# Patient Record
Sex: FEMALE | Race: WHITE | Employment: FULL TIME | URBAN - METROPOLITAN AREA
[De-identification: names, ages, dates, MRNs, and addresses within clinical notes are randomized per-mention and may not be internally consistent; named-entity substitution may affect disease eponyms.]

---

## 2018-07-16 ENCOUNTER — OFFICE VISIT (OUTPATIENT)
Dept: ORTHOPEDIC SURGERY | Age: 58
End: 2018-07-16

## 2018-07-16 VITALS — HEIGHT: 59 IN | WEIGHT: 105 LBS | BODY MASS INDEX: 21.17 KG/M2

## 2018-07-16 DIAGNOSIS — M75.01 ADHESIVE CAPSULITIS OF RIGHT SHOULDER: Primary | ICD-10-CM

## 2018-07-16 PROCEDURE — 99204 OFFICE O/P NEW MOD 45 MIN: CPT | Performed by: ORTHOPAEDIC SURGERY

## 2018-07-16 NOTE — PROGRESS NOTES
7/16/18  History of Present Illness:  Zonia Ponce is a 59 y.o. female here for 2nd opinion from a right shoulder problem    Location right Shoulder  Severity  Moderate  Duration more than 5 months  Associated sign/symptoms loss of motion, loss of strength, severe pain    I have reviewed and discussed the below Pain assessment findings with the patient. Medical History  Patient's medications, allergies, past medical, surgical, social and family histories were reviewed and updated as appropriate. Past Medical History:   Diagnosis Date    Cancer ([facility] [address]     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    Depression     Diabetes mellitus ([facility] [address]     Hypertension     Insulin pump in place      History reviewed. No pertinent family history. Social History     Social History    Marital status:      Spouse name: N/A    Number of children: N/A    Years of education: N/A     Social History Main Topics    Smoking status: Former Smoker     Types: Cigarettes     Quit date: 11/24/1980    Smokeless tobacco: Never Used    Alcohol use No    Drug use: No    Sexual activity: Not Asked     Other Topics Concern    None     Social History Narrative    None     Current Outpatient Prescriptions   Medication Sig Dispense Refill    losartan (COZAAR) 25 MG tablet Take 1 tablet by mouth daily. 30 tablet 3    pantoprazole (PROTONIX) 40 MG tablet Take 1 tablet by mouth 2 times daily. 60 tablet 1    Insulin Syringe-Needle U-100 ([employer] INSULIN SYRINGE) 31G X 5/16\" 0.5 ML MISC 100 each by Does not apply route. 100 each 6    trazodone (DESYREL) 50 MG tablet Take 50 mg by mouth nightly.  insulin aspart (NOVOLOG) 100 UNIT/ML injection Inject  into the skin continuous. [place] insulin pump        No current facility-administered medications for this visit.       Allergies   Allergen Reactions    Percocet [Oxycodone-Acetaminophen] Nausea And Vomiting       REVIEW OF SYSTEMS:   Pertinent items are noted in HPI  Review of systems Elbow motion finger and wrist motion is full equal bilaterally. Deep tendon reflexes of the Brachial radialis, biceps, tricepsAre all +2/4 equal bilaterally. Cervical spine range of motion is full without pain negative Spurling's test.  Load-and-shift test is negative. Crank test is negative. Apprehension and relocation is negative. Anterior and posterior glide are equal bilaterally. Negative sulcus sign. No signs of any significant multidirectional instability. There is no scapular winging. There is no muscle atrophy of the latissimus dorsi, the deltoid, the periscapular musculature,The trapezius musculature or the pectoralis musculature. Negative Neer's test, negative Russo test, no pain with crossarm elevation. Abduction --150 degrees  Flexion-- 180 degrees  Extension-- between 45-60 degrees  Latera/external  rotation --close to 90 degrees  Medial/ internal rotation -- between 70-90 degree    Cervical spine exam demonstrates no  Radiculopathy no reproduction of the symptomology. Range of motion is normal without pain or radiculopathy and does not cause shoulder pain. Cranial nerve exam:    1- smell-- patient states no Olfactory problem  2- visual acuity is intact  3- moves eyes, and pupils are reactive  4- extra-occular muscles are intact  5- facial sensation is intact no muscle atrophy  6- extra occular muscles are intact  7- mouth moist and facial expressions are intact  8- good hearing and no difficulty with recognition  9- patient has no difficulty swallowing  10- no difficulty breathing and no Gastrointestinal problems good cough   11- moves head with all motion and no swallowing problems  12- normal speech and tongue protrudes midline    Additional Examinations:  Left Upper Extremity: Examination of the left upper extremity does not show any tenderness, deformity or injury. Range of motion is unremarkable. There is no gross instability. There are no rashes, ulcerations or lesions. Strength and tone are normal.  Thoracic Spine: Examination of the thoracic spine does not show any tenderness, deformity or injury. Range of motion is unremarkable. There is no gross instability. There are no rashes, ulcerations or lesions. Strength and tone are normal.  Neck: Examination of the neck does not show any tenderness, deformity or injury. Range of motion is unremarkable. There is no gross instability. There are no rashes, ulcerations or lesions. Strength and tone are normal.        IMPRESSION:    Diagnostic testing:  MRI of the right shoulder demonstrates severe adhesive capsulitis synovitis and a full thickness rotator cuff tear    Impression no significant bony abnormality  MRI: As above  Labs: None          Past Surgical History:   Procedure Laterality Date    CHOLECYSTECTOMY      HYSTERECTOMY      SHOULDER SURGERY      UPPER GASTROINTESTINAL ENDOSCOPY  10/22/14   . Office Procedures:  No orders of the defined types were placed in this encounter. Previous Treatments:  MRI, physical therapy, X-ray, anti-inflammatories,    Differential Diagnoses: Impingement, AC joint osteoarthritis,  Rotator cuff tear, Labral tear, Instability, loose body,  Long head of bicep injury,  Glenohumeral osteoarthritis, AC joint separation, SLAP tear, Posterior labral tear, Anterior Labral tear, neck pathology, brachioplexis injury, muscle injury, neck radiculopathy, bone tumor, fracture,    Diagnosis she states her adhesive capsulitis got worse with physical therapy        Plan: (Medical Decision Making)    1. Medications - none at this time  2. PT - she is in therapy now  3. Further imaging - not necessary  4. Follow up - I spent 15+ minutes, face to face, with the patient discussing and answering questions regarding the risks, benefits, and complications of shoulder arthroscopy surgery in detail.  We talked about the arthroscopic nature of the procedure, the portals utilized and what can be done through

## 2018-07-26 ENCOUNTER — TELEPHONE (OUTPATIENT)
Dept: ORTHOPEDIC SURGERY | Age: 58
End: 2018-07-26

## 2018-08-27 DIAGNOSIS — M75.01 ADHESIVE CAPSULITIS OF RIGHT SHOULDER: Primary | ICD-10-CM

## 2018-08-27 RX ORDER — OXYCODONE HYDROCHLORIDE 10 MG/1
10 TABLET ORAL EVERY 8 HOURS PRN
Qty: 21 TABLET | Refills: 0 | Status: SHIPPED | OUTPATIENT
Start: 2018-08-31 | End: 2018-09-07

## 2018-08-27 RX ORDER — MELOXICAM 15 MG/1
15 TABLET ORAL DAILY
Qty: 30 TABLET | Refills: 3 | Status: SHIPPED | OUTPATIENT
Start: 2018-08-31

## 2018-08-31 ENCOUNTER — HOSPITAL ENCOUNTER (OUTPATIENT)
Dept: SURGERY | Age: 58
Discharge: HOME | End: 2018-08-31
Attending: ORTHOPAEDIC SURGERY | Admitting: ORTHOPAEDIC SURGERY

## 2018-08-31 VITALS
RESPIRATION RATE: 16 BRPM | OXYGEN SATURATION: 98 % | TEMPERATURE: 97.8 F | DIASTOLIC BLOOD PRESSURE: 72 MMHG | HEART RATE: 81 BPM | SYSTOLIC BLOOD PRESSURE: 129 MMHG

## 2018-08-31 LAB
GLUCOSE BLD-MCNC: 212 MG/DL (ref 70–99)
GLUCOSE BLD-MCNC: 285 MG/DL (ref 70–99)
PERFORMED ON: ABNORMAL
PERFORMED ON: ABNORMAL

## 2018-08-31 RX ORDER — DIPHENHYDRAMINE HYDROCHLORIDE 50 MG/ML
12.5 INJECTION INTRAMUSCULAR; INTRAVENOUS
Status: ACTIVE | OUTPATIENT
Start: 2018-08-31 | End: 2018-08-31

## 2018-08-31 RX ORDER — SODIUM CHLORIDE 0.9 % (FLUSH) 0.9 %
10 SYRINGE (ML) INJECTION EVERY 12 HOURS SCHEDULED
Status: DISCONTINUED | OUTPATIENT
Start: 2018-08-31 | End: 2018-09-01 | Stop reason: HOSPADM

## 2018-08-31 RX ORDER — OXYCODONE HYDROCHLORIDE 5 MG/1
5 TABLET ORAL PRN
Status: ACTIVE | OUTPATIENT
Start: 2018-08-31 | End: 2018-08-31

## 2018-08-31 RX ORDER — MEPERIDINE HYDROCHLORIDE 25 MG/ML
12.5 INJECTION INTRAMUSCULAR; INTRAVENOUS; SUBCUTANEOUS EVERY 5 MIN PRN
Status: DISCONTINUED | OUTPATIENT
Start: 2018-08-31 | End: 2018-09-01 | Stop reason: HOSPADM

## 2018-08-31 RX ORDER — SODIUM CHLORIDE 9 MG/ML
INJECTION, SOLUTION INTRAVENOUS CONTINUOUS
Status: DISCONTINUED | OUTPATIENT
Start: 2018-08-31 | End: 2018-09-01 | Stop reason: HOSPADM

## 2018-08-31 RX ORDER — OXYCODONE HYDROCHLORIDE 5 MG/1
10 TABLET ORAL PRN
Status: ACTIVE | OUTPATIENT
Start: 2018-08-31 | End: 2018-08-31

## 2018-08-31 RX ORDER — CEFAZOLIN SODIUM 2 G/100ML
2 INJECTION, SOLUTION INTRAVENOUS
Status: COMPLETED | OUTPATIENT
Start: 2018-08-31 | End: 2018-08-31

## 2018-08-31 RX ORDER — HYDROMORPHONE HCL 110MG/55ML
0.25 PATIENT CONTROLLED ANALGESIA SYRINGE INTRAVENOUS EVERY 5 MIN PRN
Status: DISCONTINUED | OUTPATIENT
Start: 2018-08-31 | End: 2018-09-01 | Stop reason: HOSPADM

## 2018-08-31 RX ORDER — SODIUM CHLORIDE 0.9 % (FLUSH) 0.9 %
10 SYRINGE (ML) INJECTION PRN
Status: DISCONTINUED | OUTPATIENT
Start: 2018-08-31 | End: 2018-09-01 | Stop reason: HOSPADM

## 2018-08-31 RX ORDER — PROMETHAZINE HYDROCHLORIDE 25 MG/ML
6.25 INJECTION, SOLUTION INTRAMUSCULAR; INTRAVENOUS PRN
Status: DISCONTINUED | OUTPATIENT
Start: 2018-08-31 | End: 2018-09-01 | Stop reason: HOSPADM

## 2018-08-31 RX ORDER — LABETALOL HYDROCHLORIDE 5 MG/ML
5 INJECTION, SOLUTION INTRAVENOUS EVERY 10 MIN PRN
Status: DISCONTINUED | OUTPATIENT
Start: 2018-08-31 | End: 2018-09-01 | Stop reason: HOSPADM

## 2018-08-31 RX ORDER — HYDROMORPHONE HCL 110MG/55ML
0.5 PATIENT CONTROLLED ANALGESIA SYRINGE INTRAVENOUS EVERY 5 MIN PRN
Status: DISCONTINUED | OUTPATIENT
Start: 2018-08-31 | End: 2018-09-01 | Stop reason: HOSPADM

## 2018-08-31 RX ORDER — FENTANYL CITRATE 50 UG/ML
50 INJECTION, SOLUTION INTRAMUSCULAR; INTRAVENOUS EVERY 5 MIN PRN
Status: DISCONTINUED | OUTPATIENT
Start: 2018-08-31 | End: 2018-09-01 | Stop reason: HOSPADM

## 2018-08-31 RX ADMIN — CEFAZOLIN SODIUM 2 G: 2 INJECTION, SOLUTION INTRAVENOUS at 07:43

## 2018-08-31 RX ADMIN — SODIUM CHLORIDE: 9 INJECTION, SOLUTION INTRAVENOUS at 07:07

## 2018-08-31 ASSESSMENT — PAIN DESCRIPTION - DESCRIPTORS: DESCRIPTORS: ACHING;SHARP;SHOOTING

## 2018-08-31 NOTE — PROGRESS NOTES
Discharge instructions reviewed with patient/responsible adult. All home medications have been reviewed, questions answered and patient and spouse verbalized understanding. Discharge instructions signed and copies given. Patient discharged  Per w/c with belongings.

## 2018-08-31 NOTE — ANESTHESIA POST-OP
[address] [facility] Anesthesiology  Post-Anesthesia Note       Name:  Jeannette Lion                                         Age:  59 y.o. MRN:  [id number]     Last Vitals & Oxygen Saturation: /72   Pulse 81   Temp 97.8 °F (36.6 °C) (Temporal)   Resp 16   LMP  (LMP Unknown) Comment: had a hysterectomy a long time ago  SpO2 98%   No data found. Level of consciousness:  Awake, alert    Respiratory: Respirations easy, no distress. Stable. Cardiovascular: Hemodynamically stable. Hydration: Adequate. PONV: Adequately managed. Post-op pain: Adequately controlled. Post-op assessment: Tolerated anesthetic well without complication. Complications:  None.     Josue Helms MD  August 31, 2018   2:46 PM

## 2018-08-31 NOTE — OP NOTE
bipolar both posterior superiorly and anteriorly. The undersurface rotator cuff fraying was removed 1st with a shaver then the edges were smoothed off with the bipolar. Any chondral surface fraying was removed either with the shaver, bipolar or probe. Following this we went ahead and did a standard anterior capsular release with a shaver and a bipolar once this was completely released I did a manipulation under anesthesia to get full range of motion full forward flexion full external rotation and full internal rotation full abduction. Once this was finished I felt very good about the range of motion and there was no labral tear or injury no rotator cuff tear. Having done the capsular release and knee manipulation under anesthesia I went ahead and did a irrigation and cauterized any leading tissue and removed all instruments from the glenohumeral joint itself. Following my standard diagnostic arthroscopy the cannula was removed from the glenohumeral joint. The blunt trocar was inserted into the cannula and through the posterior portal the cannula was entered into the subacromial space without any difficulty. Now under direct visualization we could see there was moderate bursitis, synovitis, a subacromial spur and distal clavicle spur. Under direct visualization I put a 18-gauge spinal needle laterally through the deltoid into the subacromial space, liking this location I made my portal with a sharp scalpel and a blunt trocar. At this point I entered the subacromial space with a shaver and I removed all the bursa tissue synovial tissue and tissue lining the subacromial joint space and also the bone spur and around the distal clavicle. I used the bipolar to remove all soft tissue from the undersurface bone spur and the distal clavicle spur.        I removed the bipolar tissue ablator and entered with the 5.5 mm  barrel bur and proceeded to perform a generous subacromial decompression through the lateral portal and through the posterior portal.      Next I went ahead and addressed the distal clavicle with a shaver and a bipolar through the lateral portal and then resecting 5 mm to the level of the subacromial decompression. I also used the shaver the bipolar and the 5.5 mm barrel bur to undermine the distal clavicle to make sure there was no impingement on the rotator cuff tissue. I now removed the 5.5 mm barrel bur from the lateral portal and I made an anterior portal with a blunt trocar and entered with a shaver then a bipolar and I finished off the distal clavicle to the capsule with a 5.5 mm barrel bur. The subacromial space was visualized through the anterior portal lateral portal and the posterior portal into bleeding tissue was cauterized any loose debris was removed with the shaver once all this was performed I removed all instruments from the subacromial space. At this point all instruments were removed from the shoulder each portal was closed with a simple interrupted suture. Each portal was covered with a sterile Band-Aid sterile gauze and ABD then tape. The patient was brought to recovery in stable condition. The Patient was given typewritten instructions for postoperative care. The patient was given exercises, pain medication, anti-inflammatory medication, a follow-up appointment in the office in 1 week. The patient was given instructions and a number to call if there is any concerns or problems. The patient will be discharged to home from the postoperative area when in stable condition and understands the instructions. _____________________         Radha Giraldo MS, DO         Orthopedic Surgeon          [facility] trained         Board-certified         Team Physician for [employer]

## 2018-08-31 NOTE — ANESTHESIA PRE-OP
[address] [facility] Anesthesiology  Pre-Anesthesia Evaluation/Consultation       Name:  Mariusz Acosta                                         Age:  59 y.o. MRN:    [id number]           Procedure (Scheduled): SHOULDER ARTHROSCOPY ROTATOR CUFF   Surgeon:     Dr. Mary Du DO     Allergies   Allergen Reactions    Percocet [Oxycodone-Acetaminophen] Nausea And Vomiting     Patient states she can take percocet     Patient Active Problem List   Diagnosis    DKA (diabetic ketoacidoses) ([address]    HTN (hypertension)    Depression    Hyponatremia    Nausea & vomiting    Duodenal erosion    Adhesive capsulitis of right shoulder     Past Medical History:   Diagnosis Date    Cancer ([facility] [address]     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    Depression     Diabetes mellitus ([facility] [address]     Hx of radiation therapy     melanoma right eye    Hypertension     Insulin pump in place     Prolonged emergence from general anesthesia     slow to wake up     Past Surgical History:   Procedure Laterality Date    CARPAL TUNNEL RELEASE Bilateral 12/2017    CHOLECYSTECTOMY      EYE SURGERY Right     biopsy    HYSTERECTOMY      LIPOMA RESECTION      SHOULDER SURGERY      UPPER GASTROINTESTINAL ENDOSCOPY  10/22/14     Social History   Substance Use Topics    Smoking status: Former Smoker     Packs/day: 1.00     Years: 10.00     Types: Cigarettes     Quit date: 11/24/1980    Smokeless tobacco: Never Used    Alcohol use No       Prior to Admission medications    Medication Sig Start Date End Date Taking? Authorizing Provider   meloxicam (MOBIC) 15 MG tablet Take 1 tablet by mouth daily 8/31/18   Mary Du DO   oxyCODONE HCl (OXY-IR) 10 MG immediate release tablet Take 1 tablet by mouth every 8 hours as needed for Pain for up to 7 days. Shaniqua Loera Date: 8/31/18 8/31/18 9/7/18  Mary Du DO   insulin regular (HUMULIN R) 100 UNIT/ML injection Inject into the skin See Admin Instructions Per insulin pump    Historical Provider, MD 5 mg Intravenous Q10 Min PRN Yanira Dozier MD        meperidine (DEMEROL) injection 12.5 mg  12.5 mg Intravenous Q5 Min PRN Yanira Dozier MD           Vital Signs  (Current) There were no vitals filed for this visit. (for past 48 hrs)  No Data Recorded  (last three values)   BP Readings from Last 3 Encounters:   10/24/14 138/67   11/24/12 155/80   05/29/12 150/85       CBC  Lab Results   Component Value Date    WBC 9.2 10/24/2014    RBC 4.17 10/24/2014    HGB 12.6 10/24/2014    HCT 37.0 10/24/2014    MCV 88.7 10/24/2014    RDW 12.8 10/24/2014     10/24/2014       CMP    Lab Results   Component Value Date     10/24/2014    K 3.1 10/24/2014    CL 97 10/24/2014    CO2 33 10/24/2014    BUN 5 10/24/2014    CREATININE 0.9 10/24/2014    GFRAA >60 10/24/2014    GFRAA >60 05/29/2012    AGRATIO 1.4 12/07/2011    LABGLOM >60 10/24/2014    GLUCOSE 52 10/24/2014    PROT 6.5 10/23/2014    PROT 7.8 05/25/2012    CALCIUM 9.1 10/24/2014    BILITOT 0.6 10/23/2014    ALKPHOS 105 10/23/2014    AST 15 10/23/2014    ALT 11 10/23/2014       BMP    Lab Results   Component Value Date     10/24/2014    K 3.1 10/24/2014    CL 97 10/24/2014    CO2 33 10/24/2014    BUN 5 10/24/2014    CREATININE 0.9 10/24/2014    CALCIUM 9.1 10/24/2014    GFRAA >60 10/24/2014    GFRAA >60 05/29/2012    LABGLOM >60 10/24/2014    GLUCOSE 52 10/24/2014       Coags   No results found for: PROTIME, INR, APTT    HCG (If Applicable)   Lab Results   Component Value Date    PREGTESTUR Neg 05/25/2012        ABGs  Lab Results   Component Value Date    PHART 7.291 10/19/2014    PO2ART 93.1 10/19/2014    OMC1APR 32.7 10/19/2014    FLO2TWJ 15.4 10/19/2014    BEART -10.1 10/19/2014    L2UYVPCZ 96.9 10/19/2014        Type & Screen (If Applicable)  No results found for: LABABO, LABRH      POCGlucose  No results for input(s): GLUCOSE in the last 72 hours.      NPO Status  > 8 hours                       BMI  There is no height or weight on file to calculate BMI. Estimated body mass index is 21.61 kg/m² as calculated from the following:    Height as of 8/27/18: 4' 11\" (1.499 m). Weight as of 8/27/18: 107 lb (48.5 kg). Additional Testing (Echo, Stress, ECG, PFTs, etc)        Anesthesia Evaluation    Airway: Mallampati: II  TM distance: >3 FB   Neck ROM: full  Mouth opening: > = 3 FB Dental:          Pulmonary:                              Cardiovascular:    (+) hypertension:,         Rhythm: regular  Rate: normal                    Neuro/Psych:   (+) psychiatric history:            GI/Hepatic/Renal:   (+) PUD,           Endo/Other:                     Abdominal:           Vascular:                                        Anesthesia Plan      general     ASA 2       Induction: intravenous. Anesthetic plan and risks discussed with patient. Plan discussed with CRNA. DOS STAFF ADDENDUM:    Pt seen and examined, chart reviewed (including anesthesia, drug and allergy history). No interval changes to history and physical examination. Anesthetic plan, risks, benefits, alternatives, and personnel involved discussed with patient. Patient verbalized an understanding and agrees to proceed.       Jennifer Lam MD  August 31, 2018  6:33 AM

## 2018-09-04 DIAGNOSIS — M75.01 ADHESIVE CAPSULITIS OF RIGHT SHOULDER: Primary | ICD-10-CM

## 2018-09-05 ENCOUNTER — HOSPITAL ENCOUNTER (OUTPATIENT)
Dept: PHYSICAL THERAPY | Age: 58
Discharge: HOME | End: 2018-09-06
Admitting: ORTHOPAEDIC SURGERY

## 2018-09-05 NOTE — FLOWSHEET NOTE
[address], [facility]    Physical Therapy Daily Treatment Note  Date:  2018    Patient Name:  Irina Santos    :  1960  MRN: [id number]  Restrictions/Precautions:    Medical/Treatment Diagnosis Information:  · Diagnosis: s/p R shoulder scope with capsular release  · Treatment Diagnosis: R shoulder pain  Insurance/Certification information:  PT Insurance Information: [place], deductible met, 100% coverage, $30 copay, 60 OP visits  Physician Information:  Referring Practitioner: Jen Arana DO  Plan of care signed (Y/N):     Date of Patient follow up with Physician:     G-Code (if applicable):      Date G-Code Applied:    PT G-Codes  Functional Assessment Tool Used: QuickDASH  Score: 48% disability  Functional Limitation: Carrying, moving and handling objects  Carrying, Moving and Handling Objects Current Status ():  At least 40 percent but less than 60 percent impaired, limited or restricted  Carrying, Moving and Handling Objects Goal Status (): 0 percent impaired, limited or restricted    Progress Note: [x]  Yes  []  No  Next due by: Visit #10      Latex Allergy:  [x]NO      []YES  Preferred Language for Healthcare:   [x]English       []other:    Visit # Insurance Allowable   1 60  $30 copay     Pain level:  3/10     SUBJECTIVE:  See eval    OBJECTIVE: See eval  Observation:   Test measurements:      RESTRICTIONS/PRECAUTIONS: SHANI    Exercises/Interventions:   Therapeutic Ex 10' Wt / Resistance Sets/sec Reps Notes   UBE       Cane AAROM flex  2 10 10x underhand   Wand ER  10\" 10           scap depression std  3\" 20    scap retraction  3\" 20    HBB strap stretch  10\" 10                                                                                        Manual Intervention 15'       Shld /GH Mobs  5 min     Post Cap mobs       Thoracic/Rib manipualtion       CT MT/Mobs       PROM MT  10 min                   NMR re-education       T-spine swelling/inflammation/restriction, improving soft tissue extensibility and allowing for proper ROM for normal function with self care, reaching, carrying, lifting, house/yardwork, driving/computer work    Modalities:  Cold pack with TENS x 15 min    Charges:  Timed Code Treatment Minutes: eval +35   Total Treatment Minutes: 60     [x] EVAL  [x] IC(48552) x  1   [] IONTO  [] NMR (81926) x      [] VASO  [x] Manual (72249) x  1    [] Other:  [] TA x       [] Mech Traction (99028)  [] ES(attended) (54849)      [x] ES (un) (46839):     GOALS:  Patient stated goal: less pain     Therapist goals for Patient:   Short Term Goals: To be achieved in: 2 weeks  1. Independent in HEP and progression per patient tolerance, in order to prevent re-injury. 2. Patient will have a decrease in pain to facilitate improvement in movement, function, and ADLs as indicated by Functional Deficits.     Long Term Goals: To be achieved in: 6 weeks  1. Disability index score of 10% or less for the DASH to assist with reaching prior level of function. 2. Patient will demonstrate increased AROM to [facility] to allow for proper joint functioning as indicated by patients Functional Deficits. 3. Patient will demonstrate an increase in Strength to 3+/5 to allow for proper functional mobility as indicated by patients Functional Deficits. 4. Patient will return to dressing/ADLs functional activities without increased symptoms or restriction. 5. Pt will return to reaching high level shelf at home without pain         Progression Towards Functional goals:  [] Patient is progressing as expected towards functional goals listed. [] Progression is slowed due to complexities listed. [] Progression has been slowed due to co-morbidities.   [x] Plan just implemented, too soon to assess goals progression  [] Other:     ASSESSMENT:  See eval    Treatment/Activity Tolerance:  [x] Patient tolerated treatment well [] Patient limited by santiago  [] Patient

## 2018-09-05 NOTE — PLAN OF CARE
(ROS):  [x]Performed Review of systems (Integumentary, CardioPulmonary, Neurological) by intake and observation. Intake form has been scanned into medical record. Patient has been instructed to contact their primary care physician regarding ROS issues if not already being addressed at this time. Co-morbidities/Complexities (which will affect course of rehabilitation):   []None           Arthritic conditions   []Rheumatoid arthritis (M05.9)  []Osteoarthritis (M19.91)   Cardiovascular conditions   []Hypertension (I10)  []Hyperlipidemia (E78.5)  []Angina pectoris (I20)  []Atherosclerosis (I70)   Musculoskeletal conditions   []Disc pathology   []Congenital spine pathologies   []Prior surgical intervention  []Osteoporosis (M81.8)  []Osteopenia (M85.8)   Endocrine conditions   []Hypothyroid (E03.9)  []Hyperthyroid Gastrointestinal conditions   []Constipation (F44.90)   Metabolic conditions   []Morbid obesity (E66.01)  [x]Diabetes type 1(E10.65) or 2 (E11.65)   []Neuropathy (G60.9)     Pulmonary conditions   []Asthma (J45)  []Coughing   []COPD (J44.9)   Psychological Disorders  []Anxiety (F41.9)  []Depression (F32.9)   []Other:   [x]Other:     H/o melanoma,      Barriers to/and or personal factors that will affect rehab potential:              []Age  []Sex              []Motivation/Lack of Motivation                        [x]Co-Morbidities              []Cognitive Function, education/learning barriers              []Environmental, home barriers              []profession/work barriers  []past PT/medical experience  []other:  Justification: DM     Falls Risk Assessment (30 days):   [x] Falls Risk assessed and no intervention required.   [] Falls Risk assessed and Patient requires intervention due to being higher risk   TUG score (>12s at risk):     [] Falls education provided, including       G-Codes:  PT G-Codes  Functional Assessment Tool Used: QuickDASH  Score: 48% disability  Functional Limitation: Carrying, moving and handling objects  Carrying, Moving and Handling Objects Current Status (): At least 40 percent but less than 60 percent impaired, limited or restricted  Carrying, Moving and Handling Objects Goal Status (): 0 percent impaired, limited or restricted    ASSESSMENT: s/p R shoulder SHANI and scope 8/31/18  Functional Impairments   [x]Noted spinal or UE joint hypomobility   []Noted spinal or UE joint hypermobility   [x]Decreased UE functional ROM   [x]Decreased UE functional strength   []Abnormal reflexes/sensation/myotomal/dermatomal deficits   [x]Decreased RC/scapular/core strength and neuromuscular control   []other:      Functional Activity Limitations (from functional questionnaire and intake)   []Reduced ability to tolerate prolonged functional positions   []Reduced ability or difficulty with changes of positions or transfers between positions   []Reduced ability to maintain good posture and demonstrate good body mechanics with sitting, bending, and lifting   [x] Reduced ability or tolerance with driving and/or computer work   [x]Reduced ability to sleep   [x]Reduced ability to perform lifting, reaching, carrying tasks   []Reduced ability to tolerate impact through UE   [x]Reduced ability to reach behind back   [x]Reduced ability to  or hold objects   []Reduced ability to throw or toss an object   []other:    Participation Restrictions   [x]Reduced participation in self care activities   [x]Reduced participation in home management activities   [x]Reduced participation in work activities   [x]Reduced participation in social activities. []Reduced participation in sport/recreation activities. Classification:   [x]Signs/symptoms consistent with post-surgical status including decreased ROM, strength and function.   []Signs/symptoms consistent with joint sprain/strain   []Signs/symptoms consistent with shoulder impingement   []Signs/symptoms consistent with shoulder/elbow/wrist

## 2018-09-06 ENCOUNTER — OFFICE VISIT (OUTPATIENT)
Dept: ORTHOPEDIC SURGERY | Age: 58
End: 2018-09-06

## 2018-09-06 VITALS — BODY MASS INDEX: 21.57 KG/M2 | WEIGHT: 107 LBS | HEIGHT: 59 IN

## 2018-09-06 DIAGNOSIS — M75.01 ADHESIVE CAPSULITIS OF RIGHT SHOULDER: Primary | ICD-10-CM

## 2018-09-06 PROCEDURE — 99024 POSTOP FOLLOW-UP VISIT: CPT | Performed by: ORTHOPAEDIC SURGERY

## 2018-09-06 NOTE — PROGRESS NOTES
History of Present of Illness:  S/P Shoulder Arthroscopy   The patient returns today for right shoulder evaluation 1 week after shoulder arthroscopy. Examination:  Inspection reveals warm, dry, intact skin. There is no adenopathy. The distal neurovascular exam is grossly intact. Examination of the contralateral shoulder reveals no atrophy or deformity. The skin is warm and dry. Range of motion is within normal limits. There is no focal tenderness with palpation. Provocative SLAP, biceps tension, apprehension AC joint or rotator cuff tests are negative. Strength is graded 5/5 in all muscle groups. The distal neurovascular exam is grossly intact. Cervical spine: The skin is warm and dry. There is no swelling, warmth, or erythema. Range of motion is within normal limits. There is no paraspinal or spinous process tenderness. Spurling's sign is negative and did not produce shoulder pain. The distal neurovascular exam is grossly intact. Diagnostic Test Findings:    No orders of the defined types were placed in this encounter. Treatment Plan:  She's doing therapy for her adhesive capsulitis doing very well        Disclaimer: \"This note was dictated with voice recognition software. Though review and correction are routine, we apologize for any errors. \"

## 2018-09-07 ENCOUNTER — HOSPITAL ENCOUNTER (OUTPATIENT)
Dept: PHYSICAL THERAPY | Age: 58
Discharge: HOME | End: 2018-09-08
Admitting: ORTHOPAEDIC SURGERY

## 2018-09-07 NOTE — FLOWSHEET NOTE
min    Charges:  Timed Code Treatment Minutes: 35   Total Treatment Minutes: 50     [] EVAL  [x] SC(89939) x  1   [] IONTO  [] NMR (14470) x      [] VASO  [x] Manual (50223) x  1    [] Other:  [] TA x       [] Mech Traction (60253)  [] ES(attended) (10798)      [x] ES (un) (39223):     GOALS:  Patient stated goal: less pain     Therapist goals for Patient:   Short Term Goals: To be achieved in: 2 weeks  1. Independent in HEP and progression per patient tolerance, in order to prevent re-injury. 2. Patient will have a decrease in pain to facilitate improvement in movement, function, and ADLs as indicated by Functional Deficits.     Long Term Goals: To be achieved in: 6 weeks  1. Disability index score of 10% or less for the DASH to assist with reaching prior level of function. 2. Patient will demonstrate increased AROM to [facility] to allow for proper joint functioning as indicated by patients Functional Deficits. 3. Patient will demonstrate an increase in Strength to 3+/5 to allow for proper functional mobility as indicated by patients Functional Deficits. 4. Patient will return to dressing/ADLs functional activities without increased symptoms or restriction. 5. Pt will return to reaching high level shelf at home without pain         Progression Towards Functional goals:  [] Patient is progressing as expected towards functional goals listed. [] Progression is slowed due to complexities listed. [] Progression has been slowed due to co-morbidities. [x] Plan just implemented, too soon to assess goals progression  [] Other:     ASSESSMENT:  Added exercises this date including theraband exercises and bent over rows. Continue to work on improving active strength and motion as well as passive motion toward end ranges for improved reaching, lifting, carrying, self care and household tasks with improved function and less pain.      Treatment/Activity Tolerance:  [x] Patient tolerated treatment well [] Patient limited

## 2018-09-13 ENCOUNTER — HOSPITAL ENCOUNTER (OUTPATIENT)
Dept: PHYSICAL THERAPY | Age: 58
Discharge: HOME | End: 2018-09-14
Admitting: ORTHOPAEDIC SURGERY

## 2018-09-13 NOTE — FLOWSHEET NOTE
[facility] [address], [employer]    Physical Therapy Daily Treatment Note  Date:  2018    Patient Name:  Anny Amado    :  1960  MRN: [id number]  Restrictions/Precautions:    Medical/Treatment Diagnosis Information:  · Diagnosis: s/p R shoulder scope with capsular release  · Treatment Diagnosis: R shoulder pain  Insurance/Certification information:  PT Insurance Information: [place], deductible met, 100% coverage, $30 copay, 60 OP visits  Physician Information:  Referring Practitioner: Terrell Tosacno DO  Plan of care signed (Y/N):     Date of Patient follow up with Physician:     G-Code (if applicable):      Date G-Code Applied:         Progress Note: [x]  Yes  []  No  Next due by: Visit #10      Latex Allergy:  [x]NO      []YES  Preferred Language for Healthcare:   [x]English       []other:    Visit # Insurance Allowable   4 60  $30 copay     Pain level:  3/10     SUBJECTIVE: A little sore today from cleaning house and was a little sore after last visit. No other issues with upgraded HEP last visit. Pt states feeling she has made about 80% improvement overall since starting therapy.       OBJECTIVE: See eval  Observation:   Test measurements:      RESTRICTIONS/PRECAUTIONS: SHANI    Exercises/Interventions:   Therapeutic Ex Wt / Resistance Sets/sec Reps Notes   pully  6'     Cane AAROM flex  2 10 10x underhand   Wand ER  10\" 10    Sleeper stretch  30\" 3    scap depression std  3\" 20    scap retraction  3\" 20    HBB strap stretch  20\" 5           theraband rows high/mid green 3 10    theraband extension green 3 10    Bent over rows  3 10           Prone extension  2 10    Prone HAB  2 10    Supine diagonals NPV                                  Manual Intervention       Shld /GH Mobs  5 min     Scapular mobs  5'     PROM MT  20 min            NMR re-education       Scapular isometrics  NPV     Body blade       Wall ball roll       Wall Ball bounce tasks and progress toward goals.      Treatment/Activity Tolerance:  [x] Patient tolerated treatment well [] Patient limited by fatique  [] Patient limited by pain  [] Patient limited by other medical complications  [] Other:     Prognosis: [x] Good [] Fair  [] Poor    Patient Requires Follow-up: [x] Yes  [] No    PLAN: See eval  [] Continue per plan of care [] Alter current plan (see comments)  [x] Plan of care initiated [] Hold pending MD visit [] Discharge    Electronically signed by: Karol Baldwin [id number]

## 2018-09-21 ENCOUNTER — HOSPITAL ENCOUNTER (OUTPATIENT)
Dept: PHYSICAL THERAPY | Age: 58
Discharge: HOME | End: 2018-09-22
Admitting: ORTHOPAEDIC SURGERY

## 2018-09-21 NOTE — FLOWSHEET NOTE
santiago  [] Patient limited by pain  [] Patient limited by other medical complications  [] Other:     Prognosis: [x] Good [] Fair  [] Poor    Patient Requires Follow-up: [x] Yes  [] No    PLAN: See eval  [] Continue per plan of care [] Alter current plan (see comments)  [x] Plan of care initiated [] Hold pending MD visit [] Discharge    Electronically signed by: Criselda Núñez [id number]

## 2018-09-27 ENCOUNTER — HOSPITAL ENCOUNTER (OUTPATIENT)
Dept: PHYSICAL THERAPY | Age: 58
Setting detail: THERAPIES SERIES
Discharge: HOME | End: 2018-09-27
Payer: COMMERCIAL

## 2018-09-27 NOTE — FLOWSHEET NOTE
[facility]    Physical Therapy  Cancellation/No-show Note  Patient Name:  Nancy Miller  :  1960   Date:  2018  Cancelled visits to date: 1  No-shows to date: 0    For today's appointment patient:  [x]  Cancelled  []  Rescheduled appointment  []  No-show     Reason given by patient:  []  Patient ill  []  Conflicting appointment  []  No transportation    []  Conflict with work  [x]  No reason given  []  Other:     Comments:      Electronically signed by:   Lexa Navarro PT

## 2018-10-15 ENCOUNTER — OFFICE VISIT (OUTPATIENT)
Dept: ORTHOPEDIC SURGERY | Age: 58
End: 2018-10-15

## 2018-10-15 VITALS
HEIGHT: 59 IN | SYSTOLIC BLOOD PRESSURE: 130 MMHG | BODY MASS INDEX: 21.6 KG/M2 | WEIGHT: 107.14 LBS | DIASTOLIC BLOOD PRESSURE: 77 MMHG

## 2018-10-15 DIAGNOSIS — M75.01 ADHESIVE CAPSULITIS OF RIGHT SHOULDER: Primary | ICD-10-CM

## 2018-10-15 PROCEDURE — 99024 POSTOP FOLLOW-UP VISIT: CPT | Performed by: ORTHOPAEDIC SURGERY

## 2019-09-20 ENCOUNTER — HOSPITAL ENCOUNTER (OUTPATIENT)
Dept: WOMENS IMAGING | Age: 59
Discharge: HOME | End: 2019-09-20
Payer: COMMERCIAL

## 2019-09-20 DIAGNOSIS — Z12.31 VISIT FOR SCREENING MAMMOGRAM: ICD-10-CM

## 2019-09-20 PROCEDURE — 77067 SCR MAMMO BI INCL CAD: CPT

## 2019-09-23 ENCOUNTER — HOSPITAL ENCOUNTER (OUTPATIENT)
Dept: ULTRASOUND IMAGING | Age: 59
Discharge: HOME | End: 2019-09-23
Payer: COMMERCIAL

## 2019-09-23 DIAGNOSIS — R92.8 ABNORMAL MAMMOGRAM: ICD-10-CM

## 2019-09-23 PROCEDURE — 76642 ULTRASOUND BREAST LIMITED: CPT

## 2019-09-24 ENCOUNTER — HOSPITAL ENCOUNTER (OUTPATIENT)
Dept: ULTRASOUND IMAGING | Age: 59
Discharge: HOME | End: 2019-09-24
Payer: COMMERCIAL

## 2019-09-24 ENCOUNTER — HOSPITAL ENCOUNTER (OUTPATIENT)
Dept: MAMMOGRAPHY | Age: 59
Discharge: HOME | End: 2019-09-24
Payer: COMMERCIAL

## 2019-09-24 DIAGNOSIS — N63.0 BREAST MASS: ICD-10-CM

## 2019-09-24 PROCEDURE — 88305 TISSUE EXAM BY PATHOLOGIST: CPT

## 2019-09-24 PROCEDURE — 2709999900 US BREAST BIOPSY W LOC DEVICE 1ST LESION LEFT

## 2019-09-24 PROCEDURE — 77065 DX MAMMO INCL CAD UNI: CPT

## 2019-10-21 ENCOUNTER — OFFICE VISIT (OUTPATIENT)
Dept: ORTHOPEDIC SURGERY | Age: 59
End: 2019-10-21
Payer: COMMERCIAL

## 2019-10-21 VITALS — HEIGHT: 59 IN | WEIGHT: 107.14 LBS | BODY MASS INDEX: 21.6 KG/M2

## 2019-10-21 DIAGNOSIS — M25.521 BILATERAL ELBOW JOINT PAIN: Primary | ICD-10-CM

## 2019-10-21 DIAGNOSIS — M25.522 BILATERAL ELBOW JOINT PAIN: Primary | ICD-10-CM

## 2019-10-21 PROCEDURE — MISCD86 TENNIS ELBOW STRAP-BREG: Performed by: ORTHOPAEDIC SURGERY

## 2019-10-21 PROCEDURE — 99214 OFFICE O/P EST MOD 30 MIN: CPT | Performed by: ORTHOPAEDIC SURGERY

## 2019-10-29 ENCOUNTER — HOSPITAL ENCOUNTER (OUTPATIENT)
Dept: PHYSICAL THERAPY | Age: 59
Setting detail: THERAPIES SERIES
Discharge: HOME | End: 2019-10-29
Payer: COMMERCIAL

## 2019-10-29 PROCEDURE — 97163 PT EVAL HIGH COMPLEX 45 MIN: CPT

## 2019-10-29 PROCEDURE — 97140 MANUAL THERAPY 1/> REGIONS: CPT

## 2019-10-29 PROCEDURE — 97032 APPL MODALITY 1+ESTIM EA 15: CPT

## 2019-10-29 PROCEDURE — 97110 THERAPEUTIC EXERCISES: CPT

## 2019-10-31 ENCOUNTER — HOSPITAL ENCOUNTER (OUTPATIENT)
Dept: PHYSICAL THERAPY | Age: 59
Setting detail: THERAPIES SERIES
Discharge: HOME | End: 2019-10-31
Payer: COMMERCIAL

## 2019-10-31 PROCEDURE — 97140 MANUAL THERAPY 1/> REGIONS: CPT

## 2019-10-31 PROCEDURE — 97110 THERAPEUTIC EXERCISES: CPT

## 2019-10-31 PROCEDURE — 97032 APPL MODALITY 1+ESTIM EA 15: CPT

## 2019-11-05 ENCOUNTER — HOSPITAL ENCOUNTER (OUTPATIENT)
Dept: PHYSICAL THERAPY | Age: 59
Setting detail: THERAPIES SERIES
Discharge: HOME | End: 2019-11-05
Payer: COMMERCIAL

## 2019-11-05 PROCEDURE — 97032 APPL MODALITY 1+ESTIM EA 15: CPT

## 2019-11-05 PROCEDURE — 97140 MANUAL THERAPY 1/> REGIONS: CPT

## 2019-11-05 PROCEDURE — 97110 THERAPEUTIC EXERCISES: CPT

## 2019-11-07 ENCOUNTER — HOSPITAL ENCOUNTER (OUTPATIENT)
Dept: PHYSICAL THERAPY | Age: 59
Setting detail: THERAPIES SERIES
Discharge: HOME | End: 2019-11-07
Payer: COMMERCIAL

## 2019-11-12 ENCOUNTER — HOSPITAL ENCOUNTER (OUTPATIENT)
Dept: PHYSICAL THERAPY | Age: 59
Setting detail: THERAPIES SERIES
Discharge: HOME | End: 2019-11-12
Payer: COMMERCIAL

## 2019-11-12 PROCEDURE — 97032 APPL MODALITY 1+ESTIM EA 15: CPT

## 2019-11-12 PROCEDURE — 97110 THERAPEUTIC EXERCISES: CPT

## 2019-11-12 PROCEDURE — 97140 MANUAL THERAPY 1/> REGIONS: CPT

## 2019-11-14 ENCOUNTER — APPOINTMENT (OUTPATIENT)
Dept: PHYSICAL THERAPY | Age: 59
End: 2019-11-14
Payer: COMMERCIAL

## 2019-11-19 ENCOUNTER — HOSPITAL ENCOUNTER (OUTPATIENT)
Dept: PHYSICAL THERAPY | Age: 59
Setting detail: THERAPIES SERIES
Discharge: HOME | End: 2019-11-19
Payer: COMMERCIAL

## 2019-11-21 ENCOUNTER — APPOINTMENT (OUTPATIENT)
Dept: PHYSICAL THERAPY | Age: 59
End: 2019-11-21
Payer: COMMERCIAL

## 2019-11-26 ENCOUNTER — HOSPITAL ENCOUNTER (OUTPATIENT)
Dept: PHYSICAL THERAPY | Age: 59
Setting detail: THERAPIES SERIES
Discharge: HOME | End: 2019-11-26
Payer: COMMERCIAL

## 2019-11-26 PROCEDURE — 97110 THERAPEUTIC EXERCISES: CPT

## 2019-11-26 PROCEDURE — 97140 MANUAL THERAPY 1/> REGIONS: CPT

## 2019-12-03 ENCOUNTER — HOSPITAL ENCOUNTER (OUTPATIENT)
Dept: PHYSICAL THERAPY | Age: 59
Setting detail: THERAPIES SERIES
Discharge: HOME | End: 2019-12-03
Payer: COMMERCIAL

## 2019-12-05 ENCOUNTER — HOSPITAL ENCOUNTER (OUTPATIENT)
Dept: PHYSICAL THERAPY | Age: 59
Setting detail: THERAPIES SERIES
Discharge: HOME | End: 2019-12-05
Payer: COMMERCIAL

## 2019-12-05 PROCEDURE — 97032 APPL MODALITY 1+ESTIM EA 15: CPT

## 2019-12-05 PROCEDURE — 97140 MANUAL THERAPY 1/> REGIONS: CPT

## 2019-12-05 PROCEDURE — 97110 THERAPEUTIC EXERCISES: CPT

## 2019-12-10 ENCOUNTER — HOSPITAL ENCOUNTER (OUTPATIENT)
Dept: PHYSICAL THERAPY | Age: 59
Setting detail: THERAPIES SERIES
Discharge: HOME | End: 2019-12-10
Payer: COMMERCIAL

## 2019-12-17 ENCOUNTER — HOSPITAL ENCOUNTER (OUTPATIENT)
Dept: PHYSICAL THERAPY | Age: 59
Setting detail: THERAPIES SERIES
Discharge: HOME | End: 2019-12-17
Payer: COMMERCIAL

## 2019-12-17 PROCEDURE — 97110 THERAPEUTIC EXERCISES: CPT

## 2019-12-17 PROCEDURE — 97140 MANUAL THERAPY 1/> REGIONS: CPT

## 2019-12-19 ENCOUNTER — HOSPITAL ENCOUNTER (OUTPATIENT)
Dept: PHYSICAL THERAPY | Age: 59
Setting detail: THERAPIES SERIES
Discharge: HOME | End: 2019-12-19
Payer: COMMERCIAL

## 2019-12-19 PROCEDURE — 97140 MANUAL THERAPY 1/> REGIONS: CPT

## 2019-12-19 PROCEDURE — 97110 THERAPEUTIC EXERCISES: CPT

## 2019-12-19 PROCEDURE — 97032 APPL MODALITY 1+ESTIM EA 15: CPT

## 2019-12-23 ENCOUNTER — OFFICE VISIT (OUTPATIENT)
Dept: ORTHOPEDIC SURGERY | Age: 59
End: 2019-12-23
Payer: COMMERCIAL

## 2019-12-23 VITALS — WEIGHT: 107 LBS | BODY MASS INDEX: 21.57 KG/M2 | HEIGHT: 59 IN

## 2019-12-23 DIAGNOSIS — M25.522 BILATERAL ELBOW JOINT PAIN: Primary | ICD-10-CM

## 2019-12-23 DIAGNOSIS — M25.521 BILATERAL ELBOW JOINT PAIN: Primary | ICD-10-CM

## 2019-12-23 PROCEDURE — 99214 OFFICE O/P EST MOD 30 MIN: CPT | Performed by: ORTHOPAEDIC SURGERY

## 2019-12-23 RX ORDER — PREDNISONE 10 MG/1
TABLET ORAL
Qty: 30 TABLET | Refills: 0 | Status: SHIPPED | OUTPATIENT
Start: 2019-12-23

## 2019-12-26 ENCOUNTER — HOSPITAL ENCOUNTER (OUTPATIENT)
Dept: PHYSICAL THERAPY | Age: 59
Setting detail: THERAPIES SERIES
Discharge: HOME | End: 2019-12-26
Payer: COMMERCIAL

## 2019-12-26 PROCEDURE — 97110 THERAPEUTIC EXERCISES: CPT

## 2019-12-26 PROCEDURE — 97032 APPL MODALITY 1+ESTIM EA 15: CPT

## 2019-12-26 PROCEDURE — 97140 MANUAL THERAPY 1/> REGIONS: CPT

## 2019-12-31 ENCOUNTER — HOSPITAL ENCOUNTER (OUTPATIENT)
Dept: PHYSICAL THERAPY | Age: 59
Setting detail: THERAPIES SERIES
Discharge: HOME | End: 2019-12-31
Payer: COMMERCIAL

## 2019-12-31 PROCEDURE — 97140 MANUAL THERAPY 1/> REGIONS: CPT

## 2019-12-31 PROCEDURE — 97110 THERAPEUTIC EXERCISES: CPT

## 2019-12-31 NOTE — FLOWSHEET NOTE
Home Exercise Program:    [x] (33211) Reviewed/Progressed HEP activities related to strengthening, flexibility, endurance, ROM of scapular, scapulothoracic and UE control with self care, reaching, carrying, lifting, house/yardwork, driving/computer work  [] (52067) Reviewed/Progressed HEP activities related to improving balance, coordination, kinesthetic sense, posture, motor skill, proprioception of scapular, scapulothoracic and UE control with self care, reaching, carrying, lifting, house/yardwork, driving/computer work      Manual Treatments:  PROM / STM / Oscillations-Mobs:  G-I, II, III, IV (PA's, Inf., Post.)  [x] (16115) Provided manual therapy to mobilize soft tissue/joints of cervical/CT, scapular GHJ and UE for the purpose of modulating pain, promoting relaxation,  increasing ROM, reducing/eliminating soft tissue swelling/inflammation/restriction, improving soft tissue extensibility and allowing for proper ROM for normal function with self care, reaching, carrying, lifting, house/yardwork, driving/computer work    Spoke with  regarding the use of Dry Needling       Modalities:  None. Charges:  Timed Code Treatment Minutes: 38   Total Treatment Minutes: 48       [] EVAL (LOW) 24046 (typically 20 minutes face-to-face)  [] EVAL (MOD) 07644 (typically 30 minutes face-to-face)  [] EVAL (HIGH) 21547 (typically 45 minutes face-to-face)  [] RE-EVAL     [x] GX(54132) x 1    [] IONTO (04984)  [] NMR (99731) x     [] VASO (84478)   [x] Manual (73986) x 1   [] Other:  [] TA (53627)x     [] Mech Traction (59179)  [] ES(attended) (18141)     [] ES (un) (89107): If [facility] Please Indicate Time In/Out  CPT Code Time in Time out                                     GOALS:  Patient stated goal: Get back to performing daily functions pain free  [x] Progressing: [] Met: [] Not Met: [] Adjusted     Therapist goals for Patient:   Short Term Goals: To be achieved in: 2 weeks  1.  Independent in HEP and progression Stages   []  Not Ready for Return to Sports   Comments:      Treatment/Activity Tolerance:  [x] Patient tolerated treatment well [] Patient limited by fatique  [] Patient limited by pain  [] Patient limited by other medical complications  [] Other:     Overall Progression Towards Functional goals/ Treatment Progress Update:  [x] Patient is progressing as expected towards functional goals listed. [] Progression is slowed due to complexities/Impairments listed. [] Progression has been slowed due to co-morbidities. [] Plan just implemented, too soon to assess goals progression <30days   [] Goals require adjustment due to lack of progress  [] Patient is not progressing as expected and requires additional follow up with physician  [] Other    Prognosis for POC: [x] Good [] Fair  [] Poor    Patient requires continued skilled intervention: [x] Yes  [] No      PLAN: Address wrist ROM deficits and pain with manual therapy techniques and stretching. Address underlying proximal mobility and strength deficits to address underlying cause of sxs. [x] Continue per plan of care [] Alter current plan (see comments)  [] Plan of care initiated [] Hold pending MD visit [] Discharge    Electronically signed by: Ainsley Uribe, PT     Note: If patient does not return for scheduled/recommended follow up visits, this note will serve as a discharge from care along with the most recent update on progress.

## 2020-01-03 ENCOUNTER — HOSPITAL ENCOUNTER (OUTPATIENT)
Dept: PHYSICAL THERAPY | Age: 60
Setting detail: THERAPIES SERIES
Discharge: HOME | End: 2020-01-03
Payer: COMMERCIAL

## 2020-01-03 PROCEDURE — 97140 MANUAL THERAPY 1/> REGIONS: CPT

## 2020-01-03 PROCEDURE — 97110 THERAPEUTIC EXERCISES: CPT

## 2020-01-03 NOTE — FLOWSHEET NOTE
[facility]    Physical Therapy Treatment Note/ Progress Report:     Date:  1/3/2020    Patient Name:  Bhavesh Childers    :  1960  MRN: [id number]  Restrictions/Precautions:    Medical/Treatment Diagnosis Information:  · Diagnosis: Bilateral elbow pain  · Treatment Diagnosis: Bilateral elbow pain  Insurance/Certification information:  PT Insurance Information: [place] - 60 visits  Physician Information:  Referring Practitioner: Cora Vasquez  Plan of care signed (Y/N):     Date of Patient follow up with Physician:      Progress Report: []  Yes  [x]  No     Date Range for reporting period:  Beginning:  10/29/19  Ending:  NA    Progress report due (10 Rx/or 30 days whichever is less): 47     Recertification due (POC duration/ or 90 days whichever is less): 20     Visit # Insurance Allowable Auth Needed   11 [place] - 60 visits []Yes    [x]No     Pain level:  8-9/10 on L only    SUBJECTIVE:  Reports 8-9/10 pain localized to L lateral elbow starting 2-3 hours following previous session primarily with gripping activities and activities where L elbow is fully extended. OBJECTIVE:    Observation:    Test measurements: Median Nn Tension Test (R/L):  +/+ Reproduction of patient's sxs in forearm and hand on L. Ulnar Nn Tension Test (R/L):  -/+       (1/3/20):  Cervical radiculopathy cluster:  -  1st rib mobility:  Restricted on the L. No reproduction of L UE sxs. 2nd rib mobility:  Restricted on the L. No reproduction of L UE sxs. Scalenes:  Increased tightness/tone on L. No reproduction of L UE sxs. No change in L lateral elbow sxs with passive radial glide and inferior shoulder glide. No change in sxs in L lateral elbow with passive radial glide and posterior shoulder glide. RESTRICTIONS/PRECAUTIONS: Type I diabetes - uses insulin pump to improve regulation.  Cancer/tumor - eye melanoma treated with plaque radiation in remission flexibility, endurance, ROM  for improvements in scapular, scapulothoracic and UE control with self care, reaching, carrying, lifting, house/yardwork, driving/computer work.    [] (75807) Provided verbal/tactile cueing for activities related to improving balance, coordination, kinesthetic sense, posture, motor skill, proprioception  to assist with  scapular, scapulothoracic and UE control with self care, reaching, carrying, lifting, house/yardwork, driving/computer work. Therapeutic Activities:    [] (43368 or 88162) Provided verbal/tactile cueing for activities related to improving balance, coordination, kinesthetic sense, posture, motor skill, proprioception and motor activation to allow for proper function of scapular, scapulothoracic and UE control with self care, carrying, lifting, driving/computer work.      Home Exercise Program:    [x] (45049) Reviewed/Progressed HEP activities related to strengthening, flexibility, endurance, ROM of scapular, scapulothoracic and UE control with self care, reaching, carrying, lifting, house/yardwork, driving/computer work  [] (55635) Reviewed/Progressed HEP activities related to improving balance, coordination, kinesthetic sense, posture, motor skill, proprioception of scapular, scapulothoracic and UE control with self care, reaching, carrying, lifting, house/yardwork, driving/computer work      Manual Treatments:  PROM / STM / Oscillations-Mobs:  G-I, II, III, IV (PA's, Inf., Post.)  [x] (32695) Provided manual therapy to mobilize soft tissue/joints of cervical/CT, scapular GHJ and UE for the purpose of modulating pain, promoting relaxation,  increasing ROM, reducing/eliminating soft tissue swelling/inflammation/restriction, improving soft tissue extensibility and allowing for proper ROM for normal function with self care, reaching, carrying, lifting, house/yardwork, driving/computer work    Spoke with  regarding the use of Dry Needling       Modalities: None.    Charges:  Timed Code Treatment Minutes: 40   Total Treatment Minutes: 40       [] EVAL (LOW) 80017 (typically 20 minutes face-to-face)  [] EVAL (MOD) 32390 (typically 30 minutes face-to-face)  [] EVAL (HIGH) 94890 (typically 45 minutes face-to-face)  [] RE-EVAL     [x] KA(20182) x 1    [] IONTO (03568)  [] NMR (66179) x     [] VASO (66317)   [x] Manual (39023) x 2   [] Other:  [] TA (69106)x     [] Mech Traction (42804)  [] ES(attended) (81794)     [] ES (un) (53962): If [facility] Please Indicate Time In/Out  CPT Code Time in Time out                                     GOALS:  Patient stated goal: Get back to performing daily functions pain free  [x] Progressing: [] Met: [] Not Met: [] Adjusted     Therapist goals for Patient:   Short Term Goals: To be achieved in: 2 weeks  1. Independent in HEP and progression per patient tolerance, in order to prevent re-injury. [x] Progressing: [] Met: [] Not Met: [] Adjusted  2. Patient will have a decrease in pain to facilitate improvement in movement, function, and ADLs as indicated by Functional Deficits. [x] Progressing: [] Met: [] Not Met: [] Adjusted     Long Term Goals: To be achieved in: 4 weeks  1. Disability index score of 25% or less for the DASH to assist with reaching prior level of function. [x] Progressing: [] Met: [] Not Met: [] Adjusted  2. Patient will demonstrate an increase in bilateral shoulder strength to allow for proper functional mobility as indicated by patients Functional Deficits. [x] Progressing: [] Met: [] Not Met: [] Adjusted  3. Patient will be able to perform all bathing/dressing tasks and heavy household chores including vacuuming, opening tight jars, etc. without increased symptoms or restriction. [x] Progressing: [] Met: [] Not Met: [] Adjusted  4. Patient will be able to perform all crafting/painting activities as part of second job without increased sxs or restriction.    [x] Progressing: [] Met: [] Not Met: [] Adjusted

## 2020-01-10 ENCOUNTER — HOSPITAL ENCOUNTER (OUTPATIENT)
Dept: PHYSICAL THERAPY | Age: 60
Setting detail: THERAPIES SERIES
Discharge: HOME | End: 2020-01-10
Payer: COMMERCIAL

## 2020-01-10 PROCEDURE — 97110 THERAPEUTIC EXERCISES: CPT

## 2020-01-10 PROCEDURE — 97140 MANUAL THERAPY 1/> REGIONS: CPT

## 2020-01-10 NOTE — FLOWSHEET NOTE
L&R wrist PROM/stretching np      L&R wrist joint mobs - dorsal, ventral np   To address wrist flexion/extension deficits   L elbow PROM/stretching np      IASTM/STMob to L common wrist extensor tendon, along ulnar nerve tract np   To L pronator teres with L elbow fully extended and pronated   R shoulder PROM np      Thoracic PA joint mobs/manips   5 min Grade 2-3, 5   Passive radial nerve glides   4 min    Radial head mobs w/gripping - lateral mob, distraction   4 min Increased p! To L lateral elbow and forearm   1st rib mobs - seated, supine   4 min Grade 2-3   2nd rib mobs - prone   4 min Grade 2-3   C7 PA glides - prone w/head slightly flexed   8 min Grade 2-3          NMR Re-education  Min:  0       T-spine Ext       GH depres/compress       Eliza Scap Bio       Scap/GH NMR       Body blade       Wall ball roll       Wall Ball bounce              Therapeutic Activity  Min:  0                         Therapeutic Exercise and NMR EXR  [x] (08436) Provided verbal/tactile cueing for activities related to strengthening, flexibility, endurance, ROM  for improvements in scapular, scapulothoracic and UE control with self care, reaching, carrying, lifting, house/yardwork, driving/computer work.    [] (91293) Provided verbal/tactile cueing for activities related to improving balance, coordination, kinesthetic sense, posture, motor skill, proprioception  to assist with  scapular, scapulothoracic and UE control with self care, reaching, carrying, lifting, house/yardwork, driving/computer work. Therapeutic Activities:    [] (08368 or 69133) Provided verbal/tactile cueing for activities related to improving balance, coordination, kinesthetic sense, posture, motor skill, proprioception and motor activation to allow for proper function of scapular, scapulothoracic and UE control with self care, carrying, lifting, driving/computer work.      Home Exercise Program:    [x] (52062) Reviewed/Progressed HEP activities related to strengthening, flexibility, endurance, ROM of scapular, scapulothoracic and UE control with self care, reaching, carrying, lifting, house/yardwork, driving/computer work  [] (27515) Reviewed/Progressed HEP activities related to improving balance, coordination, kinesthetic sense, posture, motor skill, proprioception of scapular, scapulothoracic and UE control with self care, reaching, carrying, lifting, house/yardwork, driving/computer work      Manual Treatments:  PROM / STM / Oscillations-Mobs:  G-I, II, III, IV (PA's, Inf., Post.)  [x] (34626) Provided manual therapy to mobilize soft tissue/joints of cervical/CT, scapular GHJ and UE for the purpose of modulating pain, promoting relaxation,  increasing ROM, reducing/eliminating soft tissue swelling/inflammation/restriction, improving soft tissue extensibility and allowing for proper ROM for normal function with self care, reaching, carrying, lifting, house/yardwork, driving/computer work          Modalities:  None. Charges:  Timed Code Treatment Minutes: 35   Total Treatment Minutes: 35       [] EVAL (LOW) 81943 (typically 20 minutes face-to-face)  [] EVAL (MOD) 43403 (typically 30 minutes face-to-face)  [] EVAL (HIGH) 63176 (typically 45 minutes face-to-face)  [] RE-EVAL     [x] JQ(33823) x 1    [] IONTO (90579)  [] NMR (97144) x     [] VASO (83954)   [x] Manual (21735) x 1   [] Other:  [] TA (53948)x     [] Mech Traction (14269)  [] ES(attended) (44623)     [] ES (un) (64463): If [facility] Please Indicate Time In/Out  CPT Code Time in Time out                                     GOALS:  Patient stated goal: Get back to performing daily functions pain free  [x] Progressing: [] Met: [] Not Met: [] Adjusted     Therapist goals for Patient:   Short Term Goals: To be achieved in: 2 weeks  1. Independent in HEP and progression per patient tolerance, in order to prevent re-injury. [x] Progressing: [] Met: [] Not Met: [] Adjusted  2.  Patient will have a decrease in

## 2020-01-14 ENCOUNTER — HOSPITAL ENCOUNTER (OUTPATIENT)
Dept: PHYSICAL THERAPY | Age: 60
Setting detail: THERAPIES SERIES
Discharge: HOME | End: 2020-01-14
Payer: COMMERCIAL

## 2020-01-14 NOTE — FLOWSHEET NOTE
[employer] [facility] [employer]    Physical Therapy Treatment Note/ Progress Report:     Date:  2020    Patient Name:  Radha Borges    :  1960  MRN: [id number]  Restrictions/Precautions:    Medical/Treatment Diagnosis Information:  · Diagnosis: Bilateral elbow pain  · Treatment Diagnosis: Bilateral elbow pain  Insurance/Certification information:  PT Insurance Information: [facility] - 60 visits  Physician Information:  Referring Practitioner: Luis Livingston  Plan of care signed (Y/N):     Date of Patient follow up with Physician:      Progress Report: []  Yes  [x]  No     Date Range for reporting period:  Beginning:  10/29/19  Ending:  NA    Progress report due (10 Rx/or 30 days whichever is less): 66     Recertification due (POC duration/ or 90 days whichever is less): 20     Visit # Insurance Allowable Auth Needed   13 [facility] - 60 visits []Yes    [x]No     Pain level:  6-7/10    SUBJECTIVE:  States that her L UE sxs have not changed much since previous session. Reports compliance with performance of chin tucks seated when she is driving and while sitting at her desk throughout the day, but patient does not feel these have changed her sxs at all. Performance of chin tucks has only made the back of her shoulders and neck sore. OBJECTIVE:    Observation:    Test measurements: Median Nn Tension Test (R/L):  +/+ Reproduction of patient's sxs in forearm and hand on L. Ulnar Nn Tension Test (R/L):  -/+       (1/3/20):  Cervical radiculopathy cluster:  -  1st rib mobility:  Restricted on the L. No reproduction of L UE sxs. 2nd rib mobility:  Restricted on the L. No reproduction of L UE sxs. Scalenes:  Increased tightness/tone on L. No reproduction of L UE sxs. No change in L lateral elbow sxs with passive radial glide and inferior shoulder glide.   No change in sxs in L lateral elbow with passive radial glide and posterior shoulder glide.    (1/10/20):  Passive L wrist ROM screen:  ROM restricted with L wrist extension. No reproduction of L UE sxs with passive movement; only reproduced with active L wrist flexion to end range. Passive L elbow ROM screen:  ROM normal. No reproduction of L UE sxs with passive movement; only reproduced with active L elbow extension, pronation/supination. Cervical ROM screen:  ROM normal. Somewhat restricted in extension, but no reproduction of sxs in L UE even with overpressure in all directions. C5-T4 PA glide restricted/stiff. C7 PA glide improved intensity of L UE sxs when rechecked in seated position when PA glide performed prone with patient's head in flexed position. RESTRICTIONS/PRECAUTIONS: Type I diabetes - uses insulin pump to improve regulation. Cancer/tumor - eye melanoma treated with plaque radiation in remission for 8 years. Bilateral carpal tunnel December 2018. L SLAP repair >5 years ago. R shoulder SHANI for adhesive capsulitis 2018. Exercises/Interventions:   Therapeutic Exercise  Min:  0 Wt / Resistance Sets/sec Reps Notes   Wrist flexion stretch  10 seconds 10 P! Seated serratus punches    x10    Wrist extension stretch  10 seconds 10 HEP   Prone Y & T isometric holds - 1# dumbbell  5 seconds 10 each position Verbal/tactile cueing to improve scapular position/mechanics     Prone rows & extensions - no resistance  np     TB pulldowns blue 5 seconds 10    TB rows blue 5 seconds 10    TB IR/ER blue  20    Median nerve tensioners   20  Bilaterally    Ball on wall OH flex/abd -  Green ball   20 Cw/ccw bilaterally   Wall clocks orange  10 Bilaterally   Radial nerve glides - standing - hand fixed, trunk moves for decreased nerve irritation   5 min    Cervical retractions seated w/self overpressure to chin   20    Patient education. 5 min HEP   Tests and measures. 10 min Assessment. See objective measures above.                  Manual Therapy  Min:  0       L&R wrist endurance, ROM of scapular, scapulothoracic and UE control with self care, reaching, carrying, lifting, house/yardwork, driving/computer work  [] (85378) Reviewed/Progressed HEP activities related to improving balance, coordination, kinesthetic sense, posture, motor skill, proprioception of scapular, scapulothoracic and UE control with self care, reaching, carrying, lifting, house/yardwork, driving/computer work      Manual Treatments:  PROM / STM / Oscillations-Mobs:  G-I, II, III, IV (PA's, Inf., Post.)  [x] (09110) Provided manual therapy to mobilize soft tissue/joints of cervical/CT, scapular GHJ and UE for the purpose of modulating pain, promoting relaxation,  increasing ROM, reducing/eliminating soft tissue swelling/inflammation/restriction, improving soft tissue extensibility and allowing for proper ROM for normal function with self care, reaching, carrying, lifting, house/yardwork, driving/computer work          Modalities:  None. Charges:  Timed Code Treatment Minutes: 0   Total Treatment Minutes: 15       [] EVAL (LOW) 67248 (typically 20 minutes face-to-face)  [] EVAL (MOD) 65215 (typically 30 minutes face-to-face)  [] EVAL (HIGH) 70608 (typically 45 minutes face-to-face)  [] RE-EVAL     [] DU(46474) x   [] IONTO (80330)  [] NMR (84168) x     [] VASO (15425)   [] Manual (03902) x     [] Other:  [] TA (62997)x     [] Mech Traction (62098)  [] ES(attended) (36655)     [] ES (un) (33757): If BW Please Indicate Time In/Out  CPT Code Time in Time out                                     GOALS:  Patient stated goal: Get back to performing daily functions pain free  [x] Progressing: [] Met: [] Not Met: [] Adjusted     Therapist goals for Patient:   Short Term Goals: To be achieved in: 2 weeks  1. Independent in HEP and progression per patient tolerance, in order to prevent re-injury. [x] Progressing: [] Met: [] Not Met: [] Adjusted  2.  Patient will have a decrease in pain to facilitate improvement in Plyometrics and Intro to Throwing   []  Stage 4: Sport specific Training/Return to Sport     []  Ready to Return to Play, [employer]   []  Not Ready for Return to Sports   Comments:      Treatment/Activity Tolerance:  [x] Patient tolerated treatment well [] Patient limited by fatique  [] Patient limited by pain  [] Patient limited by other medical complications  [] Other:     Overall Progression Towards Functional goals/ Treatment Progress Update:  [x] Patient is progressing as expected towards functional goals listed. [] Progression is slowed due to complexities/Impairments listed. [] Progression has been slowed due to co-morbidities. [] Plan just implemented, too soon to assess goals progression <30days   [] Goals require adjustment due to lack of progress  [] Patient is not progressing as expected and requires additional follow up with physician  [] Other    Prognosis for POC: [x] Good [] Fair  [] Poor    Patient requires continued skilled intervention: [x] Yes  [] No      PLAN:   [x] Continue per plan of care [] Alter current plan (see comments)  [] Plan of care initiated [] Hold pending MD visit [] Discharge    Electronically signed by: Rosa M Vail PT     Note: If patient does not return for scheduled/recommended follow up visits, this note will serve as a discharge from care along with the most recent update on progress.

## 2020-01-16 ENCOUNTER — HOSPITAL ENCOUNTER (OUTPATIENT)
Dept: PHYSICAL THERAPY | Age: 60
Setting detail: THERAPIES SERIES
Discharge: HOME | End: 2020-01-16
Payer: COMMERCIAL

## 2020-01-16 NOTE — FLOWSHEET NOTE
ROM restricted with L wrist extension. No reproduction of L UE sxs with passive movement; only reproduced with active L wrist flexion to end range. Passive L elbow ROM screen:  ROM normal. No reproduction of L UE sxs with passive movement; only reproduced with active L elbow extension, pronation/supination. Cervical ROM screen:  ROM normal. Somewhat restricted in extension, but no reproduction of sxs in L UE even with overpressure in all directions. C5-T4 PA glide restricted/stiff. C7 PA glide improved intensity of L UE sxs when rechecked in seated position when PA glide performed prone with patient's head in flexed position. RESTRICTIONS/PRECAUTIONS: Type I diabetes - uses insulin pump to improve regulation. Cancer/tumor - eye melanoma treated with plaque radiation in remission for 8 years. Bilateral carpal tunnel December 2018. L SLAP repair >5 years ago. R shoulder SHANI for adhesive capsulitis 2018. Exercises/Interventions:   Therapeutic Exercise  Min:  0 Wt / Resistance Sets/sec Reps Notes   Wrist flexion stretch  10 seconds 10 P! Seated serratus punches    x10    Wrist extension stretch  10 seconds 10 HEP   Prone Y & T isometric holds - 1# dumbbell  5 seconds 10 each position Verbal/tactile cueing to improve scapular position/mechanics     Prone rows & extensions - no resistance  np     TB pulldowns blue 5 seconds 10    TB rows blue 5 seconds 10    TB IR/ER blue  20    Median nerve tensioners   20  Bilaterally    Ball on wall OH flex/abd -  Green ball   20 Cw/ccw bilaterally   Wall clocks orange  10 Bilaterally   Radial nerve glides - standing - hand fixed, trunk moves for decreased nerve irritation   5 min    Cervical retractions seated w/self overpressure to chin   20    Patient education. 5 min HEP   Tests and measures. 10 min Assessment. See objective measures above.                  Manual Therapy  Min:  0       L&R wrist PROM/stretching np      L&R wrist joint mobs - dorsal, ventral np care, reaching, carrying, lifting, house/yardwork, driving/computer work  [] (37619) Reviewed/Progressed HEP activities related to improving balance, coordination, kinesthetic sense, posture, motor skill, proprioception of scapular, scapulothoracic and UE control with self care, reaching, carrying, lifting, house/yardwork, driving/computer work      Manual Treatments:  PROM / STM / Oscillations-Mobs:  G-I, II, III, IV (PA's, Inf., Post.)  [x] (82675) Provided manual therapy to mobilize soft tissue/joints of cervical/CT, scapular GHJ and UE for the purpose of modulating pain, promoting relaxation,  increasing ROM, reducing/eliminating soft tissue swelling/inflammation/restriction, improving soft tissue extensibility and allowing for proper ROM for normal function with self care, reaching, carrying, lifting, house/yardwork, driving/computer work          Modalities:  None. Charges:  Timed Code Treatment Minutes: 0   Total Treatment Minutes: 15       [] EVAL (LOW) 12074 (typically 20 minutes face-to-face)  [] EVAL (MOD) 45098 (typically 30 minutes face-to-face)  [] EVAL (HIGH) 29784 (typically 45 minutes face-to-face)  [] RE-EVAL     [] RI(72856) x   [] IONTO (07326)  [] NMR (51851) x     [] VASO (58905)   [] Manual (79010) x     [] Other:  [] TA (55517)x     [] Mech Traction (76638)  [] ES(attended) (83794)     [] ES (un) (65042): If [facility] Please Indicate Time In/Out  CPT Code Time in Time out                                     GOALS:  Patient stated goal: Get back to performing daily functions pain free  [x] Progressing: [] Met: [] Not Met: [] Adjusted     Therapist goals for Patient:   Short Term Goals: To be achieved in: 2 weeks  1. Independent in HEP and progression per patient tolerance, in order to prevent re-injury. [x] Progressing: [] Met: [] Not Met: [] Adjusted  2. Patient will have a decrease in pain to facilitate improvement in movement, function, and ADLs as indicated by Functional Deficits.   [x] Progressing: [] Met: [] Not Met: [] Adjusted     Long Term Goals: To be achieved in: 4 weeks  1. Disability index score of 25% or less for the DASH to assist with reaching prior level of function. [x] Progressing: [] Met: [] Not Met: [] Adjusted  2. Patient will demonstrate an increase in bilateral shoulder strength to allow for proper functional mobility as indicated by patients Functional Deficits. [x] Progressing: [] Met: [] Not Met: [] Adjusted  3. Patient will be able to perform all bathing/dressing tasks and heavy household chores including vacuuming, opening tight jars, etc. without increased symptoms or restriction. [x] Progressing: [] Met: [] Not Met: [] Adjusted  4. Patient will be able to perform all crafting/painting activities as part of second job without increased sxs or restriction. [x] Progressing: [] Met: [] Not Met: [] Adjusted             Progression Towards Functional goals:  [x] Patient is progressing as expected towards functional goals listed. [] Progression is slowed due to complexities listed. [] Progression has been slowed due to co-morbidities. [] Plan just implemented, too soon to assess goals progression  [] Other:     ASSESSMENT:   Patient notes minimal change in L UE sxs following compliance with wearing wrist splint to immobilize L UE since previous visit. Spoke to Dr. Monica Viera about patient, course of physical therapy, and response to treatment. Dr. Monica Viera had patient schedule an appointment with him. Patient will likely have an MRI to determine whether there is any other structural cause of pain, then will likely be referred to Dr. Noemi Humphrey.   Return to Play: (if applicable)   []  Stage 1: Intro to Strength   []  Stage 2: Dynamic Strength and Intro to Plyometrics   []  Stage 3: Advanced Plyometrics and Intro to Throwing   []  Stage 4: Sport specific Training/Return to Sport     []  Ready to Return to Play, [facility] [employer] [facility] St[facility]   []  Not Ready for Return to Sports   Comments:

## 2020-01-20 ENCOUNTER — OFFICE VISIT (OUTPATIENT)
Dept: ORTHOPEDIC SURGERY | Age: 60
End: 2020-01-20
Payer: COMMERCIAL

## 2020-01-20 VITALS — WEIGHT: 106.92 LBS | HEIGHT: 59 IN | BODY MASS INDEX: 21.56 KG/M2

## 2020-01-20 PROCEDURE — 99214 OFFICE O/P EST MOD 30 MIN: CPT | Performed by: ORTHOPAEDIC SURGERY

## 2020-01-20 NOTE — PROGRESS NOTES
Strength and tone are normal.  Left Lower Extremity: Examination of the left lower extremity does not show any tenderness, deformity or injury. Range of motion is unremarkable. There is no gross instability. There are no rashes, ulcerations or lesions. Strength and tone are normal.  Thoracic Spine: Examination of the thoracic spine does not show any tenderness, deformity or injury. Range of motion is unremarkable. There is no gross instability. There are no rashes, ulcerations or lesions. Strength and tone are normal.    Radiology: Lateral cervical spine demonstrates C6-7 arthritis changes, osteofyte no masses or tumors.     Assessment: Severe lateral epicondylitis not improved with conservative therapy    Impression: Same    Office Procedures:  Orders Placed This Encounter   Procedures    XR Cervical Spine 1 VW     Standing Status:   Future     Number of Occurrences:   1     Standing Expiration Date:   1/20/2021       Treatment Plan: She is failed with physical therapy, dry needling, [place], anti-inflammatories, steroids, I would like to get an MRI to evaluate the amount of damage to this lateral epicondyle and then we will see her back and discuss options         Homero Romano,

## 2020-01-23 ENCOUNTER — OFFICE VISIT (OUTPATIENT)
Dept: ORTHOPEDIC SURGERY | Age: 60
End: 2020-01-23
Payer: COMMERCIAL

## 2020-01-23 VITALS — HEIGHT: 59 IN | BODY MASS INDEX: 21.56 KG/M2 | WEIGHT: 106.92 LBS

## 2020-01-23 PROBLEM — M25.522 LEFT ELBOW PAIN: Status: ACTIVE | Noted: 2020-01-23

## 2020-01-23 PROCEDURE — 99214 OFFICE O/P EST MOD 30 MIN: CPT | Performed by: ORTHOPAEDIC SURGERY

## 2020-01-23 NOTE — PROGRESS NOTES
 Sexual activity: None   Lifestyle    Physical activity:     Days per week: None     Minutes per session: None    Stress: None   Relationships    Social connections:     Talks on phone: None     Gets together: None     Attends [employer] service: None     Active member of club or organization: None     Attends meetings of clubs or organizations: None     Relationship status: None    Intimate partner violence:     Fear of current or ex partner: None     Emotionally abused: None     Physically abused: None     Forced sexual activity: None   Other Topics Concern    None   Social History Narrative    None     Current Outpatient Medications   Medication Sig Dispense Refill    predniSONE (DELTASONE) 10 MG tablet Days1-2:50mg PO QD,Days3-4:40mg PO QD,Days5-6:30mg PO QD,Days7-8:20mg PO QD,Days 9-10:10mg PO QD 30 tablet 0    meloxicam (MOBIC) 15 MG tablet Take 1 tablet by mouth daily 30 tablet 3    insulin regular (HUMULIN R) 100 UNIT/ML injection Inject into the skin See Admin Instructions Per insulin pump      Insulin Syringe-Needle U-100 ([employer] INSULIN SYRINGE) 31G X 5/16\" 0.5 ML MISC 100 each by Does not apply route. 100 each 6    trazodone (DESYREL) 50 MG tablet Take 50 mg by mouth nightly. No current facility-administered medications for this visit. No Known Allergies    REVIEW OF SYSTEMS:   Pertinent items are noted in HPI  Review of systems reviewed from Patient History Form dated on 1/23/2020 and available in the patient's chart under the Media tab. Examination:    General Exam:    Vitals: Height 4' 11.02\" (1.499 m), weight 106 lb 14.8 oz (48.5 kg). Constitutional: Patient is adequately groomed with no evidence of malnutrition  Mental Status: The patient is oriented to time, place and person. The patient's mood and affect are appropriate.         Elbow Examination  Inspection: [place] demonstrates no obvious deformity    Palpation: Very pain to palpation over the lateral epicondyle    Rang of Motion: Range of motion is full with flexion extension supination and pronation    Strength: Excellent strength internal/external rotation and supraspinatus isolation bilaterally,Shoulder shrug is 5 over 5 , cervical spine strength is excellent, flexion extension at the elbow is 5 over 5 wrist and hand strength is equal bilaterally no winging no muscle atrophy  Severe pain with resisted extension at the wrist    Special Tests: Radial ulnar and median nerve function is intact capillary refill is brisk sensation is intact negative Tinel's negative Phalen's at the wrist negative Tinel's at the elbow severe pain to palpation over the lateral epicondyle and severe pain with resisted extension at the wrist    Gait: Normal gait  Deep tendon reflexes of the biceps, triceps, brachioradialis +2/4 equal bilaterally      Additional Comments:     Additional Examinations:  Right Lower Extremity: Examination of the right lower extremity does not show any tenderness, deformity or injury. Range of motion is unremarkable. There is no gross instability. There are no rashes, ulcerations or lesions. Strength and tone are normal.  Left Lower Extremity: Examination of the left lower extremity does not show any tenderness, deformity or injury. Range of motion is unremarkable. There is no gross instability. There are no rashes, ulcerations or lesions. Strength and tone are normal.  Neck: Examination of the neck does not show any tenderness, deformity or injury. Range of motion is unremarkable. There is no gross instability. There are no rashes, ulcerations or lesions.   Strength and tone are normal.      Diagnostic Testing:    Views MRI multiple views taken in the office today  Body Part left elbow impression moderate lateral epicondylar edema and tearing of the extensor tendon        Assessment: Severe lateral epicondylitis not relieved with conservative therapy of prednisone, physical therapy, rest, bracing, anti-inflammatories, MRI    Impression: Same    Office Procedures:  No orders of the defined types were placed in this encounter.       Treatment Plan: I would like for her to follow-up with Dr. Magali Paulson for evaluation and treatment         Brenda Maciel, DO

## 2020-01-29 ENCOUNTER — OFFICE VISIT (OUTPATIENT)
Dept: ORTHOPEDIC SURGERY | Age: 60
End: 2020-01-29
Payer: COMMERCIAL

## 2020-01-29 VITALS — BODY MASS INDEX: 21.56 KG/M2 | WEIGHT: 106.92 LBS | HEIGHT: 59 IN

## 2020-01-29 PROCEDURE — 99213 OFFICE O/P EST LOW 20 MIN: CPT | Performed by: ORTHOPAEDIC SURGERY

## 2020-01-29 NOTE — PROGRESS NOTES
temperature. Vascular: There is intact, symmetric circulation in both upper extremities. Musculoskeletal       Cervical spine, shoulders, wrist:  satisfactory baseline range of motion,                                                                           strength, and stability        Left Elbow:   Satisfactory range of motion 0 to 135 degrees of flexion, 75 degrees of pronation and supination without crepitus or mechanical symptoms   tenderness over the lateral epicondyle and common extensor origin with no tenderness medially. Minimal discomfort at the radial tunnel. There is pain and mild weakness with resisted wrist and middle finger extension testing. No evidence of skin changes including erythema or ecchymosis  No elbow effusion    No atrophy throughout left upper extremity including thenar or intrinsic muscular atrophy           Contralateral Elbow:  Satisfactory range of motion. No significant tenderness over lateral and medial epicondyle. No significant pain or weakness with resisted wrist/finger extensor, supination, flexor, and pronator strength testing. Neurological:  Sensation is subjectively normal in the forearm and hands bilaterally  Negative Tinel's left cubital tunnel and negative hyperflexion test at elbow    DIAGNOSTIC TESTING:     X-rays  reviewed in office:  2 views of left elbow reviewed from 10/21/2019 demonstrating no fracture or dislocation no evidence of loose body or degenerative changes    MRI of the left elbow joint without contrast from 1/22/2020  Lateral epicondylitis with low-grade partial-thickness interstitial tearing and mild tendinosis and peritendinitis of common extensor tendon origin  Images personally reviewed by me, please see media tab for full detailed report        IMPRESSION AND PLAN: 68-year-old female presenting with history of persistent left lateral elbow pain now for over 6 months  1.   Patient with common extensor tendinosis of the left elbow, also known as lateral epicondylitis or tennis elbow. We discussed the current concepts regarding this diagnosis and the appropriate gold standard evidence-based interventions including rest, lifting modifications, appropriate use of a tennis elbow forearm strap, and physical therapy. This frustrating entity may often require many months before expected resolution. We will provide the appropriate means for conservative care at this time. Appropriate followup plans are discussed with the patient depending on the level of progress with the conservative care. The patient has had minimal response and benefit from most conservative management  I did offer her referral to 1 of our hand therapist for education and modalities that may be beneficial to her and she is agreeable to this plan today  In addition I recommend continued activity modification and home stretching. We did discuss alternative treatment options including PRP injection and Tenex procedure with educational information provided to her today  Finally, if she does continue to have persistent symptoms of discomfort and pain then we could also consider surgical intervention with lateral epicondyle and common extensor debridement.   The patient is understanding and would like to proceed at this time with a trial of further conservative treatment with plans to see me back in 6 weeks for repeat evaluation and if not trending positively or responding then more strongly consider surgical intervention

## 2020-02-26 ENCOUNTER — HOSPITAL ENCOUNTER (OUTPATIENT)
Dept: OCCUPATIONAL THERAPY | Age: 60
Setting detail: THERAPIES SERIES
Discharge: HOME | End: 2020-02-26
Payer: COMMERCIAL

## 2020-02-26 PROCEDURE — 97110 THERAPEUTIC EXERCISES: CPT | Performed by: OCCUPATIONAL THERAPIST

## 2020-02-26 PROCEDURE — 97112 NEUROMUSCULAR REEDUCATION: CPT | Performed by: OCCUPATIONAL THERAPIST

## 2020-02-26 PROCEDURE — 97165 OT EVAL LOW COMPLEX 30 MIN: CPT | Performed by: OCCUPATIONAL THERAPIST

## 2020-02-26 PROCEDURE — 97535 SELF CARE MNGMENT TRAINING: CPT | Performed by: OCCUPATIONAL THERAPIST

## 2020-02-26 NOTE — FLOWSHEET NOTE
[facility], [facility]   [address], [address] [facility] [address]  Phone: [phone] Fax: [phone]    Occupational Therapy Treatment Note/ Progress Report:     Date:  2020    Patient Name:  Katharine Holbrook    :  1960  MRN: [id number]    Medical/Treatment Diagnosis Information:  · Diagnosis: M77.12 (ICD-10-CM) - Lateral epicondylitis of left elbow   · Treatment Diagnosis: L elbow pain - H77.002     Insurance/Certification information:  OT Insurance Information: Nikki  Physician Information:  Referring Practitioner: Pilo Escobar MD  Has the plan of care been signed (Y/N):        []  Yes  [x]  No       Visit # Insurance Allowable Auth Required   1 60 []  Yes []  No        Is this a Progress Report:     []  Yes  [x]  No      If Yes:  Date Range for reporting period:  Beginning  Ending    Progress report will be due (10 Rx or 30 days whichever is less):      Recertification will be due (POC Duration  / 90 days whichever is less): 20     Date of Injury: NA  Date of Surgery: NA     Date of Patient follow up with Physician: prn     RESTRICTIONS/PRECAUTIONS: -     Latex Allergy:  [x]? No      []? Yes                    Pacemaker:  [x]? No       []? Yes      Preferred Language for Healthcare:   [x]? English       []? other:      Functional Scale: 52% (Quick DASH)                                 Date assessed:  2020     SUBJECTIVE: Patient reported deficits/history of current problem: progressive pain in B elbows last fall, seen by PT from Oct '19- with some improvement (R more than L); L actually seemed to get worse; MRI recently which found tendon tears and pt was referred to hand surgeon, seen by Dr Diomedes Jerry on 20 and referred to therapy; second opinion on 20 and given option of surgery; reports tried elbow strap in the past but did not find any relief     Pain Scale: 210 \"aching\" in forearm at rest; -9/10 with for activities related to improving balance, coordination, kinesthetic sense, posture, motor skill, proprioception and motor activation to allow for proper function of scapular, scapulothoracic and UE control with self care, carrying, lifting, driving/computer work    Home Exercise Program:    [x] (50481) Reviewed/Progressed HEP activities related to strengthening, flexibility, endurance, ROM of scapular, scapulothoracic and UE control with self care, reaching, carrying, lifting, house/yardwork, driving/computer work  [] (56825) Reviewed/Progressed HEP activities related to improving balance, coordination, kinesthetic sense, posture, motor skill, proprioception of scapular, scapulothoracic and UE control with self care, reaching, carrying, lifting, house/yardwork, driving/computer work      Manual Treatments:  PROM / STM / Oscillations-Mobs:  G-I, II, III, IV (PA's, Inf., Post.)  [x] (42369) Provided manual therapy to mobilize soft tissue/joints of cervical/CT, scapular GHJ and UE for the purpose of modulating pain, promoting relaxation,  increasing ROM, reducing/eliminating soft tissue swelling/inflammation/restriction, improving soft tissue extensibility and allowing for proper ROM for normal function with self care, reaching, carrying, lifting, house/yardwork, driving/computer work    ADL Training:  [x] (19631) Provided self-care/home management training related to activities of daily living and compensatory training, and/or use of adaptive equipment      Charges:  Timed Code Treatment Minutes: 50   Total Treatment Minutes: 75   Worker's Comp: Time In/Time Out     [x] EVAL (LOW) 24936 (typically 20 minutes face-to-face)    [] EVAL (MOD) 66043 (typically 30 minutes face-to-face)  [] EVAL (HIGH) 21376 (typically 45 minutes face-to-face)  [] OT Re-eval (84319)       [x] [place] [phone]) x  1    [] YKWKX(40192)  [x] NMR (21005) x 1   [] Estim (attended) (40699)   [] Manual ([address] [facility]) x     [] US (52223)  [] TA () x [] Paraffin (57310)  [x] ADL  (19854) x  1   [] Splint/L code:    [] Estim (unattended) (46637)  [] Fluidotherapy (96273)  [] Other:      ASSESSMENT:  See eval    GOALS:  Patient stated goal: no pain    [] Progressing: [] Met: [] Not Met: [] Adjusted    Therapist goals for Patient:   Short Term Goals: To be achieved in: 2 weeks  1. Independent in HEP and progression per patient tolerance, in order to prevent re-injury. [] Progressing: [] Met: [] Not Met: [] Adjusted   2. Patient will have a decrease in pain to facilitate improvement in movement, function, and ADLs as indicated by Functional Deficits. [] Progressing: [] Met: [] Not Met: [] Adjusted    Long Term Goals to be achieved in 3-6 weeks (through 4/8/20), including patient directed goals to address patient identified performance deficits:  1) Pt to be independent in graded HEP progression with a good level of effort and compliance. [] Progressing: [] Met: [] Not Met: [] Adjusted   2) Pt to report a score of </= 30 % on the Quick DASH disability questionnaire for increased performance with carrying, moving, and handling objects. [] Progressing: [] Met: [] Not Met: [] Adjusted   3) Pt will verbalize understanding and demonstrate competency with diagnosis specific ADL modifications and joint protection techniques to enable independence with ADLs, household, and recreational activities. [] Progressing: [] Met: [] Not Met: [] Adjusted   4) Pt will demonstrate increased strength to L /pinch >/= 60% of R for improved independence with opening jar lids. [] Progressing: [] Met: [] Not Met: [] Adjusted   5) Pt will have a decrease in pain to 2-3/10 to facilitate improved rest/sleep and performance of recreational activities. [] Progressing: [] Met: [] Not Met: [] Adjusted        Overall Progression Towards Functional Goals/Treatment Progress Update:  [] Patient is progressing as expected towards functional goals listed.     [] Progression is slowed due to complexities/impairments listed. [] Progression has been slowed due to co-morbidities. [x] Plan just implemented, too soon to assess goals progression <30 days  [] Goals require adjustment due to lack of progress  [] Patient is not progressing as expected and requires additional follow up with physician  [] All goals are met  [] Other:     Prognosis for POC: [x] Good [] Fair  [] Poor    Patient requires continued skilled intervention: [x] Yes  [] No    Treatment/Activity Tolerance:  [x] Patient able to complete treatment  [] Patient limited by fatigue  [] Patient limited by pain    [] Patient limited by other medical complications  [] Other:                  PLAN: See eval  [] Continue per plan of care [] Alter current plan (see comments above)  [x] Plan of care initiated [] Hold pending MD visit [] Discharge      Electronically signed by: Any LORENZO/L, PT, MPT, CHT, [id number], [id number]      Note: If patient does not return for scheduled/ recommended follow up visits, this note will serve as a discharge from care along with most recent update on progress.

## 2020-02-26 NOTE — PLAN OF CARE
[facility], [place]  [address],  [address], [address] [facility]  Phone: [phone] Fax: [phone]            Occupational Donna   Dear Referring Practitioner: Diego Varela MD,     We had the pleasure of evaluating the following patient for occupational therapy services at [address] [place] [address]. A summary of our findings can be found in the initial assessment below. This includes our plan of care. If you have any questions or concerns regarding these findings, please do not hesitate to contact me at the office phone number checked above.   Thank you for the referral.     Physician Signature:_______________________________Date:__________________  By signing above (or electronic signature), therapists plan is approved by physician      Patient: Shelby Shearer   : 1960   MRN: [id number]  Referring Physician: Referring Practitioner: Diego Varela MD      Evaluation Date: 2020      Medical Diagnosis Information:  Diagnosis: M77.12 (ICD-10-CM) - Lateral epicondylitis of left elbow    Treatment Diagnosis: L elbow pain - M25.522              Insurance information: OT Insurance Information: [place]      Date of Injury: NA  Date of Surgery: NA    Date of Patient follow up with Physician: prn    RESTRICTIONS/PRECAUTIONS: -    Latex Allergy:  [x]No      []Yes  Pacemaker:  [x] No       [] Yes     Preferred Language for Healthcare:   [x]English       []other:     Functional Scale: 52% (Quick DASH)   Date assessed:  2020    SUBJECTIVE: Patient reported deficits/history of current problem: progressive pain in B elbows last fall, seen by PT from Oct '19- with some improvement (R more than L); L actually seemed to get worse; MRI recently which found tendon tears and pt was referred to hand surgeon, seen by Dr Darya Larkin on 20 and referred to therapy; second opinion on 20 and given option of surgery; reports tried elbow strap in the past but did not find any relief    Pain Scale: 2/10 \"aching\" in forearm at rest; 5-9/10 with activities  [x]Constant      []Intermittent    []other:  Pain Location:  L lateral forearm, upper arm, lateral epicondyle  Easing factors: rest  Provocative factors: reaching, picking up, donning bra, zipping pants, gripping, sleeping     [x] Patient reported history, allergies, and medications reviewed - see intake form. 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    Recreational Activities/Meaningful Interests: painting, cooking    Prior Level of Function: [x] Independent with ADLs/IADLs     [] Assistance needed (describe):    Patient-Identified Primary Performance Deficits (to be addressed in POC):   [] bathing    [x] household tasks    [] dressing    [] self feeding   [] grooming    [x] work/education   [] functional mobility   [] sleeping/rest   [] toileting/hygiene   [x] recreational activities   [] driving    [] community/social participation   [] other:     Comorbidities Affecting Functional Performance:     [x]Anxiety (F41.9)/Depression (F32.9)   [x]Diabetes Type 1(E10.65) or 2 (E11.65)   []Rheumatoid Arthritis (M05.9)  []Fibromyalgia (M79.7)  []Neuropathy(G60.9)  []Osteoarthritis(M19.91)  []None   [x]Other: B [facility] Dec 2018 (Dr Eliz West), 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; hx of B shoulder problems    Hand Dominance:    [x]  Right    [] Left      OBJECTIVE:     Involved   AROM: Right Left   Digits: tips to DPFC   0cm   0cm   Thumb tip to DPFC 0cm 0cm   Wrist Ext/Flex            RD/UD 80/84 85/88   Forearm sup/pron   WNL WNL   Elbow ext/flex   [id number] 10HE/135   increased time needed to reach end range due to pain   Shoulder Flex                   Abd                   IR/ER WNL (able to reach B arms overhead, behind head, behind back) WNL        Edema:      At elbow 21.5cm 21.0cm        Strength:      II 51 22 with pain   Lateral Pinch 13 11   3 Point Pinch 11 10 (with pain)   Tip Pinch 7 6 with mild pain   MMT: elbow ext                    Flex            Wrist ext                  Flex        Shoulder scaption     5/5  5/5  5/5  5/5  4+/5 5/5  5/5  5/5 (with mild pain)  5/5  4+/5     Observations (including splints, bandages, incisions, scars):   Positive for pain with wrist extension (with/without resistance) with elbow fully extended; rounded, forward shoulders noted, slumped posture    Sensation:  [] No reported deficits  [x] Intact to light touch    [] [place] test completed, findings as noted:  [] Other:    Palpation: tender over L lateral elbow, extending distally x ~3-4 inches over dorsal/lateral forearm    Functional Mobility/Transfers/Gait:  [x] Independent - no significant gait deviations  [] Assistance needed   [] Assistive device used: Falls Risk Assessment (30 days):   [x] Falls Risk assessed and no intervention required. [] Falls Risk assessed and Patient requires intervention due to being higher risk   TUG score (>12s at risk):     [] Falls education provided, including      Review Of Systems (ROS): [x]Performed Review of systems (Integumentary, CardioPulmonary, Neurological) by intake and observation. Intake form has been scanned into medical record. Patient has been instructed to contact their primary care physician regarding ROS issues if not already being addressed at this time. ASSESSMENT:   This patient presents with signs and symptoms consistent with the medical diagnosis provided by the referring physician.      Impairments (physical, cognitive and/or psychosocial):  [] Decreased mobility  [x] Weakness    [] Hypersensitivity   [x] Pain/tenderness   [] Edema/swelling   [] Decreased coordination (fine/gross motor)   [] Impaired body mechanics  [] Sensory loss  [] Loss of balance   [] Other:      Patient-Identified Primary Performance Deficits (to be addressed in POC):   [] bathing    [x] household tasks    [] dressing    [] self feeding   [] grooming    [x] work/education   [] functional mobility   [] sleeping/rest   [] toileting/hygiene   [x] recreational activities   [] driving    [] community/social participation   [] other:     Rehab Potential:   [] Excellent [x] Good [] Fair  [] Poor     Barriers affecting rehab potential:  [x]Age    []Lack of Motivation   []Co-Morbidities  []Cognitive Function  []Environmental/home/work barriers  []Other: Tolerance of evaluation/treatment:    [] Excellent [x] Good [] Fair  [] Poor    PLAN OF CARE:  Interventions:   [x] Therapeutic Exercise [x] Therapeutic Activity    [x] Activities of Daily Living [x] Neuromuscular Re-education      [x] Patient Education  [x] Manual Therapy      [x] Modalities as needed, and not otherwise contraindicated, including: ultrasound,paraffin,moist heat/cold pack, electrical stimulation, contrast bath, iontophoresis  [] Splinting    Frequency/Duration:  1 days per week for 3-6 weeks      GOALS:  Patient stated goal: no pain    [] Progressing: [] Met: [] Not Met: [] Adjusted    Therapist goals for Patient:   Short Term Goals: To be achieved in: 2 weeks  1. Independent in HEP and progression per patient tolerance, in order to prevent re-injury. [] Progressing: [] Met: [] Not Met: [] Adjusted   2. Patient will have a decrease in pain to facilitate improvement in movement, function, and ADLs as indicated by Functional Deficits. [] Progressing: [] Met: [] Not Met: [] Adjusted    Long Term Goals to be achieved in 3-6 weeks (through 4/8/20), including patient directed goals to address patient identified performance deficits:  1) Pt to be independent in graded HEP progression with a good level of effort and compliance.   [] Progressing: [] Met: [] Not Met: [] Adjusted   2) Pt to report a score of </= 30 % on the Quick DASH disability questionnaire for increased including analysis of occupational profile, data analysis from detailed assessment and consideration of several treatment options. Comorbidities that affect occupational performance may be present. Minimal to moderate task modifications or assistance needed to complete assessment. [] high complexity, including analysis of occupational profile, analysis of data from comprehensive assessment and consideration of multiple treatment options. Multiple comorbidities present that affect occupational performance. Significant task modifications or assistance needed to complete assessment. Evaluation Code:  [x] Low Complexity EVAL 41937 (typically 30 minutes face to face)  [] Mod Complexity EVAL 92406 (typically 45 minutes face to face)  [] High Complexity EVAL 40519 (typically 60 minutes face to face)    Electronically signed by:   Mary Newberry  OTR/L, PT, MPT, [address] [place] [address], [facility], [id number]

## 2020-03-16 ENCOUNTER — HOSPITAL ENCOUNTER (OUTPATIENT)
Dept: OCCUPATIONAL THERAPY | Age: 60
Setting detail: THERAPIES SERIES
Discharge: HOME | End: 2020-03-16
Payer: COMMERCIAL

## 2020-03-16 NOTE — FLOWSHEET NOTE
[facility] [address] [place] [address]    Occupational Therapy  Cancellation/No-show Note  Patient Name:  Radha Borges   :  1960   Date:  3/16/2020  Cancelled visits to date: 1  No-shows to date: 0    For today's appointment patient:  [x]    Cancelled  []    Rescheduled appointment  []    No-show     Reason given by patient:  [x]    Patient ill - sore throat  []    Conflicting appointment  []    No transportation    []    Conflict with work  []    No reason given  []    Other:     Comments:      Electronically signed by:   Mary Mclaughlin/L, [place], [address] [place] [place] [address], [id number], [id number]

## 2020-08-10 ENCOUNTER — OFFICE VISIT (OUTPATIENT)
Dept: PRIMARY CARE CLINIC | Age: 60
End: 2020-08-10
Payer: COMMERCIAL

## 2020-08-10 PROCEDURE — 99211 OFF/OP EST MAY X REQ PHY/QHP: CPT | Performed by: NURSE PRACTITIONER

## 2020-08-10 NOTE — PATIENT INSTRUCTIONS
You have received a viral test for COVID-19. Below is education on quarantine per the CDC guidelines. For any symptoms, seek care from your PCP, call [phone] to establish care with a doctor, or go directly to an urgent care or the emergency room. Test results will take 2-7 days and will be sent to you in your [employer] account. If you test positive, you will be contacted via phone. If you test negative, the ONLY communication will be through [address] GO TO [employer] AND SIGN UP FOR [employer]  (LOWER LEFT OF THE HOME PAGE)  No test is 100%. If you have symptoms, you should follow the guidance of quarantine as previously stated. You can still be contagious if you have symptoms. Your [facility] will reach out to you if you have a positive result. They will provide you with a return to work date and note. If you were tested for a pre-op, then you should remain in quarantine until your procedure. How do I know if I need to be in quarantine? If you live in a community where COVID-19 is or might be spreading (currently, that is virtually everywhere in the [place])  Be alert for symptoms. Watch for fever, cough, shortness of breath, or other symptoms of COVID-19.  Take your temperature if symptoms develop.  Practice social distancing. Maintain 6 feet of distance from others and stay out of crowded places.  Follow CDC guidance if symptoms develop. If you feel healthy but:   Recently had close contact with a person with COVID-19 you need to Quarantine:   Stay home until 14 days after your last exposure.  Check your temperature twice a day and watch for symptoms of COVID-19.  If possible, stay away from people who are at higher-risk for getting very sick from COVID-19.   Stay Home and Monitor Your Health if you:   Have been diagnosed with COVID-19, or   Are waiting for test results, or   Have cough, fever, or shortness of breath, or symptoms of COVID-19      When You Can

## 2020-08-10 NOTE — PROGRESS NOTES
Monserrat Spain received a viral test for COVID-19. They were educated on isolation and quarantine as appropriate. For any symptoms, they were directed to seek care from their PCP, given contact information to establish with a doctor, directed to an urgent care or the emergency room.

## 2020-08-11 LAB — SARS-COV-2, NAA: NOT DETECTED

## 2020-09-24 ENCOUNTER — HOSPITAL ENCOUNTER (OUTPATIENT)
Dept: MAMMOGRAPHY | Age: 60
Discharge: HOME | End: 2020-09-24
Payer: COMMERCIAL

## 2020-09-24 PROCEDURE — 77067 SCR MAMMO BI INCL CAD: CPT

## 2020-11-12 ENCOUNTER — OFFICE VISIT (OUTPATIENT)
Dept: ORTHOPEDIC SURGERY | Age: 60
End: 2020-11-12
Payer: COMMERCIAL

## 2020-11-12 VITALS — BODY MASS INDEX: 24.19 KG/M2 | WEIGHT: 120 LBS | HEIGHT: 59 IN

## 2020-11-12 PROCEDURE — L1902 AFO ANKLE GAUNTLET PRE OTS: HCPCS | Performed by: PODIATRIST

## 2020-11-12 PROCEDURE — 99203 OFFICE O/P NEW LOW 30 MIN: CPT | Performed by: PODIATRIST

## 2020-11-12 NOTE — PROGRESS NOTES
the Achilles  tendon. The patient is able to dorsiflex and plantarflex the foot, as well as  umer and invert independently. RADIOGRAPHS: 3 weightbearing x-ray views of the left ankle were evaluated. No acute fractures or dislocations are noted. The ankle joint is in  excellent alignment. 2 weightbearing x-ray views of the left foot were evaluated. No acute fracture or dislocations are noted. There is no diastases noted between the first and second metatarsal bases. It does not appear that she had any problem with a fractured toe. ASSESSMENT: Left Lateral Ankle and Midfoot Sprain      PLAN: The patient was educated on the pathology and its treatment options. An Aircast ankle brace was dispensed for the left ankle. The patient was instructed on how to apply it correctly and will use it full-time with a supportive shoe. She can start coming out of the boot. I gave her a prescription for physical therapy to rehab the left foot and ankle. I will see her back after therapy. Procedures    Aircast Air Sport Ankle Brace     Patient was prescribed an Aircast Air Sport Brace. The left ankle will require stabilization / immobilization from this semi-rigid / rigid orthosis to improve their function. The orthosis will assist in protecting the affected area, provide functional support and facilitate healing. Patient was instructed to progress ambulation weight bearing as tolerated in the device. The patient was educated and fit by a healthcare professional with expert knowledge and specialization in brace application while under the direct supervision of the treating physician. Verbal and written instructions for the use of and application of this item were provided. They were instructed to contact the office immediately should the brace result in increased pain, decreased sensation, increased swelling or worsening of the condition.

## 2022-04-08 LAB
CHOLESTEROL, TOTAL: 217 MG/DL (ref 0–199)
GLUCOSE BLD-MCNC: 430 MG/DL (ref 70–99)
HDLC SERPL-MCNC: 113 MG/DL (ref 40–60)
LDL CHOLESTEROL CALCULATED: 80 MG/DL
TRIGL SERPL-MCNC: 118 MG/DL (ref 0–150)

## 2022-04-11 ENCOUNTER — TELEPHONE (OUTPATIENT)
Dept: INTERNAL MEDICINE CLINIC | Age: 62
End: 2022-04-11

## 2022-04-11 NOTE — TELEPHONE ENCOUNTER
Discussed abn lab results (blood sugar) from the Be Well screening    1) she has a family doc (out of network)  2) she is currently on insulin  3) she is aware of s/s of elevated and low blood sugar  4) she will f/u with her PCP / endo re treatment changes

## 2022-06-03 ENCOUNTER — TELEMEDICINE (OUTPATIENT)
Dept: PRIMARY CARE CLINIC | Age: 62
End: 2022-06-03
Payer: COMMERCIAL

## 2022-06-03 DIAGNOSIS — H57.89 EYE DISCHARGE: ICD-10-CM

## 2022-06-03 DIAGNOSIS — J06.9 UPPER RESPIRATORY TRACT INFECTION, UNSPECIFIED TYPE: Primary | ICD-10-CM

## 2022-06-03 PROCEDURE — 99213 OFFICE O/P EST LOW 20 MIN: CPT | Performed by: NURSE PRACTITIONER

## 2022-06-03 RX ORDER — CETIRIZINE HYDROCHLORIDE 10 MG/1
10 TABLET ORAL DAILY
Qty: 30 TABLET | Refills: 0 | Status: SHIPPED | OUTPATIENT
Start: 2022-06-03 | End: 2022-07-03

## 2022-06-03 RX ORDER — ROPINIROLE 1 MG/1
1 TABLET, FILM COATED ORAL NIGHTLY
COMMUNITY
Start: 2022-02-01

## 2022-06-03 RX ORDER — OLOPATADINE HYDROCHLORIDE 1 MG/ML
1 SOLUTION/ DROPS OPHTHALMIC 2 TIMES DAILY
Qty: 5 ML | Refills: 0 | Status: SHIPPED | OUTPATIENT
Start: 2022-06-03 | End: 2022-07-03

## 2022-06-03 RX ORDER — GABAPENTIN 600 MG/1
600 TABLET ORAL EVERY EVENING
COMMUNITY

## 2022-06-03 SDOH — ECONOMIC STABILITY: FOOD INSECURITY: WITHIN THE PAST 12 MONTHS, THE FOOD YOU BOUGHT JUST DIDN'T LAST AND YOU DIDN'T HAVE MONEY TO GET MORE.: NEVER TRUE

## 2022-06-03 SDOH — ECONOMIC STABILITY: FOOD INSECURITY: WITHIN THE PAST 12 MONTHS, YOU WORRIED THAT YOUR FOOD WOULD RUN OUT BEFORE YOU GOT MONEY TO BUY MORE.: NEVER TRUE

## 2022-06-03 ASSESSMENT — PATIENT HEALTH QUESTIONNAIRE - PHQ9
10. IF YOU CHECKED OFF ANY PROBLEMS, HOW DIFFICULT HAVE THESE PROBLEMS MADE IT FOR YOU TO DO YOUR WORK, TAKE CARE OF THINGS AT HOME, OR GET ALONG WITH OTHER PEOPLE: 0
8. MOVING OR SPEAKING SO SLOWLY THAT OTHER PEOPLE COULD HAVE NOTICED. OR THE OPPOSITE, BEING SO FIGETY OR RESTLESS THAT YOU HAVE BEEN MOVING AROUND A LOT MORE THAN USUAL: 0
9. THOUGHTS THAT YOU WOULD BE BETTER OFF DEAD, OR OF HURTING YOURSELF: 0
5. POOR APPETITE OR OVEREATING: 0
SUM OF ALL RESPONSES TO PHQ QUESTIONS 1-9: 0
2. FEELING DOWN, DEPRESSED OR HOPELESS: 0
7. TROUBLE CONCENTRATING ON THINGS, SUCH AS READING THE NEWSPAPER OR WATCHING TELEVISION: 0
3. TROUBLE FALLING OR STAYING ASLEEP: 0
4. FEELING TIRED OR HAVING LITTLE ENERGY: 0
6. FEELING BAD ABOUT YOURSELF - OR THAT YOU ARE A FAILURE OR HAVE LET YOURSELF OR YOUR FAMILY DOWN: 0

## 2022-06-03 ASSESSMENT — ENCOUNTER SYMPTOMS
COUGH: 1
EYE REDNESS: 0
ABDOMINAL PAIN: 0
EYE PAIN: 0
TROUBLE SWALLOWING: 0
SINUS PAIN: 0
COLOR CHANGE: 0
PHOTOPHOBIA: 0
EYE DISCHARGE: 1
SINUS PRESSURE: 0
WHEEZING: 0
NAUSEA: 0
DIARRHEA: 0
CHEST TIGHTNESS: 0
EYE ITCHING: 0
RHINORRHEA: 1
SHORTNESS OF BREATH: 0
SORE THROAT: 1
VOMITING: 0

## 2022-06-03 ASSESSMENT — SOCIAL DETERMINANTS OF HEALTH (SDOH): HOW HARD IS IT FOR YOU TO PAY FOR THE VERY BASICS LIKE FOOD, HOUSING, MEDICAL CARE, AND HEATING?: NOT HARD AT ALL

## 2022-06-03 NOTE — PROGRESS NOTES
6/3/2022         TELEHEALTH EVALUATION -- Audio/Visual (During AJFUK-38 public health emergency)    HPI:    Barbara Nj (:  1960) has requested an audio/video evaluation for the following concern(s):    Patient seen virtually for a problem visit. Complains of eye discharge x 2 days  Also has headache for the past wk and a nonproductive cough. Has runny nose, sinus pressure and raspy throat. No earaches. No fevers. No shortness of breath or wheezing. Eyes were almost matted shut this am and goopy. No eye itching, redness or vision changes. Drainage is clear. Taking excedrin for headache   Overall feels ok, just concerned about her eyes. covid test negative x 3 home test    Review of Systems   Constitutional: Negative for activity change, appetite change, chills, fatigue and fever. HENT: Positive for congestion, rhinorrhea and sore throat. Negative for ear pain, postnasal drip, sinus pressure, sinus pain and trouble swallowing. Eyes: Positive for discharge. Negative for photophobia, pain, redness, itching and visual disturbance. Respiratory: Positive for cough. Negative for chest tightness, shortness of breath and wheezing. Cardiovascular: Negative for chest pain, palpitations and leg swelling. Gastrointestinal: Negative for abdominal pain, diarrhea, nausea and vomiting. Genitourinary: Negative for difficulty urinating. Musculoskeletal: Negative for myalgias. Skin: Negative for color change, pallor and rash. Neurological: Positive for headaches. Negative for dizziness, weakness and light-headedness. Hematological: Negative for adenopathy. Prior to Visit Medications    Medication Sig Taking? Authorizing Provider   gabapentin (NEURONTIN) 600 MG tablet Take 600 mg by mouth every evening.  Yes Historical Provider, MD   rOPINIRole (REQUIP) 1 MG tablet Take 1 mg by mouth nightly Yes Historical Provider, MD   olopatadine (PATANOL) 0.1 % ophthalmic solution Place 1 drop into both eyes 2 times daily Yes Suzi Whitney APRN - CNP   cetirizine (ZYRTEC) 10 MG tablet Take 1 tablet by mouth daily Yes CABRERA Art - CNP   sertraline (ZOLOFT) 50 MG tablet 75 mg daily  Yes Historical Provider, MD   insulin regular (HUMULIN R) 100 UNIT/ML injection Inject into the skin See Admin Instructions Per insulin pump Yes Historical Provider, MD   Insulin Syringe-Needle U-100 ([employer] INSULIN SYRINGE) 31G X 5/16\" 0.5 ML MISC 100 each by Does not apply route. Yes Jamin Vargas MD   predniSONE (DELTASONE) 10 MG tablet Days1-2:50mg PO QD,Days3-4:40mg PO QD,Days5-6:30mg PO QD,Days7-8:20mg PO QD,Days 9-10:10mg PO QD  Patient not taking: Reported on 6/3/2022  Joseluis Aguiar DO   meloxicam (MOBIC) 15 MG tablet Take 1 tablet by mouth daily  Patient not taking: Reported on 6/3/2022  Joseluis Aguiar DO   trazodone (DESYREL) 50 MG tablet Take 50 mg by mouth nightly.     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    Depression     Diabetes mellitus ([address]     Hx of radiation therapy     melanoma right eye    Hypertension     Insulin pump in place     Prolonged emergence from general anesthesia     slow to wake up   ,   Past Surgical History:   Procedure Laterality Date    CARPAL TUNNEL RELEASE Bilateral 2017    CHOLECYSTECTOMY      EYE SURGERY Right     biopsy    HYSTERECTOMY      LIPOMA RESECTION      SHOULDER ARTHROSCOPY Right 2018    RIGHT SHOULDER ARTHROSCOPE, SUBACROMIAL DECOMPRESSION, DISTALCLAVICLE EXCISION, CAPSULAR RELEASE, MANIPULATION UNDER ANESTHESIA, DEBRIDEMENT    SHOULDER SURGERY      UPPER GASTROINTESTINAL ENDOSCOPY  10/22/14       PHYSICAL EXAMINATION:  [ INSTRUCTIONS:  \"[x]\" Indicates a positive item  \"[]\" Indicates a negative item  -- DELETE ALL ITEMS NOT EXAMINED]  Vital Signs: (As obtained by patient/caregiver or practitioner observation)    Blood pressure-  Heart rate-    Respiratory rate-    Temperature-  Pulse oximetry-     Constitutional: [x] Appears well-developed and well-nourished [x] No apparent distress      [] Abnormal-   Mental status  [x] Alert and awake  [x] Oriented to person/place/time [x]Able to follow commands      Eyes:  EOM    [x]  Normal  [] Abnormal-  Sclera  [x]  Normal  [] Abnormal -         Discharge [x]  None visible  [] Abnormal -    HENT:   [x] Normocephalic, atraumatic. [] Abnormal   [x] Mouth/Throat: Mucous membranes are moist.     External Ears [x] Normal  [] Abnormal-     Neck: [x] No visualized mass     Pulmonary/Chest: [x] Respiratory effort normal.  [x] No visualized signs of difficulty breathing or respiratory distress        [] Abnormal-      Musculoskeletal:   [] Normal gait with no signs of ataxia         [x] Normal range of motion of neck        [] Abnormal-       Neurological:        [x] No Facial Asymmetry (Cranial nerve 7 motor function) (limited exam to video visit)          [x] No gaze palsy        [] Abnormal-         Skin:        [x] No significant exanthematous lesions or discoloration noted on facial skin         [] Abnormal-            Psychiatric:       [x] Normal Affect [x] No Hallucinations        [] Abnormal-     Other pertinent observable physical exam findings-     ASSESSMENT/PLAN:  1. Upper respiratory tract infection, unspecified type  Likely viral.   Discussed symptomatic treatment with otc meds  Start on antihistamine for drainage. Declines other rx. Will call if signs and symptoms worsen.    - cetirizine (ZYRTEC) 10 MG tablet; Take 1 tablet by mouth daily  Dispense: 30 tablet; Refill: 0    2. Eye discharge  Discussed differentials. Not likely infectious. Trial of pataday gtts. Warm compresses as needed. Will call if signs and symptoms worsen. - olopatadine (PATANOL) 0.1 % ophthalmic solution; Place 1 drop into both eyes 2 times daily  Dispense: 5 mL; Refill: 0  - cetirizine (ZYRTEC) 10 MG tablet; Take 1 tablet by mouth daily  Dispense: 30 tablet; Refill: 0    No follow-ups on file. Jennifer Palacios, was evaluated through a synchronous (real-time) audio-video encounter. The patient (or guardian if applicable) is aware that this is a billable service, which includes applicable co-pays. This Virtual Visit was conducted with patient's (and/or legal guardian's) consent. The visit was conducted pursuant to the emergency declaration under the [address] [place] [address] authority and the [employer] General Act. Patient identification was verified, and a caregiver was present when appropriate. The patient was located at Home: [place] [address]. Provider was located at Home ([place] [address]): [place]. Total time spent on this encounter: Not billed by time    --CABRERA Padilla CNP on 6/3/2022 at 12:03 PM    An electronic signature was used to authenticate this note.

## 2022-06-16 ENCOUNTER — TELEPHONE (OUTPATIENT)
Dept: ENDOCRINOLOGY | Age: 62
End: 2022-06-16

## 2022-06-16 NOTE — TELEPHONE ENCOUNTER
Fax from [facility] sending referral to see Dr. Saud Palafox     Dx - Type 1 diabetes mellitus with stage 3 kidney disease     Referring Graciela Mcintohs

## 2022-09-14 ENCOUNTER — OFFICE VISIT (OUTPATIENT)
Dept: PRIMARY CARE CLINIC | Age: 62
End: 2022-09-14
Payer: COMMERCIAL

## 2022-09-14 VITALS
RESPIRATION RATE: 16 BRPM | HEART RATE: 89 BPM | WEIGHT: 107.4 LBS | DIASTOLIC BLOOD PRESSURE: 76 MMHG | TEMPERATURE: 97 F | OXYGEN SATURATION: 99 % | HEIGHT: 59 IN | SYSTOLIC BLOOD PRESSURE: 128 MMHG | BODY MASS INDEX: 21.65 KG/M2

## 2022-09-14 DIAGNOSIS — E10.65 UNCONTROLLED TYPE 1 DIABETES MELLITUS WITH HYPERGLYCEMIA (HCC): ICD-10-CM

## 2022-09-14 DIAGNOSIS — R82.998 LEUKOCYTES IN URINE: ICD-10-CM

## 2022-09-14 DIAGNOSIS — R35.0 URINARY FREQUENCY: Primary | ICD-10-CM

## 2022-09-14 PROBLEM — E10.9 TYPE 1 DIABETES MELLITUS (HCC): Status: ACTIVE | Noted: 2017-01-30

## 2022-09-14 LAB
BILIRUBIN, POC: NORMAL
BLOOD URINE, POC: NORMAL
CLARITY, POC: NORMAL
COLOR, POC: YELLOW
GLUCOSE URINE, POC: >=1000
KETONES, POC: 15
LEUKOCYTE EST, POC: NORMAL
NITRITE, POC: NORMAL
PH, POC: 5.5
PROTEIN, POC: 30
SPECIFIC GRAVITY, POC: 1.02
UROBILINOGEN, POC: 0.2

## 2022-09-14 PROCEDURE — 99214 OFFICE O/P EST MOD 30 MIN: CPT | Performed by: NURSE PRACTITIONER

## 2022-09-14 PROCEDURE — 81002 URINALYSIS NONAUTO W/O SCOPE: CPT | Performed by: NURSE PRACTITIONER

## 2022-09-14 RX ORDER — FLASH GLUCOSE SENSOR
KIT MISCELLANEOUS
COMMUNITY
Start: 2022-09-04 | End: 2022-10-20 | Stop reason: ALTCHOICE

## 2022-09-14 ASSESSMENT — ENCOUNTER SYMPTOMS
VOMITING: 0
WHEEZING: 0
NAUSEA: 0
COLOR CHANGE: 0
DIARRHEA: 0
SHORTNESS OF BREATH: 0
COUGH: 0
ABDOMINAL PAIN: 0

## 2022-09-14 NOTE — PROGRESS NOTES
Team:  Joel Jones as PCP - General  CABRERA Arndt CNP as PCP - [facility] Empaneled Provider    Chief Complaint   Patient presents with    Urinary Tract Infection     Urinary freq, lower abdominal pain        HPI:   Patient seen for a problem visit. Complains of urinary symptoms x 1mo  Has increased frequency, pelvic pressure. Some urgency. Some low back pain. No odor or cloudiness to urine. No visible hematuria. No fevers. Some nausea. No vomiting. Diarrhea (normal for her)  Drinks some water. +Nephropathy. DMII:  Sees anna. Will change endo to Dr Tracy Lee. Upcoming apt  On insulin pump. Not well controlled. Admits she is noncomplaint   Has dexcom. Glu 300-400s, \"which is not unusual for her\"  Last a1c 9.9.     ROS:  Review of Systems   Constitutional:  Negative for chills, diaphoresis, fatigue and fever. Respiratory:  Negative for cough, shortness of breath and wheezing. Cardiovascular:  Negative for chest pain, palpitations and leg swelling. Gastrointestinal:  Negative for abdominal pain, diarrhea, nausea and vomiting. Endocrine: Positive for polydipsia and polyuria. Negative for polyphagia. Genitourinary:  Positive for frequency and urgency. Negative for difficulty urinating, dysuria, flank pain, hematuria, menstrual problem, pelvic pain, vaginal bleeding and vaginal discharge. Musculoskeletal:  Negative for myalgias. Skin:  Negative for color change and rash. Neurological:  Positive for dizziness. Negative for weakness, light-headedness and headaches. VITALS:  /76   Pulse 89   Temp 97 °F (36.1 °C)   Resp 16   Ht 4' 11\" (1.499 m)   Wt 107 lb 6.4 oz (48.7 kg)   LMP  (LMP Unknown) Comment: had a hysterectomy a long time ago  SpO2 99%   BMI 21.69 kg/m²      PE:  Physical Exam  Vitals and nursing note reviewed. Constitutional:       General: She is not in acute distress. Appearance: Normal appearance. She is well-developed and normal weight. She is not diaphoretic. Cardiovascular:      Rate and Rhythm: Normal rate and regular rhythm. Heart sounds: Normal heart sounds. No murmur heard. No friction rub. Pulmonary:      Effort: Pulmonary effort is normal. No respiratory distress. Breath sounds: Normal breath sounds. No wheezing, rhonchi or rales. Abdominal:      General: Abdomen is flat. Bowel sounds are normal. There is no distension. Palpations: Abdomen is soft. There is no mass. Tenderness: There is no abdominal tenderness. There is no right CVA tenderness, left CVA tenderness, guarding or rebound. Hernia: No hernia is present. Skin:     General: Skin is warm and dry. Findings: No rash. Neurological:      Mental Status: She is alert and oriented to person, place, and time. Gait: Gait normal.   Psychiatric:         Mood and Affect: Mood normal.         Behavior: Behavior normal.        Results for POC orders placed in visit on 09/14/22   POCT Urinalysis no Micro   Result Value Ref Range    Color, UA Yellow     Clarity, UA Cloudy     Glucose, UA POC >=1,000     Bilirubin, UA NEG     Ketones, UA 15     Spec Grav, UA 1.020     Blood, UA POC TRACE     pH, UA 5.5     Protein, UA POC 30     Urobilinogen, UA 0.2     Leukocytes, UA SMALL     Nitrite, UA NEG      ASSESSMENT/PLAN:  1. Urinary frequency  Discussed polyuria could be due to uncontrolled diabetes. Will send urine for culture. Proceed pending results. Increase water intake  - POCT Urinalysis no Micro    2. Leukocytes in urine  - Culture, Urine    3. Uncontrolled type 1 diabetes mellitus with hyperglycemia ([address]  Will establish with new endo soon. Patient has insulin pump and non compliant  States her glu 300-400s is not unusual for her  Discussed concern for glu and ketones in urine. Patient is not concerned. H/o DKA. Patient aware of symptoms to monitor for. No follow-ups on file.      Electronically signed by CABRERA Mayfield CNP on 9/14/2022 at 12:39 PM

## 2022-09-15 LAB — URINE CULTURE, ROUTINE: NORMAL

## 2022-09-18 PROBLEM — N18.30 STAGE 3 CHRONIC KIDNEY DISEASE (HCC): Status: ACTIVE | Noted: 2022-09-18

## 2022-09-19 ENCOUNTER — OFFICE VISIT (OUTPATIENT)
Dept: ENDOCRINOLOGY | Age: 62
End: 2022-09-19
Payer: COMMERCIAL

## 2022-09-19 VITALS
DIASTOLIC BLOOD PRESSURE: 85 MMHG | HEART RATE: 85 BPM | SYSTOLIC BLOOD PRESSURE: 178 MMHG | TEMPERATURE: 98 F | RESPIRATION RATE: 14 BRPM | BODY MASS INDEX: 21.77 KG/M2 | OXYGEN SATURATION: 98 % | HEIGHT: 59 IN | WEIGHT: 108 LBS

## 2022-09-19 DIAGNOSIS — E55.9 VITAMIN D DEFICIENCY: ICD-10-CM

## 2022-09-19 DIAGNOSIS — N18.30 STAGE 3 CHRONIC KIDNEY DISEASE, UNSPECIFIED WHETHER STAGE 3A OR 3B CKD (HCC): ICD-10-CM

## 2022-09-19 DIAGNOSIS — E78.2 MIXED HYPERLIPIDEMIA: ICD-10-CM

## 2022-09-19 DIAGNOSIS — Z83.49 FAMILY HISTORY OF THYROID DISEASE: ICD-10-CM

## 2022-09-19 DIAGNOSIS — I10 PRIMARY HYPERTENSION: ICD-10-CM

## 2022-09-19 PROBLEM — I27.0 PULMONARY HYPERTENSION, PRIMARY (HCC): Status: ACTIVE | Noted: 2022-09-19

## 2022-09-19 LAB
A/G RATIO: 1.5 (ref 1.1–2.2)
ALBUMIN SERPL-MCNC: 4.5 G/DL (ref 3.4–5)
ALP BLD-CCNC: 173 U/L (ref 40–129)
ALT SERPL-CCNC: 14 U/L (ref 10–40)
ANION GAP SERPL CALCULATED.3IONS-SCNC: 22 MMOL/L (ref 3–16)
AST SERPL-CCNC: 20 U/L (ref 15–37)
BILIRUB SERPL-MCNC: 0.3 MG/DL (ref 0–1)
BUN BLDV-MCNC: 24 MG/DL (ref 7–20)
CALCIUM SERPL-MCNC: 9.8 MG/DL (ref 8.3–10.6)
CHLORIDE BLD-SCNC: 97 MMOL/L (ref 99–110)
CHOLESTEROL, FASTING: 221 MG/DL (ref 0–199)
CO2: 17 MMOL/L (ref 21–32)
CREAT SERPL-MCNC: 1.6 MG/DL (ref 0.6–1.2)
CREATININE URINE: 135.7 MG/DL (ref 28–259)
GFR AFRICAN AMERICAN: 39
GFR NON-AFRICAN AMERICAN: 32
GLUCOSE BLD-MCNC: 309 MG/DL (ref 70–99)
HDLC SERPL-MCNC: 106 MG/DL (ref 40–60)
LDL CHOLESTEROL CALCULATED: 75 MG/DL
MICROALBUMIN UR-MCNC: 24.8 MG/DL
MICROALBUMIN/CREAT UR-RTO: 182.8 MG/G (ref 0–30)
POTASSIUM SERPL-SCNC: 4.4 MMOL/L (ref 3.5–5.1)
SODIUM BLD-SCNC: 136 MMOL/L (ref 136–145)
T3 FREE: 2.1 PG/ML (ref 2.3–4.2)
T4 FREE: 1.1 NG/DL (ref 0.9–1.8)
TOTAL PROTEIN: 7.6 G/DL (ref 6.4–8.2)
TRIGLYCERIDE, FASTING: 200 MG/DL (ref 0–150)
TSH SERPL DL<=0.05 MIU/L-ACNC: 2.11 UIU/ML (ref 0.27–4.2)
VITAMIN D 25-HYDROXY: 27 NG/ML
VLDLC SERPL CALC-MCNC: 40 MG/DL

## 2022-09-19 PROCEDURE — 99205 OFFICE O/P NEW HI 60 MIN: CPT | Performed by: INTERNAL MEDICINE

## 2022-09-19 NOTE — PROGRESS NOTES
Burke Magaña is a 59 y.o. female who is being evaluated for Type 1 diabetes mellitus. Current symptoms/problems include  hyperglycemia  and are worsening. Type 1 diabetes, uncontrolled, with neuropathy (HCC) [E10.40, E10.65]    Diagnosed with Type 1 diabetes mellitus in 1993. Comorbid conditions:  hypertension, Neuropathy, Nephropathy, Retinopathy, and Chronic Kidney Disease    Current diabetic medications include: Humalog  Marry 14 day  Medtronic 670G    Intolerance to diabetes medications: No     Weight trend: stable  Prior visit with dietician: yes  Current diet: on average, 3 meals per day  Current exercise: no     Current monitoring regimen: home blood tests - 4 times daily  Has brought blood glucose log/meter:  Yes  Home blood sugar records: fasting range: 138  and postprandial range: 300-high   Any episodes of hypoglycemia? Yes  Hypoglycemia frequency and time(s):  once in 3 months  Does patient have Glucagon emergency kit? No  Does patient have rapid acting carbohydrate? Yes  Does patient wear a medic alert bracelet or necklace? No    2. 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 Stage 3 chronic kidney disease, unspecified whether stage 3a or 3b CKD ([address]  No urination problems    4. Type 1 diabetes, uncontrolled, with retinopathy ([address]  Has more blurry vision    5. Vitamin D deficiency  Has fatigue    6. Hyperlipidemia  No muscle pain. Has leg cramps. 7.  Hypertension, primary  Has headaches    8. Family history of thyroid disease  Mother and sisters had thyroid disease.     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    Depression     Diabetes mellitus ([address]     Hx of radiation therapy     melanoma right eye    Hypertension     Insulin pump in place     Prolonged emergence from general anesthesia     slow to wake up Patient Active Problem List   Diagnosis    DKA (diabetic ketoacidoses)    Depression    Hyponatremia    Nausea & vomiting    Duodenal erosion    Adhesive capsulitis of right shoulder    Bilateral elbow joint pain    Left elbow pain    Type 1 diabetes mellitus ([address]    Uncontrolled type 1 diabetes mellitus with hyperglycemia (HCC)    Type 1 diabetes, uncontrolled, with neuropathy ([address]    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, with long-term current use of insulin ([facility])    Stage 3 chronic kidney disease (HCC)    Type 1 diabetes, uncontrolled, with retinopathy ([address]    Primary hypertension    Vitamin D deficiency    Mixed hyperlipidemia     Past Surgical History:   Procedure Laterality Date    CARPAL TUNNEL RELEASE Bilateral 2017    CHOLECYSTECTOMY      EYE SURGERY Right     biopsy    HYSTERECTOMY (CERVIX STATUS UNKNOWN)      LIPOMA RESECTION      SHOULDER ARTHROSCOPY Right 2018    RIGHT SHOULDER ARTHROSCOPE, SUBACROMIAL DECOMPRESSION, DISTALCLAVICLE EXCISION, CAPSULAR RELEASE, MANIPULATION UNDER ANESTHESIA, DEBRIDEMENT    SHOULDER SURGERY      UPPER GASTROINTESTINAL ENDOSCOPY  10/22/14     Social History     Socioeconomic History    Marital status:      Spouse name: Not on file    Number of children: Not on file    Years of education: Not on file    Highest education level: Not on file   Occupational History    Not on file   Tobacco Use    Smoking status: Former     Packs/day: 1.00     Years: 10.00     Pack years: 10.00     Types: Cigarettes     Quit date: 1980     Years since quittin.8    Smokeless tobacco: Never   Vaping Use    Vaping Use: Never used   Substance and Sexual Activity    Alcohol use: No    Drug use: No    Sexual activity: Not on file   Other Topics Concern    Not on file   Social History Narrative    Not on file     Social Determinants of Health     Financial Resource Strain: Low Risk     Difficulty of Paying Living Expenses: Not hard at all Food Insecurity: No Food Insecurity    Worried About Running Out of Food in the Last Year: Never true    Ran Out of Food in the Last Year: Never true   Transportation Needs: Not on file   Physical Activity: Not on file   Stress: Not on file   Social Connections: Not on file   Intimate Partner Violence: Not on file   Housing Stability: Not on file     Family History   Problem Relation Age of Onset    High Blood Pressure Mother     Breast Cancer Mother         breast w mets to bone    Diabetes Father     Stroke Father     Cancer Father         bladder     Current Outpatient Medications   Medication Sig Dispense Refill    insulin lispro (HUMALOG) 100 UNIT/ML injection vial Total daily dose 30 units, use in insulin pump 10 mL 3    Continuous Blood Gluc Sensor (FREESTYLE AMANDA 14 DAY SENSOR) MISC USE AS DIRECTED      gabapentin (NEURONTIN) 600 MG tablet Take 600 mg by mouth every evening. rOPINIRole (REQUIP) 1 MG tablet Take 1 mg by mouth nightly      Insulin Syringe-Needle U-100 ([employer] INSULIN SYRINGE) 31G X 5/16\" 0.5 ML MISC 100 each by Does not apply route. 100 each 6    sertraline (ZOLOFT) 50 MG tablet 75 mg daily  (Patient not taking: No sig reported)      predniSONE (DELTASONE) 10 MG tablet Days1-2:50mg PO QD,Days3-4:40mg PO QD,Days5-6:30mg PO QD,Days7-8:20mg PO QD,Days 9-10:10mg PO QD (Patient not taking: No sig reported) 30 tablet 0    meloxicam (MOBIC) 15 MG tablet Take 1 tablet by mouth daily (Patient not taking: No sig reported) 30 tablet 3    trazodone (DESYREL) 50 MG tablet Take 50 mg by mouth nightly. (Patient not taking: No sig reported)       No current facility-administered medications for this visit.      No Known Allergies  Family Status   Relation Name Status    Mother      Father         Lab Review:    Lab Results   Component Value Date/Time    WBC 9.2 10/24/2014 05:34 AM    HGB 12.6 10/24/2014 05:34 AM    HCT 37.0 10/24/2014 05:34 AM    MCV 88.7 10/24/2014 05:34 AM    PLT 261 10/24/2014 05:34 AM     Lab Results   Component Value Date/Time     09/19/2022 10:11 AM    K 4.4 09/19/2022 10:11 AM    CL 97 09/19/2022 10:11 AM    CO2 17 09/19/2022 10:11 AM    BUN 24 09/19/2022 10:11 AM    CREATININE 1.6 09/19/2022 10:11 AM    GLUCOSE 309 09/19/2022 10:11 AM    CALCIUM 9.8 09/19/2022 10:11 AM    PROT 7.6 09/19/2022 10:11 AM    PROT 7.8 05/25/2012 09:15 AM    LABALBU 4.5 09/19/2022 10:11 AM    BILITOT 0.3 09/19/2022 10:11 AM    ALKPHOS 173 09/19/2022 10:11 AM    AST 20 09/19/2022 10:11 AM    ALT 14 09/19/2022 10:11 AM    LABGLOM 32 09/19/2022 10:11 AM    GFRAA 39 09/19/2022 10:11 AM    GFRAA >60 05/29/2012 03:09 AM    AGRATIO 1.5 09/19/2022 10:11 AM     Lab Results   Component Value Date/Time    TSH 2.11 09/19/2022 10:11 AM    FT3 2.1 09/19/2022 10:11 AM     Lab Results   Component Value Date/Time    LABA1C 9.0 10/19/2014 06:06 AM     Lab Results   Component Value Date/Time    .6 10/19/2014 06:06 AM     Lab Results   Component Value Date/Time    CHOL 217 04/08/2022 06:21 AM     Lab Results   Component Value Date/Time    TRIG 118 04/08/2022 06:21 AM     Lab Results   Component Value Date/Time     09/19/2022 10:11 AM     12/07/2011 07:50 AM     Lab Results   Component Value Date/Time    LDLCALC 75 09/19/2022 10:11 AM     Lab Results   Component Value Date/Time    LABVLDL 40 09/19/2022 10:11 AM     No results found for: CHOLHDLRATIO  Lab Results   Component Value Date/Time    LABMICR 24.80 09/19/2022 10:09 AM    LABMICR Not Indicated 10/19/2014 02:50 AM     Lab Results   Component Value Date/Time    VITD25 27.0 09/19/2022 10:11 AM        Review of Systems:  Constitutional: has fatigue, no fever, no recent weight gain, no recent weight loss, no changes in appetite  Eyes: no eye pain, has change in vision, no eye redness, no eye irritation, no double vision  Ears, nose, throat: no nasal congestion, no sore throat, no earache, no decrease in hearing, no hoarseness, no dry mouth, no sinus problems, no difficulty swallowing, no neck lumps, no dental problems, no mouth sores, no ringing in ears  Pulmonary: no shortness of breath, no wheezing, no dyspnea on exertion, no cough  Cardiovascular: no chest pain, no lower extremity edema, no orthopnea, no intermittent leg claudication, no palpitations  Gastrointestinal: no abdominal pain, no nausea, no vomiting, has diarrhea, no constipation, no dysphagia, no heartburn, no bloating  Genitourinary: no dysuria, no urinary incontinence, no urinary hesitancy, no urinary frequency, no feelings of urinary urgency, no nocturia  Musculoskeletal: no joint swelling, no joint stiffness, no joint pain, has muscle cramps, no muscle pain  Integument/Breast: no hair loss, no skin rashes, no skin lesions, no itching, has dry skin  Neurological: has numbness left leg, no tingling, no weakness, no confusion, has headaches, has dizziness, no fainting, no tremors, no decrease in memory, no balance problems  Psychiatric: no anxiety, no depression, no insomnia  Hematologic/Lymphatic: no tendency for easy bleeding, no swollen lymph nodes, no tendency for easy bruising  Immunology: no seasonal allergies, no frequent infections, no frequent illnesses  Endocrine: no temperature intolerance    BP (!) 178/85   Pulse 85   Temp 98 °F (36.7 °C)   Resp 14   Ht 4' 11\" (1.499 m)   Wt 108 lb (49 kg)   LMP  (LMP Unknown) Comment: had a hysterectomy a long time ago  SpO2 98%   BMI 21.81 kg/m²    Wt Readings from Last 3 Encounters:   09/19/22 108 lb (49 kg)   09/14/22 107 lb 6.4 oz (48.7 kg)   11/12/20 120 lb (54.4 kg)     Body mass index is 21.81 kg/m².       OBJECTIVE:  Constitutional: no acute distress, well appearing and well nourished  Psychiatric: oriented to person, place and time, judgement and insight and normal, recent and remote memory and intact and mood and affect are normal  Skin: skin and subcutaneous tissue is normal without mass, normal turgor  Head and Face: examination of head and face revealed no abnormalities  Eyes: no lid or conjunctival swelling, erythema or discharge, pupils are normal, equal, round, reactive to light  Ears/Nose: external inspection of ears and nose revealed no abnormalities, hearing is grossly normal  Oropharynx/Mouth/Face: lips, tongue and gums are normal with no lesions, the voice quality was normal  Neck: neck is supple and symmetric, with midline trachea and no masses, thyroid is normal  Lymphatics: normal cervical lymph nodes, normal supraclavicular nodes  Pulmonary: no increased work of breathing or signs of respiratory distress, lungs are clear to auscultation  Cardiovascular: normal heart rate and rhythm, normal S1 and S2, no murmurs and pedal pulses and 2+ bilaterally, No edema  Abdomen: abdomen is soft, non-tender with no masses  Musculoskeletal: normal gait and station and exam of the digits and nails are normal  Neurological: normal coordination and normal general cortical function    Visual inspection:  Deformity/amputation: absent  Skin lesions/pre-ulcerative calluses: present - calluses  Edema: right- negative, left- negative    Sensory exam:  Monofilament sensation: normal  (minimum of 5 random plantar locations tested, avoiding callused areas - > 1 area with absence of sensation is + for neuropathy)    Plus at least one of the following:  Pulses: normal  Proprioception: Intact  Vibration (128 Hz): Impaired    ASSESSMENT/PLAN:  1. Type 1 diabetes, uncontrolled, with neuropathy (HCC)  Recommend pump upgrade. Patient has very fluctuating blood glucose levels, hypoglycemic episodes, severe hyperglycemia. Not always compliant with treatment and recommendations. Counseled patient on diabetes complications. Comply with diabetes regimen.   Obtain lab work, then reevaluate  CGM to monitor glycemic patterns  Counseled patient on teresita 3  Discussed Dexcom  - insulin lispro (HUMALOG) 100 UNIT/ML injection vial; Total daily dose 30 units, use in insulin pump  Dispense: 10 mL; Refill: 3  - Comprehensive Metabolic Panel; Future  - C-Peptide; Future  - Hemoglobin A1C; Future  - HM DIABETES FOOT EXAM  - Lipid, Fasting; Future  - Microalbumin / Creatinine Urine Ratio; Future  - Glutamic Acid Decarboxylase; Future  - Anti-Islet Cell Antibody; Future  - Zinc Transporter 8 AB; Future  - Insulin Antibody; Future  - T4, Free; Future  - T3, Free; Future  - TSH; Future    2. 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  Needs better diabetes control, counseled patient on complications and their management  - Hemoglobin A1C; Future    3. Stage 3 chronic kidney disease, unspecified whether stage 3a or 3b CKD ([facility]  Continue monitoring  - Comprehensive Metabolic Panel; Future    4. Type 1 diabetes, uncontrolled, with retinopathy ([facility]  Ophthalmology follow-up  - Hemoglobin A1C; Future    5. Primary hypertension  High blood pressure this appointment  Follow-up with family doctor  Low-salt diet  - Comprehensive Metabolic Panel; Future    6. Vitamin D deficiency  Obtain lab work, then reevaluate  - Comprehensive Metabolic Panel; Future  - Vitamin D 25 Hydroxy; Future    7. Mixed hyperlipidemia  Low-fat low-cholesterol diet  - T4, Free; Future  - T3, Free; Future  - TSH; Future    8.   Family history of thyroid disease  Mother and sisters have hypothyroidism  Father had diabetes  Obtain thyroid sonogram    Reviewed and/or ordered clinical lab results Yes  Reviewed and/or ordered radiology tests Yes  Reviewed and/or ordered other diagnostic tests No  Discussed test results with performing physician No  Independently reviewed image, tracing, or specimen yes, see scanned document  Made a decision to obtain old records No  Reviewed and summarized old records Yes  HbA1C 9.1  Creatinine 1.5  GFR 39  TSH 2.45  Microalbumin creatinine 403  25OHvitamin D 14.1  Obtained history from other than patient Viv Yip was counseled regarding symptoms of diabetes, hyperlipidemia, hypertension, thyroid disease diagnosis, course and complications of disease if inadequately treated, side effects of medications, diagnosis, treatment options, and prognosis, risks, benefits, complications, and alternatives of treatment, labs, imaging and other studies and treatment targets and goals. She understands instructions and counseling. These diagnosis were discussed and reviewed with the patient including the advantages of drug therapy. She was counseled at this visit on the following: diabetes complication prevention and foot care. Total time I spent for this encounter gathering history, performing physical exam, coordinating care, counseling, and documenting in the chart -60 minutes    CGMS Download Review and Recommendations  See scanned document for blood glucose tracing documentation  [employer] personal CGMS data downloaded and reviewed. Average glucose 306± 41.8 SD  Time in range: 19%  Time above 180: 80%  Time under 70: 1%   GMI 10.6%    Basal pattern review: Basal hyperglycemia  Postprandial pattern review: Postprandial hyperglycemia  Hypoglycemia review: Rare hypoglycemia at night  Activity related review: Not recorded      Based on the data, I recommend:    1. Patient needs better diabetes management. High risk of complication exacerbations. 2.  Timely boluses for meals    3. Count carbohydrates    4. Portion control    5. Compliance with recommendations      Return in about 1 month (around 10/19/2022) for diabetes.     Electronically signed by Rocky Landaverde MD on 9/20/2022 at 12:26 AM

## 2022-09-20 ENCOUNTER — TELEPHONE (OUTPATIENT)
Dept: ENDOCRINOLOGY | Age: 62
End: 2022-09-20

## 2022-09-20 PROBLEM — Z83.49 FAMILY HISTORY OF THYROID DISEASE: Status: ACTIVE | Noted: 2022-09-20

## 2022-09-20 LAB
ESTIMATED AVERAGE GLUCOSE: 248.9 MG/DL
HBA1C MFR BLD: 10.3 %

## 2022-09-20 NOTE — TELEPHONE ENCOUNTER
Pt came to staff with c/o hyperglycemic episode. [facility] was going off that pt was off charts. Staff pricked her finger at 26, and her BG was at 599. Pt has had 6 units insulin on top of her insulin pump. Made MD aware of situation, MD advised for pt to go to ER. Staff made us aware that pt declined going to ER.

## 2022-09-20 NOTE — TELEPHONE ENCOUNTER
Pt came to staff around [phone] stating running low on insulin. Gave her 1 sample vial of humalog u-100.      Lot [id number]  Exp 01/23

## 2022-09-21 LAB
C-PEPTIDE: <0.1 NG/ML (ref 1.1–4.4)
ISLET CELL ANTIBODY: NORMAL

## 2022-09-21 NOTE — TELEPHONE ENCOUNTER
I called patient twice and unable to reach her. Left a message to go to the ER. Her lab work is consistent with diabetic ketoacidosis and acute kidney injury. Must go to the ER for hydration and treatment for ketoacidosis. Please call patient and ask how she is doing and did she go to the ER. Let her know again that not treating diabetic ketoacidosis and acute kidney injury is a life-threatening emergency. I will speak with her, just let me know when you can reach her. Feli Deras

## 2022-09-21 NOTE — TELEPHONE ENCOUNTER
Patient was advised earlier today to go to the emergency room. Patient is noncompliant with medical advice. Her lab work is consistent with severe hyperglycemia, diabetic ketoacidosis and acute kidney injury. She declined to go to the emergency room. I called patient twice today. Unable to reach her. Left patient a voicemail to go to the ER based on her lab results and high glucose levels.

## 2022-09-22 NOTE — TELEPHONE ENCOUNTER
Noted.  Noncompliant response from patient. She understands the risks. Please ask patient if she has ketone strips at home to check for ketones if needed. If not, let me know I will send prescription. If blood glucose above 400 on fingerstick, she always should do manual injection and check for ketones.

## 2022-09-24 LAB
GLUTAMIC ACID DECARB AB: 203.4 IU/ML (ref 0–5)
INSULIN A: <0.4 U/ML (ref 0–0.4)

## 2022-09-27 LAB — ZINC TRANSPORTER 8 AB: 15.1 U/ML (ref 0–15)

## 2022-10-04 DIAGNOSIS — E10.40 POORLY CONTROLLED TYPE 1 DIABETES MELLITUS WITH NEUROPATHY (HCC): Primary | ICD-10-CM

## 2022-10-04 DIAGNOSIS — E10.65 POORLY CONTROLLED TYPE 1 DIABETES MELLITUS WITH NEUROPATHY (HCC): Primary | ICD-10-CM

## 2022-10-04 RX ORDER — BLOOD-GLUCOSE TRANSMITTER
EACH MISCELLANEOUS
Qty: 1 EACH | Refills: 1 | Status: SHIPPED | OUTPATIENT
Start: 2022-10-04

## 2022-10-04 RX ORDER — BLOOD-GLUCOSE SENSOR
EACH MISCELLANEOUS
Qty: 9 EACH | Refills: 1 | Status: SHIPPED | OUTPATIENT
Start: 2022-10-04

## 2022-10-20 ENCOUNTER — OFFICE VISIT (OUTPATIENT)
Dept: ENDOCRINOLOGY | Age: 62
End: 2022-10-20
Payer: COMMERCIAL

## 2022-10-20 VITALS
DIASTOLIC BLOOD PRESSURE: 94 MMHG | TEMPERATURE: 98 F | HEART RATE: 87 BPM | BODY MASS INDEX: 22.18 KG/M2 | WEIGHT: 110 LBS | OXYGEN SATURATION: 99 % | SYSTOLIC BLOOD PRESSURE: 207 MMHG | HEIGHT: 59 IN | RESPIRATION RATE: 14 BRPM

## 2022-10-20 DIAGNOSIS — E10.40 POORLY CONTROLLED TYPE 1 DIABETES MELLITUS WITH NEUROPATHY (HCC): Primary | ICD-10-CM

## 2022-10-20 DIAGNOSIS — Z83.49 FAMILY HISTORY OF THYROID DISEASE: ICD-10-CM

## 2022-10-20 DIAGNOSIS — I10 PRIMARY HYPERTENSION: ICD-10-CM

## 2022-10-20 DIAGNOSIS — E10.65 POORLY CONTROLLED TYPE 1 DIABETES MELLITUS WITH RETINOPATHY (HCC): ICD-10-CM

## 2022-10-20 DIAGNOSIS — E04.9 THYROID ENLARGEMENT: ICD-10-CM

## 2022-10-20 DIAGNOSIS — E10.65 POORLY CONTROLLED TYPE 1 DIABETES MELLITUS WITH NEUROPATHY (HCC): Primary | ICD-10-CM

## 2022-10-20 DIAGNOSIS — E55.9 VITAMIN D DEFICIENCY: ICD-10-CM

## 2022-10-20 DIAGNOSIS — E10.319 POORLY CONTROLLED TYPE 1 DIABETES MELLITUS WITH RETINOPATHY (HCC): ICD-10-CM

## 2022-10-20 DIAGNOSIS — E10.21 DIABETIC NEPHROPATHY ASSOCIATED WITH TYPE 1 DIABETES MELLITUS (HCC): ICD-10-CM

## 2022-10-20 DIAGNOSIS — E78.2 MIXED HYPERLIPIDEMIA: ICD-10-CM

## 2022-10-20 DIAGNOSIS — N18.30 STAGE 3 CHRONIC KIDNEY DISEASE, UNSPECIFIED WHETHER STAGE 3A OR 3B CKD (HCC): ICD-10-CM

## 2022-10-20 PROCEDURE — 99214 OFFICE O/P EST MOD 30 MIN: CPT | Performed by: INTERNAL MEDICINE

## 2022-10-20 PROCEDURE — 3046F HEMOGLOBIN A1C LEVEL >9.0%: CPT | Performed by: INTERNAL MEDICINE

## 2022-10-20 PROCEDURE — 95251 CONT GLUC MNTR ANALYSIS I&R: CPT | Performed by: INTERNAL MEDICINE

## 2022-10-20 RX ORDER — LISINOPRIL 5 MG/1
5 TABLET ORAL DAILY
Qty: 90 TABLET | Refills: 1 | Status: SHIPPED | OUTPATIENT
Start: 2022-10-20

## 2022-10-20 RX ORDER — INSULIN PMP CART,AUT,G6/7,CNTR
EACH SUBCUTANEOUS
Qty: 30 EACH | Refills: 1 | Status: SHIPPED | OUTPATIENT
Start: 2022-10-20

## 2022-10-20 RX ORDER — GABAPENTIN 300 MG/1
CAPSULE ORAL
COMMUNITY
Start: 2022-09-21 | End: 2022-10-20

## 2022-10-20 RX ORDER — INSULIN PMP CART,AUT,G6/7,CNTR
EACH SUBCUTANEOUS
Qty: 1 KIT | Refills: 0 | Status: SHIPPED | OUTPATIENT
Start: 2022-10-20

## 2022-10-20 NOTE — PROGRESS NOTES
Brock Miner is a 57 y.o. female who is being evaluated for Type 1 diabetes mellitus. Current symptoms/problems include  hyperglycemia  and are worsening. 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 Comorbid conditions:  hypertension, Neuropathy, Nephropathy, Retinopathy, and Chronic Kidney Disease    Current diabetic medications include: Humalog  Marry 14 day  Medtronic 670G    Intolerance to diabetes medications: No     Weight trend: stable  Prior visit with dietician: yes  Current diet: on average, 3 meals per day  Current exercise: no     Current monitoring regimen: home blood tests - 4 times daily  Has brought blood glucose log/meter:  Yes  Home blood sugar records: fasting range: 120  and postprandial range: 300-high   Any episodes of hypoglycemia? Yes  Hypoglycemia frequency and time(s):  once in 3 months  Does patient have Glucagon emergency kit? No  Does patient have rapid acting carbohydrate? Yes  Does patient wear a medic alert bracelet or necklace? No    2. Diabetic Nephropathy  Has increased urination  Lisinopril caused too low BP    3. Stage 3 chronic kidney disease, unspecified whether stage 3a or 3b CKD ([address]  No urination problems    4. Type 1 diabetes, poorly controlled, with retinopathy ([address]  Has more blurry vision    5. Vitamin D deficiency  Has fatigue    6. Hyperlipidemia  No muscle pain. Has leg cramps. 7.  Hypertension, primary  Has headaches    8. Family history of thyroid disease  Mother and sisters had thyroid disease.     9. 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    Depression     Diabetes mellitus ([address]     Hx of radiation therapy     melanoma right eye    Hypertension     Insulin pump in place     Prolonged emergence from general anesthesia slow to wake up      Patient Active Problem List   Diagnosis    DKA (diabetic ketoacidoses)    Depression    Hyponatremia    Nausea & vomiting    Duodenal erosion    Adhesive capsulitis of right shoulder    Bilateral elbow joint pain    Left elbow pain    Type 1 diabetes mellitus ([address]    Uncontrolled type 1 diabetes mellitus with hyperglycemia (HCC)    Type 1 diabetes, uncontrolled, with neuropathy    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, with long-term current use of insulin    Stage 3 chronic kidney disease (HCC)    Type 1 diabetes, uncontrolled, with retinopathy    Primary hypertension    Vitamin D deficiency    Mixed hyperlipidemia    Family history of thyroid disease    Poorly controlled type 1 diabetes mellitus with retinopathy ([address]    Diabetic nephropathy associated with type 1 diabetes mellitus ([address]    Poorly controlled type 1 diabetes mellitus with neuropathy (HCC)    Thyroid enlargement     Past Surgical History:   Procedure Laterality Date    CARPAL TUNNEL RELEASE Bilateral 2017    CHOLECYSTECTOMY      EYE SURGERY Right     biopsy    HYSTERECTOMY (CERVIX STATUS UNKNOWN)      LIPOMA RESECTION      SHOULDER ARTHROSCOPY Right 2018    RIGHT SHOULDER ARTHROSCOPE, SUBACROMIAL DECOMPRESSION, DISTALCLAVICLE EXCISION, CAPSULAR RELEASE, MANIPULATION UNDER ANESTHESIA, DEBRIDEMENT    SHOULDER SURGERY      UPPER GASTROINTESTINAL ENDOSCOPY  10/22/14     Social History     Socioeconomic History    Marital status:      Spouse name: Not on file    Number of children: Not on file    Years of education: Not on file    Highest education level: Not on file   Occupational History    Not on file   Tobacco Use    Smoking status: Former     Packs/day: 1.00     Years: 10.00     Pack years: 10.00     Types: Cigarettes     Quit date: 1980     Years since quittin.9    Smokeless tobacco: Never   Vaping Use    Vaping Use: Never used   Substance and Sexual Activity    Alcohol use: No    Drug use: No    Sexual activity: Not on file   Other Topics Concern    Not on file   Social History Narrative    Not on file     Social Determinants of Health     Financial Resource Strain: Low Risk     Difficulty of Paying Living Expenses: Not hard at all   Food Insecurity: No Food Insecurity    Worried About Running Out of Food in the Last Year: Never true    Ran Out of Food in the Last Year: Never true   Transportation Needs: Not on file   Physical Activity: Not on file   Stress: Not on file   Social Connections: Not on file   Intimate Partner Violence: Not on file   Housing Stability: Not on file     Family History   Problem Relation Age of Onset    High Blood Pressure Mother     Breast Cancer Mother         breast w mets to bone    Diabetes Father     Stroke Father     Cancer Father         bladder     Current Outpatient Medications   Medication Sig Dispense Refill    insulin lispro (HUMALOG) 100 UNIT/ML injection vial Total daily dose 30 units, use in insulin pump 40 mL 1    Insulin Disposable Pump (OMNIPOD 5 G6 INTRO, GEN 5,) KIT Use as directed by MD 1 kit 0    Insulin Disposable Pump (OMNIPOD 5 G6 POD, GEN 5,) MISC Change pods q 2-3 days 30 each 1    lisinopril (PRINIVIL;ZESTRIL) 5 MG tablet Take 1 tablet by mouth daily 90 tablet 1    Continuous Blood Gluc Sensor (DEXCOM G6 SENSOR) MISC Change sensor every 10 days 9 each 1    Continuous Blood Gluc Transmit (DEXCOM G6 TRANSMITTER) MISC Change transmitter every 3 months 1 each 1    gabapentin (NEURONTIN) 600 MG tablet Take 600 mg by mouth every evening. rOPINIRole (REQUIP) 1 MG tablet Take 1 mg by mouth nightly      Insulin Syringe-Needle U-100 ([employer] INSULIN SYRINGE) 31G X 5/16\" 0.5 ML MISC 100 each by Does not apply route.  100 each 6    sertraline (ZOLOFT) 50 MG tablet 75 mg daily  (Patient not taking: No sig reported)      predniSONE (DELTASONE) 10 MG tablet Days1-2:50mg PO QD,Days3-4:40mg PO QD,Days5-6:30mg PO QD,Days7-8:20mg PO QD,Days 9-10:10mg PO QD (Patient not taking: No sig reported) 30 tablet 0    meloxicam (MOBIC) 15 MG tablet Take 1 tablet by mouth daily (Patient not taking: No sig reported) 30 tablet 3    trazodone (DESYREL) 50 MG tablet Take 50 mg by mouth nightly. (Patient not taking: No sig reported)       No current facility-administered medications for this visit.      No Known Allergies  Family Status   Relation Name Status    Mother      Father         Lab Review:    Lab Results   Component Value Date/Time    WBC 9.2 10/24/2014 05:34 AM    HGB 12.6 10/24/2014 05:34 AM    HCT 37.0 10/24/2014 05:34 AM    MCV 88.7 10/24/2014 05:34 AM     10/24/2014 05:34 AM     Lab Results   Component Value Date/Time     2022 10:11 AM    K 4.4 2022 10:11 AM    CL 97 2022 10:11 AM    CO2 17 2022 10:11 AM    BUN 24 2022 10:11 AM    CREATININE 1.6 2022 10:11 AM    GLUCOSE 309 2022 10:11 AM    CALCIUM 9.8 2022 10:11 AM    PROT 7.6 2022 10:11 AM    PROT 7.8 2012 09:15 AM    LABALBU 4.5 2022 10:11 AM    BILITOT 0.3 2022 10:11 AM    ALKPHOS 173 2022 10:11 AM    AST 20 2022 10:11 AM    ALT 14 2022 10:11 AM    LABGLOM 32 2022 10:11 AM    GFRAA 39 2022 10:11 AM    GFRAA >60 2012 03:09 AM    AGRATIO 1.5 2022 10:11 AM     Lab Results   Component Value Date/Time    TSH 2.11 2022 10:11 AM    FT3 2.1 2022 10:11 AM     Lab Results   Component Value Date/Time    LABA1C 10.3 2022 10:11 AM     Lab Results   Component Value Date/Time    .9 2022 10:11 AM     Lab Results   Component Value Date/Time    CHOL 217 2022 06:21 AM     Lab Results   Component Value Date/Time    TRIG 118 2022 06:21 AM     Lab Results   Component Value Date/Time     2022 10:11 AM     2011 07:50 AM     Lab Results   Component Value Date/Time    LDLCALC 75 2022 10:11 AM     Lab Results Component Value Date/Time    LABVLDL 40 09/19/2022 10:11 AM     No results found for: CHOLHDLRATIO  Lab Results   Component Value Date/Time    LABMICR 24.80 09/19/2022 10:09 AM    LABMICR Not Indicated 10/19/2014 02:50 AM     Lab Results   Component Value Date/Time    VITD25 27.0 09/19/2022 10:11 AM        Review of Systems:  Constitutional: has fatigue, no fever, no recent weight gain, no recent weight loss, no changes in appetite  Eyes: no eye pain, has change in vision, no eye redness, no eye irritation, no double vision  Ears, nose, throat: no nasal congestion, no sore throat, no earache, no decrease in hearing, no hoarseness, no dry mouth, no sinus problems, no difficulty swallowing, no neck lumps, no dental problems, no mouth sores, no ringing in ears  Pulmonary: no shortness of breath, no wheezing, no dyspnea on exertion, no cough  Cardiovascular: no chest pain, no lower extremity edema, no orthopnea, no intermittent leg claudication, no palpitations  Gastrointestinal: no abdominal pain, no nausea, no vomiting, has diarrhea, no constipation, no dysphagia, no heartburn, no bloating  Genitourinary: no dysuria, no urinary incontinence, no urinary hesitancy, no urinary frequency, no feelings of urinary urgency, no nocturia  Musculoskeletal: no joint swelling, no joint stiffness, no joint pain, has muscle cramps, no muscle pain  Integument/Breast: no hair loss, no skin rashes, no skin lesions, no itching, has dry skin  Neurological: has numbness left leg, no tingling, no weakness, no confusion, has headaches, has dizziness, no fainting, no tremors, no decrease in memory, no balance problems  Psychiatric: no anxiety, no depression, no insomnia  Hematologic/Lymphatic: no tendency for easy bleeding, no swollen lymph nodes, no tendency for easy bruising  Immunology: no seasonal allergies, no frequent infections, no frequent illnesses  Endocrine: no temperature intolerance    BP (!) 207/94   Pulse 87   Temp 98 °F (36.7 °C)   Resp 14   Ht 4' 11\" (1.499 m)   Wt 110 lb (49.9 kg)   LMP  (LMP Unknown) Comment: had a hysterectomy a long time ago  SpO2 99%   BMI 22.22 kg/m²    Wt Readings from Last 3 Encounters:   10/20/22 110 lb (49.9 kg)   09/19/22 108 lb (49 kg)   09/14/22 107 lb 6.4 oz (48.7 kg)     Body mass index is 22.22 kg/m².       OBJECTIVE:  Constitutional: no acute distress, well appearing and well nourished  Psychiatric: oriented to person, place and time, judgement and insight and normal, recent and remote memory and intact and mood and affect are normal  Skin: skin and subcutaneous tissue is normal without mass, normal turgor  Head and Face: examination of head and face revealed no abnormalities  Eyes: no lid or conjunctival swelling, erythema or discharge, pupils are normal, equal, round, reactive to light  Ears/Nose: external inspection of ears and nose revealed no abnormalities, hearing is grossly normal  Oropharynx/Mouth/Face: lips, tongue and gums are normal with no lesions, the voice quality was normal  Neck: neck is supple and symmetric, with midline trachea and no masses, thyroid is normal  Lymphatics: normal cervical lymph nodes, normal supraclavicular nodes  Pulmonary: no increased work of breathing or signs of respiratory distress, lungs are clear to auscultation  Cardiovascular: normal heart rate and rhythm, normal S1 and S2, no murmurs and pedal pulses and 2+ bilaterally, No edema  Abdomen: abdomen is soft, non-tender with no masses  Musculoskeletal: normal gait and station and exam of the digits and nails are normal  Neurological: normal coordination and normal general cortical function    Visual inspection:  Deformity/amputation: absent  Skin lesions/pre-ulcerative calluses: present - calluses  Edema: right- negative, left- negative    Sensory exam:  Monofilament sensation: normal  (minimum of 5 random plantar locations tested, avoiding callused areas - > 1 area with absence of sensation is + for neuropathy)    Plus at least one of the following:  Pulses: normal  Proprioception: Intact  Vibration (128 Hz): Impaired    ASSESSMENT/PLAN:  1. Type 1 diabetes, uncontrolled, with neuropathy ([facility]  Recommend to proceed with Omnipod/Dexcom  Patient would benefit from not having tubing  Patient has very fluctuating blood glucose levels, hypoglycemic episodes, severe hyperglycemia. Not always compliant with treatment and recommendations. Counseled patient on diabetes complications. Comply with diabetes regimen. CGM to monitor glycemic patterns  - insulin lispro (HUMALOG) 100 UNIT/ML injection vial; Total daily dose 30 units, use in insulin pump  Dispense: 10 mL; Refill: 3  - Comprehensive Metabolic Panel; Future  - Hemoglobin A1C; Future  - Lipid, Fasting; Future  - Microalbumin / Creatinine Urine Ratio; Future    2. Diabetic Nephropathy  Needs better diabetes control, counseled patient on complications and their management  - Hemoglobin A1C; Future  - Microalbumin/creatinine ratio    3. Stage 3 chronic kidney disease, unspecified whether stage 3a or 3b CKD ([facility]  Start low dose Lisinopril  Could not take Lisinopril because of low BP. No Nephrologist  Creatinine 1.6  GFR 32  Continue monitoring  - Comprehensive Metabolic Panel; Future    4. Type 1 diabetes, poorly controlled, with retinopathy [facility])  Ophthalmology follow-up  - Hemoglobin A1C; Future    5. Primary hypertension  High blood pressure this appointment  Follow-up with family doctor  Low-salt diet  - Comprehensive Metabolic Panel; Future    6. Vitamin D deficiency  25OH vitamin D 27  Start vitamin D 2000 IU daily  - Comprehensive Metabolic Panel; Future  - Vitamin D 25 Hydroxy; Future    7. Mixed hyperlipidemia  LDL 80-75  -106  -200  Low-fat low-cholesterol diet  - T4, Free; Future  - T3, Free; Future  - TSH; Future    8.   Family history of thyroid disease  Mother, daughter and sisters have hypothyroidism  Father had diabetes  Obtain thyroid sonogram    9. Thyroid enlargement  TSH 2.11  No difficulty swallowing, voice change  No radiation to her neck      Reviewed and/or ordered clinical lab results Yes  Reviewed and/or ordered radiology tests Yes  Reviewed and/or ordered other diagnostic tests No  Discussed test results with performing physician No  Independently reviewed image, tracing, or specimen yes, see scanned document  Made a decision to obtain old records No  Reviewed and summarized old records Yes  HbA1C 9.1  Creatinine 1.5  GFR 39  TSH 2.45  Microalbumin creatinine 403  25OHvitamin D 14.1  Obtained history from other than patient No    Crispin Keating was counseled regarding symptoms of diabetes, hyperlipidemia, hypertension, thyroid disease diagnosis, course and complications of disease if inadequately treated, side effects of medications, diagnosis, treatment options, and prognosis, risks, benefits, complications, and alternatives of treatment, labs, imaging and other studies and treatment targets and goals. She understands instructions and counseling. These diagnosis were discussed and reviewed with the patient including the advantages of drug therapy. She was counseled at this visit on the following: diabetes complication prevention and foot care. CGMS Download Review and Recommendations  See scanned document for blood glucose tracing documentation  Dexcom personal CGMS data downloaded and reviewed. Average glucose 268± 86 SD  Time in range: 15%  Time above 180: 84%  Time under 70:<1%   GMI 9.7 %    Basal pattern review: Basal hyperglycemia  Postprandial pattern review: Postprandial hyperglycemia  Hypoglycemia review: Hypoglycemia at night and late evening  Activity related review: Not recorded      Based on the data, I recommend:    1. Patient needs better diabetes management. High risk of complication exacerbations. 2.  Timely boluses for meals    3. Count carbohydrates    4. Portion control    5.   Compliance with recommendations    6. Change basal rates as follows: Midnight-2 AM 0.225 units/h, 2 AM-5 AM 0.150 units/h, 5 AM to 9:30 AM 0.275 units/h, 9:30 AM to 2 PM 0.425 units/h, 2 PM to 5 PM 0.425 units/h, 5 PM to 6 PM 0.650 units/h, 6 PM to midnight 0.770 units/h      Return in about 3 months (around 1/20/2023) for diabetes.     Electronically signed by Latrice Cruz MD on 10/20/2022 at 11:08 PM

## 2022-11-28 ENCOUNTER — OFFICE VISIT (OUTPATIENT)
Dept: FAMILY MEDICINE CLINIC | Age: 62
End: 2022-11-28
Payer: COMMERCIAL

## 2022-11-28 VITALS
BODY MASS INDEX: 22.58 KG/M2 | DIASTOLIC BLOOD PRESSURE: 80 MMHG | WEIGHT: 112 LBS | HEIGHT: 59 IN | SYSTOLIC BLOOD PRESSURE: 172 MMHG | OXYGEN SATURATION: 99 % | HEART RATE: 86 BPM

## 2022-11-28 DIAGNOSIS — E10.40 POORLY CONTROLLED TYPE 1 DIABETES MELLITUS WITH NEUROPATHY (HCC): ICD-10-CM

## 2022-11-28 DIAGNOSIS — E10.65 POORLY CONTROLLED TYPE 1 DIABETES MELLITUS WITH RETINOPATHY (HCC): Primary | ICD-10-CM

## 2022-11-28 DIAGNOSIS — E10.65 POORLY CONTROLLED TYPE 1 DIABETES MELLITUS WITH NEUROPATHY (HCC): ICD-10-CM

## 2022-11-28 DIAGNOSIS — E10.319 POORLY CONTROLLED TYPE 1 DIABETES MELLITUS WITH RETINOPATHY (HCC): Primary | ICD-10-CM

## 2022-11-28 DIAGNOSIS — G25.81 RESTLESS LEG SYNDROME: ICD-10-CM

## 2022-11-28 DIAGNOSIS — I10 PRIMARY HYPERTENSION: ICD-10-CM

## 2022-11-28 DIAGNOSIS — F43.20 ADJUSTMENT DISORDER, UNSPECIFIED TYPE: ICD-10-CM

## 2022-11-28 DIAGNOSIS — N18.30 STAGE 3 CHRONIC KIDNEY DISEASE, UNSPECIFIED WHETHER STAGE 3A OR 3B CKD (HCC): ICD-10-CM

## 2022-11-28 PROCEDURE — 99203 OFFICE O/P NEW LOW 30 MIN: CPT | Performed by: NURSE PRACTITIONER

## 2022-11-28 PROCEDURE — 3074F SYST BP LT 130 MM HG: CPT | Performed by: NURSE PRACTITIONER

## 2022-11-28 PROCEDURE — 3078F DIAST BP <80 MM HG: CPT | Performed by: NURSE PRACTITIONER

## 2022-11-28 PROCEDURE — 3046F HEMOGLOBIN A1C LEVEL >9.0%: CPT | Performed by: NURSE PRACTITIONER

## 2022-11-28 RX ORDER — ROPINIROLE 1 MG/1
1 TABLET, FILM COATED ORAL NIGHTLY
Qty: 90 TABLET | Refills: 0 | Status: SHIPPED | OUTPATIENT
Start: 2022-11-28 | End: 2022-11-28

## 2022-11-28 RX ORDER — BLOOD-GLUCOSE SENSOR
EACH MISCELLANEOUS
Qty: 3 EACH | Refills: 2 | Status: SHIPPED | OUTPATIENT
Start: 2022-11-28

## 2022-11-28 RX ORDER — ROPINIROLE 1 MG/1
1 TABLET, FILM COATED ORAL NIGHTLY
Qty: 90 TABLET | Refills: 0 | Status: SHIPPED | OUTPATIENT
Start: 2022-11-28

## 2022-11-28 NOTE — PROGRESS NOTES
Jemima Ferguson (:  1960) is a 57 y.o. female,New patient, here for evaluation of the following chief complaint(s):  Establish Care       ASSESSMENT/PLAN:  1. Poorly controlled type 1 diabetes mellitus with retinopathy ([facility]  Stable; Not progressing;  Encouraged better DM control. Continue recommendations from endocrinology. 2. Primary hypertension  Stable; Not progressing;  BP elevated at visit. Patient reports does not take Lisinopril. Encouraged patient to monitor BP at home and call if >140/90.  3. Stage 3 chronic kidney disease, unspecified whether stage 3a or 3b CKD ([facility]  Stable; Not progressing; Last creatinine 1.6. Discussed referral to nephrology, patient declined. 4. Adjustment disorder, unspecified type  Stable; Not progressing; Patient grieving the loss of her sister. Discussed could consider restarting Zoloft vs. beginning Cymbalta. 5. Restless leg syndrome  Stable;  Patient doing well on requip. Encouraged patient to decrease caffeine intake to see if symptoms improve during the day. Continue current regimen.  -     rOPINIRole (REQUIP) 1 MG tablet; Take 1 tablet by mouth nightly, Disp-90 tablet, R-0Normal  6. Poorly controlled type 1 diabetes mellitus with neuropathy ([facility]  Stable;  Patient doing well on gabapentin. Continue current regimen. Return in about 4 weeks (around 2022).        Subjective   SUBJECTIVE/OBJECTIVE:  HPI  Type 1 DM  Dx 27 yo  Dad + type 1 DM  Highest 300, low 50- has a new pump that has been helpful; had a low last night in the 70s  Sees Dr. Yimi Song  BP elevated at visit  Stopped taking Lisinopril- used to take it and BP went too low  Has a BP cuff at home 106/80    CKD  Creatinine 1.6  Wants to wait on nephrology    Adjustment disorder  Sister passed away on Tuesday- had COPD, CHF; had been sick for a long time  Has her moments  Stopped taking Zoloft a long time ago- tried to go back on it; saw a difference at first, unsure if related to dose    Restless leg  Has been there for a while  Never used to bother her during the day, was worse at night when resting  Helps with standing and stretching  Requip helps put her at night (1mg)  ? Related to caffeine    Neuropathy  Wants to try 100 mg gabapentin during the day with 600 mg gabapentin at night- helps her sleep, no longer needs trazodone  Consider Cymbalta    Review of Systems       Objective   Physical Exam  Vitals reviewed. Constitutional:       Appearance: Normal appearance. HENT:      Head: Normocephalic. Right Ear: External ear normal.      Left Ear: External ear normal.   Cardiovascular:      Rate and Rhythm: Normal rate and regular rhythm. Pulses: Normal pulses. Heart sounds: Normal heart sounds, S1 normal and S2 normal.   Musculoskeletal:      Right lower leg: No edema. Left lower leg: No edema. Neurological:      Mental Status: She is alert. An electronic signature was used to authenticate this note.     --Sarah Oneal, CABRERA - CNP

## 2022-12-05 ENCOUNTER — OFFICE VISIT (OUTPATIENT)
Dept: PRIMARY CARE CLINIC | Age: 62
End: 2022-12-05
Payer: COMMERCIAL

## 2022-12-05 VITALS
HEART RATE: 85 BPM | TEMPERATURE: 98.2 F | BODY MASS INDEX: 22.18 KG/M2 | OXYGEN SATURATION: 98 % | SYSTOLIC BLOOD PRESSURE: 130 MMHG | WEIGHT: 110 LBS | DIASTOLIC BLOOD PRESSURE: 86 MMHG | HEIGHT: 59 IN

## 2022-12-05 DIAGNOSIS — E10.65 UNCONTROLLED TYPE 1 DIABETES MELLITUS WITH HYPERGLYCEMIA (HCC): ICD-10-CM

## 2022-12-05 DIAGNOSIS — R10.9 LEFT SIDED ABDOMINAL PAIN: ICD-10-CM

## 2022-12-05 DIAGNOSIS — R82.998 LEUKOCYTES IN URINE: ICD-10-CM

## 2022-12-05 DIAGNOSIS — R10.9 LEFT SIDED ABDOMINAL PAIN: Primary | ICD-10-CM

## 2022-12-05 DIAGNOSIS — K21.9 GASTROESOPHAGEAL REFLUX DISEASE WITHOUT ESOPHAGITIS: ICD-10-CM

## 2022-12-05 LAB
BILIRUBIN, POC: ABNORMAL
BLOOD URINE, POC: ABNORMAL
CLARITY, POC: ABNORMAL
COLOR, POC: YELLOW
GLUCOSE URINE, POC: ABNORMAL
HCT VFR BLD CALC: 26.3 % (ref 36–48)
HEMOGLOBIN: 8.3 G/DL (ref 12–16)
KETONES, POC: ABNORMAL
LEUKOCYTE EST, POC: ABNORMAL
MCH RBC QN AUTO: 24 PG (ref 26–34)
MCHC RBC AUTO-ENTMCNC: 31.4 G/DL (ref 31–36)
MCV RBC AUTO: 76.4 FL (ref 80–100)
NITRITE, POC: ABNORMAL
PDW BLD-RTO: 14.5 % (ref 12.4–15.4)
PH, POC: 6
PLATELET # BLD: 255 K/UL (ref 135–450)
PMV BLD AUTO: 8.2 FL (ref 5–10.5)
PROTEIN, POC: 300
RBC # BLD: 3.44 M/UL (ref 4–5.2)
SPECIFIC GRAVITY, POC: 1.02
UROBILINOGEN, POC: 0.2
WBC # BLD: 9.8 K/UL (ref 4–11)

## 2022-12-05 PROCEDURE — 81002 URINALYSIS NONAUTO W/O SCOPE: CPT | Performed by: NURSE PRACTITIONER

## 2022-12-05 PROCEDURE — 99214 OFFICE O/P EST MOD 30 MIN: CPT | Performed by: NURSE PRACTITIONER

## 2022-12-05 PROCEDURE — 3078F DIAST BP <80 MM HG: CPT | Performed by: NURSE PRACTITIONER

## 2022-12-05 PROCEDURE — 3074F SYST BP LT 130 MM HG: CPT | Performed by: NURSE PRACTITIONER

## 2022-12-05 PROCEDURE — 3046F HEMOGLOBIN A1C LEVEL >9.0%: CPT | Performed by: NURSE PRACTITIONER

## 2022-12-05 RX ORDER — ONDANSETRON 4 MG/1
4 TABLET, ORALLY DISINTEGRATING ORAL 3 TIMES DAILY PRN
Qty: 21 TABLET | Refills: 0 | Status: SHIPPED | OUTPATIENT
Start: 2022-12-05

## 2022-12-05 RX ORDER — OMEPRAZOLE 20 MG/1
20 CAPSULE, DELAYED RELEASE ORAL
Qty: 30 CAPSULE | Refills: 0 | Status: SHIPPED | OUTPATIENT
Start: 2022-12-05

## 2022-12-05 ASSESSMENT — ENCOUNTER SYMPTOMS
BLOOD IN STOOL: 0
VOMITING: 1
COLOR CHANGE: 0
ABDOMINAL PAIN: 1
COUGH: 0
WHEEZING: 0
ABDOMINAL DISTENTION: 0
RECTAL PAIN: 0
NAUSEA: 1
DIARRHEA: 0
SHORTNESS OF BREATH: 0
CONSTIPATION: 0

## 2022-12-05 NOTE — PROGRESS NOTES
ENCOUNTER DATE: 12/5/2022     NAME: Jaylin Bryant   AGE: 57 y.o. GENDER: female   YOB: 1960    Patient Active Problem List   Diagnosis    DKA (diabetic ketoacidoses)    Depression    Hyponatremia    Nausea & vomiting    Duodenal erosion    Adhesive capsulitis of right shoulder    Bilateral elbow joint pain    Left elbow pain    Type 1 diabetes mellitus ([facility] [address]    Uncontrolled type 1 diabetes mellitus with hyperglycemia (HCC)    Type 1 diabetes, uncontrolled, with neuropathy    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, with long-term current use of insulin    Stage 3 chronic kidney disease (HCC)    Type 1 diabetes, uncontrolled, with retinopathy    Primary hypertension    Vitamin D deficiency    Mixed hyperlipidemia    Family history of thyroid disease    Poorly controlled type 1 diabetes mellitus with retinopathy ([facility] [address]    Diabetic nephropathy associated with type 1 diabetes mellitus ([facility] [address]    Poorly controlled type 1 diabetes mellitus with neuropathy (HCC)    Thyroid enlargement    Gastroesophageal reflux disease without esophagitis      No Known Allergies  Current Outpatient Medications on File Prior to Visit   Medication Sig Dispense Refill    rOPINIRole (REQUIP) 1 MG tablet Take 1 tablet by mouth nightly 90 tablet 0    Continuous Blood Gluc Sensor (DEXCOM G6 SENSOR) MISC USE AS DIRECTED AND CHANGE EVERY 10 DAYS 3 each 2    insulin lispro (HUMALOG) 100 UNIT/ML injection vial Total daily dose 30 units, use in insulin pump 40 mL 1    Insulin Disposable Pump (OMNIPOD 5 G6 INTRO, GEN 5,) KIT Use as directed by MD 1 kit 0    Insulin Disposable Pump (OMNIPOD 5 G6 POD, GEN 5,) MISC Change pods q 2-3 days 30 each 1    Continuous Blood Gluc Transmit (DEXCOM G6 TRANSMITTER) MISC Change transmitter every 3 months 1 each 1    gabapentin (NEURONTIN) 600 MG tablet Take 600 mg by mouth every evening.       Insulin Syringe-Needle U-100 ([employer] INSULIN SYRINGE) 31G X 5/16\" 0.5 ML MISC 100 each by Does not apply route. 100 each 6    lisinopril (PRINIVIL;ZESTRIL) 5 MG tablet Take 1 tablet by mouth daily (Patient not taking: Reported on 2022) 90 tablet 1     No current facility-administered medications on file prior to visit. Social History     Tobacco Use    Smoking status: Former     Packs/day: 1.00     Years: 10.00     Pack years: 10.00     Types: Cigarettes     Quit date: 1980     Years since quittin.0    Smokeless tobacco: Never   Substance Use Topics    Alcohol use: No      CARE TEAM  Patient Care Team:  CABRERA Fernandez CNP as PCP - General (Certified Nurse Practitioner)  CABRERA Santizo CNP as PCP - [facility] Empaneled Provider    Chief Complaint   Patient presents with    Pain     Under   left  rib      Back Pain     Upper back  pain      HPI:   Patient is here for a problem visit    Complains of LUQ pain. Has been intermittent for some time, but now is more constant over the past wk. In LUQ and radiates down and around to her back. Some nausea with pain. Vomit x 1  Dull ache, constant. More she moves, the worse it gets. Worsens with movement, even walking. Can't lay on right side to sleep due to the pain on left side, but can lay on left side. Always tender in midepigastric region. Has reflux bad. No changes in urine or bowels. Stool pale at times. No h/o PUD, is under a lot of stress. Has h/o kidney stones. No recent hematuria. ENDO  Follows with Dr Rufus Portillo for type I diabetes  Not well controlled. Labs ordered. Not yet completed. ROS:  Review of Systems   Constitutional:  Negative for chills, diaphoresis, fatigue and fever. Respiratory:  Negative for cough, shortness of breath and wheezing. Cardiovascular:  Negative for chest pain, palpitations and leg swelling. Gastrointestinal:  Positive for abdominal pain, nausea and vomiting.  Negative for abdominal distention, blood in stool, constipation, diarrhea and rectal pain.        +reflux   Genitourinary:  Negative for difficulty urinating. Musculoskeletal:  Negative for myalgias. Skin:  Negative for color change, pallor and rash. Neurological:  Negative for dizziness, weakness, light-headedness and headaches. Hematological:  Negative for adenopathy. VITALS:  /86   Pulse 85   Temp 98.2 °F (36.8 °C) (Oral)   Ht 4' 11\" (1.499 m)   Wt 110 lb (49.9 kg)   LMP  (LMP Unknown) Comment: had a hysterectomy a long time ago  SpO2 98%   BMI 22.22 kg/m²      PE:  Physical Exam  Vitals and nursing note reviewed. Constitutional:       General: She is not in acute distress. Appearance: Normal appearance. She is well-developed and normal weight. She is not diaphoretic. Cardiovascular:      Rate and Rhythm: Normal rate and regular rhythm. Heart sounds: Normal heart sounds. No murmur heard. No friction rub. Pulmonary:      Effort: Pulmonary effort is normal. No respiratory distress. Breath sounds: Normal breath sounds. No wheezing, rhonchi or rales. Chest:      Chest wall: No tenderness (no left rib tenderness). Abdominal:      General: Abdomen is flat. Bowel sounds are normal. There is no distension. Palpations: Abdomen is soft. There is no mass. Tenderness: There is no abdominal tenderness (mild midepigastric). There is no right CVA tenderness, left CVA tenderness, guarding or rebound. Hernia: No hernia is present. Skin:     General: Skin is warm and dry. Coloration: Skin is not jaundiced or pale. Findings: No bruising or rash. Neurological:      Mental Status: She is alert and oriented to person, place, and time. Motor: No weakness.       Gait: Gait normal.   Psychiatric:         Mood and Affect: Mood normal.         Behavior: Behavior normal.      Results for POC orders placed in visit on 12/05/22   POCT Urinalysis no Micro   Result Value Ref Range    Color, UA yellow     Clarity, UA cloudy     Glucose, UA POC neg     Bilirubin, UA neg     Ketones, UA neg     Spec Grav, UA 1.020     Blood, UA POC small     pH, UA 6.0     Protein, UA      Urobilinogen, UA 0.2     Leukocytes, UA small     Nitrite, UA neg      ASSESSMENT/PLAN:  1. Left sided abdominal pain  Discussed differentials. Check labs today  Check US to rule out pancreatitis. Zofran prn for n/v.   - CBC; Future  - Basic Metabolic Panel; Future  - Lipase; Future  - ondansetron (ZOFRAN-ODT) 4 MG disintegrating tablet; Take 1 tablet by mouth 3 times daily as needed for Nausea or Vomiting  Dispense: 21 tablet; Refill: 0    - US ABDOMEN LIMITED; Future  - POCT Urinalysis no Micro  - Culture, Urine    2. Leukocytes in urine  Send urine for culture to rule out UTI. - Culture, Urine    3. GERD  Trial of PPI. ? PUD  Follow up with PCP in 2 weeks. If no improvement, may need EGD. - omeprazole (PRILOSEC) 20 MG delayed release capsule; Take 1 capsule by mouth every morning (before breakfast)  Dispense: 30 capsule; Refill: 0    3. Uncontrolled type 1 diabetes mellitus with hyperglycemia ([facility] [address]  Continue per endo  Encouraged to complete labs as previously ordered by endo      Return in about 2 weeks (around 12/19/2022) for abd pain/reflux.      Electronically signed by CABRERA Kc CNP on 12/5/2022 at 1:29 PM

## 2022-12-06 ENCOUNTER — HOSPITAL ENCOUNTER (OUTPATIENT)
Dept: ULTRASOUND IMAGING | Age: 62
Discharge: HOME | End: 2022-12-06
Payer: COMMERCIAL

## 2022-12-06 ENCOUNTER — TELEPHONE (OUTPATIENT)
Dept: PRIMARY CARE CLINIC | Age: 62
End: 2022-12-06

## 2022-12-06 ENCOUNTER — HOSPITAL ENCOUNTER (OUTPATIENT)
Dept: CT IMAGING | Age: 62
Discharge: HOME | End: 2022-12-06
Payer: COMMERCIAL

## 2022-12-06 DIAGNOSIS — R10.9 LEFT SIDED ABDOMINAL PAIN: ICD-10-CM

## 2022-12-06 DIAGNOSIS — Q45.3 PANCREATIC ABNORMALITY: ICD-10-CM

## 2022-12-06 DIAGNOSIS — R10.9 LEFT SIDED ABDOMINAL PAIN: Primary | ICD-10-CM

## 2022-12-06 DIAGNOSIS — K86.89 MASS OF PANCREAS: Primary | ICD-10-CM

## 2022-12-06 LAB
ANION GAP SERPL CALCULATED.3IONS-SCNC: 16 MMOL/L (ref 3–16)
BUN BLDV-MCNC: 22 MG/DL (ref 7–20)
CALCIUM SERPL-MCNC: 9.2 MG/DL (ref 8.3–10.6)
CHLORIDE BLD-SCNC: 100 MMOL/L (ref 99–110)
CO2: 21 MMOL/L (ref 21–32)
CREAT SERPL-MCNC: 1.2 MG/DL (ref 0.6–1.2)
GFR SERPL CREATININE-BSD FRML MDRD: 50 ML/MIN/{1.73_M2}
GLUCOSE BLD-MCNC: 181 MG/DL (ref 70–99)
LIPASE: 38 U/L (ref 13–60)
POTASSIUM SERPL-SCNC: 4.4 MMOL/L (ref 3.5–5.1)
SODIUM BLD-SCNC: 137 MMOL/L (ref 136–145)
URINE CULTURE, ROUTINE: NORMAL

## 2022-12-06 PROCEDURE — 76700 US EXAM ABDOM COMPLETE: CPT

## 2022-12-06 PROCEDURE — 74178 CT ABD&PLV WO CNTR FLWD CNTR: CPT

## 2022-12-06 PROCEDURE — 6360000004 HC RX CONTRAST MEDICATION: Performed by: NURSE PRACTITIONER

## 2022-12-06 RX ADMIN — IOPAMIDOL 80 ML: 755 INJECTION, SOLUTION INTRAVENOUS at 12:16

## 2022-12-06 NOTE — TELEPHONE ENCOUNTER
Dr. Rodney Avila called in regards to the ultra sound. He would like for you to call him at [phone]. Stated that there are findings that warrant a CT Scan.

## 2022-12-06 NOTE — TELEPHONE ENCOUNTER
Spoke with Dr Ondina Benjamin. US discussed. Recommend stat CT pancreatitis vs neoplasm. Will discuss with patient.

## 2022-12-08 ENCOUNTER — TELEPHONE (OUTPATIENT)
Dept: PRIMARY CARE CLINIC | Age: 62
End: 2022-12-08

## 2022-12-08 DIAGNOSIS — K86.89 MASS OF PANCREAS: Primary | ICD-10-CM

## 2022-12-08 RX ORDER — TRAMADOL HYDROCHLORIDE 50 MG/1
50 TABLET ORAL EVERY 8 HOURS PRN
Qty: 21 TABLET | Refills: 0 | Status: SHIPPED | OUTPATIENT
Start: 2022-12-08 | End: 2022-12-15

## 2022-12-08 NOTE — TELEPHONE ENCOUNTER
Spoke with patient. Having worsening abd pain at night. Requesting short term rx until she can see oncology. Has apt with oncology 12/13. I agreed to send in a one time rx for Tramadol. She will need to discuss pain meds with oncology.  Also, any future prescriptions will need to come from PCP, Noel Gandhi NP

## 2022-12-08 NOTE — TELEPHONE ENCOUNTER
Pt is in a lot of pain. Pt would like a script of pain medications to help her with the pain.  Pt saw Graham Oliva on 12/5/22  Pharmacy    [address] [place] [address], [address] [facility],First Floor Katharine Munoz [phone] [address] [facility], [address] [place] [id number]   Phone:  [phone]  Fax:  [phone]

## 2022-12-09 ENCOUNTER — TELEPHONE (OUTPATIENT)
Dept: FAMILY MEDICINE CLINIC | Age: 62
End: 2022-12-09

## 2022-12-09 RX ORDER — BUSPIRONE HYDROCHLORIDE 5 MG/1
5-10 TABLET ORAL 3 TIMES DAILY PRN
Qty: 90 TABLET | Refills: 0 | Status: SHIPPED | OUTPATIENT
Start: 2022-12-09 | End: 2023-01-08

## 2022-12-09 NOTE — TELEPHONE ENCOUNTER
Spoke with patient. Will try adding Buspar since patient is taking Tramadol. Discussed will wait to potentially add a benzodiazepine. Patient has an appointment with oncology on Tuesday.

## 2022-12-16 ENCOUNTER — HOSPITAL ENCOUNTER (OUTPATIENT)
Dept: CT IMAGING | Age: 62
Discharge: HOME | End: 2022-12-16
Payer: COMMERCIAL

## 2022-12-16 ENCOUNTER — HOSPITAL ENCOUNTER (OUTPATIENT)
Dept: ULTRASOUND IMAGING | Age: 62
Discharge: HOME | End: 2022-12-16
Payer: COMMERCIAL

## 2022-12-16 VITALS
WEIGHT: 110 LBS | HEIGHT: 59 IN | OXYGEN SATURATION: 99 % | RESPIRATION RATE: 16 BRPM | BODY MASS INDEX: 22.18 KG/M2 | DIASTOLIC BLOOD PRESSURE: 63 MMHG | SYSTOLIC BLOOD PRESSURE: 137 MMHG | HEART RATE: 76 BPM

## 2022-12-16 VITALS
HEART RATE: 79 BPM | DIASTOLIC BLOOD PRESSURE: 67 MMHG | SYSTOLIC BLOOD PRESSURE: 159 MMHG | TEMPERATURE: 98 F | OXYGEN SATURATION: 96 % | RESPIRATION RATE: 16 BRPM

## 2022-12-16 DIAGNOSIS — K86.89 MASS OF PANCREAS: ICD-10-CM

## 2022-12-16 DIAGNOSIS — R93.89 ABNORMAL FINDING ON IMAGING: ICD-10-CM

## 2022-12-16 DIAGNOSIS — C78.7 SECONDARY MALIGNANT NEOPLASM OF LIVER (HCC): ICD-10-CM

## 2022-12-16 LAB
APTT: 34.4 SEC (ref 23–34.3)
GLUCOSE BLD-MCNC: 81 MG/DL (ref 70–99)
INR BLD: 1.15 (ref 0.87–1.14)
PERFORMED ON: NORMAL
PLATELET # BLD: 272 K/UL (ref 135–450)
PROTHROMBIN TIME: 14.7 SEC (ref 11.7–14.5)

## 2022-12-16 PROCEDURE — 7100000011 HC PHASE II RECOVERY - ADDTL 15 MIN

## 2022-12-16 PROCEDURE — 2500000003 HC RX 250 WO HCPCS: Performed by: STUDENT IN AN ORGANIZED HEALTH CARE EDUCATION/TRAINING PROGRAM

## 2022-12-16 PROCEDURE — 7100000010 HC PHASE II RECOVERY - FIRST 15 MIN

## 2022-12-16 PROCEDURE — 85049 AUTOMATED PLATELET COUNT: CPT

## 2022-12-16 PROCEDURE — 85730 THROMBOPLASTIN TIME PARTIAL: CPT

## 2022-12-16 PROCEDURE — 36415 COLL VENOUS BLD VENIPUNCTURE: CPT

## 2022-12-16 PROCEDURE — 85610 PROTHROMBIN TIME: CPT

## 2022-12-16 PROCEDURE — 6360000002 HC RX W HCPCS: Performed by: STUDENT IN AN ORGANIZED HEALTH CARE EDUCATION/TRAINING PROGRAM

## 2022-12-16 PROCEDURE — 47000 NEEDLE BIOPSY OF LIVER PERQ: CPT | Performed by: INTERNAL MEDICINE

## 2022-12-16 RX ORDER — FENTANYL CITRATE 50 UG/ML
INJECTION, SOLUTION INTRAMUSCULAR; INTRAVENOUS
Status: COMPLETED | OUTPATIENT
Start: 2022-12-16 | End: 2022-12-16

## 2022-12-16 RX ORDER — MIDAZOLAM HYDROCHLORIDE 1 MG/ML
INJECTION INTRAMUSCULAR; INTRAVENOUS
Status: COMPLETED | OUTPATIENT
Start: 2022-12-16 | End: 2022-12-16

## 2022-12-16 RX ORDER — ACETAMINOPHEN 325 MG/1
650 TABLET ORAL EVERY 4 HOURS PRN
Status: DISCONTINUED | OUTPATIENT
Start: 2022-12-16 | End: 2022-12-17 | Stop reason: HOSPADM

## 2022-12-16 RX ORDER — LIDOCAINE HYDROCHLORIDE 10 MG/ML
INJECTION, SOLUTION EPIDURAL; INFILTRATION; INTRACAUDAL; PERINEURAL
Status: COMPLETED | OUTPATIENT
Start: 2022-12-16 | End: 2022-12-16

## 2022-12-16 RX ADMIN — FENTANYL CITRATE 50 MCG: 50 INJECTION, SOLUTION INTRAMUSCULAR; INTRAVENOUS at 10:00

## 2022-12-16 RX ADMIN — MIDAZOLAM HYDROCHLORIDE 1 MG: 2 INJECTION, SOLUTION INTRAMUSCULAR; INTRAVENOUS at 10:00

## 2022-12-16 RX ADMIN — LIDOCAINE HYDROCHLORIDE 10 ML: 10 INJECTION, SOLUTION EPIDURAL; INFILTRATION; INTRACAUDAL; PERINEURAL at 10:03

## 2022-12-16 ASSESSMENT — PAIN - FUNCTIONAL ASSESSMENT
PAIN_FUNCTIONAL_ASSESSMENT: NONE - DENIES PAIN
PAIN_FUNCTIONAL_ASSESSMENT: 0-10

## 2022-12-16 ASSESSMENT — PAIN SCALES - GENERAL
PAINLEVEL_OUTOF10: 0
PAINLEVEL_OUTOF10: 0

## 2022-12-16 NOTE — DISCHARGE INSTRUCTIONS
LIVER BIOPSY DISCHARGE INSTRUCTIONS    Post-procedure Care    Biopsy results typically take 2-3 business days. Results will be sent to the doctor that ordered the test.     [address]  A responsible person should be with you for the next 24 hours. Please follow the instructions checked below:    ACTIVITY INSTRUCTIONS:  [x]Rest today. Increase activity as tolerated    [x]No heavy lifting or strenuous activity x 1 week    [x]No driving today or as instructed by your doctor  []Other   WOUND/DRESSING INSTRUCTIONS:     Always clean your hands before and after caring for the wound. [x]May shower today  [x]Avoid tub baths, hot tub, swimming pool etc. (do not submerge site in water) For 1 week to prevent infection     [x]Remove dressing in 2 days, may apply band-aid                            []Other       MEDICATION INSTRUCTIONS:  [x]Resume your prescibed medications    [x]You may take a non-prescription headache remedy, preferably one that does not contain aspirin. [x]Give a list of your medications to your primary care physician on your next visit. Keep your medication list updated and carry it with in case of emergencies. IF YOU TAKE ANTICOAGULANTS:  You may typically re-start blood thinning drugs the day after your procedure UNLESS directed otherwise by the doctor who prescribes the drug for you.   Some common blood thinners are:  Anti-inflammatory drugs (motrin, aleve)     Aspirin  Anti-coagulants such as plavix (clopidogrel) or coumadin (warfarin)    After arriving home, contact your doctor if any of the following occurs:   Signs of infection, including fever and chills  Redness, swelling, increasing pain, excessive bleeding, or any discharge from the incision site  Severe abdominal pain, persistent nausea or vomiting  A faint or light-headed feeling  Severe shoulder pain (right shoulder aching is typical of liver biopsy and should resolve in a day or two) prescribes the drug for you. Some common blood thinners are:  Anti-inflammatory drugs (motrin, aleve)     Aspirin  Anti-coagulants such as plavix (clopidogrel) or coumadin (warfarin)    After arriving home, contact your doctor if any of the following occurs:   Signs of infection, including fever and chills  Redness, swelling, increasing pain, excessive bleeding, or any discharge from the incision site  Severe abdominal pain, persistent nausea or vomiting  A faint or light-headed feeling  Severe shoulder pain (right shoulder aching is typical of liver biopsy and should resolve in a day or two)   Trouble breathing or chest pain  In case of an emergency, CALL 911. FOLLOW-UP CARE:           Follow up with Dr. Nicolas Castano for results and appointment       The above instructions were reviewed with patient/significant other. The following additional patient specific information was reviewed with the patient/significant other:  ____________________________________________   (Patient/S.O. Signature)           Nurse *** ,R.N. Date/time ***           Radiology Department  [phone]       SAME DAY SERVICES:  [phone] M-F 7AM-6PM        If you smoke STOP. We care about your health!

## 2022-12-16 NOTE — PROGRESS NOTES
Pt arrived in SDS alert and stable  DRSG on right abd clean dry and intact  No pain  Hep lock dcd   Fluids offered  Verbal and written discharge instructions given to pt and   Vs q15min, stable at present   No pain  No nausea

## 2022-12-16 NOTE — H&P
Moderate Sedation Focused Evaluation:    NPO for 4 hours:  Yes    ASA 2 - Patient with mild systemic disease with no functional limitations    II (soft palate, uvula, fauces visible)    Activity:  2 - Able to move 4 extremities voluntarily on command  Respiration:  2 - Able to breathe deeply and cough freely  Circulation:  2 - BP+/- 20mmHg of normal  Consciousness:  2 - Fully awake  Oxygen Saturation (color):  2 - Able to maintain oxygen saturation >92% on room air    Sedation : Moderate sedation planned

## 2022-12-16 NOTE — PROGRESS NOTES
1025 Arrived in SDS 7 from radiology. Color pale pink. Report taken at bedside from radiology nurse. Patient denies pain  and respirations are unlabored. Soft flat abdomen with clean right sided abdominal dressing. Call for spouse to come to room. Call light in reach , warm covers on .

## 2022-12-16 NOTE — SEDATION DOCUMENTATION
IMAGING SERVICES NURSING PROGRESS NOTE    Procedure:  liver biopsy  December 16, 2022  Willie Muniz      Allergies:  No Known Allergies    Vitals:    12/16/22 1016   BP: 127/65   Pulse: 78   Resp: 15   Temp:    SpO2: 99%       Recent lab work reviewed with MD: yes   Procedure explained to patient by MD: yes   Informed consent obtained:yes  Family with patient: Yes    Mental Status:  Normal  Readiness to learn:  Yes  Barriers to learning: No    Pain Assessment Pre-Procedure:  Pain Present:  no  Pain Score:  0  Pain Quality/Description:  none    Time out Procedure Verification with:  [x] RN  [x] Physician  [x] Patient  [x] Other: CT Technologist  Procedure site marked, if applicable:  Yes    Note: Patient arrived A & O x 4, denies pain, breathing easily on room air, Spoke to Dr. Gabriel Segura prior to procedure. Procedural sedation:  Fentanyl:  50 mcg  Versed:    1 mg  Post Procedureal Note:  Patient tolerated procedure well. Breathing easily on room air. Report given to Will Montanez RN. Patient transported in stable conditon to room 11.     Pain Assessment Post-Procedure:  Pain Present:  yes  Pain Score:  0  Pain Quality/Description:  none    Plan of Care Goals:  Safety measures met:  Yes  Patient understands explanation of procedure:  Yes    Time in:  1001  Time out:  ITZ Bird.  R.N. 12/16/2022

## 2022-12-16 NOTE — DISCHARGE INSTRUCTIONS
Ambulatory Surgery/Procedure Discharge Note    [unfilled]    @8HRIOBRIEF@    Restroom use offered before discharge. {YES / EV:08366}    Pain assessment:  {Pain assessment:20435}  Pain Level: 0        Patient discharged to home/self care.  Patient discharged via wheel chair by transporter to waiting family/S.O.       12/16/2022 11:56 AM

## 2022-12-16 NOTE — PROGRESS NOTES
Ambulatory Surgery/Procedure Discharge Note\pt remains stable  Liver biopsy site clean dry and intact  Up ambulating well  Dcd per wheelchair to car with  present    Vitals:    12/16/22 1230   BP: (!) 159/67   Pulse: 79   Resp:    Temp:    SpO2: 96%       No intake/output data recorded. Restroom use offered before discharge. Yes    Pain assessment:  none  Pain Level: 0        Patient discharged to home/self care.  Patient discharged via wheel chair by transporter to waiting family/S.O.       12/16/2022 1:20 PM

## 2022-12-16 NOTE — PROGRESS NOTES
Signed                Pt arrived in SDS alert and stable  DRSG on right abd clean dry and intact  No pain  Hep lock dcd   Fluids offered  Verbal and written discharge instructions given to pt and   Vs q15min, stable at present   No pain  No nausea  1115 pt remains stable  Resting in semifowlers position

## 2022-12-16 NOTE — PROGRESS NOTES
1200 Report received from [facility] [address], cecelia mcfadden encouraged as pt did not want to eat or drink, water also provided. 1230 Abdomen site with dressing clean dry and intact, no pain or drainage.

## 2022-12-16 NOTE — PROGRESS NOTES
Pt arrived in SDS alert and stable  DRSG on right abd clean dry and intact  No pain  Hep lock dcd   Fluids offered  Verbal and written discharge instructions given to pt and   Vs q15min, stable at present   No pain  No nausea  1200  Pt remains stable   Drsg clean dry and intact  Alert and stable  Resting well   No pain   No nausea

## 2022-12-16 NOTE — H&P
Present Diagnosis and Illness: 57 y.o. female who presents with mass of liver. 1. Mass of pancreas    2. Secondary malignant neoplasm of liver (HCC)        No Known Allergies    Current Outpatient Medications   Medication Sig Dispense Refill    busPIRone (BUSPAR) 5 MG tablet Take 1-2 tablets by mouth 3 times daily as needed (anxiety) 90 tablet 0    ondansetron (ZOFRAN-ODT) 4 MG disintegrating tablet Take 1 tablet by mouth 3 times daily as needed for Nausea or Vomiting 21 tablet 0    omeprazole (PRILOSEC) 20 MG delayed release capsule Take 1 capsule by mouth every morning (before breakfast) 30 capsule 0    rOPINIRole (REQUIP) 1 MG tablet Take 1 tablet by mouth nightly 90 tablet 0    Continuous Blood Gluc Sensor (DEXCOM G6 SENSOR) MISC USE AS DIRECTED AND CHANGE EVERY 10 DAYS 3 each 2    insulin lispro (HUMALOG) 100 UNIT/ML injection vial Total daily dose 30 units, use in insulin pump 40 mL 1    Insulin Disposable Pump (OMNIPOD 5 G6 INTRO, GEN 5,) KIT Use as directed by MD 1 kit 0    Insulin Disposable Pump (OMNIPOD 5 G6 POD, GEN 5,) MISC Change pods q 2-3 days 30 each 1    lisinopril (PRINIVIL;ZESTRIL) 5 MG tablet Take 1 tablet by mouth daily (Patient not taking: Reported on 12/5/2022) 90 tablet 1    Continuous Blood Gluc Transmit (DEXCOM G6 TRANSMITTER) MISC Change transmitter every 3 months 1 each 1    gabapentin (NEURONTIN) 600 MG tablet Take 600 mg by mouth every evening. Insulin Syringe-Needle U-100 ([employer] INSULIN SYRINGE) 31G X 5/16\" 0.5 ML MISC 100 each by Does not apply route.  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 rate 15, SpO2 99 %. Head and neck: normal atraumatic, no neck masses, normal thyroid, no jvd  Chest: Normal  Heart: Regular rate and rhythm  Abdominal: soft, non-tender. Bowel sounds normal. No masses,  no organomegaly  Neurological: normal    Labs:  CBCP:  Lab Results   Component Value Date    WBC 9.8 12/05/2022    HGB 8.3 (L) 12/05/2022    HCT 26.3 (L) 12/05/2022    MCV 76.4 (L) 12/05/2022     12/16/2022      BASIC:  Lab Results   Component Value Date/Time     12/05/2022 03:08 PM    K 4.4 12/05/2022 03:08 PM     12/05/2022 03:08 PM    CO2 21 12/05/2022 03:08 PM    BUN 22 12/05/2022 03:08 PM    CREATININE 1.2 12/05/2022 03:08 PM    GLUCOSE 181 12/05/2022 03:08 PM    CALCIUM 9.2 12/05/2022 03:08 PM       COAGS:  Lab Results   Component Value Date    INR 1.15 (H) 12/16/2022    PROTIME 14.7 (H) 12/16/2022        Assessment: 57 y.o. female with mass of liver. Plan: Will plan for liver biopsy with moderate sedation.     Adriana Adan MD  12/16/22  10:18 AM

## 2022-12-16 NOTE — BRIEF OP NOTE
Interventional Radiology Post Procedure    Date: 12/16/2022    Physician: Valentin Lopez MD    Pre-op Diagnosis: liver lesions    Post-op Diagnosis: same    Variation from Planned Procedure: None       Findings: liver lesion biopsy without complication    Patient condition: Stable    Estimated Blood Loss: less than 50 ml    Specimens:  4 core specimens were obtained

## 2022-12-19 NOTE — TELEPHONE ENCOUNTER
Medication:   Requested Prescriptions     Pending Prescriptions Disp Refills    gabapentin (NEURONTIN) 300 MG capsule [Pharmacy Med Name: GABAPENTIN 300MG CAPS] 180 capsule 2     Sig: TAKE TWO CAPSULES BY MOUTH AT BEDTIME        Last Filled:  9/21/22    Patient Phone Number: [phone] (home) [phone] (work)    Last appt: 11/28/2022   Next appt: Visit date not found    Last OARRS: No flowsheet data found.

## 2022-12-23 RX ORDER — GABAPENTIN 300 MG/1
CAPSULE ORAL
Qty: 180 CAPSULE | Refills: 0 | Status: SHIPPED | OUTPATIENT
Start: 2022-12-23 | End: 2023-03-23

## 2022-12-27 ENCOUNTER — HOSPITAL ENCOUNTER (OUTPATIENT)
Dept: CT IMAGING | Age: 62
Discharge: HOME | End: 2022-12-27
Payer: COMMERCIAL

## 2022-12-27 ENCOUNTER — HOSPITAL ENCOUNTER (OUTPATIENT)
Dept: ULTRASOUND IMAGING | Age: 62
Discharge: HOME | End: 2022-12-27
Payer: COMMERCIAL

## 2022-12-27 VITALS
DIASTOLIC BLOOD PRESSURE: 58 MMHG | OXYGEN SATURATION: 99 % | HEART RATE: 71 BPM | HEIGHT: 60 IN | RESPIRATION RATE: 16 BRPM | BODY MASS INDEX: 21.6 KG/M2 | SYSTOLIC BLOOD PRESSURE: 130 MMHG | TEMPERATURE: 97.2 F | WEIGHT: 110 LBS

## 2022-12-27 DIAGNOSIS — E78.2 MIXED HYPERLIPIDEMIA: ICD-10-CM

## 2022-12-27 DIAGNOSIS — C78.7 SECONDARY MALIGNANT NEOPLASM OF LIVER (HCC): ICD-10-CM

## 2022-12-27 DIAGNOSIS — I10 PRIMARY HYPERTENSION: ICD-10-CM

## 2022-12-27 DIAGNOSIS — E55.9 VITAMIN D DEFICIENCY: ICD-10-CM

## 2022-12-27 DIAGNOSIS — E10.65 POORLY CONTROLLED TYPE 1 DIABETES MELLITUS WITH NEUROPATHY (HCC): ICD-10-CM

## 2022-12-27 DIAGNOSIS — E04.9 THYROID ENLARGEMENT: ICD-10-CM

## 2022-12-27 DIAGNOSIS — E10.40 POORLY CONTROLLED TYPE 1 DIABETES MELLITUS WITH NEUROPATHY (HCC): ICD-10-CM

## 2022-12-27 DIAGNOSIS — D37.8 NEOPLASM OF UNCERTAIN BEHAVIOR OF PANCREAS: ICD-10-CM

## 2022-12-27 DIAGNOSIS — Z83.49 FAMILY HISTORY OF THYROID DISEASE: ICD-10-CM

## 2022-12-27 DIAGNOSIS — K86.89 MASS OF PANCREAS: ICD-10-CM

## 2022-12-27 DIAGNOSIS — E10.21 DIABETIC NEPHROPATHY ASSOCIATED WITH TYPE 1 DIABETES MELLITUS (HCC): ICD-10-CM

## 2022-12-27 PROCEDURE — 88305 TISSUE EXAM BY PATHOLOGIST: CPT

## 2022-12-27 PROCEDURE — 2500000003 HC RX 250 WO HCPCS: Performed by: RADIOLOGY

## 2022-12-27 PROCEDURE — 6360000002 HC RX W HCPCS: Performed by: RADIOLOGY

## 2022-12-27 PROCEDURE — 7100000011 HC PHASE II RECOVERY - ADDTL 15 MIN

## 2022-12-27 PROCEDURE — 99152 MOD SED SAME PHYS/QHP 5/>YRS: CPT

## 2022-12-27 PROCEDURE — 7100000010 HC PHASE II RECOVERY - FIRST 15 MIN

## 2022-12-27 PROCEDURE — 88342 IMHCHEM/IMCYTCHM 1ST ANTB: CPT

## 2022-12-27 RX ORDER — ACETAMINOPHEN 325 MG/1
650 TABLET ORAL EVERY 4 HOURS PRN
Status: DISCONTINUED | OUTPATIENT
Start: 2022-12-27 | End: 2022-12-28 | Stop reason: HOSPADM

## 2022-12-27 RX ORDER — MIDAZOLAM HYDROCHLORIDE 1 MG/ML
INJECTION INTRAMUSCULAR; INTRAVENOUS
Status: COMPLETED | OUTPATIENT
Start: 2022-12-27 | End: 2022-12-27

## 2022-12-27 RX ORDER — FENTANYL CITRATE 50 UG/ML
INJECTION, SOLUTION INTRAMUSCULAR; INTRAVENOUS
Status: COMPLETED | OUTPATIENT
Start: 2022-12-27 | End: 2022-12-27

## 2022-12-27 RX ORDER — LIDOCAINE HYDROCHLORIDE 10 MG/ML
INJECTION, SOLUTION EPIDURAL; INFILTRATION; INTRACAUDAL; PERINEURAL
Status: COMPLETED | OUTPATIENT
Start: 2022-12-27 | End: 2022-12-27

## 2022-12-27 RX ADMIN — MIDAZOLAM HYDROCHLORIDE 1 MG: 2 INJECTION, SOLUTION INTRAMUSCULAR; INTRAVENOUS at 08:51

## 2022-12-27 RX ADMIN — FENTANYL CITRATE 25 MCG: 50 INJECTION, SOLUTION INTRAMUSCULAR; INTRAVENOUS at 08:53

## 2022-12-27 RX ADMIN — LIDOCAINE HYDROCHLORIDE 10 ML: 10 INJECTION, SOLUTION EPIDURAL; INFILTRATION; INTRACAUDAL; PERINEURAL at 08:52

## 2022-12-27 RX ADMIN — FENTANYL CITRATE 50 MCG: 50 INJECTION, SOLUTION INTRAMUSCULAR; INTRAVENOUS at 08:51

## 2022-12-27 RX ADMIN — MIDAZOLAM HYDROCHLORIDE 0.5 MG: 2 INJECTION, SOLUTION INTRAMUSCULAR; INTRAVENOUS at 08:53

## 2022-12-27 ASSESSMENT — PAIN - FUNCTIONAL ASSESSMENT: PAIN_FUNCTIONAL_ASSESSMENT: NONE - DENIES PAIN

## 2022-12-27 NOTE — PROGRESS NOTES
Patient arrived alert and orientated x 4, ambulatory, steady gait, breathing easily on room air, denies pain. Spoke to Dr. Gildardo Lira prior to procedure. Labs and medications reviewed. Consent obtained and verified. Tolerated procedure well, Retroperitoneal lymph node biopsy done, dsd applied, no bleeding at site done. Breathing easily on room air. Report sheet sent to [facility] and patient transported in stable condition to [facility].     Sedation: Versed:1.5 Mg Fentanyl: 75 mcg

## 2022-12-27 NOTE — PROGRESS NOTES
Ambulatory Surgery/Procedure Discharge Note    Vitals:    12/27/22 1002   BP: (!) 104/59   Pulse: 70   Resp: 15   Temp:    SpO2: 97%       No intake/output data recorded. Restroom use offered before discharge. Yes    Pain assessment:  none    Pt requesting water. Declines food at this time. Tolerating po well. Pt and S.O./family states \"ready to go home\". Pt alert and oriented x4. IV removed. Denies N/V or pain. Dressing to lower back c/d/I. Discharge instructions given to pt and  with pt permission. Pt and  verbalized understanding of all instructions. Left with all belongings and discharge instructions. Patient discharged to home/self care.  Patient discharged via wheel chair by transporter to waiting family/S.O.       12/27/2022 10:11 AM

## 2022-12-27 NOTE — PROCEDURES
IR Brief Postoperative Note    Crispin Keating  YOB: 1960  [id number]    Pre-operative Diagnosis: pancreatic mass    Post-operative Diagnosis: Same    Procedure: CT retroperitoneal node bx    Anesthesia: moderate    Surgeons/Assistants: stacey    Estimated Blood Loss: Minimal    Complications: none    Specimens: were obtained    See full procedure dictation to follow      Bart Diamond MD MD  12/27/2022

## 2022-12-27 NOTE — DISCHARGE INSTRUCTIONS
Discharge Instructions for Biopsy of  abdomen retroperitoneum  Interventional Radiologist performing procedure Dr. Pantera Horn the bandage after a day or two. May apply band-aid if needed. Diet     Resume your normal diet. Physical Activity     Rest for the first 24-48 hours. No driving for 24 hours if you have received analgesia/sedation   May shower. Do not submerge biopsy site in water (ie:bath, hot tub, swimming pool) for one week to prevent infection    Avoid strenuous activities for one week (excercise, heavy lifting etc.)       Call Your Doctor If Any of the Following Occurs   Signs of infection, including fever and chills     Redness, swelling, increasing pain, excessive bleeding, or any discharge from the incision site     Pain that you can't control with over the counter medications   Cough, shortness of breath, or chest pain     Pain when taking a deep breath     You feel your heart rate is fast   In case of an emergency, call 911 immediately. [facility]  A responsible person should be with you for the next 24 hours. MEDICATION INSTRUCTIONS:  [x]Resume your prescribed medications  [x]You may take a non-prescription headache remedy, preferably one that does not contain aspirin. [x] Keep your medication list updated and carry it with you in case of emergencies. IF YOU TAKE ANTI-COAGULANTS:  You may typically re-start your blood thinning drugs the day after your procedure UNLESS directed otherwise by the doctor who prescribes the drug for you. Some common blood thinning drugs include  Anti-inflammatory drugs (motrin, aleve)     Aspirin  Anti-coagulants such as plavix (clopidogrel) or coumadin (warfarin)      FOLLOW-UP CARE:  Follow up with your doctor for results and appointment    Physician ***        The above instructions were reviewed with patient/significant other.   The following additional patient specific information was reviewed with the patient/significant other:       I have read and understand the instructions given to me:         ____________________________________________   (Patient/S.O. Signature)           Nurse Albert Mccormick RN ,R.N. Date/time 12/27/2022 10:05 AM         Radiology Department  [phone]           SAME DAY SERVICES:  [phone] M-F 7AM-6PM     If you smoke STOP. We care about your health!

## 2022-12-27 NOTE — H&P
Patient:  Willie Muniz   :   1960      Relevant clinical history, particularly as it involves the pending procedure, was reviewed and discussed. The procedure including risks and benefits was discussed at length with the patient (or designated family member) and all questions were answered. Informed consent to proceed with the procedure was given. Vital signs were monitored and documented by the Radiology nurse. Targeted physical examination  Heart : regular rate and rhythm  Lungs : clear, breathing easily  Condition : stable    [facility] nurses notes reviewed and agreed. 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    Depression     Diabetes mellitus ([address]     Hx of radiation therapy     melanoma right eye    Hypertension     Insulin pump in place     Prolonged emergence from general anesthesia     slow to wake up       Past Surgical History:           Procedure Laterality Date    CARPAL TUNNEL RELEASE Bilateral 2017    CHOLECYSTECTOMY      CT BIOPSY ABDOMEN RETROPERITONEUM  2022    CT BIOPSY ABDOMEN RETROPERITONEUM 2022 [facility] CT SCAN    EYE SURGERY Right     biopsy    HYSTERECTOMY (CERVIX STATUS UNKNOWN)      LIPOMA RESECTION      LIVER BIOPSY Right     SHOULDER ARTHROSCOPY Right 2018    RIGHT SHOULDER ARTHROSCOPE, SUBACROMIAL DECOMPRESSION, DISTALCLAVICLE EXCISION, CAPSULAR RELEASE, MANIPULATION UNDER ANESTHESIA, DEBRIDEMENT    SHOULDER SURGERY      UPPER GASTROINTESTINAL ENDOSCOPY  10/22/2014    US GUIDED LIVER BIOPSY PERCUTANEOUS  2022    US GUIDED LIVER BIOPSY PERCUTANEOUS 2022 [facility] ULTRASOUND       Allergies:  Patient has no known allergies.     Medications:   Home Meds  Current Outpatient Medications on File Prior to Encounter   Medication Sig Dispense Refill    gabapentin (NEURONTIN) 300 MG capsule TAKE TWO CAPSULES BY MOUTH AT BEDTIME 180 capsule 0    busPIRone (BUSPAR) 5 MG tablet Take 1-2 tablets by mouth 3 times daily as needed (anxiety) 90 tablet 0    ondansetron (ZOFRAN-ODT) 4 MG disintegrating tablet Take 1 tablet by mouth 3 times daily as needed for Nausea or Vomiting 21 tablet 0    rOPINIRole (REQUIP) 1 MG tablet Take 1 tablet by mouth nightly 90 tablet 0    Continuous Blood Gluc Sensor (DEXCOM G6 SENSOR) MISC USE AS DIRECTED AND CHANGE EVERY 10 DAYS 3 each 2    insulin lispro (HUMALOG) 100 UNIT/ML injection vial Total daily dose 30 units, use in insulin pump 40 mL 1    Insulin Disposable Pump (OMNIPOD 5 G6 INTRO, GEN 5,) KIT Use as directed by MD 1 kit 0    Insulin Disposable Pump (OMNIPOD 5 G6 POD, GEN 5,) MISC Change pods q 2-3 days 30 each 1    Continuous Blood Gluc Transmit (DEXCOM G6 TRANSMITTER) MISC Change transmitter every 3 months 1 each 1    Insulin Syringe-Needle U-100 ([employer] INSULIN SYRINGE) 31G X 5/16\" 0.5 ML MISC 100 each by Does not apply route. 100 each 6     No current facility-administered medications on file prior to encounter.        Current Meds  acetaminophen (TYLENOL) tablet 650 mg, Q4H PRN          ASA 2 - Patient with mild systemic disease with no functional limitations    II (soft palate, uvula, fauces visible)    Activity:  2 - Able to move 4 extremities voluntarily on command  Respiration:  2 - Able to breathe deeply and cough freely  Circulation:  2 - BP+/- 20mmHg of normal  Consciousness:  2 - Fully awake  Oxygen Saturation (color):  2 - Able to maintain oxygen saturation >92% on room air    Sedation : Moderate sedation planned

## 2023-01-03 PROBLEM — G25.81 RESTLESS LEGS SYNDROME (RLS): Status: ACTIVE | Noted: 2020-02-06

## 2023-01-03 PROBLEM — M79.642 BILATERAL HAND PAIN: Status: ACTIVE | Noted: 2017-06-05

## 2023-01-03 PROBLEM — M79.641 BILATERAL HAND PAIN: Status: ACTIVE | Noted: 2017-06-05

## 2023-01-03 PROBLEM — E11.21 DIABETIC NEPHROPATHY (HCC): Status: ACTIVE | Noted: 2022-10-20

## 2023-01-03 PROBLEM — H26.9 CATARACT, LEFT EYE: Status: ACTIVE | Noted: 2017-07-11

## 2023-01-03 PROBLEM — F32.2 CURRENT SEVERE EPISODE OF MAJOR DEPRESSIVE DISORDER WITHOUT PSYCHOTIC FEATURES WITHOUT PRIOR EPISODE (HCC): Status: ACTIVE | Noted: 2019-11-04

## 2023-01-03 PROBLEM — R80.9 MICROALBUMINURIA: Status: ACTIVE | Noted: 2020-02-07

## 2023-01-03 PROBLEM — C69.30 MALIGNANT MELANOMA OF CHOROID (HCC): Status: ACTIVE | Noted: 2023-01-03

## 2023-01-03 PROBLEM — R05.3 CHRONIC COUGH: Status: ACTIVE | Noted: 2020-02-06

## 2023-01-03 PROBLEM — K86.89 PANCREATIC MASS: Status: ACTIVE | Noted: 2022-12-13

## 2023-01-03 PROBLEM — K91.5 POST-CHOLECYSTECTOMY SYNDROME: Status: ACTIVE | Noted: 2021-10-30

## 2023-01-03 PROBLEM — M75.81 ROTATOR CUFF TENDINITIS, RIGHT: Status: ACTIVE | Noted: 2017-11-09

## 2023-01-03 PROBLEM — E55.9 VITAMIN D DEFICIENCY: Status: ACTIVE | Noted: 2021-04-23

## 2023-01-03 PROBLEM — E11.319 DIABETIC RETINOPATHY (HCC): Status: ACTIVE | Noted: 2023-01-03

## 2023-01-03 PROBLEM — M77.11 LATERAL EPICONDYLITIS OF RIGHT ELBOW: Status: ACTIVE | Noted: 2017-11-09

## 2023-01-03 PROBLEM — Z96.41 INSULIN PUMP STATUS: Status: ACTIVE | Noted: 2018-10-01

## 2023-01-03 PROBLEM — C78.7 MALIGNANT NEOPLASM METASTATIC TO LIVER (HCC): Status: ACTIVE | Noted: 2023-01-03

## 2023-01-03 PROBLEM — M51.16 LUMBAR DISC HERNIATION WITH RADICULOPATHY: Status: ACTIVE | Noted: 2021-04-15

## 2023-01-03 RX ORDER — OMEPRAZOLE 20 MG/1
20 CAPSULE, DELAYED RELEASE ORAL
COMMUNITY
Start: 2022-12-05

## 2023-01-03 RX ORDER — GABAPENTIN 300 MG/1
CAPSULE ORAL
COMMUNITY
End: 2023-01-11

## 2023-01-03 RX ORDER — LISINOPRIL 5 MG/1
5 TABLET ORAL
COMMUNITY
Start: 2022-10-20

## 2023-01-03 RX ORDER — INSULIN LISPRO 100 [IU]/ML
INJECTION, SOLUTION INTRAVENOUS; SUBCUTANEOUS
COMMUNITY
Start: 2022-10-20 | End: 2023-01-11

## 2023-01-06 ENCOUNTER — OFFICE VISIT (OUTPATIENT)
Dept: SURGERY | Age: 63
End: 2023-01-06
Payer: COMMERCIAL

## 2023-01-06 VITALS
SYSTOLIC BLOOD PRESSURE: 201 MMHG | TEMPERATURE: 97.2 F | WEIGHT: 108 LBS | HEIGHT: 60 IN | HEART RATE: 94 BPM | DIASTOLIC BLOOD PRESSURE: 77 MMHG | BODY MASS INDEX: 21.2 KG/M2

## 2023-01-06 DIAGNOSIS — R59.0 RETROPERITONEAL LYMPHADENOPATHY: ICD-10-CM

## 2023-01-06 DIAGNOSIS — K86.89 PANCREATIC MASS: ICD-10-CM

## 2023-01-06 DIAGNOSIS — R16.0 LIVER MASS: ICD-10-CM

## 2023-01-06 DIAGNOSIS — R16.1 SPLENIC MASS: ICD-10-CM

## 2023-01-06 DIAGNOSIS — R16.0 LIVER MASS: Primary | ICD-10-CM

## 2023-01-06 PROCEDURE — 3074F SYST BP LT 130 MM HG: CPT | Performed by: SURGERY

## 2023-01-06 PROCEDURE — 3078F DIAST BP <80 MM HG: CPT | Performed by: SURGERY

## 2023-01-06 PROCEDURE — 99205 OFFICE O/P NEW HI 60 MIN: CPT | Performed by: SURGERY

## 2023-01-07 LAB
A/G RATIO: 1.1 (ref 1.1–2.2)
ALBUMIN SERPL-MCNC: 4 G/DL (ref 3.4–5)
ALP BLD-CCNC: 155 U/L (ref 40–129)
ALT SERPL-CCNC: 9 U/L (ref 10–40)
ANION GAP SERPL CALCULATED.3IONS-SCNC: 17 MMOL/L (ref 3–16)
AST SERPL-CCNC: 16 U/L (ref 15–37)
BASOPHILS ABSOLUTE: 0.1 K/UL (ref 0–0.2)
BASOPHILS RELATIVE PERCENT: 0.9 %
BILIRUB SERPL-MCNC: 0.3 MG/DL (ref 0–1)
BUN BLDV-MCNC: 20 MG/DL (ref 7–20)
CALCIUM SERPL-MCNC: 9.7 MG/DL (ref 8.3–10.6)
CEA: 4 NG/ML (ref 0–5)
CHLORIDE BLD-SCNC: 100 MMOL/L (ref 99–110)
CO2: 22 MMOL/L (ref 21–32)
CREAT SERPL-MCNC: 1.3 MG/DL (ref 0.6–1.2)
EOSINOPHILS ABSOLUTE: 0.2 K/UL (ref 0–0.6)
EOSINOPHILS RELATIVE PERCENT: 1.7 %
GFR SERPL CREATININE-BSD FRML MDRD: 46 ML/MIN/{1.73_M2}
GLUCOSE BLD-MCNC: 196 MG/DL (ref 70–99)
HCT VFR BLD CALC: 27.4 % (ref 36–48)
HEMOGLOBIN: 8.3 G/DL (ref 12–16)
LYMPHOCYTES ABSOLUTE: 1.6 K/UL (ref 1–5.1)
LYMPHOCYTES RELATIVE PERCENT: 16.8 %
MCH RBC QN AUTO: 22.5 PG (ref 26–34)
MCHC RBC AUTO-ENTMCNC: 30.4 G/DL (ref 31–36)
MCV RBC AUTO: 73.9 FL (ref 80–100)
MONOCYTES ABSOLUTE: 0.6 K/UL (ref 0–1.3)
MONOCYTES RELATIVE PERCENT: 5.8 %
NEUTROPHILS ABSOLUTE: 7.2 K/UL (ref 1.7–7.7)
NEUTROPHILS RELATIVE PERCENT: 74.8 %
PDW BLD-RTO: 15.8 % (ref 12.4–15.4)
PLATELET # BLD: 323 K/UL (ref 135–450)
PMV BLD AUTO: 9.4 FL (ref 5–10.5)
POTASSIUM SERPL-SCNC: 3.9 MMOL/L (ref 3.5–5.1)
RBC # BLD: 3.7 M/UL (ref 4–5.2)
SODIUM BLD-SCNC: 139 MMOL/L (ref 136–145)
TOTAL PROTEIN: 7.5 G/DL (ref 6.4–8.2)
WBC # BLD: 9.6 K/UL (ref 4–11)

## 2023-01-09 ENCOUNTER — TELEPHONE (OUTPATIENT)
Dept: SURGERY | Age: 63
End: 2023-01-09

## 2023-01-09 DIAGNOSIS — K86.89 PANCREATIC MASS: ICD-10-CM

## 2023-01-09 DIAGNOSIS — R16.0 LIVER MASS: Primary | ICD-10-CM

## 2023-01-09 DIAGNOSIS — R59.0 RETROPERITONEAL LYMPHADENOPATHY: ICD-10-CM

## 2023-01-09 DIAGNOSIS — R16.1 SPLENIC MASS: ICD-10-CM

## 2023-01-09 NOTE — TELEPHONE ENCOUNTER
MA called [facility] to get a prior Auth for a PET Scan and it was approved with an auth # of  [id number]     MA also called [facility] and gave them this information and told them that Dr. Lauren Matos would like this done ASAP and they said they would call the patient and get them scheduled

## 2023-01-09 NOTE — TELEPHONE ENCOUNTER
Shae Anglin called re: GI appointment and difficulty scheduling- office will only schedule her for consultation. Placed call to [facility]. Dr. Ndiaye Ahr assistant currently unavailable, but left message requesting expedited procedure. Will await callback.

## 2023-01-10 LAB
CA 19-9: 31 U/ML (ref 0–35)
CHROMOGRANIN A: 214 NG/ML (ref 0–103)

## 2023-01-11 ENCOUNTER — TELEPHONE (OUTPATIENT)
Dept: SURGERY | Age: 63
End: 2023-01-11

## 2023-01-11 NOTE — PROGRESS NOTES
Pt has insulin pump in place and anethesia is aware    C-diff Questionnaire:     * Admitted with diarrhea? [] YES    [x]  NO     *Prior history of C-Diff. In last 3 months? [] YES    [x]  NO     *Antibiotic use in the past 6-8 weeks? [x]  NO    []  YES      If yes, which: REASON_________________     *Prior hospitalization or nursing home in the last month? []  YES    [x]  NO     SAFETY FIRST. .call before you fall    [address] time___730      Surgery time____900    Do not eat or drink anything after 12:00 midnight prior to your surgery. This includes water chewing gum, mints and ice chips- the Day of Surgery. You may brush your teeth and gargle the morning of your surgery, but do not swallow the water     Please see your family doctor/pediatrician for a history and physical and/or questions concerning medications. Bring any test results/reports from your physicians office. If you are under the care of a heart doctor or specialist doctor, please be aware that you may be asked to them for clearance    You may be asked to stop blood thinners such as Coumadin, Plavix, Fragmin, Lovenox, etc., or any anti-inflammatories such as:  Aspirin, Ibuprofen, Advil, Naproxen prior to your surgery. We also ask that you stop any OTC medications such as fish oil, vitamin E, glucosamine, garlic, Multivitamins, COQ 10, etc.    We ask that you do not smoke 24 hours prior to surgery  We ask that you do not  drink any alcoholic beverages 24 hours prior to surgery     You must make arrangements for a responsible adult to take you home after your surgery. For your safety you will not be allowed to leave alone or drive yourself home. Your surgery will be cancelled if you do not have a ride home. Also for your safety, it is strongly suggested that someone stay with you the first 24 hours after your surgery.      A parent or legal guardian must accompany a child scheduled for surgery and plan to stay at the hospital until the child is discharged. Please do not bring other children with you. For your comfort, please wear simple loose fitting clothing to the hospital.  Please do not bring valuables. Do not wear any make-up or nail polish on your fingers or toes. For your safety, please do not wear any jewelry or body piercing's on the day of surgery. All jewelry must be removed. If you have dentures, they will be removed before going to operating room. For your convenience, we will provide you with a container. If you wear contact lenses or glasses, they will be removed, please bring a case for them. If you have a living will and a durable power of  for healthcare, please bring in a copy. As part of our patient safety program to minimize surgical site infections, we ask you to do the following:    Please notify your surgeon if you develop any illness between         now and the day of your surgery. This includes a cough, cold, fever, sore throat, nausea,         or vomiting, and diarrhea, etc.   Please notify your surgeon if you experience dizziness, shortness         of breath or blurred vision between now and the time of your surgery. Do not shave your operative site 96 hours prior to surgery. For face and neck surgery, men may use an electric razor 48 hours   prior to surgery. You may shower the night before surgery or the morning of   your surgery with an antibacterial soap. You will need to bring a photo ID and insurance card     If you use a C-pap or Bi-pap machine, please bring your machine with you to the hospital     Our goal is to provide you with excellent care, therefore, visitors will be limited to so that we may focus on providing this care for you. Please contact your surgeon office, if you have any further questions.                  [address] phone number:  [phone] PAT fax number:  [phone]    Please note these are generalized instructions for all surgical cases, you may be provided with more specific instructions according to your surgery.

## 2023-01-11 NOTE — TELEPHONE ENCOUNTER
Received patient advice request from Kiah Dotson stating she is vomiting almost every day and having diarrhea. Recommended changing diet to liquids only and sipping throughout the day. Encouraged her to seek high calorie, high-protein liquids. Has EUS on Friday with Dr. Yanet Santa, PET scan is tomorrow. Offered follow-up appt Tuesday 1/17 however she is working every day until 4:30 at [employer] until there is a plan in place re: surgery/treatment. Will discuss option of Doxyvisit with Dr. Solange Aguirre and follow-up with patient.

## 2023-01-12 ENCOUNTER — TELEPHONE (OUTPATIENT)
Dept: SURGERY | Age: 63
End: 2023-01-12

## 2023-01-12 ENCOUNTER — ANESTHESIA EVENT (OUTPATIENT)
Dept: ENDOSCOPY | Age: 63
End: 2023-01-12
Payer: COMMERCIAL

## 2023-01-12 NOTE — TELEPHONE ENCOUNTER
MA called patient and offered her an appointment for tomorrow and she said she is having her procedure tomorrow and feels she will still feel sleepy from anesthesia so I told her that was fine, we will offer her a doxy visit for Tuesday 1/17/23

## 2023-01-13 ENCOUNTER — ANESTHESIA (OUTPATIENT)
Dept: ENDOSCOPY | Age: 63
End: 2023-01-13
Payer: COMMERCIAL

## 2023-01-13 ENCOUNTER — HOSPITAL ENCOUNTER (OUTPATIENT)
Age: 63
Setting detail: OUTPATIENT SURGERY
Discharge: HOME | End: 2023-01-13
Attending: INTERNAL MEDICINE | Admitting: INTERNAL MEDICINE
Payer: COMMERCIAL

## 2023-01-13 VITALS
BODY MASS INDEX: 19.3 KG/M2 | HEART RATE: 79 BPM | OXYGEN SATURATION: 98 % | TEMPERATURE: 97.7 F | DIASTOLIC BLOOD PRESSURE: 71 MMHG | WEIGHT: 98.33 LBS | SYSTOLIC BLOOD PRESSURE: 144 MMHG | RESPIRATION RATE: 16 BRPM | HEIGHT: 60 IN

## 2023-01-13 DIAGNOSIS — K86.89 PANCREATIC MASS: ICD-10-CM

## 2023-01-13 LAB
GLUCOSE BLD-MCNC: 113 MG/DL (ref 70–99)
GLUCOSE BLD-MCNC: 136 MG/DL (ref 70–99)
PERFORMED ON: ABNORMAL
PERFORMED ON: ABNORMAL

## 2023-01-13 PROCEDURE — 88177 CYTP FNA EVAL EA ADDL: CPT

## 2023-01-13 PROCEDURE — 7100000010 HC PHASE II RECOVERY - FIRST 15 MIN: Performed by: INTERNAL MEDICINE

## 2023-01-13 PROCEDURE — 88172 CYTP DX EVAL FNA 1ST EA SITE: CPT

## 2023-01-13 PROCEDURE — 7100000001 HC PACU RECOVERY - ADDTL 15 MIN: Performed by: INTERNAL MEDICINE

## 2023-01-13 PROCEDURE — 2580000003 HC RX 258: Performed by: NURSE ANESTHETIST, CERTIFIED REGISTERED

## 2023-01-13 PROCEDURE — 3609020800 HC EGD W/EUS FNA: Performed by: INTERNAL MEDICINE

## 2023-01-13 PROCEDURE — 7100000011 HC PHASE II RECOVERY - ADDTL 15 MIN: Performed by: INTERNAL MEDICINE

## 2023-01-13 PROCEDURE — 2500000003 HC RX 250 WO HCPCS: Performed by: NURSE ANESTHETIST, CERTIFIED REGISTERED

## 2023-01-13 PROCEDURE — 88305 TISSUE EXAM BY PATHOLOGIST: CPT

## 2023-01-13 PROCEDURE — 3700000001 HC ADD 15 MINUTES (ANESTHESIA): Performed by: INTERNAL MEDICINE

## 2023-01-13 PROCEDURE — 3609012400 HC EGD TRANSORAL BIOPSY SINGLE/MULTIPLE: Performed by: INTERNAL MEDICINE

## 2023-01-13 PROCEDURE — 6360000002 HC RX W HCPCS: Performed by: NURSE ANESTHETIST, CERTIFIED REGISTERED

## 2023-01-13 PROCEDURE — 2709999900 HC NON-CHARGEABLE SUPPLY: Performed by: INTERNAL MEDICINE

## 2023-01-13 PROCEDURE — 2580000003 HC RX 258: Performed by: ANESTHESIOLOGY

## 2023-01-13 PROCEDURE — 2720000010 HC SURG SUPPLY STERILE: Performed by: INTERNAL MEDICINE

## 2023-01-13 PROCEDURE — 3700000000 HC ANESTHESIA ATTENDED CARE: Performed by: INTERNAL MEDICINE

## 2023-01-13 PROCEDURE — 88173 CYTOPATH EVAL FNA REPORT: CPT

## 2023-01-13 PROCEDURE — 88342 IMHCHEM/IMCYTCHM 1ST ANTB: CPT

## 2023-01-13 PROCEDURE — 88341 IMHCHEM/IMCYTCHM EA ADD ANTB: CPT

## 2023-01-13 PROCEDURE — 7100000000 HC PACU RECOVERY - FIRST 15 MIN: Performed by: INTERNAL MEDICINE

## 2023-01-13 RX ORDER — PROPOFOL 10 MG/ML
INJECTION, EMULSION INTRAVENOUS PRN
Status: DISCONTINUED | OUTPATIENT
Start: 2023-01-13 | End: 2023-01-13 | Stop reason: SDUPTHER

## 2023-01-13 RX ORDER — SODIUM CHLORIDE 0.9 % (FLUSH) 0.9 %
5-40 SYRINGE (ML) INJECTION EVERY 12 HOURS SCHEDULED
Status: DISCONTINUED | OUTPATIENT
Start: 2023-01-13 | End: 2023-01-13 | Stop reason: HOSPADM

## 2023-01-13 RX ORDER — SODIUM CHLORIDE 0.9 % (FLUSH) 0.9 %
5-40 SYRINGE (ML) INJECTION PRN
Status: DISCONTINUED | OUTPATIENT
Start: 2023-01-13 | End: 2023-01-13 | Stop reason: HOSPADM

## 2023-01-13 RX ORDER — SODIUM CHLORIDE 9 MG/ML
INJECTION, SOLUTION INTRAVENOUS CONTINUOUS PRN
Status: DISCONTINUED | OUTPATIENT
Start: 2023-01-13 | End: 2023-01-13 | Stop reason: SDUPTHER

## 2023-01-13 RX ORDER — SODIUM CHLORIDE 9 MG/ML
INJECTION, SOLUTION INTRAVENOUS PRN
Status: DISCONTINUED | OUTPATIENT
Start: 2023-01-13 | End: 2023-01-13 | Stop reason: HOSPADM

## 2023-01-13 RX ORDER — PROPOFOL 10 MG/ML
INJECTION, EMULSION INTRAVENOUS CONTINUOUS PRN
Status: DISCONTINUED | OUTPATIENT
Start: 2023-01-13 | End: 2023-01-13 | Stop reason: SDUPTHER

## 2023-01-13 RX ORDER — LIDOCAINE HYDROCHLORIDE 20 MG/ML
INJECTION, SOLUTION EPIDURAL; INFILTRATION; INTRACAUDAL; PERINEURAL PRN
Status: DISCONTINUED | OUTPATIENT
Start: 2023-01-13 | End: 2023-01-13 | Stop reason: SDUPTHER

## 2023-01-13 RX ORDER — ONDANSETRON 2 MG/ML
4 INJECTION INTRAMUSCULAR; INTRAVENOUS
Status: DISCONTINUED | OUTPATIENT
Start: 2023-01-13 | End: 2023-01-13 | Stop reason: HOSPADM

## 2023-01-13 RX ADMIN — PROPOFOL 80 MG: 10 INJECTION, EMULSION INTRAVENOUS at 09:03

## 2023-01-13 RX ADMIN — SODIUM CHLORIDE: 9 INJECTION, SOLUTION INTRAVENOUS at 08:06

## 2023-01-13 RX ADMIN — LIDOCAINE HYDROCHLORIDE 60 MG: 20 INJECTION, SOLUTION EPIDURAL; INFILTRATION; INTRACAUDAL; PERINEURAL at 09:03

## 2023-01-13 RX ADMIN — PROPOFOL 180 MCG/KG/MIN: 10 INJECTION, EMULSION INTRAVENOUS at 09:03

## 2023-01-13 RX ADMIN — SODIUM CHLORIDE: 9 INJECTION, SOLUTION INTRAVENOUS at 08:59

## 2023-01-13 ASSESSMENT — PAIN SCALES - GENERAL
PAINLEVEL_OUTOF10: 0

## 2023-01-13 ASSESSMENT — PAIN - FUNCTIONAL ASSESSMENT: PAIN_FUNCTIONAL_ASSESSMENT: 0-10

## 2023-01-13 NOTE — PROGRESS NOTES
Pt has insulin pump in right thigh and dexcom in abdomen. Dr Claribel Browne states okay for insulin pump to stay on.

## 2023-01-13 NOTE — DISCHARGE INSTRUCTIONS
Impression:  Moderate portal gastropathy and small gastric varices in the fundus. There was a 1cm pigmented nodule in the proximal body of the stomach on the greater curve biopsied  Extensive retroperitoneal lymphadenopathy adjacent to the tail of the pancreas. Biopsy performed. Splenic vein thrombosis. 1.25 x 1.18 cm hyperechoic liver mass    Recommendations:  1. Clear liquid diet advance as tolerated. 2.  Follow up on the final biopsies. Parisa Mccormick MD  [facility]      Endoscopy Discharge Instructions    Call with any questions or concerns. You may be drowsy or lightheaded after receiving sedation. DO NOT operate  a vehicle (automobile, bicycle, motorcycle, machinery, or power tools), no  alcoholic beverages, and do not make any important decisions today. Plan on bed rest or quiet relaxation today. Resume normal activities in the morning. Resume normal activity tomorrow unless otherwise advised by your physician. Eat a light first meal, avoiding spicy and fatty foods, then resume normal diet unless  you are told otherwise by your physician. If the intravenous medication site is painful, apply warm compresses on the site until the soreness is relieved and elevate the arm above the heart. Call your physician if no improvement  in 2-3 days. POSSIBLE SYMPTOMS TO WATCH:     1. fever (greater than 100) 5. increased abdominal bloating   2. severe pain   6. excessive bleeding   3. nausea and vomiting  7. chest pain   4. chills    8. shortness of breath       Notify us if these problems occur     Expected as normal and remedies:  Sore throat: use over the counter throat lozenges or gargle with warm salt water. Redness or soreness at the IV site: apply warm compress  Gaseous discomfort: belching or passing flatus (gas).

## 2023-01-13 NOTE — ANESTHESIA POSTPROCEDURE EVALUATION
[facility] Department of Anesthesiology  Post-Anesthesia Note       Name:  Rodney Sheriff                                  Age:  57 y.o. MRN:  [id number]     Last Vitals & Oxygen Saturation: BP (!) 144/71   Pulse 79   Temp 97.7 °F (36.5 °C) (Temporal)   Resp 16   Ht 5' (1.524 m)   Wt 98 lb 5.2 oz (44.6 kg)   LMP  (LMP Unknown) Comment: had a hysterectomy a long time ago  SpO2 98%   BMI 19.20 kg/m²   Patient Vitals for the past 4 hrs:   BP Temp Temp src Pulse Resp SpO2 Height Weight   01/13/23 1022 (!) 144/71 -- -- 79 16 98 % -- --   01/13/23 1000 135/65 97.7 °F (36.5 °C) Temporal 80 18 99 % -- --   01/13/23 0955 (!) 125/57 -- -- 77 17 97 % -- --   01/13/23 0950 (!) 127/55 -- -- 79 20 98 % -- --   01/13/23 0945 (!) 117/50 -- -- 76 21 98 % -- --   01/13/23 0940 (!) 83/33 -- -- 73 22 97 % -- --   01/13/23 0935 (!) 85/30 -- -- 73 21 100 % -- --   01/13/23 0933 -- -- -- 74 20 100 % -- --   01/13/23 0932 (!) 84/34 -- -- 72 14 99 % -- --   01/13/23 0931 -- -- -- -- -- 100 % -- --   01/13/23 0930 (!) 82/33 97.8 °F (36.6 °C) Temporal 74 20 -- -- --   01/13/23 0754 (!) 143/65 98.7 °F (37.1 °C) Temporal 83 17 100 % 5' (1.524 m) 98 lb 5.2 oz (44.6 kg)       Level of consciousness:  Awake, alert    Respiratory: Respirations easy, no distress. Stable. Cardiovascular: Hemodynamically stable. Hydration: Adequate. PONV: Adequately managed. Post-op pain: Adequately controlled. Post-op assessment: Tolerated anesthetic well without complication. Complications:  None.     Niesha Charlton MD  January 13, 2023   10:47 AM

## 2023-01-13 NOTE — ANESTHESIA PRE PROCEDURE
[facility] Department of Anesthesiology  Pre-Anesthesia Evaluation/Consultation       Name:  Shaw Turcios  : 1960  Age:  57 y.o.                                            MRN:  [id number]  Date: 2023           Surgeon: Surgeon(s):  Asaf Valdez MD    Procedure: Procedure(s):  ENDOSCOPIC ULTRASOUND EXAM     No Known Allergies  Patient Active Problem List   Diagnosis    DKA (diabetic ketoacidoses)    Depression    Hyponatremia    Nausea & vomiting    Duodenal erosion    Rotator cuff tendinitis, right    Bilateral hand pain    Left elbow pain    Type 1 diabetes mellitus ([address]    Uncontrolled type 1 diabetes mellitus with hyperglycemia ([address]    Type 1 diabetes, uncontrolled, with neuropathy    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, with long-term current use of insulin    Stage 3 chronic kidney disease (HCC)    Type 1 diabetes, uncontrolled, with retinopathy    Primary hypertension    Vitamin D deficiency    Mixed hyperlipidemia    Family history of thyroid disease    Poorly controlled type 1 diabetes mellitus with retinopathy ([address]    Diabetic nephropathy ([address]    Poorly controlled type 1 diabetes mellitus with neuropathy ([address]    Thyroid enlargement    Gastroesophageal reflux disease without esophagitis    Cataract, left eye    Chronic cough    Current severe episode of major depressive disorder without psychotic features without prior episode ([address]    Diabetic retinopathy ([address]    Insomnia    Insulin pump status    Lateral epicondylitis of right elbow    Lumbar disc herniation with radiculopathy    Malignant melanoma of choroid ([address]    Malignant neoplasm metastatic to liver (HCC)    Microalbuminuria    Pancreatic mass    Post-cholecystectomy syndrome    Restless legs syndrome (RLS)     Past Medical History:   Diagnosis Date    Cancer ([address]     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    Depression     Diabetes mellitus ([address]     Hx of radiation therapy     melanoma right eye    Hypertension     Insulin pump in place     Prolonged emergence from general anesthesia     slow to wake up    Restless leg syndrome      Past Surgical History:   Procedure Laterality Date    CARPAL TUNNEL RELEASE Bilateral 2017    CHOLECYSTECTOMY      CT BIOPSY ABDOMEN RETROPERITONEUM  2022    CT BIOPSY ABDOMEN RETROPERITONEUM 2022 [facility] CT SCAN    EYE SURGERY Right     biopsy    HYSTERECTOMY (CERVIX STATUS UNKNOWN)      LIPOMA RESECTION      LIVER BIOPSY Right     SHOULDER ARTHROSCOPY Right 2018    RIGHT SHOULDER ARTHROSCOPE, SUBACROMIAL DECOMPRESSION, DISTALCLAVICLE EXCISION, CAPSULAR RELEASE, MANIPULATION UNDER ANESTHESIA, DEBRIDEMENT    SHOULDER SURGERY      UPPER GASTROINTESTINAL ENDOSCOPY  10/22/2014    US GUIDED LIVER BIOPSY PERCUTANEOUS  2022    US GUIDED LIVER BIOPSY PERCUTANEOUS 2022 [address] ULTRASOUND     Social History     Tobacco Use    Smoking status: Former     Packs/day: 1.00     Years: 10.00     Pack years: 10.00     Types: Cigarettes     Quit date: 1980     Years since quittin.1    Smokeless tobacco: Never   Vaping Use    Vaping Use: Never used   Substance Use Topics    Alcohol use: No    Drug use: No     Medications  No current facility-administered medications on file prior to encounter.      Current Outpatient Medications on File Prior to Encounter   Medication Sig Dispense Refill    lisinopril (PRINIVIL;ZESTRIL) 5 MG tablet 5 mg (Patient not taking: Reported on 2023)      omeprazole (PRILOSEC) 20 MG delayed release capsule 20 mg (Patient not taking: Reported on 2023)      gabapentin (NEURONTIN) 300 MG capsule TAKE TWO CAPSULES BY MOUTH AT BEDTIME 180 capsule 0    ondansetron (ZOFRAN-ODT) 4 MG disintegrating tablet Take 1 tablet by mouth 3 times daily as needed for Nausea or Vomiting (Patient not taking: Reported on 2023) 21 tablet 0    rOPINIRole (REQUIP) 1 MG tablet Take 1 tablet by mouth nightly 90 tablet 0    insulin lispro (HUMALOG) 100 UNIT/ML injection vial Total daily dose 30 units, use in insulin pump 40 mL 1     Current Facility-Administered Medications   Medication Dose Route Frequency Provider Last Rate Last Admin    sodium chloride flush 0.9 % injection 5-40 mL  5-40 mL IntraVENous 2 times per day Hafsa Sanchez MD        sodium chloride flush 0.9 % injection 5-40 mL  5-40 mL IntraVENous ROXIE Sanchez MD        0.9 % sodium chloride infusion   IntraVENous PRN Hafsa Sanchez  mL/hr at 23 0806 New Bag at 23 08     Vital Signs (Current)   Vitals:    23   BP: (!) 143/65   Pulse: 83   Resp: 17   Temp: 98.7 °F (37.1 °C)   SpO2: 100%     Vital Signs Statistics (for past 48 hrs)     Temp  Av.7 °F (37.1 °C)  Min: 98.7 °F (37.1 °C)   Min taken time: 23  Max: 98.7 °F (37.1 °C)   Max taken time: 23  Pulse  Av  Min: 80   Min taken time: 23  Max: 80   Max taken time: 23  Resp  Av  Min: 16   Min taken time: 23  Max: 16   Max taken time: 23  BP  Min: 143/65   Min taken time: 23  Max: 143/65   Max taken time: 23  SpO2  Av %  Min: 100 %   Min taken time: 23  Max: 100 %   Max taken time: 23    BP Readings from Last 3 Encounters:   23 (!) 143/65   23 (!) 201/77   22 (!) 130/58     BMI  Body mass index is 19.2 kg/m². Estimated body mass index is 19.2 kg/m² as calculated from the following:    Height as of this encounter: 5' (1.524 m). Weight as of this encounter: 98 lb 5.2 oz (44.6 kg).     CBC   Lab Results   Component Value Date/Time    WBC 9.6 2023 04:03 PM    RBC 3.70 2023 04:03 PM    HGB 8.3 2023 04:03 PM    HCT 27.4 2023 04:03 PM    MCV 73.9 2023 04:03 PM    RDW 15.8 2023 04:03 PM     2023 04:03 PM     CMP    Lab Results   Component Value Date/Time     01/06/2023 04:03 PM    K 3.9 01/06/2023 04:03 PM     01/06/2023 04:03 PM    CO2 22 01/06/2023 04:03 PM    BUN 20 01/06/2023 04:03 PM    CREATININE 1.3 01/06/2023 04:03 PM    GFRAA 39 09/19/2022 10:11 AM    GFRAA >60 05/29/2012 03:09 AM    AGRATIO 1.1 01/06/2023 04:03 PM    LABGLOM 46 01/06/2023 04:03 PM    GLUCOSE 196 01/06/2023 04:03 PM    PROT 7.5 01/06/2023 04:03 PM    PROT 7.8 05/25/2012 09:15 AM    CALCIUM 9.7 01/06/2023 04:03 PM    BILITOT 0.3 01/06/2023 04:03 PM    ALKPHOS 155 01/06/2023 04:03 PM    AST 16 01/06/2023 04:03 PM    ALT 9 01/06/2023 04:03 PM     BMP    Lab Results   Component Value Date/Time     01/06/2023 04:03 PM    K 3.9 01/06/2023 04:03 PM     01/06/2023 04:03 PM    CO2 22 01/06/2023 04:03 PM    BUN 20 01/06/2023 04:03 PM    CREATININE 1.3 01/06/2023 04:03 PM    CALCIUM 9.7 01/06/2023 04:03 PM    GFRAA 39 09/19/2022 10:11 AM    GFRAA >60 05/29/2012 03:09 AM    LABGLOM 46 01/06/2023 04:03 PM    GLUCOSE 196 01/06/2023 04:03 PM     POCGlucose  No results for input(s): GLUCOSE in the last 72 hours.    Coags    Lab Results   Component Value Date/Time    PROTIME 14.7 12/16/2022 08:04 AM    INR 1.15 12/16/2022 08:04 AM    APTT 34.4 12/16/2022 08:04 AM     HCG (If Applicable)   Lab Results   Component Value Date    PREGTESTUR Neg 05/25/2012      ABGs   Lab Results   Component Value Date/Time    PHART 7.291 10/19/2014 12:38 AM    PO2ART 93.1 10/19/2014 12:38 AM    QEC7RQI 32.7 10/19/2014 12:38 AM    HZK8SOQ 15.4 10/19/2014 12:38 AM    BEART -10.1 10/19/2014 12:38 AM    M5TKWWEP 96.9 10/19/2014 12:38 AM      Type & Screen (If Applicable)  No results found for: LABABO, LABRH                         BMI: Wt Readings from Last 3 Encounters:       NPO Status:   Date of last liquid consumption: 01/12/23   Time of last liquid consumption: 2300   Date of last solid food consumption: 01/11/23      Time of last solid consumption: 2300       Anesthesia Evaluation  Patient summary reviewed no history of anesthetic complications:   Airway: Mallampati: III  TM distance: >3 FB   Neck ROM: full  Mouth opening: > = 3 FB   Dental: normal exam         Pulmonary:Negative Pulmonary ROS and normal exam                               Cardiovascular:  Exercise tolerance: good (>4 METS),   (+) hypertension:, hyperlipidemia      ECG reviewed  Rhythm: regular  Rate: normal                    Neuro/Psych:   (+) neuromuscular disease:, psychiatric history:depression/anxiety             GI/Hepatic/Renal:   (+) GERD: well controlled, PUD, liver disease:, renal disease: CRI,           Endo/Other:    (+) Diabetes (insulin pump)Type I DM, using insulin, .    (-) blood dyscrasia               Abdominal:             Vascular: negative vascular ROS. Other Findings:           Anesthesia Plan      MAC     ASA 3       Induction: intravenous. Anesthetic plan and risks discussed with patient and spouse. Plan discussed with CRNA. This pre-anesthesia assessment may be used as a history and physical.    DOS STAFF ADDENDUM:    Pt seen and examined, chart reviewed (including anesthesia, drug and allergy history). No interval changes to history and physical examination. Anesthetic plan, risks, benefits, alternatives, and personnel involved discussed with patient. Questions and concerns addressed. Patient(family) verbalized an understanding and agrees to proceed.       Arie Salgado MD  January 13, 2023  8:19 AM

## 2023-01-13 NOTE — OP NOTE
Endoscopy Note    Patient: Lesli Mercedes   : 1960  Acct#:     Procedure: Endoscopic ultrasound with FNA  Doppler of mesenteric vessels  EGD with biopsy    Surgeon:  Cristofer Crystal MD,     Referring Physician:  Dr. Alphonzo Collet, Dr. Hilaria Alcantara, and Colleen REES-CNP    Anesthesia:  MAC per Anesthesia. Indications: This is a 57y.o. year old female who presents today with 5  x 5.5cm mass int he pancreatic tail with extensive retroperitoneal lymphadenopathy. 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 There is metastatic disease of the lung, liver, and left adrenal gland and spleen. Had IR biopsy of liver  and retroperitoneal lymph node negative. Consent: Risks, benefits, and alternatives were explained and informed consent was obtained. Monitoring:  Patient was monitored with continuous pulse oximetry, telemetry, and intermittent blood pressures. Details of the Procedure: The patient was then taken to the endoscopy suite. A time-out was performed. The patient and staff were in agreement as to the correct patient and procedure. The above anesthesia was administered by the anesthesia department. The patient was placed in the left lateral position. The Olympus videoendoscope was placed in the patient's mouth and under direct visualization passed into the esophagus and advanced without difficulty to the 2nd portion of the duodenum. Views were good, patient toleration was good. Retroflexion was performed in the stomach. Findings:  1. The esophagus appeared normal without evidence of Chand's esophagus or reflux esophagitis. No esophageal varices. 2.  Moderate portal gastropathy. Possible small gastric varices in the fundus. There was a 1cm pigmented nodule in the proximal body of the stomach on the greater curve biopsied  3. Normal duodenum.      Next, the curvilinear array echoendoscope was advanced without difficulty to the 2nd portion of the duodenum. Endosonographic views were good, patient toleration was good. Findings: The body and tail of the pancreas appeared pretty normal.  Adjacent to the tail of the pancreas, there was extensive large adenopathy with largest 3.68 x 2.23 cm. Using doppler ultrasound to find a vessel-free path into the mass, a 22 G Acquire needle was passed under ultrasound guidance directly into the mass. 3 passes were made. Slides were made and specimen was also sent in formalin. Preliminary results were + for malignancy. There was a 1.25 x 1.18 cm hyperechoic liver lesion also concerning for tumor. 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 Doppler evaluation of the celiac artery, hepatic artery, superior mesenteric artery, superior mesenteric vein, portal vein was normal.    The duodenum and stomach were decompressed and the scope was withdrawn from the patient. The patient tolerated the procedure well and was taken to the post anesthesia care unit in good condition. Doppler Interpretation:  Doppler evaluation was performed and interpreted on the spot by the endoscopist without the presence of a radiologist.      Specimens taken: yes  Estimated blood loss: minimal      Impression:  Moderate portal gastropathy and small gastric varices in the fundus. There was a 1cm pigmented nodule in the proximal body of the stomach on the greater curve biopsied  Extensive retroperitoneal lymphadenopathy adjacent to the tail of the pancreas. Biopsy performed. Splenic vein thrombosis. 1.25 x 1.18 cm hyperechoic liver mass    Recommendations:  1. Clear liquid diet advance as tolerated. 2.  Follow up on the final biopsies. Preliminary concern is for melanoma. 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     Maria C Kramer

## 2023-01-13 NOTE — H&P
Pre-operative History and Physical    Patient: Baron Kuhn  : 1960  Acct#:     HISTORY OF PRESENT ILLNESS:    The patient is a 57 y.o. female who presents with 5  x 5.5cm mass int he pancreatic tail with extensive retroperitoneal lymphadenopathy. 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 There is metastatic disease of the lung, liver, and left adrenal gland and spleen. Had IR biopsy of liver  and retroperitoneal lymph node negative. 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    Depression     Diabetes mellitus ([address]     Hx of radiation therapy     melanoma right eye    Hypertension     Insulin pump in place     Prolonged emergence from general anesthesia     slow to wake up    Restless leg syndrome       Past Surgical History:        Procedure Laterality Date    CARPAL TUNNEL RELEASE Bilateral 2017    CHOLECYSTECTOMY      CT BIOPSY ABDOMEN RETROPERITONEUM  2022    CT BIOPSY ABDOMEN RETROPERITONEUM 2022 [facility] CT SCAN    EYE SURGERY Right     biopsy    HYSTERECTOMY (CERVIX STATUS UNKNOWN)      LIPOMA RESECTION      LIVER BIOPSY Right     SHOULDER ARTHROSCOPY Right 2018    RIGHT SHOULDER ARTHROSCOPE, SUBACROMIAL DECOMPRESSION, DISTALCLAVICLE EXCISION, CAPSULAR RELEASE, MANIPULATION UNDER ANESTHESIA, DEBRIDEMENT    SHOULDER SURGERY      UPPER GASTROINTESTINAL ENDOSCOPY  10/22/2014    US GUIDED LIVER BIOPSY PERCUTANEOUS  2022    US GUIDED LIVER BIOPSY PERCUTANEOUS 2022 [facility] ULTRASOUND      Medications Prior to Admission:   No current facility-administered medications on file prior to encounter.      Current Outpatient Medications on File Prior to Encounter   Medication Sig Dispense Refill    lisinopril (PRINIVIL;ZESTRIL) 5 MG tablet 5 mg (Patient not taking: Reported on 2023)      omeprazole (PRILOSEC) 20 MG delayed release capsule 20 mg (Patient not taking: Reported on 2023)      gabapentin (NEURONTIN) 300 MG capsule TAKE TWO CAPSULES BY MOUTH AT BEDTIME 180 capsule 0    ondansetron (ZOFRAN-ODT) 4 MG disintegrating tablet Take 1 tablet by mouth 3 times daily as needed for Nausea or Vomiting (Patient not taking: Reported on 2023) 21 tablet 0    rOPINIRole (REQUIP) 1 MG tablet Take 1 tablet by mouth nightly 90 tablet 0    insulin lispro (HUMALOG) 100 UNIT/ML injection vial Total daily dose 30 units, use in insulin pump 40 mL 1        Allergies:  Patient has no known allergies.     Social History:   Social History     Socioeconomic History    Marital status:      Spouse name: Not on file    Number of children: Not on file    Years of education: Not on file    Highest education level: Not on file   Occupational History    Not on file   Tobacco Use    Smoking status: Former     Packs/day: 1.00     Years: 10.00     Pack years: 10.00     Types: Cigarettes     Quit date: 1980     Years since quittin.1    Smokeless tobacco: Never   Vaping Use    Vaping Use: Never used   Substance and Sexual Activity    Alcohol use: No    Drug use: No    Sexual activity: Not on file   Other Topics Concern    Not on file   Social History Narrative    Not on file     Social Determinants of Health     Financial Resource Strain: Low Risk     Difficulty of Paying Living Expenses: Not hard at all   Food Insecurity: No Food Insecurity    Worried About Running Out of Food in the Last Year: Never true    Ran Out of Food in the Last Year: Never true   Transportation Needs: Not on file   Physical Activity: Not on file   Stress: Not on file   Social Connections: Not on file   Intimate Partner Violence: Not on file   Housing Stability: Not on file      Family History:       Problem Relation Age of Onset    High Blood Pressure Mother     Breast Cancer Mother         breast w mets to bone    Diabetes Father     Stroke Father     Cancer Father         bladder        PHYSICAL EXAM:      BP (!) 143/65   Pulse 83   Temp 98.7 °F (37.1 °C) (Temporal)   Resp 17   Ht 5' (1.524 m)   Wt 98 lb 5.2 oz (44.6 kg)   LMP  (LMP Unknown) Comment: had a hysterectomy a long time ago  SpO2 100%   BMI 19.20 kg/m²  I        Heart:  RRR    Lungs:  CTA b    Abdomen:  S/NT/ND/+BS      ASSESSMENT AND PLAN:  ASA: per anesthesia  Mallampati: per anesthesia  1. Patient is a 57 y.o. female here for EGD and EUS   2. Procedure options, risks and benefits reviewed with the patient. The patient expresses understanding.     Emil Cornejo

## 2023-01-13 NOTE — PROGRESS NOTES
Reviewed dc instructions with pt . Pt  verbalized understanding. PIV removed. Dressing clean dry and intact. Pt dc to private residence. Wheelchair to transport pt. To vehicle. Pt dc with personal belongings.

## 2023-01-13 NOTE — PROGRESS NOTES
Procedure Performed: ENDOSCOPIC ULTRASOUND With: Fine Needle Aspiration    Fine Needle Aspiration of:   Pancreatic Tail (X 3 Passes)          ALL FNA SPECIMENS MANAGED BY CYTOTECHNOLOGIST.         EUS scope balloon removed intact and verified by Marsha Tran and Ifrah MOBLEY      Electronically signed by Angie Montes De Oca RN on 1/13/2023 at 9:25 AM

## 2023-01-16 NOTE — PROGRESS NOTES
2023    TELEHEALTH EVALUATION -- Audio/Visual ([place] [facility] [place])    HPI:    Janelle Morales (:  1960) has requested an audio/video evaluation for the following concern(s): Pancreatic Mass. Ida Crockett is here today to discuss further management of her Pancreatic Mass and to review recent EUS findings. Patient is followed by Dr. Sandee Wray.     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    Depression     Diabetes mellitus ([address]     Hx of radiation therapy     melanoma right eye    Hypertension     Insulin pump in place     Prolonged emergence from general anesthesia     slow to wake up    Restless leg syndrome        Past Surgical History:   Procedure Laterality Date    CARPAL TUNNEL RELEASE Bilateral 2017    CHOLECYSTECTOMY      CT BIOPSY ABDOMEN RETROPERITONEUM  2022    CT BIOPSY ABDOMEN RETROPERITONEUM 2022 [facility] CT SCAN    EYE SURGERY Right     biopsy    HYSTERECTOMY (CERVIX STATUS UNKNOWN)      LIPOMA RESECTION      LIVER BIOPSY Right     SHOULDER ARTHROSCOPY Right 2018    RIGHT SHOULDER ARTHROSCOPE, SUBACROMIAL DECOMPRESSION, DISTALCLAVICLE EXCISION, CAPSULAR RELEASE, MANIPULATION UNDER ANESTHESIA, DEBRIDEMENT    SHOULDER SURGERY      UPPER GASTROINTESTINAL ENDOSCOPY  10/22/2014    UPPER GASTROINTESTINAL ENDOSCOPY N/A 2023    EGD W/EUS FNA performed by Calin Sparrow MD at [place] [address]  2023    EGD BIOPSY performed by Calin Sparrow MD at [address] [place] [address]  2022    US GUIDED LIVER BIOPSY PERCUTANEOUS 2022 [facility] ULTRASOUND       Family History   Problem Relation Age of Onset    High Blood Pressure Mother     Breast Cancer Mother         breast w mets to bone    Diabetes Father     Stroke Father     Cancer Father         bladder       Social History     Tobacco Use    Smoking status: Former     Packs/day: 1.00     Years: 10.00 Pack years: 10.00     Types: Cigarettes     Quit date: 1980     Years since quittin.1    Smokeless tobacco: Never   Vaping Use    Vaping Use: Never used   Substance Use Topics    Alcohol use: No    Drug use: No        Prior to Visit Medications    Medication Sig Taking? Authorizing Provider   lisinopril (PRINIVIL;ZESTRIL) 5 MG tablet 5 mg  Patient not taking: No sig reported  Historical Provider, MD   omeprazole (PRILOSEC) 20 MG delayed release capsule 20 mg  Patient not taking: No sig reported  Historical Provider, MD   gabapentin (NEURONTIN) 300 MG capsule TAKE TWO CAPSULES BY MOUTH AT BEDTIME  CABRERA Oro CNP   ondansetron (ZOFRAN-ODT) 4 MG disintegrating tablet Take 1 tablet by mouth 3 times daily as needed for Nausea or Vomiting  Patient not taking: No sig reported  Amber Jearl Rubinstein, APRN - CNP   rOPINIRole (REQUIP) 1 MG tablet Take 1 tablet by mouth nightly  CABRERA Donnelly CNP   insulin lispro (HUMALOG) 100 UNIT/ML injection vial Total daily dose 30 units, use in insulin pump  Ricki Lopez MD       No Known Allergies    PHYSICAL EXAMINATION:    Constitutional: Patient is oriented to person, place, and time. No distress. HENT: mucus membranes - moist. No scleral icterus. Neck: Normal range of motion. No visible lymphadenopathy present. Pulmonary/Chest: Effort normal. No respiratory distress. Bilateral symmetrical chest rise. No audible additional breath sounds. Neurological: Grossly intact motor and sensory systems on limited exam  Skin: Skin is warm and dry. No rash noted. She is not diaphoretic. Psychiatric: She has a normal mood and affect. Her behavior is normal. Judgment and thought content normal.     Other pertinent observable physical exam findings - none    PET 23 Impression:    1.  Hypermetabolic mass at the distal pancreatic tail extending to the splenic hilum and inseparable from the adjacent left adrenal gland is concerning for primary pancreatic malignancy. 2. Hypermetabolic retrocaval lymph nodes consistent with jose metastasis. There are also mildly hypermetabolic enlarged posterior mediastinal nodes and left supraclavicular nodes consistent with additional jose metastasis. 3. Scattered lung nodules with mild FDG uptake are concerning for pulmonary metastasis. Some of theses are sub centimeter nodules  and are below PET resolution due to small size. 4. No definite FDG evidence of hepatic metastasis. Wedge shaped hypo attenuation in the right hepatic lobe does not correspond with abnormal uptake. Additional smaller hypodense focus seen on prior right hepatic lobe near the dome may be below PET resolution. Abdominal MRI without and with contrast would provide better characterization. This lesion was still not seen remote prior CT from 2014. EUS 1/13/23 Impression:    Moderate portal gastropathy and small gastric varices in the fundus. There was a 1cm pigmented nodule in the proximal body of the stomach on the greater curve biopsied  Extensive retroperitoneal lymphadenopathy adjacent to the tail of the pancreas. Biopsy performed. Splenic vein thrombosis. 1.25 x 1.18 cm hyperechoic liver mass    Cytology 1/13/23: melanoma      ASSESSMENT/PLAN:     Diagnosis Orders   1. Pancreatic mass        2. Splenic mass        3. Retroperitoneal lymphadenopathy        4. Liver mass          PET scan images personally interpreted - active disease sites noted. Based on disease extent, she is not a candidate for surgery. EUS biopsy showed melanoma. Discussed about management with immunotherapy and prognosis. I had a discussion with the patient and family regarding the risks, benefits, and alternatives of the immunotherapy. Discussed need for optimization of nutrition and physical activity in preparation for immunotherapy. Recommended she increase protein intake. Discussed with Dr. Wolf Alcantara and he wanted to have port placed.   Discussed about port placement in detail. All the complications were explained. Risks, benefits and alternatives of surgery explained to the patient. She is at higher risk for anesthesia due to overall deconditioned status and diabetes. All the questions of the patient are answered to her apparent satisfaction. Patient had multiple relevant questions and answered all of them. Patient verbalized understanding of the management plan. Daya Marin is a 57 y.o. female being evaluated by a Virtual Visit (video visit) encounter to address concerns as mentioned above. A caregiver was present when appropriate. Due to this being a TeleHealth encounter (During [facility] public health emergency), evaluation of the some organ systems was limited. This Virtual Visit was conducted with patient's (and/or legal guardian's) consent. The patient (and/or legal guardian) has also been advised to contact this office for worsening conditions or problems, and seek emergency medical treatment and/or call 911 if deemed necessary. Basil Yun MD  Surgery Attending      An electronic signature was used to authenticate this note.

## 2023-01-17 ENCOUNTER — TELEMEDICINE (OUTPATIENT)
Dept: SURGERY | Age: 63
End: 2023-01-17
Payer: COMMERCIAL

## 2023-01-17 ENCOUNTER — TELEPHONE (OUTPATIENT)
Dept: SURGERY | Age: 63
End: 2023-01-17

## 2023-01-17 DIAGNOSIS — K86.89 PANCREATIC MASS: Primary | ICD-10-CM

## 2023-01-17 DIAGNOSIS — R59.0 RETROPERITONEAL LYMPHADENOPATHY: ICD-10-CM

## 2023-01-17 DIAGNOSIS — R16.0 LIVER MASS: ICD-10-CM

## 2023-01-17 DIAGNOSIS — R16.1 SPLENIC MASS: ICD-10-CM

## 2023-01-17 PROCEDURE — 99214 OFFICE O/P EST MOD 30 MIN: CPT | Performed by: SURGERY

## 2023-01-17 NOTE — TELEPHONE ENCOUNTER
Pre-op Call     Pt aware of arrival and procedure time: Yes  Arrival time: 12:15     Surgical site and laterality confirmed: Yes    Reminded to be NPO after midnight: Yes  Pre-op H&P completed with PCP: N/A  Lymphoscintigram scheduled if needed:  NA  Pre-op labs completed (if needed):   NA  Blood thinning medications held for surgery: Reviewed, will hold anticoagulants pre-operatively and Not on any anticoagulation   Bowel prep reviewed: Yes, No, and Not needed   Has ride home from hospital: Yes     Instructed to call with any questions or concerns. Verbalized understanding.

## 2023-01-17 NOTE — LETTER
[facility]) Surgical Oncology and General Surgery  [address] [phone]  F [phone]   Joslyn Anne MD    Date of Surgery Friday January 20th     Time of Surgery 2:15 pm   Arrival Time: 12:15    Lymphoscintigram time: Not needed                                                       PATIENT NAME: Sienna Andrea OF BIRTH: 1960  SEX: female   PHONE: [phone] (home) [phone] (work)  PCP: CABRERA Hanna CNP                                            Procedure Name and CPT Code(s): Port placement: 32744    Diagnosis:     ICD-10-CM    1. Pancreatic mass  K86.89       2. Splenic mass  R16.1       3. Retroperitoneal lymphadenopathy  R59.0       4.  Liver mass  R16.0           Length of Procedure: 30 minutes       Anesthesia:  MAC     Patient Status: Outpatient     Pre-Op to be done by: PCP    Pt Position:  supine    Other needs: None    Labs Needed: None: None    Other orders: None    Medications to be stopped 5 days before surgery: None    No Known Allergies     Preop Antibiotics: Ancef 2g IV on call to OR    Bowel Prep: None     Joslyn Anne MD  12:54 PM

## 2023-01-18 ENCOUNTER — HOSPITAL ENCOUNTER (OUTPATIENT)
Age: 63
Setting detail: OUTPATIENT SURGERY
Discharge: HOME | End: 2023-01-18
Attending: SURGERY | Admitting: SURGERY
Payer: COMMERCIAL

## 2023-01-18 ENCOUNTER — ANESTHESIA EVENT (OUTPATIENT)
Dept: OPERATING ROOM | Age: 63
End: 2023-01-18
Payer: COMMERCIAL

## 2023-01-18 ENCOUNTER — ANESTHESIA (OUTPATIENT)
Dept: OPERATING ROOM | Age: 63
End: 2023-01-18
Payer: COMMERCIAL

## 2023-01-18 ENCOUNTER — APPOINTMENT (OUTPATIENT)
Dept: GENERAL RADIOLOGY | Age: 63
End: 2023-01-18
Attending: SURGERY
Payer: COMMERCIAL

## 2023-01-18 VITALS
RESPIRATION RATE: 14 BRPM | HEART RATE: 82 BPM | BODY MASS INDEX: 18.31 KG/M2 | TEMPERATURE: 97.6 F | OXYGEN SATURATION: 100 % | WEIGHT: 97 LBS | HEIGHT: 61 IN | SYSTOLIC BLOOD PRESSURE: 154 MMHG | DIASTOLIC BLOOD PRESSURE: 70 MMHG

## 2023-01-18 LAB
GLUCOSE BLD-MCNC: 185 MG/DL (ref 70–99)
GLUCOSE BLD-MCNC: 93 MG/DL (ref 70–99)
PERFORMED ON: ABNORMAL
PERFORMED ON: NORMAL

## 2023-01-18 PROCEDURE — 7100000010 HC PHASE II RECOVERY - FIRST 15 MIN: Performed by: SURGERY

## 2023-01-18 PROCEDURE — 2500000003 HC RX 250 WO HCPCS: Performed by: SURGERY

## 2023-01-18 PROCEDURE — C1788 PORT, INDWELLING, IMP: HCPCS | Performed by: SURGERY

## 2023-01-18 PROCEDURE — 2500000003 HC RX 250 WO HCPCS: Performed by: NURSE ANESTHETIST, CERTIFIED REGISTERED

## 2023-01-18 PROCEDURE — 3600000012 HC SURGERY LEVEL 2 ADDTL 15MIN: Performed by: SURGERY

## 2023-01-18 PROCEDURE — 71045 X-RAY EXAM CHEST 1 VIEW: CPT

## 2023-01-18 PROCEDURE — 2580000003 HC RX 258: Performed by: SURGERY

## 2023-01-18 PROCEDURE — A4217 STERILE WATER/SALINE, 500 ML: HCPCS | Performed by: SURGERY

## 2023-01-18 PROCEDURE — 6360000002 HC RX W HCPCS: Performed by: SURGERY

## 2023-01-18 PROCEDURE — 2580000003 HC RX 258: Performed by: ANESTHESIOLOGY

## 2023-01-18 PROCEDURE — 7100000011 HC PHASE II RECOVERY - ADDTL 15 MIN: Performed by: SURGERY

## 2023-01-18 PROCEDURE — 7100000001 HC PACU RECOVERY - ADDTL 15 MIN: Performed by: SURGERY

## 2023-01-18 PROCEDURE — 77001 FLUOROGUIDE FOR VEIN DEVICE: CPT

## 2023-01-18 PROCEDURE — 3600000002 HC SURGERY LEVEL 2 BASE: Performed by: SURGERY

## 2023-01-18 PROCEDURE — 3700000000 HC ANESTHESIA ATTENDED CARE: Performed by: SURGERY

## 2023-01-18 PROCEDURE — 3700000001 HC ADD 15 MINUTES (ANESTHESIA): Performed by: SURGERY

## 2023-01-18 PROCEDURE — 6360000002 HC RX W HCPCS: Performed by: NURSE ANESTHETIST, CERTIFIED REGISTERED

## 2023-01-18 PROCEDURE — 7100000000 HC PACU RECOVERY - FIRST 15 MIN: Performed by: SURGERY

## 2023-01-18 PROCEDURE — 2709999900 HC NON-CHARGEABLE SUPPLY: Performed by: SURGERY

## 2023-01-18 DEVICE — PORT INFUS 6FR PLAS PWR INJ ATTCH POLYUR CATH SIL FILL SUT: Type: IMPLANTABLE DEVICE | Status: FUNCTIONAL

## 2023-01-18 RX ORDER — DIPHENHYDRAMINE HYDROCHLORIDE 50 MG/ML
12.5 INJECTION INTRAMUSCULAR; INTRAVENOUS
Status: CANCELLED | OUTPATIENT
Start: 2023-01-18 | End: 2023-01-19

## 2023-01-18 RX ORDER — ONDANSETRON 2 MG/ML
4 INJECTION INTRAMUSCULAR; INTRAVENOUS
Status: CANCELLED | OUTPATIENT
Start: 2023-01-18 | End: 2023-01-19

## 2023-01-18 RX ORDER — BUPIVACAINE HYDROCHLORIDE 5 MG/ML
INJECTION, SOLUTION EPIDURAL; INTRACAUDAL PRN
Status: DISCONTINUED | OUTPATIENT
Start: 2023-01-18 | End: 2023-01-18 | Stop reason: HOSPADM

## 2023-01-18 RX ORDER — SODIUM CHLORIDE, SODIUM LACTATE, POTASSIUM CHLORIDE, CALCIUM CHLORIDE 600; 310; 30; 20 MG/100ML; MG/100ML; MG/100ML; MG/100ML
INJECTION, SOLUTION INTRAVENOUS CONTINUOUS
Status: DISCONTINUED | OUTPATIENT
Start: 2023-01-18 | End: 2023-01-18 | Stop reason: HOSPADM

## 2023-01-18 RX ORDER — HEPARIN SODIUM (PORCINE) LOCK FLUSH IV SOLN 100 UNIT/ML 100 UNIT/ML
SOLUTION INTRAVENOUS PRN
Status: DISCONTINUED | OUTPATIENT
Start: 2023-01-18 | End: 2023-01-18 | Stop reason: HOSPADM

## 2023-01-18 RX ORDER — SODIUM CHLORIDE 0.9 % (FLUSH) 0.9 %
5-40 SYRINGE (ML) INJECTION PRN
Status: CANCELLED | OUTPATIENT
Start: 2023-01-18

## 2023-01-18 RX ORDER — SODIUM CHLORIDE 0.9 % (FLUSH) 0.9 %
5-40 SYRINGE (ML) INJECTION EVERY 12 HOURS SCHEDULED
Status: CANCELLED | OUTPATIENT
Start: 2023-01-18

## 2023-01-18 RX ORDER — LIDOCAINE HYDROCHLORIDE 20 MG/ML
INJECTION, SOLUTION EPIDURAL; INFILTRATION; INTRACAUDAL; PERINEURAL PRN
Status: DISCONTINUED | OUTPATIENT
Start: 2023-01-18 | End: 2023-01-18 | Stop reason: SDUPTHER

## 2023-01-18 RX ORDER — PROPOFOL 10 MG/ML
INJECTION, EMULSION INTRAVENOUS CONTINUOUS PRN
Status: DISCONTINUED | OUTPATIENT
Start: 2023-01-18 | End: 2023-01-18 | Stop reason: SDUPTHER

## 2023-01-18 RX ORDER — PROPOFOL 10 MG/ML
INJECTION, EMULSION INTRAVENOUS PRN
Status: DISCONTINUED | OUTPATIENT
Start: 2023-01-18 | End: 2023-01-18 | Stop reason: SDUPTHER

## 2023-01-18 RX ORDER — FENTANYL CITRATE 50 UG/ML
INJECTION, SOLUTION INTRAMUSCULAR; INTRAVENOUS PRN
Status: DISCONTINUED | OUTPATIENT
Start: 2023-01-18 | End: 2023-01-18 | Stop reason: SDUPTHER

## 2023-01-18 RX ORDER — LIDOCAINE HYDROCHLORIDE 10 MG/ML
1 INJECTION, SOLUTION EPIDURAL; INFILTRATION; INTRACAUDAL; PERINEURAL
Status: DISCONTINUED | OUTPATIENT
Start: 2023-01-18 | End: 2023-01-18 | Stop reason: HOSPADM

## 2023-01-18 RX ORDER — MIDAZOLAM HYDROCHLORIDE 1 MG/ML
INJECTION INTRAMUSCULAR; INTRAVENOUS PRN
Status: DISCONTINUED | OUTPATIENT
Start: 2023-01-18 | End: 2023-01-18 | Stop reason: SDUPTHER

## 2023-01-18 RX ORDER — HYDRALAZINE HYDROCHLORIDE 20 MG/ML
10 INJECTION INTRAMUSCULAR; INTRAVENOUS
Status: CANCELLED | OUTPATIENT
Start: 2023-01-18

## 2023-01-18 RX ORDER — GLYCOPYRROLATE 0.2 MG/ML
INJECTION INTRAMUSCULAR; INTRAVENOUS PRN
Status: DISCONTINUED | OUTPATIENT
Start: 2023-01-18 | End: 2023-01-18 | Stop reason: SDUPTHER

## 2023-01-18 RX ORDER — PROCHLORPERAZINE EDISYLATE 5 MG/ML
5 INJECTION INTRAMUSCULAR; INTRAVENOUS
Status: CANCELLED | OUTPATIENT
Start: 2023-01-18 | End: 2023-01-19

## 2023-01-18 RX ORDER — MAGNESIUM HYDROXIDE 1200 MG/15ML
LIQUID ORAL CONTINUOUS PRN
Status: DISCONTINUED | OUTPATIENT
Start: 2023-01-18 | End: 2023-01-18 | Stop reason: HOSPADM

## 2023-01-18 RX ORDER — SODIUM CHLORIDE 9 MG/ML
INJECTION, SOLUTION INTRAVENOUS PRN
Status: CANCELLED | OUTPATIENT
Start: 2023-01-18

## 2023-01-18 RX ADMIN — SODIUM CHLORIDE, POTASSIUM CHLORIDE, SODIUM LACTATE AND CALCIUM CHLORIDE: 600; 310; 30; 20 INJECTION, SOLUTION INTRAVENOUS at 11:36

## 2023-01-18 RX ADMIN — GLYCOPYRROLATE 0.2 MG: 0.2 INJECTION INTRAMUSCULAR; INTRAVENOUS at 12:57

## 2023-01-18 RX ADMIN — FENTANYL CITRATE 50 MCG: 50 INJECTION, SOLUTION INTRAMUSCULAR; INTRAVENOUS at 13:00

## 2023-01-18 RX ADMIN — PROPOFOL 30 MG: 10 INJECTION, EMULSION INTRAVENOUS at 12:56

## 2023-01-18 RX ADMIN — LIDOCAINE HYDROCHLORIDE 100 MG: 20 INJECTION, SOLUTION EPIDURAL; INFILTRATION; INTRACAUDAL; PERINEURAL at 12:52

## 2023-01-18 RX ADMIN — PROPOFOL 130 MCG/KG/MIN: 10 INJECTION, EMULSION INTRAVENOUS at 12:58

## 2023-01-18 RX ADMIN — PROPOFOL 30 MG: 10 INJECTION, EMULSION INTRAVENOUS at 12:52

## 2023-01-18 RX ADMIN — PROPOFOL 120 MCG/KG/MIN: 10 INJECTION, EMULSION INTRAVENOUS at 12:52

## 2023-01-18 RX ADMIN — MIDAZOLAM HYDROCHLORIDE 1 MG: 2 INJECTION, SOLUTION INTRAMUSCULAR; INTRAVENOUS at 12:45

## 2023-01-18 ASSESSMENT — PAIN SCALES - GENERAL
PAINLEVEL_OUTOF10: 0

## 2023-01-18 ASSESSMENT — PAIN - FUNCTIONAL ASSESSMENT: PAIN_FUNCTIONAL_ASSESSMENT: 0-10

## 2023-01-18 ASSESSMENT — LIFESTYLE VARIABLES: SMOKING_STATUS: 0

## 2023-01-18 NOTE — ANESTHESIA PRE PROCEDURE
Department of Anesthesiology  Preprocedure Note       Name:  Bernardo Dang   Age:  57 y.o.  :  1960                                          MRN:  [id number]         Date:  2023      Surgeon: Neel Espino):  Siobhan Zhu MD    Procedure: Procedure(s):  PORT PLACEMENT    Medications prior to admission:   Prior to Admission medications    Medication Sig Start Date End Date Taking? Authorizing Provider   lisinopril (PRINIVIL;ZESTRIL) 5 MG tablet 5 mg  Patient not taking: No sig reported 10/20/22   Historical Provider, MD   omeprazole (PRILOSEC) 20 MG delayed release capsule 20 mg  Patient not taking: No sig reported 22   Historical Provider, MD   gabapentin (NEURONTIN) 300 MG capsule TAKE TWO CAPSULES BY MOUTH AT BEDTIME 12/23/22 3/23/23  [facility] CampusCABRERA CNP   ondansetron (ZOFRAN-ODT) 4 MG disintegrating tablet Take 1 tablet by mouth 3 times daily as needed for Nausea or Vomiting  Patient not taking: No sig reported 22   CABRERA Rubalcava CNP   rOPINIRole (REQUIP) 1 MG tablet Take 1 tablet by mouth nightly 22   [facility], APRN - CNP   insulin lispro (HUMALOG) 100 UNIT/ML injection vial Total daily dose 30 units, use in insulin pump 10/20/22   Niesha Donaldson MD       Current medications:    No current facility-administered medications for this encounter. Allergies:  No Known Allergies    Problem List:    Patient Active Problem List   Diagnosis Code    DKA (diabetic ketoacidoses) E11.10    Depression F32. A    Hyponatremia E87.1    Nausea & vomiting R11.2    Duodenal erosion K26.9    Rotator cuff tendinitis, right M75.81    Bilateral hand pain M79.641, M79.642    Left elbow pain M25.522    Type 1 diabetes mellitus ([address] E10.9    Uncontrolled type 1 diabetes mellitus with hyperglycemia (HCC) E10.65    Type 1 diabetes, uncontrolled, with neuropathy [id number]    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, with long-term current use of insulin [id number]    Stage 3 chronic kidney disease (HCC) N18.30    Type 1 diabetes, uncontrolled, with retinopathy CXH8879    Primary hypertension I10    Vitamin D deficiency E55.9    Mixed hyperlipidemia E78.2    Family history of thyroid disease Z80.51    Poorly controlled type 1 diabetes mellitus with retinopathy ([address] E10.319, E10.65    Diabetic nephropathy ([address] E11.21    Poorly 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.40, E10.65    Thyroid enlargement E04.9    Gastroesophageal reflux disease without esophagitis K21.9    Cataract, left eye H26.9    Chronic cough R05.3    Current severe episode of major depressive disorder without psychotic features without prior episode ([address] F32.2    Diabetic retinopathy ([address] E11.319    Insomnia G47.00    Insulin pump status Z96.41    Lateral epicondylitis of right elbow M77.11    Lumbar disc herniation with radiculopathy M51.16    Malignant melanoma of choroid (HCC) C69.30    Malignant neoplasm metastatic to liver (HCC) C78.7    Microalbuminuria R80.9    Pancreatic mass K86.89    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    Restless legs syndrome (RLS) G25.81       Past Medical History:        Diagnosis Date    Cancer ([address]     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    Depression     Diabetes mellitus ([address]     Hx of radiation therapy     melanoma right eye    Hypertension     Insulin pump in place     Prolonged emergence from general anesthesia     slow to wake up    Restless leg syndrome        Past Surgical History:        Procedure Laterality Date    CARPAL TUNNEL RELEASE Bilateral 12/2017    CHOLECYSTECTOMY      CT BIOPSY ABDOMEN RETROPERITONEUM  12/27/2022    CT BIOPSY ABDOMEN RETROPERITONEUM 12/27/2022 [facility] CT SCAN    EYE SURGERY Right     biopsy    HYSTERECTOMY (CERVIX STATUS UNKNOWN)      LIPOMA RESECTION      LIVER BIOPSY Right     SHOULDER ARTHROSCOPY Right 08/31/2018    RIGHT SHOULDER ARTHROSCOPE, SUBACROMIAL DECOMPRESSION, DISTALCLAVICLE EXCISION, CAPSULAR RELEASE, MANIPULATION UNDER ANESTHESIA, DEBRIDEMENT    SHOULDER SURGERY      UPPER GASTROINTESTINAL ENDOSCOPY  10/22/2014    UPPER GASTROINTESTINAL ENDOSCOPY N/A 2023    [facility]GD [facility]US [facility]  [facility]    EGD BIOPSY [facility] Graf MD at [facility]  2022    US GUIDED LIVER BIOPSY PERCUTANEOUS 2022 [address] ULTRASOUND       Social History:    Social History     Tobacco Use    Smoking status: Former     Packs/day: 1.00     Years: 10.00     Pack years: 10.00     Types: Cigarettes     Quit date: 1980     Years since quittin.1    Smokeless tobacco: Never   Substance Use Topics    Alcohol use: No                                Counseling given: Not Answered      Vital Signs (Current):   Vitals:    23 1044   BP: (!) 171/79   Pulse: 86   Resp: 15   Temp: 97.3 °F (36.3 °C)   TempSrc: Temporal   SpO2: 99%   Weight: 97 lb (44 kg)   Height: 5' 1\" (1.549 m)                                              BP Readings from Last 3 Encounters:   23 (!) 171/79   23 (!) 144/71   23 (!) 201/77       NPO Status: Time of last liquid consumption: 2300                        Time of last solid consumption: 2300                                                      BMI:   Wt Readings from Last 3 Encounters:   23 97 lb (44 kg)   23 98 lb 5.2 oz (44.6 kg)   23 108 lb (49 kg)     Body mass index is 18.33 kg/m².     CBC:   Lab Results   Component Value Date/Time    WBC 9.6 2023 04:03 PM    RBC 3.70 2023 04:03 PM    HGB 8.3 2023 04:03 PM    HCT 27.4 2023 04:03 PM    MCV 73.9 2023 04:03 PM    RDW 15.8 2023 04:03 PM     2023 04:03 PM       CMP:   Lab Results   Component Value Date/Time     2023 04:03 PM    K 3.9 2023 04:03 PM     01/06/2023 04:03 PM    CO2 22 01/06/2023 04:03 PM    BUN 20 01/06/2023 04:03 PM    CREATININE 1.3 01/06/2023 04:03 PM    GFRAA 39 09/19/2022 10:11 AM    GFRAA >60 05/29/2012 03:09 AM    AGRATIO 1.1 01/06/2023 04:03 PM    LABGLOM 46 01/06/2023 04:03 PM    GLUCOSE 196 01/06/2023 04:03 PM    PROT 7.5 01/06/2023 04:03 PM    PROT 7.8 05/25/2012 09:15 AM    CALCIUM 9.7 01/06/2023 04:03 PM    BILITOT 0.3 01/06/2023 04:03 PM    ALKPHOS 155 01/06/2023 04:03 PM    AST 16 01/06/2023 04:03 PM    ALT 9 01/06/2023 04:03 PM       POC Tests: No results for input(s): POCGLU, POCNA, POCK, POCCL, POCBUN, POCHEMO, POCHCT in the last 72 hours.     Coags:   Lab Results   Component Value Date/Time    PROTIME 14.7 12/16/2022 08:04 AM    INR 1.15 12/16/2022 08:04 AM    APTT 34.4 12/16/2022 08:04 AM       HCG (If Applicable):   Lab Results   Component Value Date    PREGTESTUR Neg 05/25/2012        ABGs:   Lab Results   Component Value Date/Time    PHART 7.291 10/19/2014 12:38 AM    PO2ART 93.1 10/19/2014 12:38 AM    CJD0BUB 32.7 10/19/2014 12:38 AM    YMZ1AEM 15.4 10/19/2014 12:38 AM    BEART -10.1 10/19/2014 12:38 AM    V4MGPEFF 96.9 10/19/2014 12:38 AM        Type & Screen (If Applicable):  No results found for: LABABO, LABRH    Drug/Infectious Status (If Applicable):  No results found for: HIV, HEPCAB    COVID-19 Screening (If Applicable):   Lab Results   Component Value Date/Time    COVID19 NOT DETECTED 08/10/2020 09:29 AM           Anesthesia Evaluation  Patient summary reviewed and Nursing notes reviewed no history of anesthetic complications:   Airway: Mallampati: II  TM distance: >3 FB   Neck ROM: full  Mouth opening: > = 3 FB   Dental: normal exam         Pulmonary: breath sounds clear to auscultation      (-) not a current smoker (quit 40 yrs ago )                           Cardiovascular:  Exercise tolerance: good (>4 METS),   (+) hypertension: moderate,     (-) past MI    NYHA Classification: II    Rhythm: regular  Rate: normal           Beta Blocker:  Not on Beta Blocker         Neuro/Psych:      (-) neuromuscular disease           GI/Hepatic/Renal:   (+) GERD: well controlled,           Endo/Other:    (+) Diabetes (insulin pump )Type I DM, well controlled, using insulin, malignancy/cancer (hx melanoma right eye  20 yrs ago  had radiation patch sewn to  now with recurrance  mets to pancrease/liver  / stomach  bx last week + for melanoma  for port / chemo ).                 Abdominal:             Vascular: negative vascular ROS.         Other Findings:           Anesthesia Plan      MAC     ASA 4       Induction: intravenous.    MIPS: Prophylactic antiemetics administered.  Anesthetic plan and risks discussed with patient.      Plan discussed with CRNA.    Attending anesthesiologist reviewed and agrees with Preprocedure content                Cesar Harvey, DO   1/18/2023

## 2023-01-18 NOTE — H&P
General Surgery   Resident History and Physical       Chief Complaint: malignant melanoma    History of Present Illness:    Janelle Contreras is a 57 y.o. female with Hx of malignant melanoma is here for scheduled port placement. No complains. Past Medical History:        Diagnosis Date    Cancer ([facility] 2002    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    Depression     Diabetes mellitus ([facility]     Hx of radiation therapy     melanoma right eye    Hypertension     Insulin pump in place     Melanoma ([facility] 01/13/2023    in stomach, pancreas    Prolonged emergence from general anesthesia     slow to wake up    Restless leg syndrome        Past Surgical History:           Procedure Laterality Date    CARPAL TUNNEL RELEASE Bilateral 12/2017    CHOLECYSTECTOMY      CT BIOPSY ABDOMEN RETROPERITONEUM  12/27/2022    CT BIOPSY ABDOMEN RETROPERITONEUM 12/27/2022 [facility] CT SCAN    EYE SURGERY Right     biopsy    HYSTERECTOMY (CERVIX STATUS UNKNOWN)      LIPOMA RESECTION      LIVER BIOPSY Right     SHOULDER ARTHROSCOPY Right 08/31/2018    RIGHT SHOULDER ARTHROSCOPE, SUBACROMIAL DECOMPRESSION, DISTALCLAVICLE EXCISION, CAPSULAR RELEASE, MANIPULATION UNDER ANESTHESIA, DEBRIDEMENT    SHOULDER SURGERY      UPPER GASTROINTESTINAL ENDOSCOPY  10/22/2014    UPPER GASTROINTESTINAL ENDOSCOPY N/A 1/13/2023    EGD W/EUS FNA performed by Billy Camacho MD at [facility]  1/13/2023    EGD BIOPSY performed by Billy Camacho MD at [address] [place] [address]  12/16/2022    US GUIDED LIVER BIOPSY PERCUTANEOUS 12/16/2022 [facility] ULTRASOUND       Allergies:  Patient has no known allergies. Medications:   Home Meds  No current facility-administered medications on file prior to encounter.      Current Outpatient Medications on File Prior to Encounter   Medication Sig Dispense Refill    lisinopril (PRINIVIL;ZESTRIL) 5 MG tablet 5 mg (Patient not taking: No sig reported)      omeprazole (PRILOSEC) 20 MG delayed release capsule 20 mg (Patient not taking: No sig reported)      gabapentin (NEURONTIN) 300 MG capsule TAKE TWO CAPSULES BY MOUTH AT BEDTIME 180 capsule 0    ondansetron (ZOFRAN-ODT) 4 MG disintegrating tablet Take 1 tablet by mouth 3 times daily as needed for Nausea or Vomiting (Patient not taking: No sig reported) 21 tablet 0    rOPINIRole (REQUIP) 1 MG tablet Take 1 tablet by mouth nightly 90 tablet 0    insulin lispro (HUMALOG) 100 UNIT/ML injection vial Total daily dose 30 units, use in insulin pump 40 mL 1       Current Meds  lidocaine PF 1 % injection 1 mL, Once PRN  lactated ringers infusion, Continuous        Family History:   Family History   Problem Relation Age of Onset    High Blood Pressure Mother     Breast Cancer Mother         breast w mets to bone    Diabetes Father     Stroke Father     Cancer Father         bladder       Social History:   TOBACCO:   reports that she quit smoking about 42 years ago. Her smoking use included cigarettes. She has a 10.00 pack-year smoking history. She has never used smokeless tobacco.  ETOH:   reports no history of alcohol use. DRUGS:   reports no history of drug use. ROS: A 14 point review of systems was conducted, significant findings as noted in HPI. All other systems negative. Physical exam:    Vitals:    01/18/23 1044   BP: (!) 171/79   Pulse: 86   Resp: 15   Temp: 97.3 °F (36.3 °C)   TempSrc: Temporal   SpO2: 99%   Weight: 97 lb (44 kg)   Height: 5' 1\" (1.549 m)       General appearance: alert, no acute distress, grooming appropriate  Eyes: PERRLA, no scleral icterus  Neck: trachea midline, no JVD  Chest/Lungs: normal effort on room air  Cardiovascular: RRR  Abdomen: soft, non-tender, non-distended,no guarding/rigidity  Skin: warm and dry, no rashes  Extremities: no edema, no cyanosis  Neuro: A&Ox3, no focal deficits, sensation intact    Labs:    CBC: No results for input(s): WBC, HGB, HCT, MCV, PLT in the last 72 hours.   BMP: No results for input(s): NA, K, CL, CO2, PHOS, BUN, CREATININE, CA in the last 72 hours. PT/INR: No results for input(s): PROTIME, INR in the last 72 hours. APTT: No results for input(s): APTT in the last 72 hours. Liver Profile:  Lab Results   Component Value Date/Time    AST 16 01/06/2023 04:03 PM    ALT 9 01/06/2023 04:03 PM    BILIDIR 0.2 10/23/2014 05:50 AM    BILITOT 0.3 01/06/2023 04:03 PM    ALKPHOS 155 01/06/2023 04:03 PM     Lab Results   Component Value Date/Time    CHOL 217 04/08/2022 06:21 AM     09/19/2022 10:11 AM     12/07/2011 07:50 AM    TRIG 118 04/08/2022 06:21 AM     UA:   Lab Results   Component Value Date/Time    NITRITE neg 12/05/2022 12:50 PM    COLORU yellow 12/05/2022 12:50 PM    COLORU Yellow 10/19/2014 02:50 AM    PHUR 6.0 12/05/2022 12:50 PM    PHUR 5.5 10/19/2014 02:50 AM    WBCUA None seen 05/25/2012 09:30 AM    RBCUA Rare 05/25/2012 09:30 AM    BACTERIA Rare 05/25/2012 09:30 AM    CLARITYU cloudy 12/05/2022 12:50 PM    CLARITYU Clear 10/19/2014 02:50 AM    SPECGRAV 1.020 12/05/2022 12:50 PM    SPECGRAV 1.020 10/19/2014 02:50 AM    LEUKOCYTESUR small 12/05/2022 12:50 PM    LEUKOCYTESUR Negative 10/19/2014 02:50 AM    UROBILINOGEN 0.2 10/19/2014 02:50 AM    BILIRUBINUR neg 12/05/2022 12:50 PM    BILIRUBINUR NEGATIVE 05/25/2012 09:30 AM    BLOODU small 12/05/2022 12:50 PM    BLOODU Negative 10/19/2014 02:50 AM    GLUCOSEU neg 12/05/2022 12:50 PM    GLUCOSEU >=1000 10/19/2014 02:50 AM    GLUCOSEU >=1000 05/25/2012 09:30 AM       Imaging:   No orders to display          Assessment/Plan: This is a 57 y.o. female with Hx of malignant melanoma is here for scheduled port placement. Risks, benefits, and alternatives were discussed and pt is consented for above procedure.      Kulwinder Dugan MD  General Surgery Surgery  01/18/23  12:24 PM  [phone]

## 2023-01-18 NOTE — DISCHARGE INSTRUCTIONS
Wound Care:   Skin glue was used to cover your incisions. It will peel off on its own in approximately 10 days. If glue does not peel off in 10 days, you may use petroleum jelly to remove. You may shower after surgery, but do not soak or scrub area of incision for 7-10 days.    Pain Medication:  Most patients that have had this procedure report that their post-operative pain was relieved by their preferred of over-the-counter medication alone. Use over-the-counter medications as your primary pain reliever.    Diet:   You may resume a regular diet.    Activity:  No vigorous activity immediately after surgery. You may return to your pre-operative level activity gradually as tolerated.    Return Precautions:  It is normal for the area around your incisions to be tender and slightly red for the first 24-48 hours after surgery.   If you notice increased redness, worsening pain, drainage from wound, fevers, or have any other concerns about your incision or post-operative course, however, call or return to the Emergency Department.     Follow-up:   Follow up with Dr. Corley in 3 months to discuss your pancreatic mass care.    [facility] AMBULATORY PROCEDURE DISCHARGE INSTRUCTIONS    There are potential side effects of anesthesia or sedation you may experience for the first 24 hours.  These side effects include:    Confusion or Memory loss, Dizziness, or Delayed Reaction Times   [x]A responsible person should be with you for the next 24 hours.  Do not operate any vehicles (automobiles, bicycles, motorcycles) or power tools or machinery for 24 hours.  Do not sign any legal documents or make any legal decisions for 24 hours. Do not drink alcohol for 24 hours or while taking narcotic pain medication.      Nausea    [x]Start with light diet and progress to your normal diet as you feel like eating. However, if you experience nausea or repeated episodes of vomiting which persist beyond 12-24 hours, notify your physician.  Once nausea has passed, remember to keep drinking fluids. Difficulty Passing Urine  [x]Drink extra amounts of fluid today. Notify your physician if you have not urinated within 8 hours after your procedure or you feel uncomfortable. Irritated Throat from a Breathing Tube  [x]Drink extra amounts of fluid today. Lozenges may help. Muscle Aches  [x]You may experience some generalized body aches as your muscles recover from medications used to relax them during surgery. These will gradually subside. MEDICATION INSTRUCTIONS:  [x]Prescription(S) x  0   sent with you. Use as directed. When taking pain medications, you may experience the side effect of dizziness or drowsiness. Do not drink alcohol or drive when taking these medications. []Prescription(S) x          Called to Pharmacy Name and location:    [x]Give the list of your medications to your primary care physician on your next visit. Keep your med list updated and carry it with in case of emergencies. [x] Narcotic pain medications can cause the side effect of significant constipation. You may want to add a stool softener to your postoperative medication schedule or speak to your surgeon on how best to manage this side effect. NARCOTIC SAFETY:  Your pain medicine is only for you to take. Safely store your medicines. Store pills up high and out of reach of children and pets. Ensure safety caps are snapped tightly  Keep track of how many pills you have left    Unused medication can be disposed of by taking them to a drop-off box or take-back program that is authorized by the [facility]. Access to a site near you can be found on the [facility] [employer] website ([facility].gov). If you have a CPAP machine, it is very important that you use it daily during all periods of sleep and daytime rest during your recovery at home. Surgery and Anesthesia place a significant amount of stress on your body.   Using your CPAP will help keep you safe and lessen the negative effects of that stress. FOLLOW-UP RECOVERY CARE:  [x]Call the office at  for follow-up appointment and problems    Watch for these possible complications, symptoms, or side effects of anesthesia. Call physician if they or any other problems occur:  Signs of INFECTION   > Fever over 101°     > Redness, swelling, hardness or warmth at the operative site   >Foul smelling or cloudy drainage at the operative site   Unrelieved PAIN  Unrelieved NAUSEA  Blood soaked dressing. (Some oozing may be normal)  Inability to urinate      Numb, pale, blue, cold or tingling extremity      Physician: The above instructions were reviewed with patient/significant other. The following additional patient specific information was reviewed with the patient/significant other:  []Procedure/physician specific instructions  []Medication information sheet(S) including potential side effects  []Antoniettas egress test  []Pain Ball management  []FAQ Catheter associated blood stream infections  []FAQ Surgical Site Infections  []Other-    I have read and understand the instructions given to me: ____________________________________________   (Patient/S.O. Signature)            Date/time 1/18/2023 2:51 PM         PACU:  [phone]   M-F 700 AM - 7 PM      SAME DAY SERVICES:  [phone] M-F 7AM-6PM        If you smoke STOP. We care about your health!

## 2023-01-18 NOTE — OP NOTE
Operative Note      Patient: Christine Kaur  YOB: 1960  MRN: [id number]    Date of Procedure: 1/18/2023    Pre-Op Diagnosis: Pancreatic mass [K86.89]  Splenic mass [R16.1]  Liver mass [R16.0]  Retroperitoneal lymphadenopathy [R59.0]    Post-Op Diagnosis: Same       Procedure(s):  RIGHT PORT PLACEMENT    Surgeon(s):  Charmayne Rusk, MD    Assistant:   Resident: Dawn Santillan MD    Anesthesia: Monitor Anesthesia Care    Estimated Blood Loss (mL): Minimal    Complications: None    Specimens:   * No specimens in log *    Implants:  * No implants in log *      Drains: * No LDAs found *    Findings: R subclavian vein port    Detailed Description of Procedure: Indication for procedure: metastatic melanoma, requiring access for chemotherapy    Description of procedure: The patient was brought to the operating room and placed in a supine position. The patient was then prepped and draped in sterile fashion. The left clavicular fossa was anesthetized with 1% lidocaine. After local anesthetic and sedation, the left subclavian vein was then entered easily with good return of venous blood. Guidewire was then positioned in the superior vena cava. This was confirmed using fluoroscopy. Following this a 4 cm pocket for the port was created on the anterior chest wall 3 fingerbreadths below the guidewire by anesthetizing the skin with 1% lidocaine. Following adequate local anesthetic, incision was then made using a 15-blade scalpel. Electrocautery was then used to deepen the incision and create the pocket for the port, leaving a small fatty layer above the port. Next, a 15-blade scalpel was used to extend the guidewire exit site to a 1 cm incision. The dilator and sheath were placed over the guidewire in the superior vena cava under fluoroscopic guidance. The dilator and wire were subsequently removed. The catheter was then threaded through the sheath into the superior vena cava.  This was confirmed using fluoroscopy. The catheter was cut to appropriate length; the port/catheter locking mechanism was placed onto the catheter and the catheter was secured to the port. The port was then positioned in the pocket and tethered to the anterior chest wall using 2 interrupted 2-0 ethibond sutures. The port was then accessed. Good return of venous blood was noted and flushed easily. Wounds were inspected. Good hemostasis was noted. Following this, the wounds were then closed using interrupted 3-0 Vicryl sutures in two layers. The wounds were then cleaned, dried and dressed with DermaFlex. The patient tolerated the procedure well and was taken to the recovery room in stable condition. Dr. Neva Parker was scrubbed and present for the entire case. Electronically signed by Neelam Huizar MD on 1/18/2023 at 1:42 PM    I was present for the entire procedure. Agree with the above note and changes made accordingly.      Zeke Gomez MD  Surgery Attending

## 2023-01-18 NOTE — PROGRESS NOTES
Ambulatory Surgery/Procedure Discharge Note    Vitals:    01/18/23 1516   BP: (!) 154/70   Pulse: 82   Resp: 14   Temp: 97.6 °F (36.4 °C)   SpO2: 100%   Adrete score criteria met for BP. In: 840 [P.O.:240; I.V.:600]  Out: -     Restroom use offered before discharge. Yes    Pain assessment:  level of pain (1-10, 10 severe),   Pain Level: 0    Pt and S.O./family states \"ready to go home\". Pt alert and oriented x4. IV removed. Denies N/V or pain. Dressing C, D & I. Discharge instructions given to pt and  with pt permission. Pt and  verbalized understanding of all instructions. Left with all belongings and discharge instructions. Patient discharged to home/self care.  Patient discharged via wheel chair by transporter to waiting family/S.O.       1/18/2023 3:43 PM

## 2023-01-18 NOTE — PROGRESS NOTES
Place patient label inside box (if no patient label, complete below)  Name:  :  MR#:     Quirino Speaks / PROCEDURE  I (we)Mian (Patient Name) authorize DR. Hugo Rangel (Provider / Jennifer Vazquez) and/or such assistants as may be selected by him/her, to perform the following operation/procedure(s): PORT PLACEMENT       Note: If unable to obtain consent prior to an emergent procedure, document the emergent reason in the medical record. This procedure has been explained to my (our) satisfaction and included in the explanation was: The intended benefit, nature, and extent of the procedure to be performed; The significant risks involved and the probability of success; Alternative procedures and methods of treatment; The dangers and probable consequences of such alternatives (including no procedure or treatment); The expected consequences of the procedure on my future health; Whether other qualified individuals would be performing important surgical tasks and/or whether  would be present to advise or support the procedure. I (we) understand that there are other risks of infection and other serious complications in the pre-operative/procedural and postoperative/procedural stages of my (our) care. I (we) have asked all of the questions which I (we) thought were important in deciding whether or not to undergo treatment or diagnosis. These questions have been answered to my (our) satisfaction. I (we) understand that no assurance can be given that the procedure will be a success, and no guarantee or warranty of success has been given to me (us). It has been explained to me (us) that during the course of the operation/procedure, unforeseen conditions may be revealed that necessitate extension of the original procedure(s) or different procedure(s) than those set forth in Paragraph 1.  I (we) authorize and request that the above-named physician, his/her assistants or his/her designees, perform procedures as necessary and desirable if deemed to be in my (our) best interest.     Revised 8/2/2021                                                                          Page 1 of 2       I acknowledge that health care personnel may be observing this procedure for the purpose of medical education or other specified purposes as may be necessary as requested and/or approved by my (our) physician. I (we) consent to the disposal by the hospital Pathologist of the removed tissue, parts or organs in accordance with hospital policy. I do ____ do not ____ consent to the use of a local infiltration pain blocking agent that will be used by my provider/surgical provider to help alleviate pain during my procedure. I do ____ do not ____ consent to an emergent blood transfusion in the case of a life-threatening situation that requires blood components to be administered. This consent is valid for 24 hours from the beginning of the procedure. This patient does ____ or does not ____ currently have a DNR status/order. If DNR order is in place, obtain Addendum to the Surgical Consent for ALL Patients with a DNR Order to address jhon-operative status for limited intervention or DNR suspension.      I have read and fully understand the above Consent for Operation/Procedure and that all blanks were completed before I signed the consent.   _____________________________       _____________________      ____/____am/pm  Signature of Patient or legal representative      Printed Name / Relationship            Date / Time   ____________________________       _____________________      ____/____am/pm  Witness to Signature                                    Printed Name                    Date / Time    If patient is unable to sign or is a minor, complete the following)  Patient is a minor, ____ years of age, or unable to sign because: ______________________________________________________________________________________________    If a phone consent is obtained, consent will be documented by using two health care professionals, each affirming that the consenting party has no questions and gives consent for the procedure discussed with the physician/provider.   _____________________          ____________________       _____/_____am/pm   2nd witness to phone consent        Printed name           Date / Time    Informed Consent:  I have provided the explanation described above in section 1 to the patient and/or legal representative.  I have provided the patient and/or legal representative with an opportunity to ask any questions about the proposed operation/procedure.   ___________________________          ____________________         ____/____am/pm  Provider / Proceduralist                            Printed name            Date / Time  Revised 8/2/2021                                                                      Page 2 of 2

## 2023-01-18 NOTE — ANESTHESIA POSTPROCEDURE EVALUATION
Department of Anesthesiology  Postprocedure Note    Patient: Pippa Betts  MRN: [id number]  YOB: 1960  Date of evaluation: 1/18/2023      Procedure Summary     Date: 01/18/23 Room / Location: [address] [place] [address]    Anesthesia Start: 1247 Anesthesia Stop: 8053    Procedure: RIGHT PORT PLACEMENT (Right) Diagnosis:       Pancreatic mass      Splenic mass      Liver mass      Retroperitoneal lymphadenopathy      (Pancreatic mass [K86.89])      (Splenic mass [R16.1])      (Liver mass [R16.0])      (Retroperitoneal lymphadenopathy [R59.0])    Surgeons: Marcin Yarbrough MD Responsible Provider: Shlomo Hager DO    Anesthesia Type: MAC ASA Status: 4          Anesthesia Type: No value filed.     Rosaura Phase I: Rosaura Score: 10    Rosaura Phase II:        Anesthesia Post Evaluation    Patient location during evaluation: PACU  Patient participation: complete - patient participated  Level of consciousness: awake and alert  Pain score: 0  Airway patency: patent  Nausea & Vomiting: no nausea and no vomiting  Complications: no  Cardiovascular status: hemodynamically stable  Respiratory status: acceptable  Hydration status: stable

## 2023-01-18 NOTE — PROGRESS NOTES
PACU Transfer to [facility]    Vitals:    01/18/23 1430   BP: (!) 142/64   Pulse: 81   Resp: 19   Temp: 98.2 °F (36.8 °C)   SpO2: 99%         Intake/Output Summary (Last 24 hours) at 1/18/2023 1446  Last data filed at 1/18/2023 1334  Gross per 24 hour   Intake 600 ml   Output --   Net 600 ml       Pain assessment:  none  Pain Level: 0    Patient transferred to care of [facility] RN.    1/18/2023 2:46 PM

## 2023-02-01 ENCOUNTER — HOSPITAL ENCOUNTER (OUTPATIENT)
Dept: MRI IMAGING | Age: 63
Discharge: HOME | End: 2023-02-01
Payer: COMMERCIAL

## 2023-02-01 DIAGNOSIS — D37.8: ICD-10-CM

## 2023-02-01 DIAGNOSIS — C43.9 MALIGNANT MELANOMA, UNSPECIFIED SITE (HCC): ICD-10-CM

## 2023-02-01 DIAGNOSIS — C77.2 SECONDARY MALIGNANT NEOPLASM OF INTRA-ABDOMINAL LYMPH NODES (HCC): ICD-10-CM

## 2023-02-01 DIAGNOSIS — C78.00 MALIGNANT NEOPLASM METASTATIC TO LUNG, UNSPECIFIED LATERALITY (HCC): ICD-10-CM

## 2023-02-01 PROCEDURE — 70553 MRI BRAIN STEM W/O & W/DYE: CPT

## 2023-02-01 PROCEDURE — 6360000004 HC RX CONTRAST MEDICATION: Performed by: INTERNAL MEDICINE

## 2023-02-01 PROCEDURE — A9579 GAD-BASE MR CONTRAST NOS,1ML: HCPCS | Performed by: INTERNAL MEDICINE

## 2023-02-01 RX ADMIN — GADOTERIDOL 8 ML: 279.3 INJECTION, SOLUTION INTRAVENOUS at 13:43

## 2023-02-19 PROBLEM — E10.21 DIABETIC NEPHROPATHY ASSOCIATED WITH TYPE 1 DIABETES MELLITUS (HCC): Status: ACTIVE | Noted: 2023-02-19

## 2023-02-19 PROBLEM — C43.9: Status: ACTIVE | Noted: 2023-02-19

## 2023-02-19 PROBLEM — C79.70: Status: ACTIVE | Noted: 2023-02-19

## 2023-02-19 PROBLEM — C79.72 MALIGNANT NEOPLASM METASTATIC TO LEFT ADRENAL GLAND (HCC): Status: ACTIVE | Noted: 2023-02-19

## 2023-02-20 ENCOUNTER — OFFICE VISIT (OUTPATIENT)
Dept: ENDOCRINOLOGY | Age: 63
End: 2023-02-20
Payer: COMMERCIAL

## 2023-02-20 VITALS
SYSTOLIC BLOOD PRESSURE: 189 MMHG | TEMPERATURE: 98 F | HEIGHT: 61 IN | OXYGEN SATURATION: 100 % | RESPIRATION RATE: 14 BRPM | DIASTOLIC BLOOD PRESSURE: 90 MMHG | WEIGHT: 92.8 LBS | HEART RATE: 90 BPM | BODY MASS INDEX: 17.52 KG/M2

## 2023-02-20 DIAGNOSIS — E04.9 THYROID ENLARGEMENT: ICD-10-CM

## 2023-02-20 DIAGNOSIS — E10.65 POORLY CONTROLLED TYPE 1 DIABETES MELLITUS WITH RETINOPATHY (HCC): ICD-10-CM

## 2023-02-20 DIAGNOSIS — Z83.49 FAMILY HISTORY OF THYROID DISEASE: ICD-10-CM

## 2023-02-20 DIAGNOSIS — E10.40 POORLY CONTROLLED TYPE 1 DIABETES MELLITUS WITH NEUROPATHY (HCC): Primary | ICD-10-CM

## 2023-02-20 DIAGNOSIS — C79.70: ICD-10-CM

## 2023-02-20 DIAGNOSIS — E10.65 POORLY CONTROLLED TYPE 1 DIABETES MELLITUS WITH NEUROPATHY (HCC): ICD-10-CM

## 2023-02-20 DIAGNOSIS — N18.30 STAGE 3 CHRONIC KIDNEY DISEASE, UNSPECIFIED WHETHER STAGE 3A OR 3B CKD (HCC): ICD-10-CM

## 2023-02-20 DIAGNOSIS — E10.319 POORLY CONTROLLED TYPE 1 DIABETES MELLITUS WITH RETINOPATHY (HCC): ICD-10-CM

## 2023-02-20 DIAGNOSIS — E78.2 MIXED HYPERLIPIDEMIA: ICD-10-CM

## 2023-02-20 DIAGNOSIS — E10.65 POORLY CONTROLLED TYPE 1 DIABETES MELLITUS WITH NEUROPATHY (HCC): Primary | ICD-10-CM

## 2023-02-20 DIAGNOSIS — E10.40 POORLY CONTROLLED TYPE 1 DIABETES MELLITUS WITH NEUROPATHY (HCC): ICD-10-CM

## 2023-02-20 DIAGNOSIS — C43.9: ICD-10-CM

## 2023-02-20 DIAGNOSIS — E55.9 VITAMIN D DEFICIENCY: ICD-10-CM

## 2023-02-20 DIAGNOSIS — I10 PRIMARY HYPERTENSION: ICD-10-CM

## 2023-02-20 DIAGNOSIS — E10.21 DIABETIC NEPHROPATHY ASSOCIATED WITH TYPE 1 DIABETES MELLITUS (HCC): ICD-10-CM

## 2023-02-20 LAB
A/G RATIO: 1.1 (ref 1.1–2.2)
ALBUMIN SERPL-MCNC: 3.6 G/DL (ref 3.4–5)
ALP BLD-CCNC: 157 U/L (ref 40–129)
ALT SERPL-CCNC: 8 U/L (ref 10–40)
ANION GAP SERPL CALCULATED.3IONS-SCNC: 15 MMOL/L (ref 3–16)
AST SERPL-CCNC: 21 U/L (ref 15–37)
BILIRUB SERPL-MCNC: <0.2 MG/DL (ref 0–1)
BUN BLDV-MCNC: 11 MG/DL (ref 7–20)
CALCIUM SERPL-MCNC: 9.7 MG/DL (ref 8.3–10.6)
CHLORIDE BLD-SCNC: 96 MMOL/L (ref 99–110)
CHOLESTEROL, FASTING: 172 MG/DL (ref 0–199)
CO2: 28 MMOL/L (ref 21–32)
CORTISOL - AM: 22.1 UG/DL (ref 4.3–22.4)
CREAT SERPL-MCNC: 1.5 MG/DL (ref 0.6–1.2)
CREATININE URINE: 344.7 MG/DL (ref 28–259)
ESTIMATED AVERAGE GLUCOSE: 159.9 MG/DL
GFR SERPL CREATININE-BSD FRML MDRD: 39 ML/MIN/{1.73_M2}
GLUCOSE BLD-MCNC: 103 MG/DL (ref 70–99)
HBA1C MFR BLD: 7.2 %
HBA1C MFR BLD: 7.2 %
HDLC SERPL-MCNC: 81 MG/DL (ref 40–60)
LDL CHOLESTEROL CALCULATED: 59 MG/DL
MICROALBUMIN UR-MCNC: 173.8 MG/DL
MICROALBUMIN/CREAT UR-RTO: 504.2 MG/G (ref 0–30)
POTASSIUM SERPL-SCNC: 4 MMOL/L (ref 3.5–5.1)
SODIUM BLD-SCNC: 139 MMOL/L (ref 136–145)
T3 FREE: 2.2 PG/ML (ref 2.3–4.2)
T4 FREE: 1.6 NG/DL (ref 0.9–1.8)
TOTAL PROTEIN: 7 G/DL (ref 6.4–8.2)
TRIGLYCERIDE, FASTING: 162 MG/DL (ref 0–150)
TSH SERPL DL<=0.05 MIU/L-ACNC: 1.86 UIU/ML (ref 0.27–4.2)
VITAMIN D 25-HYDROXY: 20.8 NG/ML
VLDLC SERPL CALC-MCNC: 32 MG/DL

## 2023-02-20 PROCEDURE — 99214 OFFICE O/P EST MOD 30 MIN: CPT | Performed by: INTERNAL MEDICINE

## 2023-02-20 PROCEDURE — 95251 CONT GLUC MNTR ANALYSIS I&R: CPT | Performed by: INTERNAL MEDICINE

## 2023-02-20 PROCEDURE — 3077F SYST BP >= 140 MM HG: CPT | Performed by: INTERNAL MEDICINE

## 2023-02-20 PROCEDURE — 3080F DIAST BP >= 90 MM HG: CPT | Performed by: INTERNAL MEDICINE

## 2023-02-20 PROCEDURE — 83036 HEMOGLOBIN GLYCOSYLATED A1C: CPT | Performed by: INTERNAL MEDICINE

## 2023-02-20 RX ORDER — OXYCODONE HYDROCHLORIDE 5 MG/1
TABLET ORAL
COMMUNITY
Start: 2023-02-17

## 2023-02-20 RX ORDER — ONDANSETRON HYDROCHLORIDE 8 MG/1
TABLET, FILM COATED ORAL
COMMUNITY
Start: 2023-01-26

## 2023-02-20 NOTE — PROGRESS NOTES
Gladis Phelps is a 57 y.o. female who is being evaluated for Type 1 diabetes mellitus. Current symptoms/problems include  hyperglycemia  and are worsening. 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 Comorbid conditions:  hypertension, Neuropathy, Nephropathy, Retinopathy, and Chronic Kidney Disease    Current diabetic medications include: Humalog  Marry 14 day  Medtronic 670G    Intolerance to diabetes medications: No     Weight trend: stable  Prior visit with dietician: yes  Current diet: on average, 3 meals per day  Current exercise: no     Current monitoring regimen: home blood tests - 4 times daily  Has brought blood glucose log/meter:  Yes  Home blood sugar records: fasting range: 100-120  and postprandial range: 300s   Any episodes of hypoglycemia? Yes  Hypoglycemia frequency and time(s):  once in 3 months  Does patient have Glucagon emergency kit? No  Does patient have rapid acting carbohydrate? Yes  Does patient wear a medic alert bracelet or necklace? No    2. Diabetic Nephropathy  Has increased urination  Lisinopril caused too low BP    3. Stage 3 chronic kidney disease, unspecified whether stage 3a or 3b CKD ([address]  No urination problems    4. Type 1 diabetes, poorly controlled, with retinopathy ([address]  Has blurry vision    5. Vitamin D deficiency  Has fatigue    6. Hyperlipidemia  No muscle pain. Has leg cramps. 7.  Hypertension, primary  Has headaches    8. Family history of thyroid disease  Mother and sisters had thyroid disease. 9. Thyroid enlargement  No difficulty swallowing, voice change  No radiation to her neck    10.   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 History of chronic pain, increasing discomfort with nausea. History of melanoma of the eye       COMPARISON: Abdominal ultrasound, 12/6/2022       TECHNIQUE: Axial CT imaging obtained from lung bases through pelvis. Images obtained, arterial phase, portal venous phase and delayed imaging. Axial images and multiplanar reformatted images are provided for review. Individualized dose optimization technique was used in order to meet ALARA standards for radiation dose reduction. In addition to vendor specific dose reduction algorithms, the    dose reduction techniques vary based on the specific scanner utilized but frequently include automated exposure control, adjustment of the mA and/or kV according to patient size, and use of iterative reconstruction technique. IV Contrast: 80 mL Isovue-370   Oral Contrast: None. FINDINGS:   LUNG BASES: Multiple noncalcified pulmonary nodules are present, pulmonary metastasis. In the right lower lobe, 18 x 12 mm nodule series 302 image 25   Anteromedially along the diaphragm nodule measures 8.3 x 8 mm image 25, right middle lobe subpleural nodule measures 5 mm       Left lower lobe 9 x 8 mm nodule image 37   Adjacent pulmonary nodule measures 7 x 6 mm. Rea Torres LIVER:    Wedge-shaped hypoattenuation right lobe of liver segment 6/7 series 306 image 26 with arterial phase imaging suggesting a preceding 11 x 12 mm lesion on image 62 of series 302. Subcapsular ill-defined lesion medial aspect of the right lobe segment 6/7 measuring 7 x 8 mm on image 26   Adjacent to the hepatic vein in the medial aspect of the right lower lobe, 5 mm lesion image 29   Inferomedially in the right lobe of 4 mm lesion on image 45   Hypoattenuating lesion on image 49 in the central portion of the inferior right lobe.    Inferior subcapsular lesion in the right lobe anteriorly on image 53 measures 18 x 4 mm. [facility] GALLBLADDER AND BILIARY TREE: Cholecystectomy clips  No intra- or extrahepatic biliary dilatation. PANCREATIC TUMOR TABLE:   *  Location (Right head/neck or Left body/tail of SMV):  Large mass in the tail the pancreas measures 5 x 5.5 cm image 83. *  Size: Coronal image series 605 image 59 measuring 5.6 x 3.8 cm   *  Enhancement/Attenuation in Pancreatic parenchyma (hypo/iso/hyper): Heterogeneous enhancing lesion similar to the spleen. .   *  Confined to pancreas with clear fat plane: Loss of the fat planes of the peripancreatic fat, spleen and adrenal glands. *  Pancreatic Duct Cut off with or without dilatation:  None. *  Biliary Duct Cut off with or without dilatation:  None. *  Remaining pancreas (yes/no , state of pancreatic duct): Uncinate process, head body and neck of the pancreas are normal.   *  Adenopathy (celiac, periportal, para aortic etc):   Extensive retroperitoneal lymphadenopathy on the left with nodes measuring 19 x 14 mm image 107 of series 302 and large retroperitoneal jose complex measuring 3.6 x 20 mm to the left of the aorta on    image 122. *  Metastatic disease:  Left adrenal: Poorly defined with soft tissue infiltration measuring 27 x 24 mm. Liver and pulmonary metastasis as described. *  Splenic contour lesion with enhancing lesion in the midportion spleen, subcapsular nodule measuring 15 x 22 mm on image 79   *  Ascites/peripancreatic fluid:  None. VASCULAR INVOLVEMENT and DEGREE:  (<180 or >180)    (Abutment or encasement) (narrowing, irregularity, tethering hazy attenuation/Stranding contact)   *  Celiac Involvement:  Normal.   *  SMA Involvement:  Normal perivascular fat. *  Portal Vein Involvement:  Patent portal vein. *  Superior mesenteric vein:  Normal superior mesenteric vein.    *  Splenic vein:  There is enhancing soft tissue within the splenic vein on arterial image 77 through image 80 of series 302 with filling defect on venous phase imaging measuring 14 x 6 mm series 306 image 33, tumor thrombus . 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 *  Distance to SMV: 30 mm   *  Aberrant anatomy ( replaced or accessory):  None. *  Atherosclerosis origins of celiac axis/SMA:  None. ADRENAL GLANDS: Normal right adrenal gland. KIDNEYS AND URETERS: No hydronephrosis. No urolithiasis. URINARY BLADDER: Normal.       REPRODUCTIVE ORGANS: Uterus is been removed. No adnexal masses       BOWEL: No dilatation. Normal appendix. There is thickening and prominence of the ligament of Treitz with anterior rightward orientation, partial malrotation. No obstructive fluid levels. Edematous changes of the proximal jejunum are noted. ABDOMINAL WALL: Normal.   BONES: No destructive process. Impression   1. Mass involving the tail the pancreas and splenic hilus with retroperitoneal lymphadenopathy and splenic vein tumor thrombus, concordant with pancreatic neoplasm   2.  Metastatic disease of the lung, liver, left adrenal gland and spleen       Past Medical History:   Diagnosis Date    Cancer ([address] 2002    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    Depression     Diabetes mellitus ([address]     Hx of radiation therapy     melanoma right eye    Hypertension     Insulin pump in place     Melanoma ([address] 01/13/2023    in stomach, pancreas    Prolonged emergence from general anesthesia     slow to wake up    Restless leg syndrome       Patient Active Problem List   Diagnosis    DKA (diabetic ketoacidoses)    Depression    Hyponatremia    Nausea & vomiting    Duodenal erosion    Rotator cuff tendinitis, right    Bilateral hand pain    Left elbow pain    Type 1 diabetes mellitus ([address]    Uncontrolled type 1 diabetes mellitus with hyperglycemia (HCC)    Type 1 diabetes, uncontrolled, with neuropathy    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, with long-term current use of insulin    Stage 3 chronic kidney disease (HCC)    Type 1 diabetes, uncontrolled, with retinopathy    Primary hypertension    Vitamin D deficiency    Mixed hyperlipidemia    Family history of thyroid disease    Poorly controlled type 1 diabetes mellitus with retinopathy ([address]    Diabetic nephropathy ([address]    Poorly controlled type 1 diabetes mellitus with neuropathy (HCC)    Thyroid enlargement    Gastroesophageal reflux disease without esophagitis    Cataract, left eye    Chronic cough    Current severe episode of major depressive disorder without psychotic features without prior episode (HCC)    Diabetic retinopathy (HCC)    Insomnia    Insulin pump status    Lateral epicondylitis of right elbow    Lumbar disc herniation with radiculopathy    Malignant melanoma of choroid ([address]    Malignant neoplasm metastatic to liver (HCC)    Microalbuminuria    Pancreatic mass    Post-cholecystectomy syndrome    Restless legs syndrome (RLS)    Diabetic nephropathy associated with type 1 diabetes mellitus ([address]    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 [facility])     Past Surgical History:   Procedure Laterality Date    CARPAL TUNNEL RELEASE Bilateral 12/2017    CHOLECYSTECTOMY      CT BIOPSY ABDOMEN RETROPERITONEUM  12/27/2022    CT BIOPSY ABDOMEN RETROPERITONEUM 12/27/2022 [facility] CT SCAN    EYE SURGERY Right     biopsy    HYSTERECTOMY (CERVIX STATUS UNKNOWN)      LIPOMA RESECTION      LIVER BIOPSY Right     PORT SURGERY Right 1/18/2023    RIGHT PORT PLACEMENT performed by Manuel Corcoran MD at [facility] ARTHROSCOPY Right 08/31/2018    RIGHT SHOULDER ARTHROSCOPE, SUBACROMIAL DECOMPRESSION, DISTALCLAVICLE EXCISION, CAPSULAR RELEASE, MANIPULATION UNDER ANESTHESIA, DEBRIDEMENT    SHOULDER SURGERY      UPPER GASTROINTESTINAL ENDOSCOPY  10/22/2014    UPPER GASTROINTESTINAL ENDOSCOPY N/A 1/13/2023    EGD W/EUS FNA performed by Yuniel John MD at [facility] ENDOSCOPY    UPPER GASTROINTESTINAL ENDOSCOPY  2023    EGD BIOPSY performed by Angel Luis Arias MD at [address]  2022    US GUIDED LIVER BIOPSY PERCUTANEOUS 2022 [address] ULTRASOUND     Social History     Socioeconomic History    Marital status:      Spouse name: Not on file    Number of children: Not on file    Years of education: Not on file    Highest education level: Not on file   Occupational History    Not on file   Tobacco Use    Smoking status: Former     Packs/day: 1.00     Years: 10.00     Pack years: 10.00     Types: Cigarettes     Quit date: 1980     Years since quittin.2    Smokeless tobacco: Never   Vaping Use    Vaping Use: Never used   Substance and Sexual Activity    Alcohol use: No    Drug use: No    Sexual activity: Not on file   Other Topics Concern    Not on file   Social History Narrative    Not on file     Social Determinants of Health     Financial Resource Strain: Low Risk     Difficulty of Paying Living Expenses: Not hard at all   Food Insecurity: No Food Insecurity    Worried About Running Out of Food in the Last Year: Never true    Ran Out of Food in the Last Year: Never true   Transportation Needs: Not on file   Physical Activity: Not on file   Stress: Not on file   Social Connections: Not on file   Intimate Partner Violence: Not on file   Housing Stability: Not on file     Family History   Problem Relation Age of Onset    High Blood Pressure Mother     Breast Cancer Mother         breast w mets to bone    Diabetes Father     Stroke Father     Cancer Father         bladder     Current Outpatient Medications   Medication Sig Dispense Refill    oxyCODONE (ROXICODONE) 5 MG immediate release tablet TAKE 1 TABLET BY MOUTH EVERY 4 TO 6 HOURS AS NEEDED FOR PAIN      ondansetron (ZOFRAN) 8 MG tablet TAKE 1 TABLET BY MOUTH EVERY 8 HOURS AS NEEDED FOR NAUSEA OR VOMITING      insulin lispro (HUMALOG) 100 UNIT/ML injection vial Total daily dose 30 units, use in insulin pump 30 mL 3    gabapentin (NEURONTIN) 300 MG capsule TAKE TWO CAPSULES BY MOUTH AT BEDTIME 180 capsule 0    rOPINIRole (REQUIP) 1 MG tablet Take 1 tablet by mouth nightly 90 tablet 0    lisinopril (PRINIVIL;ZESTRIL) 5 MG tablet 5 mg (Patient not taking: No sig reported)      omeprazole (PRILOSEC) 20 MG delayed release capsule 20 mg (Patient not taking: No sig reported)      ondansetron (ZOFRAN-ODT) 4 MG disintegrating tablet Take 1 tablet by mouth 3 times daily as needed for Nausea or Vomiting (Patient not taking: No sig reported) 21 tablet 0     No current facility-administered medications for this visit.      No Known Allergies  Family Status   Relation Name Status    Mother      Father         Lab Review:    Lab Results   Component Value Date/Time    WBC 9.6 2023 04:03 PM    HGB 8.3 2023 04:03 PM    HCT 27.4 2023 04:03 PM    MCV 73.9 2023 04:03 PM     2023 04:03 PM     Lab Results   Component Value Date/Time     2023 04:03 PM    K 3.9 2023 04:03 PM     2023 04:03 PM    CO2 22 2023 04:03 PM    BUN 20 2023 04:03 PM    CREATININE 1.3 2023 04:03 PM    GLUCOSE 196 2023 04:03 PM    CALCIUM 9.7 2023 04:03 PM    PROT 7.5 2023 04:03 PM    PROT 7.8 2012 09:15 AM    LABALBU 4.0 2023 04:03 PM    BILITOT 0.3 2023 04:03 PM    ALKPHOS 155 2023 04:03 PM    AST 16 2023 04:03 PM    ALT 9 2023 04:03 PM    LABGLOM 46 2023 04:03 PM    GFRAA 39 2022 10:11 AM    GFRAA >60 2012 03:09 AM    AGRATIO 1.1 2023 04:03 PM     Lab Results   Component Value Date/Time    TSH 2.11 2022 10:11 AM    FT3 2.1 2022 10:11 AM     Lab Results   Component Value Date/Time    LABA1C 10.3 2022 10:11 AM     Lab Results   Component Value Date/Time    .9 2022 10:11 AM     Lab Results   Component Value Date/Time    CHOL 217 04/08/2022 06:21 AM     Lab Results   Component Value Date/Time    TRIG 118 04/08/2022 06:21 AM     Lab Results   Component Value Date/Time     09/19/2022 10:11 AM     12/07/2011 07:50 AM     Lab Results   Component Value Date/Time    LDLCALC 75 09/19/2022 10:11 AM     Lab Results   Component Value Date/Time    LABVLDL 40 09/19/2022 10:11 AM     No results found for: CHOLHDLRATIO  Lab Results   Component Value Date/Time    LABMICR 24.80 09/19/2022 10:09 AM    LABMICR Not Indicated 10/19/2014 02:50 AM     Lab Results   Component Value Date/Time    VITD25 27.0 09/19/2022 10:11 AM        Review of Systems:  Constitutional: has fatigue, no fever, no recent weight gain, no recent weight loss, no changes in appetite  Eyes: no eye pain, has change in vision, no eye redness, no eye irritation, no double vision  Ears, nose, throat: no nasal congestion, no sore throat, no earache, no decrease in hearing, no hoarseness, no dry mouth, no sinus problems, no difficulty swallowing, no neck lumps, no dental problems, no mouth sores, no ringing in ears  Pulmonary: no shortness of breath, no wheezing, no dyspnea on exertion, no cough  Cardiovascular: no chest pain, no lower extremity edema, no orthopnea, no intermittent leg claudication, no palpitations  Gastrointestinal: has abdominal pain, has nausea, has vomiting, has diarrhea, no constipation, no dysphagia, no heartburn, no bloating  Genitourinary: no dysuria, no urinary incontinence, no urinary hesitancy, no urinary frequency, no feelings of urinary urgency, no nocturia  Musculoskeletal: no joint swelling, no joint stiffness, no joint pain, has muscle cramps, no muscle pain  Integument/Breast: no hair loss, no skin rashes, no skin lesions, no itching, has dry skin  Neurological: has numbness left leg, no tingling, no weakness, no confusion, has headaches, has dizziness, no fainting, no tremors, no decrease in memory, no balance problems  Psychiatric: no anxiety, no depression, no insomnia  Hematologic/Lymphatic: no tendency for easy bleeding, no swollen lymph nodes, no tendency for easy bruising  Immunology: no seasonal allergies, no frequent infections, no frequent illnesses  Endocrine: no temperature intolerance    BP (!) 189/90   Pulse 90   Temp 98 °F (36.7 °C)   Resp 14   Ht 5' 1\" (1.549 m)   Wt 92 lb 12.8 oz (42.1 kg)   LMP  (LMP Unknown) Comment: had a hysterectomy a long time ago  SpO2 100%   BMI 17.53 kg/m²    Wt Readings from Last 3 Encounters:   02/20/23 92 lb 12.8 oz (42.1 kg)   01/18/23 97 lb (44 kg)   01/13/23 98 lb 5.2 oz (44.6 kg)     Body mass index is 17.53 kg/m².       OBJECTIVE:  Constitutional: no acute distress, well appearing and well nourished  Psychiatric: oriented to person, place and time, judgement and insight and normal, recent and remote memory and intact and mood and affect are normal  Skin: skin and subcutaneous tissue is normal without mass, normal turgor  Head and Face: examination of head and face revealed no abnormalities  Eyes: no lid or conjunctival swelling, erythema or discharge, pupils are normal, equal, round, reactive to light  Ears/Nose: external inspection of ears and nose revealed no abnormalities, hearing is grossly normal  Oropharynx/Mouth/Face: lips, tongue and gums are normal with no lesions, the voice quality was normal  Neck: neck is supple and symmetric, with midline trachea and no masses, thyroid is normal  Lymphatics: normal cervical lymph nodes, normal supraclavicular nodes  Pulmonary: no increased work of breathing or signs of respiratory distress, lungs are clear to auscultation  Cardiovascular: normal heart rate and rhythm, normal S1 and S2, no murmurs and pedal pulses and 2+ bilaterally, No edema  Abdomen: abdomen is soft, non-tender with no masses  Musculoskeletal: normal gait and station and exam of the digits and nails are normal  Neurological: normal coordination and normal general cortical function    Visual inspection:  Deformity/amputation: absent  Skin lesions/pre-ulcerative calluses: present - calluses  Edema: right- negative, left- negative    Sensory exam:  Monofilament sensation: normal  (minimum of 5 random plantar locations tested, avoiding callused areas - > 1 area with absence of sensation is + for neuropathy)    Plus at least one of the following:  Pulses: normal  Propriocssssssssssssssssssssssssssssssssssssssssssssssssssssssssssssssssssssssssssssssssssssssssssssssssseption: Intact  Vibration (128 Hz): Impaired    ASSESSMENT/PLAN:  1. Type 1 diabetes, poorly controlled, with neuropathy (HCC)  Continue Omnipod/Dexcom   Continue Humalog  HbA1C 7.2  Patient has very fluctuating blood glucose levels, hypoglycemic episodes, severe hyperglycemia. Counseled patient on diabetes complications. Comply with diabetes regimen. - Comprehensive Metabolic Panel; Future  - Hemoglobin A1C; Future  - Lipid, Fasting; Future  - Microalbumin / Creatinine Urine Ratio; Future    2. Diabetic Nephropathy  - Hemoglobin A1C; Future  - Microalbumin/creatinine ratio    3. Stage 3 chronic kidney disease, unspecified whether stage 3a or 3b CKD ([address]  Could not take Lisinopril because of low BP. Creatinine 1.6-1.2-1.3  GFR 32-50-46  Continue monitoring  - Comprehensive Metabolic Panel; Future    4. Type 1 diabetes, poorly controlled, with retinopathy [facility])  Ophthalmology follow-up  - Hemoglobin A1C; Future    5. Primary hypertension  High blood pressure this appointment  Follow-up with family doctor  Low-salt diet  - Comprehensive Metabolic Panel; Future    6. Vitamin D deficiency  25OH vitamin D 27  Start vitamin D 2000 IU daily  - Comprehensive Metabolic Panel; Future  - Vitamin D 25 Hydroxy; Future    7. Mixed hyperlipidemia  LDL 80-75  -106  -200  Low-fat low-cholesterol diet  - T4, Free; Future  - T3, Free; Future  - TSH; Future    8.   Family history of thyroid disease  Mother, daughter and sisters have hypothyroidism  Father had diabetes  Obtain thyroid sonogram    9. Thyroid enlargement  TSH 2.11  No difficulty swallowing, voice change  No radiation to her neck    10. 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)  Left adrenal metastasis on CT scan  Primary: Malignant melanoma        Reviewed and/or ordered clinical lab results Yes  Reviewed and/or ordered radiology tests Yes  Reviewed and/or ordered other diagnostic tests No  Discussed test results with performing physician No  Independently reviewed image, tracing, or specimen yes, see scanned document  Made a decision to obtain old records No  Reviewed and summarized old records Yes  HbA1C 9.1  Creatinine 1.5  GFR 39  TSH 2.45  Microalbumin creatinine 403  25OHvitamin D 14.1  Obtained history from other than patient No    Eduar Slaughter was counseled regarding symptoms of diabetes, hyperlipidemia, hypertension, thyroid disease diagnosis, course and complications of disease if inadequately treated, side effects of medications, diagnosis, treatment options, and prognosis, risks, benefits, complications, and alternatives of treatment, labs, imaging and other studies and treatment targets and goals. She understands instructions and counseling. These diagnosis were discussed and reviewed with the patient including the advantages of drug therapy. She was counseled at this visit on the following: diabetes complication prevention and foot care. CGMS Download Review and Recommendations  See scanned document for blood glucose tracing documentation  Dexcom personal CGMS data downloaded and reviewed.   This was  service provided -CGM interpretation    Average glucose 248± 113 SD  Time in range: 33%  Time above 180: 65%  Time under 70:21%   GMI 9.2 %    Basal pattern review: Basal hyperglycemia  Postprandial pattern review: Postprandial hyperglycemia  Hypoglycemia review: Hypoglycemia at night and late evening  Activity related review: Not recorded      Based on the data, I recommend:    1. She does not bolus for meals    2. Start timely boluses for meals    3. Count carbohydrates    4. Portion control      Return in about 3 months (around 5/20/2023) for diabetes.     Electronically signed by Aida Leger MD on 2/20/2023 at 7:46 AM

## 2023-02-23 LAB — ADRENOCORTICOTROPIC HORMONE: 25 PG/ML (ref 6–58)

## 2023-02-25 ENCOUNTER — TELEPHONE (OUTPATIENT)
Dept: ENDOCRINOLOGY | Age: 63
End: 2023-02-25

## 2023-02-26 NOTE — TELEPHONE ENCOUNTER
Please inform patient:  Protein in the urine is elevated, 504. 2. Creatinine is worse, GFR 39. Recommended nephrology referral.  Patient is already on lisinopril. Noted that patient was referred to Dr. Tammy Gleason. Cortisol in the blood is normal.  Thyroid test is good overall. Hemoglobin A1c 7.2  Bad cholesterol was good, 59. Triglycerides improved and was 162. Close to normal.  Vitamin D was still very low, 20.8. Is she taking any supplements? Reviewed pump download. Same data as on the CGM. She gives herself insulin boluses when blood glucose is already high for a while. She needs to give boluses before meals or as soon as she starts eating to avoid severe hyperglycemia related to meals.

## 2023-03-11 DIAGNOSIS — E10.40 POORLY CONTROLLED TYPE 1 DIABETES MELLITUS WITH NEUROPATHY (HCC): ICD-10-CM

## 2023-03-11 DIAGNOSIS — G25.81 RESTLESS LEG SYNDROME: ICD-10-CM

## 2023-03-11 DIAGNOSIS — E10.65 POORLY CONTROLLED TYPE 1 DIABETES MELLITUS WITH NEUROPATHY (HCC): ICD-10-CM

## 2023-03-13 RX ORDER — INSULIN PMP CART,AUT,G6/7,CNTR
EACH SUBCUTANEOUS
Qty: 30 EACH | Refills: 0 | Status: SHIPPED | OUTPATIENT
Start: 2023-03-13 | End: 2023-04-04 | Stop reason: SDUPTHER

## 2023-03-13 RX ORDER — ROPINIROLE 1 MG/1
TABLET, FILM COATED ORAL
Qty: 90 TABLET | Refills: 0 | Status: SHIPPED | OUTPATIENT
Start: 2023-03-13

## 2023-03-13 RX ORDER — INSULIN LISPRO 100 [IU]/ML
INJECTION, SOLUTION INTRAVENOUS; SUBCUTANEOUS
Qty: 40 ML | Refills: 0 | Status: SHIPPED | OUTPATIENT
Start: 2023-03-13

## 2023-03-13 RX ORDER — PROCHLORPERAZINE 25 MG/1
SUPPOSITORY RECTAL
Qty: 9 EACH | Refills: 0 | Status: SHIPPED | OUTPATIENT
Start: 2023-03-13

## 2023-03-13 NOTE — TELEPHONE ENCOUNTER
Medication:   Requested Prescriptions     Pending Prescriptions Disp Refills    rOPINIRole (REQUIP) 1 MG tablet [Pharmacy Med Name: ROPINIROLE HCL 1MG TABS] 90 tablet 0     Sig: TAKE ONE TABLET BY MOUTH NIGHTLY        Last Filled:  11/28/22    Patient Phone Number: [phone] (home)     Last appt: 11/28/2022   Next appt: Visit date not found    Last OARRS: No flowsheet data found.

## 2023-04-01 ENCOUNTER — APPOINTMENT (OUTPATIENT)
Dept: CT IMAGING | Age: 63
DRG: 683 | End: 2023-04-01
Payer: COMMERCIAL

## 2023-04-01 ENCOUNTER — HOSPITAL ENCOUNTER (INPATIENT)
Age: 63
LOS: 1 days | Discharge: HOME | DRG: 683 | End: 2023-04-02
Attending: EMERGENCY MEDICINE | Admitting: INTERNAL MEDICINE
Payer: COMMERCIAL

## 2023-04-01 DIAGNOSIS — N17.9 AKI (ACUTE KIDNEY INJURY) (HCC): ICD-10-CM

## 2023-04-01 DIAGNOSIS — I16.0 HYPERTENSIVE URGENCY: Primary | ICD-10-CM

## 2023-04-01 DIAGNOSIS — E87.6 HYPOKALEMIA: ICD-10-CM

## 2023-04-01 LAB
ANION GAP SERPL CALCULATED.3IONS-SCNC: 12 MMOL/L (ref 3–16)
ANION GAP SERPL CALCULATED.3IONS-SCNC: 22 MMOL/L (ref 3–16)
BASOPHILS # BLD: 0.1 K/UL (ref 0–0.2)
BASOPHILS # BLD: 0.1 K/UL (ref 0–0.2)
BASOPHILS NFR BLD: 1 %
BASOPHILS NFR BLD: 1.2 %
BUN SERPL-MCNC: 32 MG/DL (ref 7–20)
BUN SERPL-MCNC: 36 MG/DL (ref 7–20)
CALCIUM SERPL-MCNC: 8.3 MG/DL (ref 8.3–10.6)
CALCIUM SERPL-MCNC: 9.7 MG/DL (ref 8.3–10.6)
CHLORIDE SERPL-SCNC: 102 MMOL/L (ref 99–110)
CHLORIDE SERPL-SCNC: 93 MMOL/L (ref 99–110)
CO2 SERPL-SCNC: 15 MMOL/L (ref 21–32)
CO2 SERPL-SCNC: 17 MMOL/L (ref 21–32)
CREAT SERPL-MCNC: 1.9 MG/DL (ref 0.6–1.2)
CREAT SERPL-MCNC: 2.2 MG/DL (ref 0.6–1.2)
DEPRECATED RDW RBC AUTO: 19.8 % (ref 12.4–15.4)
DEPRECATED RDW RBC AUTO: 20.3 % (ref 12.4–15.4)
EKG ATRIAL RATE: 71 BPM
EKG DIAGNOSIS: NORMAL
EKG P AXIS: 66 DEGREES
EKG P-R INTERVAL: 118 MS
EKG Q-T INTERVAL: 420 MS
EKG QRS DURATION: 86 MS
EKG QTC CALCULATION (BAZETT): 457 MS
EKG R AXIS: 53 DEGREES
EKG T AXIS: 61 DEGREES
EKG VENTRICULAR RATE: 71 BPM
EOSINOPHIL # BLD: 0.1 K/UL (ref 0–0.6)
EOSINOPHIL # BLD: 0.3 K/UL (ref 0–0.6)
EOSINOPHIL NFR BLD: 0.9 %
EOSINOPHIL NFR BLD: 2.9 %
FLUAV RNA UPPER RESP QL NAA+PROBE: NEGATIVE
FLUBV AG NPH QL: NEGATIVE
GFR SERPLBLD CREATININE-BSD FMLA CKD-EPI: 24 ML/MIN/{1.73_M2}
GFR SERPLBLD CREATININE-BSD FMLA CKD-EPI: 29 ML/MIN/{1.73_M2}
GLUCOSE BLD-MCNC: 116 MG/DL (ref 70–99)
GLUCOSE BLD-MCNC: 123 MG/DL (ref 70–99)
GLUCOSE BLD-MCNC: 98 MG/DL (ref 70–99)
GLUCOSE SERPL-MCNC: 133 MG/DL (ref 70–99)
GLUCOSE SERPL-MCNC: 87 MG/DL (ref 70–99)
HCT VFR BLD AUTO: 28 % (ref 36–48)
HCT VFR BLD AUTO: 32.3 % (ref 36–48)
HGB BLD-MCNC: 10.5 G/DL (ref 12–16)
HGB BLD-MCNC: 9.1 G/DL (ref 12–16)
LYMPHOCYTES # BLD: 1.8 K/UL (ref 1–5.1)
LYMPHOCYTES # BLD: 3.8 K/UL (ref 1–5.1)
LYMPHOCYTES NFR BLD: 20 %
LYMPHOCYTES NFR BLD: 36.3 %
MAGNESIUM SERPL-MCNC: 2 MG/DL (ref 1.8–2.4)
MCH RBC QN AUTO: 22.9 PG (ref 26–34)
MCH RBC QN AUTO: 23.2 PG (ref 26–34)
MCHC RBC AUTO-ENTMCNC: 32.3 G/DL (ref 31–36)
MCHC RBC AUTO-ENTMCNC: 32.6 G/DL (ref 31–36)
MCV RBC AUTO: 70.4 FL (ref 80–100)
MCV RBC AUTO: 71.9 FL (ref 80–100)
MONOCYTES # BLD: 0.5 K/UL (ref 0–1.3)
MONOCYTES # BLD: 0.9 K/UL (ref 0–1.3)
MONOCYTES NFR BLD: 5.6 %
MONOCYTES NFR BLD: 8.6 %
NEUTROPHILS # BLD: 5.3 K/UL (ref 1.7–7.7)
NEUTROPHILS # BLD: 6.5 K/UL (ref 1.7–7.7)
NEUTROPHILS NFR BLD: 51 %
NEUTROPHILS NFR BLD: 72.5 %
PERFORMED ON: ABNORMAL
PERFORMED ON: ABNORMAL
PERFORMED ON: NORMAL
PHOSPHATE SERPL-MCNC: 3.1 MG/DL (ref 2.5–4.9)
PLATELET # BLD AUTO: 264 K/UL (ref 135–450)
PLATELET # BLD AUTO: 308 K/UL (ref 135–450)
PMV BLD AUTO: 7.2 FL (ref 5–10.5)
PMV BLD AUTO: 7.5 FL (ref 5–10.5)
POTASSIUM SERPL-SCNC: 2.5 MMOL/L (ref 3.5–5.1)
POTASSIUM SERPL-SCNC: 4.8 MMOL/L (ref 3.5–5.1)
POTASSIUM SERPL-SCNC: 4.9 MMOL/L (ref 3.5–5.1)
RBC # BLD AUTO: 3.9 M/UL (ref 4–5.2)
RBC # BLD AUTO: 4.59 M/UL (ref 4–5.2)
SARS-COV-2 RDRP RESP QL NAA+PROBE: NOT DETECTED
SODIUM SERPL-SCNC: 130 MMOL/L (ref 136–145)
SODIUM SERPL-SCNC: 131 MMOL/L (ref 136–145)
WBC # BLD AUTO: 10.4 K/UL (ref 4–11)
WBC # BLD AUTO: 8.9 K/UL (ref 4–11)

## 2023-04-01 PROCEDURE — 6360000002 HC RX W HCPCS: Performed by: INTERNAL MEDICINE

## 2023-04-01 PROCEDURE — 2060000000 HC ICU INTERMEDIATE R&B

## 2023-04-01 PROCEDURE — 93010 ELECTROCARDIOGRAM REPORT: CPT | Performed by: INTERNAL MEDICINE

## 2023-04-01 PROCEDURE — 6370000000 HC RX 637 (ALT 250 FOR IP): Performed by: INTERNAL MEDICINE

## 2023-04-01 PROCEDURE — 99285 EMERGENCY DEPT VISIT HI MDM: CPT

## 2023-04-01 PROCEDURE — 87804 INFLUENZA ASSAY W/OPTIC: CPT

## 2023-04-01 PROCEDURE — 87635 SARS-COV-2 COVID-19 AMP PRB: CPT

## 2023-04-01 PROCEDURE — 80048 BASIC METABOLIC PNL TOTAL CA: CPT

## 2023-04-01 PROCEDURE — 2580000003 HC RX 258: Performed by: EMERGENCY MEDICINE

## 2023-04-01 PROCEDURE — 96361 HYDRATE IV INFUSION ADD-ON: CPT

## 2023-04-01 PROCEDURE — 83036 HEMOGLOBIN GLYCOSYLATED A1C: CPT

## 2023-04-01 PROCEDURE — 96374 THER/PROPH/DIAG INJ IV PUSH: CPT

## 2023-04-01 PROCEDURE — 84100 ASSAY OF PHOSPHORUS: CPT

## 2023-04-01 PROCEDURE — 85025 COMPLETE CBC W/AUTO DIFF WBC: CPT

## 2023-04-01 PROCEDURE — 2580000003 HC RX 258: Performed by: INTERNAL MEDICINE

## 2023-04-01 PROCEDURE — 84132 ASSAY OF SERUM POTASSIUM: CPT

## 2023-04-01 PROCEDURE — 83735 ASSAY OF MAGNESIUM: CPT

## 2023-04-01 PROCEDURE — 2500000003 HC RX 250 WO HCPCS: Performed by: EMERGENCY MEDICINE

## 2023-04-01 PROCEDURE — 93005 ELECTROCARDIOGRAM TRACING: CPT | Performed by: EMERGENCY MEDICINE

## 2023-04-01 PROCEDURE — 96375 TX/PRO/DX INJ NEW DRUG ADDON: CPT

## 2023-04-01 PROCEDURE — 36415 COLL VENOUS BLD VENIPUNCTURE: CPT

## 2023-04-01 PROCEDURE — 70450 CT HEAD/BRAIN W/O DYE: CPT

## 2023-04-01 PROCEDURE — 6360000002 HC RX W HCPCS: Performed by: EMERGENCY MEDICINE

## 2023-04-01 RX ORDER — ONDANSETRON 2 MG/ML
4 INJECTION INTRAMUSCULAR; INTRAVENOUS ONCE
Status: COMPLETED | OUTPATIENT
Start: 2023-04-01 | End: 2023-04-01

## 2023-04-01 RX ORDER — 0.9 % SODIUM CHLORIDE 0.9 %
1000 INTRAVENOUS SOLUTION INTRAVENOUS ONCE
Status: COMPLETED | OUTPATIENT
Start: 2023-04-01 | End: 2023-04-01

## 2023-04-01 RX ORDER — POLYETHYLENE GLYCOL 3350 17 G/17G
17 POWDER, FOR SOLUTION ORAL DAILY PRN
Status: DISCONTINUED | OUTPATIENT
Start: 2023-04-01 | End: 2023-04-02 | Stop reason: HOSPADM

## 2023-04-01 RX ORDER — DEXTROSE MONOHYDRATE 100 MG/ML
INJECTION, SOLUTION INTRAVENOUS CONTINUOUS PRN
Status: DISCONTINUED | OUTPATIENT
Start: 2023-04-01 | End: 2023-04-02 | Stop reason: HOSPADM

## 2023-04-01 RX ORDER — AMLODIPINE BESYLATE 5 MG/1
5 TABLET ORAL DAILY
Status: DISCONTINUED | OUTPATIENT
Start: 2023-04-01 | End: 2023-04-02 | Stop reason: HOSPADM

## 2023-04-01 RX ORDER — INSULIN LISPRO 100 [IU]/ML
0-4 INJECTION, SOLUTION INTRAVENOUS; SUBCUTANEOUS NIGHTLY
Status: DISCONTINUED | OUTPATIENT
Start: 2023-04-01 | End: 2023-04-02 | Stop reason: HOSPADM

## 2023-04-01 RX ORDER — ROPINIROLE 1 MG/1
1 TABLET, FILM COATED ORAL NIGHTLY
Status: DISCONTINUED | OUTPATIENT
Start: 2023-04-01 | End: 2023-04-02 | Stop reason: HOSPADM

## 2023-04-01 RX ORDER — HEPARIN SODIUM 5000 [USP'U]/ML
5000 INJECTION, SOLUTION INTRAVENOUS; SUBCUTANEOUS 2 TIMES DAILY
Status: DISCONTINUED | OUTPATIENT
Start: 2023-04-01 | End: 2023-04-02 | Stop reason: HOSPADM

## 2023-04-01 RX ORDER — OXYCODONE HYDROCHLORIDE 5 MG/1
5 TABLET ORAL EVERY 4 HOURS PRN
Status: DISCONTINUED | OUTPATIENT
Start: 2023-04-01 | End: 2023-04-02 | Stop reason: HOSPADM

## 2023-04-01 RX ORDER — ONDANSETRON 2 MG/ML
4 INJECTION INTRAMUSCULAR; INTRAVENOUS EVERY 4 HOURS PRN
Status: DISCONTINUED | OUTPATIENT
Start: 2023-04-01 | End: 2023-04-02 | Stop reason: HOSPADM

## 2023-04-01 RX ORDER — SODIUM CHLORIDE 9 MG/ML
INJECTION, SOLUTION INTRAVENOUS PRN
Status: DISCONTINUED | OUTPATIENT
Start: 2023-04-01 | End: 2023-04-02 | Stop reason: HOSPADM

## 2023-04-01 RX ORDER — DEXTROSE MONOHYDRATE 100 MG/ML
INJECTION, SOLUTION INTRAVENOUS CONTINUOUS PRN
Status: DISCONTINUED | OUTPATIENT
Start: 2023-04-01 | End: 2023-04-01 | Stop reason: SDUPTHER

## 2023-04-01 RX ORDER — SODIUM CHLORIDE AND POTASSIUM CHLORIDE 300; 900 MG/100ML; MG/100ML
INJECTION, SOLUTION INTRAVENOUS ONCE
Status: COMPLETED | OUTPATIENT
Start: 2023-04-01 | End: 2023-04-01

## 2023-04-01 RX ORDER — SODIUM CHLORIDE 0.9 % (FLUSH) 0.9 %
10 SYRINGE (ML) INJECTION EVERY 12 HOURS SCHEDULED
Status: DISCONTINUED | OUTPATIENT
Start: 2023-04-01 | End: 2023-04-02 | Stop reason: HOSPADM

## 2023-04-01 RX ORDER — LABETALOL HYDROCHLORIDE 5 MG/ML
20 INJECTION, SOLUTION INTRAVENOUS ONCE
Status: COMPLETED | OUTPATIENT
Start: 2023-04-01 | End: 2023-04-01

## 2023-04-01 RX ORDER — SODIUM CHLORIDE 9 MG/ML
INJECTION, SOLUTION INTRAVENOUS CONTINUOUS
Status: DISCONTINUED | OUTPATIENT
Start: 2023-04-01 | End: 2023-04-02 | Stop reason: HOSPADM

## 2023-04-01 RX ORDER — HYDRALAZINE HYDROCHLORIDE 20 MG/ML
10 INJECTION INTRAMUSCULAR; INTRAVENOUS ONCE
Status: COMPLETED | OUTPATIENT
Start: 2023-04-01 | End: 2023-04-01

## 2023-04-01 RX ORDER — SODIUM CHLORIDE 0.9 % (FLUSH) 0.9 %
10 SYRINGE (ML) INJECTION PRN
Status: DISCONTINUED | OUTPATIENT
Start: 2023-04-01 | End: 2023-04-02 | Stop reason: HOSPADM

## 2023-04-01 RX ORDER — ACETAMINOPHEN 325 MG/1
650 TABLET ORAL EVERY 4 HOURS PRN
Status: DISCONTINUED | OUTPATIENT
Start: 2023-04-01 | End: 2023-04-02 | Stop reason: HOSPADM

## 2023-04-01 RX ORDER — INSULIN LISPRO 100 [IU]/ML
0-8 INJECTION, SOLUTION INTRAVENOUS; SUBCUTANEOUS
Status: DISCONTINUED | OUTPATIENT
Start: 2023-04-01 | End: 2023-04-02 | Stop reason: HOSPADM

## 2023-04-01 RX ORDER — KETOROLAC TROMETHAMINE 30 MG/ML
15 INJECTION, SOLUTION INTRAMUSCULAR; INTRAVENOUS ONCE
Status: COMPLETED | OUTPATIENT
Start: 2023-04-01 | End: 2023-04-01

## 2023-04-01 RX ORDER — ACETAMINOPHEN 650 MG/1
650 SUPPOSITORY RECTAL EVERY 4 HOURS PRN
Status: DISCONTINUED | OUTPATIENT
Start: 2023-04-01 | End: 2023-04-02 | Stop reason: HOSPADM

## 2023-04-01 RX ORDER — LORAZEPAM 2 MG/ML
1 INJECTION INTRAMUSCULAR ONCE
Status: COMPLETED | OUTPATIENT
Start: 2023-04-01 | End: 2023-04-01

## 2023-04-01 RX ADMIN — OXYCODONE 5 MG: 5 TABLET ORAL at 14:56

## 2023-04-01 RX ADMIN — LORAZEPAM 1 MG: 2 INJECTION INTRAMUSCULAR; INTRAVENOUS at 01:46

## 2023-04-01 RX ADMIN — ONDANSETRON 4 MG: 2 INJECTION INTRAMUSCULAR; INTRAVENOUS at 01:46

## 2023-04-01 RX ADMIN — AMLODIPINE BESYLATE 5 MG: 5 TABLET ORAL at 14:55

## 2023-04-01 RX ADMIN — OXYCODONE 5 MG: 5 TABLET ORAL at 20:08

## 2023-04-01 RX ADMIN — OXYCODONE 5 MG: 5 TABLET ORAL at 10:28

## 2023-04-01 RX ADMIN — POTASSIUM CHLORIDE AND SODIUM CHLORIDE: 900; 300 INJECTION, SOLUTION INTRAVENOUS at 03:01

## 2023-04-01 RX ADMIN — ROPINIROLE 1 MG: 1 TABLET, FILM COATED ORAL at 23:06

## 2023-04-01 RX ADMIN — HEPARIN SODIUM 5000 UNITS: 5000 INJECTION INTRAVENOUS; SUBCUTANEOUS at 23:06

## 2023-04-01 RX ADMIN — HEPARIN SODIUM 5000 UNITS: 5000 INJECTION INTRAVENOUS; SUBCUTANEOUS at 10:32

## 2023-04-01 RX ADMIN — ONDANSETRON 4 MG: 2 INJECTION INTRAMUSCULAR; INTRAVENOUS at 21:51

## 2023-04-01 RX ADMIN — Medication 10 ML: at 10:35

## 2023-04-01 RX ADMIN — ACETAMINOPHEN 650 MG: 325 TABLET ORAL at 05:45

## 2023-04-01 RX ADMIN — SODIUM CHLORIDE 1000 ML: 9 INJECTION, SOLUTION INTRAVENOUS at 01:21

## 2023-04-01 RX ADMIN — KETOROLAC TROMETHAMINE 15 MG: 30 INJECTION, SOLUTION INTRAMUSCULAR at 02:21

## 2023-04-01 RX ADMIN — SODIUM CHLORIDE: 9 INJECTION, SOLUTION INTRAVENOUS at 14:55

## 2023-04-01 RX ADMIN — LABETALOL HYDROCHLORIDE 20 MG: 5 INJECTION, SOLUTION INTRAVENOUS at 01:17

## 2023-04-01 RX ADMIN — POTASSIUM BICARBONATE 40 MEQ: 782 TABLET, EFFERVESCENT ORAL at 06:43

## 2023-04-01 RX ADMIN — HYDRALAZINE HYDROCHLORIDE 10 MG: 20 INJECTION INTRAMUSCULAR; INTRAVENOUS at 02:19

## 2023-04-01 ASSESSMENT — PAIN SCALES - GENERAL
PAINLEVEL_OUTOF10: 0
PAINLEVEL_OUTOF10: 4
PAINLEVEL_OUTOF10: 0
PAINLEVEL_OUTOF10: 0
PAINLEVEL_OUTOF10: 7
PAINLEVEL_OUTOF10: 7
PAINLEVEL_OUTOF10: 0
PAINLEVEL_OUTOF10: 7
PAINLEVEL_OUTOF10: 7
PAINLEVEL_OUTOF10: 2

## 2023-04-01 ASSESSMENT — PAIN DESCRIPTION - LOCATION
LOCATION: HEAD
LOCATION: HEAD;NECK
LOCATION: HEAD

## 2023-04-01 ASSESSMENT — PAIN SCALES - WONG BAKER
WONGBAKER_NUMERICALRESPONSE: 0

## 2023-04-01 ASSESSMENT — LIFESTYLE VARIABLES
HOW OFTEN DO YOU HAVE A DRINK CONTAINING ALCOHOL: NEVER
HOW OFTEN DO YOU HAVE A DRINK CONTAINING ALCOHOL: NEVER
HOW MANY STANDARD DRINKS CONTAINING ALCOHOL DO YOU HAVE ON A TYPICAL DAY: PATIENT DOES NOT DRINK
HOW MANY STANDARD DRINKS CONTAINING ALCOHOL DO YOU HAVE ON A TYPICAL DAY: PATIENT DOES NOT DRINK

## 2023-04-01 ASSESSMENT — PAIN - FUNCTIONAL ASSESSMENT
PAIN_FUNCTIONAL_ASSESSMENT: PREVENTS OR INTERFERES SOME ACTIVE ACTIVITIES AND ADLS

## 2023-04-01 ASSESSMENT — PAIN DESCRIPTION - DESCRIPTORS
DESCRIPTORS: THROBBING
DESCRIPTORS: ACHING
DESCRIPTORS: ACHING

## 2023-04-01 ASSESSMENT — PAIN DESCRIPTION - PAIN TYPE: TYPE: ACUTE PAIN

## 2023-04-01 ASSESSMENT — PAIN DESCRIPTION - ORIENTATION
ORIENTATION: ANTERIOR;POSTERIOR
ORIENTATION: ANTERIOR
ORIENTATION: ANTERIOR

## 2023-04-01 ASSESSMENT — PAIN DESCRIPTION - ONSET: ONSET: GRADUAL

## 2023-04-01 ASSESSMENT — PAIN DESCRIPTION - FREQUENCY: FREQUENCY: INTERMITTENT

## 2023-04-01 NOTE — PROGRESS NOTES
4 Eyes Skin Assessment     NAME:  Gladis Phelps  YOB: 1960  MEDICAL RECORD NUMBER:  [id number]    The patient is being assessed for  Admission    I agree that One RN has performed a thorough Head to Toe Skin Assessment on the patient. ALL assessment sites listed below have been assessed. Areas assessed by both nurses:    Head, Face, Ears, Shoulders, Back, Chest, Arms, Elbows, Hands, Sacrum. Buttock, Coccyx, Ischium, and Legs. Feet and Heels        Does the Patient have a Wound?  No noted wound(s)       Uriah Prevention initiated by RN: No   Wound Care Orders initiated by RN: No    Pressure Injury (Stage 3,4, Unstageable, DTI, NWPT, and Complex wounds) if present, place referral order by RN under : NA    New and Established Ostomies, if present place, referral order under : NA      Nurse 1 eSignature: Electronically signed by Rosita Webber RN on 4/1/23 at 7:00 PM EDT    **SHARE this note so that the co-signing nurse can place an eSignature**    Nurse 2 eSignature: Electronically signed by Radha Tony RN on 4/1/23 at 7:00 PM EDT

## 2023-04-01 NOTE — ED PROVIDER NOTES
Patient has no known allergies. FAMILY HISTORY        Family History   Problem Relation Age of Onset    High Blood Pressure Mother     Breast Cancer Mother         breast w mets to bone    Diabetes Father     Stroke Father     Cancer Father         bladder       SOCIAL HISTORY       Social History     Socioeconomic History    Marital status:    Tobacco Use    Smoking status: Former     Packs/day: 1.00     Years: 10.00     Pack years: 10.00     Types: Cigarettes     Quit date: 1980     Years since quittin.3    Smokeless tobacco: Never   Vaping Use    Vaping Use: Never used   Substance and Sexual Activity    Alcohol use: No    Drug use: No     Social Determinants of Health     Financial Resource Strain: Low Risk     Difficulty of Paying Living Expenses: Not hard at all   Food Insecurity: No Food Insecurity    Worried About ENDYMION in the Last Year: Never true    Ran Out of Food in the Last Year: Never true       PHYSICAL EXAM       ED Triage Vitals   BP Temp Temp src Heart Rate Resp SpO2 Height Weight   23 0123 -- -- 23 0123 23 0123 23 0123 23 0109 23 0109   (!) 165/107   74 19 100 % 5' 1\" (1.549 m) 94 lb 9.2 oz (42.9 kg)       Physical Exam  Vitals and nursing note reviewed. Constitutional:       General: She is not in acute distress. Appearance: She is well-developed. She is ill-appearing. She is not toxic-appearing or diaphoretic. HENT:      Head: Normocephalic and atraumatic. Right Ear: External ear normal.      Left Ear: External ear normal.   Eyes:      General:         Right eye: No discharge. Left eye: No discharge. Conjunctiva/sclera: Conjunctivae normal.      Pupils: Pupils are equal, round, and reactive to light. Cardiovascular:      Rate and Rhythm: Normal rate and regular rhythm. Heart sounds: No murmur heard. Pulmonary:      Effort: Pulmonary effort is normal. No respiratory distress.       Breath

## 2023-04-01 NOTE — ED NOTES
Pt remains alert and oriented with no acute distress noted. Pain score and pt condition reviewed with Saran Kerr. Informed her of the pt's insulin pump. All questions answered.       Gretel Youngblood RN  04/01/23 8032

## 2023-04-01 NOTE — PROGRESS NOTES
Patient is receiving amlodipine. VS are WNL. Oxycodone is being given PRN for headache. Patient is ambulating with standby assist and is tolerating well. Patient monitors glucose levels and receives insulin via glucose monitor on LUQ and left thigh. Patient will also administer bolus doses of insulin as needed with meals. Doctor was notified and has approved. Patient will document glucose levels and administered insulin on documentation sheet on bedside table. Patient is resting comfortably in bed. Call light is within reach. Bed alarm on. Care ongoing.

## 2023-04-01 NOTE — H&P
nerves: II-XII intact, grossly non-focal.  Psychiatric:  Alert and oriented, thought content appropriate, normal insight  Capillary Refill: Brisk,3 seconds, normal  Peripheral Pulses: +2 palpable, equal bilaterally       Labs:     Recent Labs     04/01/23  0113 04/01/23  0704   WBC 10.4 8.9   HGB 10.5* 9.1*   HCT 32.3* 28.0*    264     Recent Labs     04/01/23  0113 04/01/23  0704 04/01/23  0741   * 131*  --    K 2.5* 4.9 4.8   CL 93* 102  --    CO2 15* 17*  --    BUN 36* 32*  --    CREATININE 2.2* 1.9*  --    CALCIUM 9.7 8.3  --    PHOS  --   --  3.1     No results for input(s): AST, ALT, BILIDIR, BILITOT, ALKPHOS in the last 72 hours. No results for input(s): INR in the last 72 hours. No results for input(s): Heavenly Duque in the last 72 hours. Urinalysis:      Lab Results   Component Value Date/Time    NITRU Negative 10/19/2014 02:50 AM    WBCUA None seen 05/25/2012 09:30 AM    BACTERIA Rare 05/25/2012 09:30 AM    RBCUA Rare 05/25/2012 09:30 AM    BLOODU small 12/05/2022 12:50 PM    BLOODU Negative 10/19/2014 02:50 AM    SPECGRAV 1.020 12/05/2022 12:50 PM    SPECGRAV 1.020 10/19/2014 02:50 AM    GLUCOSEU neg 12/05/2022 12:50 PM    GLUCOSEU >=1000 10/19/2014 02:50 AM    GLUCOSEU >=1000 05/25/2012 09:30 AM       Radiology:         CT HEAD WO CONTRAST   Final Result   No acute intracranial abnormality. RECOMMENDATIONS:   Unavailable             Consults:    IP CONSULT TO DIABETES EDUCATOR    ASSESSMENT:    ARF on CRF  Uncontrolled HTN  Cephalgia  Met melanoma      PLAN:    Likely dehydrated  Poor po  Start ivf  Stop ace  Start norvasc    DVT Prophylaxis: lovenox  Diet: ADULT ORAL NUTRITION SUPPLEMENT; Breakfast, Lunch, Dinner; Diabetic Oral Supplement  ADULT DIET; Regular; 5 carb choices (75 gm/meal)  Code Status: Full Code    PT/OT Eval Status:     Dispo - 2        Kentrell Camacho MD    Thank you CABRERA Rosales - DRE for the opportunity to be involved in this patient's care.  If

## 2023-04-01 NOTE — ED NOTES
This RN received report from [place], [address] [place] [address]. All questions answered. Will continue to monitor pt.       Joaquin Yanez RN  04/01/23 7811

## 2023-04-01 NOTE — H&P
Refill: Brisk,3 seconds, normal  Peripheral Pulses: +2 palpable, equal bilaterally       Labs:     Recent Labs     04/01/23  0113 04/01/23  0704   WBC 10.4 8.9   HGB 10.5* 9.1*   HCT 32.3* 28.0*    264     Recent Labs     04/01/23  0113 04/01/23  0704 04/01/23  0741   * 131*  --    K 2.5* 4.9 4.8   CL 93* 102  --    CO2 15* 17*  --    BUN 36* 32*  --    CREATININE 2.2* 1.9*  --    CALCIUM 9.7 8.3  --    PHOS  --   --  3.1     No results for input(s): AST, ALT, BILIDIR, BILITOT, ALKPHOS in the last 72 hours. No results for input(s): INR in the last 72 hours. No results for input(s): Jaky Cassi in the last 72 hours. Urinalysis:      Lab Results   Component Value Date/Time    NITRU Negative 10/19/2014 02:50 AM    WBCUA None seen 05/25/2012 09:30 AM    BACTERIA Rare 05/25/2012 09:30 AM    RBCUA Rare 05/25/2012 09:30 AM    BLOODU small 12/05/2022 12:50 PM    BLOODU Negative 10/19/2014 02:50 AM    SPECGRAV 1.020 12/05/2022 12:50 PM    SPECGRAV 1.020 10/19/2014 02:50 AM    GLUCOSEU neg 12/05/2022 12:50 PM    GLUCOSEU >=1000 10/19/2014 02:50 AM    GLUCOSEU >=1000 05/25/2012 09:30 AM       Radiology:     CXR: I have reviewed the CXR with the following interpretation: ***  EKG:  I have reviewed the EKG with the following interpretation: ***    CT HEAD WO CONTRAST   Final Result   No acute intracranial abnormality. RECOMMENDATIONS:   Unavailable             Consults:    None    ASSESSMENT:    Active Hospital Problems    Diagnosis Date Noted    Hypertensive urgency [I16.0] 04/01/2023         PLAN:      DVT Prophylaxis: ***  Diet: ADULT ORAL NUTRITION SUPPLEMENT; Breakfast, Lunch, Dinner; Diabetic Oral Supplement  ADULT DIET; Regular; 5 carb choices (75 gm/meal)  Code Status: Full Code    PT/OT Eval Status: ***    Dispo - ***       Minnie Coffer, MD    Thank you CABRERA Gonzalez CNP for the opportunity to be involved in this patient's care.  If you have any questions or concerns please

## 2023-04-01 NOTE — ED NOTES
12-Lead EKG completed. EKG printer not working. Charge RN notified. See Chart Review for EKG image.       [place] Jorge  04/01/23 0136

## 2023-04-02 VITALS
DIASTOLIC BLOOD PRESSURE: 66 MMHG | WEIGHT: 96.34 LBS | BODY MASS INDEX: 18.19 KG/M2 | TEMPERATURE: 98.4 F | HEART RATE: 91 BPM | RESPIRATION RATE: 16 BRPM | OXYGEN SATURATION: 99 % | SYSTOLIC BLOOD PRESSURE: 122 MMHG | HEIGHT: 61 IN

## 2023-04-02 LAB
ALBUMIN SERPL-MCNC: 3.4 G/DL (ref 3.4–5)
ALBUMIN/GLOB SERPL: 1.4 {RATIO} (ref 1.1–2.2)
ALP SERPL-CCNC: 84 U/L (ref 40–129)
ALT SERPL-CCNC: 8 U/L (ref 10–40)
ANION GAP SERPL CALCULATED.3IONS-SCNC: 12 MMOL/L (ref 3–16)
AST SERPL-CCNC: 14 U/L (ref 15–37)
BASOPHILS # BLD: 0.1 K/UL (ref 0–0.2)
BASOPHILS NFR BLD: 1.2 %
BILIRUB SERPL-MCNC: <0.2 MG/DL (ref 0–1)
BUN SERPL-MCNC: 17 MG/DL (ref 7–20)
CALCIUM SERPL-MCNC: 8.9 MG/DL (ref 8.3–10.6)
CHLORIDE SERPL-SCNC: 106 MMOL/L (ref 99–110)
CO2 SERPL-SCNC: 19 MMOL/L (ref 21–32)
CREAT SERPL-MCNC: 1.3 MG/DL (ref 0.6–1.2)
DEPRECATED RDW RBC AUTO: 20.7 % (ref 12.4–15.4)
EOSINOPHIL # BLD: 0.2 K/UL (ref 0–0.6)
EOSINOPHIL NFR BLD: 3.5 %
EST. AVERAGE GLUCOSE BLD GHB EST-MCNC: 191.5 MG/DL
GFR SERPLBLD CREATININE-BSD FMLA CKD-EPI: 46 ML/MIN/{1.73_M2}
GLUCOSE BLD-MCNC: 144 MG/DL (ref 70–99)
GLUCOSE BLD-MCNC: 87 MG/DL (ref 70–99)
GLUCOSE SERPL-MCNC: 70 MG/DL (ref 70–99)
HBA1C MFR BLD: 8.3 %
HCT VFR BLD AUTO: 28.3 % (ref 36–48)
HGB BLD-MCNC: 9.2 G/DL (ref 12–16)
LYMPHOCYTES # BLD: 2.2 K/UL (ref 1–5.1)
LYMPHOCYTES NFR BLD: 39.5 %
MCH RBC QN AUTO: 23.7 PG (ref 26–34)
MCHC RBC AUTO-ENTMCNC: 32.5 G/DL (ref 31–36)
MCV RBC AUTO: 72.9 FL (ref 80–100)
MONOCYTES # BLD: 0.5 K/UL (ref 0–1.3)
MONOCYTES NFR BLD: 8.9 %
NEUTROPHILS # BLD: 2.7 K/UL (ref 1.7–7.7)
NEUTROPHILS NFR BLD: 46.9 %
PERFORMED ON: ABNORMAL
PERFORMED ON: NORMAL
PLATELET # BLD AUTO: 177 K/UL (ref 135–450)
PMV BLD AUTO: 7.6 FL (ref 5–10.5)
POTASSIUM SERPL-SCNC: 4.6 MMOL/L (ref 3.5–5.1)
PROT SERPL-MCNC: 5.9 G/DL (ref 6.4–8.2)
RBC # BLD AUTO: 3.89 M/UL (ref 4–5.2)
SODIUM SERPL-SCNC: 137 MMOL/L (ref 136–145)
WBC # BLD AUTO: 5.7 K/UL (ref 4–11)

## 2023-04-02 PROCEDURE — 6370000000 HC RX 637 (ALT 250 FOR IP): Performed by: INTERNAL MEDICINE

## 2023-04-02 PROCEDURE — 6360000002 HC RX W HCPCS: Performed by: INTERNAL MEDICINE

## 2023-04-02 PROCEDURE — 85025 COMPLETE CBC W/AUTO DIFF WBC: CPT

## 2023-04-02 PROCEDURE — 2580000003 HC RX 258: Performed by: INTERNAL MEDICINE

## 2023-04-02 PROCEDURE — 80053 COMPREHEN METABOLIC PANEL: CPT

## 2023-04-02 PROCEDURE — 36415 COLL VENOUS BLD VENIPUNCTURE: CPT

## 2023-04-02 RX ORDER — AMLODIPINE BESYLATE 5 MG/1
5 TABLET ORAL DAILY
Qty: 30 TABLET | Refills: 3 | Status: SHIPPED | OUTPATIENT
Start: 2023-04-03

## 2023-04-02 RX ORDER — PROCHLORPERAZINE MALEATE 10 MG
10 TABLET ORAL EVERY 6 HOURS PRN
Qty: 12 TABLET | Refills: 0 | Status: SHIPPED | OUTPATIENT
Start: 2023-04-02

## 2023-04-02 RX ADMIN — AMLODIPINE BESYLATE 5 MG: 5 TABLET ORAL at 10:22

## 2023-04-02 RX ADMIN — SODIUM CHLORIDE: 9 INJECTION, SOLUTION INTRAVENOUS at 02:02

## 2023-04-02 RX ADMIN — Medication 25 MG: at 00:20

## 2023-04-02 RX ADMIN — HEPARIN SODIUM 5000 UNITS: 5000 INJECTION INTRAVENOUS; SUBCUTANEOUS at 10:22

## 2023-04-02 RX ADMIN — OXYCODONE 5 MG: 5 TABLET ORAL at 10:30

## 2023-04-02 ASSESSMENT — PAIN - FUNCTIONAL ASSESSMENT: PAIN_FUNCTIONAL_ASSESSMENT: PREVENTS OR INTERFERES SOME ACTIVE ACTIVITIES AND ADLS

## 2023-04-02 ASSESSMENT — PAIN DESCRIPTION - ORIENTATION: ORIENTATION: ANTERIOR

## 2023-04-02 ASSESSMENT — PAIN SCALES - GENERAL: PAINLEVEL_OUTOF10: 7

## 2023-04-02 ASSESSMENT — PAIN DESCRIPTION - LOCATION: LOCATION: HEAD;NECK

## 2023-04-02 ASSESSMENT — PAIN DESCRIPTION - DESCRIPTORS: DESCRIPTORS: ACHING

## 2023-04-02 NOTE — PLAN OF CARE
Problem: Discharge Planning  Goal: Discharge to home or other facility with appropriate resources  4/2/2023 0014 by Xochitl Smith RN  Outcome: Progressing  Flowsheets (Taken 4/1/2023 1945)  Discharge to home or other facility with appropriate resources:   Identify barriers to discharge with patient and caregiver   Arrange for needed discharge resources and transportation as appropriate   Identify discharge learning needs (meds, wound care, etc)       Problem: Pain  Goal: Verbalizes/displays adequate comfort level or baseline comfort level  4/2/2023 0014 by Xochitl Smith RN  Outcome: Progressing  Flowsheets (Taken 4/1/2023 2008)  Verbalizes/displays adequate comfort level or baseline comfort level:   Encourage patient to monitor pain and request assistance   Assess pain using appropriate pain scale   Administer analgesics based on type and severity of pain and evaluate response   Implement non-pharmacological measures as appropriate and evaluate response       Problem: Safety - Adult  Goal: Free from fall injury  4/2/2023 0014 by Xochitl Smith RN  Outcome: Progressing  Flowsheets (Taken 4/2/2023 0011)       Problem: ABCDS Injury Assessment  Goal: Absence of physical injury  Outcome: Progressing     Problem: Chronic Conditions and Co-morbidities  Goal: Patient's chronic conditions and co-morbidity symptoms are monitored and maintained or improved  Outcome: Progressing     Problem: Cardiovascular - Adult  Goal: Maintains optimal cardiac output and hemodynamic stability  Outcome: Progressing     Problem: Musculoskeletal - Adult  Goal: Return mobility to safest level of function  Outcome: Progressing     Problem: Gastrointestinal - Adult  Goal: Minimal or absence of nausea and vomiting  Outcome: Progressing  Goal: Maintains or returns to baseline bowel function  Outcome: Progressing  Goal: Maintains adequate nutritional intake  Outcome: Progressing     Problem: Metabolic/Fluid and Electrolytes - Adult  Goal:
Problem: Discharge Planning  Goal: Discharge to home or other facility with appropriate resources  Outcome: Progressing  Flowsheets  Taken 4/1/2023 1012  Discharge to home or other facility with appropriate resources: Identify barriers to discharge with patient and caregiver  Taken 4/1/2023 0931  Discharge to home or other facility with appropriate resources: Identify barriers to discharge with patient and caregiver     Problem: Pain  Goal: Verbalizes/displays adequate comfort level or baseline comfort level  Outcome: Progressing     Problem: Safety - Adult  Goal: Free from fall injury  Outcome: Progressing
Completed

## 2023-04-02 NOTE — PROGRESS NOTES
Discharge orders acknowledged by RN . Discharge teaching completed with pt and family. AVS reviewed and all questions answered. Medication regimen reviewed and pt understands schedule. Follow up appointments also reviewed with pt and resources given for discharge. Pt was sent electronic to be filled and understands schedule. IV removed. Bedside monitor removed from pt. 60+ minutes of education completed. Required core measures completed. Pt vitals WDL. Pt discharged with all belongings to home with . Pt transported off of unit via wheelchair. No complications.

## 2023-04-02 NOTE — CARE COORDINATION
04/02/23 1416   Service Assessment   Patient Orientation Alert and Oriented   Cognition Alert   History Provided By Patient   Primary Caregiver Self   Accompanied By/Relationship no one   Support Systems Spouse/Significant Other   Patient's Healthcare Decision Maker is: Legal Next of Kin   PCP Verified by CM Yes   Last Visit to PCP Within last 6 months   Prior Functional Level Independent in ADLs/IADLs   Current Functional Level Independent in ADLs/IADLs   Can patient return to prior living arrangement Yes   Ability to make needs known: Good   Family able to assist with home care needs: Yes   Would you like for me to discuss the discharge plan with any other family members/significant others, and if so, who? No   Financial Resources Other (Comment)  (BCBS)   Discharge Planning   Type of Residence House   Living Arrangements Spouse/Significant Other   Current Services Prior To Admission None   Potential Assistance Needed N/A   DME Ordered?  No   Potential Assistance Purchasing Medications No   Type of Home Care Services None   Patient expects to be discharged to: [facility]   Transition of Care Consult (CM Consult) N/A   Services At/After Discharge None   Mode of Transport at Discharge Other (see comment)  (private car)
discuss the discharge plan with any other family members/significant others, and if so, who?  No  Plans to Return to Present Housing: Yes  Other Identified Issues/Barriers to RETURNING to current housing: n/a  Potential Assistance needed at discharge: N/A            Potential DME:  none needed  Patient expects to discharge to: [address] [place] [address] for transportation at discharge:  family    Financial    Payor: Ketty Garrett / Plan: [address] [address] Pr[address]ar / Product Type: *No Product type* /     Does insurance require precert for SNF: n/a    Potential assistance Purchasing Medications: No  Meds-to-Beds request:        [id number] [place], [address] [place] [address] [phone] - F [phone]  [place] [id number]  Phone: [phone] Fax: [phone]    [address] [place] [address], [address] [place] [address]  Phone: [phone] Fax: [phone]      Notes:    Factors facilitating achievement of predicted outcomes: Family support, Motivated, Cooperative, and Pleasant    Barriers to discharge: none noted    Additional Case Management Notes: pt plans to return home- no needs    The Plan for Transition of Care is related to the following treatment goals of Hypokalemia [E87.6]  FAVIAN (acute kidney injury) ([facility] [N17.9]  Hypertensive urgency [I16.0]    The Patient and/or Patient Representative Agree with the Discharge Plan?  yes    Merline Lente  Case Management Department  Ph: [phone] Fax: [phone]

## 2023-04-02 NOTE — ACP (ADVANCE CARE PLANNING)
Advance Care Planning     Advance Care Planning Activator (Inpatient)  Conversation Note      Date of ACP Conversation: 4/2/2023     Conversation Conducted with: Patient with Decision Making Capacity    ACP Activator: [address] Decision Maker:     Current Designated Health Care Decision Maker:     Primary Decision Maker: Micaela Birmingham - [phone]    Secondary Decision Maker: George Hall - Child - [phone]    Supplemental (Other) Decision Maker: Delma Hu - Child - [phone]  Click here to complete Healthcare Decision Makers including section of the Healthcare Decision Maker Relationship (ie \"Primary\")      Care Preferences    Ventilation: \"If you were in your present state of health and suddenly became very ill and were unable to breathe on your own, what would your preference be about the use of a ventilator (breathing machine) if it were available to you? \"      Would the patient desire the use of ventilator (breathing machine)?: yes    \"If your health worsens and it becomes clear that your chance of recovery is unlikely, what would your preference be about the use of a ventilator (breathing machine) if it were available to you? \"     Would the patient desire the use of ventilator (breathing machine)?: No      Resuscitation  \"CPR works best to restart the heart when there is a sudden event, like a heart attack, in someone who is otherwise healthy. Unfortunately, CPR does not typically restart the heart for people who have serious health conditions or who are very sick. \"    \"In the event your heart stopped as a result of an underlying serious health condition, would you want attempts to be made to restart your heart (answer \"yes\" for attempt to resuscitate) or would you prefer a natural death (answer \"no\" for do not attempt to resuscitate)? \" yes       [] Yes   [] No   Educated Patient / Decision Maker regarding differences between Advance Directives and

## 2023-04-02 NOTE — PROGRESS NOTES
Pt complained of a head and neck pain and nausea in the beginning of the shift. Took quite a bit of time, rest, and medication for relief. Stated later this shift that she did get some rest.  Blood pressure has been under control.

## 2023-04-03 ENCOUNTER — CARE COORDINATION (OUTPATIENT)
Dept: OTHER | Facility: CLINIC | Age: 63
End: 2023-04-03

## 2023-04-03 NOTE — CARE COORDINATION
[facility] Care Transitions Initial Follow Up Call    Call within 2 business days of discharge: Yes    Patient Current Location: [address]  Transition Nurse contacted the patient by telephone to perform post hospital discharge assessment. Verified name and  with patient as identifiers. Provided introduction to self, and explanation of the Care Transition Nurse role. Patient: Gladis Phelps Patient : 1960   MRN: [id number]  Reason for Admission: hypertensive Urgency  Discharge Date: 23 RARS: Readmission Risk Score: 14.7      Last Discharge  Street       Date Complaint Diagnosis Description Type Department Provider    23 Hypertension Hypertensive urgency . .. ED to Hosp-Admission (Discharged) (ADMITTED) Lopez Cuevas MD; Jennifer Galvan MD;... Was this an external facility discharge? No Discharge Facility: [facility]    Challenges to be reviewed by the provider   Additional needs identified to be addressed with provider: No  none               Method of communication with provider: none. Spoke briefly with patient who said she was at the pcp office now waiting to be seen. She has started the Norvasc she said, she does not routinely check her b/p at home she said. She is agreeable to follow up call in a couple of days. Care Transition Nurse reviewed discharge instructions with patient who verbalized understanding. The patient was given an opportunity to ask questions and does not have any further questions or concerns at this time. Were discharge instructions available to patient? Yes. Reviewed appropriate site of care based on symptoms and resources available to patient including: PCP./Specialist The patient agrees to contact the PCP office for questions related to their healthcare. Advance Care Planning:   Does patient have an Advance Directive: not on file.     Medication reconciliation was performed with  not reviewed at this time as patient was in the waiting area

## 2023-04-04 ENCOUNTER — TELEPHONE (OUTPATIENT)
Dept: ENDOCRINOLOGY | Age: 63
End: 2023-04-04

## 2023-04-04 DIAGNOSIS — E10.65 POORLY CONTROLLED TYPE 1 DIABETES MELLITUS WITH NEUROPATHY (HCC): ICD-10-CM

## 2023-04-04 DIAGNOSIS — E10.40 POORLY CONTROLLED TYPE 1 DIABETES MELLITUS WITH NEUROPATHY (HCC): ICD-10-CM

## 2023-04-04 LAB
EKG ATRIAL RATE: 76 BPM
EKG DIAGNOSIS: NORMAL
EKG P AXIS: 78 DEGREES
EKG P-R INTERVAL: 144 MS
EKG Q-T INTERVAL: 422 MS
EKG QRS DURATION: 72 MS
EKG QTC CALCULATION (BAZETT): 474 MS
EKG R AXIS: 51 DEGREES
EKG T AXIS: 58 DEGREES
EKG VENTRICULAR RATE: 76 BPM

## 2023-04-04 RX ORDER — INSULIN PMP CART,AUT,G6/7,CNTR
EACH SUBCUTANEOUS
Qty: 30 EACH | Refills: 0 | Status: SHIPPED | OUTPATIENT
Start: 2023-04-04

## 2023-04-04 NOTE — TELEPHONE ENCOUNTER
Call from [facility] has a quantity limit of 10 per month. 1 pod = 3 days.  They want to change rx to 1 every 3 days    CB# [id number] [facility] [phone]

## 2023-04-05 ENCOUNTER — CARE COORDINATION (OUTPATIENT)
Dept: OTHER | Facility: CLINIC | Age: 63
End: 2023-04-05

## 2023-04-05 SDOH — ECONOMIC STABILITY: TRANSPORTATION INSECURITY
IN THE PAST 12 MONTHS, HAS THE LACK OF TRANSPORTATION KEPT YOU FROM MEDICAL APPOINTMENTS OR FROM GETTING MEDICATIONS?: NO

## 2023-04-05 SDOH — ECONOMIC STABILITY: TRANSPORTATION INSECURITY
IN THE PAST 12 MONTHS, HAS LACK OF TRANSPORTATION KEPT YOU FROM MEETINGS, WORK, OR FROM GETTING THINGS NEEDED FOR DAILY LIVING?: NO

## 2023-04-05 NOTE — CARE COORDINATION
care based on symptoms and resources available to patient including: PCP  Specialist. The patient agrees to contact the PCP office for questions related to their healthcare. Advance Care Planning:   not on file. Patients top risk factors for readmission: medical condition-pt has metastatic melanoma and receiving immunotherapy. Offered patient enrollment in the Remote Patient Monitoring (RPM) program for in-home monitoring: NA.     Care Transitions Subsequent and Final Call    Subsequent and Final Calls  Do you have any ongoing symptoms?: No  Have your medications changed?: No  Do you have any questions related to your medications?: No  Do you currently have any active services?: No  Do you have any needs or concerns that I can assist you with?: No  Identified Barriers: None  Care Transitions Interventions  Other Interventions:             Care Transition Nurse provided contact information for future needs. No further follow-up call indicated based on severity of symptoms and risk factors. Pt politely declines need for future support from ACM.      Hugo Duggan RN

## 2023-04-19 ENCOUNTER — APPOINTMENT (OUTPATIENT)
Dept: CT IMAGING | Age: 63
DRG: 638 | End: 2023-04-19
Payer: COMMERCIAL

## 2023-04-19 ENCOUNTER — APPOINTMENT (OUTPATIENT)
Dept: GENERAL RADIOLOGY | Age: 63
DRG: 638 | End: 2023-04-19
Payer: COMMERCIAL

## 2023-04-19 ENCOUNTER — HOSPITAL ENCOUNTER (INPATIENT)
Age: 63
LOS: 3 days | Discharge: HOME | DRG: 638 | End: 2023-04-22
Attending: EMERGENCY MEDICINE | Admitting: STUDENT IN AN ORGANIZED HEALTH CARE EDUCATION/TRAINING PROGRAM
Payer: COMMERCIAL

## 2023-04-19 DIAGNOSIS — N17.9 AKI (ACUTE KIDNEY INJURY) (HCC): ICD-10-CM

## 2023-04-19 DIAGNOSIS — E87.6 HYPOKALEMIA: ICD-10-CM

## 2023-04-19 DIAGNOSIS — E10.10 DIABETIC KETOACIDOSIS WITHOUT COMA ASSOCIATED WITH TYPE 1 DIABETES MELLITUS (HCC): Primary | ICD-10-CM

## 2023-04-19 PROBLEM — D72.829 LEUKOCYTOSIS: Status: ACTIVE | Noted: 2023-04-19

## 2023-04-19 PROBLEM — E11.10 DKA (DIABETIC KETOACIDOSIS) (HCC): Status: ACTIVE | Noted: 2023-04-19

## 2023-04-19 LAB
ALBUMIN SERPL-MCNC: 4.4 G/DL (ref 3.4–5)
ALBUMIN/GLOB SERPL: 1.5 {RATIO} (ref 1.1–2.2)
ALP SERPL-CCNC: 111 U/L (ref 40–129)
ALT SERPL-CCNC: 17 U/L (ref 10–40)
ANION GAP SERPL CALCULATED.3IONS-SCNC: 28 MMOL/L (ref 3–16)
AST SERPL-CCNC: 23 U/L (ref 15–37)
BASE EXCESS BLDV CALC-SCNC: -9.5 MMOL/L
BASOPHILS # BLD: 0.1 K/UL (ref 0–0.2)
BASOPHILS NFR BLD: 0.4 %
BETA-HYDROXYBUTYRATE: 2.91 MMOL/L (ref 0–0.27)
BILIRUB SERPL-MCNC: 0.4 MG/DL (ref 0–1)
BUN SERPL-MCNC: 46 MG/DL (ref 7–20)
CALCIUM SERPL-MCNC: 10.3 MG/DL (ref 8.3–10.6)
CHLORIDE SERPL-SCNC: 92 MMOL/L (ref 99–110)
CO2 BLDV-SCNC: 16 MMOL/L
CO2 SERPL-SCNC: 13 MMOL/L (ref 21–32)
COHGB MFR BLDV: 1.4 %
CREAT SERPL-MCNC: 2.1 MG/DL (ref 0.6–1.2)
DEPRECATED RDW RBC AUTO: 19.1 % (ref 12.4–15.4)
EOSINOPHIL # BLD: 0 K/UL (ref 0–0.6)
EOSINOPHIL NFR BLD: 0 %
GFR SERPLBLD CREATININE-BSD FMLA CKD-EPI: 26 ML/MIN/{1.73_M2}
GLUCOSE BLD-MCNC: 308 MG/DL (ref 70–99)
GLUCOSE BLD-MCNC: 490 MG/DL (ref 70–99)
GLUCOSE SERPL-MCNC: 465 MG/DL (ref 70–99)
HCO3 BLDV-SCNC: 15 MMOL/L (ref 23–29)
HCT VFR BLD AUTO: 32.6 % (ref 36–48)
HGB BLD-MCNC: 10.6 G/DL (ref 12–16)
LIPASE SERPL-CCNC: 31 U/L (ref 13–60)
LYMPHOCYTES # BLD: 0.7 K/UL (ref 1–5.1)
LYMPHOCYTES NFR BLD: 3.9 %
MCH RBC QN AUTO: 24.6 PG (ref 26–34)
MCHC RBC AUTO-ENTMCNC: 32.5 G/DL (ref 31–36)
MCV RBC AUTO: 75.8 FL (ref 80–100)
METHGB MFR BLDV: 0.6 %
MONOCYTES # BLD: 1.4 K/UL (ref 0–1.3)
MONOCYTES NFR BLD: 7.6 %
NEUTROPHILS # BLD: 15.8 K/UL (ref 1.7–7.7)
NEUTROPHILS NFR BLD: 88.1 %
O2 THERAPY: ABNORMAL
PCO2 BLDV: 28.1 MMHG (ref 40–50)
PERFORMED ON: ABNORMAL
PERFORMED ON: ABNORMAL
PH BLDV: 7.34 [PH] (ref 7.35–7.45)
PLATELET # BLD AUTO: 304 K/UL (ref 135–450)
PMV BLD AUTO: 7.7 FL (ref 5–10.5)
PO2 BLDV: <30 MMHG
POTASSIUM SERPL-SCNC: 2.9 MMOL/L (ref 3.5–5.1)
PROT SERPL-MCNC: 7.4 G/DL (ref 6.4–8.2)
RBC # BLD AUTO: 4.3 M/UL (ref 4–5.2)
SAO2 % BLDV: 50 %
SODIUM SERPL-SCNC: 133 MMOL/L (ref 136–145)
WBC # BLD AUTO: 17.9 K/UL (ref 4–11)

## 2023-04-19 PROCEDURE — 71046 X-RAY EXAM CHEST 2 VIEWS: CPT

## 2023-04-19 PROCEDURE — 6360000002 HC RX W HCPCS: Performed by: EMERGENCY MEDICINE

## 2023-04-19 PROCEDURE — 99285 EMERGENCY DEPT VISIT HI MDM: CPT

## 2023-04-19 PROCEDURE — 85025 COMPLETE CBC W/AUTO DIFF WBC: CPT

## 2023-04-19 PROCEDURE — 80053 COMPREHEN METABOLIC PANEL: CPT

## 2023-04-19 PROCEDURE — 83690 ASSAY OF LIPASE: CPT

## 2023-04-19 PROCEDURE — 2580000003 HC RX 258: Performed by: EMERGENCY MEDICINE

## 2023-04-19 PROCEDURE — 96375 TX/PRO/DX INJ NEW DRUG ADDON: CPT

## 2023-04-19 PROCEDURE — 2000000000 HC ICU R&B

## 2023-04-19 PROCEDURE — 82010 KETONE BODYS QUAN: CPT

## 2023-04-19 PROCEDURE — 82803 BLOOD GASES ANY COMBINATION: CPT

## 2023-04-19 PROCEDURE — 96365 THER/PROPH/DIAG IV INF INIT: CPT

## 2023-04-19 PROCEDURE — 74176 CT ABD & PELVIS W/O CONTRAST: CPT

## 2023-04-19 RX ORDER — SODIUM CHLORIDE 9 MG/ML
INJECTION, SOLUTION INTRAVENOUS CONTINUOUS
Status: DISCONTINUED | OUTPATIENT
Start: 2023-04-19 | End: 2023-04-20

## 2023-04-19 RX ORDER — ONDANSETRON 2 MG/ML
4 INJECTION INTRAMUSCULAR; INTRAVENOUS EVERY 6 HOURS PRN
Status: DISCONTINUED | OUTPATIENT
Start: 2023-04-19 | End: 2023-04-22 | Stop reason: HOSPADM

## 2023-04-19 RX ORDER — POTASSIUM CHLORIDE 7.45 MG/ML
10 INJECTION INTRAVENOUS
Status: COMPLETED | OUTPATIENT
Start: 2023-04-19 | End: 2023-04-20

## 2023-04-19 RX ORDER — POTASSIUM CHLORIDE 7.45 MG/ML
10 INJECTION INTRAVENOUS PRN
Status: DISCONTINUED | OUTPATIENT
Start: 2023-04-19 | End: 2023-04-19

## 2023-04-19 RX ORDER — HEPARIN SODIUM 5000 [USP'U]/ML
5000 INJECTION, SOLUTION INTRAVENOUS; SUBCUTANEOUS 2 TIMES DAILY
Status: DISCONTINUED | OUTPATIENT
Start: 2023-04-20 | End: 2023-04-22 | Stop reason: HOSPADM

## 2023-04-19 RX ORDER — MAGNESIUM SULFATE 1 G/100ML
1000 INJECTION INTRAVENOUS PRN
Status: DISCONTINUED | OUTPATIENT
Start: 2023-04-19 | End: 2023-04-19

## 2023-04-19 RX ORDER — DEXTROSE, SODIUM CHLORIDE, AND POTASSIUM CHLORIDE 5; .45; .15 G/100ML; G/100ML; G/100ML
INJECTION INTRAVENOUS CONTINUOUS PRN
Status: DISCONTINUED | OUTPATIENT
Start: 2023-04-19 | End: 2023-04-21

## 2023-04-19 RX ORDER — ONDANSETRON 2 MG/ML
4 INJECTION INTRAMUSCULAR; INTRAVENOUS ONCE
Status: COMPLETED | OUTPATIENT
Start: 2023-04-19 | End: 2023-04-19

## 2023-04-19 RX ORDER — 0.9 % SODIUM CHLORIDE 0.9 %
1000 INTRAVENOUS SOLUTION INTRAVENOUS ONCE
Status: COMPLETED | OUTPATIENT
Start: 2023-04-19 | End: 2023-04-19

## 2023-04-19 RX ORDER — SODIUM CHLORIDE 9 MG/ML
INJECTION, SOLUTION INTRAVENOUS CONTINUOUS
Status: DISCONTINUED | OUTPATIENT
Start: 2023-04-19 | End: 2023-04-19

## 2023-04-19 RX ADMIN — Medication 10 MEQ: at 23:23

## 2023-04-19 RX ADMIN — Medication 12.5 MG: at 22:49

## 2023-04-19 RX ADMIN — ONDANSETRON 4 MG: 2 INJECTION INTRAMUSCULAR; INTRAVENOUS at 21:33

## 2023-04-19 RX ADMIN — SODIUM CHLORIDE 1000 ML: 9 INJECTION, SOLUTION INTRAVENOUS at 21:32

## 2023-04-19 RX ADMIN — SODIUM CHLORIDE 1000 ML: 9 INJECTION, SOLUTION INTRAVENOUS at 22:47

## 2023-04-19 ASSESSMENT — LIFESTYLE VARIABLES
HOW MANY STANDARD DRINKS CONTAINING ALCOHOL DO YOU HAVE ON A TYPICAL DAY: PATIENT DOES NOT DRINK
HOW OFTEN DO YOU HAVE A DRINK CONTAINING ALCOHOL: NEVER

## 2023-04-20 LAB
ANION GAP SERPL CALCULATED.3IONS-SCNC: 13 MMOL/L (ref 3–16)
ANION GAP SERPL CALCULATED.3IONS-SCNC: 15 MMOL/L (ref 3–16)
ANION GAP SERPL CALCULATED.3IONS-SCNC: 17 MMOL/L (ref 3–16)
ANION GAP SERPL CALCULATED.3IONS-SCNC: 20 MMOL/L (ref 3–16)
ANION GAP SERPL CALCULATED.3IONS-SCNC: 22 MMOL/L (ref 3–16)
BACTERIA URNS QL MICRO: NORMAL /HPF
BASE EXCESS BLDV CALC-SCNC: -10.2 MMOL/L
BASOPHILS # BLD: 0 K/UL (ref 0–0.2)
BASOPHILS NFR BLD: 0.3 %
BILIRUB UR QL STRIP.AUTO: NEGATIVE
BUN SERPL-MCNC: 21 MG/DL (ref 7–20)
BUN SERPL-MCNC: 24 MG/DL (ref 7–20)
BUN SERPL-MCNC: 26 MG/DL (ref 7–20)
BUN SERPL-MCNC: 30 MG/DL (ref 7–20)
BUN SERPL-MCNC: 31 MG/DL (ref 7–20)
CALCIUM SERPL-MCNC: 10.2 MG/DL (ref 8.3–10.6)
CALCIUM SERPL-MCNC: 8.8 MG/DL (ref 8.3–10.6)
CALCIUM SERPL-MCNC: 8.9 MG/DL (ref 8.3–10.6)
CALCIUM SERPL-MCNC: 9 MG/DL (ref 8.3–10.6)
CALCIUM SERPL-MCNC: 9.4 MG/DL (ref 8.3–10.6)
CHLORIDE SERPL-SCNC: 100 MMOL/L (ref 99–110)
CHLORIDE SERPL-SCNC: 103 MMOL/L (ref 99–110)
CHLORIDE SERPL-SCNC: 104 MMOL/L (ref 99–110)
CHLORIDE SERPL-SCNC: 97 MMOL/L (ref 99–110)
CHLORIDE SERPL-SCNC: 97 MMOL/L (ref 99–110)
CLARITY UR: CLEAR
CO2 BLDV-SCNC: 15 MMOL/L
CO2 SERPL-SCNC: 15 MMOL/L (ref 21–32)
CO2 SERPL-SCNC: 15 MMOL/L (ref 21–32)
CO2 SERPL-SCNC: 19 MMOL/L (ref 21–32)
CO2 SERPL-SCNC: 19 MMOL/L (ref 21–32)
CO2 SERPL-SCNC: 20 MMOL/L (ref 21–32)
COHGB MFR BLDV: 1.5 %
COLOR UR: YELLOW
CREAT SERPL-MCNC: 1.2 MG/DL (ref 0.6–1.2)
CREAT SERPL-MCNC: 1.3 MG/DL (ref 0.6–1.2)
CREAT SERPL-MCNC: 1.4 MG/DL (ref 0.6–1.2)
DEPRECATED RDW RBC AUTO: 19.3 % (ref 12.4–15.4)
EOSINOPHIL # BLD: 0 K/UL (ref 0–0.6)
EOSINOPHIL NFR BLD: 0 %
EPI CELLS #/AREA URNS AUTO: 1 /HPF (ref 0–5)
FLUAV RNA UPPER RESP QL NAA+PROBE: NEGATIVE
FLUBV AG NPH QL: NEGATIVE
GFR SERPLBLD CREATININE-BSD FMLA CKD-EPI: 42 ML/MIN/{1.73_M2}
GFR SERPLBLD CREATININE-BSD FMLA CKD-EPI: 46 ML/MIN/{1.73_M2}
GFR SERPLBLD CREATININE-BSD FMLA CKD-EPI: 50 ML/MIN/{1.73_M2}
GLUCOSE BLD-MCNC: 143 MG/DL (ref 70–99)
GLUCOSE BLD-MCNC: 143 MG/DL (ref 70–99)
GLUCOSE BLD-MCNC: 146 MG/DL (ref 70–99)
GLUCOSE BLD-MCNC: 158 MG/DL (ref 70–99)
GLUCOSE BLD-MCNC: 161 MG/DL (ref 70–99)
GLUCOSE BLD-MCNC: 170 MG/DL (ref 70–99)
GLUCOSE BLD-MCNC: 181 MG/DL (ref 70–99)
GLUCOSE BLD-MCNC: 191 MG/DL (ref 70–99)
GLUCOSE BLD-MCNC: 202 MG/DL (ref 70–99)
GLUCOSE BLD-MCNC: 210 MG/DL (ref 70–99)
GLUCOSE BLD-MCNC: 217 MG/DL (ref 70–99)
GLUCOSE BLD-MCNC: 227 MG/DL (ref 70–99)
GLUCOSE BLD-MCNC: 237 MG/DL (ref 70–99)
GLUCOSE BLD-MCNC: 253 MG/DL (ref 70–99)
GLUCOSE BLD-MCNC: 291 MG/DL (ref 70–99)
GLUCOSE BLD-MCNC: 312 MG/DL (ref 70–99)
GLUCOSE BLD-MCNC: 362 MG/DL (ref 70–99)
GLUCOSE BLD-MCNC: 382 MG/DL (ref 70–99)
GLUCOSE BLD-MCNC: 414 MG/DL (ref 70–99)
GLUCOSE SERPL-MCNC: 144 MG/DL (ref 70–99)
GLUCOSE SERPL-MCNC: 191 MG/DL (ref 70–99)
GLUCOSE SERPL-MCNC: 234 MG/DL (ref 70–99)
GLUCOSE SERPL-MCNC: 260 MG/DL (ref 70–99)
GLUCOSE SERPL-MCNC: 395 MG/DL (ref 70–99)
GLUCOSE UR STRIP.AUTO-MCNC: >=1000 MG/DL
HCO3 BLDV-SCNC: 15 MMOL/L (ref 23–29)
HCT VFR BLD AUTO: 27.3 % (ref 36–48)
HGB BLD-MCNC: 8.9 G/DL (ref 12–16)
HGB UR QL STRIP.AUTO: ABNORMAL
HYALINE CASTS #/AREA URNS AUTO: 1 /LPF (ref 0–8)
KETONES UR STRIP.AUTO-MCNC: 15 MG/DL
LEUKOCYTE ESTERASE UR QL STRIP.AUTO: NEGATIVE
LYMPHOCYTES # BLD: 1 K/UL (ref 1–5.1)
LYMPHOCYTES NFR BLD: 6.5 %
MAGNESIUM SERPL-MCNC: 1.4 MG/DL (ref 1.8–2.4)
MAGNESIUM SERPL-MCNC: 1.9 MG/DL (ref 1.8–2.4)
MAGNESIUM SERPL-MCNC: 2.2 MG/DL (ref 1.8–2.4)
MAGNESIUM SERPL-MCNC: 2.2 MG/DL (ref 1.8–2.4)
MAGNESIUM SERPL-MCNC: 2.3 MG/DL (ref 1.8–2.4)
MCH RBC QN AUTO: 23.9 PG (ref 26–34)
MCHC RBC AUTO-ENTMCNC: 32.8 G/DL (ref 31–36)
MCV RBC AUTO: 73.1 FL (ref 80–100)
METHGB MFR BLDV: 0.5 %
MONOCYTES # BLD: 0.5 K/UL (ref 0–1.3)
MONOCYTES NFR BLD: 3.5 %
NEUTROPHILS # BLD: 13.9 K/UL (ref 1.7–7.7)
NEUTROPHILS NFR BLD: 89.7 %
NITRITE UR QL STRIP.AUTO: NEGATIVE
O2 THERAPY: ABNORMAL
PCO2 BLDV: 27.2 MMHG (ref 40–50)
PERFORMED ON: ABNORMAL
PH BLDV: 7.33 [PH] (ref 7.35–7.45)
PH UR STRIP.AUTO: 5 [PH] (ref 5–8)
PHOSPHATE SERPL-MCNC: 1.7 MG/DL (ref 2.5–4.9)
PHOSPHATE SERPL-MCNC: 3.1 MG/DL (ref 2.5–4.9)
PHOSPHATE SERPL-MCNC: 3.2 MG/DL (ref 2.5–4.9)
PHOSPHATE SERPL-MCNC: 3.2 MG/DL (ref 2.5–4.9)
PHOSPHATE SERPL-MCNC: 4.3 MG/DL (ref 2.5–4.9)
PLATELET # BLD AUTO: 274 K/UL (ref 135–450)
PMV BLD AUTO: 7.1 FL (ref 5–10.5)
PO2 BLDV: 47 MMHG
POTASSIUM SERPL-SCNC: 3.4 MMOL/L (ref 3.5–5.1)
POTASSIUM SERPL-SCNC: 3.4 MMOL/L (ref 3.5–5.1)
POTASSIUM SERPL-SCNC: 3.6 MMOL/L (ref 3.5–5.1)
POTASSIUM SERPL-SCNC: 4 MMOL/L (ref 3.5–5.1)
POTASSIUM SERPL-SCNC: 4.4 MMOL/L (ref 3.5–5.1)
PROCALCITONIN SERPL IA-MCNC: 3.13 NG/ML (ref 0–0.15)
PROT UR STRIP.AUTO-MCNC: 100 MG/DL
RBC # BLD AUTO: 3.74 M/UL (ref 4–5.2)
RBC CLUMPS #/AREA URNS AUTO: 0 /HPF (ref 0–4)
SAO2 % BLDV: 82 %
SARS-COV-2 RDRP RESP QL NAA+PROBE: NOT DETECTED
SODIUM SERPL-SCNC: 129 MMOL/L (ref 136–145)
SODIUM SERPL-SCNC: 134 MMOL/L (ref 136–145)
SODIUM SERPL-SCNC: 135 MMOL/L (ref 136–145)
SODIUM SERPL-SCNC: 138 MMOL/L (ref 136–145)
SODIUM SERPL-SCNC: 140 MMOL/L (ref 136–145)
SP GR UR STRIP.AUTO: 1.01 (ref 1–1.03)
UA COMPLETE W REFLEX CULTURE PNL UR: ABNORMAL
UA DIPSTICK W REFLEX MICRO PNL UR: YES
URN SPEC COLLECT METH UR: ABNORMAL
UROBILINOGEN UR STRIP-ACNC: 0.2 E.U./DL
WBC # BLD AUTO: 15.5 K/UL (ref 4–11)
WBC #/AREA URNS AUTO: 1 /HPF (ref 0–5)

## 2023-04-20 PROCEDURE — 82803 BLOOD GASES ANY COMBINATION: CPT

## 2023-04-20 PROCEDURE — 2580000003 HC RX 258: Performed by: STUDENT IN AN ORGANIZED HEALTH CARE EDUCATION/TRAINING PROGRAM

## 2023-04-20 PROCEDURE — 87635 SARS-COV-2 COVID-19 AMP PRB: CPT

## 2023-04-20 PROCEDURE — 2580000003 HC RX 258: Performed by: HOSPITALIST

## 2023-04-20 PROCEDURE — 81001 URINALYSIS AUTO W/SCOPE: CPT

## 2023-04-20 PROCEDURE — 6360000002 HC RX W HCPCS: Performed by: STUDENT IN AN ORGANIZED HEALTH CARE EDUCATION/TRAINING PROGRAM

## 2023-04-20 PROCEDURE — 84145 PROCALCITONIN (PCT): CPT

## 2023-04-20 PROCEDURE — 84100 ASSAY OF PHOSPHORUS: CPT

## 2023-04-20 PROCEDURE — 2000000000 HC ICU R&B

## 2023-04-20 PROCEDURE — 87804 INFLUENZA ASSAY W/OPTIC: CPT

## 2023-04-20 PROCEDURE — 6360000002 HC RX W HCPCS: Performed by: EMERGENCY MEDICINE

## 2023-04-20 PROCEDURE — 6370000000 HC RX 637 (ALT 250 FOR IP): Performed by: HOSPITALIST

## 2023-04-20 PROCEDURE — 6360000002 HC RX W HCPCS: Performed by: HOSPITALIST

## 2023-04-20 PROCEDURE — 94760 N-INVAS EAR/PLS OXIMETRY 1: CPT

## 2023-04-20 PROCEDURE — 85025 COMPLETE CBC W/AUTO DIFF WBC: CPT

## 2023-04-20 PROCEDURE — 36415 COLL VENOUS BLD VENIPUNCTURE: CPT

## 2023-04-20 PROCEDURE — 83735 ASSAY OF MAGNESIUM: CPT

## 2023-04-20 PROCEDURE — 2500000003 HC RX 250 WO HCPCS: Performed by: STUDENT IN AN ORGANIZED HEALTH CARE EDUCATION/TRAINING PROGRAM

## 2023-04-20 PROCEDURE — 83036 HEMOGLOBIN GLYCOSYLATED A1C: CPT

## 2023-04-20 PROCEDURE — 36591 DRAW BLOOD OFF VENOUS DEVICE: CPT

## 2023-04-20 PROCEDURE — 80048 BASIC METABOLIC PNL TOTAL CA: CPT

## 2023-04-20 PROCEDURE — 6370000000 HC RX 637 (ALT 250 FOR IP): Performed by: STUDENT IN AN ORGANIZED HEALTH CARE EDUCATION/TRAINING PROGRAM

## 2023-04-20 RX ORDER — METOCLOPRAMIDE HYDROCHLORIDE 5 MG/ML
10 INJECTION INTRAMUSCULAR; INTRAVENOUS ONCE
Status: COMPLETED | OUTPATIENT
Start: 2023-04-20 | End: 2023-04-20

## 2023-04-20 RX ORDER — DEXTROSE, SODIUM CHLORIDE, AND POTASSIUM CHLORIDE 5; .45; .15 G/100ML; G/100ML; G/100ML
INJECTION INTRAVENOUS CONTINUOUS PRN
Status: DISCONTINUED | OUTPATIENT
Start: 2023-04-20 | End: 2023-04-20

## 2023-04-20 RX ORDER — POTASSIUM CHLORIDE 7.45 MG/ML
10 INJECTION INTRAVENOUS PRN
Status: DISCONTINUED | OUTPATIENT
Start: 2023-04-20 | End: 2023-04-21

## 2023-04-20 RX ORDER — METOCLOPRAMIDE HYDROCHLORIDE 5 MG/ML
5 INJECTION INTRAMUSCULAR; INTRAVENOUS EVERY 6 HOURS PRN
Status: DISCONTINUED | OUTPATIENT
Start: 2023-04-20 | End: 2023-04-20

## 2023-04-20 RX ORDER — SODIUM CHLORIDE 450 MG/100ML
INJECTION, SOLUTION INTRAVENOUS CONTINUOUS
Status: DISCONTINUED | OUTPATIENT
Start: 2023-04-20 | End: 2023-04-21

## 2023-04-20 RX ORDER — MAGNESIUM SULFATE IN WATER 40 MG/ML
4000 INJECTION, SOLUTION INTRAVENOUS ONCE
Status: COMPLETED | OUTPATIENT
Start: 2023-04-20 | End: 2023-04-20

## 2023-04-20 RX ORDER — OXYCODONE HYDROCHLORIDE 5 MG/1
5 TABLET ORAL EVERY 4 HOURS PRN
Status: DISCONTINUED | OUTPATIENT
Start: 2023-04-20 | End: 2023-04-22 | Stop reason: HOSPADM

## 2023-04-20 RX ORDER — POTASSIUM CHLORIDE 29.8 MG/ML
20 INJECTION INTRAVENOUS ONCE
Status: COMPLETED | OUTPATIENT
Start: 2023-04-20 | End: 2023-04-20

## 2023-04-20 RX ORDER — HYDRALAZINE HYDROCHLORIDE 20 MG/ML
5 INJECTION INTRAMUSCULAR; INTRAVENOUS ONCE
Status: COMPLETED | OUTPATIENT
Start: 2023-04-21 | End: 2023-04-21

## 2023-04-20 RX ORDER — MAGNESIUM SULFATE IN WATER 40 MG/ML
2000 INJECTION, SOLUTION INTRAVENOUS PRN
Status: DISCONTINUED | OUTPATIENT
Start: 2023-04-20 | End: 2023-04-21

## 2023-04-20 RX ORDER — ROPINIROLE 1 MG/1
1 TABLET, FILM COATED ORAL NIGHTLY
Status: DISCONTINUED | OUTPATIENT
Start: 2023-04-20 | End: 2023-04-21

## 2023-04-20 RX ORDER — METOCLOPRAMIDE HYDROCHLORIDE 5 MG/ML
5 INJECTION INTRAMUSCULAR; INTRAVENOUS ONCE
Status: COMPLETED | OUTPATIENT
Start: 2023-04-20 | End: 2023-04-20

## 2023-04-20 RX ADMIN — POTASSIUM CHLORIDE, DEXTROSE MONOHYDRATE AND SODIUM CHLORIDE: 150; 5; 450 INJECTION, SOLUTION INTRAVENOUS at 14:18

## 2023-04-20 RX ADMIN — Medication 10 MEQ: at 00:58

## 2023-04-20 RX ADMIN — POTASSIUM CHLORIDE 10 MEQ: 7.46 INJECTION, SOLUTION INTRAVENOUS at 20:31

## 2023-04-20 RX ADMIN — Medication 12.5 MG: at 20:40

## 2023-04-20 RX ADMIN — HEPARIN SODIUM 5000 UNITS: 5000 INJECTION INTRAVENOUS; SUBCUTANEOUS at 09:54

## 2023-04-20 RX ADMIN — POTASSIUM CHLORIDE 10 MEQ: 7.46 INJECTION, SOLUTION INTRAVENOUS at 21:57

## 2023-04-20 RX ADMIN — SODIUM CHLORIDE: 9 INJECTION, SOLUTION INTRAVENOUS at 00:58

## 2023-04-20 RX ADMIN — METOCLOPRAMIDE HYDROCHLORIDE 5 MG: 5 INJECTION INTRAMUSCULAR; INTRAVENOUS at 11:09

## 2023-04-20 RX ADMIN — POTASSIUM CHLORIDE, DEXTROSE MONOHYDRATE AND SODIUM CHLORIDE: 150; 5; 450 INJECTION, SOLUTION INTRAVENOUS at 23:34

## 2023-04-20 RX ADMIN — Medication 12.5 MG: at 08:13

## 2023-04-20 RX ADMIN — Medication 10 MEQ: at 02:10

## 2023-04-20 RX ADMIN — POTASSIUM PHOSPHATE, MONOBASIC AND POTASSIUM PHOSPHATE, DIBASIC 20 MMOL: 224; 236 INJECTION, SOLUTION, CONCENTRATE INTRAVENOUS at 06:53

## 2023-04-20 RX ADMIN — HEPARIN SODIUM 5000 UNITS: 5000 INJECTION INTRAVENOUS; SUBCUTANEOUS at 20:38

## 2023-04-20 RX ADMIN — METOCLOPRAMIDE 5 MG: 5 INJECTION, SOLUTION INTRAMUSCULAR; INTRAVENOUS at 05:28

## 2023-04-20 RX ADMIN — MAGNESIUM SULFATE HEPTAHYDRATE 4000 MG: 40 INJECTION, SOLUTION INTRAVENOUS at 06:40

## 2023-04-20 RX ADMIN — POTASSIUM CHLORIDE 10 MEQ: 7.46 INJECTION, SOLUTION INTRAVENOUS at 19:18

## 2023-04-20 RX ADMIN — POTASSIUM CHLORIDE 10 MEQ: 7.46 INJECTION, SOLUTION INTRAVENOUS at 17:10

## 2023-04-20 RX ADMIN — SODIUM CHLORIDE: 4.5 INJECTION, SOLUTION INTRAVENOUS at 11:12

## 2023-04-20 RX ADMIN — METOCLOPRAMIDE HYDROCHLORIDE 5 MG: 5 INJECTION INTRAMUSCULAR; INTRAVENOUS at 17:17

## 2023-04-20 RX ADMIN — POTASSIUM CHLORIDE 10 MEQ: 7.46 INJECTION, SOLUTION INTRAVENOUS at 14:32

## 2023-04-20 RX ADMIN — Medication 10 MEQ: at 03:13

## 2023-04-20 RX ADMIN — ROPINIROLE HYDROCHLORIDE 1 MG: 1 TABLET, FILM COATED ORAL at 02:14

## 2023-04-20 RX ADMIN — POTASSIUM CHLORIDE 10 MEQ: 7.46 INJECTION, SOLUTION INTRAVENOUS at 16:03

## 2023-04-20 RX ADMIN — ONDANSETRON 4 MG: 2 INJECTION INTRAMUSCULAR; INTRAVENOUS at 03:52

## 2023-04-20 RX ADMIN — POTASSIUM CHLORIDE 10 MEQ: 7.46 INJECTION, SOLUTION INTRAVENOUS at 23:01

## 2023-04-20 RX ADMIN — OXYCODONE 5 MG: 5 TABLET ORAL at 15:13

## 2023-04-20 RX ADMIN — OXYCODONE 5 MG: 5 TABLET ORAL at 20:38

## 2023-04-20 RX ADMIN — INSULIN HUMAN 0.1 UNITS/KG/HR: 1 INJECTION, SOLUTION INTRAVENOUS at 11:20

## 2023-04-20 RX ADMIN — METOCLOPRAMIDE 10 MG: 5 INJECTION, SOLUTION INTRAMUSCULAR; INTRAVENOUS at 02:09

## 2023-04-20 RX ADMIN — POTASSIUM CHLORIDE 20 MEQ: 29.8 INJECTION, SOLUTION INTRAVENOUS at 06:39

## 2023-04-20 RX ADMIN — ROPINIROLE HYDROCHLORIDE 1 MG: 1 TABLET, FILM COATED ORAL at 21:33

## 2023-04-20 RX ADMIN — ONDANSETRON 4 MG: 2 INJECTION INTRAMUSCULAR; INTRAVENOUS at 09:54

## 2023-04-20 ASSESSMENT — PAIN DESCRIPTION - ORIENTATION
ORIENTATION: MID
ORIENTATION: RIGHT;LEFT
ORIENTATION: RIGHT;LEFT

## 2023-04-20 ASSESSMENT — PAIN - FUNCTIONAL ASSESSMENT
PAIN_FUNCTIONAL_ASSESSMENT: ACTIVITIES ARE NOT PREVENTED
PAIN_FUNCTIONAL_ASSESSMENT: ACTIVITIES ARE NOT PREVENTED

## 2023-04-20 ASSESSMENT — PAIN DESCRIPTION - ONSET
ONSET: ON-GOING
ONSET: ON-GOING

## 2023-04-20 ASSESSMENT — PAIN DESCRIPTION - LOCATION
LOCATION: HEAD

## 2023-04-20 ASSESSMENT — PAIN DESCRIPTION - PAIN TYPE
TYPE: CHRONIC PAIN
TYPE: CHRONIC PAIN

## 2023-04-20 ASSESSMENT — PAIN DESCRIPTION - DESCRIPTORS
DESCRIPTORS: ACHING
DESCRIPTORS: ACHING

## 2023-04-20 ASSESSMENT — PAIN SCALES - GENERAL
PAINLEVEL_OUTOF10: 0
PAINLEVEL_OUTOF10: 1
PAINLEVEL_OUTOF10: 6
PAINLEVEL_OUTOF10: 7

## 2023-04-20 ASSESSMENT — PAIN DESCRIPTION - FREQUENCY
FREQUENCY: CONTINUOUS
FREQUENCY: CONTINUOUS

## 2023-04-20 NOTE — CONSULTS
[facility]    Consult Note      Requesting Physician:  rickey    CHIEF COMPLAINT:  nv       HISTORY OF PRESENT ILLNESS:    Ms. Myrtle Merritt  is a 57 y.o. female we are seeing in consultation for known metastatic melanoma   THe pt sees my partner dr clark and is on active treatment with ipilumumab and nivolumab  She has had about 3-4 rounds-and last tx last week  Seh has not noted harder to control sugars on the regimen and so far has not had obvious autoimmune issues  She thinks she went into dka due to her pump malfunctioning  SHe denies diarrhea cough sob   NO rashes  NO neuro changes    ICD-10-CM    1. Diabetic ketoacidosis without coma associated with type 1 diabetes mellitus ([address]  E10.10       2. FAVIAN (acute kidney injury) ([address]  N17.9       3.  Hypokalemia  E87.6        Stage iv melanoma       Past Medical History:  Past Medical History:   Diagnosis Date    Cancer ([address] 2002    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    Depression     Diabetes mellitus ([address]     Hx of radiation therapy     melanoma right eye    Hypertension     Insulin pump in place     Melanoma ([address] 01/13/2023    in stomach, pancreas    Prolonged emergence from general anesthesia     slow to wake up    Restless leg syndrome        Past Surgical History:  Past Surgical History:   Procedure Laterality Date    CARPAL TUNNEL RELEASE Bilateral 12/2017    CHOLECYSTECTOMY      CT BIOPSY ABDOMEN RETROPERITONEUM  12/27/2022    CT BIOPSY ABDOMEN RETROPERITONEUM 12/27/2022 [facility] CT SCAN    EYE SURGERY Right     biopsy    HYSTERECTOMY (CERVIX STATUS UNKNOWN)      LIPOMA RESECTION      LIVER BIOPSY Right     PORT SURGERY Right 1/18/2023    RIGHT PORT PLACEMENT performed by Caitie Atkins MD at [facility] ARTHROSCOPY Right 08/31/2018    RIGHT SHOULDER ARTHROSCOPE, SUBACROMIAL DECOMPRESSION, DISTALCLAVICLE EXCISION, CAPSULAR RELEASE,

## 2023-04-20 NOTE — ACP (ADVANCE CARE PLANNING)
Advance Care Planning     Advance Care Planning Activator (Inpatient)  Conversation Note      Date of ACP Conversation: 4/20/2023     Conversation Conducted with: Patient with Decision Making Capacity    ACP Activator: Russ Camacho, [address] [address] Maker:     Current Designated Health Care Decision Maker:     Primary Decision Maker: Dellar Osler - [phone]    Secondary Decision Maker: Traci Dobbs - Child - [phone]    Supplemental (Other) Decision Maker: Seferino Mendoza Child - [phone]    Care Preferences    Ventilation: \"If you were in your present state of health and suddenly became very ill and were unable to breathe on your own, what would your preference be about the use of a ventilator (breathing machine) if it were available to you? \"      Would the patient desire the use of ventilator (breathing machine)?: yes    \"If your health worsens and it becomes clear that your chance of recovery is unlikely, what would your preference be about the use of a ventilator (breathing machine) if it were available to you? \"     Would the patient desire the use of ventilator (breathing machine)?: No      Resuscitation  \"CPR works best to restart the heart when there is a sudden event, like a heart attack, in someone who is otherwise healthy. Unfortunately, CPR does not typically restart the heart for people who have serious health conditions or who are very sick. \"    \"In the event your heart stopped as a result of an underlying serious health condition, would you want attempts to be made to restart your heart (answer \"yes\" for attempt to resuscitate) or would you prefer a natural death (answer \"no\" for do not attempt to resuscitate)? \" yes       [] Yes   [x] No   Educated Patient / Samia Dozier regarding differences between Advance Directives and portable DNR orders.     Length of ACP Conversation in minutes:  5    Conversation Outcomes:  ACP discussion completed    Follow-up plan:

## 2023-04-20 NOTE — ED TRIAGE NOTES
Per patient she has been vomiting all day, history of DM1. Per patient her meter has been saying \"high\" since this AM.  at this time. Patient A/O x4 at this time. Per patient she just took 3.5 units right before she came in. Denies any other pain at this time.

## 2023-04-20 NOTE — ED PROVIDER NOTES
AS DIRECTED CHANGE EVERY 10 DAYS    CONTINUOUS BLOOD GLUC TRANSMIT (DEXCOM G6 TRANSMITTER) MISC    USE AS DIRECTED AND CHANGE EVERY 90 DAYS    ESCITALOPRAM (LEXAPRO) 10 MG TABLET    Take 0.5 tablet daily x 1 week then increase to 1 tablet daily    GABAPENTIN (NEURONTIN) 300 MG CAPSULE    TAKE TWO CAPSULES BY MOUTH AT BEDTIME    INSULIN DISPOSABLE PUMP (OMNIPOD 5 G6 POD, GEN 5,) MISC    USE AS DIRECTED AND CHANGE POD EVERY 3 DAYS    INSULIN LISPRO (HUMALOG) 100 UNIT/ML INJECTION VIAL    Total daily dose 30 units, use in insulin pump    INSULIN LISPRO (HUMALOG) 100 UNIT/ML SOLN INJECTION VIAL    USE IN INSULIN PUMP; TOTAL DAILY DOSE IS 30 UNITS. VIAL EXPIRES 28 DAYS AFTER OPENING. ONDANSETRON (ZOFRAN) 8 MG TABLET    TAKE 1 TABLET BY MOUTH EVERY 8 HOURS AS NEEDED FOR NAUSEA OR VOMITING    ONDANSETRON (ZOFRAN-ODT) 4 MG DISINTEGRATING TABLET    Take 1 tablet by mouth 3 times daily as needed for Nausea or Vomiting    OXYCODONE (ROXICODONE) 5 MG IMMEDIATE RELEASE TABLET    TAKE 1 TABLET BY MOUTH EVERY 4 TO 6 HOURS AS NEEDED FOR PAIN    PROCHLORPERAZINE (COMPAZINE) 10 MG TABLET    Take 1 tablet by mouth every 6 hours as needed (headache or nausea)    ROPINIROLE (REQUIP) 1 MG TABLET    TAKE ONE TABLET BY MOUTH NIGHTLY       ALLERGIES     Patient has no known allergies.     FAMILY HISTORY       Family History   Problem Relation Age of Onset    High Blood Pressure Mother     Breast Cancer Mother         breast w mets to bone    Diabetes Father     Stroke Father     Cancer Father         bladder          SOCIAL HISTORY       Social History     Socioeconomic History    Marital status:      Spouse name: None    Number of children: None    Years of education: None    Highest education level: None   Tobacco Use    Smoking status: Former     Packs/day: 1.00     Years: 10.00     Pack years: 10.00     Types: Cigarettes     Quit date: 1980     Years since quittin.4    Smokeless tobacco: Never   Vaping Use    Vaping

## 2023-04-20 NOTE — H&P
round, and reactive to light. Extra ocular muscles intact. Dry mucous membranes, anicteric sclera, no nystagmus  Neck: Supple, no JVD  Lungs: Clear breath sounds bilaterally  Heart: Tachycardic but regular rhythm, no murmurs detected  Abdomen: Somewhat firm abdomen, hyperactive bowel sounds, generalized tenderness to palpation,  Extremities: No edema  Skin: No rashes visualized  Neurologic: Difficult to fully evaluate but seems grossly intact neurologically  Mental status: Alert, oriented, thought content appropriate. Capillary Refill: Acceptable  < 3 seconds  Peripheral Pulses: +3 Easily felt, not easily obliterated with pressure    CT abdomen pelvis noncontrast:  Again noted is the large mass at the pancreatic tail which appears to be   invading the posterior wall the stomach, posteromedial aspect of spleen and   left adrenal gland with metastatic retroperitoneal lymph nodes. The known   hepatic metastases are not well appreciated on the current exam due to   extensive artifact as noted above. Old healed fractures noted anterolaterally at the lower ribs on the right. CBC   Recent Labs     04/19/23 2114 04/20/23  0517   WBC 17.9* 15.5*   HGB 10.6* 8.9*   HCT 32.6* 27.3*    274      RENAL  Recent Labs     04/19/23 2114 04/20/23  0517   * 140   K 2.9* 3.4*   CL 92* 104   CO2 13* 19*   PHOS  --  1.7*   BUN 46* 31*   CREATININE 2.1* 1.3*     LFT'S  Recent Labs     04/19/23 2114   AST 23   ALT 17   BILITOT 0.4   ALKPHOS 111     COAG  No results for input(s): INR in the last 72 hours. CARDIAC ENZYMES  No results for input(s): CKTOTAL, CKMB, CKMBINDEX, TROPONINI in the last 72 hours.     U/A:    Lab Results   Component Value Date/Time    NITRITE neg 12/05/2022 12:50 PM    COLORU Yellow 04/20/2023 12:22 AM    WBCUA 1 04/20/2023 12:22 AM    RBCUA 0 04/20/2023 12:22 AM    BACTERIA None Seen 04/20/2023 12:22 AM    CLARITYU Clear 04/20/2023 12:22 AM    SPECGRAV 1.015 04/20/2023 12:22 AM

## 2023-04-21 LAB
ANION GAP SERPL CALCULATED.3IONS-SCNC: 12 MMOL/L (ref 3–16)
ANION GAP SERPL CALCULATED.3IONS-SCNC: 13 MMOL/L (ref 3–16)
ANION GAP SERPL CALCULATED.3IONS-SCNC: 15 MMOL/L (ref 3–16)
BASOPHILS # BLD: 0 K/UL (ref 0–0.2)
BASOPHILS NFR BLD: 0.2 %
BUN SERPL-MCNC: 13 MG/DL (ref 7–20)
BUN SERPL-MCNC: 14 MG/DL (ref 7–20)
BUN SERPL-MCNC: 18 MG/DL (ref 7–20)
CALCIUM SERPL-MCNC: 8.5 MG/DL (ref 8.3–10.6)
CALCIUM SERPL-MCNC: 8.9 MG/DL (ref 8.3–10.6)
CALCIUM SERPL-MCNC: 9 MG/DL (ref 8.3–10.6)
CHLORIDE SERPL-SCNC: 100 MMOL/L (ref 99–110)
CHLORIDE SERPL-SCNC: 103 MMOL/L (ref 99–110)
CHLORIDE SERPL-SCNC: 98 MMOL/L (ref 99–110)
CO2 SERPL-SCNC: 19 MMOL/L (ref 21–32)
CO2 SERPL-SCNC: 20 MMOL/L (ref 21–32)
CO2 SERPL-SCNC: 20 MMOL/L (ref 21–32)
CREAT SERPL-MCNC: 1 MG/DL (ref 0.6–1.2)
CREAT SERPL-MCNC: 1 MG/DL (ref 0.6–1.2)
CREAT SERPL-MCNC: 1.1 MG/DL (ref 0.6–1.2)
DEPRECATED RDW RBC AUTO: 19.6 % (ref 12.4–15.4)
EOSINOPHIL # BLD: 0 K/UL (ref 0–0.6)
EOSINOPHIL NFR BLD: 0.1 %
EST. AVERAGE GLUCOSE BLD GHB EST-MCNC: 185.8 MG/DL
GFR SERPLBLD CREATININE-BSD FMLA CKD-EPI: 56 ML/MIN/{1.73_M2}
GFR SERPLBLD CREATININE-BSD FMLA CKD-EPI: >60 ML/MIN/{1.73_M2}
GFR SERPLBLD CREATININE-BSD FMLA CKD-EPI: >60 ML/MIN/{1.73_M2}
GLUCOSE BLD-MCNC: 130 MG/DL (ref 70–99)
GLUCOSE BLD-MCNC: 132 MG/DL (ref 70–99)
GLUCOSE BLD-MCNC: 138 MG/DL (ref 70–99)
GLUCOSE BLD-MCNC: 144 MG/DL (ref 70–99)
GLUCOSE BLD-MCNC: 144 MG/DL (ref 70–99)
GLUCOSE BLD-MCNC: 157 MG/DL (ref 70–99)
GLUCOSE BLD-MCNC: 157 MG/DL (ref 70–99)
GLUCOSE BLD-MCNC: 169 MG/DL (ref 70–99)
GLUCOSE BLD-MCNC: 176 MG/DL (ref 70–99)
GLUCOSE BLD-MCNC: 184 MG/DL (ref 70–99)
GLUCOSE BLD-MCNC: 185 MG/DL (ref 70–99)
GLUCOSE BLD-MCNC: 195 MG/DL (ref 70–99)
GLUCOSE BLD-MCNC: 210 MG/DL (ref 70–99)
GLUCOSE BLD-MCNC: 242 MG/DL (ref 70–99)
GLUCOSE SERPL-MCNC: 151 MG/DL (ref 70–99)
GLUCOSE SERPL-MCNC: 164 MG/DL (ref 70–99)
GLUCOSE SERPL-MCNC: 232 MG/DL (ref 70–99)
HBA1C MFR BLD: 8.1 %
HCT VFR BLD AUTO: 27.8 % (ref 36–48)
HGB BLD-MCNC: 9.1 G/DL (ref 12–16)
LYMPHOCYTES # BLD: 1.3 K/UL (ref 1–5.1)
LYMPHOCYTES NFR BLD: 6.6 %
MAGNESIUM SERPL-MCNC: 1.4 MG/DL (ref 1.8–2.4)
MAGNESIUM SERPL-MCNC: 1.6 MG/DL (ref 1.8–2.4)
MAGNESIUM SERPL-MCNC: 1.7 MG/DL (ref 1.8–2.4)
MCH RBC QN AUTO: 24.1 PG (ref 26–34)
MCHC RBC AUTO-ENTMCNC: 32.7 G/DL (ref 31–36)
MCV RBC AUTO: 73.5 FL (ref 80–100)
MONOCYTES # BLD: 1.3 K/UL (ref 0–1.3)
MONOCYTES NFR BLD: 6.5 %
NEUTROPHILS # BLD: 17.1 K/UL (ref 1.7–7.7)
NEUTROPHILS NFR BLD: 86.6 %
PERFORMED ON: ABNORMAL
PHOSPHATE SERPL-MCNC: 2.1 MG/DL (ref 2.5–4.9)
PHOSPHATE SERPL-MCNC: 2.9 MG/DL (ref 2.5–4.9)
PHOSPHATE SERPL-MCNC: 3.1 MG/DL (ref 2.5–4.9)
PLATELET # BLD AUTO: 259 K/UL (ref 135–450)
PMV BLD AUTO: 7.1 FL (ref 5–10.5)
POTASSIUM SERPL-SCNC: 4.3 MMOL/L (ref 3.5–5.1)
POTASSIUM SERPL-SCNC: 4.5 MMOL/L (ref 3.5–5.1)
POTASSIUM SERPL-SCNC: 4.6 MMOL/L (ref 3.5–5.1)
PROCALCITONIN SERPL IA-MCNC: 2.29 NG/ML (ref 0–0.15)
RBC # BLD AUTO: 3.78 M/UL (ref 4–5.2)
SODIUM SERPL-SCNC: 132 MMOL/L (ref 136–145)
SODIUM SERPL-SCNC: 133 MMOL/L (ref 136–145)
SODIUM SERPL-SCNC: 135 MMOL/L (ref 136–145)
WBC # BLD AUTO: 19.7 K/UL (ref 4–11)

## 2023-04-21 PROCEDURE — 36591 DRAW BLOOD OFF VENOUS DEVICE: CPT

## 2023-04-21 PROCEDURE — 80048 BASIC METABOLIC PNL TOTAL CA: CPT

## 2023-04-21 PROCEDURE — 2500000003 HC RX 250 WO HCPCS: Performed by: STUDENT IN AN ORGANIZED HEALTH CARE EDUCATION/TRAINING PROGRAM

## 2023-04-21 PROCEDURE — 83735 ASSAY OF MAGNESIUM: CPT

## 2023-04-21 PROCEDURE — 84100 ASSAY OF PHOSPHORUS: CPT

## 2023-04-21 PROCEDURE — 6360000002 HC RX W HCPCS: Performed by: HOSPITALIST

## 2023-04-21 PROCEDURE — 6370000000 HC RX 637 (ALT 250 FOR IP): Performed by: STUDENT IN AN ORGANIZED HEALTH CARE EDUCATION/TRAINING PROGRAM

## 2023-04-21 PROCEDURE — 2580000003 HC RX 258: Performed by: HOSPITALIST

## 2023-04-21 PROCEDURE — C9113 INJ PANTOPRAZOLE SODIUM, VIA: HCPCS | Performed by: STUDENT IN AN ORGANIZED HEALTH CARE EDUCATION/TRAINING PROGRAM

## 2023-04-21 PROCEDURE — 6370000000 HC RX 637 (ALT 250 FOR IP): Performed by: HOSPITALIST

## 2023-04-21 PROCEDURE — 2580000003 HC RX 258: Performed by: STUDENT IN AN ORGANIZED HEALTH CARE EDUCATION/TRAINING PROGRAM

## 2023-04-21 PROCEDURE — 2500000003 HC RX 250 WO HCPCS: Performed by: HOSPITALIST

## 2023-04-21 PROCEDURE — 1200000000 HC SEMI PRIVATE

## 2023-04-21 PROCEDURE — 6360000002 HC RX W HCPCS: Performed by: STUDENT IN AN ORGANIZED HEALTH CARE EDUCATION/TRAINING PROGRAM

## 2023-04-21 PROCEDURE — 85025 COMPLETE CBC W/AUTO DIFF WBC: CPT

## 2023-04-21 PROCEDURE — 94760 N-INVAS EAR/PLS OXIMETRY 1: CPT

## 2023-04-21 PROCEDURE — 84145 PROCALCITONIN (PCT): CPT

## 2023-04-21 PROCEDURE — 36415 COLL VENOUS BLD VENIPUNCTURE: CPT

## 2023-04-21 RX ORDER — LABETALOL HYDROCHLORIDE 5 MG/ML
5 INJECTION, SOLUTION INTRAVENOUS EVERY 4 HOURS PRN
Status: DISCONTINUED | OUTPATIENT
Start: 2023-04-21 | End: 2023-04-22 | Stop reason: HOSPADM

## 2023-04-21 RX ORDER — ESCITALOPRAM OXALATE 10 MG/1
10 TABLET ORAL DAILY
Status: DISCONTINUED | OUTPATIENT
Start: 2023-04-21 | End: 2023-04-22 | Stop reason: HOSPADM

## 2023-04-21 RX ORDER — AMLODIPINE BESYLATE 5 MG/1
5 TABLET ORAL DAILY
Status: DISCONTINUED | OUTPATIENT
Start: 2023-04-21 | End: 2023-04-22 | Stop reason: HOSPADM

## 2023-04-21 RX ORDER — OXYCODONE HYDROCHLORIDE 5 MG/1
5 TABLET ORAL EVERY 4 HOURS PRN
Status: DISCONTINUED | OUTPATIENT
Start: 2023-04-21 | End: 2023-04-21 | Stop reason: SDUPTHER

## 2023-04-21 RX ORDER — INSULIN GLARGINE 100 [IU]/ML
10 INJECTION, SOLUTION SUBCUTANEOUS EVERY MORNING
Status: DISCONTINUED | OUTPATIENT
Start: 2023-04-21 | End: 2023-04-22 | Stop reason: HOSPADM

## 2023-04-21 RX ORDER — INSULIN LISPRO 100 [IU]/ML
0-4 INJECTION, SOLUTION INTRAVENOUS; SUBCUTANEOUS
Status: DISCONTINUED | OUTPATIENT
Start: 2023-04-21 | End: 2023-04-22 | Stop reason: HOSPADM

## 2023-04-21 RX ORDER — INSULIN LISPRO 100 [IU]/ML
0-4 INJECTION, SOLUTION INTRAVENOUS; SUBCUTANEOUS NIGHTLY
Status: DISCONTINUED | OUTPATIENT
Start: 2023-04-21 | End: 2023-04-22 | Stop reason: HOSPADM

## 2023-04-21 RX ORDER — DEXTROSE MONOHYDRATE 100 MG/ML
INJECTION, SOLUTION INTRAVENOUS CONTINUOUS PRN
Status: DISCONTINUED | OUTPATIENT
Start: 2023-04-21 | End: 2023-04-22 | Stop reason: HOSPADM

## 2023-04-21 RX ORDER — ROPINIROLE 1 MG/1
1 TABLET, FILM COATED ORAL NIGHTLY
Status: DISCONTINUED | OUTPATIENT
Start: 2023-04-21 | End: 2023-04-22 | Stop reason: HOSPADM

## 2023-04-21 RX ADMIN — POTASSIUM CHLORIDE 10 MEQ: 7.46 INJECTION, SOLUTION INTRAVENOUS at 00:05

## 2023-04-21 RX ADMIN — AMLODIPINE BESYLATE 5 MG: 5 TABLET ORAL at 18:06

## 2023-04-21 RX ADMIN — POTASSIUM CHLORIDE 10 MEQ: 7.46 INJECTION, SOLUTION INTRAVENOUS at 02:34

## 2023-04-21 RX ADMIN — LABETALOL HYDROCHLORIDE 5 MG: 5 INJECTION INTRAVENOUS at 09:11

## 2023-04-21 RX ADMIN — POTASSIUM CHLORIDE 10 MEQ: 7.46 INJECTION, SOLUTION INTRAVENOUS at 10:04

## 2023-04-21 RX ADMIN — HEPARIN SODIUM 5000 UNITS: 5000 INJECTION INTRAVENOUS; SUBCUTANEOUS at 21:20

## 2023-04-21 RX ADMIN — POTASSIUM CHLORIDE, DEXTROSE MONOHYDRATE AND SODIUM CHLORIDE: 150; 5; 450 INJECTION, SOLUTION INTRAVENOUS at 10:03

## 2023-04-21 RX ADMIN — SODIUM PHOSPHATE, MONOBASIC, MONOHYDRATE AND SODIUM PHOSPHATE, DIBASIC, ANHYDROUS 15 MMOL: 142; 276 INJECTION, SOLUTION INTRAVENOUS at 03:49

## 2023-04-21 RX ADMIN — OXYCODONE 5 MG: 5 TABLET ORAL at 19:36

## 2023-04-21 RX ADMIN — ROPINIROLE HYDROCHLORIDE 1 MG: 1 TABLET, FILM COATED ORAL at 21:20

## 2023-04-21 RX ADMIN — ALUMINUM HYDROXIDE, MAGNESIUM HYDROXIDE, AND SIMETHICONE: 200; 200; 20 SUSPENSION ORAL at 04:34

## 2023-04-21 RX ADMIN — OXYCODONE 5 MG: 5 TABLET ORAL at 03:35

## 2023-04-21 RX ADMIN — MAGNESIUM SULFATE HEPTAHYDRATE 2000 MG: 40 INJECTION, SOLUTION INTRAVENOUS at 11:36

## 2023-04-21 RX ADMIN — INSULIN GLARGINE 10 UNITS: 100 INJECTION, SOLUTION SUBCUTANEOUS at 11:40

## 2023-04-21 RX ADMIN — POTASSIUM CHLORIDE 10 MEQ: 7.46 INJECTION, SOLUTION INTRAVENOUS at 03:36

## 2023-04-21 RX ADMIN — POTASSIUM CHLORIDE 10 MEQ: 7.46 INJECTION, SOLUTION INTRAVENOUS at 08:17

## 2023-04-21 RX ADMIN — HEPARIN SODIUM 5000 UNITS: 5000 INJECTION INTRAVENOUS; SUBCUTANEOUS at 08:58

## 2023-04-21 RX ADMIN — POTASSIUM CHLORIDE 10 MEQ: 7.46 INJECTION, SOLUTION INTRAVENOUS at 06:42

## 2023-04-21 RX ADMIN — HYDRALAZINE HYDROCHLORIDE 5 MG: 20 INJECTION INTRAMUSCULAR; INTRAVENOUS at 00:07

## 2023-04-21 RX ADMIN — Medication 12.5 MG: at 03:13

## 2023-04-21 RX ADMIN — Medication 40 MG: at 04:33

## 2023-04-21 RX ADMIN — OXYCODONE 5 MG: 5 TABLET ORAL at 09:10

## 2023-04-21 RX ADMIN — MAGNESIUM SULFATE HEPTAHYDRATE 2000 MG: 40 INJECTION, SOLUTION INTRAVENOUS at 08:20

## 2023-04-21 RX ADMIN — OXYCODONE 5 MG: 5 TABLET ORAL at 15:52

## 2023-04-21 RX ADMIN — ESCITALOPRAM OXALATE 10 MG: 10 TABLET ORAL at 18:06

## 2023-04-21 RX ADMIN — LABETALOL HYDROCHLORIDE 5 MG: 5 INJECTION INTRAVENOUS at 15:52

## 2023-04-21 RX ADMIN — LABETALOL HYDROCHLORIDE 5 MG: 5 INJECTION INTRAVENOUS at 04:34

## 2023-04-21 ASSESSMENT — PAIN DESCRIPTION - LOCATION
LOCATION: HEAD

## 2023-04-21 ASSESSMENT — PAIN DESCRIPTION - ORIENTATION
ORIENTATION: MID;LEFT;RIGHT
ORIENTATION: ANTERIOR
ORIENTATION: RIGHT;LEFT
ORIENTATION: ANTERIOR
ORIENTATION: RIGHT;LEFT

## 2023-04-21 ASSESSMENT — PAIN - FUNCTIONAL ASSESSMENT
PAIN_FUNCTIONAL_ASSESSMENT: ACTIVITIES ARE NOT PREVENTED
PAIN_FUNCTIONAL_ASSESSMENT: ACTIVITIES ARE NOT PREVENTED
PAIN_FUNCTIONAL_ASSESSMENT: PREVENTS OR INTERFERES SOME ACTIVE ACTIVITIES AND ADLS
PAIN_FUNCTIONAL_ASSESSMENT: PREVENTS OR INTERFERES SOME ACTIVE ACTIVITIES AND ADLS

## 2023-04-21 ASSESSMENT — PAIN DESCRIPTION - DESCRIPTORS
DESCRIPTORS: ACHING
DESCRIPTORS: THROBBING
DESCRIPTORS: ACHING
DESCRIPTORS: THROBBING
DESCRIPTORS: DISCOMFORT;THROBBING

## 2023-04-21 ASSESSMENT — PAIN SCALES - GENERAL
PAINLEVEL_OUTOF10: 7
PAINLEVEL_OUTOF10: 4
PAINLEVEL_OUTOF10: 5
PAINLEVEL_OUTOF10: 7
PAINLEVEL_OUTOF10: 6
PAINLEVEL_OUTOF10: 7
PAINLEVEL_OUTOF10: 2
PAINLEVEL_OUTOF10: 3
PAINLEVEL_OUTOF10: 7

## 2023-04-21 ASSESSMENT — PAIN DESCRIPTION - PAIN TYPE
TYPE: CHRONIC PAIN
TYPE: CHRONIC PAIN

## 2023-04-21 ASSESSMENT — PAIN DESCRIPTION - FREQUENCY
FREQUENCY: CONTINUOUS
FREQUENCY: CONTINUOUS

## 2023-04-21 ASSESSMENT — PAIN DESCRIPTION - ONSET
ONSET: ON-GOING
ONSET: ON-GOING

## 2023-04-21 NOTE — PLAN OF CARE
Problem: Discharge Planning  Goal: Discharge to home or other facility with appropriate resources  Outcome: Progressing  Flowsheets (Taken 4/21/2023 0800)  Discharge to home or other facility with appropriate resources:   Identify barriers to discharge with patient and caregiver   Arrange for needed discharge resources and transportation as appropriate   Identify discharge learning needs (meds, wound care, etc)     Problem: Pain  Goal: Verbalizes/displays adequate comfort level or baseline comfort level  Outcome: Progressing     Problem: Skin/Tissue Integrity  Goal: Absence of new skin breakdown  Description: 1. Monitor for areas of redness and/or skin breakdown  2. Assess vascular access sites hourly  3. Every 4-6 hours minimum:  Change oxygen saturation probe site  4. Every 4-6 hours:  If on nasal continuous positive airway pressure, respiratory therapy assess nares and determine need for appliance change or resting period.   Outcome: Progressing     Problem: Safety - Adult  Goal: Free from fall injury  Outcome: Progressing  Flowsheets (Taken 4/21/2023 1705)  Free From Fall Injury:   Instruct family/caregiver on patient safety   Based on caregiver fall risk screen, instruct family/caregiver to ask for assistance with transferring infant if caregiver noted to have fall risk factors     Problem: Nutrition Deficit:  Goal: Optimize nutritional status  Outcome: Progressing     Problem: ABCDS Injury Assessment  Goal: Absence of physical injury  Outcome: Progressing  Flowsheets (Taken 4/21/2023 1705)  Absence of Physical Injury: Implement safety measures based on patient assessment

## 2023-04-22 VITALS
BODY MASS INDEX: 19.2 KG/M2 | OXYGEN SATURATION: 100 % | TEMPERATURE: 98.1 F | SYSTOLIC BLOOD PRESSURE: 166 MMHG | HEIGHT: 59 IN | HEART RATE: 81 BPM | WEIGHT: 95.24 LBS | DIASTOLIC BLOOD PRESSURE: 80 MMHG | RESPIRATION RATE: 16 BRPM

## 2023-04-22 LAB
ANION GAP SERPL CALCULATED.3IONS-SCNC: 11 MMOL/L (ref 3–16)
BASOPHILS # BLD: 0.1 K/UL (ref 0–0.2)
BASOPHILS NFR BLD: 0.5 %
BUN SERPL-MCNC: 10 MG/DL (ref 7–20)
CALCIUM SERPL-MCNC: 8.7 MG/DL (ref 8.3–10.6)
CHLORIDE SERPL-SCNC: 91 MMOL/L (ref 99–110)
CO2 SERPL-SCNC: 24 MMOL/L (ref 21–32)
CREAT SERPL-MCNC: 0.8 MG/DL (ref 0.6–1.2)
DEPRECATED RDW RBC AUTO: 19.6 % (ref 12.4–15.4)
EOSINOPHIL # BLD: 0 K/UL (ref 0–0.6)
EOSINOPHIL NFR BLD: 0.2 %
GFR SERPLBLD CREATININE-BSD FMLA CKD-EPI: >60 ML/MIN/{1.73_M2}
GLUCOSE BLD-MCNC: 200 MG/DL (ref 70–99)
GLUCOSE BLD-MCNC: 249 MG/DL (ref 70–99)
GLUCOSE BLD-MCNC: 347 MG/DL (ref 70–99)
GLUCOSE SERPL-MCNC: 230 MG/DL (ref 70–99)
HCT VFR BLD AUTO: 29.4 % (ref 36–48)
HGB BLD-MCNC: 10 G/DL (ref 12–16)
LYMPHOCYTES # BLD: 1.3 K/UL (ref 1–5.1)
LYMPHOCYTES NFR BLD: 10.8 %
MAGNESIUM SERPL-MCNC: 2.1 MG/DL (ref 1.8–2.4)
MCH RBC QN AUTO: 25 PG (ref 26–34)
MCHC RBC AUTO-ENTMCNC: 34 G/DL (ref 31–36)
MCV RBC AUTO: 73.5 FL (ref 80–100)
MONOCYTES # BLD: 0.6 K/UL (ref 0–1.3)
MONOCYTES NFR BLD: 4.9 %
NEUTROPHILS # BLD: 10 K/UL (ref 1.7–7.7)
NEUTROPHILS NFR BLD: 83.6 %
PERFORMED ON: ABNORMAL
PHOSPHATE SERPL-MCNC: 2.6 MG/DL (ref 2.5–4.9)
PLATELET # BLD AUTO: 220 K/UL (ref 135–450)
PMV BLD AUTO: 7.1 FL (ref 5–10.5)
POTASSIUM SERPL-SCNC: 4.4 MMOL/L (ref 3.5–5.1)
RBC # BLD AUTO: 3.99 M/UL (ref 4–5.2)
SODIUM SERPL-SCNC: 126 MMOL/L (ref 136–145)
WBC # BLD AUTO: 12 K/UL (ref 4–11)

## 2023-04-22 PROCEDURE — 6370000000 HC RX 637 (ALT 250 FOR IP): Performed by: STUDENT IN AN ORGANIZED HEALTH CARE EDUCATION/TRAINING PROGRAM

## 2023-04-22 PROCEDURE — C9113 INJ PANTOPRAZOLE SODIUM, VIA: HCPCS | Performed by: STUDENT IN AN ORGANIZED HEALTH CARE EDUCATION/TRAINING PROGRAM

## 2023-04-22 PROCEDURE — 6370000000 HC RX 637 (ALT 250 FOR IP): Performed by: HOSPITALIST

## 2023-04-22 PROCEDURE — 2580000003 HC RX 258: Performed by: STUDENT IN AN ORGANIZED HEALTH CARE EDUCATION/TRAINING PROGRAM

## 2023-04-22 PROCEDURE — 6360000002 HC RX W HCPCS: Performed by: STUDENT IN AN ORGANIZED HEALTH CARE EDUCATION/TRAINING PROGRAM

## 2023-04-22 PROCEDURE — 84100 ASSAY OF PHOSPHORUS: CPT

## 2023-04-22 PROCEDURE — 94760 N-INVAS EAR/PLS OXIMETRY 1: CPT

## 2023-04-22 PROCEDURE — 83735 ASSAY OF MAGNESIUM: CPT

## 2023-04-22 PROCEDURE — 85025 COMPLETE CBC W/AUTO DIFF WBC: CPT

## 2023-04-22 PROCEDURE — 80048 BASIC METABOLIC PNL TOTAL CA: CPT

## 2023-04-22 PROCEDURE — 2500000003 HC RX 250 WO HCPCS: Performed by: STUDENT IN AN ORGANIZED HEALTH CARE EDUCATION/TRAINING PROGRAM

## 2023-04-22 RX ORDER — SUCRALFATE 1 G/1
1 TABLET ORAL EVERY 6 HOURS SCHEDULED
Status: DISCONTINUED | OUTPATIENT
Start: 2023-04-22 | End: 2023-04-22 | Stop reason: HOSPADM

## 2023-04-22 RX ORDER — SUCRALFATE 1 G/1
1 TABLET ORAL 4 TIMES DAILY
Qty: 120 TABLET | Refills: 0 | Status: SHIPPED | OUTPATIENT
Start: 2023-04-22

## 2023-04-22 RX ORDER — CALCIUM CARBONATE 200(500)MG
500 TABLET,CHEWABLE ORAL 3 TIMES DAILY PRN
Status: DISCONTINUED | OUTPATIENT
Start: 2023-04-22 | End: 2023-04-22 | Stop reason: HOSPADM

## 2023-04-22 RX ORDER — FAMOTIDINE 20 MG/1
20 TABLET, FILM COATED ORAL DAILY
Status: DISCONTINUED | OUTPATIENT
Start: 2023-04-22 | End: 2023-04-22 | Stop reason: HOSPADM

## 2023-04-22 RX ADMIN — ANTACID TABLETS 500 MG: 500 TABLET, CHEWABLE ORAL at 01:54

## 2023-04-22 RX ADMIN — FAMOTIDINE 20 MG: 20 TABLET, FILM COATED ORAL at 13:56

## 2023-04-22 RX ADMIN — INSULIN LISPRO 1 UNITS: 100 INJECTION, SOLUTION INTRAVENOUS; SUBCUTANEOUS at 13:56

## 2023-04-22 RX ADMIN — HEPARIN SODIUM 5000 UNITS: 5000 INJECTION INTRAVENOUS; SUBCUTANEOUS at 08:05

## 2023-04-22 RX ADMIN — AMLODIPINE BESYLATE 5 MG: 5 TABLET ORAL at 08:05

## 2023-04-22 RX ADMIN — Medication 40 MG: at 08:05

## 2023-04-22 RX ADMIN — OXYCODONE 5 MG: 5 TABLET ORAL at 05:02

## 2023-04-22 RX ADMIN — OXYCODONE 5 MG: 5 TABLET ORAL at 14:01

## 2023-04-22 RX ADMIN — ANTACID TABLETS 500 MG: 500 TABLET, CHEWABLE ORAL at 13:55

## 2023-04-22 RX ADMIN — SUCRALFATE 1 G: 1 TABLET ORAL at 13:56

## 2023-04-22 RX ADMIN — INSULIN GLARGINE 10 UNITS: 100 INJECTION, SOLUTION SUBCUTANEOUS at 08:05

## 2023-04-22 RX ADMIN — INSULIN LISPRO 1 UNITS: 100 INJECTION, SOLUTION INTRAVENOUS; SUBCUTANEOUS at 17:14

## 2023-04-22 RX ADMIN — INSULIN LISPRO 3 UNITS: 100 INJECTION, SOLUTION INTRAVENOUS; SUBCUTANEOUS at 08:04

## 2023-04-22 RX ADMIN — ESCITALOPRAM OXALATE 10 MG: 10 TABLET ORAL at 08:05

## 2023-04-22 RX ADMIN — ONDANSETRON 4 MG: 2 INJECTION INTRAMUSCULAR; INTRAVENOUS at 08:16

## 2023-04-22 RX ADMIN — LABETALOL HYDROCHLORIDE 5 MG: 5 INJECTION INTRAVENOUS at 09:05

## 2023-04-22 ASSESSMENT — PAIN DESCRIPTION - LOCATION
LOCATION: GENERALIZED
LOCATION: ABDOMEN

## 2023-04-22 ASSESSMENT — PAIN SCALES - GENERAL
PAINLEVEL_OUTOF10: 3
PAINLEVEL_OUTOF10: 0
PAINLEVEL_OUTOF10: 6
PAINLEVEL_OUTOF10: 0
PAINLEVEL_OUTOF10: 7

## 2023-04-22 ASSESSMENT — PAIN DESCRIPTION - DESCRIPTORS
DESCRIPTORS: DISCOMFORT
DESCRIPTORS: BURNING

## 2023-04-22 ASSESSMENT — PAIN DESCRIPTION - ORIENTATION: ORIENTATION: MID

## 2023-04-22 NOTE — PLAN OF CARE
Problem: Discharge Planning  Goal: Discharge to home or other facility with appropriate resources  4/22/2023 1921 by Singh Alfred RN  Outcome: Completed  4/22/2023 1602 by Singh Alfred RN  Outcome: Progressing     Problem: Pain  Goal: Verbalizes/displays adequate comfort level or baseline comfort level  4/22/2023 1921 by Singh Alfred RN  Outcome: Completed  4/22/2023 1602 by Singh Alfred RN  Outcome: Progressing     Problem: Skin/Tissue Integrity  Goal: Absence of new skin breakdown  Description: 1. Monitor for areas of redness and/or skin breakdown  2. Assess vascular access sites hourly  3. Every 4-6 hours minimum:  Change oxygen saturation probe site  4. Every 4-6 hours:  If on nasal continuous positive airway pressure, respiratory therapy assess nares and determine need for appliance change or resting period.   4/22/2023 1921 by Singh Alfred RN  Outcome: Completed  4/22/2023 1602 by Singh Alfred RN  Outcome: Progressing     Problem: Safety - Adult  Goal: Free from fall injury  4/22/2023 1921 by Singh Alfred RN  Outcome: Completed  4/22/2023 1602 by Singh Alfred RN  Outcome: Progressing     Problem: Nutrition Deficit:  Goal: Optimize nutritional status  4/22/2023 1921 by Singh Alfred RN  Outcome: Completed  4/22/2023 1602 by Singh Alfred RN  Outcome: Progressing     Problem: ABCDS Injury Assessment  Goal: Absence of physical injury  4/22/2023 1921 by Singh Alfred RN  Outcome: Completed  4/22/2023 1602 by Singh Alfred RN  Outcome: Progressing

## 2023-04-22 NOTE — DISCHARGE INSTR - DIET

## 2023-04-22 NOTE — PROGRESS NOTES
4 Eyes Skin Assessment     NAME:  Andres Abdi  YOB: 1960  MEDICAL RECORD NUMBER:  [id number]    The patient is being assessed for  Shift Handoff    I agree that at lease one RN has performed a thorough Head to Toe Skin Assessment on the patient. ALL assessment sites listed below have been assessed. Areas assessed by both nurses:    Head, Face, Ears, Shoulders, Back, Chest, Arms, Elbows, Hands, Sacrum. Buttock, Coccyx, Ischium, Legs. Feet and Heels, and Under Medical Devices         Does the Patient have a Wound?  No noted wound(s)       Uriah Prevention initiated by RN: No  Wound Care Orders initiated by RN: No    Pressure Injury (Stage 3,4, Unstageable, DTI, NWPT, and Complex wounds) if present, place referral order by RN under : No    New Ostomies, if present place, referral order under : No     Nurse 1 eSignature: Electronically signed by Sarah Boyer RN on 4/21/23 at 6:22 PM EDT    **SHARE this note so that the co-signing nurse can place an eSignature**    Nurse 2 eSignature: {Esignature:[id number]}
4 Eyes Skin Assessment     NAME:  Mariela Funk  YOB: 1960  MEDICAL RECORD NUMBER:  [id number]    The patient is being assessed for  Transfer to New Unit    I agree that at [employer] one RN has performed a thorough Head to Toe Skin Assessment on the patient. ALL assessment sites listed below have been assessed. Areas assessed by both nurses:    Head, Face, Ears, Shoulders, Back, Chest, Arms, Elbows, Hands, Sacrum. Buttock, Coccyx, Ischium, Legs. Feet and Heels, and Under Medical Devices         Does the Patient have a Wound?  No noted wound(s)       Uriah Prevention initiated by RN: No  Wound Care Orders initiated by RN: No    Pressure Injury (Stage 3,4, Unstageable, DTI, NWPT, and Complex wounds) if present, place referral order by RN under : No    New Ostomies, if present place, referral order under : No     Nurse 1 eSignature: Electronically signed by Saurabh Montenegro RN on 4/22/23 at 5:50 PM EDT    **SHARE this note so that the co-signing nurse can place an eSignature**    Nurse 2 eSignature: Electronically signed by Jolene Sims RN on 4/22/23 at 5:52 PM EDT
Hospitalist Progress Note      PCP: Johnny Lozoya, APRN - CNP    Date of Admission: 4/19/2023    Chief Complaint: n/v, elevated blood glucose    Hospital Course: 31F with DM I ,R eye melenoma, HTN, presents with elevated blood glucose. Patient woke up at 9am with n/v, with biliuos emesis, no diarrhea or abdominal pain. Patient believes her insulin was not inserted correctly    Subjective: afebrile, complains of heartburn / epigastric discomfort, no further n/v      Medications:  Reviewed    Infusion Medications    dextrose       Scheduled Medications    pantoprazole (PROTONIX) 40 mg injection  40 mg IntraVENous Daily    insulin glargine  10 Units SubCUTAneous QAM    insulin lispro  0-4 Units SubCUTAneous TID WC    insulin lispro  0-4 Units SubCUTAneous Nightly    rOPINIRole  1 mg Oral Nightly    heparin (porcine)  5,000 Units SubCUTAneous BID     PRN Meds: labetalol, glucose, dextrose bolus **OR** dextrose bolus, glucagon (rDNA), dextrose, oxyCODONE, promethazine, ondansetron      Intake/Output Summary (Last 24 hours) at 4/21/2023 1716  Last data filed at 4/21/2023 1626  Gross per 24 hour   Intake 6258.82 ml   Output 3900 ml   Net 2358.82 ml         Physical Exam Performed:    BP (!) 188/87   Pulse 79   Temp 98.1 °F (36.7 °C)   Resp 18   Ht 4' 11\" (1.499 m)   Wt 98 lb 1.7 oz (44.5 kg)   LMP  (LMP Unknown) Comment: had a hysterectomy a long time ago  SpO2 100%   BMI 19.81 kg/m²     General appearance: No apparent distress, appears stated age and cooperative. HEENT: Pupils equal, round,  Conjunctivae/corneas clear. Neck: Supple, with full range of motion. No jugular venous distention. Trachea midline. Respiratory:  Normal respiratory effort. Cardiovascular: Regular rate and rhythm    Abdomen: Soft, non-tender, non-distended    Musculoskeletal: No clubbing, cyanosis or edema bilaterally. Skin: Skin color, texture, turgor normal.  No rashes or lesions.   Neurologic:  grossly non-focal.
Informed pt that the doctor had ordered an insulin drip for her. She then informed me that she had already started her own insulin pump from home. Blood sugar was rechecked, BG remains elevated at 414. I asked her if she would deactivate her pump for now and she was agreeable. Insulin drip to start shortly.
Medication Reconciliation    List of medications patient is currently taking is complete. Source of information: 1.  RN Interview                                      2. EPIC records
Messaged Dr Ant Weir regarding patient's BP of 188/89 and admin of Labetalol IV. Patient is tolerating food and fluids by mouth and asked if po medications can be ordered. Home meds were ordered.
ONCOLOGY HEMATOLOGY CARE PROGRESS NOTE      SUBJECTIVE: Feeling better today. Mild burning in throat from vomiting. No abdominal pain. No fevers. ROS:     Constitutional:  No weight loss, No fever, No chills, No night sweats. Energy level good.   Eyes:  No impairment or change in vision  ENT / Mouth:  No pain, abnormal ulceration, bleeding, nasal drip or change in voice or hearing  Cardiovascular:  No chest pain, palpitations, new edema, or calf discomfort  Respiratory:  No pain, hemoptysis, change to breathing  Breast:  No pain, discharge, change in appearance or texture  Gastrointestinal:  No pain, cramping, jaundice, change to eating and bowel habits  Urinary:  No pain, bleeding or change in continence  Genitalia: No pain, bleeding or discharge  Musculoskeletal:  No redness, pain, edema or weakness  Skin:  No pruritus, rash, change to nodules or lesions  Neurologic:  No discomfort, change in mental status, speech, sensory or motor activity  Psychiatric:  No change in concentration or change to affect or mood  Endocrine:  No hot flashes, increased thirst, or change to urine production  Hematologic: No petechiae, ecchymosis or bleeding  Lymphatic:  No lymphadenopathy or lymphedema  Allergy / Immunologic:  No eczema, hives, frequent or recurrent infections    OBJECTIVE        Physical    VITALS:  Patient Vitals for the past 24 hrs:   BP Temp Temp src Pulse Resp SpO2 Weight   04/21/23 1700 -- -- -- 79 18 -- --   04/21/23 1622 -- -- -- -- 15 -- --   04/21/23 1600 -- -- -- 86 19 100 % --   04/21/23 1552 (!) 188/87 98.1 °F (36.7 °C) -- 86 -- -- --   04/21/23 1500 -- -- -- 85 17 100 % --   04/21/23 1400 (!) 177/80 -- -- 80 13 100 % --   04/21/23 1300 -- -- -- 91 22 100 % --   04/21/23 1200 (!) 177/84 -- -- 83 19 100 % --   04/21/23 1100 137/69 -- -- 79 21 98 % --   04/21/23 1000 (!) 149/79 -- -- 77 21 99 % --   04/21/23 0940 -- -- -- -- 18 -- --   04/21/23 0900 (!) 186/90 --
Patient complaining of non-stop nausea and intermittent vomiting. Phenergan, Zofran and Reglan all ineffective.     Electronically signed by Linda Brantley RN on 4/20/2023 at 6:47 AM
Patient transitioned off of insulin gtt at 1249 after given 10U lantus. RN spoke with Dr. Mahesh Simpson regarding patient's home insulin pump. Patient's  brought in her insulin pump but Dr. Mahesh Simpson said to continue with sliding scale insulin and she can resume her home insulin pump tomorrow at discharge since she was already given the lantus. 1552 - Patient's . Patient takes 5 mg amlodipine at home. Perfect Serve message sent to Dr. Bo Aaron at to see if he wants to resume home med. Awaiting response.
Pt AOX4 - Pt denied n/v, diarrhea SOB & pain - pt has no further needs at this time - bed in lowest and locked position with bedside table and call light w/in reach non skid socks on
04/19/23 2114 04/20/23 0517   WBC 17.9* 15.5*   HGB 10.6* 8.9*   HCT 32.6* 27.3*    274     Recent Labs     04/19/23 2114 04/20/23 0517 04/20/23  0924   * 140 134*   K 2.9* 3.4* 4.4   CL 92* 104 97*   CO2 13* 19* 15*   BUN 46* 31* 30*   CREATININE 2.1* 1.3* 1.3*   CALCIUM 10.3 9.4 8.9   PHOS  --  1.7* 4.3     Recent Labs     04/19/23 2114   AST 23   ALT 17   BILITOT 0.4   ALKPHOS 111     No results for input(s): INR in the last 72 hours. No results for input(s): Arnold Genera in the last 72 hours. Urinalysis:      Lab Results   Component Value Date/Time    NITRU Negative 04/20/2023 12:22 AM    WBCUA 1 04/20/2023 12:22 AM    BACTERIA None Seen 04/20/2023 12:22 AM    RBCUA 0 04/20/2023 12:22 AM    BLOODU TRACE 04/20/2023 12:22 AM    SPECGRAV 1.015 04/20/2023 12:22 AM    GLUCOSEU >=1000 04/20/2023 12:22 AM    GLUCOSEU >=1000 05/25/2012 09:30 AM       Radiology:  XR CHEST (2 VW)   Final Result   Normal examination with right Port-A-Cath in place. CT ABDOMEN PELVIS WO CONTRAST Additional Contrast? None   Final Result   Again noted is the large mass at the pancreatic tail which appears to be   invading the posterior wall the stomach, posteromedial aspect of spleen and   left adrenal gland with metastatic retroperitoneal lymph nodes. The known   hepatic metastases are not well appreciated on the current exam due to   extensive artifact as noted above. Old healed fractures noted anterolaterally at the lower ribs on the right.                  Assessment/Plan:    Active Hospital Problems    Diagnosis Date Noted    DKA (diabetic ketoacidosis) ([facility] [E11.10] 04/19/2023    Leukocytosis [D72.829] 04/19/2023    FAVIAN (acute kidney injury) ([facility] [N17.9] 04/19/2023    DKA, type 1, not at goal [facility]) [E10.10] 04/19/2023     DKA  - insulin per dka protocol    Hypokalemia  - corrected, continue to monitor    Leukocytosis  - pct elev, likely reactive     Diet: Diet NPO  Code Status: Full Code
liquid status due to medical condition    Nutrition Interventions:   Food and/or Nutrient Delivery:  (Monitor for diet advancements)  Nutrition Education/Counseling: Education declined  Coordination of Nutrition Care: Continue to monitor while inpatient       Goals:     Goals: other (specify)  Specify Other Goals: Initiate most appropriate form of nutrition    Nutrition Monitoring and Evaluation:   Behavioral-Environmental Outcomes: None Identified  Food/Nutrient Intake Outcomes: Diet Advancement/Tolerance  Physical Signs/Symptoms Outcomes: Biochemical Data, Nutrition Focused Physical Findings, Skin, Weight, Nausea or Vomiting    Discharge Planning:     Too soon to determine     Marcello More, [address] [place] [address], LD  Contact: [phone]
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    Malignant neoplasm metastatic to liver (HCC) C78.7    Microalbuminuria R80.9    Pancreatic mass K86.89    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    Restless legs syndrome (RLS) G25.81    Diabetic nephropathy associated with type 1 diabetes mellitus (HCC) E10.21    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, 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    Hypertensive urgency I16.0    DKA (diabetic ketoacidosis) (HCC) E11.10    Leukocytosis D72.829    FAVIAN (acute kidney injury) ([facility] N17.9    DKA, type 1, not at goal ([facility] E10.10        BP (!) 182/76   Pulse (!) 107   Temp 97.8 °F (36.6 °C) (Oral)   Resp 18   Ht 4' 11\" (1.499 m)   Wt 97 lb 14.2 oz (44.4 kg)   LMP  (LMP Unknown) Comment: had a hysterectomy a long time ago  SpO2 100%   BMI 19.77 kg/m²     HgBA1c:    Lab Results   Component Value Date/Time    LABA1C 8.3 04/01/2023 07:04 AM       Recent Labs     04/20/23  0312 04/20/23  0427 04/20/23  0815 04/20/23  0923   POCGLU 158* 181* 312* 362*       BUN/Creatinine:    Lab Results   Component Value Date/Time    BUN 31 04/20/2023 05:17 AM    CREATININE 1.3 04/20/2023 05:17 AM       Assessment        Diabetes Management and Education    Does the patient have a Primary Care Physician? Yes, Ioana Chang APRN - CNP  Endocrinology support with Dr. Maricarmen Fleming. Does the patient require new medication instruction? TBD - anticipate resumption of insulin pump when DKA resolved.

## 2023-04-22 NOTE — CARE COORDINATION
04/20/23 1708   Readmission Assessment   Number of Days since last admission? 8-30 days   Who is being Interviewed Patient   What was the patient's/caregiver's perception as to why they think they needed to return back to the hospital? Other (Comment)  (N/V, elevated blood glucose levels)   Did you visit your Primary Care Physician after you left the hospital, before you returned this time? Yes   Did you see a specialist, such as Cardiac, Pulmonary, Orthopedic Physician, etc. after you left the hospital? No   Who advised the patient to return to the hospital? Self-referral   Does the patient report anything that got in the way of taking their medications? No   In our efforts to provide the best possible care to you and others like you, can you think of anything that we could have done to help you after you left the hospital the first time, so that you might not have needed to return so soon?  Other (Comment)  (Nothing)     William SIERRA RN  Case Management  [phone]    Electronically signed by William Mejia RN on 4/20/2023 at 5:10 PM
Per chart review, on RA, glucose level this morning <200. Discharge plan is to return to home with spouse. No needs identified. Family will provide transportation.      Chacorta CELISN RN  Case Management  [phone]    Electronically signed by Chacorta Pacheco RN on 4/21/2023 at 3:51 PM
/24  OT AM-PAC:   /24    Family can provide assistance at DC: Yes  Would you like Case Management to discuss the discharge plan with any other family members/significant others, and if so, who? No  Plans to Return to Present Housing: Yes  Other Identified Issues/Barriers to RETURNING to current housing: None  Potential Assistance needed at discharge: N/A            Potential DME: N/A   Patient expects to discharge to: [address] [place] [address] for transportation at discharge: Family     Financial    Payor: Alma Moran / Plan: Berny Calvillo [id number] / Product Type: *No Product type* /     Does insurance require precert for SNF: Yes    Potential assistance Purchasing Medications: No  Meds-to-Beds request: Yes      [place] [address] [address] [facility] [phone] - F [phone]  [facility] [address] [address]  Phone: [phone] Fax: [phone]    [address] [place] [address]  Phone: [phone] Fax: [phone]    [facility] [address], [address] [facility] [phone] Lenetta Shallow [phone]  [address] [facility] [place] [address]  Phone: [phone] Fax: [phone]      Notes:    Factors facilitating achievement of predicted outcomes: Family support    Barriers to discharge: Medication managment    Additional Case Management Notes: Discharge plan is to return to home with spouse. No needs identified at this time. Family will provide transportation. The Plan for Transition of Care is related to the following treatment goals of Hypokalemia [E87.6]  FAVIAN (acute kidney injury) ([facility] [address] [address] [N17.9]  DKA, type 1, not at goal [facility]) [E10.10]  Diabetic ketoacidosis without coma associated with type 1 diabetes mellitus ([facility] [E10.10]    The Patient and/or Patient Representative Agree with the Discharge Plan?
ketoacidosis without coma associated with type 1 diabetes mellitus [facility] [E10.10]    Kelly Pavon RN  [address] [address]   Case Management Department  Ph: [phone]    Electronically signed by Kelly Pavon RN on 4/22/2023 at 4:03 PM

## 2023-04-22 NOTE — DISCHARGE SUMMARY
>60 >60    Calcium 8.5 8.3 - 10.6 mg/dL   Magnesium   Result Value Ref Range    Magnesium 1.40 (L) 1.80 - 2.40 mg/dL   Phosphorus   Result Value Ref Range    Phosphorus 3.1 2.5 - 4.9 mg/dL   Procalcitonin   Result Value Ref Range    Procalcitonin 2.29 (H) 0.00 - 0.15 ng/mL   CBC with Auto Differential   Result Value Ref Range    WBC 12.0 (H) 4.0 - 11.0 K/uL    RBC 3.99 (L) 4.00 - 5.20 M/uL    Hemoglobin 10.0 (L) 12.0 - 16.0 g/dL    Hematocrit 29.4 (L) 36.0 - 48.0 %    MCV 73.5 (L) 80.0 - 100.0 fL    MCH 25.0 (L) 26.0 - 34.0 pg    MCHC 34.0 31.0 - 36.0 g/dL    RDW 19.6 (H) 12.4 - 15.4 %    Platelets 057 361 - 620 K/uL    MPV 7.1 5.0 - 10.5 fL    Neutrophils % 83.6 %    Lymphocytes % 10.8 %    Monocytes % 4.9 %    Eosinophils % 0.2 %    Basophils % 0.5 %    Neutrophils Absolute 10.0 (H) 1.7 - 7.7 K/uL    Lymphocytes Absolute 1.3 1.0 - 5.1 K/uL    Monocytes Absolute 0.6 0.0 - 1.3 K/uL    Eosinophils Absolute 0.0 0.0 - 0.6 K/uL    Basophils Absolute 0.1 0.0 - 0.2 K/uL   Basic Metabolic Panel   Result Value Ref Range    Sodium 126 (L) 136 - 145 mmol/L    Potassium 4.4 3.5 - 5.1 mmol/L    Chloride 91 (L) 99 - 110 mmol/L    CO2 24 21 - 32 mmol/L    Anion Gap 11 3 - 16    Glucose 230 (H) 70 - 99 mg/dL    BUN 10 7 - 20 mg/dL    Creatinine 0.8 0.6 - 1.2 mg/dL    Est, Glom Filt Rate >60 >60    Calcium 8.7 8.3 - 10.6 mg/dL   Magnesium   Result Value Ref Range    Magnesium 2.10 1.80 - 2.40 mg/dL   Phosphorus   Result Value Ref Range    Phosphorus 2.6 2.5 - 4.9 mg/dL   POCT Glucose   Result Value Ref Range    POC Glucose 490 (H) 70 - 99 mg/dl    Performed on ACCU-CHEK    POCT Glucose   Result Value Ref Range    POC Glucose 308 (H) 70 - 99 mg/dl    Performed on ACCU-CHEK    POCT Glucose   Result Value Ref Range    POC Glucose 227 (H) 70 - 99 mg/dl    Performed on ACCU-CHEK    POCT Glucose   Result Value Ref Range    POC Glucose 191 (H) 70 - 99 mg/dl    Performed on ACCU-CHEK    POCT Glucose   Result Value Ref Range    POC Glucose

## 2023-04-27 ENCOUNTER — HOSPITAL ENCOUNTER (OUTPATIENT)
Dept: CT IMAGING | Age: 63
Discharge: HOME | End: 2023-04-27
Payer: COMMERCIAL

## 2023-04-27 DIAGNOSIS — C43.9 MALIGNANT MELANOMA, UNSPECIFIED SITE (HCC): ICD-10-CM

## 2023-04-27 DIAGNOSIS — C77.2 SECONDARY AND UNSPECIFIED MALIGNANT NEOPLASM OF INTRA-ABDOMINAL LYMPH NODES (HCC): ICD-10-CM

## 2023-04-27 DIAGNOSIS — C78.00 MALIGNANT NEOPLASM METASTATIC TO LUNG, UNSPECIFIED LATERALITY (HCC): ICD-10-CM

## 2023-04-27 DIAGNOSIS — C69.31 CHOROID MELANOMA OF RIGHT EYE (HCC): ICD-10-CM

## 2023-04-27 DIAGNOSIS — D37.8 NEOPLASM OF UNCERTAIN BEHAVIOR OF HEAD OF PANCREAS: ICD-10-CM

## 2023-04-27 PROCEDURE — 6360000004 HC RX CONTRAST MEDICATION: Performed by: INTERNAL MEDICINE

## 2023-04-27 PROCEDURE — 71260 CT THORAX DX C+: CPT

## 2023-04-27 RX ADMIN — IOPAMIDOL 75 ML: 755 INJECTION, SOLUTION INTRAVENOUS at 10:10

## 2023-04-27 RX ADMIN — IOPAMIDOL 50 ML: 612 INJECTION, SOLUTION INTRAVENOUS at 10:10

## 2023-05-08 DIAGNOSIS — G25.81 RESTLESS LEG SYNDROME: ICD-10-CM

## 2023-05-08 DIAGNOSIS — E10.65 POORLY CONTROLLED TYPE 1 DIABETES MELLITUS WITH NEUROPATHY (HCC): ICD-10-CM

## 2023-05-08 DIAGNOSIS — E10.40 POORLY CONTROLLED TYPE 1 DIABETES MELLITUS WITH NEUROPATHY (HCC): ICD-10-CM

## 2023-05-08 RX ORDER — ROPINIROLE 1 MG/1
TABLET, FILM COATED ORAL
Qty: 90 TABLET | Refills: 0 | Status: SHIPPED | OUTPATIENT
Start: 2023-05-08

## 2023-05-08 RX ORDER — INSULIN PMP CART,AUT,G6/7,CNTR
EACH SUBCUTANEOUS
Qty: 30 EACH | Refills: 0 | Status: SHIPPED | OUTPATIENT
Start: 2023-05-08

## 2023-05-08 NOTE — TELEPHONE ENCOUNTER
Medication:   Requested Prescriptions     Pending Prescriptions Disp Refills    rOPINIRole (REQUIP) 1 MG tablet [Pharmacy Med Name: ROPINIROLE HCL 1MG TABS] 90 tablet 0     Sig: TAKE ONE TABLET BY MOUTH EVERY NIGHT        Last Filled: 3/13/2023   Last appt: 4/3/2023   Next appt: NONE

## 2023-05-18 DIAGNOSIS — F32.2 CURRENT SEVERE EPISODE OF MAJOR DEPRESSIVE DISORDER WITHOUT PSYCHOTIC FEATURES WITHOUT PRIOR EPISODE (HCC): ICD-10-CM

## 2023-05-18 RX ORDER — ESCITALOPRAM OXALATE 10 MG/1
TABLET ORAL
Qty: 30 TABLET | Refills: 1 | Status: SHIPPED | OUTPATIENT
Start: 2023-05-18

## 2023-05-18 RX ORDER — ESCITALOPRAM OXALATE 10 MG/1
TABLET ORAL
Qty: 6 TABLET | Refills: 1 | Status: SHIPPED | OUTPATIENT
Start: 2023-05-18 | End: 2023-05-18 | Stop reason: SDUPTHER

## 2023-05-18 NOTE — TELEPHONE ENCOUNTER
Medication:   Requested Prescriptions     Pending Prescriptions Disp Refills    escitalopram (LEXAPRO) 10 MG tablet [Pharmacy Med Name: ESCITALOPRAM OXALATE 10MG TABS] 6 tablet 1     Sig: TAKE ONE TABLET BY MOUTH ONE TIME A DAY        Last Filled:  4/3/23    Patient Phone Number: [phone] (home)     Last appt: 4/3/2023   Next appt: Visit date not found    Last OARRS: No flowsheet data found.

## 2023-05-18 NOTE — TELEPHONE ENCOUNTER
Quantity clarification needed for Escitalopram Rx that was sent to [facility]. Quantity is only 6 with a refill. Should quantity be 90?

## 2023-06-07 ENCOUNTER — HOSPITAL ENCOUNTER (INPATIENT)
Age: 63
LOS: 2 days | Discharge: HOME | DRG: 682 | End: 2023-06-09
Attending: EMERGENCY MEDICINE | Admitting: HOSPITALIST
Payer: COMMERCIAL

## 2023-06-07 ENCOUNTER — APPOINTMENT (OUTPATIENT)
Dept: CT IMAGING | Age: 63
DRG: 682 | End: 2023-06-07
Payer: COMMERCIAL

## 2023-06-07 DIAGNOSIS — N17.9 AKI (ACUTE KIDNEY INJURY) (HCC): Primary | ICD-10-CM

## 2023-06-07 DIAGNOSIS — M54.2 NECK PAIN: ICD-10-CM

## 2023-06-07 DIAGNOSIS — E16.2 HYPOGLYCEMIA: ICD-10-CM

## 2023-06-07 DIAGNOSIS — R41.82 ALTERED MENTAL STATUS, UNSPECIFIED ALTERED MENTAL STATUS TYPE: ICD-10-CM

## 2023-06-07 DIAGNOSIS — Z85.820 HISTORY OF MELANOMA: ICD-10-CM

## 2023-06-07 DIAGNOSIS — Z71.89 GOALS OF CARE, COUNSELING/DISCUSSION: ICD-10-CM

## 2023-06-07 LAB
ALBUMIN SERPL-MCNC: 3.9 G/DL (ref 3.4–5)
ALBUMIN/GLOB SERPL: 1.3 {RATIO} (ref 1.1–2.2)
ALP SERPL-CCNC: 124 U/L (ref 40–129)
ALT SERPL-CCNC: 8 U/L (ref 10–40)
AMPHETAMINES UR QL SCN>1000 NG/ML: ABNORMAL
ANION GAP SERPL CALCULATED.3IONS-SCNC: 12 MMOL/L (ref 3–16)
AST SERPL-CCNC: 25 U/L (ref 15–37)
BARBITURATES UR QL SCN>200 NG/ML: ABNORMAL
BENZODIAZ UR QL SCN>200 NG/ML: ABNORMAL
BILIRUB SERPL-MCNC: 0.3 MG/DL (ref 0–1)
BUN SERPL-MCNC: 20 MG/DL (ref 7–20)
CALCIUM SERPL-MCNC: 9.2 MG/DL (ref 8.3–10.6)
CANNABINOIDS UR QL SCN>50 NG/ML: ABNORMAL
CHLORIDE SERPL-SCNC: 106 MMOL/L (ref 99–110)
CHP ED QC CHECK: YES
CO2 SERPL-SCNC: 22 MMOL/L (ref 21–32)
COCAINE UR QL SCN: ABNORMAL
CREAT SERPL-MCNC: 1.9 MG/DL (ref 0.6–1.2)
CRP SERPL-MCNC: 155.6 MG/L (ref 0–5.1)
DEPRECATED RDW RBC AUTO: 15.1 % (ref 12.4–15.4)
DRUG SCREEN COMMENT UR-IMP: ABNORMAL
ERYTHROCYTE [SEDIMENTATION RATE] IN BLOOD BY WESTERGREN METHOD: 49 MM/HR (ref 0–30)
ETHANOLAMINE SERPL-MCNC: NORMAL MG/DL (ref 0–0.08)
FENTANYL SCREEN, URINE: ABNORMAL
GFR SERPLBLD CREATININE-BSD FMLA CKD-EPI: 29 ML/MIN/{1.73_M2}
GLUCOSE BLD-MCNC: 129 MG/DL (ref 70–99)
GLUCOSE BLD-MCNC: 162 MG/DL (ref 70–99)
GLUCOSE BLD-MCNC: 36 MG/DL (ref 70–99)
GLUCOSE BLD-MCNC: 36 MG/DL (ref 70–99)
GLUCOSE BLD-MCNC: 91 MG/DL
GLUCOSE BLD-MCNC: 91 MG/DL (ref 70–99)
GLUCOSE SERPL-MCNC: 86 MG/DL (ref 70–99)
HCT VFR BLD AUTO: 27.3 % (ref 36–48)
HGB BLD-MCNC: 9 G/DL (ref 12–16)
INR PPP: 1.2 (ref 0.84–1.16)
MCH RBC QN AUTO: 25.5 PG (ref 26–34)
MCHC RBC AUTO-ENTMCNC: 33 G/DL (ref 31–36)
MCV RBC AUTO: 77.2 FL (ref 80–100)
METHADONE UR QL SCN>300 NG/ML: ABNORMAL
OPIATES UR QL SCN>300 NG/ML: ABNORMAL
OXYCODONE UR QL SCN: POSITIVE
PCP UR QL SCN>25 NG/ML: ABNORMAL
PERFORMED ON: ABNORMAL
PERFORMED ON: NORMAL
PH UR STRIP: 6 [PH]
PLATELET # BLD AUTO: 239 K/UL (ref 135–450)
PMV BLD AUTO: 8.3 FL (ref 5–10.5)
POTASSIUM SERPL-SCNC: 3.7 MMOL/L (ref 3.5–5.1)
PROCALCITONIN SERPL IA-MCNC: 0.21 NG/ML (ref 0–0.15)
PROT SERPL-MCNC: 6.8 G/DL (ref 6.4–8.2)
PROTHROMBIN TIME: 15.2 SEC (ref 11.5–14.8)
RBC # BLD AUTO: 3.54 M/UL (ref 4–5.2)
SODIUM SERPL-SCNC: 140 MMOL/L (ref 136–145)
WBC # BLD AUTO: 7.1 K/UL (ref 4–11)

## 2023-06-07 PROCEDURE — 87040 BLOOD CULTURE FOR BACTERIA: CPT

## 2023-06-07 PROCEDURE — 87483 CNS DNA AMP PROBE TYPE 12-25: CPT

## 2023-06-07 PROCEDURE — 2060000000 HC ICU INTERMEDIATE R&B

## 2023-06-07 PROCEDURE — 6370000000 HC RX 637 (ALT 250 FOR IP): Performed by: NURSE PRACTITIONER

## 2023-06-07 PROCEDURE — 6360000002 HC RX W HCPCS: Performed by: NURSE PRACTITIONER

## 2023-06-07 PROCEDURE — 80053 COMPREHEN METABOLIC PANEL: CPT

## 2023-06-07 PROCEDURE — 36415 COLL VENOUS BLD VENIPUNCTURE: CPT

## 2023-06-07 PROCEDURE — 84157 ASSAY OF PROTEIN OTHER: CPT

## 2023-06-07 PROCEDURE — 82077 ASSAY SPEC XCP UR&BREATH IA: CPT

## 2023-06-07 PROCEDURE — 70450 CT HEAD/BRAIN W/O DYE: CPT

## 2023-06-07 PROCEDURE — 2580000003 HC RX 258: Performed by: EMERGENCY MEDICINE

## 2023-06-07 PROCEDURE — 80307 DRUG TEST PRSMV CHEM ANLYZR: CPT

## 2023-06-07 PROCEDURE — 84145 PROCALCITONIN (PCT): CPT

## 2023-06-07 PROCEDURE — 85610 PROTHROMBIN TIME: CPT

## 2023-06-07 PROCEDURE — 85027 COMPLETE CBC AUTOMATED: CPT

## 2023-06-07 PROCEDURE — 96365 THER/PROPH/DIAG IV INF INIT: CPT

## 2023-06-07 PROCEDURE — 99285 EMERGENCY DEPT VISIT HI MDM: CPT

## 2023-06-07 PROCEDURE — 93005 ELECTROCARDIOGRAM TRACING: CPT | Performed by: NURSE PRACTITIONER

## 2023-06-07 PROCEDURE — 85652 RBC SED RATE AUTOMATED: CPT

## 2023-06-07 PROCEDURE — 6360000002 HC RX W HCPCS: Performed by: EMERGENCY MEDICINE

## 2023-06-07 PROCEDURE — 2580000003 HC RX 258: Performed by: NURSE PRACTITIONER

## 2023-06-07 PROCEDURE — 89050 BODY FLUID CELL COUNT: CPT

## 2023-06-07 PROCEDURE — 96361 HYDRATE IV INFUSION ADD-ON: CPT

## 2023-06-07 PROCEDURE — 86140 C-REACTIVE PROTEIN: CPT

## 2023-06-07 PROCEDURE — 82945 GLUCOSE OTHER FLUID: CPT

## 2023-06-07 PROCEDURE — 2500000003 HC RX 250 WO HCPCS: Performed by: EMERGENCY MEDICINE

## 2023-06-07 PROCEDURE — 96367 TX/PROPH/DG ADDL SEQ IV INF: CPT

## 2023-06-07 PROCEDURE — 1200000000 HC SEMI PRIVATE

## 2023-06-07 PROCEDURE — 96375 TX/PRO/DX INJ NEW DRUG ADDON: CPT

## 2023-06-07 PROCEDURE — 2500000003 HC RX 250 WO HCPCS: Performed by: NURSE PRACTITIONER

## 2023-06-07 RX ORDER — LIDOCAINE HYDROCHLORIDE 20 MG/ML
5 INJECTION, SOLUTION INFILTRATION; PERINEURAL ONCE
Status: COMPLETED | OUTPATIENT
Start: 2023-06-07 | End: 2023-06-07

## 2023-06-07 RX ORDER — ACETAMINOPHEN 500 MG
1000 TABLET ORAL ONCE
Status: COMPLETED | OUTPATIENT
Start: 2023-06-07 | End: 2023-06-07

## 2023-06-07 RX ORDER — DEXTROSE MONOHYDRATE 25 G/50ML
25 INJECTION, SOLUTION INTRAVENOUS ONCE
Status: COMPLETED | OUTPATIENT
Start: 2023-06-07 | End: 2023-06-07

## 2023-06-07 RX ORDER — MIDAZOLAM HYDROCHLORIDE 1 MG/ML
2 INJECTION INTRAMUSCULAR; INTRAVENOUS ONCE
Status: COMPLETED | OUTPATIENT
Start: 2023-06-07 | End: 2023-06-07

## 2023-06-07 RX ORDER — SODIUM CHLORIDE 9 MG/ML
INJECTION, SOLUTION INTRAVENOUS CONTINUOUS
Status: DISCONTINUED | OUTPATIENT
Start: 2023-06-07 | End: 2023-06-09 | Stop reason: HOSPADM

## 2023-06-07 RX ORDER — 0.9 % SODIUM CHLORIDE 0.9 %
1000 INTRAVENOUS SOLUTION INTRAVENOUS ONCE
Status: COMPLETED | OUTPATIENT
Start: 2023-06-07 | End: 2023-06-07

## 2023-06-07 RX ORDER — INSULIN LISPRO 100 [IU]/ML
0-8 INJECTION, SOLUTION INTRAVENOUS; SUBCUTANEOUS
Status: DISCONTINUED | OUTPATIENT
Start: 2023-06-08 | End: 2023-06-08 | Stop reason: DRUGHIGH

## 2023-06-07 RX ORDER — KETOROLAC TROMETHAMINE 30 MG/ML
15 INJECTION, SOLUTION INTRAMUSCULAR; INTRAVENOUS ONCE
Status: COMPLETED | OUTPATIENT
Start: 2023-06-07 | End: 2023-06-07

## 2023-06-07 RX ORDER — 0.9 % SODIUM CHLORIDE 0.9 %
500 INTRAVENOUS SOLUTION INTRAVENOUS ONCE
Status: COMPLETED | OUTPATIENT
Start: 2023-06-07 | End: 2023-06-07

## 2023-06-07 RX ORDER — CIPROFLOXACIN 500 MG/1
500 TABLET, FILM COATED ORAL 2 TIMES DAILY
Status: ON HOLD | COMMUNITY
Start: 2023-06-05 | End: 2023-06-09 | Stop reason: HOSPADM

## 2023-06-07 RX ORDER — DEXTROSE MONOHYDRATE 100 MG/ML
INJECTION, SOLUTION INTRAVENOUS CONTINUOUS PRN
Status: DISCONTINUED | OUTPATIENT
Start: 2023-06-07 | End: 2023-06-09 | Stop reason: HOSPADM

## 2023-06-07 RX ORDER — INSULIN LISPRO 100 [IU]/ML
0-4 INJECTION, SOLUTION INTRAVENOUS; SUBCUTANEOUS NIGHTLY
Status: DISCONTINUED | OUTPATIENT
Start: 2023-06-07 | End: 2023-06-08 | Stop reason: DRUGHIGH

## 2023-06-07 RX ORDER — MORPHINE SULFATE 4 MG/ML
4 INJECTION, SOLUTION INTRAMUSCULAR; INTRAVENOUS ONCE
Status: COMPLETED | OUTPATIENT
Start: 2023-06-07 | End: 2023-06-07

## 2023-06-07 RX ADMIN — MIDAZOLAM HYDROCHLORIDE 2 MG: 1 INJECTION, SOLUTION INTRAMUSCULAR; INTRAVENOUS at 22:36

## 2023-06-07 RX ADMIN — DEXTROSE MONOHYDRATE 25 G: 25 INJECTION, SOLUTION INTRAVENOUS at 19:38

## 2023-06-07 RX ADMIN — AMPICILLIN SODIUM 2000 MG: 2 INJECTION, POWDER, FOR SOLUTION INTRAMUSCULAR; INTRAVENOUS at 21:14

## 2023-06-07 RX ADMIN — VANCOMYCIN HYDROCHLORIDE 1000 MG: 1 INJECTION, POWDER, LYOPHILIZED, FOR SOLUTION INTRAVENOUS at 18:46

## 2023-06-07 RX ADMIN — CEFTRIAXONE 1000 MG: 1 INJECTION, POWDER, FOR SOLUTION INTRAMUSCULAR; INTRAVENOUS at 18:21

## 2023-06-07 RX ADMIN — MORPHINE SULFATE 4 MG: 4 INJECTION, SOLUTION INTRAMUSCULAR; INTRAVENOUS at 18:37

## 2023-06-07 RX ADMIN — ACETAMINOPHEN 1000 MG: 500 TABLET ORAL at 17:02

## 2023-06-07 RX ADMIN — SODIUM CHLORIDE 500 ML: 9 INJECTION, SOLUTION INTRAVENOUS at 18:54

## 2023-06-07 RX ADMIN — LIDOCAINE HYDROCHLORIDE 5 ML: 20 INJECTION, SOLUTION INFILTRATION; PERINEURAL at 22:40

## 2023-06-07 RX ADMIN — KETOROLAC TROMETHAMINE 15 MG: 30 INJECTION, SOLUTION INTRAMUSCULAR at 18:18

## 2023-06-07 RX ADMIN — SODIUM CHLORIDE 1000 ML: 9 INJECTION, SOLUTION INTRAVENOUS at 17:01

## 2023-06-07 ASSESSMENT — ENCOUNTER SYMPTOMS
ABDOMINAL PAIN: 0
DIARRHEA: 0
NAUSEA: 1
ANAL BLEEDING: 0
VOMITING: 1
EYE PAIN: 0
SHORTNESS OF BREATH: 0
SORE THROAT: 0
BACK PAIN: 0
COUGH: 0

## 2023-06-07 ASSESSMENT — PAIN SCALES - GENERAL
PAINLEVEL_OUTOF10: 10
PAINLEVEL_OUTOF10: 10
PAINLEVEL_OUTOF10: 8
PAINLEVEL_OUTOF10: 2

## 2023-06-07 ASSESSMENT — PAIN DESCRIPTION - LOCATION
LOCATION: LEG
LOCATION: HEAD;NECK

## 2023-06-07 ASSESSMENT — PAIN - FUNCTIONAL ASSESSMENT: PAIN_FUNCTIONAL_ASSESSMENT: 0-10

## 2023-06-07 NOTE — ED TRIAGE NOTES
[facility] called to report pt has stage IV Melanoma and has had confusion x4 days. Pt also c/o head and neck pain. Pt was seen at [facility] and received fluids. Pt recently treated for a UTI.

## 2023-06-07 NOTE — ED NOTES

## 2023-06-08 ENCOUNTER — APPOINTMENT (OUTPATIENT)
Dept: MRI IMAGING | Age: 63
DRG: 682 | End: 2023-06-08
Payer: COMMERCIAL

## 2023-06-08 LAB
ALBUMIN SERPL-MCNC: 2.8 G/DL (ref 3.4–5)
ALBUMIN/GLOB SERPL: 1.1 {RATIO} (ref 1.1–2.2)
ALP SERPL-CCNC: 88 U/L (ref 40–129)
ALT SERPL-CCNC: 8 U/L (ref 10–40)
ANION GAP SERPL CALCULATED.3IONS-SCNC: 10 MMOL/L (ref 3–16)
APPEARANCE CSF: CLEAR
AST SERPL-CCNC: 15 U/L (ref 15–37)
BACTERIA URNS QL MICRO: ABNORMAL /HPF
BASOPHILS # BLD: 0.1 K/UL (ref 0–0.2)
BASOPHILS NFR BLD: 1.2 %
BILIRUB SERPL-MCNC: <0.2 MG/DL (ref 0–1)
BILIRUB UR QL STRIP.AUTO: NEGATIVE
BUN SERPL-MCNC: 17 MG/DL (ref 7–20)
CALCIUM SERPL-MCNC: 8.3 MG/DL (ref 8.3–10.6)
CHLORIDE SERPL-SCNC: 107 MMOL/L (ref 99–110)
CLARITY UR: CLEAR
CLOT EVALUATION CSF: ABNORMAL
CO2 SERPL-SCNC: 20 MMOL/L (ref 21–32)
COLOR CSF: COLORLESS
COLOR UR: YELLOW
CREAT SERPL-MCNC: 1.9 MG/DL (ref 0.6–1.2)
DEPRECATED RDW RBC AUTO: 15 % (ref 12.4–15.4)
EKG ATRIAL RATE: 90 BPM
EKG DIAGNOSIS: NORMAL
EKG P AXIS: 80 DEGREES
EKG P-R INTERVAL: 132 MS
EKG Q-T INTERVAL: 386 MS
EKG QRS DURATION: 72 MS
EKG QTC CALCULATION (BAZETT): 472 MS
EKG R AXIS: 34 DEGREES
EKG T AXIS: 42 DEGREES
EKG VENTRICULAR RATE: 90 BPM
EOSINOPHIL # BLD: 0.9 K/UL (ref 0–0.6)
EOSINOPHIL NFR BLD: 14.8 %
EPI CELLS #/AREA URNS AUTO: 1 /HPF (ref 0–5)
GFR SERPLBLD CREATININE-BSD FMLA CKD-EPI: 29 ML/MIN/{1.73_M2}
GLUCOSE BLD-MCNC: 102 MG/DL (ref 70–99)
GLUCOSE BLD-MCNC: 136 MG/DL (ref 70–99)
GLUCOSE BLD-MCNC: 152 MG/DL (ref 70–99)
GLUCOSE BLD-MCNC: 193 MG/DL (ref 70–99)
GLUCOSE BLD-MCNC: 239 MG/DL (ref 70–99)
GLUCOSE BLD-MCNC: 52 MG/DL (ref 70–99)
GLUCOSE BLD-MCNC: 56 MG/DL (ref 70–99)
GLUCOSE BLD-MCNC: 71 MG/DL (ref 70–99)
GLUCOSE BLD-MCNC: 79 MG/DL (ref 70–99)
GLUCOSE BLD-MCNC: 85 MG/DL (ref 70–99)
GLUCOSE CSF-MCNC: 72 MG/DL (ref 40–80)
GLUCOSE SERPL-MCNC: 150 MG/DL (ref 70–99)
GLUCOSE UR STRIP.AUTO-MCNC: NEGATIVE MG/DL
HCT VFR BLD AUTO: 24.5 % (ref 36–48)
HGB BLD-MCNC: 8 G/DL (ref 12–16)
HGB UR QL STRIP.AUTO: NEGATIVE
HYALINE CASTS #/AREA URNS AUTO: 0 /LPF (ref 0–8)
KETONES UR STRIP.AUTO-MCNC: ABNORMAL MG/DL
LEUKOCYTE ESTERASE UR QL STRIP.AUTO: NEGATIVE
LYMPHOCYTES # BLD: 1.3 K/UL (ref 1–5.1)
LYMPHOCYTES NFR BLD: 22.5 %
MANUAL DIF COMMENT CSF-IMP: ABNORMAL
MCH RBC QN AUTO: 25.2 PG (ref 26–34)
MCHC RBC AUTO-ENTMCNC: 32.5 G/DL (ref 31–36)
MCV RBC AUTO: 77.6 FL (ref 80–100)
MENING+ENC PNL CSF NAA+NON-PROBE: NORMAL
MONOCYTES # BLD: 0.5 K/UL (ref 0–1.3)
MONOCYTES NFR BLD: 8.8 %
MRSA DNA SPEC QL NAA+PROBE: NORMAL
NEUTROPHILS # BLD: 3.1 K/UL (ref 1.7–7.7)
NEUTROPHILS NFR BLD: 52.7 %
NITRITE UR QL STRIP.AUTO: NEGATIVE
NUC CELL # FLD MANUAL: 7 /CUMM (ref 0–5)
PERFORMED ON: ABNORMAL
PERFORMED ON: NORMAL
PH UR STRIP.AUTO: 5.5 [PH] (ref 5–8)
PHOSPHATE SERPL-MCNC: 3.3 MG/DL (ref 2.5–4.9)
PLATELET # BLD AUTO: 208 K/UL (ref 135–450)
PMV BLD AUTO: 8.5 FL (ref 5–10.5)
POTASSIUM SERPL-SCNC: 3.7 MMOL/L (ref 3.5–5.1)
PROT CSF-MCNC: 51 MG/DL (ref 15–45)
PROT SERPL-MCNC: 5.3 G/DL (ref 6.4–8.2)
PROT UR STRIP.AUTO-MCNC: 30 MG/DL
RBC # BLD AUTO: 3.16 M/UL (ref 4–5.2)
RBC # FLD MANUAL: 17 /CUMM
RBC CLUMPS #/AREA URNS AUTO: 0 /HPF (ref 0–4)
REPORT: NORMAL
SODIUM SERPL-SCNC: 137 MMOL/L (ref 136–145)
SP GR UR STRIP.AUTO: 1.01 (ref 1–1.03)
SPECIMEN VOL CSF: 3.5 ML
TUBE # CSF: ABNORMAL
UA DIPSTICK W REFLEX MICRO PNL UR: YES
URN SPEC COLLECT METH UR: ABNORMAL
UROBILINOGEN UR STRIP-ACNC: 0.2 E.U./DL
VANCOMYCIN SERPL-MCNC: 13 UG/ML
WBC # BLD AUTO: 5.9 K/UL (ref 4–11)
WBC #/AREA URNS AUTO: 3 /HPF (ref 0–5)

## 2023-06-08 PROCEDURE — 72156 MRI NECK SPINE W/O & W/DYE: CPT

## 2023-06-08 PROCEDURE — 93010 ELECTROCARDIOGRAM REPORT: CPT | Performed by: INTERNAL MEDICINE

## 2023-06-08 PROCEDURE — 70544 MR ANGIOGRAPHY HEAD W/O DYE: CPT

## 2023-06-08 PROCEDURE — 6360000002 HC RX W HCPCS: Performed by: EMERGENCY MEDICINE

## 2023-06-08 PROCEDURE — 2580000003 HC RX 258: Performed by: NURSE PRACTITIONER

## 2023-06-08 PROCEDURE — 6370000000 HC RX 637 (ALT 250 FOR IP): Performed by: NURSE PRACTITIONER

## 2023-06-08 PROCEDURE — 6360000002 HC RX W HCPCS: Performed by: HOSPITALIST

## 2023-06-08 PROCEDURE — 81001 URINALYSIS AUTO W/SCOPE: CPT

## 2023-06-08 PROCEDURE — 009U3ZZ DRAINAGE OF SPINAL CANAL, PERCUTANEOUS APPROACH: ICD-10-PCS | Performed by: EMERGENCY MEDICINE

## 2023-06-08 PROCEDURE — 88112 CYTOPATH CELL ENHANCE TECH: CPT

## 2023-06-08 PROCEDURE — 6370000000 HC RX 637 (ALT 250 FOR IP): Performed by: HOSPITALIST

## 2023-06-08 PROCEDURE — A9577 INJ MULTIHANCE: HCPCS | Performed by: NURSE PRACTITIONER

## 2023-06-08 PROCEDURE — 70553 MRI BRAIN STEM W/O & W/DYE: CPT

## 2023-06-08 PROCEDURE — 36415 COLL VENOUS BLD VENIPUNCTURE: CPT

## 2023-06-08 PROCEDURE — 87641 MR-STAPH DNA AMP PROBE: CPT

## 2023-06-08 PROCEDURE — 97530 THERAPEUTIC ACTIVITIES: CPT

## 2023-06-08 PROCEDURE — 2580000003 HC RX 258: Performed by: HOSPITALIST

## 2023-06-08 PROCEDURE — 80053 COMPREHEN METABOLIC PANEL: CPT

## 2023-06-08 PROCEDURE — 70549 MR ANGIOGRAPH NECK W/O&W/DYE: CPT

## 2023-06-08 PROCEDURE — 85025 COMPLETE CBC W/AUTO DIFF WBC: CPT

## 2023-06-08 PROCEDURE — 84100 ASSAY OF PHOSPHORUS: CPT

## 2023-06-08 PROCEDURE — 2060000000 HC ICU INTERMEDIATE R&B

## 2023-06-08 PROCEDURE — 2580000003 HC RX 258: Performed by: EMERGENCY MEDICINE

## 2023-06-08 PROCEDURE — 6360000004 HC RX CONTRAST MEDICATION: Performed by: NURSE PRACTITIONER

## 2023-06-08 PROCEDURE — 87040 BLOOD CULTURE FOR BACTERIA: CPT

## 2023-06-08 PROCEDURE — 99222 1ST HOSP IP/OBS MODERATE 55: CPT | Performed by: INTERNAL MEDICINE

## 2023-06-08 PROCEDURE — 80202 ASSAY OF VANCOMYCIN: CPT

## 2023-06-08 PROCEDURE — 97165 OT EVAL LOW COMPLEX 30 MIN: CPT

## 2023-06-08 RX ORDER — INSULIN LISPRO 100 [IU]/ML
0-4 INJECTION, SOLUTION INTRAVENOUS; SUBCUTANEOUS NIGHTLY
Status: DISCONTINUED | OUTPATIENT
Start: 2023-06-08 | End: 2023-06-09 | Stop reason: HOSPADM

## 2023-06-08 RX ORDER — SODIUM CHLORIDE 9 MG/ML
INJECTION, SOLUTION INTRAVENOUS PRN
Status: DISCONTINUED | OUTPATIENT
Start: 2023-06-08 | End: 2023-06-09 | Stop reason: HOSPADM

## 2023-06-08 RX ORDER — ACETAMINOPHEN 650 MG/1
650 SUPPOSITORY RECTAL EVERY 6 HOURS PRN
Status: DISCONTINUED | OUTPATIENT
Start: 2023-06-08 | End: 2023-06-09 | Stop reason: HOSPADM

## 2023-06-08 RX ORDER — INSULIN GLARGINE 100 [IU]/ML
7 INJECTION, SOLUTION SUBCUTANEOUS DAILY
Status: DISCONTINUED | OUTPATIENT
Start: 2023-06-08 | End: 2023-06-09 | Stop reason: HOSPADM

## 2023-06-08 RX ORDER — MAGNESIUM SULFATE IN WATER 40 MG/ML
2000 INJECTION, SOLUTION INTRAVENOUS PRN
Status: DISCONTINUED | OUTPATIENT
Start: 2023-06-08 | End: 2023-06-08

## 2023-06-08 RX ORDER — SODIUM CHLORIDE 0.9 % (FLUSH) 0.9 %
5-40 SYRINGE (ML) INJECTION PRN
Status: DISCONTINUED | OUTPATIENT
Start: 2023-06-08 | End: 2023-06-09 | Stop reason: HOSPADM

## 2023-06-08 RX ORDER — INSULIN LISPRO 100 [IU]/ML
0-4 INJECTION, SOLUTION INTRAVENOUS; SUBCUTANEOUS
Status: DISCONTINUED | OUTPATIENT
Start: 2023-06-08 | End: 2023-06-09 | Stop reason: HOSPADM

## 2023-06-08 RX ORDER — SODIUM CHLORIDE 0.9 % (FLUSH) 0.9 %
5-40 SYRINGE (ML) INJECTION EVERY 12 HOURS SCHEDULED
Status: DISCONTINUED | OUTPATIENT
Start: 2023-06-08 | End: 2023-06-09 | Stop reason: HOSPADM

## 2023-06-08 RX ORDER — POLYETHYLENE GLYCOL 3350 17 G/17G
17 POWDER, FOR SOLUTION ORAL DAILY PRN
Status: DISCONTINUED | OUTPATIENT
Start: 2023-06-08 | End: 2023-06-09 | Stop reason: HOSPADM

## 2023-06-08 RX ORDER — FAMOTIDINE 20 MG/1
20 TABLET, FILM COATED ORAL DAILY
Status: DISCONTINUED | OUTPATIENT
Start: 2023-06-08 | End: 2023-06-09 | Stop reason: HOSPADM

## 2023-06-08 RX ORDER — ACETAMINOPHEN 325 MG/1
650 TABLET ORAL EVERY 6 HOURS PRN
Status: DISCONTINUED | OUTPATIENT
Start: 2023-06-08 | End: 2023-06-09 | Stop reason: HOSPADM

## 2023-06-08 RX ORDER — ONDANSETRON 2 MG/ML
4 INJECTION INTRAMUSCULAR; INTRAVENOUS EVERY 6 HOURS PRN
Status: DISCONTINUED | OUTPATIENT
Start: 2023-06-08 | End: 2023-06-09 | Stop reason: HOSPADM

## 2023-06-08 RX ORDER — ROPINIROLE 1 MG/1
1 TABLET, FILM COATED ORAL NIGHTLY
Status: DISCONTINUED | OUTPATIENT
Start: 2023-06-08 | End: 2023-06-09 | Stop reason: HOSPADM

## 2023-06-08 RX ORDER — POTASSIUM CHLORIDE 20 MEQ/1
40 TABLET, EXTENDED RELEASE ORAL PRN
Status: DISCONTINUED | OUTPATIENT
Start: 2023-06-08 | End: 2023-06-08

## 2023-06-08 RX ORDER — ONDANSETRON 4 MG/1
4 TABLET, ORALLY DISINTEGRATING ORAL EVERY 8 HOURS PRN
Status: DISCONTINUED | OUTPATIENT
Start: 2023-06-08 | End: 2023-06-09 | Stop reason: HOSPADM

## 2023-06-08 RX ORDER — POTASSIUM CHLORIDE 7.45 MG/ML
10 INJECTION INTRAVENOUS PRN
Status: DISCONTINUED | OUTPATIENT
Start: 2023-06-08 | End: 2023-06-08

## 2023-06-08 RX ORDER — INSULIN LISPRO 100 [IU]/ML
2 INJECTION, SOLUTION INTRAVENOUS; SUBCUTANEOUS
Status: DISCONTINUED | OUTPATIENT
Start: 2023-06-08 | End: 2023-06-09 | Stop reason: HOSPADM

## 2023-06-08 RX ORDER — DEXTROSE MONOHYDRATE 50 MG/ML
INJECTION, SOLUTION INTRAVENOUS CONTINUOUS
Status: DISCONTINUED | OUTPATIENT
Start: 2023-06-08 | End: 2023-06-09 | Stop reason: HOSPADM

## 2023-06-08 RX ORDER — DEXTROSE AND SODIUM CHLORIDE 5; .45 G/100ML; G/100ML
INJECTION, SOLUTION INTRAVENOUS CONTINUOUS
Status: DISCONTINUED | OUTPATIENT
Start: 2023-06-08 | End: 2023-06-08

## 2023-06-08 RX ADMIN — CEFTRIAXONE SODIUM 2000 MG: 2 INJECTION, POWDER, FOR SOLUTION INTRAMUSCULAR; INTRAVENOUS at 01:35

## 2023-06-08 RX ADMIN — ROPINIROLE HYDROCHLORIDE 1 MG: 1 TABLET, FILM COATED ORAL at 01:16

## 2023-06-08 RX ADMIN — GADOBENATE DIMEGLUMINE 9 ML: 529 INJECTION, SOLUTION INTRAVENOUS at 15:24

## 2023-06-08 RX ADMIN — VANCOMYCIN HYDROCHLORIDE 750 MG: 750 INJECTION, POWDER, LYOPHILIZED, FOR SOLUTION INTRAVENOUS at 08:39

## 2023-06-08 RX ADMIN — ONDANSETRON 4 MG: 4 TABLET, ORALLY DISINTEGRATING ORAL at 10:21

## 2023-06-08 RX ADMIN — INSULIN LISPRO 2 UNITS: 100 INJECTION, SOLUTION INTRAVENOUS; SUBCUTANEOUS at 12:57

## 2023-06-08 RX ADMIN — ONDANSETRON 4 MG: 4 TABLET, ORALLY DISINTEGRATING ORAL at 22:37

## 2023-06-08 RX ADMIN — Medication 16 G: at 20:31

## 2023-06-08 RX ADMIN — INSULIN LISPRO 2 UNITS: 100 INJECTION, SOLUTION INTRAVENOUS; SUBCUTANEOUS at 18:05

## 2023-06-08 RX ADMIN — CEFTRIAXONE SODIUM 2000 MG: 2 INJECTION, POWDER, FOR SOLUTION INTRAMUSCULAR; INTRAVENOUS at 15:57

## 2023-06-08 RX ADMIN — ROPINIROLE HYDROCHLORIDE 1 MG: 1 TABLET, FILM COATED ORAL at 20:33

## 2023-06-08 RX ADMIN — SODIUM CHLORIDE: 9 INJECTION, SOLUTION INTRAVENOUS at 01:10

## 2023-06-08 RX ADMIN — DEXTROSE AND SODIUM CHLORIDE: 5; 450 INJECTION, SOLUTION INTRAVENOUS at 01:33

## 2023-06-08 RX ADMIN — INSULIN GLARGINE 7 UNITS: 100 INJECTION, SOLUTION SUBCUTANEOUS at 12:56

## 2023-06-08 RX ADMIN — SODIUM CHLORIDE, PRESERVATIVE FREE 10 ML: 5 INJECTION INTRAVENOUS at 20:33

## 2023-06-08 RX ADMIN — DEXTROSE MONOHYDRATE: 50 INJECTION, SOLUTION INTRAVENOUS at 21:33

## 2023-06-08 RX ADMIN — FAMOTIDINE 20 MG: 20 TABLET ORAL at 08:33

## 2023-06-08 RX ADMIN — INSULIN LISPRO 1 UNITS: 100 INJECTION, SOLUTION INTRAVENOUS; SUBCUTANEOUS at 12:57

## 2023-06-08 RX ADMIN — ACYCLOVIR SODIUM 450 MG: 50 INJECTION, SOLUTION INTRAVENOUS at 02:17

## 2023-06-08 ASSESSMENT — PAIN SCALES - GENERAL
PAINLEVEL_OUTOF10: 0
PAINLEVEL_OUTOF10: 0

## 2023-06-08 NOTE — ED PROVIDER NOTES
EMERGENCY DEPARTMENT SUPERVISING PHYSICIAN NOTE    I have seen this patient & have reviewed history and findings with the PA, NP, or resident physician  and provided direct personal supervision as needed. I was present for key portions of any procedures performed. I concur with their assessment and plans except for any discrepancies noted below. I've participated in medical management, monitoring, and treatment of this patient with the provider. I have reviewed documentation, test results, and laboratory results in the interim. Care plan has been developed collaboratively. Swapna Nicolas is a 57y.o. year old female presenting to the emergency department for Altered Mental Status ([facility] called to report pt has stage IV Melanoma and has had confusion x4 days. Pt also c/o head and neck pain. Pt was seen at [facility] and received fluids. Pt recently treated for a UTI. )  .     Brief HPI:  54yrs F currently on treatment for melanoma  Reports intermittent headaches and confusion as of late but not having a headache currently  Currently reports neck pain and stiffness    Pertinent Exam Findings:  Awake, alert, well-oriented, chronically but not acutely ill-appearing, non-distressed    Plan:  Began empiric meningitis / encephalitis treatment  Discussed the R/B/A of proceeding with LP in the ED and CSF analysis and she wished to proceed with this intervention  Performed LP at bedside successfully with no immediate complications    Lumbar Puncture    Date/Time: 6/7/2023 11:30 PM  Performed by: Thalia Sarmiento MD  Authorized by: Thalia Sarmiento MD     Consent:     Consent obtained:  Verbal and written    Consent given by:  Patient    Risks, benefits, and alternatives were discussed: yes      Risks discussed:  Bleeding, infection, pain, repeat procedure, nerve damage and headache    Alternatives discussed:  Delayed treatment, alternative treatment and observation  Universal protocol:     Patient identity confirmed:
with it. Psychiatric:         Mood and Affect: Mood normal.         Behavior: Behavior normal.       Negative test of skew  No truncal instability  No difficulty performing finger-to-nose      DIAGNOSTIC RESULTS   LABS:    I have reviewed and interpreted all of the currently available lab results from this visit:  Results for orders placed or performed during the hospital encounter of 06/07/23   Herpes simplex virus PCR    Specimen: CSF   Result Value Ref Range    Herpes Simplex Virus Subtype Source csf    Meningitis Encephalitis Panel CSF, Molecular    Specimen: CSF   Result Value Ref Range    Meningitis Encephalitis Panel       Meningitis Encephalitis Panel PCR Result: Not Detected  Patients with a suspicion of cryptococcal meningitis should be tested  for cryptococcal antigen. \"  See additional report for complete Meningitis Encephalitis Panel.      Meningitis Encephalitis Panel Report   Result Value Ref Range    Report SEE IMAGE    CBC   Result Value Ref Range    WBC 7.1 4.0 - 11.0 K/uL    RBC 3.54 (L) 4.00 - 5.20 M/uL    Hemoglobin 9.0 (L) 12.0 - 16.0 g/dL    Hematocrit 27.3 (L) 36.0 - 48.0 %    MCV 77.2 (L) 80.0 - 100.0 fL    MCH 25.5 (L) 26.0 - 34.0 pg    MCHC 33.0 31.0 - 36.0 g/dL    RDW 15.1 12.4 - 15.4 %    Platelets 264 808 - 957 K/uL    MPV 8.3 5.0 - 10.5 fL   CMP w/ Reflex to MG   Result Value Ref Range    Sodium 140 136 - 145 mmol/L    Potassium reflex Magnesium 3.7 3.5 - 5.1 mmol/L    Chloride 106 99 - 110 mmol/L    CO2 22 21 - 32 mmol/L    Anion Gap 12 3 - 16    Glucose 86 70 - 99 mg/dL    BUN 20 7 - 20 mg/dL    Creatinine 1.9 (H) 0.6 - 1.2 mg/dL    Est, Glom Filt Rate 29 (A) >60    Calcium 9.2 8.3 - 10.6 mg/dL    Total Protein 6.8 6.4 - 8.2 g/dL    Albumin 3.9 3.4 - 5.0 g/dL    Albumin/Globulin Ratio 1.3 1.1 - 2.2    Total Bilirubin 0.3 0.0 - 1.0 mg/dL    Alkaline Phosphatase 124 40 - 129 U/L    ALT 8 (L) 10 - 40 U/L    AST 25 15 - 37 U/L   Ethanol   Result Value Ref Range    Ethanol Lvl None

## 2023-06-08 NOTE — H&P
afebrile Pt   had  an episode of hypoglycemia 36 in ed . She has an LP in ed and  was started empirically on  meningtis rx . CT head  no acute pathology. Cr  1.9 previously was normal. HG 9 which is  around her baseline      CT head  Mild atrophy and mild chronic microischemic changes scattered in the deep  white matter which is unchanged with no acute abnormality seen. Chronic sinusitis. We were asked to admit for work up and evaluation of the above problems.      Past Medical History:   Diagnosis Date    Cancer ([address] 2002    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    Depression     Diabetes mellitus ([address]     Hx of radiation therapy     melanoma right eye    Hypertension     Insulin pump in place     Melanoma ([address] 01/13/2023    in stomach, pancreas    Prolonged emergence from general anesthesia     slow to wake up    Restless leg syndrome         Past Surgical History:   Procedure Laterality Date    CARPAL TUNNEL RELEASE Bilateral 12/2017    CHOLECYSTECTOMY      CT BIOPSY ABDOMEN RETROPERITONEUM  12/27/2022    CT BIOPSY ABDOMEN RETROPERITONEUM 12/27/2022 [facility] CT SCAN    EYE SURGERY Right     biopsy    HYSTERECTOMY (CERVIX STATUS UNKNOWN)      LIPOMA RESECTION      LIVER BIOPSY Right     PORT SURGERY Right 1/18/2023    RIGHT PORT PLACEMENT performed by Karena Qureshi MD at [facility] ARTHROSCOPY Right 08/31/2018    RIGHT SHOULDER ARTHROSCOPE, SUBACROMIAL DECOMPRESSION, DISTALCLAVICLE EXCISION, CAPSULAR RELEASE, MANIPULATION UNDER ANESTHESIA, DEBRIDEMENT    SHOULDER SURGERY      UPPER GASTROINTESTINAL ENDOSCOPY  10/22/2014    UPPER GASTROINTESTINAL ENDOSCOPY N/A 1/13/2023    EGD W/EUS FNA performed by Abiodun Peguero MD at [address] [place] [address]  1/13/2023    EGD BIOPSY performed by Abiodun Peguero MD at [facility]  12/16/2022    [facility] 12/16/2022 [address] ULTRASOUND       Social History     Tobacco Use    Smoking

## 2023-06-08 NOTE — CONSULTS
Clinical Pharmacy Note  Vancomycin Consult    Eduar Slaughter is a 57 y.o. female ordered Vancomycin for empiric coverage for CNS infection; consult received from Dr. Jaqueline Allen to manage therapy. Also receiving ceftriaxone, ampicillin, acyclovir. Allergies:  Patient has no known allergies. Temp max:  Temp (24hrs), Av.4 °F (36.9 °C), Min:97.5 °F (36.4 °C), Max:99.6 °F (37.6 °C)      Recent Labs     23  1650 23  0558   WBC 7.1 5.9         Recent Labs     23  1650 23  0558   BUN 20 17   CREATININE 1.9* 1.9*           Intake/Output Summary (Last 24 hours) at 2023 8720  Last data filed at 2023 0327  Gross per 24 hour   Intake 120 ml   Output --   Net 120 ml       Culture Results:  pending    Ht Readings from Last 1 Encounters:   23 4' 11\" (1.499 m)          Wt Readings from Last 1 Encounters:   23 101 lb 3.1 oz (45.9 kg)         Estimated Creatinine Clearance: 22 mL/min (A) (based on SCr of 1.9 mg/dL (H)). Assessment/Plan:  Day # 2 of vancomycin. Vancomycin random level 13mg/L 11 hours post 1000 mg dose given   Goal -600  Will dose initially based on levels due to FAVIAN  Vancomycin 750mg x 1 dose this AM  AM SrCr unchanged  Random Vancomycin level tomorrow AM  Thank you for the consult.    MARKUS Dooley [facility], 2023 7:29 AM
Clinical Pharmacy Note  Vancomycin Consult    Milena Carrasquillo is a 57 y.o. female ordered Vancomycin for empiric coverage for CNS infection; consult received from Dr. Louie Galvez to manage therapy. Also receiving ceftriaxone, ampicillin, acyclovir. Allergies:  Patient has no known allergies. Temp max:  Temp (24hrs), Av.6 °F (37 °C), Min:97.5 °F (36.4 °C), Max:99.6 °F (37.6 °C)      Recent Labs     23  1650   WBC 7.1       Recent Labs     23  1650   BUN 20   CREATININE 1.9*       No intake or output data in the 24 hours ending 23 0241    Culture Results:  pending    Ht Readings from Last 1 Encounters:   23 4' 11\" (1.499 m)        Wt Readings from Last 1 Encounters:   23 101 lb 3.1 oz (45.9 kg)         Estimated Creatinine Clearance: 22 mL/min (A) (based on SCr of 1.9 mg/dL (H)). Assessment/Plan:  Day # 1 of vancomycin. Vancomycin 1000 mg IV x 1 dose given at 1846  Goal -600  Will dose initially based on levels due to FAVIAN  Level ordered for 0400 (with repeat Scr) to see if renal function improved/another dose of vancomycin needed    Thank you for the consult.    Eli Walden, PharmD  2023 2:43 AM
Clinical Pharmacy Note  Vancomycin Consult    Pharmacy consult received for one-time dose of vancomycin in the Emergency Department per Chelsea Barragan CNP. Ht Readings from Last 1 Encounters:   04/20/23 4' 11\" (1.499 m)        Wt Readings from Last 1 Encounters:   06/07/23 101 lb (45.8 kg)         Assessment/Plan:  Vancomycin 1000 mg x 1 in ED. If Vancomycin is to continue on admission and pharmacy is to manage dosing, please re-consult with admission orders.       MARKUS Guzman [facility]  6/7/2023 5:41 PM
Neurology Consult Note  Reason for Consult: AMS, headache, neck pain    Chief complaint: multiple complaints    Dr Nya Arnold MD asked me to see Leesa Espinal in consultation for evaluation of AMS, headache, neck pain    History of Present Illness:  Leesa Espinal is a 57 y.o. female who presents with multiple complaints. I obtained my information via interview w/ the patient and her  at the bedside, supplemented by chart review. About 3-4 weeks ago the patient had a fall and landed on her chest over the bathtub. She has been somewhat unsteady w/ her gait recently. Over the past 7 days or so she had developed multiple different symptoms. She has been feeling overall sick. She has been complaining of neck pain and stiffness. She has been dizzy and lightheaded. She has had nausea and did vomit once. There has been some confusion. Her  gives the example of her having a difficult time figuring out how to use the phone and seemed confused when she was talking on it. Just yesterday she started having pain below the knees bilaterally which is difficult for her to describe. She feels like she has to tighten her legs to give her some relief and has been hitting them to see if that will help. Today she is feeling better overall w/ improvement in all her symptoms though not quite back to normal.      She denies any fevers at home. No bowel or bladder issues. She does have a hx of metastatic cancer, on Nivolumab/Ipilimumab.       Medical History:  Past Medical History:   Diagnosis Date    Cancer ([facility] [address] 2002    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    Depression     Diabetes mellitus ([facility] [address]     Hx of radiation therapy     melanoma right eye    Hypertension     Insulin pump in place     Melanoma ([facility] [address] 01/13/2023    in stomach, pancreas    Prolonged emergence from general anesthesia     slow to wake up    Restless leg syndrome      Past Surgical History:   Procedure Laterality Date
2000, 10/20/2014    TDaP, ADACEL (age 10y-63y), BOOSTRIX (age 10y+), IM, 0.5mL 2014         Social History:     Social History     Tobacco Use    Smoking status: Former     Packs/day: 1.00     Years: 10.00     Pack years: 10.00     Types: Cigarettes     Quit date: 1980     Years since quittin.5    Smokeless tobacco: Never   Vaping Use    Vaping Use: Never used   Substance Use Topics    Alcohol use: No    Drug use: No     Social History     Tobacco Use   Smoking Status Former    Packs/day: 1.00    Years: 10.00    Pack years: 10.00    Types: Cigarettes    Quit date: 1980    Years since quittin.5   Smokeless Tobacco Never      Family History   Problem Relation Age of Onset    High Blood Pressure Mother     Breast Cancer Mother         breast w mets to bone    Diabetes Father     Stroke Father     Cancer Father         bladder          REVIEW OF SYSTEMS:      Constitutional:  negative for fevers, chills, night sweats  Eyes:  negative for blurred vision, eye discharge, visual disturbance   HEENT:  negative for hearing loss, ear drainage,nasal congestion  Respiratory:  negative for cough, shortness of breath or hemoptysis   Cardiovascular:  negative for chest pain, palpitations, syncope  Gastrointestinal:  negative for nausea, vomiting, diarrhea, constipation, abdominal pain  Genitourinary:  negative for frequency, dysuria, urinary incontinence, hematuria  Hematologic/Lymphatic:  negative for easy bruising, bleeding and lymphadenopathy  Allergic/Immunologic:  negative for recurrent infections, angioedema, anaphylaxis   Endocrine:  negative for weight changes, polyuria, polydipsia and polyphagia  Musculoskeletal:  negative for joint  pain, swelling, decreased range of motion  Integumentary: No rashes, skin lesions  Neurological:  negative for headaches, slurred speech, unilateral weakness  Psychiatric: negative for hallucinations,confusion,agitation.      PHYSICAL EXAM:      Vitals:    BP

## 2023-06-08 NOTE — ED NOTES
Introduce myself to the patient, identification band inplace, stretcher in the lowest position for safety, and the call light is in reach. Updated patient on Emergency Department plan of care, no additional needs at this time, will continue to monitor patient.           Laureen Guevara RN  06/08/23 1785

## 2023-06-08 NOTE — PLAN OF CARE
Problem: Discharge Planning  Goal: Discharge to home or other facility with appropriate resources  Outcome: Progressing  Flowsheets (Taken 6/8/2023 1840)  Discharge to home or other facility with appropriate resources:   Identify barriers to discharge with patient and caregiver   Arrange for needed discharge resources and transportation as appropriate     Problem: Pain  Goal: Verbalizes/displays adequate comfort level or baseline comfort level  Outcome: Progressing  Flowsheets (Taken 6/8/2023 1840)  Verbalizes/displays adequate comfort level or baseline comfort level:   Encourage patient to monitor pain and request assistance   Assess pain using appropriate pain scale     Problem: Safety - Adult  Goal: Free from fall injury  Outcome: Progressing  Flowsheets (Taken 6/8/2023 1840)  Free From Fall Injury: Instruct family/caregiver on patient safety     Problem: ABCDS Injury Assessment  Goal: Absence of physical injury  Outcome: Progressing  Flowsheets (Taken 6/8/2023 1840)  Absence of Physical Injury: Implement safety measures based on patient assessment   Sandy Rizzo RN  6/8/2023

## 2023-06-08 NOTE — ED NOTES
Safe shift handoff given to ArvVidal. Justyn Hernandez No question at this time. VSS. Versed 2Mg given to Adore for administration during upcoming Lumbar puncture.       Jonathan Batres RN  06/07/23 1840

## 2023-06-08 NOTE — ED NOTES
Handoff report given to [facility], RN to assume care. NO further questions at this time. ED tech is transporting the pt to room 5266. If any questions arise, please don't hesitate to call .      Cleo Schafer RN  06/08/23 0625

## 2023-06-08 NOTE — PLAN OF CARE
Problem: Discharge Planning  Goal: Discharge to home or other facility with appropriate resources  Outcome: Progressing     Problem: Pain  Goal: Verbalizes/displays adequate comfort level or baseline comfort level  Outcome: Progressing     Problem: Safety - Adult  Goal: Free from fall injury  Outcome: Progressing     Problem: ABCDS Injury Assessment  Goal: Absence of physical injury  Outcome: Progressing     Problem: Respiratory - Adult  Goal: Achieves optimal ventilation and oxygenation  Outcome: Progressing     Problem: Cardiovascular - Adult  Goal: Maintains optimal cardiac output and hemodynamic stability  Outcome: Progressing     Problem: Cardiovascular - Adult  Goal: Absence of cardiac dysrhythmias or at baseline  Outcome: Progressing     Problem: Skin/Tissue Integrity - Adult  Goal: Skin integrity remains intact  Outcome: Progressing     Problem: Musculoskeletal - Adult  Goal: Return mobility to safest level of function  Outcome: Progressing     Problem: Gastrointestinal - Adult  Goal: Minimal or absence of nausea and vomiting  Outcome: Progressing     Problem: Infection - Adult  Goal: Absence of infection at discharge  Outcome: Progressing     Problem: Hematologic - Adult  Goal: Maintains hematologic stability  Outcome: Progressing

## 2023-06-09 VITALS
WEIGHT: 97 LBS | HEIGHT: 59 IN | HEART RATE: 94 BPM | SYSTOLIC BLOOD PRESSURE: 152 MMHG | BODY MASS INDEX: 19.56 KG/M2 | DIASTOLIC BLOOD PRESSURE: 91 MMHG | TEMPERATURE: 98.1 F | OXYGEN SATURATION: 99 % | RESPIRATION RATE: 16 BRPM

## 2023-06-09 PROBLEM — Z85.820 HISTORY OF MELANOMA: Status: ACTIVE | Noted: 2023-06-09

## 2023-06-09 PROBLEM — C78.89: Status: ACTIVE | Noted: 2023-06-09

## 2023-06-09 PROBLEM — M54.2 NECK PAIN: Status: ACTIVE | Noted: 2023-06-09

## 2023-06-09 PROBLEM — E11.65 POORLY CONTROLLED DIABETES MELLITUS (HCC): Status: ACTIVE | Noted: 2023-06-09

## 2023-06-09 PROBLEM — E16.2 HYPOGLYCEMIA: Status: ACTIVE | Noted: 2023-06-09

## 2023-06-09 LAB
ANION GAP SERPL CALCULATED.3IONS-SCNC: 15 MMOL/L (ref 3–16)
BACTERIA BLD CULT ORG #2: NORMAL
BASOPHILS # BLD: 0.1 K/UL (ref 0–0.2)
BASOPHILS NFR BLD: 1.2 %
BUN SERPL-MCNC: 11 MG/DL (ref 7–20)
CALCIUM SERPL-MCNC: 8.8 MG/DL (ref 8.3–10.6)
CHLORIDE SERPL-SCNC: 100 MMOL/L (ref 99–110)
CO2 SERPL-SCNC: 21 MMOL/L (ref 21–32)
CREAT SERPL-MCNC: 1.4 MG/DL (ref 0.6–1.2)
DEPRECATED RDW RBC AUTO: 14.6 % (ref 12.4–15.4)
EOSINOPHIL # BLD: 1.1 K/UL (ref 0–0.6)
EOSINOPHIL NFR BLD: 14 %
GFR SERPLBLD CREATININE-BSD FMLA CKD-EPI: 42 ML/MIN/{1.73_M2}
GLUCOSE BLD-MCNC: 142 MG/DL (ref 70–99)
GLUCOSE BLD-MCNC: 265 MG/DL (ref 70–99)
GLUCOSE BLD-MCNC: 299 MG/DL (ref 70–99)
GLUCOSE SERPL-MCNC: 227 MG/DL (ref 70–99)
HCT VFR BLD AUTO: 28.7 % (ref 36–48)
HGB BLD-MCNC: 9.5 G/DL (ref 12–16)
HSV1 DNA CSF QL NAA+PROBE: NOT DETECTED
HSV2 DNA CSF QL NAA+PROBE: NOT DETECTED
LYMPHOCYTES # BLD: 1.7 K/UL (ref 1–5.1)
LYMPHOCYTES NFR BLD: 21.3 %
MCH RBC QN AUTO: 25 PG (ref 26–34)
MCHC RBC AUTO-ENTMCNC: 33.2 G/DL (ref 31–36)
MCV RBC AUTO: 75.3 FL (ref 80–100)
MONOCYTES # BLD: 0.6 K/UL (ref 0–1.3)
MONOCYTES NFR BLD: 8 %
NEUTROPHILS # BLD: 4.4 K/UL (ref 1.7–7.7)
NEUTROPHILS NFR BLD: 55.5 %
PERFORMED ON: ABNORMAL
PLATELET # BLD AUTO: 253 K/UL (ref 135–450)
PMV BLD AUTO: 8.5 FL (ref 5–10.5)
POTASSIUM SERPL-SCNC: 3.1 MMOL/L (ref 3.5–5.1)
RBC # BLD AUTO: 3.8 M/UL (ref 4–5.2)
SODIUM SERPL-SCNC: 136 MMOL/L (ref 136–145)
SPECIMEN SOURCE: NORMAL
WBC # BLD AUTO: 8 K/UL (ref 4–11)

## 2023-06-09 PROCEDURE — 97161 PT EVAL LOW COMPLEX 20 MIN: CPT

## 2023-06-09 PROCEDURE — 97530 THERAPEUTIC ACTIVITIES: CPT

## 2023-06-09 PROCEDURE — 6360000002 HC RX W HCPCS: Performed by: HOSPITALIST

## 2023-06-09 PROCEDURE — 80048 BASIC METABOLIC PNL TOTAL CA: CPT

## 2023-06-09 PROCEDURE — 6370000000 HC RX 637 (ALT 250 FOR IP): Performed by: HOSPITALIST

## 2023-06-09 PROCEDURE — 85025 COMPLETE CBC W/AUTO DIFF WBC: CPT

## 2023-06-09 RX ORDER — POTASSIUM CHLORIDE 20 MEQ/1
40 TABLET, EXTENDED RELEASE ORAL ONCE
Status: COMPLETED | OUTPATIENT
Start: 2023-06-09 | End: 2023-06-09

## 2023-06-09 RX ORDER — PROMETHAZINE HYDROCHLORIDE 25 MG/1
12.5 TABLET ORAL EVERY 6 HOURS PRN
Status: DISCONTINUED | OUTPATIENT
Start: 2023-06-09 | End: 2023-06-09 | Stop reason: HOSPADM

## 2023-06-09 RX ADMIN — PROMETHAZINE HYDROCHLORIDE 12.5 MG: 25 TABLET ORAL at 12:01

## 2023-06-09 RX ADMIN — ACETAMINOPHEN 650 MG: 325 TABLET ORAL at 09:37

## 2023-06-09 RX ADMIN — INSULIN LISPRO 2 UNITS: 100 INJECTION, SOLUTION INTRAVENOUS; SUBCUTANEOUS at 10:02

## 2023-06-09 RX ADMIN — POTASSIUM CHLORIDE 40 MEQ: 1500 TABLET, EXTENDED RELEASE ORAL at 12:02

## 2023-06-09 RX ADMIN — INSULIN LISPRO 2 UNITS: 100 INJECTION, SOLUTION INTRAVENOUS; SUBCUTANEOUS at 13:32

## 2023-06-09 RX ADMIN — FAMOTIDINE 20 MG: 20 TABLET ORAL at 09:38

## 2023-06-09 RX ADMIN — ONDANSETRON 4 MG: 2 INJECTION INTRAMUSCULAR; INTRAVENOUS at 09:38

## 2023-06-09 ASSESSMENT — PAIN SCALES - GENERAL: PAINLEVEL_OUTOF10: 1

## 2023-06-09 ASSESSMENT — PAIN DESCRIPTION - LOCATION: LOCATION: ABDOMEN

## 2023-06-09 ASSESSMENT — PAIN DESCRIPTION - DESCRIPTORS: DESCRIPTORS: CRAMPING

## 2023-06-09 ASSESSMENT — PAIN DESCRIPTION - ORIENTATION: ORIENTATION: RIGHT

## 2023-06-09 NOTE — DISCHARGE SUMMARY
BILIRUBINUR NEGATIVE 05/25/2012 09:30 AM    BLOODU Negative 06/08/2023 04:15 PM    GLUCOSEU Negative 06/08/2023 04:15 PM    GLUCOSEU >=1000 05/25/2012 09:30 AM    KETUA TRACE 06/08/2023 04:15 PM     Urine Cultures:   Lab Results   Component Value Date/Time    LABURIN No growth at 18 to 36 hours 12/05/2022 12:54 PM     Blood Cultures:   Lab Results   Component Value Date/Time    [facility]  06/07/2023 06:09 PM     No Growth to date. Any change in status will be called. Lab Results   Component Value Date/Time    BLOODCULT2  06/08/2023 01:07 AM     No Growth to date. Any change in status will be called.      Organism: No results found for: ORG    Time Spent Discharging patient 45 minutes    Electronically signed by Dewayne Cunningham MD on 6/9/2023 at 11:26 AM

## 2023-06-11 LAB — BACTERIA BLD CULT: NORMAL

## 2023-06-12 LAB — BACTERIA BLD CULT ORG #2: NORMAL

## 2023-06-14 ENCOUNTER — APPOINTMENT (OUTPATIENT)
Dept: GENERAL RADIOLOGY | Age: 63
DRG: 682 | End: 2023-06-14
Payer: COMMERCIAL

## 2023-06-14 ENCOUNTER — HOSPITAL ENCOUNTER (INPATIENT)
Age: 63
LOS: 5 days | Discharge: HOME | DRG: 682 | End: 2023-06-19
Attending: EMERGENCY MEDICINE | Admitting: INTERNAL MEDICINE
Payer: COMMERCIAL

## 2023-06-14 DIAGNOSIS — R77.8 ELEVATED TROPONIN: ICD-10-CM

## 2023-06-14 DIAGNOSIS — E87.6 HYPOKALEMIA: ICD-10-CM

## 2023-06-14 DIAGNOSIS — E87.1 HYPONATREMIA: Primary | ICD-10-CM

## 2023-06-14 DIAGNOSIS — N17.9 AKI (ACUTE KIDNEY INJURY) (HCC): ICD-10-CM

## 2023-06-14 DIAGNOSIS — R62.7 FAILURE TO THRIVE IN ADULT: ICD-10-CM

## 2023-06-14 DIAGNOSIS — R53.1 GENERALIZED WEAKNESS: ICD-10-CM

## 2023-06-14 LAB
ALBUMIN SERPL-MCNC: 4 G/DL (ref 3.4–5)
ALBUMIN/GLOB SERPL: 1.2 {RATIO} (ref 1.1–2.2)
ALP SERPL-CCNC: 134 U/L (ref 40–129)
ALT SERPL-CCNC: 9 U/L (ref 10–40)
ANION GAP SERPL CALCULATED.3IONS-SCNC: 17 MMOL/L (ref 3–16)
AST SERPL-CCNC: 28 U/L (ref 15–37)
BACTERIA URNS QL MICRO: NORMAL /HPF
BASE EXCESS BLDV CALC-SCNC: -1.1 MMOL/L
BASOPHILS # BLD: 0.1 K/UL (ref 0–0.2)
BASOPHILS NFR BLD: 0.9 %
BETA-HYDROXYBUTYRATE: 2.05 MMOL/L (ref 0–0.27)
BILIRUB SERPL-MCNC: 0.3 MG/DL (ref 0–1)
BILIRUB UR QL STRIP.AUTO: NEGATIVE
BUN SERPL-MCNC: 26 MG/DL (ref 7–20)
CALCIUM SERPL-MCNC: 9.9 MG/DL (ref 8.3–10.6)
CHLORIDE SERPL-SCNC: 80 MMOL/L (ref 99–110)
CLARITY UR: CLEAR
CO2 BLDV-SCNC: 28 MMOL/L
CO2 SERPL-SCNC: 23 MMOL/L (ref 21–32)
COHGB MFR BLDV: 1.1 %
COLOR UR: YELLOW
CREAT SERPL-MCNC: 3.3 MG/DL (ref 0.6–1.2)
CREAT UR-MCNC: 53.2 MG/DL (ref 28–259)
DEPRECATED RDW RBC AUTO: 14.5 % (ref 12.4–15.4)
EOSINOPHIL # BLD: 1.6 K/UL (ref 0–0.6)
EOSINOPHIL NFR BLD: 20 %
EPI CELLS #/AREA URNS AUTO: 1 /HPF (ref 0–5)
GFR SERPLBLD CREATININE-BSD FMLA CKD-EPI: 15 ML/MIN/{1.73_M2}
GLUCOSE SERPL-MCNC: 124 MG/DL (ref 70–99)
GLUCOSE UR STRIP.AUTO-MCNC: NEGATIVE MG/DL
HCO3 BLDV-SCNC: 26 MMOL/L (ref 23–29)
HCT VFR BLD AUTO: 30.3 % (ref 36–48)
HGB BLD-MCNC: 10.1 G/DL (ref 12–16)
HGB UR QL STRIP.AUTO: NEGATIVE
HYALINE CASTS #/AREA URNS AUTO: 0 /LPF (ref 0–8)
KETONES UR STRIP.AUTO-MCNC: NEGATIVE MG/DL
LACTATE BLDV-SCNC: 1 MMOL/L (ref 0.4–1.9)
LACTATE BLDV-SCNC: 1.2 MMOL/L (ref 0.4–1.9)
LEUKOCYTE ESTERASE UR QL STRIP.AUTO: NEGATIVE
LIPASE SERPL-CCNC: 15 U/L (ref 13–60)
LYMPHOCYTES # BLD: 1.8 K/UL (ref 1–5.1)
LYMPHOCYTES NFR BLD: 21.7 %
MAGNESIUM SERPL-MCNC: 1.4 MG/DL (ref 1.8–2.4)
MCH RBC QN AUTO: 24.2 PG (ref 26–34)
MCHC RBC AUTO-ENTMCNC: 33.3 G/DL (ref 31–36)
MCV RBC AUTO: 72.6 FL (ref 80–100)
METHGB MFR BLDV: 0.6 %
MONOCYTES # BLD: 0.8 K/UL (ref 0–1.3)
MONOCYTES NFR BLD: 9.3 %
NEUTROPHILS # BLD: 3.9 K/UL (ref 1.7–7.7)
NEUTROPHILS NFR BLD: 48.1 %
NITRITE UR QL STRIP.AUTO: NEGATIVE
NT-PROBNP SERPL-MCNC: 969 PG/ML (ref 0–124)
O2 THERAPY: ABNORMAL
OSMOLALITY SERPL: 270 MOSM/KG (ref 280–301)
OSMOLALITY UR: 144 MOSM/KG (ref 390–1070)
PCO2 BLDV: 54 MMHG (ref 40–50)
PH BLDV: 7.29 [PH] (ref 7.35–7.45)
PH UR STRIP.AUTO: 6 [PH] (ref 5–8)
PLATELET # BLD AUTO: 359 K/UL (ref 135–450)
PMV BLD AUTO: 8.5 FL (ref 5–10.5)
PO2 BLDV: <30 MMHG
POTASSIUM SERPL-SCNC: 2.9 MMOL/L (ref 3.5–5.1)
PROT SERPL-MCNC: 7.3 G/DL (ref 6.4–8.2)
PROT UR STRIP.AUTO-MCNC: 30 MG/DL
RBC # BLD AUTO: 4.18 M/UL (ref 4–5.2)
RBC CLUMPS #/AREA URNS AUTO: 0 /HPF (ref 0–4)
SAO2 % BLDV: 26 %
SODIUM SERPL-SCNC: 120 MMOL/L (ref 136–145)
SODIUM UR-SCNC: <20 MMOL/L
SP GR UR STRIP.AUTO: 1 (ref 1–1.03)
TROPONIN, HIGH SENSITIVITY: 28 NG/L (ref 0–14)
TROPONIN, HIGH SENSITIVITY: 33 NG/L (ref 0–14)
TSH SERPL DL<=0.005 MIU/L-ACNC: 3 UIU/ML (ref 0.27–4.2)
UA DIPSTICK W REFLEX MICRO PNL UR: YES
URN SPEC COLLECT METH UR: ABNORMAL
UROBILINOGEN UR STRIP-ACNC: 0.2 E.U./DL
UUN UR-MCNC: 123.3 MG/DL (ref 800–1666)
WBC # BLD AUTO: 8.2 K/UL (ref 4–11)
WBC #/AREA URNS AUTO: 1 /HPF (ref 0–5)

## 2023-06-14 PROCEDURE — 87086 URINE CULTURE/COLONY COUNT: CPT

## 2023-06-14 PROCEDURE — 6360000002 HC RX W HCPCS: Performed by: INTERNAL MEDICINE

## 2023-06-14 PROCEDURE — 1200000000 HC SEMI PRIVATE

## 2023-06-14 PROCEDURE — 83735 ASSAY OF MAGNESIUM: CPT

## 2023-06-14 PROCEDURE — 96365 THER/PROPH/DIAG IV INF INIT: CPT

## 2023-06-14 PROCEDURE — 83690 ASSAY OF LIPASE: CPT

## 2023-06-14 PROCEDURE — 84540 ASSAY OF URINE/UREA-N: CPT

## 2023-06-14 PROCEDURE — 93005 ELECTROCARDIOGRAM TRACING: CPT | Performed by: EMERGENCY MEDICINE

## 2023-06-14 PROCEDURE — 2580000003 HC RX 258: Performed by: EMERGENCY MEDICINE

## 2023-06-14 PROCEDURE — 6360000002 HC RX W HCPCS: Performed by: EMERGENCY MEDICINE

## 2023-06-14 PROCEDURE — 84443 ASSAY THYROID STIM HORMONE: CPT

## 2023-06-14 PROCEDURE — 85025 COMPLETE CBC W/AUTO DIFF WBC: CPT

## 2023-06-14 PROCEDURE — 82010 KETONE BODYS QUAN: CPT

## 2023-06-14 PROCEDURE — 82570 ASSAY OF URINE CREATININE: CPT

## 2023-06-14 PROCEDURE — 83880 ASSAY OF NATRIURETIC PEPTIDE: CPT

## 2023-06-14 PROCEDURE — 83935 ASSAY OF URINE OSMOLALITY: CPT

## 2023-06-14 PROCEDURE — 6370000000 HC RX 637 (ALT 250 FOR IP): Performed by: INTERNAL MEDICINE

## 2023-06-14 PROCEDURE — 83605 ASSAY OF LACTIC ACID: CPT

## 2023-06-14 PROCEDURE — 80053 COMPREHEN METABOLIC PANEL: CPT

## 2023-06-14 PROCEDURE — 36415 COLL VENOUS BLD VENIPUNCTURE: CPT

## 2023-06-14 PROCEDURE — 96367 TX/PROPH/DG ADDL SEQ IV INF: CPT

## 2023-06-14 PROCEDURE — 87040 BLOOD CULTURE FOR BACTERIA: CPT

## 2023-06-14 PROCEDURE — 82803 BLOOD GASES ANY COMBINATION: CPT

## 2023-06-14 PROCEDURE — 99285 EMERGENCY DEPT VISIT HI MDM: CPT

## 2023-06-14 PROCEDURE — 84484 ASSAY OF TROPONIN QUANT: CPT

## 2023-06-14 PROCEDURE — 81001 URINALYSIS AUTO W/SCOPE: CPT

## 2023-06-14 PROCEDURE — 84300 ASSAY OF URINE SODIUM: CPT

## 2023-06-14 PROCEDURE — 2580000003 HC RX 258: Performed by: INTERNAL MEDICINE

## 2023-06-14 PROCEDURE — 83930 ASSAY OF BLOOD OSMOLALITY: CPT

## 2023-06-14 PROCEDURE — 71045 X-RAY EXAM CHEST 1 VIEW: CPT

## 2023-06-14 RX ORDER — SODIUM CHLORIDE 0.9 % (FLUSH) 0.9 %
5-40 SYRINGE (ML) INJECTION EVERY 12 HOURS SCHEDULED
Status: DISCONTINUED | OUTPATIENT
Start: 2023-06-14 | End: 2023-06-19 | Stop reason: HOSPADM

## 2023-06-14 RX ORDER — POTASSIUM CHLORIDE 7.45 MG/ML
10 INJECTION INTRAVENOUS
Status: DISPENSED | OUTPATIENT
Start: 2023-06-14 | End: 2023-06-14

## 2023-06-14 RX ORDER — SODIUM CHLORIDE 9 MG/ML
INJECTION, SOLUTION INTRAVENOUS CONTINUOUS
Status: DISCONTINUED | OUTPATIENT
Start: 2023-06-14 | End: 2023-06-16

## 2023-06-14 RX ORDER — ONDANSETRON 4 MG/1
4 TABLET, ORALLY DISINTEGRATING ORAL EVERY 8 HOURS PRN
Status: DISCONTINUED | OUTPATIENT
Start: 2023-06-14 | End: 2023-06-19 | Stop reason: HOSPADM

## 2023-06-14 RX ORDER — SODIUM CHLORIDE, SODIUM LACTATE, POTASSIUM CHLORIDE, AND CALCIUM CHLORIDE .6; .31; .03; .02 G/100ML; G/100ML; G/100ML; G/100ML
30 INJECTION, SOLUTION INTRAVENOUS ONCE
Status: COMPLETED | OUTPATIENT
Start: 2023-06-14 | End: 2023-06-14

## 2023-06-14 RX ORDER — HEPARIN SODIUM 5000 [USP'U]/ML
5000 INJECTION, SOLUTION INTRAVENOUS; SUBCUTANEOUS 2 TIMES DAILY
Status: DISCONTINUED | OUTPATIENT
Start: 2023-06-15 | End: 2023-06-19 | Stop reason: HOSPADM

## 2023-06-14 RX ORDER — ACETAMINOPHEN 325 MG/1
650 TABLET ORAL EVERY 6 HOURS PRN
Status: DISCONTINUED | OUTPATIENT
Start: 2023-06-14 | End: 2023-06-19 | Stop reason: HOSPADM

## 2023-06-14 RX ORDER — INSULIN LISPRO 100 [IU]/ML
0-4 INJECTION, SOLUTION INTRAVENOUS; SUBCUTANEOUS
Status: DISCONTINUED | OUTPATIENT
Start: 2023-06-15 | End: 2023-06-15

## 2023-06-14 RX ORDER — ESCITALOPRAM OXALATE 10 MG/1
10 TABLET ORAL DAILY
Status: DISCONTINUED | OUTPATIENT
Start: 2023-06-15 | End: 2023-06-19 | Stop reason: HOSPADM

## 2023-06-14 RX ORDER — INSULIN LISPRO 100 [IU]/ML
0-4 INJECTION, SOLUTION INTRAVENOUS; SUBCUTANEOUS NIGHTLY
Status: DISCONTINUED | OUTPATIENT
Start: 2023-06-14 | End: 2023-06-15

## 2023-06-14 RX ORDER — SODIUM CHLORIDE 0.9 % (FLUSH) 0.9 %
5-40 SYRINGE (ML) INJECTION PRN
Status: DISCONTINUED | OUTPATIENT
Start: 2023-06-14 | End: 2023-06-19 | Stop reason: HOSPADM

## 2023-06-14 RX ORDER — SODIUM CHLORIDE 9 MG/ML
INJECTION, SOLUTION INTRAVENOUS CONTINUOUS
Status: DISCONTINUED | OUTPATIENT
Start: 2023-06-14 | End: 2023-06-14 | Stop reason: SDUPTHER

## 2023-06-14 RX ORDER — POLYETHYLENE GLYCOL 3350 17 G/17G
17 POWDER, FOR SOLUTION ORAL DAILY PRN
Status: DISCONTINUED | OUTPATIENT
Start: 2023-06-14 | End: 2023-06-19 | Stop reason: HOSPADM

## 2023-06-14 RX ORDER — DEXTROSE MONOHYDRATE 100 MG/ML
INJECTION, SOLUTION INTRAVENOUS CONTINUOUS PRN
Status: DISCONTINUED | OUTPATIENT
Start: 2023-06-14 | End: 2023-06-17 | Stop reason: SDUPTHER

## 2023-06-14 RX ORDER — SODIUM CHLORIDE 9 MG/ML
INJECTION, SOLUTION INTRAVENOUS PRN
Status: DISCONTINUED | OUTPATIENT
Start: 2023-06-14 | End: 2023-06-19 | Stop reason: HOSPADM

## 2023-06-14 RX ORDER — MAGNESIUM SULFATE IN WATER 40 MG/ML
2000 INJECTION, SOLUTION INTRAVENOUS ONCE
Status: COMPLETED | OUTPATIENT
Start: 2023-06-14 | End: 2023-06-14

## 2023-06-14 RX ORDER — AMLODIPINE BESYLATE 5 MG/1
5 TABLET ORAL DAILY
Status: CANCELLED | OUTPATIENT
Start: 2023-06-15

## 2023-06-14 RX ORDER — ACETAMINOPHEN 650 MG/1
650 SUPPOSITORY RECTAL EVERY 6 HOURS PRN
Status: DISCONTINUED | OUTPATIENT
Start: 2023-06-14 | End: 2023-06-19 | Stop reason: HOSPADM

## 2023-06-14 RX ORDER — ONDANSETRON 2 MG/ML
4 INJECTION INTRAMUSCULAR; INTRAVENOUS EVERY 6 HOURS PRN
Status: DISCONTINUED | OUTPATIENT
Start: 2023-06-14 | End: 2023-06-19 | Stop reason: HOSPADM

## 2023-06-14 RX ORDER — ROPINIROLE 1 MG/1
1 TABLET, FILM COATED ORAL NIGHTLY
Status: DISCONTINUED | OUTPATIENT
Start: 2023-06-14 | End: 2023-06-19 | Stop reason: HOSPADM

## 2023-06-14 RX ORDER — POTASSIUM CHLORIDE 7.45 MG/ML
10 INJECTION INTRAVENOUS
Status: COMPLETED | OUTPATIENT
Start: 2023-06-15 | End: 2023-06-14

## 2023-06-14 RX ORDER — OXYCODONE HYDROCHLORIDE 5 MG/1
5 TABLET ORAL EVERY 4 HOURS PRN
Status: DISCONTINUED | OUTPATIENT
Start: 2023-06-14 | End: 2023-06-19 | Stop reason: HOSPADM

## 2023-06-14 RX ADMIN — POTASSIUM CHLORIDE 10 MEQ: 7.46 INJECTION, SOLUTION INTRAVENOUS at 20:21

## 2023-06-14 RX ADMIN — CEFEPIME 2000 MG: 2 INJECTION, POWDER, FOR SOLUTION INTRAVENOUS at 17:23

## 2023-06-14 RX ADMIN — SODIUM CHLORIDE, POTASSIUM CHLORIDE, SODIUM LACTATE AND CALCIUM CHLORIDE 1215 ML: 600; 310; 30; 20 INJECTION, SOLUTION INTRAVENOUS at 17:23

## 2023-06-14 RX ADMIN — MAGNESIUM SULFATE HEPTAHYDRATE 2000 MG: 40 INJECTION, SOLUTION INTRAVENOUS at 20:16

## 2023-06-14 RX ADMIN — SODIUM CHLORIDE: 9 INJECTION, SOLUTION INTRAVENOUS at 20:14

## 2023-06-14 RX ADMIN — POTASSIUM CHLORIDE 10 MEQ: 7.46 INJECTION, SOLUTION INTRAVENOUS at 23:26

## 2023-06-14 RX ADMIN — ROPINIROLE HYDROCHLORIDE 1 MG: 1 TABLET, FILM COATED ORAL at 23:15

## 2023-06-14 RX ADMIN — VANCOMYCIN HYDROCHLORIDE 750 MG: 750 INJECTION, POWDER, LYOPHILIZED, FOR SOLUTION INTRAVENOUS at 18:52

## 2023-06-14 ASSESSMENT — PAIN - FUNCTIONAL ASSESSMENT: PAIN_FUNCTIONAL_ASSESSMENT: NONE - DENIES PAIN

## 2023-06-15 LAB
ALBUMIN SERPL-MCNC: 3.1 G/DL (ref 3.4–5)
ANION GAP SERPL CALCULATED.3IONS-SCNC: 10 MMOL/L (ref 3–16)
ANION GAP SERPL CALCULATED.3IONS-SCNC: 10 MMOL/L (ref 3–16)
ANION GAP SERPL CALCULATED.3IONS-SCNC: 14 MMOL/L (ref 3–16)
BASOPHILS # BLD: 0.1 K/UL (ref 0–0.2)
BASOPHILS NFR BLD: 1.1 %
BUN SERPL-MCNC: 18 MG/DL (ref 7–20)
BUN SERPL-MCNC: 19 MG/DL (ref 7–20)
BUN SERPL-MCNC: 22 MG/DL (ref 7–20)
CALCIUM SERPL-MCNC: 8.6 MG/DL (ref 8.3–10.6)
CALCIUM SERPL-MCNC: 8.8 MG/DL (ref 8.3–10.6)
CALCIUM SERPL-MCNC: 8.8 MG/DL (ref 8.3–10.6)
CHLORIDE SERPL-SCNC: 85 MMOL/L (ref 99–110)
CHLORIDE SERPL-SCNC: 91 MMOL/L (ref 99–110)
CHLORIDE SERPL-SCNC: 91 MMOL/L (ref 99–110)
CO2 SERPL-SCNC: 21 MMOL/L (ref 21–32)
CO2 SERPL-SCNC: 22 MMOL/L (ref 21–32)
CO2 SERPL-SCNC: 22 MMOL/L (ref 21–32)
CORTIS 1H P 250 UG ACTH RIV SERPL-MCNC: <0.8 UG/DL
CORTIS 30M P 250 UG ACTH RIV SERPL-MCNC: <0.8 UG/DL
CORTIS SERPL-MCNC: <0.8 UG/DL
CORTIS SERPL-MCNC: <0.8 UG/DL
CREAT SERPL-MCNC: 2 MG/DL (ref 0.6–1.2)
CREAT SERPL-MCNC: 2.1 MG/DL (ref 0.6–1.2)
CREAT SERPL-MCNC: 2.6 MG/DL (ref 0.6–1.2)
DEPRECATED RDW RBC AUTO: 14.4 % (ref 12.4–15.4)
EKG ATRIAL RATE: 73 BPM
EKG DIAGNOSIS: NORMAL
EKG P AXIS: 60 DEGREES
EKG P-R INTERVAL: 146 MS
EKG Q-T INTERVAL: 430 MS
EKG QRS DURATION: 82 MS
EKG QTC CALCULATION (BAZETT): 473 MS
EKG R AXIS: 24 DEGREES
EKG T AXIS: 63 DEGREES
EKG VENTRICULAR RATE: 73 BPM
EOSINOPHIL # BLD: 1 K/UL (ref 0–0.6)
EOSINOPHIL NFR BLD: 18.7 %
GFR SERPLBLD CREATININE-BSD FMLA CKD-EPI: 20 ML/MIN/{1.73_M2}
GFR SERPLBLD CREATININE-BSD FMLA CKD-EPI: 26 ML/MIN/{1.73_M2}
GFR SERPLBLD CREATININE-BSD FMLA CKD-EPI: 27 ML/MIN/{1.73_M2}
GLUCOSE BLD-MCNC: 100 MG/DL (ref 70–99)
GLUCOSE BLD-MCNC: 112 MG/DL (ref 70–99)
GLUCOSE BLD-MCNC: 92 MG/DL (ref 70–99)
GLUCOSE BLD-MCNC: 99 MG/DL (ref 70–99)
GLUCOSE SERPL-MCNC: 168 MG/DL (ref 70–99)
GLUCOSE SERPL-MCNC: 225 MG/DL (ref 70–99)
GLUCOSE SERPL-MCNC: 92 MG/DL (ref 70–99)
HCT VFR BLD AUTO: 23.7 % (ref 36–48)
HGB BLD-MCNC: 8.1 G/DL (ref 12–16)
LYMPHOCYTES # BLD: 1.2 K/UL (ref 1–5.1)
LYMPHOCYTES NFR BLD: 23.9 %
MAGNESIUM SERPL-MCNC: 1.8 MG/DL (ref 1.8–2.4)
MCH RBC QN AUTO: 24.9 PG (ref 26–34)
MCHC RBC AUTO-ENTMCNC: 34.2 G/DL (ref 31–36)
MCV RBC AUTO: 72.7 FL (ref 80–100)
MONOCYTES # BLD: 0.8 K/UL (ref 0–1.3)
MONOCYTES NFR BLD: 15.3 %
NEUTROPHILS # BLD: 2.1 K/UL (ref 1.7–7.7)
NEUTROPHILS NFR BLD: 41 %
PERFORMED ON: ABNORMAL
PERFORMED ON: ABNORMAL
PERFORMED ON: NORMAL
PERFORMED ON: NORMAL
PHOSPHATE SERPL-MCNC: 3.2 MG/DL (ref 2.5–4.9)
PHOSPHATE SERPL-MCNC: 3.5 MG/DL (ref 2.5–4.9)
PLATELET # BLD AUTO: 235 K/UL (ref 135–450)
PMV BLD AUTO: 8.4 FL (ref 5–10.5)
POTASSIUM SERPL-SCNC: 3.4 MMOL/L (ref 3.5–5.1)
POTASSIUM SERPL-SCNC: 3.5 MMOL/L (ref 3.5–5.1)
POTASSIUM SERPL-SCNC: 3.7 MMOL/L (ref 3.5–5.1)
RBC # BLD AUTO: 3.27 M/UL (ref 4–5.2)
SODIUM SERPL-SCNC: 120 MMOL/L (ref 136–145)
SODIUM SERPL-SCNC: 123 MMOL/L (ref 136–145)
SODIUM SERPL-SCNC: 123 MMOL/L (ref 136–145)
WBC # BLD AUTO: 5.1 K/UL (ref 4–11)

## 2023-06-15 PROCEDURE — 2580000003 HC RX 258: Performed by: INTERNAL MEDICINE

## 2023-06-15 PROCEDURE — 94760 N-INVAS EAR/PLS OXIMETRY 1: CPT

## 2023-06-15 PROCEDURE — 97530 THERAPEUTIC ACTIVITIES: CPT

## 2023-06-15 PROCEDURE — 80069 RENAL FUNCTION PANEL: CPT

## 2023-06-15 PROCEDURE — 83735 ASSAY OF MAGNESIUM: CPT

## 2023-06-15 PROCEDURE — 6370000000 HC RX 637 (ALT 250 FOR IP): Performed by: INTERNAL MEDICINE

## 2023-06-15 PROCEDURE — 97535 SELF CARE MNGMENT TRAINING: CPT

## 2023-06-15 PROCEDURE — 84100 ASSAY OF PHOSPHORUS: CPT

## 2023-06-15 PROCEDURE — 80400 ACTH STIMULATION PANEL: CPT

## 2023-06-15 PROCEDURE — 6360000002 HC RX W HCPCS: Performed by: INTERNAL MEDICINE

## 2023-06-15 PROCEDURE — 80048 BASIC METABOLIC PNL TOTAL CA: CPT

## 2023-06-15 PROCEDURE — 85025 COMPLETE CBC W/AUTO DIFF WBC: CPT

## 2023-06-15 PROCEDURE — 1200000000 HC SEMI PRIVATE

## 2023-06-15 PROCEDURE — 97165 OT EVAL LOW COMPLEX 30 MIN: CPT

## 2023-06-15 PROCEDURE — 97162 PT EVAL MOD COMPLEX 30 MIN: CPT

## 2023-06-15 PROCEDURE — 97116 GAIT TRAINING THERAPY: CPT

## 2023-06-15 PROCEDURE — 2580000003 HC RX 258: Performed by: HOSPITALIST

## 2023-06-15 PROCEDURE — 93010 ELECTROCARDIOGRAM REPORT: CPT | Performed by: INTERNAL MEDICINE

## 2023-06-15 PROCEDURE — 36415 COLL VENOUS BLD VENIPUNCTURE: CPT

## 2023-06-15 PROCEDURE — 82533 TOTAL CORTISOL: CPT

## 2023-06-15 RX ORDER — DEXTROSE MONOHYDRATE 50 MG/ML
INJECTION, SOLUTION INTRAVENOUS CONTINUOUS
Status: DISCONTINUED | OUTPATIENT
Start: 2023-06-15 | End: 2023-06-15

## 2023-06-15 RX ORDER — DEXTROSE MONOHYDRATE 100 MG/ML
INJECTION, SOLUTION INTRAVENOUS CONTINUOUS PRN
Status: CANCELLED | OUTPATIENT
Start: 2023-06-15

## 2023-06-15 RX ADMIN — HEPARIN SODIUM 5000 UNITS: 5000 INJECTION INTRAVENOUS; SUBCUTANEOUS at 08:11

## 2023-06-15 RX ADMIN — DEXTROSE MONOHYDRATE: 50 INJECTION, SOLUTION INTRAVENOUS at 12:46

## 2023-06-15 RX ADMIN — ESCITALOPRAM OXALATE 10 MG: 10 TABLET ORAL at 08:11

## 2023-06-15 RX ADMIN — OXYCODONE 5 MG: 5 TABLET ORAL at 12:49

## 2023-06-15 RX ADMIN — ROPINIROLE HYDROCHLORIDE 1 MG: 1 TABLET, FILM COATED ORAL at 20:33

## 2023-06-15 RX ADMIN — SODIUM CHLORIDE: 9 INJECTION, SOLUTION INTRAVENOUS at 20:36

## 2023-06-15 RX ADMIN — SODIUM CHLORIDE: 9 INJECTION, SOLUTION INTRAVENOUS at 04:11

## 2023-06-15 RX ADMIN — Medication 10 ML: at 20:37

## 2023-06-15 RX ADMIN — OXYCODONE 5 MG: 5 TABLET ORAL at 08:15

## 2023-06-15 RX ADMIN — HEPARIN SODIUM 5000 UNITS: 5000 INJECTION INTRAVENOUS; SUBCUTANEOUS at 20:33

## 2023-06-15 RX ADMIN — OXYCODONE 5 MG: 5 TABLET ORAL at 18:36

## 2023-06-15 ASSESSMENT — PAIN DESCRIPTION - LOCATION
LOCATION: NECK
LOCATION: HEAD
LOCATION: NECK

## 2023-06-15 ASSESSMENT — PAIN DESCRIPTION - ORIENTATION
ORIENTATION: MID
ORIENTATION: MID;LOWER
ORIENTATION: RIGHT;LEFT;MID

## 2023-06-15 ASSESSMENT — PAIN DESCRIPTION - DESCRIPTORS
DESCRIPTORS: ACHING

## 2023-06-15 ASSESSMENT — PAIN SCALES - GENERAL
PAINLEVEL_OUTOF10: 5
PAINLEVEL_OUTOF10: 4
PAINLEVEL_OUTOF10: 6

## 2023-06-16 LAB
ANION GAP SERPL CALCULATED.3IONS-SCNC: 12 MMOL/L (ref 3–16)
ANION GAP SERPL CALCULATED.3IONS-SCNC: 13 MMOL/L (ref 3–16)
BACTERIA UR CULT: NORMAL
BUN SERPL-MCNC: 14 MG/DL (ref 7–20)
BUN SERPL-MCNC: 16 MG/DL (ref 7–20)
CALCIUM SERPL-MCNC: 8 MG/DL (ref 8.3–10.6)
CALCIUM SERPL-MCNC: 8.2 MG/DL (ref 8.3–10.6)
CHLORIDE SERPL-SCNC: 93 MMOL/L (ref 99–110)
CHLORIDE SERPL-SCNC: 97 MMOL/L (ref 99–110)
CO2 SERPL-SCNC: 18 MMOL/L (ref 21–32)
CO2 SERPL-SCNC: 20 MMOL/L (ref 21–32)
CREAT SERPL-MCNC: 1.7 MG/DL (ref 0.6–1.2)
CREAT SERPL-MCNC: 1.8 MG/DL (ref 0.6–1.2)
GFR SERPLBLD CREATININE-BSD FMLA CKD-EPI: 31 ML/MIN/{1.73_M2}
GFR SERPLBLD CREATININE-BSD FMLA CKD-EPI: 33 ML/MIN/{1.73_M2}
GLUCOSE BLD-MCNC: 121 MG/DL (ref 70–99)
GLUCOSE BLD-MCNC: 129 MG/DL (ref 70–99)
GLUCOSE BLD-MCNC: 148 MG/DL (ref 70–99)
GLUCOSE BLD-MCNC: 173 MG/DL (ref 70–99)
GLUCOSE BLD-MCNC: 202 MG/DL (ref 70–99)
GLUCOSE BLD-MCNC: 275 MG/DL (ref 70–99)
GLUCOSE SERPL-MCNC: 162 MG/DL (ref 70–99)
GLUCOSE SERPL-MCNC: 391 MG/DL (ref 70–99)
PERFORMED ON: ABNORMAL
POTASSIUM SERPL-SCNC: 3.4 MMOL/L (ref 3.5–5.1)
POTASSIUM SERPL-SCNC: 3.7 MMOL/L (ref 3.5–5.1)
PROCALCITONIN SERPL IA-MCNC: 0.15 NG/ML (ref 0–0.15)
SODIUM SERPL-SCNC: 124 MMOL/L (ref 136–145)
SODIUM SERPL-SCNC: 129 MMOL/L (ref 136–145)

## 2023-06-16 PROCEDURE — 2580000003 HC RX 258: Performed by: INTERNAL MEDICINE

## 2023-06-16 PROCEDURE — 6370000000 HC RX 637 (ALT 250 FOR IP): Performed by: HOSPITALIST

## 2023-06-16 PROCEDURE — 82024 ASSAY OF ACTH: CPT

## 2023-06-16 PROCEDURE — 36415 COLL VENOUS BLD VENIPUNCTURE: CPT

## 2023-06-16 PROCEDURE — 97116 GAIT TRAINING THERAPY: CPT

## 2023-06-16 PROCEDURE — 6360000002 HC RX W HCPCS: Performed by: INTERNAL MEDICINE

## 2023-06-16 PROCEDURE — 1200000000 HC SEMI PRIVATE

## 2023-06-16 PROCEDURE — 6370000000 HC RX 637 (ALT 250 FOR IP): Performed by: INTERNAL MEDICINE

## 2023-06-16 PROCEDURE — 84145 PROCALCITONIN (PCT): CPT

## 2023-06-16 PROCEDURE — 80048 BASIC METABOLIC PNL TOTAL CA: CPT

## 2023-06-16 PROCEDURE — 94760 N-INVAS EAR/PLS OXIMETRY 1: CPT

## 2023-06-16 RX ORDER — INSULIN LISPRO 100 [IU]/ML
0-4 INJECTION, SOLUTION INTRAVENOUS; SUBCUTANEOUS NIGHTLY
Status: DISCONTINUED | OUTPATIENT
Start: 2023-06-16 | End: 2023-06-17

## 2023-06-16 RX ORDER — INSULIN GLARGINE 100 [IU]/ML
10 INJECTION, SOLUTION SUBCUTANEOUS EVERY 24 HOURS
Status: DISCONTINUED | OUTPATIENT
Start: 2023-06-16 | End: 2023-06-17

## 2023-06-16 RX ORDER — SODIUM CHLORIDE AND POTASSIUM CHLORIDE 150; 900 MG/100ML; MG/100ML
INJECTION, SOLUTION INTRAVENOUS CONTINUOUS
Status: DISCONTINUED | OUTPATIENT
Start: 2023-06-16 | End: 2023-06-18

## 2023-06-16 RX ORDER — INSULIN LISPRO 100 [IU]/ML
0-4 INJECTION, SOLUTION INTRAVENOUS; SUBCUTANEOUS
Status: DISCONTINUED | OUTPATIENT
Start: 2023-06-16 | End: 2023-06-17

## 2023-06-16 RX ORDER — INSULIN LISPRO 100 [IU]/ML
2 INJECTION, SOLUTION INTRAVENOUS; SUBCUTANEOUS
Status: DISCONTINUED | OUTPATIENT
Start: 2023-06-16 | End: 2023-06-17

## 2023-06-16 RX ORDER — INSULIN GLARGINE 100 [IU]/ML
7 INJECTION, SOLUTION SUBCUTANEOUS EVERY 24 HOURS
Status: DISCONTINUED | OUTPATIENT
Start: 2023-06-16 | End: 2023-06-16

## 2023-06-16 RX ADMIN — OXYCODONE 5 MG: 5 TABLET ORAL at 21:10

## 2023-06-16 RX ADMIN — SODIUM CHLORIDE 1000 ML: 9 INJECTION, SOLUTION INTRAVENOUS at 04:23

## 2023-06-16 RX ADMIN — POTASSIUM CHLORIDE AND SODIUM CHLORIDE: 900; 150 INJECTION, SOLUTION INTRAVENOUS at 15:01

## 2023-06-16 RX ADMIN — INSULIN LISPRO 2 UNITS: 100 INJECTION, SOLUTION INTRAVENOUS; SUBCUTANEOUS at 17:49

## 2023-06-16 RX ADMIN — INSULIN GLARGINE 7 UNITS: 100 INJECTION, SOLUTION SUBCUTANEOUS at 15:02

## 2023-06-16 RX ADMIN — HEPARIN SODIUM 5000 UNITS: 5000 INJECTION INTRAVENOUS; SUBCUTANEOUS at 08:43

## 2023-06-16 RX ADMIN — ESCITALOPRAM OXALATE 10 MG: 10 TABLET ORAL at 08:43

## 2023-06-16 RX ADMIN — INSULIN GLARGINE 10 UNITS: 100 INJECTION, SOLUTION SUBCUTANEOUS at 18:26

## 2023-06-16 RX ADMIN — HEPARIN SODIUM 5000 UNITS: 5000 INJECTION INTRAVENOUS; SUBCUTANEOUS at 21:10

## 2023-06-16 RX ADMIN — INSULIN LISPRO 4 UNITS: 100 INJECTION, SOLUTION INTRAVENOUS; SUBCUTANEOUS at 17:50

## 2023-06-16 RX ADMIN — OXYCODONE 5 MG: 5 TABLET ORAL at 08:48

## 2023-06-16 RX ADMIN — ROPINIROLE HYDROCHLORIDE 1 MG: 1 TABLET, FILM COATED ORAL at 21:10

## 2023-06-16 RX ADMIN — OXYCODONE 5 MG: 5 TABLET ORAL at 14:45

## 2023-06-16 ASSESSMENT — PAIN DESCRIPTION - LOCATION
LOCATION: NECK
LOCATION: HEAD;NECK

## 2023-06-16 ASSESSMENT — PAIN DESCRIPTION - DESCRIPTORS
DESCRIPTORS: ACHING
DESCRIPTORS: ACHING

## 2023-06-16 ASSESSMENT — PAIN SCALES - GENERAL
PAINLEVEL_OUTOF10: 5
PAINLEVEL_OUTOF10: 3

## 2023-06-16 NOTE — PROGRESS NOTES
4 Eyes Skin Assessment     NAME:  Chanda Guardado  YOB: 1960  MEDICAL RECORD NUMBER:  [id number]    The patient is being assessed for  Admission    I agree that at least one RN has performed a thorough Head to Toe Skin Assessment on the patient. ALL assessment sites listed below have been assessed. Areas assessed by both nurses:    Head, Face, Ears, Shoulders, Back, Chest, Arms, Elbows, Hands, Sacrum. Buttock, Coccyx, Ischium, Legs. Feet and Heels, and Under Medical Devices         Does the Patient have a Wound?  No noted wound(s)       Uriah Prevention initiated by RN: No  Wound Care Orders initiated by RN: No    Pressure Injury (Stage 3,4, Unstageable, DTI, NWPT, and Complex wounds) if present, place Wound referral order by RN under : No    New Ostomies, if present place, Ostomy referral order under : No     Nurse 1 eSignature: Electronically signed by Ronnie Brian RN on 6/8/23 at 5:41 AM EDT    **SHARE this note so that the co-signing nurse can place an eSignature**    Nurse 2 eSignature: Electronically signed by Ely Zaragoza RN on 6/8/23 at 5:41 AM EDT
Arrived to pcu from the ED. Alert and oriented with some forgetfulness. Oriented to room and call light. Denies pain or SOB. Safety precautions in place.
Clinical Pharmacy Note  Renal Dose Adjustment    Jemima Ferguson is receiving the following renally eliminated medications: ampicillin, acyclovir. Based on the patient's estimated creatinine clearance of Estimated Creatinine Clearance: 22 mL/min (A) (based on SCr of 1.9 mg/dL (H)). and indication of CNS infection, the doses have been adjusted to the following:  -acyclovir 450mg (10 mg/kg) IV Q24H  -ampicillin 2g IV Q12H    Pharmacy will continue to monitor and adjust dose as needed for changes in renal function. Will re-evaluate doses with 0400 labs.      MARKUS Tinoco [facility] [place] 6/8/2023 1:16 AM
[facility]  Diabetes Education   Progress Note       NAME:  Judeth Denver  MEDICAL RECORD NUMBER:  [id number]  AGE: 57 y.o. GENDER: female  : 1960  TODAY'S DATE:  2023    Subjective   Reason for Diabetic Education Evaluation and Assessment: hypoglycemia     Clearer today. Alert and oriented. Planning discharge today. Insulin pump supplies available. Last Lantus dose  1256.       Visit Type: follow-up      Judeth Denver is a 57 y.o. female referred by:     [x] Physician  [] Nursing  [] Chart Review   [] Other:     PAST MEDICAL HISTORY        Diagnosis Date    Cancer ([facility]     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    Depression     Diabetes mellitus ([facility]     Hx of radiation therapy     melanoma right eye    Hypertension     Insulin pump in place     Melanoma ([facility] 2023    in stomach, pancreas    Prolonged emergence from general anesthesia     slow to wake up    Restless leg syndrome        PAST SURGICAL HISTORY    Past Surgical History:   Procedure Laterality Date    CARPAL TUNNEL RELEASE Bilateral 2017    CHOLECYSTECTOMY      CT BIOPSY ABDOMEN RETROPERITONEUM  2022    CT BIOPSY ABDOMEN RETROPERITONEUM 2022 [facility] CT SCAN    EYE SURGERY Right     biopsy    HYSTERECTOMY (CERVIX STATUS UNKNOWN)      LIPOMA RESECTION      LIVER BIOPSY Right     PORT SURGERY Right 2023    RIGHT PORT PLACEMENT performed by Breanne Rosales MD at [facility] ARTHROSCOPY Right 2018    RIGHT SHOULDER ARTHROSCOPE, SUBACROMIAL DECOMPRESSION, DISTALCLAVICLE EXCISION, CAPSULAR RELEASE, MANIPULATION UNDER ANESTHESIA, DEBRIDEMENT    SHOULDER SURGERY      UPPER GASTROINTESTINAL ENDOSCOPY  10/22/2014    UPPER GASTROINTESTINAL ENDOSCOPY N/A 2023    EGD W/EUS FNA performed by Guera Pace MD at [address] [place] [address]  2023    EGD BIOPSY performed by Guera Pace MD at [address] [place] [address]
Occupational Therapy  Facility/Department: [address] PROGRESSIVE CARE  Occupational Therapy Initial Assessment and Tentative D/C      Name: Jemima Ferguson  : 1960  MRN: [id number]  Date of Service: 2023    Discharge Recommendations: Jemima Ferguson scored a 19/24 on the AM-PAC ADL Inpatient form. Current research shows that an AM-PAC score of 18 or greater is typically associated with a discharge to the patient's home setting. If patient discharges prior to next session this note will serve as a discharge summary. Please see below for the latest assessment towards goals. Continue to assess pending progress, Home with assist PRN  OT Equipment Recommendations  Other: possible shower chair pending improved balance       Patient Diagnosis(es): The primary encounter diagnosis was FAVIAN (acute kidney injury) ([address] Diagnoses of Altered mental status, unspecified altered mental status type, Goals of care, counseling/discussion, Hypoglycemia, Neck pain, and History of melanoma were also pertinent to this visit. Past Medical History:  has a past medical history of Cancer ([address] Depression, Diabetes mellitus ([address] Hx of radiation therapy, Hypertension, Insulin pump in place, Melanoma ([address] Prolonged emergence from general anesthesia, and Restless leg syndrome. Past Surgical History:  has a past surgical history that includes Hysterectomy; Cholecystectomy; shoulder surgery; Upper gastrointestinal endoscopy (10/22/2014); Carpal tunnel release (Bilateral, 2017); lipoma resection; Eye surgery (Right); Shoulder arthroscopy (Right, 2018); liver biopsy (Right); US BIOPSY LIVER PERCUTANEOUS (2022); CT BIOPSY ABDOMEN RETROPERITONEUM (2022); Upper gastrointestinal endoscopy (N/A, 2023); Upper gastrointestinal endoscopy (2023); and Port Surgery (Right, 2023). Assessment   Performance deficits / Impairments: Decreased functional mobility ; Decreased
Patient discharged. All discharge education and information reviewed with patient and her , no further questions or concerns at this time. Spoke with Madeleine Yancey NP, he stated Neurology does not need to see patient before she leaves and patient does not need to follow up outpt with Neurology. Tele removed, CMU notified of discharge. Port deaccessed without complication. All belongings packed up. Transport to assist patient to front entrance.
Pharmacy Medication Reconciliation Note     List of medications Jordan Leiva is currently taking is complete. Source of information:   1. Conversation with patient at bedside  2. EMR    Notes regarding home medications:   1. Patient reports last dose of home medications was 6/5/2023  2.  Patient prescribed Cipro 500 mg BID x 7 days on 6/5/2023-- has not started taking    Patient denies taking any OTC or herbal medications other than those listed    Efe Boo, Pharmacy Intern  6/7/2023  6:40 PM
Physician Progress Note      PATIENT:               María Wray  CSN #:                  [id number]  :                       1960  ADMIT DATE:       2023 3:57 PM  [address] [place] DATE:        2023 2:03 PM  RESPONDING  PROVIDER #:        Felicita Bruno MD          QUERY TEXT:    Pt admitted with metabolic encephalopathy. Pt noted to have hypoglycemia and   FAVIAN. If possible, please document in progress notes and discharge summary the   relationship, if any, between metabolic encephalopathy and FAVIAN/hypoglycemia. The medical record reflects the following:  Risk Factors: FAVIAN and hypoglycemia  Clinical Indicators: on admission, 4-day history of confusion, nausea,   vomiting, lightheadedness, dizziness, and neck pain - Cr- 1.9   Glucose- 36  Treatment: IVF, monitor labs, hold insulin, monitor glucose, CT of head  Options provided:  -- metabolic encephalopathy due to FAVIAN  -- metabolic encephalopathy due to hypoglycemia  -- metabolic encephalopathy due to both FAVIAN and hypoglycemia  -- metabolic encephalopathy due to other, please specify. -- Other - I will add my own diagnosis  -- Disagree - Not applicable / Not valid  -- Disagree - Clinically unable to determine / Unknown  -- Refer to Clinical Documentation Reviewer    PROVIDER RESPONSE TEXT:    metabolic encephalopathy due to both FAVIAN and hypoglycemia. Query created by: Chantel Carrizales on 2023 11:41 AM      Electronically signed by:   Felicita Bruno MD 2023 7:19 PM
Placed on tele and verified with cmu.
Pt HS blood sugar 56, pt given juice and rechecked 15 min after and it was 52. Pt given more juice and able to tolerate only 2 of the glucose tablets, recheck was 71. Secure message sent to [facility]PORTIA ROBERT, fluids switched to d5. Pt encouraged to eat/drink, pt expresses little to no interest in food, and states she doesn't have an appetite. Snacks left at pt's bedside. Pt BS rechecked and it was 79. Call light within reach.     Electronically signed by Abelardo Dugan RN on 6/8/2023 at 10:24 PM
Independent  Ambulation  Surface: Level tile  Device: No Device  Distance: Pt amb ~ 150' without assist device Supervision. No LE buckling/giving way; abit guarded although improving as distance progressed. AM-PAC Score  AM-PAC Inpatient Mobility Raw Score : 22 (06/09/23 1226)  AM-PAC Inpatient T-Scale Score : 53.28 (06/09/23 1226)  Mobility Inpatient CMS 0-100% Score: 20.91 (06/09/23 1226)  Mobility Inpatient CMS G-Code Modifier : CJ (06/09/23 1226)            Goals  Short Term Goals  Time Frame for Short Term Goals: No acute care PT goals identified. Patient Goals   Patient Goals : Return home with family. Education  Patient Education  Education Given To: Patient; Family  Education Provided Comments: Role of PT, POC, Need to call for assist.  Education Method: Verbal  Barriers to Learning: None  Education Outcome: Verbalized understanding      Therapy Time   Individual Concurrent Group Co-treatment   Time In 1030         Time Out 1100         Minutes [address], PT
vancomycin (VANCOCIN) intermittent dosing (placeholder)   Other RX Placeholder    vancomycin  750 mg IntraVENous Once    insulin lispro  0-8 Units SubCUTAneous TID WC    insulin lispro  0-4 Units SubCUTAneous Nightly      Infusions:    sodium chloride      dextrose      sodium chloride 100 mL/hr at 06/08/23 0110     PRN Meds: sodium chloride flush, 5-40 mL, PRN  sodium chloride, , PRN  ondansetron, 4 mg, Q8H PRN   Or  ondansetron, 4 mg, Q6H PRN  polyethylene glycol, 17 g, Daily PRN  acetaminophen, 650 mg, Q6H PRN   Or  acetaminophen, 650 mg, Q6H PRN  glucose, 4 tablet, PRN  dextrose bolus, 125 mL, PRN   Or  dextrose bolus, 250 mL, PRN  glucagon (rDNA), 1 mg, PRN  dextrose, , Continuous PRN        Labs and Imaging   CT HEAD WO CONTRAST    Result Date: 6/7/2023  EXAMINATION: CT OF THE HEAD WITHOUT CONTRAST  6/7/2023 5:34 pm TECHNIQUE: CT of the head was performed without the administration of intravenous contrast. Automated exposure control, iterative reconstruction, and/or weight based adjustment of the mA/kV was utilized to reduce the radiation dose to as low as reasonably achievable. COMPARISON: None. HISTORY: ORDERING SYSTEM PROVIDED HISTORY: confusion TECHNOLOGIST PROVIDED HISTORY: If patient is on cardiac monitor and/or pulse ox, they may be taken off cardiac monitor and pulse ox, left on O2 if currently on. All monitors reattached when patient returns to room. Has a \"code stroke\" or \"stroke alert\" been called? ->No Reason for exam:->confusion Decision Support Exception - unselect if not a suspected or confirmed emergency medical condition->Emergency Medical Condition (MA) Reason for Exam: ams, dizziness FINDINGS: BRAIN/VENTRICLES: The ventricles are borderline enlarged and there is mild prominence of the cortical sulci which is unchanged. There is mild periventricular low density bilaterally which is unchanged. No intracranial hemorrhage or edema is seen. There is no extra-axial fluid collection or mass.
06/08/23  0406 06/08/23  0628 06/08/23  0812   POCGLU 85 102* 152* 193*       BUN/Creatinine:    Lab Results   Component Value Date/Time    BUN 17 06/08/2023 05:58 AM    CREATININE 1.9 06/08/2023 05:58 AM       Assessment        Diabetes Management and Education    Does the patient have a Primary Care Physician? Yes, Dasia Ackerman, APRN - CNP  Endocrinology with Dr. Carlos Mazariegos      Does the patient require new medication instruction? TBD - pump use at home.  can bring pump supplies to the hospital when ready to use. Person responsible for administration of Insulin/Medication:        [x] Self     [] Caregiver       [] Spouse       [] Other Family Member   []  Other      Does the patient/caregiver monitor Blood Glucoses? Yes CGM     Does the patient/caregiver follow a Meal Plan? No: Limited intake.  no intake the last 4 days. Does the patient/caregiver understand S/S of Hypoglycemia? No:   Reviewed symptoms, prevention and treatment. Level of patient/caregiver understanding able to:       [] Verbalized Understanding   [] Demonstrated Understanding       [] Teach Back       [x] Needs Reinforcement     []  Other:                    Does the patient/caregiver understand S/S of Hyperglycemia? No:     Reviewed symptoms, prevention and treatment. Level of patient/caregiver understanding able to:        [] Verbalized Understanding   [] Demonstrated Understanding       [] Teach Back       [x] Needs Reinforcement     []  Other:           Plan        Ongoing diabetes education and blood glucose monitoring. Recommend starting with Lantus 6 - 7 units, low correction and Humalog 2 - 3 units with meals if not ready to resume pump self management.   Discussed with Yara Soriano MD         Teaching Time Diabetes Education:  30 minutes     Electronically signed by Mich Kennedy on 6/8/2023 at 9:55 AM

## 2023-06-17 LAB
ANION GAP SERPL CALCULATED.3IONS-SCNC: 10 MMOL/L (ref 3–16)
ANION GAP SERPL CALCULATED.3IONS-SCNC: 4 MMOL/L (ref 3–16)
ANION GAP SERPL CALCULATED.3IONS-SCNC: 9 MMOL/L (ref 3–16)
BUN SERPL-MCNC: 12 MG/DL (ref 7–20)
BUN SERPL-MCNC: 12 MG/DL (ref 7–20)
BUN SERPL-MCNC: 13 MG/DL (ref 7–20)
CALCIUM SERPL-MCNC: 8.2 MG/DL (ref 8.3–10.6)
CALCIUM SERPL-MCNC: 8.2 MG/DL (ref 8.3–10.6)
CALCIUM SERPL-MCNC: 8.3 MG/DL (ref 8.3–10.6)
CHLORIDE SERPL-SCNC: 100 MMOL/L (ref 99–110)
CHLORIDE SERPL-SCNC: 102 MMOL/L (ref 99–110)
CHLORIDE SERPL-SCNC: 97 MMOL/L (ref 99–110)
CO2 SERPL-SCNC: 20 MMOL/L (ref 21–32)
CO2 SERPL-SCNC: 21 MMOL/L (ref 21–32)
CO2 SERPL-SCNC: 22 MMOL/L (ref 21–32)
CREAT SERPL-MCNC: 1.4 MG/DL (ref 0.6–1.2)
CREAT SERPL-MCNC: 1.5 MG/DL (ref 0.6–1.2)
CREAT SERPL-MCNC: 1.6 MG/DL (ref 0.6–1.2)
GFR SERPLBLD CREATININE-BSD FMLA CKD-EPI: 36 ML/MIN/{1.73_M2}
GFR SERPLBLD CREATININE-BSD FMLA CKD-EPI: 39 ML/MIN/{1.73_M2}
GFR SERPLBLD CREATININE-BSD FMLA CKD-EPI: 42 ML/MIN/{1.73_M2}
GLUCOSE BLD-MCNC: 175 MG/DL (ref 70–99)
GLUCOSE BLD-MCNC: 255 MG/DL (ref 70–99)
GLUCOSE BLD-MCNC: 53 MG/DL (ref 70–99)
GLUCOSE BLD-MCNC: 93 MG/DL (ref 70–99)
GLUCOSE BLD-MCNC: 96 MG/DL (ref 70–99)
GLUCOSE SERPL-MCNC: 127 MG/DL (ref 70–99)
GLUCOSE SERPL-MCNC: 178 MG/DL (ref 70–99)
GLUCOSE SERPL-MCNC: 221 MG/DL (ref 70–99)
PERFORMED ON: ABNORMAL
PERFORMED ON: NORMAL
PERFORMED ON: NORMAL
POTASSIUM SERPL-SCNC: 3.7 MMOL/L (ref 3.5–5.1)
POTASSIUM SERPL-SCNC: 3.9 MMOL/L (ref 3.5–5.1)
POTASSIUM SERPL-SCNC: 4 MMOL/L (ref 3.5–5.1)
SODIUM SERPL-SCNC: 128 MMOL/L (ref 136–145)
SODIUM SERPL-SCNC: 128 MMOL/L (ref 136–145)
SODIUM SERPL-SCNC: 129 MMOL/L (ref 136–145)

## 2023-06-17 PROCEDURE — 6360000002 HC RX W HCPCS: Performed by: INTERNAL MEDICINE

## 2023-06-17 PROCEDURE — 6370000000 HC RX 637 (ALT 250 FOR IP): Performed by: HOSPITALIST

## 2023-06-17 PROCEDURE — 80048 BASIC METABOLIC PNL TOTAL CA: CPT

## 2023-06-17 PROCEDURE — 6370000000 HC RX 637 (ALT 250 FOR IP): Performed by: INTERNAL MEDICINE

## 2023-06-17 PROCEDURE — 1200000000 HC SEMI PRIVATE

## 2023-06-17 PROCEDURE — 36415 COLL VENOUS BLD VENIPUNCTURE: CPT

## 2023-06-17 RX ORDER — DEXTROSE MONOHYDRATE 100 MG/ML
INJECTION, SOLUTION INTRAVENOUS CONTINUOUS PRN
Status: DISCONTINUED | OUTPATIENT
Start: 2023-06-17 | End: 2023-06-19 | Stop reason: HOSPADM

## 2023-06-17 RX ORDER — HYDROCORTISONE 5 MG/1
5 TABLET ORAL NIGHTLY
Status: DISCONTINUED | OUTPATIENT
Start: 2023-06-17 | End: 2023-06-19 | Stop reason: HOSPADM

## 2023-06-17 RX ORDER — HYDROCORTISONE 10 MG/1
10 TABLET ORAL DAILY
Status: DISCONTINUED | OUTPATIENT
Start: 2023-06-18 | End: 2023-06-19 | Stop reason: HOSPADM

## 2023-06-17 RX ADMIN — ESCITALOPRAM OXALATE 10 MG: 10 TABLET ORAL at 08:51

## 2023-06-17 RX ADMIN — Medication 16 G: at 08:29

## 2023-06-17 RX ADMIN — POTASSIUM CHLORIDE AND SODIUM CHLORIDE: 900; 150 INJECTION, SOLUTION INTRAVENOUS at 15:50

## 2023-06-17 RX ADMIN — POTASSIUM CHLORIDE AND SODIUM CHLORIDE 1000 ML: 900; 150 INJECTION, SOLUTION INTRAVENOUS at 03:11

## 2023-06-17 RX ADMIN — OXYCODONE 5 MG: 5 TABLET ORAL at 10:55

## 2023-06-17 RX ADMIN — ROPINIROLE HYDROCHLORIDE 1 MG: 1 TABLET, FILM COATED ORAL at 20:31

## 2023-06-17 RX ADMIN — HEPARIN SODIUM 5000 UNITS: 5000 INJECTION INTRAVENOUS; SUBCUTANEOUS at 20:31

## 2023-06-17 RX ADMIN — OXYCODONE 5 MG: 5 TABLET ORAL at 20:31

## 2023-06-17 RX ADMIN — HYDROCORTISONE 5 MG: 5 TABLET ORAL at 20:31

## 2023-06-17 RX ADMIN — HEPARIN SODIUM 5000 UNITS: 5000 INJECTION INTRAVENOUS; SUBCUTANEOUS at 08:51

## 2023-06-17 RX ADMIN — INSULIN LISPRO 2 UNITS: 100 INJECTION, SOLUTION INTRAVENOUS; SUBCUTANEOUS at 12:14

## 2023-06-17 RX ADMIN — OXYCODONE 5 MG: 5 TABLET ORAL at 15:47

## 2023-06-17 ASSESSMENT — PAIN DESCRIPTION - LOCATION
LOCATION: LEG

## 2023-06-17 ASSESSMENT — PAIN DESCRIPTION - ORIENTATION
ORIENTATION: RIGHT;LEFT

## 2023-06-17 ASSESSMENT — PAIN SCALES - GENERAL
PAINLEVEL_OUTOF10: 7
PAINLEVEL_OUTOF10: 0

## 2023-06-17 ASSESSMENT — PAIN DESCRIPTION - DESCRIPTORS
DESCRIPTORS: ACHING
DESCRIPTORS: ACHING
DESCRIPTORS: ACHING;DISCOMFORT

## 2023-06-17 ASSESSMENT — PAIN DESCRIPTION - FREQUENCY: FREQUENCY: INTERMITTENT

## 2023-06-17 ASSESSMENT — PAIN DESCRIPTION - PAIN TYPE: TYPE: ACUTE PAIN

## 2023-06-17 ASSESSMENT — PAIN DESCRIPTION - ONSET: ONSET: ON-GOING

## 2023-06-18 LAB
ANION GAP SERPL CALCULATED.3IONS-SCNC: 11 MMOL/L (ref 3–16)
ANION GAP SERPL CALCULATED.3IONS-SCNC: 7 MMOL/L (ref 3–16)
BACTERIA BLD CULT ORG #2: NORMAL
BACTERIA BLD CULT: NORMAL
BASOPHILS # BLD: 0.1 K/UL (ref 0–0.2)
BASOPHILS NFR BLD: 1.9 %
BUN SERPL-MCNC: 10 MG/DL (ref 7–20)
BUN SERPL-MCNC: 8 MG/DL (ref 7–20)
CA-I BLD-SCNC: 1.06 MMOL/L (ref 1.12–1.32)
CALCIUM SERPL-MCNC: 6.8 MG/DL (ref 8.3–10.6)
CALCIUM SERPL-MCNC: 8 MG/DL (ref 8.3–10.6)
CHLORIDE SERPL-SCNC: 103 MMOL/L (ref 99–110)
CHLORIDE SERPL-SCNC: 112 MMOL/L (ref 99–110)
CO2 SERPL-SCNC: 16 MMOL/L (ref 21–32)
CO2 SERPL-SCNC: 21 MMOL/L (ref 21–32)
CREAT SERPL-MCNC: 1 MG/DL (ref 0.6–1.2)
CREAT SERPL-MCNC: 1.3 MG/DL (ref 0.6–1.2)
DEPRECATED RDW RBC AUTO: 14.8 % (ref 12.4–15.4)
EOSINOPHIL # BLD: 0.6 K/UL (ref 0–0.6)
EOSINOPHIL NFR BLD: 15.9 %
GFR SERPLBLD CREATININE-BSD FMLA CKD-EPI: 46 ML/MIN/{1.73_M2}
GFR SERPLBLD CREATININE-BSD FMLA CKD-EPI: >60 ML/MIN/{1.73_M2}
GLUCOSE BLD-MCNC: 112 MG/DL (ref 70–99)
GLUCOSE BLD-MCNC: 257 MG/DL (ref 70–99)
GLUCOSE BLD-MCNC: 263 MG/DL (ref 70–99)
GLUCOSE BLD-MCNC: 83 MG/DL (ref 70–99)
GLUCOSE SERPL-MCNC: 73 MG/DL (ref 70–99)
GLUCOSE SERPL-MCNC: 90 MG/DL (ref 70–99)
HCT VFR BLD AUTO: 22.5 % (ref 36–48)
HGB BLD-MCNC: 7.4 G/DL (ref 12–16)
LYMPHOCYTES # BLD: 0.8 K/UL (ref 1–5.1)
LYMPHOCYTES NFR BLD: 22.1 %
MCH RBC QN AUTO: 25 PG (ref 26–34)
MCHC RBC AUTO-ENTMCNC: 32.7 G/DL (ref 31–36)
MCV RBC AUTO: 76.4 FL (ref 80–100)
MONOCYTES # BLD: 0.2 K/UL (ref 0–1.3)
MONOCYTES NFR BLD: 6.1 %
NEUTROPHILS # BLD: 1.9 K/UL (ref 1.7–7.7)
NEUTROPHILS NFR BLD: 54 %
PERFORMED ON: ABNORMAL
PERFORMED ON: NORMAL
PH BLDV: 7.27 [PH] (ref 7.35–7.45)
PLATELET # BLD AUTO: 213 K/UL (ref 135–450)
PMV BLD AUTO: 8.2 FL (ref 5–10.5)
POTASSIUM SERPL-SCNC: 3.9 MMOL/L (ref 3.5–5.1)
POTASSIUM SERPL-SCNC: 4.3 MMOL/L (ref 3.5–5.1)
RBC # BLD AUTO: 2.94 M/UL (ref 4–5.2)
SODIUM SERPL-SCNC: 131 MMOL/L (ref 136–145)
SODIUM SERPL-SCNC: 139 MMOL/L (ref 136–145)
WBC # BLD AUTO: 3.6 K/UL (ref 4–11)

## 2023-06-18 PROCEDURE — 94760 N-INVAS EAR/PLS OXIMETRY 1: CPT

## 2023-06-18 PROCEDURE — 6360000002 HC RX W HCPCS: Performed by: INTERNAL MEDICINE

## 2023-06-18 PROCEDURE — 2500000003 HC RX 250 WO HCPCS: Performed by: STUDENT IN AN ORGANIZED HEALTH CARE EDUCATION/TRAINING PROGRAM

## 2023-06-18 PROCEDURE — 6370000000 HC RX 637 (ALT 250 FOR IP): Performed by: INTERNAL MEDICINE

## 2023-06-18 PROCEDURE — 6370000000 HC RX 637 (ALT 250 FOR IP): Performed by: HOSPITALIST

## 2023-06-18 PROCEDURE — 80048 BASIC METABOLIC PNL TOTAL CA: CPT

## 2023-06-18 PROCEDURE — 85025 COMPLETE CBC W/AUTO DIFF WBC: CPT

## 2023-06-18 PROCEDURE — 1200000000 HC SEMI PRIVATE

## 2023-06-18 PROCEDURE — 82330 ASSAY OF CALCIUM: CPT

## 2023-06-18 RX ORDER — CALCIUM GLUCONATE 20 MG/ML
1000 INJECTION, SOLUTION INTRAVENOUS ONCE
Status: COMPLETED | OUTPATIENT
Start: 2023-06-18 | End: 2023-06-18

## 2023-06-18 RX ORDER — ERGOCALCIFEROL 1.25 MG/1
50000 CAPSULE ORAL WEEKLY
Status: DISCONTINUED | OUTPATIENT
Start: 2023-06-18 | End: 2023-06-19 | Stop reason: HOSPADM

## 2023-06-18 RX ADMIN — ERGOCALCIFEROL 50000 UNITS: 1.25 CAPSULE ORAL at 18:10

## 2023-06-18 RX ADMIN — OXYCODONE 5 MG: 5 TABLET ORAL at 23:06

## 2023-06-18 RX ADMIN — HYDROCORTISONE 10 MG: 10 TABLET ORAL at 08:20

## 2023-06-18 RX ADMIN — OXYCODONE 5 MG: 5 TABLET ORAL at 08:20

## 2023-06-18 RX ADMIN — ROPINIROLE HYDROCHLORIDE 1 MG: 1 TABLET, FILM COATED ORAL at 20:10

## 2023-06-18 RX ADMIN — OXYCODONE 5 MG: 5 TABLET ORAL at 12:44

## 2023-06-18 RX ADMIN — HEPARIN SODIUM 5000 UNITS: 5000 INJECTION INTRAVENOUS; SUBCUTANEOUS at 08:20

## 2023-06-18 RX ADMIN — POTASSIUM CHLORIDE AND SODIUM CHLORIDE: 900; 150 INJECTION, SOLUTION INTRAVENOUS at 04:59

## 2023-06-18 RX ADMIN — OXYCODONE 5 MG: 5 TABLET ORAL at 17:12

## 2023-06-18 RX ADMIN — CALCIUM GLUCONATE 1000 MG: 20 INJECTION, SOLUTION INTRAVENOUS at 17:16

## 2023-06-18 RX ADMIN — HEPARIN SODIUM 5000 UNITS: 5000 INJECTION INTRAVENOUS; SUBCUTANEOUS at 20:10

## 2023-06-18 RX ADMIN — HYDROCORTISONE 5 MG: 5 TABLET ORAL at 20:11

## 2023-06-18 RX ADMIN — ESCITALOPRAM OXALATE 10 MG: 10 TABLET ORAL at 08:20

## 2023-06-18 ASSESSMENT — PAIN - FUNCTIONAL ASSESSMENT
PAIN_FUNCTIONAL_ASSESSMENT: PREVENTS OR INTERFERES SOME ACTIVE ACTIVITIES AND ADLS
PAIN_FUNCTIONAL_ASSESSMENT: ACTIVITIES ARE NOT PREVENTED

## 2023-06-18 ASSESSMENT — PAIN SCALES - GENERAL
PAINLEVEL_OUTOF10: 2
PAINLEVEL_OUTOF10: 8
PAINLEVEL_OUTOF10: 7

## 2023-06-18 ASSESSMENT — PAIN DESCRIPTION - DESCRIPTORS
DESCRIPTORS: STABBING
DESCRIPTORS: ACHING;THROBBING
DESCRIPTORS: ACHING;THROBBING
DESCRIPTORS: ACHING;STABBING;SHOOTING

## 2023-06-18 ASSESSMENT — PAIN DESCRIPTION - ORIENTATION
ORIENTATION: RIGHT;LEFT

## 2023-06-18 ASSESSMENT — PAIN DESCRIPTION - ONSET: ONSET: ON-GOING

## 2023-06-18 ASSESSMENT — PAIN DESCRIPTION - LOCATION
LOCATION: LEG

## 2023-06-18 ASSESSMENT — PAIN DESCRIPTION - FREQUENCY: FREQUENCY: CONTINUOUS

## 2023-06-18 ASSESSMENT — PAIN DESCRIPTION - PAIN TYPE: TYPE: CHRONIC PAIN

## 2023-06-18 ASSESSMENT — PAIN SCALES - WONG BAKER: WONGBAKER_NUMERICALRESPONSE: 0

## 2023-06-19 VITALS
HEIGHT: 59 IN | OXYGEN SATURATION: 98 % | SYSTOLIC BLOOD PRESSURE: 158 MMHG | WEIGHT: 103.17 LBS | BODY MASS INDEX: 20.8 KG/M2 | TEMPERATURE: 98.2 F | DIASTOLIC BLOOD PRESSURE: 76 MMHG | HEART RATE: 73 BPM | RESPIRATION RATE: 16 BRPM

## 2023-06-19 LAB
ANION GAP SERPL CALCULATED.3IONS-SCNC: 12 MMOL/L (ref 3–16)
BASOPHILS # BLD: 0.1 K/UL (ref 0–0.2)
BASOPHILS NFR BLD: 1.1 %
BUN SERPL-MCNC: 8 MG/DL (ref 7–20)
CALCIUM SERPL-MCNC: 8.6 MG/DL (ref 8.3–10.6)
CHLORIDE SERPL-SCNC: 100 MMOL/L (ref 99–110)
CO2 SERPL-SCNC: 21 MMOL/L (ref 21–32)
CREAT SERPL-MCNC: 1.2 MG/DL (ref 0.6–1.2)
DEPRECATED RDW RBC AUTO: 15 % (ref 12.4–15.4)
EOSINOPHIL # BLD: 0.3 K/UL (ref 0–0.6)
EOSINOPHIL NFR BLD: 6.4 %
EST. AVERAGE GLUCOSE BLD GHB EST-MCNC: 159.9 MG/DL
FERRITIN SERPL IA-MCNC: 140 NG/ML (ref 15–150)
FOLATE SERPL-MCNC: 8.06 NG/ML (ref 4.78–24.2)
GFR SERPLBLD CREATININE-BSD FMLA CKD-EPI: 50 ML/MIN/{1.73_M2}
GLUCOSE BLD-MCNC: 154 MG/DL (ref 70–99)
GLUCOSE BLD-MCNC: 181 MG/DL (ref 70–99)
GLUCOSE BLD-MCNC: 244 MG/DL (ref 70–99)
GLUCOSE SERPL-MCNC: 188 MG/DL (ref 70–99)
HBA1C MFR BLD: 7.2 %
HCT VFR BLD AUTO: 23.4 % (ref 36–48)
HGB BLD-MCNC: 8 G/DL (ref 12–16)
IRON SATN MFR SERPL: 11 % (ref 15–50)
IRON SERPL-MCNC: 24 UG/DL (ref 37–145)
LYMPHOCYTES # BLD: 1.2 K/UL (ref 1–5.1)
LYMPHOCYTES NFR BLD: 21.4 %
MCH RBC QN AUTO: 25.2 PG (ref 26–34)
MCHC RBC AUTO-ENTMCNC: 34 G/DL (ref 31–36)
MCV RBC AUTO: 74 FL (ref 80–100)
MONOCYTES # BLD: 0.4 K/UL (ref 0–1.3)
MONOCYTES NFR BLD: 6.7 %
NEUTROPHILS # BLD: 3.5 K/UL (ref 1.7–7.7)
NEUTROPHILS NFR BLD: 64.4 %
PERFORMED ON: ABNORMAL
PLATELET # BLD AUTO: 280 K/UL (ref 135–450)
PMV BLD AUTO: 7.7 FL (ref 5–10.5)
POTASSIUM SERPL-SCNC: 4.1 MMOL/L (ref 3.5–5.1)
RBC # BLD AUTO: 3.16 M/UL (ref 4–5.2)
SODIUM SERPL-SCNC: 133 MMOL/L (ref 136–145)
TIBC SERPL-MCNC: 218 UG/DL (ref 260–445)
VIT B12 SERPL-MCNC: 954 PG/ML (ref 211–911)
WBC # BLD AUTO: 5.4 K/UL (ref 4–11)

## 2023-06-19 PROCEDURE — 6360000002 HC RX W HCPCS: Performed by: PHYSICIAN ASSISTANT

## 2023-06-19 PROCEDURE — 83550 IRON BINDING TEST: CPT

## 2023-06-19 PROCEDURE — 85025 COMPLETE CBC W/AUTO DIFF WBC: CPT

## 2023-06-19 PROCEDURE — 80048 BASIC METABOLIC PNL TOTAL CA: CPT

## 2023-06-19 PROCEDURE — 82607 VITAMIN B-12: CPT

## 2023-06-19 PROCEDURE — 82728 ASSAY OF FERRITIN: CPT

## 2023-06-19 PROCEDURE — 97116 GAIT TRAINING THERAPY: CPT

## 2023-06-19 PROCEDURE — 94760 N-INVAS EAR/PLS OXIMETRY 1: CPT

## 2023-06-19 PROCEDURE — 82746 ASSAY OF FOLIC ACID SERUM: CPT

## 2023-06-19 PROCEDURE — 2580000003 HC RX 258: Performed by: INTERNAL MEDICINE

## 2023-06-19 PROCEDURE — 6370000000 HC RX 637 (ALT 250 FOR IP): Performed by: HOSPITALIST

## 2023-06-19 PROCEDURE — 83540 ASSAY OF IRON: CPT

## 2023-06-19 PROCEDURE — 6370000000 HC RX 637 (ALT 250 FOR IP): Performed by: INTERNAL MEDICINE

## 2023-06-19 RX ORDER — HYDROCORTISONE 10 MG/1
10 TABLET ORAL DAILY
Qty: 30 TABLET | Refills: 1 | Status: SHIPPED | OUTPATIENT
Start: 2023-06-20 | End: 2023-08-19

## 2023-06-19 RX ORDER — ERGOCALCIFEROL 1.25 MG/1
50000 CAPSULE ORAL WEEKLY
Qty: 7 CAPSULE | Refills: 0 | Status: SHIPPED | OUTPATIENT
Start: 2023-06-25 | End: 2023-08-07

## 2023-06-19 RX ORDER — METOCLOPRAMIDE HYDROCHLORIDE 5 MG/ML
5 INJECTION INTRAMUSCULAR; INTRAVENOUS 3 TIMES DAILY
Status: DISCONTINUED | OUTPATIENT
Start: 2023-06-19 | End: 2023-06-19 | Stop reason: HOSPADM

## 2023-06-19 RX ORDER — HYDROCORTISONE 5 MG/1
5 TABLET ORAL NIGHTLY
Qty: 30 TABLET | Refills: 1 | Status: SHIPPED | OUTPATIENT
Start: 2023-06-19 | End: 2023-08-18

## 2023-06-19 RX ADMIN — Medication 10 ML: at 09:19

## 2023-06-19 RX ADMIN — OXYCODONE 5 MG: 5 TABLET ORAL at 05:57

## 2023-06-19 RX ADMIN — METOCLOPRAMIDE 5 MG: 5 INJECTION, SOLUTION INTRAMUSCULAR; INTRAVENOUS at 13:58

## 2023-06-19 RX ADMIN — OXYCODONE 5 MG: 5 TABLET ORAL at 13:58

## 2023-06-19 RX ADMIN — IRON SUCROSE 200 MG: 20 INJECTION, SOLUTION INTRAVENOUS at 09:16

## 2023-06-19 RX ADMIN — METOCLOPRAMIDE 5 MG: 5 INJECTION, SOLUTION INTRAMUSCULAR; INTRAVENOUS at 09:16

## 2023-06-19 RX ADMIN — HYDROCORTISONE 10 MG: 10 TABLET ORAL at 09:17

## 2023-06-19 ASSESSMENT — PAIN DESCRIPTION - LOCATION
LOCATION: LEG
LOCATION: LEG

## 2023-06-19 ASSESSMENT — PAIN DESCRIPTION - DESCRIPTORS
DESCRIPTORS: STABBING
DESCRIPTORS: ACHING;DISCOMFORT

## 2023-06-19 ASSESSMENT — PAIN SCALES - GENERAL
PAINLEVEL_OUTOF10: 8
PAINLEVEL_OUTOF10: 8

## 2023-06-19 ASSESSMENT — PAIN - FUNCTIONAL ASSESSMENT: PAIN_FUNCTIONAL_ASSESSMENT: ACTIVITIES ARE NOT PREVENTED

## 2023-06-19 ASSESSMENT — PAIN DESCRIPTION - ORIENTATION
ORIENTATION: RIGHT;LEFT
ORIENTATION: RIGHT;LEFT

## 2023-06-19 NOTE — PLAN OF CARE
Problem: Discharge Planning  Goal: Discharge to home or other facility with appropriate resources  Outcome: Adequate for Discharge     Problem: Safety - Adult  Goal: Free from fall injury  Outcome: Adequate for Discharge     Problem: Chronic Conditions and Co-morbidities  Goal: Patient's chronic conditions and co-morbidity symptoms are monitored and maintained or improved  Outcome: Adequate for Discharge     Problem: Nutrition Deficit:  Goal: Optimize nutritional status  Outcome: Adequate for Discharge  Flowsheets (Taken 6/19/2023 1107 by Brandy Brennan RD)  Nutrient intake appropriate for improving, restoring, or maintaining nutritional needs:   Assess nutritional status and recommend course of action   Recommend appropriate diets, oral nutritional supplements, and vitamin/mineral supplements   Monitor oral intake, labs, and treatment plans   Provide specific nutrition education to patient or family as appropriate     Problem: Pain  Goal: Verbalizes/displays adequate comfort level or baseline comfort level  Outcome: Adequate for Discharge

## 2023-06-19 NOTE — DISCHARGE SUMMARY
BILIRUBINUR Negative 06/14/2023 09:15 PM    BILIRUBINUR neg 12/05/2022 12:50 PM    BILIRUBINUR NEGATIVE 05/25/2012 09:30 AM    BLOODU Negative 06/14/2023 09:15 PM    GLUCOSEU Negative 06/14/2023 09:15 PM    GLUCOSEU >=1000 05/25/2012 09:30 AM    KETUA Negative 06/14/2023 09:15 PM     Urine Cultures:   Lab Results   Component Value Date/Time    LABURIN No growth at 18 to 36 hours 06/14/2023 09:15 PM     Blood Cultures:   Lab Results   Component Value Date/Time    BC No Growth after 4 days of incubation. 06/14/2023 05:09 PM     Lab Results   Component Value Date/Time    BLOODCULT2 No Growth after 4 days of incubation.  06/14/2023 05:09 PM     Organism: No results found for: ORG    Time Spent Discharging patient 35 minutes    Electronically signed by Brooke Quiroz MD on 6/19/2023 at 5:22 PM

## 2023-06-19 NOTE — CONSULTS
GASTROENTEROLOGY INPATIENT CONSULTATION        IDENTIFYING DATA/REASON FOR CONSULTATION   PATIENT:  Jayla Moe  MRN:  [id number]  ADMIT DATE: 6/14/2023  TIME OF EVALUATION: 6/19/2023 7:24 AM  HOSPITAL STAY:   LOS: 5 days     REASON FOR CONSULTATION:  PEG tube placement    HISTORY OF PRESENT ILLNESS   Jayla Moe is a 57 y.o. female with a PMH of metastatic melanoma with intraabdominal metastatic disease on immunotherapy follows with Dr. Marlin Pollard, DM on insulin, HTN, cholecystectomy who presented on 6/14/2023 with FAVIAN due to dehydration and poor oral intake worsened over the last two weeks. We have been consulted to evaluate patient for possible PEG tube placement. She reports having no appetite. She took nothing by mouth for 11 days prior to presenting to hospital. She has no abdominal pain or nausea. She reports she is getting started on steroids with hopes it will boost her appetite. Lab work-up:  Iron studies with iron 24, TIBC 218, iron sat 11%, ferritin pending  WBC 3.6, hgb 7.4  Na improving to 133 (was 120), Cr is 1.2 (was 3.3), K is 4.1 (was 2.9)    CT Chest, Abd, Pelvis 4/27/23:  1. Mild interval chemotherapeutic response in the chest.  2. Stable hepatic metastatic disease. 3. Stable pancreatic tail soft tissue mass now with evidence of splenic vein  invasion. Prior Endoscopic Evaluations:  EGD/EUS 1/2023 with Dr. Bella Nair: Moderate portal gastropathy and small gastric varices in the fundus. There was a 1cm pigmented nodule in the proximal body of the stomach on the greater curve biopsied  Extensive retroperitoneal lymphadenopathy adjacent to the tail of the pancreas. Biopsy performed. Splenic vein thrombosis. 1.25 x 1.18 cm hyperechoic liver mass    EGD 10/2014 with Dr. Fariha Coleman:   1. Duodenal bulb erosion. 2.  Gastritis. 3.  Esophageal ulceration likely from vomiting from her diabetic ketoacidosis  episode.     PAST MEDICAL, SURGICAL, FAMILY, and SOCIAL HISTORY     Past Medical

## 2023-06-19 NOTE — CARE COORDINATION
SW noting that patient doesn't need therapies post discharge but they do recommend a tim walker. Orders are not in as of yet. Bedside nurse advises that she may discharge tonight, however, the orders are not in. Respectfully submitted,    Merlyn THURSTON, [facility]   [phone]    Electronically signed by BERTRAND Kim, LSW on 6/19/2023 at 5:00 PM

## 2023-06-19 NOTE — PLAN OF CARE
Problem: Discharge Planning  Goal: Discharge to home or other facility with appropriate resources  6/18/2023 2016 by Paddy Mckeon RN  Outcome: Progressing  6/18/2023 1105 by Dee Dee Larson RN  Outcome: Progressing  Flowsheets (Taken 6/18/2023 1105)  Discharge to home or other facility with appropriate resources:   Refer to discharge planning if patient needs post-hospital services based on physician order or complex needs related to functional status, cognitive ability or social support system   Identify barriers to discharge with patient and caregiver   Identify discharge learning needs (meds, wound care, etc)     Problem: Safety - Adult  Goal: Free from fall injury  6/18/2023 2016 by Paddy Mckeon RN  Outcome: Progressing  6/18/2023 1105 by Dee Dee Larson RN  Outcome: Progressing  Flowsheets (Taken 6/18/2023 1105)  Free From Fall Injury:   Instruct family/caregiver on patient safety   Based on caregiver fall risk screen, instruct family/caregiver to ask for assistance with transferring infant if caregiver noted to have fall risk factors     Problem: Chronic Conditions and Co-morbidities  Goal: Patient's chronic conditions and co-morbidity symptoms are monitored and maintained or improved  Outcome: Progressing     Problem: Nutrition Deficit:  Goal: Optimize nutritional status  Outcome: Progressing     Problem: Pain  Goal: Verbalizes/displays adequate comfort level or baseline comfort level  6/18/2023 2016 by Paddy Mckeon RN  Outcome: Progressing  6/18/2023 1105 by Dee Dee Larson RN  Outcome: Progressing  Flowsheets (Taken 6/18/2023 1105)  Verbalizes/displays adequate comfort level or baseline comfort level:   Encourage patient to monitor pain and request assistance   Assess pain using appropriate pain scale   Administer analgesics based on type and severity of pain and evaluate response   Implement non-pharmacological measures as appropriate and evaluate response

## 2023-06-19 NOTE — PROGRESS NOTES
Comprehensive Nutrition Assessment    Type and Reason for Visit:  Reassess    Nutrition Recommendations/Plan:   Continue current diet. Do not go longer than 4-5 waking hours without a meal.   Honor food/fluid preferences as possible. Malnutrition Assessment:  Malnutrition Status:  Severe malnutrition (06/15/23 1006)    Context:  Chronic Illness     Findings of the 6 clinical characteristics of malnutrition:  Energy Intake:  75% or less estimated energy requirements for 1 month or longer  Weight Loss:  Greater than 10% over 6 months     Body Fat Loss:  Severe body fat loss Orbital   Muscle Mass Loss:  Severe muscle mass loss Thigh (quadraceps), Calf (gastrocnemius)  Fluid Accumulation:  No significant fluid accumulation     Strength:  Not Performed    Nutrition Assessment:    FU- pt. admitted with acute kidney injury. PMH includes T1DM, CKD stage 3, HTN, Hx. of DKA. FAVIAN may have been caused by dehydration and poor meal intakes prior to admission. GI consulted, possible PEG placement. PEG may be contraindicated d/t intraabdominal (including gastric) mets. If enteral nutrition is determined best course of action, TF recs are as follows; Recommend Nepro w/ carbsteady @50mL/hr cont. Flush 60mL q4hrs. This would provide 1770kcal (37kcal/kg), 81g protein (1.7g/kg), and 1087mL (23mL/kg) per day. Nutrition Related Findings:    Labs reviewed Na+ 133, GFR 50. Meds reviewed. Skin intact. Last BM 6/17. Wound Type: None       Current Nutrition Intake & Therapies:    Average Meal Intake: Unable to assess (NPO until 11:30 today; Hx. of very poor intakes.)  Average Supplements Intake: None Ordered  ADULT DIET; Regular    Anthropometric Measures:  Height: 4' 11\" (149.9 cm)  Ideal Body Weight (IBW): 95 lbs (43 kg)    Admission Body Weight: 89 lb (40.4 kg)  Current Body Weight: 103 lb 2.8 oz (46.8 kg), 108.6 % IBW.  Weight Source: Standing Scale  Current BMI (kg/m2): 20.8        Weight Adjustment For: No Adjustment
[facility]  Diabetes Education   Progress Note       NAME:  Glenda Cartwright  MEDICAL RECORD NUMBER:  [id number]  AGE: 57 y.o. GENDER: female  : 1960  TODAY'S DATE:  2023    Subjective   Reason for Diabetic Education Evaluation and Assessment: insulin pump    Kyle Baca is hoping for discharge today. Omni pod basal rate at present is 0.45 units/hr. CGM info Average , at BG targets 70 - 180 66%, above 180 34% and < 70 0%.       Visit Type: follow-up      Glenda Cartwright is a 57 y.o. female referred by:     [x] Physician  [] Nursing  [] Chart Review   [] Other:     PAST MEDICAL HISTORY        Diagnosis Date    Cancer ([facility]     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    Depression     Diabetes mellitus ([facility]     Hx of radiation therapy     melanoma right eye    Hypertension     Insulin pump in place     Melanoma ([facility] 2023    in stomach, pancreas    Prolonged emergence from general anesthesia     slow to wake up    Restless leg syndrome        PAST SURGICAL HISTORY    Past Surgical History:   Procedure Laterality Date    CARPAL TUNNEL RELEASE Bilateral 2017    CHOLECYSTECTOMY      CT BIOPSY ABDOMEN RETROPERITONEUM  2022    CT BIOPSY ABDOMEN RETROPERITONEUM 2022 [facility] CT SCAN    EYE SURGERY Right     biopsy    HYSTERECTOMY (CERVIX STATUS UNKNOWN)      LIPOMA RESECTION      LIVER BIOPSY Right     PORT SURGERY Right 2023    RIGHT PORT PLACEMENT performed by Austin Jeff MD at [facility] ARTHROSCOPY Right 2018    RIGHT SHOULDER ARTHROSCOPE, SUBACROMIAL DECOMPRESSION, DISTALCLAVICLE EXCISION, CAPSULAR RELEASE, MANIPULATION UNDER ANESTHESIA, DEBRIDEMENT    SHOULDER SURGERY      UPPER GASTROINTESTINAL ENDOSCOPY  10/22/2014    UPPER GASTROINTESTINAL ENDOSCOPY N/A 2023    EGD W/EUS FNA performed by Kelsey Lyon MD at [address] [place] [address]  2023    EGD BIOPSY performed by Kelsey Lyon MD at [facility]
Nephrology Progress Note   [email or web address]      Chief Complaint: Nausea, vomiting and poor intake    History of Present Illness: Ms Yuly Neumann is a 57 y.o. female with past medical history of essential HTN,insulin requiring diabetes, melanoma on immunotherapy with Dr. Barry Favorite, presented with 1 week h/o poor PO intake, nausea, vomiting, lightheadedness ongoing for a week. She states she was not eating much but was drinking plenty of water, at least 6-7 16 ounce bottles/d   at bedside reports no acute mental status changes. Labs reveal a cr of 3.3 up from 1.4 a week ago, sodium of 120 with a K of 2.9  She was admitted to the hospital recently for acute metabolic encephalopathy, has FAVIAN which improved with IV hydration    Subjective:    Sitting on side of the bed; eager to go home    Scheduled Meds:   metoclopramide  5 mg IntraVENous TID    iron sucrose  200 mg IntraVENous Daily    vitamin D  50,000 Units Oral Weekly    Insulin Pump - Bolus Dose   SubCUTAneous 4x Daily AC & HS    Insulin Pump - Basal Dose   SubCUTAneous Daily    hydrocortisone  10 mg Oral Daily    hydrocortisone  5 mg Oral Nightly    escitalopram  10 mg Oral Daily    rOPINIRole  1 mg Oral Nightly    sodium chloride flush  5-40 mL IntraVENous 2 times per day    [Held by provider] heparin (porcine)  5,000 Units SubCUTAneous BID        dextrose      sodium chloride         PRN Meds:.glucose, dextrose bolus **OR** dextrose bolus, glucagon (rDNA), dextrose, sodium chloride flush, sodium chloride, ondansetron **OR** ondansetron, polyethylene glycol, acetaminophen **OR** acetaminophen, oxyCODONE, dextrose bolus **OR** [DISCONTINUED] dextrose bolus    Physical Exam:    TEMPERATURE:  Current - Temp: 98.2 °F (36.8 °C);  Max - Temp  Av.4 °F (36.9 °C)  Min: 98.2 °F (36.8 °C)  Max: 98.5 °F (36.9 °C)  RESPIRATIONS RANGE: Resp  Av.1  Min: 14  Max: 18  PULSE RANGE: Pulse  Av.5  Min: 73  Max: 76  BLOOD PRESSURE RANGE:  Systolic (74ZEP), RQX:978 ,
RN received discharge order. Patient going home with . Patient ready to leave. RN de accessed patient port before leaving. Patient sent home with rolling walker per MD order. RN wheeled patient out in wheelchair.
ascending  Assistance: Supervision  Comment: ascended the steps step over step and with one hand support on the rail; descended the steps one at a time and with 2 hands on the one rail; no LOB but reported coming down the steps harder than going up       Balance  Sitting - Static: Good  Sitting - Dynamic: Good  Standing - Static: Good  Standing - Dynamic: Good;-  Comments: no LOB when using the walker             AM-PAC Score  AM-PAC Inpatient Mobility Raw Score : 23 (06/19/23 1122)  AM-PAC Inpatient T-Scale Score : 56.93 (06/19/23 1122)  Mobility Inpatient CMS 0-100% Score: 11.2 (06/19/23 1122)  Mobility Inpatient CMS G-Code Modifier : CI (06/19/23 1122)          Goals  Short Term Goals  Time Frame for Short Term Goals: upon d/c  Short Term Goal 1: bed mobility indep (met)  Short Term Goal 2: transfers mod I (met)  Short Term Goal 3: ambulate 150' mod I with LRAD (met)  Short Term Goal 4: stair assessment (met)  Patient Goals   Patient Goals : \"to go home\"       Education  Patient Education  Education Given To: Patient; Family  Education Provided: Role of Therapy;Plan of Care;Equipment; Fall Prevention Strategies;Transfer Training;Energy Conservation  Education Provided Comments: recommendation of RW for home use  Education Method: Verbal;Demonstration  Barriers to Learning: None  Education Outcome: Verbalized understanding;Demonstrated understanding      Therapy Time   Individual Concurrent Group Co-treatment   Time In 1103         Time Out 1132         Minutes 29                 Electronically signed by Jessica Jackson, PT [id number] on 6/19/2023 at 1:07 PM

## 2023-06-19 NOTE — CARE COORDINATION
A quick chart review reveals that this patient is not ready for discharge today. She is still waiting on on GI clearance, may need peg tube placement. If so she will also need to be set up with tube feeds. We will continue to monitor. Respectfully submitted,    Merlyn THURSTON, [facility]   [phone]    Electronically signed by BERTRAND Mendieta LSW on 6/19/2023 at 8:52 AM

## 2023-06-20 ENCOUNTER — CARE COORDINATION (OUTPATIENT)
Dept: OTHER | Facility: CLINIC | Age: 63
End: 2023-06-20

## 2023-06-20 LAB — ACTH PLAS-MCNC: <5 PG/ML (ref 6–58)

## 2023-06-20 NOTE — CARE COORDINATION
Care Transitions Outreach Attempt    Call within 2 business days of discharge: Yes   Attempted to reach patient for transitions of care follow up. Unable to reach patient. Patient: Alexy Canada Patient : 1960 MRN: [id number]    Last Discharge  Street       Date Complaint Diagnosis Description Type Department Provider    23 Emesis Hyponatremia . .. ED to Hosp-Admission (Discharged) (ADMITTED) Chago Winkler MD; Carito Wooten Son. .. Was this an external facility discharge? No Discharge Facility: [address] [place] [address]    Noted following upcoming appointments from discharge chart review:   [facility] follow up appointment(s):   Future Appointments   Date Time Provider Aixa Yuan   2023  7:10 AM MD Alyse Rincon [facility]   2023 11:40 AM CABRERA [place] - DRE Cullen  [place] - DYD     Non-[facility] follow up appointment(s): Will assess with successful outreach    ACM attempted to reach patient and spouse for Care Transitions call. HIPAA compliant message left requesting a return phone call at patient and spouse convenience. Will attempt to outreach again. Maryjo Mason, NICOLETTE RN  Associate Care Manager  Phone: [phone]  Email: [email or web address]

## 2023-06-21 ENCOUNTER — CARE COORDINATION (OUTPATIENT)
Dept: OTHER | Facility: CLINIC | Age: 63
End: 2023-06-21

## 2023-06-21 NOTE — CARE COORDINATION
can have each day. You need some protein to stay healthy. Include all sources of protein in your daily protein count. Besides meat, poultry, and fish, protein is found in milk and milk products, beans, peas, lentils, nuts, seeds, tofu, and eggs. Check for protein on the Nutrition Facts label found on packages of food such as bread and cereal.  To limit salt  Do not add salt to your food. And look for \"low sodium\" on labels. Do not use a salt substitute or lite salt unless your doctor says it is okay. (These products are high in potassium.)  Avoid or use very small amounts of condiments and marinades. These include soy sauce, fish sauce, and barbecue sauce. They are high in sodium. Avoid salted pretzels, chips, and other salted snacks. Check food labels to become more aware of the sodium content of foods. Foods that are high in sodium include soups; many canned foods; cured, smoked, or dried meats; and many packaged foods. To control carbohydrate  Ask your dietitian how much carbohydrate you can have. Carbohydrate foods include:  Whole-grain and refined breads and cereals, and some vegetables such as peas and beans. Fruits, milk, and milk products (except cheese). Candy, table sugar, and regular carbonated drinks. To limit fluids  Know what your fluid allowance is. Fill a pitcher with that amount of water every day. If you drink another fluid (such as coffee) that day, pour an equal amount out of the pitcher. Foods that are liquid at room temperature count as fluids. These include ice, gelatin, ice pops, and ice cream.  To limit potassium  Limit foods that are high in potassium. Potassium is in many foods, including vegetables, fruits, and milk products. Some high-potassium foods are bananas, broccoli, cantaloupe, milk, oranges, potatoes, spinach, and tomatoes. Choose fruits and vegetables that don't have as much potassium.  These include applesauce, blueberries, cucumbers, grapes, green beans, lettuce,

## 2023-06-22 ENCOUNTER — OFFICE VISIT (OUTPATIENT)
Dept: ENDOCRINOLOGY | Age: 63
End: 2023-06-22

## 2023-06-22 VITALS
OXYGEN SATURATION: 99 % | DIASTOLIC BLOOD PRESSURE: 74 MMHG | WEIGHT: 90.6 LBS | HEIGHT: 59 IN | SYSTOLIC BLOOD PRESSURE: 120 MMHG | HEART RATE: 74 BPM | BODY MASS INDEX: 18.27 KG/M2

## 2023-06-22 DIAGNOSIS — I10 PRIMARY HYPERTENSION: ICD-10-CM

## 2023-06-22 DIAGNOSIS — C43.9: ICD-10-CM

## 2023-06-22 DIAGNOSIS — E10.40 POORLY CONTROLLED TYPE 1 DIABETES MELLITUS WITH NEUROPATHY (HCC): Primary | ICD-10-CM

## 2023-06-22 DIAGNOSIS — E04.9 THYROID ENLARGEMENT: ICD-10-CM

## 2023-06-22 DIAGNOSIS — E78.2 MIXED HYPERLIPIDEMIA: ICD-10-CM

## 2023-06-22 DIAGNOSIS — E10.65 POORLY CONTROLLED TYPE 1 DIABETES MELLITUS WITH RETINOPATHY (HCC): ICD-10-CM

## 2023-06-22 DIAGNOSIS — E10.21 DIABETIC NEPHROPATHY ASSOCIATED WITH TYPE 1 DIABETES MELLITUS (HCC): ICD-10-CM

## 2023-06-22 DIAGNOSIS — E55.9 VITAMIN D DEFICIENCY: ICD-10-CM

## 2023-06-22 DIAGNOSIS — Z83.49 FAMILY HISTORY OF THYROID DISEASE: ICD-10-CM

## 2023-06-22 DIAGNOSIS — E10.65 POORLY CONTROLLED TYPE 1 DIABETES MELLITUS WITH NEUROPATHY (HCC): Primary | ICD-10-CM

## 2023-06-22 DIAGNOSIS — E10.319 POORLY CONTROLLED TYPE 1 DIABETES MELLITUS WITH RETINOPATHY (HCC): ICD-10-CM

## 2023-06-22 DIAGNOSIS — C79.70: ICD-10-CM

## 2023-06-22 DIAGNOSIS — N18.30 STAGE 3 CHRONIC KIDNEY DISEASE, UNSPECIFIED WHETHER STAGE 3A OR 3B CKD (HCC): ICD-10-CM

## 2023-06-22 RX ORDER — INSULIN PMP CART,AUT,G6/7,CNTR
EACH SUBCUTANEOUS
Qty: 30 EACH | Refills: 1 | Status: SHIPPED | OUTPATIENT
Start: 2023-06-22

## 2023-06-22 RX ORDER — PROCHLORPERAZINE 25 MG/1
SUPPOSITORY RECTAL
COMMUNITY
Start: 2023-06-08

## 2023-06-22 RX ORDER — INSULIN LISPRO 100 [IU]/ML
INJECTION, SOLUTION INTRAVENOUS; SUBCUTANEOUS
Qty: 40 ML | Refills: 1 | Status: SHIPPED | OUTPATIENT
Start: 2023-06-22

## 2023-06-22 NOTE — PROGRESS NOTES
mellitus with diabetic nephropathy, with long-term current use of insulin    Stage 3 chronic kidney disease (HCC)    Type 1 diabetes, uncontrolled, with retinopathy    Primary hypertension    Vitamin D deficiency    Mixed hyperlipidemia    Family history of thyroid disease    Poorly controlled type 1 diabetes mellitus with retinopathy ([address]    Diabetic nephropathy ([address]    Poorly controlled type 1 diabetes mellitus with neuropathy (HCC)    Thyroid enlargement    Gastroesophageal reflux disease without esophagitis    Cataract, left eye    Chronic cough    Current severe episode of major depressive disorder without psychotic features without prior episode (HCC)    Diabetic retinopathy (HCC)    Insomnia    Insulin pump status    Lateral epicondylitis of right elbow    Lumbar disc herniation with radiculopathy    Malignant melanoma of choroid (HCC)    Malignant neoplasm metastatic to liver (HCC)    Microalbuminuria    Pancreatic mass    Post-cholecystectomy syndrome    Restless legs syndrome (RLS)    Diabetic nephropathy associated with type 1 diabetes mellitus (HCC)    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)    Hypertensive urgency    DKA (diabetic ketoacidosis) (HCC)    Leukocytosis    FAVIAN (acute kidney injury) ([address]    DKA, type 1, not at goal [facility])    AMS (altered mental status)    History of melanoma    Neck pain    Hypoglycemia    Metastatic melanoma to pancreas ([address]    Poorly controlled diabetes mellitus ([address]     Past Surgical History:   Procedure Laterality Date    CARPAL TUNNEL RELEASE Bilateral 12/2017    CHOLECYSTECTOMY      CT BIOPSY ABDOMEN RETROPERITONEUM  12/27/2022    CT BIOPSY ABDOMEN RETROPERITONEUM 12/27/2022 [facility] CT SCAN    EYE SURGERY Right     biopsy    HYSTERECTOMY (CERVIX STATUS UNKNOWN)      LIPOMA RESECTION      LIVER BIOPSY Right     PORT SURGERY Right 1/18/2023    RIGHT PORT PLACEMENT performed by Kassy Andre MD at [facility] ARTHROSCOPY Right 08/31/2018    RIGHT

## 2023-06-26 ENCOUNTER — OFFICE VISIT (OUTPATIENT)
Dept: FAMILY MEDICINE CLINIC | Age: 63
End: 2023-06-26
Payer: COMMERCIAL

## 2023-06-26 VITALS
HEART RATE: 67 BPM | WEIGHT: 92.2 LBS | HEIGHT: 59 IN | SYSTOLIC BLOOD PRESSURE: 116 MMHG | OXYGEN SATURATION: 98 % | DIASTOLIC BLOOD PRESSURE: 60 MMHG | BODY MASS INDEX: 18.59 KG/M2

## 2023-06-26 DIAGNOSIS — C43.9: Primary | ICD-10-CM

## 2023-06-26 DIAGNOSIS — F32.2 CURRENT SEVERE EPISODE OF MAJOR DEPRESSIVE DISORDER WITHOUT PSYCHOTIC FEATURES WITHOUT PRIOR EPISODE (HCC): ICD-10-CM

## 2023-06-26 DIAGNOSIS — E27.40 ADRENAL INSUFFICIENCY (HCC): ICD-10-CM

## 2023-06-26 DIAGNOSIS — G25.81 RESTLESS LEG SYNDROME: ICD-10-CM

## 2023-06-26 DIAGNOSIS — C79.70: Primary | ICD-10-CM

## 2023-06-26 DIAGNOSIS — E10.65 UNCONTROLLED TYPE 1 DIABETES MELLITUS WITH HYPERGLYCEMIA (HCC): ICD-10-CM

## 2023-06-26 DIAGNOSIS — Z87.440 HISTORY OF UTI: ICD-10-CM

## 2023-06-26 LAB
BILIRUBIN, POC: NEGATIVE
BLOOD URINE, POC: NEGATIVE
CLARITY, POC: NORMAL
COLOR, POC: NORMAL
GLUCOSE URINE, POC: NEGATIVE
KETONES, POC: NEGATIVE
LEUKOCYTE EST, POC: NORMAL
NITRITE, POC: NEGATIVE
PH, POC: 5.5
PROTEIN, POC: NORMAL
SPECIFIC GRAVITY, POC: >=1.03
UROBILINOGEN, POC: NORMAL

## 2023-06-26 PROCEDURE — 81002 URINALYSIS NONAUTO W/O SCOPE: CPT | Performed by: NURSE PRACTITIONER

## 2023-06-26 PROCEDURE — 3074F SYST BP LT 130 MM HG: CPT | Performed by: NURSE PRACTITIONER

## 2023-06-26 PROCEDURE — 99214 OFFICE O/P EST MOD 30 MIN: CPT | Performed by: NURSE PRACTITIONER

## 2023-06-26 PROCEDURE — 3078F DIAST BP <80 MM HG: CPT | Performed by: NURSE PRACTITIONER

## 2023-06-26 PROCEDURE — 3051F HG A1C>EQUAL 7.0%<8.0%: CPT | Performed by: NURSE PRACTITIONER

## 2023-06-27 ENCOUNTER — TELEPHONE (OUTPATIENT)
Dept: ENDOCRINOLOGY | Age: 63
End: 2023-06-27

## 2023-06-27 DIAGNOSIS — E10.40 POORLY CONTROLLED TYPE 1 DIABETES MELLITUS WITH NEUROPATHY (HCC): ICD-10-CM

## 2023-06-27 DIAGNOSIS — E10.65 POORLY CONTROLLED TYPE 1 DIABETES MELLITUS WITH NEUROPATHY (HCC): ICD-10-CM

## 2023-06-27 LAB — BACTERIA UR CULT: NORMAL

## 2023-06-27 RX ORDER — INSULIN PMP CART,AUT,G6/7,CNTR
EACH SUBCUTANEOUS
Qty: 30 EACH | Refills: 1 | Status: SHIPPED | OUTPATIENT
Start: 2023-06-27

## 2023-06-28 ENCOUNTER — CARE COORDINATION (OUTPATIENT)
Dept: OTHER | Facility: CLINIC | Age: 63
End: 2023-06-28

## 2023-07-09 DIAGNOSIS — F32.2 CURRENT SEVERE EPISODE OF MAJOR DEPRESSIVE DISORDER WITHOUT PSYCHOTIC FEATURES WITHOUT PRIOR EPISODE (HCC): ICD-10-CM

## 2023-07-10 RX ORDER — ESCITALOPRAM OXALATE 10 MG/1
TABLET ORAL
Qty: 30 TABLET | Refills: 1 | Status: SHIPPED | OUTPATIENT
Start: 2023-07-10

## 2023-07-10 NOTE — TELEPHONE ENCOUNTER
Medication:   Requested Prescriptions     Pending Prescriptions Disp Refills    escitalopram (LEXAPRO) 10 MG tablet [Pharmacy Med Name: ESCITALOPRAM OXALATE 10MG TABS] 30 tablet 1     Sig: TAKE ONE TABLET BY MOUTH ONCE A DAY        Last Filled: 5/18/2023   Last appt: 6/26/2023   Next appt: none

## 2023-07-23 ENCOUNTER — HOSPITAL ENCOUNTER (INPATIENT)
Age: 63
LOS: 3 days | Discharge: HOME | DRG: 638 | End: 2023-07-26
Attending: EMERGENCY MEDICINE | Admitting: STUDENT IN AN ORGANIZED HEALTH CARE EDUCATION/TRAINING PROGRAM
Payer: COMMERCIAL

## 2023-07-23 ENCOUNTER — APPOINTMENT (OUTPATIENT)
Dept: GENERAL RADIOLOGY | Age: 63
DRG: 638 | End: 2023-07-23
Payer: COMMERCIAL

## 2023-07-23 DIAGNOSIS — T38.3X1A INSULIN OVERDOSE, ACCIDENTAL OR UNINTENTIONAL, INITIAL ENCOUNTER: ICD-10-CM

## 2023-07-23 DIAGNOSIS — E87.6 HYPOKALEMIA: ICD-10-CM

## 2023-07-23 DIAGNOSIS — E87.20 LACTIC ACID ACIDOSIS: ICD-10-CM

## 2023-07-23 DIAGNOSIS — N17.9 AKI (ACUTE KIDNEY INJURY) (HCC): Primary | ICD-10-CM

## 2023-07-23 PROBLEM — E78.2 MIXED HYPERLIPIDEMIA: Status: RESOLVED | Noted: 2022-09-19 | Resolved: 2023-07-23

## 2023-07-23 PROBLEM — R80.9 MICROALBUMINURIA: Status: RESOLVED | Noted: 2020-02-07 | Resolved: 2023-07-23

## 2023-07-23 PROBLEM — M54.2 NECK PAIN: Status: RESOLVED | Noted: 2023-06-09 | Resolved: 2023-07-23

## 2023-07-23 PROBLEM — M75.81 ROTATOR CUFF TENDINITIS, RIGHT: Status: RESOLVED | Noted: 2017-11-09 | Resolved: 2023-07-23

## 2023-07-23 PROBLEM — E78.00 HYPERCHOLESTEROLEMIA: Status: ACTIVE | Noted: 2023-07-23

## 2023-07-23 PROBLEM — R41.82 AMS (ALTERED MENTAL STATUS): Status: RESOLVED | Noted: 2023-06-07 | Resolved: 2023-07-23

## 2023-07-23 PROBLEM — E10.65 UNCONTROLLED TYPE 1 DIABETES MELLITUS WITH HYPERGLYCEMIA (HCC): Status: RESOLVED | Noted: 2022-09-14 | Resolved: 2023-07-23

## 2023-07-23 PROBLEM — I10 PRIMARY HYPERTENSION: Status: RESOLVED | Noted: 2022-09-19 | Resolved: 2023-07-23

## 2023-07-23 PROBLEM — E11.10 DKA (DIABETIC KETOACIDOSIS) (HCC): Status: RESOLVED | Noted: 2023-04-19 | Resolved: 2023-07-23

## 2023-07-23 PROBLEM — E10.21 DIABETIC NEPHROPATHY ASSOCIATED WITH TYPE 1 DIABETES MELLITUS (HCC): Status: RESOLVED | Noted: 2023-02-19 | Resolved: 2023-07-23

## 2023-07-23 PROBLEM — E11.319 DIABETIC RETINOPATHY (HCC): Status: RESOLVED | Noted: 2023-01-03 | Resolved: 2023-07-23

## 2023-07-23 PROBLEM — E11.65 POORLY CONTROLLED DIABETES MELLITUS (HCC): Status: RESOLVED | Noted: 2023-06-09 | Resolved: 2023-07-23

## 2023-07-23 PROBLEM — N18.30 STAGE 3 CHRONIC KIDNEY DISEASE (HCC): Status: RESOLVED | Noted: 2022-09-18 | Resolved: 2023-07-23

## 2023-07-23 PROBLEM — E10.40 POORLY CONTROLLED TYPE 1 DIABETES MELLITUS WITH NEUROPATHY (HCC): Status: RESOLVED | Noted: 2022-10-20 | Resolved: 2023-07-23

## 2023-07-23 PROBLEM — R71.8 MICROCYTOSIS: Status: ACTIVE | Noted: 2023-07-23

## 2023-07-23 PROBLEM — F32.2 CURRENT SEVERE EPISODE OF MAJOR DEPRESSIVE DISORDER WITHOUT PSYCHOTIC FEATURES WITHOUT PRIOR EPISODE (HCC): Status: RESOLVED | Noted: 2019-11-04 | Resolved: 2023-07-23

## 2023-07-23 PROBLEM — E10.319 POORLY CONTROLLED TYPE 1 DIABETES MELLITUS WITH RETINOPATHY (HCC): Status: RESOLVED | Noted: 2022-10-20 | Resolved: 2023-07-23

## 2023-07-23 PROBLEM — R79.89 ELEVATED BRAIN NATRIURETIC PEPTIDE (BNP) LEVEL: Status: ACTIVE | Noted: 2023-07-23

## 2023-07-23 PROBLEM — M79.642 BILATERAL HAND PAIN: Status: RESOLVED | Noted: 2017-06-05 | Resolved: 2023-07-23

## 2023-07-23 PROBLEM — D72.829 LEUKOCYTOSIS: Status: RESOLVED | Noted: 2023-04-19 | Resolved: 2023-07-23

## 2023-07-23 PROBLEM — E10.65 POORLY CONTROLLED TYPE 1 DIABETES MELLITUS WITH NEUROPATHY (HCC): Status: RESOLVED | Noted: 2022-10-20 | Resolved: 2023-07-23

## 2023-07-23 PROBLEM — E10.65 POORLY CONTROLLED TYPE 1 DIABETES MELLITUS WITH RETINOPATHY (HCC): Status: RESOLVED | Noted: 2022-10-20 | Resolved: 2023-07-23

## 2023-07-23 PROBLEM — E27.40 ADRENAL INSUFFICIENCY (HCC): Status: ACTIVE | Noted: 2023-07-23

## 2023-07-23 PROBLEM — I16.0 HYPERTENSIVE URGENCY: Status: RESOLVED | Noted: 2023-04-01 | Resolved: 2023-07-23

## 2023-07-23 PROBLEM — R05.3 CHRONIC COUGH: Status: RESOLVED | Noted: 2020-02-06 | Resolved: 2023-07-23

## 2023-07-23 PROBLEM — M79.641 BILATERAL HAND PAIN: Status: RESOLVED | Noted: 2017-06-05 | Resolved: 2023-07-23

## 2023-07-23 PROBLEM — R77.8 ELEVATED TROPONIN: Status: ACTIVE | Noted: 2023-07-23

## 2023-07-23 PROBLEM — R79.89 ELEVATED TROPONIN: Status: ACTIVE | Noted: 2023-07-23

## 2023-07-23 PROBLEM — E10.10 DKA, TYPE 1, NOT AT GOAL (HCC): Status: RESOLVED | Noted: 2023-04-19 | Resolved: 2023-07-23

## 2023-07-23 LAB
ALBUMIN SERPL-MCNC: 4.8 G/DL (ref 3.4–5)
ALBUMIN/GLOB SERPL: 1.3 {RATIO} (ref 1.1–2.2)
ALP SERPL-CCNC: 155 U/L (ref 40–129)
ALT SERPL-CCNC: 29 U/L (ref 10–40)
ANION GAP SERPL CALCULATED.3IONS-SCNC: 25 MMOL/L (ref 3–16)
AST SERPL-CCNC: 26 U/L (ref 15–37)
BACTERIA URNS QL MICRO: NORMAL /HPF
BASE EXCESS BLDV CALC-SCNC: 0.1 MMOL/L
BASOPHILS # BLD: 0.2 K/UL (ref 0–0.2)
BASOPHILS NFR BLD: 0.9 %
BETA-HYDROXYBUTYRATE: 0.13 MMOL/L (ref 0–0.27)
BILIRUB SERPL-MCNC: 0.7 MG/DL (ref 0–1)
BILIRUB UR QL STRIP.AUTO: NEGATIVE
BUN SERPL-MCNC: 32 MG/DL (ref 7–20)
CALCIUM SERPL-MCNC: 11.3 MG/DL (ref 8.3–10.6)
CHLORIDE SERPL-SCNC: 95 MMOL/L (ref 99–110)
CLARITY UR: ABNORMAL
CO2 BLDV-SCNC: 28 MMOL/L
CO2 SERPL-SCNC: 22 MMOL/L (ref 21–32)
COHGB MFR BLDV: 1.4 %
COLOR UR: YELLOW
CREAT SERPL-MCNC: 2 MG/DL (ref 0.6–1.2)
DEPRECATED RDW RBC AUTO: 15.8 % (ref 12.4–15.4)
EOSINOPHIL # BLD: 0.5 K/UL (ref 0–0.6)
EOSINOPHIL NFR BLD: 2.9 %
EPI CELLS #/AREA URNS AUTO: 1 /HPF (ref 0–5)
GFR SERPLBLD CREATININE-BSD FMLA CKD-EPI: 27 ML/MIN/{1.73_M2}
GLUCOSE BLD-MCNC: 111 MG/DL (ref 70–99)
GLUCOSE BLD-MCNC: 156 MG/DL (ref 70–99)
GLUCOSE BLD-MCNC: 310 MG/DL (ref 70–99)
GLUCOSE BLD-MCNC: 74 MG/DL (ref 70–99)
GLUCOSE BLD-MCNC: 84 MG/DL (ref 70–99)
GLUCOSE BLD-MCNC: 89 MG/DL (ref 70–99)
GLUCOSE BLD-MCNC: 97 MG/DL (ref 70–99)
GLUCOSE SERPL-MCNC: 84 MG/DL (ref 70–99)
GLUCOSE UR STRIP.AUTO-MCNC: >=1000 MG/DL
HCO3 BLDV-SCNC: 26 MMOL/L (ref 23–29)
HCT VFR BLD AUTO: 38 % (ref 36–48)
HGB BLD-MCNC: 12.6 G/DL (ref 12–16)
HGB UR QL STRIP.AUTO: ABNORMAL
HYALINE CASTS #/AREA URNS AUTO: 0 /LPF (ref 0–8)
KETONES UR STRIP.AUTO-MCNC: ABNORMAL MG/DL
LACTATE BLDV-SCNC: 2.2 MMOL/L (ref 0.4–1.9)
LACTATE BLDV-SCNC: 6.6 MMOL/L (ref 0.4–1.9)
LEUKOCYTE ESTERASE UR QL STRIP.AUTO: NEGATIVE
LYMPHOCYTES # BLD: 4.5 K/UL (ref 1–5.1)
LYMPHOCYTES NFR BLD: 25.3 %
MCH RBC QN AUTO: 25.3 PG (ref 26–34)
MCHC RBC AUTO-ENTMCNC: 33.3 G/DL (ref 31–36)
MCV RBC AUTO: 76 FL (ref 80–100)
METHGB MFR BLDV: 0.4 %
MONOCYTES # BLD: 1.5 K/UL (ref 0–1.3)
MONOCYTES NFR BLD: 8.2 %
NEUTROPHILS # BLD: 11 K/UL (ref 1.7–7.7)
NEUTROPHILS NFR BLD: 62.7 %
NITRITE UR QL STRIP.AUTO: NEGATIVE
NT-PROBNP SERPL-MCNC: 8900 PG/ML (ref 0–124)
O2 THERAPY: ABNORMAL
PCO2 BLDV: 48.1 MMHG (ref 40–50)
PERFORMED ON: ABNORMAL
PERFORMED ON: NORMAL
PH BLDV: 7.35 [PH] (ref 7.35–7.45)
PH UR STRIP.AUTO: 7 [PH] (ref 5–8)
PLATELET # BLD AUTO: 363 K/UL (ref 135–450)
PMV BLD AUTO: 8.2 FL (ref 5–10.5)
PO2 BLDV: <30 MMHG
POTASSIUM SERPL-SCNC: 2.2 MMOL/L (ref 3.5–5.1)
PROT SERPL-MCNC: 8.5 G/DL (ref 6.4–8.2)
PROT UR STRIP.AUTO-MCNC: 100 MG/DL
RBC # BLD AUTO: 5 M/UL (ref 4–5.2)
RBC CLUMPS #/AREA URNS AUTO: 1 /HPF (ref 0–4)
SAO2 % BLDV: 37 %
SODIUM SERPL-SCNC: 142 MMOL/L (ref 136–145)
SP GR UR STRIP.AUTO: 1.02 (ref 1–1.03)
TROPONIN, HIGH SENSITIVITY: 87 NG/L (ref 0–14)
UA COMPLETE W REFLEX CULTURE PNL UR: ABNORMAL
UA DIPSTICK W REFLEX MICRO PNL UR: YES
URN SPEC COLLECT METH UR: ABNORMAL
UROBILINOGEN UR STRIP-ACNC: 0.2 E.U./DL
WBC # BLD AUTO: 17.6 K/UL (ref 4–11)
WBC #/AREA URNS AUTO: 3 /HPF (ref 0–5)

## 2023-07-23 PROCEDURE — 96376 TX/PRO/DX INJ SAME DRUG ADON: CPT

## 2023-07-23 PROCEDURE — 96361 HYDRATE IV INFUSION ADD-ON: CPT

## 2023-07-23 PROCEDURE — 99285 EMERGENCY DEPT VISIT HI MDM: CPT

## 2023-07-23 PROCEDURE — 84484 ASSAY OF TROPONIN QUANT: CPT

## 2023-07-23 PROCEDURE — 93005 ELECTROCARDIOGRAM TRACING: CPT | Performed by: EMERGENCY MEDICINE

## 2023-07-23 PROCEDURE — 82010 KETONE BODYS QUAN: CPT

## 2023-07-23 PROCEDURE — 71045 X-RAY EXAM CHEST 1 VIEW: CPT

## 2023-07-23 PROCEDURE — 80053 COMPREHEN METABOLIC PANEL: CPT

## 2023-07-23 PROCEDURE — 2100000000 HC CCU R&B

## 2023-07-23 PROCEDURE — 6370000000 HC RX 637 (ALT 250 FOR IP): Performed by: EMERGENCY MEDICINE

## 2023-07-23 PROCEDURE — 2500000003 HC RX 250 WO HCPCS: Performed by: EMERGENCY MEDICINE

## 2023-07-23 PROCEDURE — 6360000002 HC RX W HCPCS: Performed by: EMERGENCY MEDICINE

## 2023-07-23 PROCEDURE — 83880 ASSAY OF NATRIURETIC PEPTIDE: CPT

## 2023-07-23 PROCEDURE — 81001 URINALYSIS AUTO W/SCOPE: CPT

## 2023-07-23 PROCEDURE — 85025 COMPLETE CBC W/AUTO DIFF WBC: CPT

## 2023-07-23 PROCEDURE — 87040 BLOOD CULTURE FOR BACTERIA: CPT

## 2023-07-23 PROCEDURE — 82803 BLOOD GASES ANY COMBINATION: CPT

## 2023-07-23 PROCEDURE — 2580000003 HC RX 258: Performed by: EMERGENCY MEDICINE

## 2023-07-23 PROCEDURE — 83605 ASSAY OF LACTIC ACID: CPT

## 2023-07-23 PROCEDURE — P9612 CATHETERIZE FOR URINE SPEC: HCPCS

## 2023-07-23 PROCEDURE — 96365 THER/PROPH/DIAG IV INF INIT: CPT

## 2023-07-23 RX ORDER — 0.9 % SODIUM CHLORIDE 0.9 %
1000 INTRAVENOUS SOLUTION INTRAVENOUS ONCE
Status: COMPLETED | OUTPATIENT
Start: 2023-07-23 | End: 2023-07-23

## 2023-07-23 RX ORDER — OXYCODONE HYDROCHLORIDE 5 MG/1
5 TABLET ORAL EVERY 6 HOURS PRN
Status: DISCONTINUED | OUTPATIENT
Start: 2023-07-23 | End: 2023-07-24

## 2023-07-23 RX ORDER — DEXTROSE MONOHYDRATE 25 G/50ML
25 INJECTION, SOLUTION INTRAVENOUS ONCE
Status: COMPLETED | OUTPATIENT
Start: 2023-07-23 | End: 2023-07-23

## 2023-07-23 RX ORDER — HEPARIN SODIUM 5000 [USP'U]/ML
5000 INJECTION, SOLUTION INTRAVENOUS; SUBCUTANEOUS 2 TIMES DAILY
Status: DISCONTINUED | OUTPATIENT
Start: 2023-07-24 | End: 2023-07-26 | Stop reason: HOSPADM

## 2023-07-23 RX ORDER — SODIUM CHLORIDE 0.9 % (FLUSH) 0.9 %
5-40 SYRINGE (ML) INJECTION PRN
Status: DISCONTINUED | OUTPATIENT
Start: 2023-07-23 | End: 2023-07-26 | Stop reason: HOSPADM

## 2023-07-23 RX ORDER — SODIUM CHLORIDE 0.9 % (FLUSH) 0.9 %
5-40 SYRINGE (ML) INJECTION EVERY 12 HOURS SCHEDULED
Status: DISCONTINUED | OUTPATIENT
Start: 2023-07-24 | End: 2023-07-26 | Stop reason: HOSPADM

## 2023-07-23 RX ORDER — REGADENOSON 0.08 MG/ML
0.4 INJECTION, SOLUTION INTRAVENOUS
Status: DISCONTINUED | OUTPATIENT
Start: 2023-07-23 | End: 2023-07-26 | Stop reason: HOSPADM

## 2023-07-23 RX ORDER — ACETAMINOPHEN 650 MG/1
650 SUPPOSITORY RECTAL EVERY 6 HOURS PRN
Status: DISCONTINUED | OUTPATIENT
Start: 2023-07-23 | End: 2023-07-26 | Stop reason: HOSPADM

## 2023-07-23 RX ORDER — SODIUM CHLORIDE 9 MG/ML
INJECTION, SOLUTION INTRAVENOUS PRN
Status: DISCONTINUED | OUTPATIENT
Start: 2023-07-23 | End: 2023-07-26 | Stop reason: HOSPADM

## 2023-07-23 RX ORDER — 0.9 % SODIUM CHLORIDE 0.9 %
500 INTRAVENOUS SOLUTION INTRAVENOUS ONCE
Status: COMPLETED | OUTPATIENT
Start: 2023-07-23 | End: 2023-07-23

## 2023-07-23 RX ORDER — DEXTROSE MONOHYDRATE 100 MG/ML
INJECTION, SOLUTION INTRAVENOUS CONTINUOUS PRN
Status: DISCONTINUED | OUTPATIENT
Start: 2023-07-23 | End: 2023-07-24

## 2023-07-23 RX ORDER — ESCITALOPRAM OXALATE 10 MG/1
10 TABLET ORAL DAILY
Status: DISCONTINUED | OUTPATIENT
Start: 2023-07-24 | End: 2023-07-26 | Stop reason: HOSPADM

## 2023-07-23 RX ORDER — DEXTROSE MONOHYDRATE 100 MG/ML
INJECTION, SOLUTION INTRAVENOUS CONTINUOUS
Status: DISCONTINUED | OUTPATIENT
Start: 2023-07-23 | End: 2023-07-24

## 2023-07-23 RX ORDER — ONDANSETRON 2 MG/ML
4 INJECTION INTRAMUSCULAR; INTRAVENOUS EVERY 6 HOURS PRN
Status: DISCONTINUED | OUTPATIENT
Start: 2023-07-23 | End: 2023-07-24

## 2023-07-23 RX ORDER — POTASSIUM CHLORIDE 7.45 MG/ML
10 INJECTION INTRAVENOUS
Status: DISPENSED | OUTPATIENT
Start: 2023-07-23 | End: 2023-07-24

## 2023-07-23 RX ORDER — HYDROCORTISONE 5 MG/1
5 TABLET ORAL NIGHTLY
Status: DISCONTINUED | OUTPATIENT
Start: 2023-07-24 | End: 2023-07-26 | Stop reason: HOSPADM

## 2023-07-23 RX ORDER — ONDANSETRON 2 MG/ML
4 INJECTION INTRAMUSCULAR; INTRAVENOUS ONCE
Status: COMPLETED | OUTPATIENT
Start: 2023-07-23 | End: 2023-07-23

## 2023-07-23 RX ORDER — POLYETHYLENE GLYCOL 3350 17 G/17G
17 POWDER, FOR SOLUTION ORAL DAILY PRN
Status: DISCONTINUED | OUTPATIENT
Start: 2023-07-23 | End: 2023-07-26 | Stop reason: HOSPADM

## 2023-07-23 RX ORDER — ACETAMINOPHEN 325 MG/1
650 TABLET ORAL EVERY 6 HOURS PRN
Status: DISCONTINUED | OUTPATIENT
Start: 2023-07-23 | End: 2023-07-26 | Stop reason: HOSPADM

## 2023-07-23 RX ORDER — ROPINIROLE 1 MG/1
1 TABLET, FILM COATED ORAL NIGHTLY
Status: DISCONTINUED | OUTPATIENT
Start: 2023-07-24 | End: 2023-07-26 | Stop reason: HOSPADM

## 2023-07-23 RX ORDER — ONDANSETRON 4 MG/1
4 TABLET, ORALLY DISINTEGRATING ORAL EVERY 8 HOURS PRN
Status: DISCONTINUED | OUTPATIENT
Start: 2023-07-23 | End: 2023-07-24

## 2023-07-23 RX ORDER — HYDROCORTISONE 10 MG/1
10 TABLET ORAL DAILY
Status: DISCONTINUED | OUTPATIENT
Start: 2023-07-24 | End: 2023-07-26 | Stop reason: HOSPADM

## 2023-07-23 RX ADMIN — CEFEPIME 2000 MG: 2 INJECTION, POWDER, FOR SOLUTION INTRAVENOUS at 21:59

## 2023-07-23 RX ADMIN — POTASSIUM CHLORIDE 10 MEQ: 7.46 INJECTION, SOLUTION INTRAVENOUS at 20:53

## 2023-07-23 RX ADMIN — DEXTROSE MONOHYDRATE: 100 INJECTION, SOLUTION INTRAVENOUS at 19:33

## 2023-07-23 RX ADMIN — POTASSIUM BICARBONATE 40 MEQ: 782 TABLET, EFFERVESCENT ORAL at 21:17

## 2023-07-23 RX ADMIN — DEXTROSE MONOHYDRATE 25 G: 25 INJECTION, SOLUTION INTRAVENOUS at 19:45

## 2023-07-23 RX ADMIN — VANCOMYCIN HYDROCHLORIDE 750 MG: 750 INJECTION, POWDER, LYOPHILIZED, FOR SOLUTION INTRAVENOUS at 23:26

## 2023-07-23 RX ADMIN — POTASSIUM CHLORIDE 10 MEQ: 7.46 INJECTION, SOLUTION INTRAVENOUS at 21:57

## 2023-07-23 RX ADMIN — SODIUM CHLORIDE 1000 ML: 9 INJECTION, SOLUTION INTRAVENOUS at 19:52

## 2023-07-23 RX ADMIN — SODIUM CHLORIDE 500 ML: 9 INJECTION, SOLUTION INTRAVENOUS at 21:06

## 2023-07-23 RX ADMIN — ONDANSETRON 4 MG: 2 INJECTION INTRAMUSCULAR; INTRAVENOUS at 20:17

## 2023-07-23 RX ADMIN — POTASSIUM CHLORIDE 10 MEQ: 7.46 INJECTION, SOLUTION INTRAVENOUS at 23:32

## 2023-07-23 ASSESSMENT — ENCOUNTER SYMPTOMS
GASTROINTESTINAL NEGATIVE: 1
EYES NEGATIVE: 1
ALLERGIC/IMMUNOLOGIC NEGATIVE: 1
RESPIRATORY NEGATIVE: 1

## 2023-07-23 NOTE — ED TRIAGE NOTES
Patient to the ER via EMS for blood sugar problem. Per EMS patient's home blood sugar meter was reading high so she took 30 units of subcut regular insulin. Upon their arrival her BS was 160. Patient is alert but drowsy and says \"I feel terrible\". When asked to elaborate on symptoms, patient says \"I don't know\". On arrival, FSBS on our glucometer reads 111.

## 2023-07-24 LAB
ANION GAP SERPL CALCULATED.3IONS-SCNC: 10 MMOL/L (ref 3–16)
BUN SERPL-MCNC: 30 MG/DL (ref 7–20)
CALCIUM SERPL-MCNC: 9.3 MG/DL (ref 8.3–10.6)
CHLORIDE SERPL-SCNC: 94 MMOL/L (ref 99–110)
CO2 SERPL-SCNC: 26 MMOL/L (ref 21–32)
CREAT SERPL-MCNC: 2 MG/DL (ref 0.6–1.2)
DEPRECATED RDW RBC AUTO: 15.9 % (ref 12.4–15.4)
EKG ATRIAL RATE: 95 BPM
EKG DIAGNOSIS: NORMAL
EKG P AXIS: 77 DEGREES
EKG P-R INTERVAL: 116 MS
EKG Q-T INTERVAL: 390 MS
EKG QRS DURATION: 70 MS
EKG QTC CALCULATION (BAZETT): 490 MS
EKG R AXIS: 58 DEGREES
EKG T AXIS: 66 DEGREES
EKG VENTRICULAR RATE: 95 BPM
EST. AVERAGE GLUCOSE BLD GHB EST-MCNC: 174.3 MG/DL
GFR SERPLBLD CREATININE-BSD FMLA CKD-EPI: 27 ML/MIN/{1.73_M2}
GLUCOSE BLD-MCNC: 102 MG/DL (ref 70–99)
GLUCOSE BLD-MCNC: 110 MG/DL (ref 70–99)
GLUCOSE BLD-MCNC: 114 MG/DL (ref 70–99)
GLUCOSE BLD-MCNC: 156 MG/DL (ref 70–99)
GLUCOSE BLD-MCNC: 229 MG/DL (ref 70–99)
GLUCOSE BLD-MCNC: 247 MG/DL (ref 70–99)
GLUCOSE BLD-MCNC: 313 MG/DL (ref 70–99)
GLUCOSE BLD-MCNC: 362 MG/DL (ref 70–99)
GLUCOSE BLD-MCNC: 406 MG/DL (ref 70–99)
GLUCOSE BLD-MCNC: 58 MG/DL (ref 70–99)
GLUCOSE BLD-MCNC: 65 MG/DL (ref 70–99)
GLUCOSE BLD-MCNC: 68 MG/DL (ref 70–99)
GLUCOSE BLD-MCNC: 73 MG/DL (ref 70–99)
GLUCOSE BLD-MCNC: 79 MG/DL (ref 70–99)
GLUCOSE BLD-MCNC: 85 MG/DL (ref 70–99)
GLUCOSE SERPL-MCNC: 245 MG/DL (ref 70–99)
HBA1C MFR BLD: 7.7 %
HCT VFR BLD AUTO: 31.8 % (ref 36–48)
HGB BLD-MCNC: 10.6 G/DL (ref 12–16)
IRON SATN MFR SERPL: 10 % (ref 15–50)
IRON SERPL-MCNC: 28 UG/DL (ref 37–145)
LACTATE BLDV-SCNC: 1 MMOL/L (ref 0.4–2)
LV EF: 63 %
LVEF MODALITY: NORMAL
MCH RBC QN AUTO: 25.2 PG (ref 26–34)
MCHC RBC AUTO-ENTMCNC: 33.3 G/DL (ref 31–36)
MCV RBC AUTO: 75.7 FL (ref 80–100)
PERFORMED ON: ABNORMAL
PERFORMED ON: NORMAL
PLATELET # BLD AUTO: 242 K/UL (ref 135–450)
PMV BLD AUTO: 8.1 FL (ref 5–10.5)
POTASSIUM SERPL-SCNC: 4.4 MMOL/L (ref 3.5–5.1)
RBC # BLD AUTO: 4.2 M/UL (ref 4–5.2)
SODIUM SERPL-SCNC: 130 MMOL/L (ref 136–145)
TIBC SERPL-MCNC: 278 UG/DL (ref 260–445)
TROPONIN, HIGH SENSITIVITY: 91 NG/L (ref 0–14)
WBC # BLD AUTO: 15.2 K/UL (ref 4–11)

## 2023-07-24 PROCEDURE — 80048 BASIC METABOLIC PNL TOTAL CA: CPT

## 2023-07-24 PROCEDURE — 6370000000 HC RX 637 (ALT 250 FOR IP): Performed by: INTERNAL MEDICINE

## 2023-07-24 PROCEDURE — 93010 ELECTROCARDIOGRAM REPORT: CPT | Performed by: INTERNAL MEDICINE

## 2023-07-24 PROCEDURE — 83540 ASSAY OF IRON: CPT

## 2023-07-24 PROCEDURE — 2580000003 HC RX 258: Performed by: NURSE PRACTITIONER

## 2023-07-24 PROCEDURE — 93306 TTE W/DOPPLER COMPLETE: CPT

## 2023-07-24 PROCEDURE — 6360000002 HC RX W HCPCS: Performed by: STUDENT IN AN ORGANIZED HEALTH CARE EDUCATION/TRAINING PROGRAM

## 2023-07-24 PROCEDURE — 83550 IRON BINDING TEST: CPT

## 2023-07-24 PROCEDURE — 83036 HEMOGLOBIN GLYCOSYLATED A1C: CPT

## 2023-07-24 PROCEDURE — 6370000000 HC RX 637 (ALT 250 FOR IP): Performed by: STUDENT IN AN ORGANIZED HEALTH CARE EDUCATION/TRAINING PROGRAM

## 2023-07-24 PROCEDURE — 2580000003 HC RX 258: Performed by: STUDENT IN AN ORGANIZED HEALTH CARE EDUCATION/TRAINING PROGRAM

## 2023-07-24 PROCEDURE — 6360000002 HC RX W HCPCS: Performed by: INTERNAL MEDICINE

## 2023-07-24 PROCEDURE — 99223 1ST HOSP IP/OBS HIGH 75: CPT | Performed by: INTERNAL MEDICINE

## 2023-07-24 PROCEDURE — 36591 DRAW BLOOD OFF VENOUS DEVICE: CPT

## 2023-07-24 PROCEDURE — 2500000003 HC RX 250 WO HCPCS: Performed by: INTERNAL MEDICINE

## 2023-07-24 PROCEDURE — 2100000000 HC CCU R&B

## 2023-07-24 PROCEDURE — 2580000003 HC RX 258: Performed by: INTERNAL MEDICINE

## 2023-07-24 PROCEDURE — 84484 ASSAY OF TROPONIN QUANT: CPT

## 2023-07-24 PROCEDURE — 85027 COMPLETE CBC AUTOMATED: CPT

## 2023-07-24 PROCEDURE — 83605 ASSAY OF LACTIC ACID: CPT

## 2023-07-24 RX ORDER — INSULIN GLARGINE 100 [IU]/ML
15 INJECTION, SOLUTION SUBCUTANEOUS DAILY
Status: DISCONTINUED | OUTPATIENT
Start: 2023-07-24 | End: 2023-07-25

## 2023-07-24 RX ORDER — PROMETHAZINE HYDROCHLORIDE 25 MG/ML
6.25 INJECTION, SOLUTION INTRAMUSCULAR; INTRAVENOUS EVERY 6 HOURS PRN
Status: DISCONTINUED | OUTPATIENT
Start: 2023-07-24 | End: 2023-07-26 | Stop reason: HOSPADM

## 2023-07-24 RX ORDER — INSULIN LISPRO 100 [IU]/ML
0-4 INJECTION, SOLUTION INTRAVENOUS; SUBCUTANEOUS NIGHTLY
Status: DISCONTINUED | OUTPATIENT
Start: 2023-07-24 | End: 2023-07-24

## 2023-07-24 RX ORDER — SCOLOPAMINE TRANSDERMAL SYSTEM 1 MG/1
1 PATCH, EXTENDED RELEASE TRANSDERMAL
Status: DISCONTINUED | OUTPATIENT
Start: 2023-07-24 | End: 2023-07-26 | Stop reason: HOSPADM

## 2023-07-24 RX ORDER — INSULIN LISPRO 100 [IU]/ML
0-4 INJECTION, SOLUTION INTRAVENOUS; SUBCUTANEOUS NIGHTLY
Status: DISCONTINUED | OUTPATIENT
Start: 2023-07-24 | End: 2023-07-26 | Stop reason: ALTCHOICE

## 2023-07-24 RX ORDER — DEXTROSE MONOHYDRATE 100 MG/ML
INJECTION, SOLUTION INTRAVENOUS CONTINUOUS
Status: DISCONTINUED | OUTPATIENT
Start: 2023-07-24 | End: 2023-07-24

## 2023-07-24 RX ORDER — INSULIN LISPRO 100 [IU]/ML
0-8 INJECTION, SOLUTION INTRAVENOUS; SUBCUTANEOUS
Status: DISCONTINUED | OUTPATIENT
Start: 2023-07-24 | End: 2023-07-24

## 2023-07-24 RX ORDER — INSULIN LISPRO 100 [IU]/ML
0-4 INJECTION, SOLUTION INTRAVENOUS; SUBCUTANEOUS
Status: DISCONTINUED | OUTPATIENT
Start: 2023-07-24 | End: 2023-07-26 | Stop reason: ALTCHOICE

## 2023-07-24 RX ORDER — INSULIN LISPRO 100 [IU]/ML
0-4 INJECTION, SOLUTION INTRAVENOUS; SUBCUTANEOUS
Status: DISCONTINUED | OUTPATIENT
Start: 2023-07-24 | End: 2023-07-24

## 2023-07-24 RX ORDER — POTASSIUM CHLORIDE 7.45 MG/ML
10 INJECTION INTRAVENOUS ONCE
Status: COMPLETED | OUTPATIENT
Start: 2023-07-24 | End: 2023-07-24

## 2023-07-24 RX ORDER — PROCHLORPERAZINE EDISYLATE 5 MG/ML
10 INJECTION INTRAMUSCULAR; INTRAVENOUS EVERY 6 HOURS PRN
Status: DISCONTINUED | OUTPATIENT
Start: 2023-07-24 | End: 2023-07-26 | Stop reason: HOSPADM

## 2023-07-24 RX ORDER — HYDRALAZINE HYDROCHLORIDE 25 MG/1
25 TABLET, FILM COATED ORAL EVERY 8 HOURS PRN
Status: DISCONTINUED | OUTPATIENT
Start: 2023-07-24 | End: 2023-07-24

## 2023-07-24 RX ORDER — DEXTROSE MONOHYDRATE 100 MG/ML
INJECTION, SOLUTION INTRAVENOUS CONTINUOUS PRN
Status: DISCONTINUED | OUTPATIENT
Start: 2023-07-24 | End: 2023-07-26 | Stop reason: HOSPADM

## 2023-07-24 RX ORDER — OXYCODONE HYDROCHLORIDE 5 MG/1
5 TABLET ORAL EVERY 4 HOURS PRN
Status: DISCONTINUED | OUTPATIENT
Start: 2023-07-24 | End: 2023-07-26 | Stop reason: HOSPADM

## 2023-07-24 RX ORDER — DEXTROSE, SODIUM CHLORIDE, SODIUM LACTATE, POTASSIUM CHLORIDE, AND CALCIUM CHLORIDE 5; .6; .31; .03; .02 G/100ML; G/100ML; G/100ML; G/100ML; G/100ML
INJECTION, SOLUTION INTRAVENOUS CONTINUOUS
Status: DISCONTINUED | OUTPATIENT
Start: 2023-07-24 | End: 2023-07-25

## 2023-07-24 RX ORDER — HYDRALAZINE HYDROCHLORIDE 20 MG/ML
5 INJECTION INTRAMUSCULAR; INTRAVENOUS EVERY 4 HOURS PRN
Status: DISCONTINUED | OUTPATIENT
Start: 2023-07-24 | End: 2023-07-24

## 2023-07-24 RX ORDER — MORPHINE SULFATE 2 MG/ML
2 INJECTION, SOLUTION INTRAMUSCULAR; INTRAVENOUS EVERY 4 HOURS PRN
Status: DISCONTINUED | OUTPATIENT
Start: 2023-07-24 | End: 2023-07-26 | Stop reason: HOSPADM

## 2023-07-24 RX ORDER — SODIUM CHLORIDE, SODIUM LACTATE, POTASSIUM CHLORIDE, CALCIUM CHLORIDE 600; 310; 30; 20 MG/100ML; MG/100ML; MG/100ML; MG/100ML
INJECTION, SOLUTION INTRAVENOUS CONTINUOUS
Status: ACTIVE | OUTPATIENT
Start: 2023-07-24 | End: 2023-07-25

## 2023-07-24 RX ORDER — AMLODIPINE BESYLATE 5 MG/1
5 TABLET ORAL DAILY
Status: DISCONTINUED | OUTPATIENT
Start: 2023-07-24 | End: 2023-07-26 | Stop reason: HOSPADM

## 2023-07-24 RX ORDER — PROMETHAZINE HYDROCHLORIDE 25 MG/ML
6.25 INJECTION, SOLUTION INTRAMUSCULAR; INTRAVENOUS EVERY 6 HOURS PRN
Status: DISCONTINUED | OUTPATIENT
Start: 2023-07-24 | End: 2023-07-24

## 2023-07-24 RX ADMIN — SODIUM CHLORIDE: 9 INJECTION, SOLUTION INTRAVENOUS at 01:06

## 2023-07-24 RX ADMIN — Medication 10 ML: at 20:39

## 2023-07-24 RX ADMIN — OXYCODONE 5 MG: 5 TABLET ORAL at 16:49

## 2023-07-24 RX ADMIN — HYDROCORTISONE 10 MG: 10 TABLET ORAL at 08:49

## 2023-07-24 RX ADMIN — Medication 10 ML: at 03:36

## 2023-07-24 RX ADMIN — OXYCODONE 5 MG: 5 TABLET ORAL at 20:39

## 2023-07-24 RX ADMIN — PROMETHAZINE HYDROCHLORIDE 6.25 MG: 25 INJECTION INTRAMUSCULAR; INTRAVENOUS at 04:24

## 2023-07-24 RX ADMIN — ONDANSETRON 4 MG: 2 INJECTION INTRAMUSCULAR; INTRAVENOUS at 09:37

## 2023-07-24 RX ADMIN — PROCHLORPERAZINE EDISYLATE 10 MG: 5 INJECTION INTRAMUSCULAR; INTRAVENOUS at 21:04

## 2023-07-24 RX ADMIN — HYDROCORTISONE 5 MG: 5 TABLET ORAL at 00:33

## 2023-07-24 RX ADMIN — OXYCODONE HYDROCHLORIDE 5 MG: 5 TABLET ORAL at 00:33

## 2023-07-24 RX ADMIN — HEPARIN SODIUM 5000 UNITS: 5000 INJECTION INTRAVENOUS; SUBCUTANEOUS at 20:39

## 2023-07-24 RX ADMIN — SODIUM CHLORIDE, SODIUM LACTATE, POTASSIUM CHLORIDE, CALCIUM CHLORIDE AND DEXTROSE MONOHYDRATE: 5; 600; 310; 30; 20 INJECTION, SOLUTION INTRAVENOUS at 22:42

## 2023-07-24 RX ADMIN — ROPINIROLE HYDROCHLORIDE 1 MG: 1 TABLET, FILM COATED ORAL at 20:39

## 2023-07-24 RX ADMIN — SODIUM CHLORIDE, POTASSIUM CHLORIDE, SODIUM LACTATE AND CALCIUM CHLORIDE: 600; 310; 30; 20 INJECTION, SOLUTION INTRAVENOUS at 21:41

## 2023-07-24 RX ADMIN — MORPHINE SULFATE 2 MG: 2 INJECTION, SOLUTION INTRAMUSCULAR; INTRAVENOUS at 10:47

## 2023-07-24 RX ADMIN — ROPINIROLE HYDROCHLORIDE 1 MG: 1 TABLET, FILM COATED ORAL at 00:33

## 2023-07-24 RX ADMIN — OXYCODONE HYDROCHLORIDE 5 MG: 5 TABLET ORAL at 12:43

## 2023-07-24 RX ADMIN — DEXTROSE MONOHYDRATE: 100 INJECTION, SOLUTION INTRAVENOUS at 22:20

## 2023-07-24 RX ADMIN — HEPARIN SODIUM 5000 UNITS: 5000 INJECTION INTRAVENOUS; SUBCUTANEOUS at 08:46

## 2023-07-24 RX ADMIN — POTASSIUM CHLORIDE 10 MEQ: 7.46 INJECTION, SOLUTION INTRAVENOUS at 01:11

## 2023-07-24 RX ADMIN — ESCITALOPRAM OXALATE 10 MG: 10 TABLET ORAL at 08:49

## 2023-07-24 RX ADMIN — INSULIN LISPRO 8 UNITS: 100 INJECTION, SOLUTION INTRAVENOUS; SUBCUTANEOUS at 11:49

## 2023-07-24 RX ADMIN — Medication 10 ML: at 08:51

## 2023-07-24 RX ADMIN — INSULIN GLARGINE 15 UNITS: 100 INJECTION, SOLUTION SUBCUTANEOUS at 10:37

## 2023-07-24 RX ADMIN — SODIUM CHLORIDE, POTASSIUM CHLORIDE, SODIUM LACTATE AND CALCIUM CHLORIDE: 600; 310; 30; 20 INJECTION, SOLUTION INTRAVENOUS at 11:52

## 2023-07-24 RX ADMIN — SODIUM CHLORIDE 5 MG/HR: 9 INJECTION, SOLUTION INTRAVENOUS at 10:44

## 2023-07-24 RX ADMIN — ACETAMINOPHEN 650 MG: 325 TABLET ORAL at 05:03

## 2023-07-24 RX ADMIN — PROMETHAZINE HYDROCHLORIDE 6.25 MG: 25 INJECTION INTRAMUSCULAR; INTRAVENOUS at 13:19

## 2023-07-24 RX ADMIN — AMLODIPINE BESYLATE 5 MG: 5 TABLET ORAL at 09:43

## 2023-07-24 RX ADMIN — HYDRALAZINE HYDROCHLORIDE 25 MG: 25 TABLET, FILM COATED ORAL at 05:03

## 2023-07-24 RX ADMIN — HYDRALAZINE HYDROCHLORIDE 5 MG: 20 INJECTION INTRAMUSCULAR; INTRAVENOUS at 06:40

## 2023-07-24 RX ADMIN — Medication 16 G: at 20:48

## 2023-07-24 RX ADMIN — ONDANSETRON 4 MG: 2 INJECTION INTRAMUSCULAR; INTRAVENOUS at 03:36

## 2023-07-24 RX ADMIN — HYDROCORTISONE 5 MG: 5 TABLET ORAL at 20:39

## 2023-07-24 RX ADMIN — Medication 10 ML: at 00:47

## 2023-07-24 RX ADMIN — PROCHLORPERAZINE EDISYLATE 10 MG: 5 INJECTION INTRAMUSCULAR; INTRAVENOUS at 10:39

## 2023-07-24 RX ADMIN — Medication 10 ML: at 06:40

## 2023-07-24 RX ADMIN — INSULIN LISPRO 4 UNITS: 100 INJECTION, SOLUTION INTRAVENOUS; SUBCUTANEOUS at 08:45

## 2023-07-24 RX ADMIN — Medication 10 ML: at 21:04

## 2023-07-24 ASSESSMENT — PAIN DESCRIPTION - ORIENTATION
ORIENTATION: ANTERIOR;POSTERIOR
ORIENTATION: RIGHT;LEFT
ORIENTATION: ANTERIOR;POSTERIOR
ORIENTATION: RIGHT;LEFT
ORIENTATION: ANTERIOR;POSTERIOR

## 2023-07-24 ASSESSMENT — PAIN DESCRIPTION - DESCRIPTORS
DESCRIPTORS: ACHING;DISCOMFORT
DESCRIPTORS: THROBBING
DESCRIPTORS: ACHING;DISCOMFORT
DESCRIPTORS: THROBBING

## 2023-07-24 ASSESSMENT — PAIN DESCRIPTION - LOCATION
LOCATION: LEG
LOCATION: HEAD
LOCATION: HEAD
LOCATION: LEG
LOCATION: HEAD

## 2023-07-24 ASSESSMENT — PAIN SCALES - GENERAL
PAINLEVEL_OUTOF10: 6
PAINLEVEL_OUTOF10: 8
PAINLEVEL_OUTOF10: 5
PAINLEVEL_OUTOF10: 0
PAINLEVEL_OUTOF10: 0
PAINLEVEL_OUTOF10: 3
PAINLEVEL_OUTOF10: 8
PAINLEVEL_OUTOF10: 6

## 2023-07-24 ASSESSMENT — PAIN - FUNCTIONAL ASSESSMENT
PAIN_FUNCTIONAL_ASSESSMENT: ACTIVITIES ARE NOT PREVENTED

## 2023-07-24 ASSESSMENT — PAIN DESCRIPTION - PAIN TYPE
TYPE: ACUTE PAIN

## 2023-07-24 NOTE — PROGRESS NOTES
RN chart review for CVU admission.     Electronically signed by Magdalene Doyle RN on 7/23/2023 at 11:15 PM

## 2023-07-24 NOTE — PROGRESS NOTES
Physician Progress Note      Kal Drake  CSN #:                  [id number]  :                       1960  ADMIT DATE:       2023 7:15 PM  [address] [facility] DATE:  RESPONDING  PROVIDER #:        Joi Vickers MD          QUERY TEXT:    Dear Dr. Lieutenant Flores,  Patient admitted with hypoglycemia , noted to have taken 30 units of Regular   Insulin for a High reading after she made a mistake and put Vet Insulin in   place of R-humulin in Insulin pump. If possible, please document in progress   notes and discharge summary if you are evaluating and/or treating any of the   following: The medical record reflects the following:  Risk Factors: Hx Adrenal Insufficiency with recently started hydrocortef  ,   Depression, Melanoma, HTN, DM type 1 with Insulin pump and per ED \"informed ED   there was a mistake and placed vet insulin in her pump in place of R-humulin,   which is longer acting\"  Clinical Indicators:  ED for blood sugar problem, \"Reportedly had a blood   glucose level that read high at home. Reportedly gave herself 30 units of   regular insulin at some point in time. \" pt unsure of time she took,  the   Kiki. She was given a 200 cc bolus of D10. Blood glucose=160, then 111   and given D-50 1 amp for downtrend and ED notes \"Insulin overdose, accidental   or unintentional, initial encounter \". H and P admits pt for \"Hyperglycemia in   DM1 with pump, arrives hypoglycemic\"  Treatment: D-10 infusion, monitor labs and blood sugars, pump removed, Sliding   scale and prandial Insulin and Lantus, hypoglycemic protocol  Thank you,  Doroteo Cedeno RN, [email or web address]  Options provided:  -- Accidental overdose of Regular Insulin along with longer acting Vet Insulin  -- Intentional overdose of Regular Insulin along with longer acting Vet   Insulin  -- Adverse effect of Regular Insulin , properly administered  -- Other - I will add my own diagnosis  -- Disagree - Not

## 2023-07-24 NOTE — PROGRESS NOTES
@ 0400 - This RN [employer] messaged DRE Kelley regarding patient's hypertension, vomiting, and improvement in blood sugar. The message was, \"Pt is starting to be hypertensive. BP last hour was 170/80, then went up to 193/89, & now down to 168/76. Also, she just vomited again. She vomited 200 ml (brown) when she was admitted to CVU about 4 hours ago, then this time, 200 ml (greenish). I just gave her PRN Zofran IV. Her blood sugar is starting to improve. Last hour was 156 and now, 229 mg/dL. Do you want to give her any medicine for BP? She's on D10 @ 100 ml/hr, do you want to adjust this? She just urinated 500 ml of mikayla-pinkish urine. Can you also add some more PRN antiemetic, just in case she'll nee one before the next Zofran is due? \". This RN will wait for DRE Kelley's response. @ 0408 - Dr. Waylon Westbrook ordered PRN Hydralazine & Phenergan and decreased the D10 infusion to 50 ml/hr.     Electronically signed by Marques Rodriguez RN on 7/24/2023 at 4:13 AM

## 2023-07-24 NOTE — PROGRESS NOTES
equal bilaterally      24HR INTAKE/OUTPUT:    Intake/Output Summary (Last 24 hours) at 7/24/2023 0956  Last data filed at 7/24/2023 0940  Gross per 24 hour   Intake 1575.64 ml   Output 1500 ml   Net 75.64 ml     I/O last 3 completed shifts:   In: 1575.6 [P.O.:60; I.V.:1095.2; IV Piggyback:420.4]  Out: 900 [Urine:500; Emesis/NG output:400]  I/O this shift:  In: -   Out: 600 [Urine:600]  Meds:    amLODIPine  5 mg Oral Daily    insulin glargine  15 Units SubCUTAneous Daily    insulin lispro  0-8 Units SubCUTAneous TID WC    insulin lispro  0-4 Units SubCUTAneous Nightly    scopolamine  1 patch TransDERmal Q72H    escitalopram  10 mg Oral Daily    hydrocortisone  10 mg Oral Daily    hydrocortisone  5 mg Oral Nightly    rOPINIRole  1 mg Oral Nightly    sodium chloride flush  5-40 mL IntraVENous 2 times per day    heparin (porcine)  5,000 Units SubCUTAneous BID     Infusions:    dextrose      niCARdipine      dextrose Stopped (07/24/23 0559)    sodium chloride 10 mL/hr at 07/24/23 0609     PRN Meds: glucose, dextrose bolus **OR** dextrose bolus, glucagon (rDNA), dextrose, morphine, prochlorperazine, promethazine, oxyCODONE, sodium chloride flush, sodium chloride, polyethylene glycol, acetaminophen **OR** acetaminophen, perflutren lipid microspheres, regadenoson    Labs/imaging:  CBC:   Recent Labs     07/23/23 1930 07/24/23  0335   WBC 17.6* 15.2*   HGB 12.6 10.6*    242     BMP:    Recent Labs     07/23/23 1930 07/24/23  0335    130*   K 2.2* 4.4   CL 95* 94*   CO2 22 26   BUN 32* 30*   CREATININE 2.0* 2.0*   GLUCOSE 84 245*     Hepatic:   Recent Labs     07/23/23 1930   AST 26   ALT 29   BILITOT 0.7   ALKPHOS 155*       Valentín Medina MD  -------------------------------  Rounding hospitalist

## 2023-07-24 NOTE — FLOWSHEET NOTE
Late entry due to patient care. 07/24/23 0500   Vitals   BP (!) 188/77   MAP (Calculated) 114   MAP (mmHg) 110       This RN gave PRN Hydralazine 25 mg PO (see MAR) for the above-written BP.     Electronically signed by Susannah Rodriguez RN on 7/24/2023 at 6:37 AM

## 2023-07-24 NOTE — PROGRESS NOTES
4 Eyes Skin Assessment     NAME:  Divina Hale  YOB: 1960  MEDICAL RECORD NUMBER:  [id number]    The patient is being assessed for  Admission    I agree that at least one RN has performed a thorough Head to Toe Skin Assessment on the patient. ALL assessment sites listed below have been assessed. Areas assessed by both nurses:    Head, Face, Ears, Shoulders, Back, Chest, Arms, Elbows, Hands, Sacrum. Buttock, Coccyx, Ischium, Legs. Feet and Heels, and Under Medical Devices         Does the Patient have a Wound?  No noted wound(s)       Uriah Prevention initiated by RN: Yes  Wound Care Orders initiated by RN: NA    Pressure Injury (Stage 3,4, Unstageable, DTI, NWPT, and Complex wounds) if present, place Wound referral order by RN under : NA    New Ostomies, if present place, Ostomy referral order under : NA     Nurse 1 eSignature: Electronically signed by Harshad Dorman RN on 7/24/23 at 3:20 AM EDT    **SHARE this note so that the co-signing nurse can place an eSignature**    Nurse 2 eSignature: Electronically signed by Jerry Herrera RN on 7/24/23 at 6:37 AM EDT

## 2023-07-24 NOTE — CONSULTS
[address] [facility]  1960    July 24, 2023    Reason for Consult: Concern of Ischemic Disease    CC: N/V    HPI:  The patient is 57 y.o. female with a past medical history significant for Diabetes Mellitus Type I, Metastic Melanoma (Previously Stage IV, ongoing further staging), adrenal insufficiency, restless leg syndrome, Major Depressive Disorder. Patient's  called EMS for nausea, emesis, lethargy and the patient was found to be hypoglycemia secondary to insulin overdose and with FAVIAN. Patient high-sensitivities troponin elevated 87 > 91. On 6/14/2023, they were 28 > 33. Consult is for consideration of ischemic workup with stress test. Pro-BNP is now elevated at 8,900. ECHO is processing. Patient has never seen a cardiologist before according to the . Patient does have smoking history of about 10 years, but she quit 30 years ago. Does not use alcohol. Trial of medical marijuana was used earlier this year for melanoma symptoms, but patient did not like it and does not use it. Review of Systems:  Constitutional: No fatigue, weakness, night sweats or fever. HEENT: No new vision difficulties or ringing in the ears. Respiratory: No new SOB, PND, orthopnea or cough. Cardiovascular: See HPI   GI: No n/v, diarrhea, constipation, abdominal pain or changes in bowel habits. No melena, no hematochezia  : No urinary frequency, urgency, incontinence, hematuria or dysuria. Skin: No cyanosis or skin lesions. Musculoskeletal: No new muscle or joint pain. Neurological: No syncope or TIA-like symptoms.   Psychiatric: No anxiety, insomnia or depression     Past Medical History:   Diagnosis Date    Adrenal insufficiency ([facility])     Cancer ([facility]) 2002    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    Depression     Diabetes mellitus ([facility])     Type I    Hx of radiation therapy     melanoma right eye    Hypertension     Pt. denies having history of Hypertension    Hyponatremia

## 2023-07-24 NOTE — PROGRESS NOTES
Late entry due to patient care. Latest Reference Range & Units 07/24/23 00:45   Lactic Acid 0.4 - 2.0 mmol/L 1.0   Troponin, High Sensitivity 0 - 14 ng/L 91 (H)   (H): Data is abnormally high    Dr. Zeyad Hoffman in CVU. This RN informed MD of the above lab results. MD acknowledged; no new orders were made.

## 2023-07-24 NOTE — PROGRESS NOTES
4 Eyes Skin Assessment     NAME:  Sanjiv Salmon  YOB: 1960  MEDICAL RECORD NUMBER:  [id number]    The patient is being assessed for  Shift Handoff    I agree that at least one RN has performed a thorough Head to Toe Skin Assessment on the patient. ALL assessment sites listed below have been assessed. Areas assessed by both nurses:    Head, Face, Ears, Shoulders, Back, Chest, Arms, Elbows, Hands, Sacrum. Buttock, Coccyx, Ischium, Legs. Feet and Heels, and Under Medical Devices         Does the Patient have a Wound?  No noted wound(s)       Uriah Prevention initiated by RN: Yes  Wound Care Orders initiated by RN: NA    Pressure Injury (Stage 3,4, Unstageable, DTI, NWPT, and Complex wounds) if present, place Wound referral order by RN under : NA    New Ostomies, if present place, Ostomy referral order under : No     Nurse 1 eSignature: Electronically signed by Freddy Roth RN on 7/24/23 at 7:53 AM EDT    **SHARE this note so that the co-signing nurse can place an eSignature**    Nurse 2 eSignature: Electronically signed by Francisca Sow RN on 7/24/23 at 8:02 AM EDT

## 2023-07-24 NOTE — ACP (ADVANCE CARE PLANNING)
Advance Care Planning     Advance Care Planning Activator (Inpatient)  Conversation Note      Date of ACP Conversation: 7/24/2023     Conversation Conducted with: Patient with Decision Making Capacity    ACP Activator: Danny Ferreira Decision Maker:     Current [facility] Decision Maker:     Primary Decision Maker: Jose C Christensen - [phone]    Secondary Decision Maker: Fariha Mcwilliams - Child - [phone]    Supplemental (Other) Decision Maker: Rock Cruz - Child - [phone]  Click here to complete Healthcare Decision Makers including section of the Healthcare Decision Maker Relationship (ie \"Primary\")      Care Preferences    Ventilation: \"If you were in your present state of health and suddenly became very ill and were unable to breathe on your own, what would your preference be about the use of a ventilator (breathing machine) if it were available to you? \"      Would the patient desire the use of ventilator (breathing machine)?: yes    \"If your health worsens and it becomes clear that your chance of recovery is unlikely, what would your preference be about the use of a ventilator (breathing machine) if it were available to you? \"     Would the patient desire the use of ventilator (breathing machine)?: No      Resuscitation  \"CPR works best to restart the heart when there is a sudden event, like a heart attack, in someone who is otherwise healthy. Unfortunately, CPR does not typically restart the heart for people who have serious health conditions or who are very sick. \"    \"In the event your heart stopped as a result of an underlying serious health condition, would you want attempts to be made to restart your heart (answer \"yes\" for attempt to resuscitate) or would you prefer a natural death (answer \"no\" for do not attempt to resuscitate)? \" yes       [] Yes   [] No   Educated Patient / Decision Maker regarding differences between Advance Directives

## 2023-07-24 NOTE — H&P
Hospitalist History and Physical      PCP: Tamiko Torres, APRN - CNP    Date of Admission: 7/23/2023     Anticipated Discharge Day/Date -     Anticipated Discharge Location:  [x]Home  MEDICAL CENTER [facility]  []SNF  []Acute Rehab  []Hospice    Assessment/Plan:      Lactic acidosis    Hyperglycemia in DM1 with pump, arrives hypoglycemic. She is not sure the amount of insulin she gets by pump daily but informed ED there was a mistake and placed vet insulin in her pump in place of R-humulin, which is longer acting. She took 10+10+10 units of Regular insulin because her glucose read was too high. Dealing with diabetes some 30 years, she does not tell me if she has managed similarly in the past, didn't want to go into DKA. Insulin pump is removed in the ED and due to frequent checks and dextrose gtt is admitted to ICU. Lactic acidosis 6.6-2.2, leukocytosis without bands. No infection chest imaging, UA with glucosuria but no bacteria. Follow blood cultures, aggressive IVF. BNP 8900. Elev trop 87. Obtain echo, trend trop. May need ischemic workup with stress test.   Microcytosis without anemia. Check for iron deficiency  Adrenal insufficiency? Takes hydrocortef in am and evening, she is not sure why. Not certain of adrenal insufficiency. Says chronic weakness and started 2 weeks ago. Not aware of need to increase dose with acute illness, not acutely ill at this time. Other chronic medical conditions reviewed and managed. DVT Prophylaxis:  [x]PPx LMWH  []SQ Heparin  []IPC/SCDs  []Eliquis  []Xarelto  []Coumadin  []Other -        Diet: Diet NPO  Code Status: Full Code      75 Minutes were spent solely for medical decision making for this patient including documentation, ordering and interpreting labs imaging and studies, independently reviewing the chart as well as discussing plan of care with the family patient ancillary staff and coordinating their care.     Chief Complaint, History of Present Illness, Review of PM

## 2023-07-24 NOTE — CARE COORDINATION
Chart Reviewed. Met with patient to introduce  role and to initiate discussion regarding DC planning. Her goal is to be discharged for a concert she has tickets to tomorrow evening. Her goal is to return home. Case Management Assessment  Initial Evaluation    Date/Time of Evaluation: 7/24/2023 1:01 PM  Assessment Completed by: JEOVANNY Chatman    If patient is discharged prior to next notation, then this note serves as note for discharge by case management. Patient Name: Jose Foss                   YOB: 1960  Diagnosis: Hypokalemia [E87.6]  Lactic acidosis [E87.20]  Lactic acid acidosis [E87.20]  FAVIAN (acute kidney injury) ([address]) [N17.9]  Insulin overdose, accidental or unintentional, initial encounter Flor Em                   Date / Time: 7/23/2023  7:15 PM    Patient Admission Status: Inpatient   Readmission Risk (Low < 19, Mod (19-27), High > 27): Readmission Risk Score: 25    Current PCP: Chancey Sandifer, APRN - CNP  PCP verified by CM? Chart Reviewed: Yes      History Provided by:    Patient Orientation:      Patient Cognition:      Hospitalization in the last 30 days (Readmission):  No    If yes, Readmission Assessment in CM Navigator will be completed.     Advance Directives:      Code Status: Full Code   Patient's Primary Decision Maker is:      Primary Decision Maker: Shant Arguelles - Spouse - [phone]    Secondary Decision Maker: Janelle Augustine - Child - [phone]    Supplemental (Other) Decision Maker: Viridiana Ventura - Child - [phone]    Discharge Planning:    Patient lives with: Spouse/Significant Other Type of Home: House  Primary Care Giver:    Patient Support Systems include: Spouse/Significant Other   Current Financial resources:    Current community resources:    Current services prior to admission: None            Current DME:              Type of Home Care services:  None    ADLS  Prior functional level:

## 2023-07-24 NOTE — FLOWSHEET NOTE
07/24/23 0900 07/24/23 0940   Vitals   BP (!) 199/85 (!) 180/90   MAP (Calculated) 123 120   MAP (mmHg) 119  --    BP Location Right upper arm Right upper arm   BP Method Automatic Manual     Per Dr. Cross Don communication, take manual if SBP is greater than 160 or DBP greater than 100.  Scheduled norvasc given at this time

## 2023-07-24 NOTE — PROGRESS NOTES
Late entry due to patient care. She verbalized that her nausea is better after this RN gave her PRN Promethazine IM (see MAR) earlier.     Electronically signed by Mai Sims RN on 7/24/2023 at 8:05 AM

## 2023-07-24 NOTE — PROGRESS NOTES
@ 7663 - This RN informed Dr. Haseeb Hudson regarding the patient's increasing blood sugar. The message was, \"Her blood sugar now is 313 mg/dL, previously 247 and 229. Dr. Sandrita Sommers decreased the D10 infusion from 100 to 50 ml/hr around 4 AM. Does the pt. still need to be on the D10 infusion? Her Serum Sodium this AM was 130, Potassium 4.4, BUN 30, Creatinine 2. \" Dr. Monster Coreas ordered to discontinue the D10 infusion. @ 9758 - This RN also informed Dr. Monster Coreas of the patient's hypertension. This RN told MD that at 5 AM, PRN Hydralazine 25 mg PO was given for her BP of 188/77 and now, BP is 183/78. This RN will wait for Dr. Monster Coreas' response.     Electronically signed by Clau Soni RN on 7/24/2023 at 6:09 AM

## 2023-07-24 NOTE — PROGRESS NOTES
Late entry due to patient care. Admitted to room 1304, via stretcher, from ED. She's sleepy but oriented (x4), holds a conversation, ST on the monitor, on O2 @ 2 L/min per nasal cannula, not in any distress, denies any pain except for a headache, w/ an accessed R chest Port-A-Cath and 2 peripheral IVs on the L arm, & on D10 IV infusion @ 100 ml/hr (See MAR). She vomited 200 ml of brownish vomitus. She denied nausea after she vomited. Plan of care for this shift, including being NPO after midnight for stress test tomorrow, were explained to her and she verbalized understanding. Call light and bedside table are within her reach. Bed alarm is activated. She only has her cellphone & eyeglasses with her upon admission to CVU.     Electronically signed by Adan Park RN on 7/24/2023 at 4:06 AM

## 2023-07-24 NOTE — FLOWSHEET NOTE
07/24/23 0633   Vitals   BP (!) 193/76   MAP (Calculated) 115   MAP (mmHg) 110     This RN gave PRN Hydralazine 5 mg IV (see MAR) for the above-written BP.     Electronically signed by Noemi Mckoy RN on 7/24/2023 at 6:51 AM

## 2023-07-24 NOTE — PROGRESS NOTES
4 Eyes Skin Assessment     NAME:  Kenyetta Tony  YOB: 1960  MEDICAL RECORD NUMBER:  [id number]    The patient is being assessed for  Shift Handoff    I agree that at least one RN has performed a thorough Head to Toe Skin Assessment on the patient. ALL assessment sites listed below have been assessed. Areas assessed by both nurses:    Head, Face, Ears, Shoulders, Back, Chest, Arms, Elbows, Hands, Sacrum. Buttock, Coccyx, Ischium, Legs. Feet and Heels, and Under Medical Devices         Does the Patient have a Wound?  No noted wound(s)       Ruiah Prevention initiated by RN: Yes  Wound Care Orders initiated by RN: NA    Pressure Injury (Stage 3,4, Unstageable, DTI, NWPT, and Complex wounds) if present, place Wound referral order by RN under : NA    New Ostomies, if present place, Ostomy referral order under : NA     Nurse 1 eSignature: Electronically signed by Jamil Sarkar RN on 7/24/23 at 6:49 PM EDT    **SHARE this note so that the co-signing nurse can place an eSignature**    Nurse 2 eSignature: Electronically signed by Miguel Delgadillo RN on 7/25/2023 at 12:13 AM

## 2023-07-24 NOTE — CONSULTS
Clinical Pharmacy Note  Vancomycin Consult    Pharmacy consult received for one-time dose of vancomycin in the Emergency Department per Dr. Alvarez Gallego. Ht Readings from Last 1 Encounters:   07/23/23 4' 11\" (1.499 m)        Wt Readings from Last 1 Encounters:   07/23/23 89 lb 4.6 oz (40.5 kg)         Assessment/Plan:  Vancomycin 750 mg x 1 in ED. If Vancomycin is to continue on admission and pharmacy is to manage dosing, please re-consult with admission orders.       Ephraim May, SarbjitD, BCPS  7/23/2023 9:15 PM

## 2023-07-24 NOTE — FLOWSHEET NOTE
07/24/23 1100   Vitals   BP (!) 93/51   MAP (Calculated) 65   MAP (mmHg) (!) 64     Cardizem gtt stopped @ this time, cardizem started at 1044, Dr. Luis Angel Gutiérrez notified of rapid change of BP

## 2023-07-24 NOTE — PROGRESS NOTES
Bedside shift hand-off done w/ oncoming ITZ Laura., who will assume pt. care. This RN handed-off the pt. in a stable condition.     Electronically signed by Stevenson Finley RN on 7/24/2023 at 7:54 AM

## 2023-07-25 LAB
ANION GAP SERPL CALCULATED.3IONS-SCNC: 11 MMOL/L (ref 3–16)
BUN SERPL-MCNC: 26 MG/DL (ref 7–20)
CALCIUM SERPL-MCNC: 8.8 MG/DL (ref 8.3–10.6)
CHLORIDE SERPL-SCNC: 103 MMOL/L (ref 99–110)
CO2 SERPL-SCNC: 25 MMOL/L (ref 21–32)
CREAT SERPL-MCNC: 2.5 MG/DL (ref 0.6–1.2)
DEPRECATED RDW RBC AUTO: 15.9 % (ref 12.4–15.4)
GFR SERPLBLD CREATININE-BSD FMLA CKD-EPI: 21 ML/MIN/{1.73_M2}
GLUCOSE BLD-MCNC: 112 MG/DL (ref 70–99)
GLUCOSE BLD-MCNC: 115 MG/DL (ref 70–99)
GLUCOSE BLD-MCNC: 117 MG/DL (ref 70–99)
GLUCOSE BLD-MCNC: 121 MG/DL (ref 70–99)
GLUCOSE BLD-MCNC: 137 MG/DL (ref 70–99)
GLUCOSE BLD-MCNC: 143 MG/DL (ref 70–99)
GLUCOSE BLD-MCNC: 194 MG/DL (ref 70–99)
GLUCOSE BLD-MCNC: 227 MG/DL (ref 70–99)
GLUCOSE SERPL-MCNC: 107 MG/DL (ref 70–99)
HCT VFR BLD AUTO: 25.7 % (ref 36–48)
HGB BLD-MCNC: 8.6 G/DL (ref 12–16)
LACTATE BLD-SCNC: 0.72 MMOL/L (ref 0.4–2)
MAGNESIUM SERPL-MCNC: 1.6 MG/DL (ref 1.8–2.4)
MCH RBC QN AUTO: 25.6 PG (ref 26–34)
MCHC RBC AUTO-ENTMCNC: 33.5 G/DL (ref 31–36)
MCV RBC AUTO: 76.4 FL (ref 80–100)
PERFORMED ON: ABNORMAL
PERFORMED ON: NORMAL
PLATELET # BLD AUTO: 174 K/UL (ref 135–450)
PMV BLD AUTO: 8.3 FL (ref 5–10.5)
POC SAMPLE TYPE: NORMAL
POTASSIUM SERPL-SCNC: 3.5 MMOL/L (ref 3.5–5.1)
RBC # BLD AUTO: 3.36 M/UL (ref 4–5.2)
SODIUM SERPL-SCNC: 139 MMOL/L (ref 136–145)
WBC # BLD AUTO: 9.2 K/UL (ref 4–11)

## 2023-07-25 PROCEDURE — 6360000002 HC RX W HCPCS: Performed by: STUDENT IN AN ORGANIZED HEALTH CARE EDUCATION/TRAINING PROGRAM

## 2023-07-25 PROCEDURE — 2580000003 HC RX 258: Performed by: STUDENT IN AN ORGANIZED HEALTH CARE EDUCATION/TRAINING PROGRAM

## 2023-07-25 PROCEDURE — 6370000000 HC RX 637 (ALT 250 FOR IP): Performed by: INTERNAL MEDICINE

## 2023-07-25 PROCEDURE — 83605 ASSAY OF LACTIC ACID: CPT

## 2023-07-25 PROCEDURE — 2100000000 HC CCU R&B

## 2023-07-25 PROCEDURE — 94760 N-INVAS EAR/PLS OXIMETRY 1: CPT

## 2023-07-25 PROCEDURE — 85027 COMPLETE CBC AUTOMATED: CPT

## 2023-07-25 PROCEDURE — 36591 DRAW BLOOD OFF VENOUS DEVICE: CPT

## 2023-07-25 PROCEDURE — 80048 BASIC METABOLIC PNL TOTAL CA: CPT

## 2023-07-25 PROCEDURE — 6370000000 HC RX 637 (ALT 250 FOR IP): Performed by: STUDENT IN AN ORGANIZED HEALTH CARE EDUCATION/TRAINING PROGRAM

## 2023-07-25 PROCEDURE — 83735 ASSAY OF MAGNESIUM: CPT

## 2023-07-25 PROCEDURE — C9113 INJ PANTOPRAZOLE SODIUM, VIA: HCPCS | Performed by: STUDENT IN AN ORGANIZED HEALTH CARE EDUCATION/TRAINING PROGRAM

## 2023-07-25 PROCEDURE — 6360000002 HC RX W HCPCS: Performed by: INTERNAL MEDICINE

## 2023-07-25 RX ORDER — CALCIUM CARBONATE 500 MG/1
500 TABLET, CHEWABLE ORAL 3 TIMES DAILY PRN
Status: DISCONTINUED | OUTPATIENT
Start: 2023-07-25 | End: 2023-07-26 | Stop reason: HOSPADM

## 2023-07-25 RX ORDER — SODIUM CHLORIDE, SODIUM LACTATE, POTASSIUM CHLORIDE, CALCIUM CHLORIDE 600; 310; 30; 20 MG/100ML; MG/100ML; MG/100ML; MG/100ML
INJECTION, SOLUTION INTRAVENOUS CONTINUOUS
Status: ACTIVE | OUTPATIENT
Start: 2023-07-25 | End: 2023-07-26

## 2023-07-25 RX ORDER — POTASSIUM CHLORIDE 29.8 MG/ML
20 INJECTION INTRAVENOUS ONCE
Status: COMPLETED | OUTPATIENT
Start: 2023-07-25 | End: 2023-07-25

## 2023-07-25 RX ORDER — INSULIN GLARGINE 100 [IU]/ML
6 INJECTION, SOLUTION SUBCUTANEOUS DAILY
Status: DISCONTINUED | OUTPATIENT
Start: 2023-07-25 | End: 2023-07-26 | Stop reason: HOSPADM

## 2023-07-25 RX ORDER — MAGNESIUM SULFATE IN WATER 40 MG/ML
2000 INJECTION, SOLUTION INTRAVENOUS ONCE
Status: COMPLETED | OUTPATIENT
Start: 2023-07-25 | End: 2023-07-25

## 2023-07-25 RX ADMIN — Medication 40 MG: at 12:27

## 2023-07-25 RX ADMIN — PROMETHAZINE HYDROCHLORIDE 6.25 MG: 25 INJECTION INTRAMUSCULAR; INTRAVENOUS at 15:27

## 2023-07-25 RX ADMIN — ROPINIROLE HYDROCHLORIDE 1 MG: 1 TABLET, FILM COATED ORAL at 22:17

## 2023-07-25 RX ADMIN — Medication 10 ML: at 05:12

## 2023-07-25 RX ADMIN — MAGNESIUM SULFATE HEPTAHYDRATE 2000 MG: 40 INJECTION, SOLUTION INTRAVENOUS at 08:40

## 2023-07-25 RX ADMIN — Medication 10 ML: at 17:22

## 2023-07-25 RX ADMIN — ANTACID TABLETS 500 MG: 500 TABLET, CHEWABLE ORAL at 12:27

## 2023-07-25 RX ADMIN — Medication 10 ML: at 08:47

## 2023-07-25 RX ADMIN — HYDROCORTISONE 10 MG: 10 TABLET ORAL at 08:29

## 2023-07-25 RX ADMIN — OXYCODONE 5 MG: 5 TABLET ORAL at 15:02

## 2023-07-25 RX ADMIN — Medication 10 ML: at 04:54

## 2023-07-25 RX ADMIN — HYDROCORTISONE 5 MG: 5 TABLET ORAL at 22:17

## 2023-07-25 RX ADMIN — SODIUM CHLORIDE, POTASSIUM CHLORIDE, SODIUM LACTATE AND CALCIUM CHLORIDE: 600; 310; 30; 20 INJECTION, SOLUTION INTRAVENOUS at 12:42

## 2023-07-25 RX ADMIN — HEPARIN SODIUM 5000 UNITS: 5000 INJECTION INTRAVENOUS; SUBCUTANEOUS at 08:46

## 2023-07-25 RX ADMIN — PROCHLORPERAZINE EDISYLATE 10 MG: 5 INJECTION INTRAMUSCULAR; INTRAVENOUS at 05:12

## 2023-07-25 RX ADMIN — MORPHINE SULFATE 2 MG: 2 INJECTION, SOLUTION INTRAMUSCULAR; INTRAVENOUS at 12:27

## 2023-07-25 RX ADMIN — PROMETHAZINE HYDROCHLORIDE 6.25 MG: 25 INJECTION INTRAMUSCULAR; INTRAVENOUS at 09:28

## 2023-07-25 RX ADMIN — OXYCODONE 5 MG: 5 TABLET ORAL at 05:00

## 2023-07-25 RX ADMIN — OXYCODONE 5 MG: 5 TABLET ORAL at 22:16

## 2023-07-25 RX ADMIN — INSULIN LISPRO 1 UNITS: 100 INJECTION, SOLUTION INTRAVENOUS; SUBCUTANEOUS at 12:37

## 2023-07-25 RX ADMIN — HEPARIN SODIUM 5000 UNITS: 5000 INJECTION INTRAVENOUS; SUBCUTANEOUS at 22:17

## 2023-07-25 RX ADMIN — PROCHLORPERAZINE EDISYLATE 10 MG: 5 INJECTION INTRAMUSCULAR; INTRAVENOUS at 17:22

## 2023-07-25 RX ADMIN — OXYCODONE 5 MG: 5 TABLET ORAL at 00:44

## 2023-07-25 RX ADMIN — INSULIN GLARGINE 6 UNITS: 100 INJECTION, SOLUTION SUBCUTANEOUS at 09:25

## 2023-07-25 RX ADMIN — OXYCODONE 5 MG: 5 TABLET ORAL at 09:28

## 2023-07-25 RX ADMIN — PROCHLORPERAZINE EDISYLATE 10 MG: 5 INJECTION INTRAMUSCULAR; INTRAVENOUS at 11:25

## 2023-07-25 RX ADMIN — ANTACID TABLETS 500 MG: 500 TABLET, CHEWABLE ORAL at 17:20

## 2023-07-25 RX ADMIN — ESCITALOPRAM OXALATE 10 MG: 10 TABLET ORAL at 08:29

## 2023-07-25 RX ADMIN — POTASSIUM CHLORIDE 20 MEQ: 29.8 INJECTION, SOLUTION INTRAVENOUS at 08:38

## 2023-07-25 ASSESSMENT — PAIN SCALES - GENERAL
PAINLEVEL_OUTOF10: 5
PAINLEVEL_OUTOF10: 6
PAINLEVEL_OUTOF10: 0
PAINLEVEL_OUTOF10: 6
PAINLEVEL_OUTOF10: 5
PAINLEVEL_OUTOF10: 0

## 2023-07-25 ASSESSMENT — PAIN DESCRIPTION - ORIENTATION
ORIENTATION: RIGHT;LEFT
ORIENTATION: RIGHT;LEFT;ANTERIOR;POSTERIOR
ORIENTATION: RIGHT;LEFT
ORIENTATION: RIGHT;LEFT

## 2023-07-25 ASSESSMENT — PAIN DESCRIPTION - PAIN TYPE
TYPE: ACUTE PAIN

## 2023-07-25 ASSESSMENT — PAIN - FUNCTIONAL ASSESSMENT
PAIN_FUNCTIONAL_ASSESSMENT: PREVENTS OR INTERFERES SOME ACTIVE ACTIVITIES AND ADLS
PAIN_FUNCTIONAL_ASSESSMENT: ACTIVITIES ARE NOT PREVENTED
PAIN_FUNCTIONAL_ASSESSMENT: ACTIVITIES ARE NOT PREVENTED

## 2023-07-25 ASSESSMENT — PAIN DESCRIPTION - DESCRIPTORS
DESCRIPTORS: THROBBING
DESCRIPTORS: THROBBING
DESCRIPTORS: ACHING;DISCOMFORT
DESCRIPTORS: PRESSURE

## 2023-07-25 ASSESSMENT — PAIN DESCRIPTION - LOCATION
LOCATION: LEG
LOCATION: HEAD
LOCATION: LEG
LOCATION: HEAD

## 2023-07-25 NOTE — FLOWSHEET NOTE
07/25/23 1549   Mobility   Level of Assistance Modified independent, requires aide device or extra time   Assistive Device None   Distance Ambulated (ft) 50 ft   Ambulation Response Tolerated well     Pt c/o restless legs. Assisted to ambulate in hallway. Pt states it helped some. Pt medicated for c/o's headache with PO Oxycodone @ 1502, see eMAR. Pt medicated for c/o's nausea with IM Phenergan @ 1527, see eMAR.

## 2023-07-25 NOTE — PROGRESS NOTES
Latest Reference Range & Units 07/24/23 22:07   POC Glucose 70 - 99 mg/dl 85     @ 2211 - This RN informed DRE Rossiilia Brandynsierra of pt.'s latest blood sugar of 85 mg/dL. This RN will wait for DRE Bentley's response. @ 2217 - DRE Nichelle Bentley responded and ordered to start D10 IV drip @ 50 ml/hr and re-check blood sugar in 30 minutes. This RN asked DRE Bentley if current order of LR @ 100 ml/hr will be kept running in addition to the D10 infusion; waiting for CNP's response. @ [address] [address] Vern changed her IV fluids to D5 LR @ 100 ml/hr (see MAR).     Electronically signed by Yesica Bull RN on 7/24/2023 at 10:34 PM

## 2023-07-25 NOTE — PROGRESS NOTES
Daughter at bedside, trying to encourage PO intake, brought outside food to patient.   Pt with c/o's heartburn and nausea, medicated with PRN Tums and Compazine, see eMAR

## 2023-07-25 NOTE — PROGRESS NOTES
DRE Bentley ordered to check patient's blood sugar again at 2200 to assess if pt. will need a Dextrose-containing IVF.     Electronically signed by Susannah Rodriguez RN on 7/24/2023 at 9:56 PM

## 2023-07-25 NOTE — PROGRESS NOTES
Late entry due to patient care. Latest Reference Range & Units 07/24/23 20:43 07/24/23 21:08   POC Glucose 70 - 99 mg/dl 58 (L) 65 (L)   (L): Data is abnormally low    @ 2119 - Bedtime blood sugar was 58 mg/dL. She was instructed to take the PRN 4 glucose tablets (16 g) for this. After the 2nd glucose tablet, she said she couldn't take anymore since she feels like she's going to throw-up. Repeat blood sugar was 65 mg/dL. This RN [employer] messaged DRE Rossiilia Mc to ask if PRN IV can just be given since she's not tolerating anymore PO intake. The message was, \"Reason for this message is that patient's bedtime blood sugar was 58 mg/dL. Since she was awake, this RN tried to give her the ordered PRN protocol 16 g of chewable glucose tabs. She only was able to take 8 g since she feels she's going to throw-up after. Repeat blood sugar was 65 mg/dL. She's drowsy but easily arousable w/ minimal verbal stimuli and oriented (x4). Can I just give the ordered PRN D10 IV bolus since she can't tolerate PO? \" This RN will wait for DRE Bentley's response. Latest Reference Range & Units 07/24/23 21:31   POC Glucose 70 - 99 mg/dl 110 (H)   (H): Data is abnormally high    @ 2131 - No response yet from CNP Dapilah but blood sugar is now 110 mg/dL. Will monitor blood sugar closely.   Electronically signed by Olga Singh RN on 7/25/2023 at 1:54 AM

## 2023-07-25 NOTE — PROGRESS NOTES
Latest Reference Range & Units 07/24/23 22:54   POC Glucose 70 - 99 mg/dl 102 (H)   (H): Data is abnormally high    This RN [employer] messaged DRE Bentley of pt.'s latest blood sugar. The message was, \"Blood sugar re-check: 102 mg/dL. Any new orders or just keep on checking her blood sugar every 2 hours like what you ordered? \" This RN will wait for DRE Bentley's response.     Electronically signed by Adan Park RN on 7/24/2023 at 11:00 PM

## 2023-07-25 NOTE — FLOWSHEET NOTE
07/25/23 0611   Vitals   BP (!) 81/43   MAP (Calculated) 56   MAP (mmHg) (!) 55     @ 0620 - This RN Bala Esteban regarding pt.'s hypotension. The message was, Zuly Macias for this message is pt.'s hypotension of 81/43 (MAP 55). She was given PRN Compazine IV for nausea about an hour ago. Last night, after Compazine, her SBP dropped to around 95/51. I noticed that every after Compazine administration, she sleeps deep but can be aroused w/ verbal and light tactile stimulation. Do you want to order anything for the hypotension or just continue to monitor her BP? She's currently on D5LR @ 100 ml/hr due to hypoglycemia last night, SR on the monitor, and O2 sat 98% on room air. \" This RN will wait for Dr. Sheffield Gosselin response. @ 1650 - Dr. Mikey Esteban responded and asked if the pt. had consistent BP of 80s/40s. This RN informed Dr. Dani Vallejoer that at 5 AM, when the pt. was awake, BP was 177/78 and the rest of the night, SBP was from 90s and above. This RN also told Dr. Dani Vallejoer that BP now is 82/42. Dr. Dani Trivedi ordered to check lactic acid through the [facility].     Electronically signed by Chacha Hyatt RN on 7/25/2023 at 6:37 AM

## 2023-07-25 NOTE — PROGRESS NOTES
4 Eyes Skin Assessment     NAME:  Braden De La Torre  YOB: 1960  MEDICAL RECORD NUMBER:  [id number]    The patient is being assessed for  Shift Handoff    I agree that at least one RN has performed a thorough Head to Toe Skin Assessment on the patient. ALL assessment sites listed below have been assessed. Areas assessed by both nurses:    Head, Face, Ears, Shoulders, Back, Chest, Arms, Elbows, Hands, Sacrum. Buttock, Coccyx, Ischium, Legs. Feet and Heels, and Under Medical Devices         Does the Patient have a Wound?  No noted wound(s)       Uriah Prevention initiated by RN: Yes  Wound Care Orders initiated by RN: NA    Pressure Injury (Stage 3,4, Unstageable, DTI, NWPT, and Complex wounds) if present, place Wound referral order by RN under : NA    New Ostomies, if present place, Ostomy referral order under : NA     Nurse 1 eSignature: Electronically signed by Anurag Zelaya RN on 7/25/23 at 7:22 AM EDT    **SHARE this note so that the co-signing nurse can place an eSignature**    Nurse 2 eSignature: Electronically signed by Marie Brar RN on 7/25/23 at 7:55 AM EDT

## 2023-07-25 NOTE — PROGRESS NOTES
[facility]  Hypoglycemia Event and Prevention Plan      NAME: Kenyetta Tony  MEDICAL RECORD NUMBER:  [id number]  AGE: 57 y.o. GENDER: female  : 1960  EPISODE DATE:  2023     Data     Recent Labs     23  2207 23  2254 23  0040 23  0228 23  0456 23  0605   POCGLU 85 102* 121* 115* 112* 117*       Medications  Scheduled Medications:   magnesium sulfate  2,000 mg IntraVENous Once    potassium chloride  20 mEq IntraVENous Once    amLODIPine  5 mg Oral Daily    insulin glargine  15 Units SubCUTAneous Daily    scopolamine  1 patch TransDERmal Q72H    insulin lispro  0-4 Units SubCUTAneous TID WC    insulin lispro  0-4 Units SubCUTAneous Nightly    escitalopram  10 mg Oral Daily    hydrocortisone  10 mg Oral Daily    hydrocortisone  5 mg Oral Nightly    rOPINIRole  1 mg Oral Nightly    sodium chloride flush  5-40 mL IntraVENous 2 times per day    heparin (porcine)  5,000 Units SubCUTAneous BID       Diet  Current diet/supplement order: ADULT DIET; Regular; 4 carb choices (60 gm/meal)     Recorded PO:   last meal in flowsheets      Action      Recommend Lantus 6 - 8 units plus Humalog 2 units when eating.       Physician Notified of event: Yes   Ivis Meyer MD    Responce     Achieved POCT Blood Glucose greater than 70 mg/dl: Yes      Medication plan updated: Yes      Electronically signed by Ila Sever, RN on 2023 at 8:22 AM

## 2023-07-25 NOTE — PROGRESS NOTES
Late entry due to patient care. She slept most of the night. As of this writing, her systolic BP ranged from 81 to 177 mmHg. Her blood sugar remained stable after she was started on a Dextrose-containing IVF (see MAR). After the PRN Compazine IV dose around 9 PM last night, she denied any more nausea, until when she got out of bed this AM to walk to the bathroom to urinate. She was given another PRN dose of Compazine IV (see MAR). Pt. also required PRN Oxycodone (see MAR) for a constant headache, rated as 4-5/10, across her head.     Electronically signed by Miguel Delgadillo RN on 7/25/2023 at 7:48 AM

## 2023-07-25 NOTE — PROGRESS NOTES
Latest Reference Range & Units 07/25/23 06:39   Lactate, ser/plas 0.40 - 2.00 mmol/L 0.72      Latest Reference Range & Units 07/25/23 04:53   Sodium 136 - 145 mmol/L 139   Potassium 3.5 - 5.1 mmol/L 3.5   Chloride 99 - 110 mmol/L 103   CO2 21 - 32 mmol/L 25   BUN,BUNPL 7 - 20 mg/dL 26 (H)   Creatinine 0.6 - 1.2 mg/dL 2.5 (H)   Anion Gap 3 - 16  11   Est, Glom Filt Rate >60  21 ! Magnesium 1.80 - 2.40 mg/dL 1.60 (L)   (H): Data is abnormally high  !: Data is abnormal  (L): Data is abnormally low    @ 0645 - This RN updated Dr. Nica Jules through [facility]. The message was, \"EPO Lactate = 0.72 mmol/L. Latest BP = 87/51 (MAP 61). Also, her serum Magnesium this AM was 1.6, Potassium 3.5, BUN 26, Creatinine 2.5. Do you want to order any electrolytes? She has a port-a-cath. Do you want to order anything for the BP? It seemed like BP is starting to trend-up a little. \"    @ 6417 - Dr. Nica Jules responded that the Lactate result was reassuring that the pt. is perfusing and MD agreed that the low BP probably is from sleeping and the Compazine as well. Regarding this AM's electrolytes, MgSO4 and KCl were ordered (see MAR).     Electronically signed by Paige Mensah RN on 7/25/2023 at 6:51 AM

## 2023-07-26 VITALS
OXYGEN SATURATION: 100 % | TEMPERATURE: 98.4 F | WEIGHT: 94.8 LBS | HEIGHT: 59 IN | SYSTOLIC BLOOD PRESSURE: 159 MMHG | DIASTOLIC BLOOD PRESSURE: 71 MMHG | RESPIRATION RATE: 18 BRPM | HEART RATE: 82 BPM | BODY MASS INDEX: 19.11 KG/M2

## 2023-07-26 LAB
ANION GAP SERPL CALCULATED.3IONS-SCNC: 13 MMOL/L (ref 3–16)
BUN SERPL-MCNC: 19 MG/DL (ref 7–20)
CALCIUM SERPL-MCNC: 9.2 MG/DL (ref 8.3–10.6)
CHLORIDE SERPL-SCNC: 96 MMOL/L (ref 99–110)
CO2 SERPL-SCNC: 24 MMOL/L (ref 21–32)
CREAT SERPL-MCNC: 1.9 MG/DL (ref 0.6–1.2)
DEPRECATED RDW RBC AUTO: 15.7 % (ref 12.4–15.4)
GFR SERPLBLD CREATININE-BSD FMLA CKD-EPI: 29 ML/MIN/{1.73_M2}
GLUCOSE BLD-MCNC: 102 MG/DL (ref 70–99)
GLUCOSE BLD-MCNC: 170 MG/DL (ref 70–99)
GLUCOSE BLD-MCNC: 266 MG/DL (ref 70–99)
GLUCOSE BLD-MCNC: 343 MG/DL (ref 70–99)
GLUCOSE SERPL-MCNC: 317 MG/DL (ref 70–99)
HCT VFR BLD AUTO: 27.3 % (ref 36–48)
HGB BLD-MCNC: 9.3 G/DL (ref 12–16)
MCH RBC QN AUTO: 26.1 PG (ref 26–34)
MCHC RBC AUTO-ENTMCNC: 34.1 G/DL (ref 31–36)
MCV RBC AUTO: 76.6 FL (ref 80–100)
PERFORMED ON: ABNORMAL
PLATELET # BLD AUTO: 145 K/UL (ref 135–450)
PMV BLD AUTO: 8.2 FL (ref 5–10.5)
POTASSIUM SERPL-SCNC: 3.7 MMOL/L (ref 3.5–5.1)
RBC # BLD AUTO: 3.56 M/UL (ref 4–5.2)
SODIUM SERPL-SCNC: 133 MMOL/L (ref 136–145)
WBC # BLD AUTO: 5.3 K/UL (ref 4–11)

## 2023-07-26 PROCEDURE — 85027 COMPLETE CBC AUTOMATED: CPT

## 2023-07-26 PROCEDURE — C9113 INJ PANTOPRAZOLE SODIUM, VIA: HCPCS | Performed by: STUDENT IN AN ORGANIZED HEALTH CARE EDUCATION/TRAINING PROGRAM

## 2023-07-26 PROCEDURE — 6360000002 HC RX W HCPCS: Performed by: STUDENT IN AN ORGANIZED HEALTH CARE EDUCATION/TRAINING PROGRAM

## 2023-07-26 PROCEDURE — 6370000000 HC RX 637 (ALT 250 FOR IP): Performed by: STUDENT IN AN ORGANIZED HEALTH CARE EDUCATION/TRAINING PROGRAM

## 2023-07-26 PROCEDURE — 80048 BASIC METABOLIC PNL TOTAL CA: CPT

## 2023-07-26 PROCEDURE — 6370000000 HC RX 637 (ALT 250 FOR IP): Performed by: INTERNAL MEDICINE

## 2023-07-26 PROCEDURE — 2580000003 HC RX 258: Performed by: STUDENT IN AN ORGANIZED HEALTH CARE EDUCATION/TRAINING PROGRAM

## 2023-07-26 PROCEDURE — 6360000002 HC RX W HCPCS: Performed by: INTERNAL MEDICINE

## 2023-07-26 PROCEDURE — 94760 N-INVAS EAR/PLS OXIMETRY 1: CPT

## 2023-07-26 PROCEDURE — 36591 DRAW BLOOD OFF VENOUS DEVICE: CPT

## 2023-07-26 RX ORDER — AMLODIPINE BESYLATE 5 MG/1
5 TABLET ORAL DAILY
Qty: 30 TABLET | Refills: 3 | Status: SHIPPED | OUTPATIENT
Start: 2023-07-27

## 2023-07-26 RX ORDER — INSULIN LISPRO 100 [IU]/ML
5 INJECTION, SOLUTION INTRAVENOUS; SUBCUTANEOUS
Status: DISCONTINUED | OUTPATIENT
Start: 2023-07-26 | End: 2023-07-26 | Stop reason: ALTCHOICE

## 2023-07-26 RX ADMIN — OXYCODONE 5 MG: 5 TABLET ORAL at 10:50

## 2023-07-26 RX ADMIN — SODIUM CHLORIDE, POTASSIUM CHLORIDE, SODIUM LACTATE AND CALCIUM CHLORIDE: 600; 310; 30; 20 INJECTION, SOLUTION INTRAVENOUS at 05:11

## 2023-07-26 RX ADMIN — Medication 40 MG: at 08:48

## 2023-07-26 RX ADMIN — Medication 10 ML: at 08:59

## 2023-07-26 RX ADMIN — AMLODIPINE BESYLATE 5 MG: 5 TABLET ORAL at 08:47

## 2023-07-26 RX ADMIN — PROCHLORPERAZINE EDISYLATE 10 MG: 5 INJECTION INTRAMUSCULAR; INTRAVENOUS at 05:11

## 2023-07-26 RX ADMIN — OXYCODONE 5 MG: 5 TABLET ORAL at 05:11

## 2023-07-26 RX ADMIN — INSULIN GLARGINE 6 UNITS: 100 INJECTION, SOLUTION SUBCUTANEOUS at 08:47

## 2023-07-26 RX ADMIN — HEPARIN SODIUM 5000 UNITS: 5000 INJECTION INTRAVENOUS; SUBCUTANEOUS at 08:58

## 2023-07-26 RX ADMIN — ESCITALOPRAM OXALATE 10 MG: 10 TABLET ORAL at 08:47

## 2023-07-26 RX ADMIN — HYDROCORTISONE 10 MG: 10 TABLET ORAL at 09:47

## 2023-07-26 RX ADMIN — PROMETHAZINE HYDROCHLORIDE 6.25 MG: 25 INJECTION INTRAMUSCULAR; INTRAVENOUS at 06:27

## 2023-07-26 RX ADMIN — INSULIN LISPRO 2 UNITS: 100 INJECTION, SOLUTION INTRAVENOUS; SUBCUTANEOUS at 08:48

## 2023-07-26 ASSESSMENT — PAIN DESCRIPTION - ORIENTATION
ORIENTATION: RIGHT;LEFT
ORIENTATION: RIGHT;LEFT

## 2023-07-26 ASSESSMENT — PAIN SCALES - GENERAL
PAINLEVEL_OUTOF10: 8
PAINLEVEL_OUTOF10: 8
PAINLEVEL_OUTOF10: 6
PAINLEVEL_OUTOF10: 8

## 2023-07-26 ASSESSMENT — PAIN DESCRIPTION - LOCATION
LOCATION: HEAD
LOCATION: LEG
LOCATION: LEG

## 2023-07-26 ASSESSMENT — PAIN DESCRIPTION - DESCRIPTORS
DESCRIPTORS: ACHING;THROBBING
DESCRIPTORS: ACHING;THROBBING

## 2023-07-26 ASSESSMENT — PAIN DESCRIPTION - PAIN TYPE: TYPE: ACUTE PAIN

## 2023-07-26 NOTE — PROGRESS NOTES
CLINICAL PHARMACY NOTE: MEDS TO BEDS    Total # of Prescriptions Filled: 1   The following medications were delivered to the patient:  Current Discharge Medication List        START taking these medications    Details   amLODIPine (NORVASC) 5 MG tablet Take 1 tablet by mouth daily  Qty: 30 tablet, Refills: 3               Additional Documentation:  [facility]

## 2023-07-26 NOTE — DISCHARGE INSTR - DIET
Good nutrition is important when healing from an illness, injury, or surgery. Follow any nutrition recommendations given to you during your hospital stay. If you were given an oral nutrition supplement while in the hospital, continue to take this supplement at home. You can take it with meals, in-between meals, and/or before bedtime. These supplements can be purchased at most local grocery stores, pharmacies, and chain [facility]-stores. If you have any questions about your diet or nutrition, call the hospital and ask for the dietitian.   DM diet

## 2023-07-26 NOTE — CARE COORDINATION
Chart Reviewed. Goal is home   No needs.   Noemy Cazares [place]     Case Management   [phone]    7/26/2023  10:15 AM

## 2023-07-26 NOTE — DISCHARGE INSTRUCTIONS
-Be sure to stay well hydrated. -Watch for signs of dehydration and electrolyte imbalance. Handouts provided at discharge for reference. - Monitor blood pressure daily. You have been started on a blood pressure medication : amlodipine  - I printed you off the [employer] [employer] information for monitoring you glucose to fingerstick. You might need to change your monitoring system tonight. - Someone needs to monitor your blood sugar in the middle of the night tonight. - Keep your insulin pump off until 8590-8280. Restart it at that time because you received Long acting insulin this morning.

## 2023-07-26 NOTE — DISCHARGE SUMMARY
Nursing Discharge Note    Discharge instructions reviewed with patient/ family  After visit Jean Marie provided with the following Educational handouts given via [address]. New Medications:  Amlodipine     Related to Dx:  Hyperglycemia: General Info  Blood Pressure Test: Home  Dehydration  Electrolyte Imbalance  Dexacom G6 Information  Patient specific discharge instructions as follows:   -Be sure to stay well hydrated. -Watch for signs of dehydration and electrolyte imbalance. Handouts provided at discharge for reference. - Monitor blood pressure daily. You have been started on a blood pressure medication : amlodipine  - I printed you off the [employer] information for monitoring you glucose to fingerstick. You might need to change your monitoring system tonight. - Someone needs to monitor your blood sugar in the middle of the night tonight. - Keep your insulin pump off until 0611-5777. Restart it at that time because you received Long acting insulin this morning. LDA at dischage:  - IV/tele discontinued. Pt discharged to home with spouse. Taken from room via [phone] by wheelchair, personal belongings taken with. Instructed Patient/ Family to make the following follow up appointment:  PCP in 1 weeks. Endocrinologist in 1-2 weeks. New medications supplied through outpatient pharmacy. Patient/ or responsible party confirms all questions regarding discharge have been answered and AVS has been provided. Signed copy of this can be found in the chart.       Patient left facility at 15:20 PM

## 2023-07-27 ENCOUNTER — CARE COORDINATION (OUTPATIENT)
Dept: OTHER | Facility: CLINIC | Age: 63
End: 2023-07-27

## 2023-07-27 LAB
BACTERIA BLD CULT ORG #2: NORMAL
BACTERIA BLD CULT: NORMAL

## 2023-07-27 NOTE — CARE COORDINATION
Care Transitions Outreach Attempt    Call within 2 business days of discharge: Yes   Attempted to reach patient for transitions of care follow up. Unable to reach patient. Patient: Braden De La Torre Patient : 1960 MRN: [id number]    Last Discharge [address]       Date Complaint Diagnosis Description Type Department Provider    23 Blood Sugar Problem FAVIAN (acute kidney injury) ([address] [facility]) . .. ED to Hosp-Admission (Discharged) (ADMIT) Netta Gallegos MD; Speedy Lopez ... Was this an external facility discharge? No Discharge Facility: [facility]    Noted following upcoming appointments from discharge chart review:   [facility] follow up appointment(s):   Future Appointments   Date Time Provider [address]  [facility]   2023  9:00 AM [facility]    2023  1:50 PM Con Curling, MD [facility]   2024 11:30 AM Con Curling, MD [facility]   2024 11:30 AM Con Curling, MD Deer[facility] Endo [facility]     Non-[facility] follow up appointment(s): Will assess with successful outreach    ACM attempted to reach patient for Care Transitions call. HIPAA compliant message left requesting a return phone call at patient convenience. Will attempt to outreach again in 1-2 days. NICOLETTE Locke RN  Associate Care Manager  Phone: [phone]  Email: [email or web address]

## 2023-07-28 ENCOUNTER — CARE COORDINATION (OUTPATIENT)
Dept: OTHER | Facility: CLINIC | Age: 63
End: 2023-07-28

## 2023-07-28 NOTE — CARE COORDINATION
Care Transitions Outreach Attempt    Call within 2 business days of discharge: Yes   Attempted to reach patient for transitions of care follow up. Unable to reach patient. Patient: Gentry Ferguson Patient : 1960 MRN: [id number]    Last Discharge [address]       Date Complaint Diagnosis Description Type Department Provider    23 Blood Sugar Problem FAVIAN (acute kidney injury) ([address]) . .. ED to Hosp-Admission (Discharged) (ADMIT) Michelle Almanza MD; Thanai Villa ... Was this an external facility discharge? No Discharge Facility: [facility]    Noted following upcoming appointments from discharge chart review:   [facility][1] follow up appointment(s):   Future Appointments   Date Time Provider [address]   2023  9:00 AM [facility]    2023  1:50 PM MD Alyse [facility]   2024 11:30 AM MD Alyse [facility]   2024 11:30 AM MD Alyse Davidson     Non-[facility] follow up appointment(s): Will assess with successful outreach      ACM attempted 2nd outreach to patient for introduction to Associate Care Management. HIPAA compliant message left requesting a return phone call at patient convenience. ACM also attempted to reach patient's , Neri Parisi. Unable to reach and no voicemail option. Unable to Reach Letter sent to patient via [employer] and mailed. Will continue to outreach.       Future Appointments   Date Time Provider [address]   2023  9:00 AM [facility] Robyn MALLOY   2023  1:50 PM MD Alyse Davidson [facility]   2024 11:30 AM MD Alyse Davidson [facility]   2024 11:30 AM MD Alyse [facility]      ACM mailed the following handouts with this letter: Electrolyte imbalance care note, Diabetic renal diet care note, BWWD program and enrollment info, [facility], [address] [place], Breanna jacobs, Dial a Dietician info,  Nurse Access

## 2023-08-01 ENCOUNTER — HOSPITAL ENCOUNTER (OUTPATIENT)
Dept: CT IMAGING | Age: 63
Discharge: HOME | End: 2023-08-01
Attending: INTERNAL MEDICINE
Payer: COMMERCIAL

## 2023-08-01 DIAGNOSIS — C78.7 METASTASIS TO LIVER (HCC): ICD-10-CM

## 2023-08-01 DIAGNOSIS — C78.00 MALIGNANT NEOPLASM METASTATIC TO LUNG, UNSPECIFIED LATERALITY (HCC): ICD-10-CM

## 2023-08-01 DIAGNOSIS — C43.9 MALIGNANT MELANOMA, UNSPECIFIED SITE (HCC): ICD-10-CM

## 2023-08-01 LAB
PERFORMED ON: ABNORMAL
POC CREATININE: 1.5 MG/DL (ref 0.6–1.2)
POC SAMPLE TYPE: ABNORMAL

## 2023-08-01 PROCEDURE — 82565 ASSAY OF CREATININE: CPT

## 2023-08-01 PROCEDURE — 6360000004 HC RX CONTRAST MEDICATION: Performed by: INTERNAL MEDICINE

## 2023-08-01 PROCEDURE — 71260 CT THORAX DX C+: CPT

## 2023-08-01 RX ADMIN — IOPAMIDOL 75 ML: 755 INJECTION, SOLUTION INTRAVENOUS at 08:48

## 2023-08-01 RX ADMIN — IOPAMIDOL 50 ML: 612 INJECTION, SOLUTION INTRAVENOUS at 08:48

## 2023-08-04 ENCOUNTER — CARE COORDINATION (OUTPATIENT)
Dept: OTHER | Facility: CLINIC | Age: 63
End: 2023-08-04

## 2023-08-04 NOTE — CARE COORDINATION
Final Transition of Care Outreach Attempt     ACM attempted to reach patient for final Care Transitions call. HIPAA compliant message left requesting a return phone call at patient convenience. Final Unable to Reach Letter sent via [employer] and mail. No further outreach scheduled with this ACM, patient has been provided with this ACM's contact information. ACM will sign off care team at this time. Episode of care resolved. Future Appointments   Date Time Provider [address]   11/28/2023  1:50 PM Inga Goodpasture, MD [facility]   2/29/2024 11:30 AM Inga Goodpasture, MD [facility]   5/28/2024 11:30 AM Inga Goodpasture, MD [facility]      AC mailed the following handouts with this letter:  Cornelio Jackson and Right [facility] Right Place Right Time. NICOLETTE Cortez RN  Associate Care Manager  Phone: [phone]  Email: [email or web address]

## 2023-08-07 DIAGNOSIS — G25.81 RESTLESS LEG SYNDROME: ICD-10-CM

## 2023-08-07 RX ORDER — PROCHLORPERAZINE 25 MG/1
SUPPOSITORY RECTAL
Qty: 9 EACH | Refills: 0 | Status: SHIPPED | OUTPATIENT
Start: 2023-08-07 | End: 2023-08-09

## 2023-08-07 RX ORDER — PROCHLORPERAZINE 25 MG/1
SUPPOSITORY RECTAL
Qty: 1 EACH | Refills: 0 | Status: SHIPPED | OUTPATIENT
Start: 2023-08-07 | End: 2023-08-09

## 2023-08-09 ENCOUNTER — APPOINTMENT (OUTPATIENT)
Dept: CT IMAGING | Age: 63
DRG: 637 | End: 2023-08-09
Payer: COMMERCIAL

## 2023-08-09 ENCOUNTER — APPOINTMENT (OUTPATIENT)
Dept: GENERAL RADIOLOGY | Age: 63
DRG: 637 | End: 2023-08-09
Payer: COMMERCIAL

## 2023-08-09 ENCOUNTER — HOSPITAL ENCOUNTER (INPATIENT)
Age: 63
LOS: 5 days | Discharge: HOME | DRG: 637 | End: 2023-08-15
Attending: EMERGENCY MEDICINE | Admitting: STUDENT IN AN ORGANIZED HEALTH CARE EDUCATION/TRAINING PROGRAM
Payer: COMMERCIAL

## 2023-08-09 DIAGNOSIS — E87.5 HYPERKALEMIA: ICD-10-CM

## 2023-08-09 DIAGNOSIS — N17.9 AKI (ACUTE KIDNEY INJURY) (HCC): ICD-10-CM

## 2023-08-09 DIAGNOSIS — E10.10 DIABETIC KETOACIDOSIS WITHOUT COMA ASSOCIATED WITH TYPE 1 DIABETES MELLITUS (HCC): Primary | ICD-10-CM

## 2023-08-09 LAB
ALBUMIN SERPL-MCNC: 3.4 G/DL (ref 3.4–5)
ALBUMIN/GLOB SERPL: 1.4 {RATIO} (ref 1.1–2.2)
ALP SERPL-CCNC: 114 U/L (ref 40–129)
ALT SERPL-CCNC: 15 U/L (ref 10–40)
ANION GAP SERPL CALCULATED.3IONS-SCNC: 31 MMOL/L (ref 3–16)
AST SERPL-CCNC: 26 U/L (ref 15–37)
BASE EXCESS BLDV CALC-SCNC: -24.4 MMOL/L
BASE EXCESS BLDV CALC-SCNC: -24.8 MMOL/L
BASOPHILS # BLD: 0 K/UL (ref 0–0.2)
BASOPHILS NFR BLD: 0 %
BILIRUB SERPL-MCNC: <0.2 MG/DL (ref 0–1)
BUN SERPL-MCNC: 65 MG/DL (ref 7–20)
CALCIUM SERPL-MCNC: 8.4 MG/DL (ref 8.3–10.6)
CHLORIDE SERPL-SCNC: 80 MMOL/L (ref 99–110)
CO2 BLDV-SCNC: 6 MMOL/L
CO2 BLDV-SCNC: 7 MMOL/L
CO2 SERPL-SCNC: 5 MMOL/L (ref 21–32)
COHGB MFR BLDV: 1.5 %
COHGB MFR BLDV: 1.7 %
CREAT SERPL-MCNC: 3.3 MG/DL (ref 0.6–1.2)
DEPRECATED RDW RBC AUTO: 16.9 % (ref 12.4–15.4)
EOSINOPHIL # BLD: 0 K/UL (ref 0–0.6)
EOSINOPHIL NFR BLD: 0 %
GFR SERPLBLD CREATININE-BSD FMLA CKD-EPI: 15 ML/MIN/{1.73_M2}
GLUCOSE BLD-MCNC: >600 MG/DL (ref 70–99)
GLUCOSE SERPL-MCNC: 1645 MG/DL (ref 70–99)
HCO3 BLDV-SCNC: 6 MMOL/L (ref 23–29)
HCO3 BLDV-SCNC: 6 MMOL/L (ref 23–29)
HCT VFR BLD AUTO: 33.3 % (ref 36–48)
HGB BLD-MCNC: 8.4 G/DL (ref 12–16)
LIPASE SERPL-CCNC: 19 U/L (ref 13–60)
LYMPHOCYTES # BLD: 2.3 K/UL (ref 1–5.1)
LYMPHOCYTES NFR BLD: 12 %
MAGNESIUM SERPL-MCNC: 2.2 MG/DL (ref 1.8–2.4)
MCH RBC QN AUTO: 26 PG (ref 26–34)
MCHC RBC AUTO-ENTMCNC: 25.3 G/DL (ref 31–36)
MCV RBC AUTO: 102.7 FL (ref 80–100)
METHGB MFR BLDV: 0.8 %
METHGB MFR BLDV: 0.8 %
MONOCYTES # BLD: 1.1 K/UL (ref 0–1.3)
MONOCYTES NFR BLD: 6 %
NEUTROPHILS # BLD: 15.4 K/UL (ref 1.7–7.7)
NEUTROPHILS NFR BLD: 82 %
O2 THERAPY: ABNORMAL
O2 THERAPY: ABNORMAL
PCO2 BLDV: 24.7 MMHG (ref 40–50)
PCO2 BLDV: 28.6 MMHG (ref 40–50)
PERFORMED ON: ABNORMAL
PH BLDV: 6.93 [PH] (ref 7.35–7.45)
PH BLDV: 6.97 [PH] (ref 7.35–7.45)
PHOSPHATE SERPL-MCNC: 6.6 MG/DL (ref 2.5–4.9)
PLATELET # BLD AUTO: 334 K/UL (ref 135–450)
PMV BLD AUTO: 8.9 FL (ref 5–10.5)
PO2 BLDV: 48 MMHG
PO2 BLDV: 89 MMHG
POTASSIUM SERPL-SCNC: 6.6 MMOL/L (ref 3.5–5.1)
PROCALCITONIN SERPL IA-MCNC: 5.54 NG/ML (ref 0–0.15)
PROT SERPL-MCNC: 5.9 G/DL (ref 6.4–8.2)
RBC # BLD AUTO: 3.24 M/UL (ref 4–5.2)
REASON FOR REJECTION: NORMAL
REJECTED TEST: NORMAL
SAO2 % BLDV: 67 %
SAO2 % BLDV: 94 %
SODIUM SERPL-SCNC: 116 MMOL/L (ref 136–145)
WBC # BLD AUTO: 18.8 K/UL (ref 4–11)

## 2023-08-09 PROCEDURE — 81001 URINALYSIS AUTO W/SCOPE: CPT

## 2023-08-09 PROCEDURE — 36556 INSERT NON-TUNNEL CV CATH: CPT

## 2023-08-09 PROCEDURE — 84100 ASSAY OF PHOSPHORUS: CPT

## 2023-08-09 PROCEDURE — 85025 COMPLETE CBC W/AUTO DIFF WBC: CPT

## 2023-08-09 PROCEDURE — 71045 X-RAY EXAM CHEST 1 VIEW: CPT

## 2023-08-09 PROCEDURE — 83735 ASSAY OF MAGNESIUM: CPT

## 2023-08-09 PROCEDURE — 96367 TX/PROPH/DG ADDL SEQ IV INF: CPT

## 2023-08-09 PROCEDURE — 87040 BLOOD CULTURE FOR BACTERIA: CPT

## 2023-08-09 PROCEDURE — 2500000003 HC RX 250 WO HCPCS: Performed by: EMERGENCY MEDICINE

## 2023-08-09 PROCEDURE — 6360000002 HC RX W HCPCS: Performed by: EMERGENCY MEDICINE

## 2023-08-09 PROCEDURE — 70450 CT HEAD/BRAIN W/O DYE: CPT

## 2023-08-09 PROCEDURE — 96375 TX/PRO/DX INJ NEW DRUG ADDON: CPT

## 2023-08-09 PROCEDURE — 96368 THER/DIAG CONCURRENT INF: CPT

## 2023-08-09 PROCEDURE — 6370000000 HC RX 637 (ALT 250 FOR IP): Performed by: EMERGENCY MEDICINE

## 2023-08-09 PROCEDURE — 84145 PROCALCITONIN (PCT): CPT

## 2023-08-09 PROCEDURE — 96365 THER/PROPH/DIAG IV INF INIT: CPT

## 2023-08-09 PROCEDURE — 2580000003 HC RX 258: Performed by: EMERGENCY MEDICINE

## 2023-08-09 PROCEDURE — 93005 ELECTROCARDIOGRAM TRACING: CPT | Performed by: EMERGENCY MEDICINE

## 2023-08-09 PROCEDURE — 87086 URINE CULTURE/COLONY COUNT: CPT

## 2023-08-09 PROCEDURE — 82803 BLOOD GASES ANY COMBINATION: CPT

## 2023-08-09 PROCEDURE — 96366 THER/PROPH/DIAG IV INF ADDON: CPT

## 2023-08-09 PROCEDURE — 71250 CT THORAX DX C-: CPT

## 2023-08-09 PROCEDURE — 80053 COMPREHEN METABOLIC PANEL: CPT

## 2023-08-09 PROCEDURE — 99285 EMERGENCY DEPT VISIT HI MDM: CPT

## 2023-08-09 PROCEDURE — 2580000003 HC RX 258: Performed by: PHYSICIAN ASSISTANT

## 2023-08-09 PROCEDURE — 96361 HYDRATE IV INFUSION ADD-ON: CPT

## 2023-08-09 PROCEDURE — 36415 COLL VENOUS BLD VENIPUNCTURE: CPT

## 2023-08-09 PROCEDURE — 51702 INSERT TEMP BLADDER CATH: CPT

## 2023-08-09 PROCEDURE — 83690 ASSAY OF LIPASE: CPT

## 2023-08-09 RX ORDER — DEXTROSE MONOHYDRATE 100 MG/ML
INJECTION, SOLUTION INTRAVENOUS CONTINUOUS PRN
Status: DISCONTINUED | OUTPATIENT
Start: 2023-08-09 | End: 2023-08-12

## 2023-08-09 RX ORDER — SODIUM CHLORIDE, SODIUM LACTATE, POTASSIUM CHLORIDE, AND CALCIUM CHLORIDE .6; .31; .03; .02 G/100ML; G/100ML; G/100ML; G/100ML
1000 INJECTION, SOLUTION INTRAVENOUS ONCE
Status: COMPLETED | OUTPATIENT
Start: 2023-08-09 | End: 2023-08-09

## 2023-08-09 RX ORDER — PANTOPRAZOLE SODIUM 40 MG/10ML
40 INJECTION, POWDER, LYOPHILIZED, FOR SOLUTION INTRAVENOUS ONCE
Status: COMPLETED | OUTPATIENT
Start: 2023-08-10 | End: 2023-08-10

## 2023-08-09 RX ORDER — CALCIUM GLUCONATE 20 MG/ML
2000 INJECTION, SOLUTION INTRAVENOUS ONCE
Status: DISCONTINUED | OUTPATIENT
Start: 2023-08-09 | End: 2023-08-09

## 2023-08-09 RX ORDER — 0.9 % SODIUM CHLORIDE 0.9 %
1000 INTRAVENOUS SOLUTION INTRAVENOUS ONCE
Status: COMPLETED | OUTPATIENT
Start: 2023-08-09 | End: 2023-08-09

## 2023-08-09 RX ORDER — LINEZOLID 2 MG/ML
600 INJECTION, SOLUTION INTRAVENOUS ONCE
Status: COMPLETED | OUTPATIENT
Start: 2023-08-09 | End: 2023-08-10

## 2023-08-09 RX ORDER — NOREPINEPHRINE BITARTRATE 0.06 MG/ML
1-100 INJECTION, SOLUTION INTRAVENOUS CONTINUOUS
Status: DISCONTINUED | OUTPATIENT
Start: 2023-08-09 | End: 2023-08-11

## 2023-08-09 RX ORDER — CALCIUM GLUCONATE 20 MG/ML
2000 INJECTION, SOLUTION INTRAVENOUS ONCE
Status: COMPLETED | OUTPATIENT
Start: 2023-08-09 | End: 2023-08-09

## 2023-08-09 RX ADMIN — SODIUM BICARBONATE 50 MEQ: 84 INJECTION INTRAVENOUS at 22:46

## 2023-08-09 RX ADMIN — LINEZOLID 600 MG: 600 INJECTION, SOLUTION INTRAVENOUS at 23:46

## 2023-08-09 RX ADMIN — SODIUM CHLORIDE, POTASSIUM CHLORIDE, SODIUM LACTATE AND CALCIUM CHLORIDE 1000 ML: 600; 310; 30; 20 INJECTION, SOLUTION INTRAVENOUS at 19:01

## 2023-08-09 RX ADMIN — PIPERACILLIN AND TAZOBACTAM 3375 MG: 3; .375 INJECTION, POWDER, LYOPHILIZED, FOR SOLUTION INTRAVENOUS at 22:51

## 2023-08-09 RX ADMIN — SODIUM CHLORIDE 1000 ML: 9 INJECTION, SOLUTION INTRAVENOUS at 19:13

## 2023-08-09 RX ADMIN — Medication 5 MCG/MIN: at 20:48

## 2023-08-09 RX ADMIN — SODIUM BICARBONATE: 84 INJECTION, SOLUTION INTRAVENOUS at 22:11

## 2023-08-09 RX ADMIN — INSULIN HUMAN 0.1 UNITS/KG/HR: 1 INJECTION, SOLUTION INTRAVENOUS at 23:25

## 2023-08-09 RX ADMIN — CALCIUM GLUCONATE 2000 MG: 20 INJECTION, SOLUTION INTRAVENOUS at 23:08

## 2023-08-09 RX ADMIN — SODIUM CHLORIDE 1000 ML: 9 INJECTION, SOLUTION INTRAVENOUS at 19:01

## 2023-08-09 NOTE — ED PROVIDER NOTES
[address] [facility] [address] [address]        Pt Name: Braden De La Torre  MRN: [id number]  [address] [facility] 1960  Date of evaluation: 8/9/2023  Provider: MADAN Santana  PCP: Mundo Pool, APRN - CNP  Note Started: 6:45 PM EDT 8/9/23       I have seen and evaluated this patient with my supervising physician Lucas Moseley MD.      [address]       Chief Complaint   Patient presents with    Hyperglycemia     Bs reading high at home. Feeling sick, lethargic        HISTORY OF PRESENT ILLNESS: 1 or more Elements     History From: patient    Braden De La Torre is a 57 y.o. female who presents for hyperglycemia with nausea and vomiting. She reports nausea vomiting started yesterday. Vomited many times. she is unable to tell me how many. Denies hematemesis or coffee ground emesis. Denies diarrhea. Denies abdominal pain. She is Type 1 DM on insulin pump. Denies fever, cough, congestion. Denies chest pain shortness of breath. She is poor historian. Nursing Notes were reviewed and agreed with or any disagreements were addressed in the HPI. REVIEW OF SYSTEMS :      Review of Systems    Positives and Pertinent negatives as per HPI.      SURGICAL HISTORY     Past Surgical History:   Procedure Laterality Date    CARPAL TUNNEL RELEASE Bilateral 12/2017    CHOLECYSTECTOMY      CT BIOPSY ABDOMEN RETROPERITONEUM  12/27/2022    CT BIOPSY ABDOMEN RETROPERITONEUM 12/27/2022 [facility] CT SCAN    EYE SURGERY Right     biopsy    HYSTERECTOMY (CERVIX STATUS UNKNOWN)      LIPOMA RESECTION      LIVER BIOPSY Right     PORT SURGERY Right 01/18/2023    RIGHT POWER PORT PLACEMENT performed by Modesta Jenkins MD at [address] [place] [address] ARTHROSCOPY Right 08/31/2018    RIGHT SHOULDER ARTHROSCOPE, SUBACROMIAL DECOMPRESSION, DISTALCLAVICLE EXCISION, CAPSULAR RELEASE, MANIPULATION UNDER ANESTHESIA, DEBRIDEMENT    SHOULDER SURGERY      UPPER GASTROINTESTINAL [id number],  Chemistry results called to and read back by Enid Ny ED RN, 08/09/2023  18:41, by TARIQ BLOOMT GLUCOSE - Abnormal; Notable for the following components:    POC Glucose >600 (*)     All other components within normal limits   SPECIMEN REJECTION   SPECIMEN REJECTION    Narrative:     ORDER WAS CANCELLED 08/09/2023 19:51, Rejected: Possibly contaminated 19:55  08/09/2023. SPECIMEN REJECTION    Narrative:     ORDER WAS CANCELLED 08/09/2023 19:57, Rejected: Possibly contaminated  08/09/2023  19:58. URINALYSIS WITH REFLEX TO CULTURE   BLOOD GAS, VENOUS   CBC WITH AUTO DIFFERENTIAL   COMPREHENSIVE METABOLIC PANEL   LIPASE   MAGNESIUM   PHOSPHORUS   PROCALCITONIN       When ordered only abnormal lab results are displayed. All other labs were within normal range or not returned as of this dictation. EKG: When ordered, EKG's are interpreted by the Emergency Department Physician in the absence of a cardiologist.  Please see their note for interpretation of EKG. RADIOLOGY:   Non-plain film images such as CT, Ultrasound and MRI are read by the radiologist. Plain radiographic images are visualized and preliminarily interpreted by the ED Provider with the below findings:    Interpretation per the Radiologist below, if available at the time of this note:    CT HEAD WO CONTRAST   Final Result   1. No acute intracranial findings. 2. Mild mucosal thickening in the sphenoid sinus. XR CHEST PORTABLE    (Results Pending)     No results found. PAST MEDICAL HISTORY      has a past medical history of Adrenal insufficiency ([address]), Cancer ([address]) (2002), Depression, Diabetes mellitus ([address]), radiation therapy, Hypertension, Hyponatremia, Insulin pump in place, Melanoma ([address]) (01/13/2023), Prolonged emergence from general anesthesia, and Restless leg syndrome.      EMERGENCY DEPARTMENT COURSE and DIFFERENTIAL DIAGNOSIS/MDM:   Vitals:    Vitals:    08/09/23 1849 08/09/23 1900 08/09/23 1915 08/09/23 1930

## 2023-08-09 NOTE — ED NOTES
Report given to [facility] OF MITZY MOORE RN at this time. All questions answered.      [facility] [address] [place] [address]  08/09/23 1920

## 2023-08-09 NOTE — ED NOTES
Pt's dexacom and insulin pump were removed from pt's abdomen at this time.       Nusrat Castillo  08/09/23 1235 Pediatric

## 2023-08-10 ENCOUNTER — TELEPHONE (OUTPATIENT)
Dept: ENDOCRINOLOGY | Age: 63
End: 2023-08-10

## 2023-08-10 PROBLEM — E10.10 TYPE 1 DIABETES MELLITUS WITH KETOACIDOSIS WITHOUT COMA (HCC): Status: ACTIVE | Noted: 2023-08-10

## 2023-08-10 PROBLEM — C43.9 METASTATIC MELANOMA (HCC): Status: ACTIVE | Noted: 2023-08-10

## 2023-08-10 PROBLEM — E11.10 DKA, TYPE 2, NOT AT GOAL (HCC): Status: ACTIVE | Noted: 2023-08-10

## 2023-08-10 PROBLEM — D72.828 NEUTROPHILIA: Status: ACTIVE | Noted: 2023-08-10

## 2023-08-10 PROBLEM — A41.9 SEPTIC SHOCK (HCC): Status: ACTIVE | Noted: 2023-08-10

## 2023-08-10 PROBLEM — N17.9 AKI (ACUTE KIDNEY INJURY) (HCC): Status: ACTIVE | Noted: 2023-08-10

## 2023-08-10 PROBLEM — E10.10 DIABETIC KETOACIDOSIS WITHOUT COMA ASSOCIATED WITH TYPE 1 DIABETES MELLITUS (HCC): Status: ACTIVE | Noted: 2023-08-10

## 2023-08-10 PROBLEM — R79.89 ELEVATED PROCALCITONIN: Status: ACTIVE | Noted: 2023-08-10

## 2023-08-10 PROBLEM — E87.20 LACTIC ACID ACIDOSIS: Status: ACTIVE | Noted: 2023-08-10

## 2023-08-10 PROBLEM — R65.21 SEPTIC SHOCK (HCC): Status: ACTIVE | Noted: 2023-08-10

## 2023-08-10 PROBLEM — G93.41 METABOLIC ENCEPHALOPATHY: Status: ACTIVE | Noted: 2023-08-10

## 2023-08-10 PROBLEM — D72.9 NEUTROPHILIA: Status: ACTIVE | Noted: 2023-08-10

## 2023-08-10 PROBLEM — E87.5 HYPERKALEMIA: Status: ACTIVE | Noted: 2023-08-10

## 2023-08-10 LAB
ALBUMIN SERPL-MCNC: 3 G/DL (ref 3.4–5)
ALBUMIN SERPL-MCNC: 3.1 G/DL (ref 3.4–5)
ALBUMIN SERPL-MCNC: 3.2 G/DL (ref 3.4–5)
ALBUMIN/GLOB SERPL: 1.2 {RATIO} (ref 1.1–2.2)
ALBUMIN/GLOB SERPL: 1.3 {RATIO} (ref 1.1–2.2)
ALBUMIN/GLOB SERPL: 1.3 {RATIO} (ref 1.1–2.2)
ALP SERPL-CCNC: 104 U/L (ref 40–129)
ALP SERPL-CCNC: 107 U/L (ref 40–129)
ALP SERPL-CCNC: 109 U/L (ref 40–129)
ALT SERPL-CCNC: 11 U/L (ref 10–40)
ALT SERPL-CCNC: 12 U/L (ref 10–40)
ALT SERPL-CCNC: 14 U/L (ref 10–40)
ANION GAP SERPL CALCULATED.3IONS-SCNC: 14 MMOL/L (ref 3–16)
ANION GAP SERPL CALCULATED.3IONS-SCNC: 15 MMOL/L (ref 3–16)
ANION GAP SERPL CALCULATED.3IONS-SCNC: 17 MMOL/L (ref 3–16)
ANION GAP SERPL CALCULATED.3IONS-SCNC: 19 MMOL/L (ref 3–16)
ANION GAP SERPL CALCULATED.3IONS-SCNC: 20 MMOL/L (ref 3–16)
ANION GAP SERPL CALCULATED.3IONS-SCNC: 24 MMOL/L (ref 3–16)
ANION GAP SERPL CALCULATED.3IONS-SCNC: 28 MMOL/L (ref 3–16)
ANION GAP SERPL CALCULATED.3IONS-SCNC: 30 MMOL/L (ref 3–16)
ANION GAP SERPL CALCULATED.3IONS-SCNC: 31 MMOL/L (ref 3–16)
AST SERPL-CCNC: 25 U/L (ref 15–37)
AST SERPL-CCNC: 25 U/L (ref 15–37)
AST SERPL-CCNC: 28 U/L (ref 15–37)
BACTERIA UR CULT: NORMAL
BACTERIA URNS QL MICRO: NORMAL /HPF
BASE EXCESS BLDV CALC-SCNC: -7.7 MMOL/L
BILIRUB SERPL-MCNC: <0.2 MG/DL (ref 0–1)
BILIRUB UR QL STRIP.AUTO: NEGATIVE
BODY TEMPERATURE: 98.6
BODY TEMPERATURE: 98.6
BODY TEMPERATURE: 98.7
BODY TEMPERATURE: 98.7
BUN SERPL-MCNC: 57 MG/DL (ref 7–20)
BUN SERPL-MCNC: 58 MG/DL (ref 7–20)
BUN SERPL-MCNC: 61 MG/DL (ref 7–20)
BUN SERPL-MCNC: 64 MG/DL (ref 7–20)
BUN SERPL-MCNC: 66 MG/DL (ref 7–20)
BUN SERPL-MCNC: 67 MG/DL (ref 7–20)
BUN SERPL-MCNC: 68 MG/DL (ref 7–20)
BUN SERPL-MCNC: 70 MG/DL (ref 7–20)
BUN SERPL-MCNC: 71 MG/DL (ref 7–20)
CALCIUM SERPL-MCNC: 7.7 MG/DL (ref 8.3–10.6)
CALCIUM SERPL-MCNC: 7.9 MG/DL (ref 8.3–10.6)
CALCIUM SERPL-MCNC: 8 MG/DL (ref 8.3–10.6)
CALCIUM SERPL-MCNC: 8.1 MG/DL (ref 8.3–10.6)
CALCIUM SERPL-MCNC: 8.2 MG/DL (ref 8.3–10.6)
CALCIUM SERPL-MCNC: 8.3 MG/DL (ref 8.3–10.6)
CALCIUM SERPL-MCNC: 8.3 MG/DL (ref 8.3–10.6)
CALCIUM SERPL-MCNC: 8.4 MG/DL (ref 8.3–10.6)
CALCIUM SERPL-MCNC: 8.6 MG/DL (ref 8.3–10.6)
CHLORIDE SERPL-SCNC: 81 MMOL/L (ref 99–110)
CHLORIDE SERPL-SCNC: 83 MMOL/L (ref 99–110)
CHLORIDE SERPL-SCNC: 83 MMOL/L (ref 99–110)
CHLORIDE SERPL-SCNC: 84 MMOL/L (ref 99–110)
CHLORIDE SERPL-SCNC: 85 MMOL/L (ref 99–110)
CHLORIDE SERPL-SCNC: 87 MMOL/L (ref 99–110)
CHLORIDE SERPL-SCNC: 88 MMOL/L (ref 99–110)
CHLORIDE SERPL-SCNC: 89 MMOL/L (ref 99–110)
CHLORIDE SERPL-SCNC: 89 MMOL/L (ref 99–110)
CLARITY UR: CLEAR
CO2 BLDV-SCNC: 20 MMOL/L
CO2 SERPL-SCNC: 12 MMOL/L (ref 21–32)
CO2 SERPL-SCNC: 13 MMOL/L (ref 21–32)
CO2 SERPL-SCNC: 15 MMOL/L (ref 21–32)
CO2 SERPL-SCNC: 17 MMOL/L (ref 21–32)
CO2 SERPL-SCNC: 7 MMOL/L (ref 21–32)
CO2 SERPL-SCNC: 7 MMOL/L (ref 21–32)
CO2 SERPL-SCNC: 9 MMOL/L (ref 21–32)
COHGB MFR BLDV: 1.5 %
COLOR UR: YELLOW
CREAT SERPL-MCNC: 3.2 MG/DL (ref 0.6–1.2)
CREAT SERPL-MCNC: 3.3 MG/DL (ref 0.6–1.2)
CREAT SERPL-MCNC: 3.3 MG/DL (ref 0.6–1.2)
CREAT SERPL-MCNC: 3.5 MG/DL (ref 0.6–1.2)
CREAT SERPL-MCNC: 3.6 MG/DL (ref 0.6–1.2)
CREAT SERPL-MCNC: 3.6 MG/DL (ref 0.6–1.2)
CREAT SERPL-MCNC: 3.8 MG/DL (ref 0.6–1.2)
EKG ATRIAL RATE: 64 BPM
EKG DIAGNOSIS: NORMAL
EKG P AXIS: 88 DEGREES
EKG P-R INTERVAL: 158 MS
EKG Q-T INTERVAL: 434 MS
EKG QRS DURATION: 84 MS
EKG QTC CALCULATION (BAZETT): 447 MS
EKG R AXIS: 2 DEGREES
EKG T AXIS: 44 DEGREES
EKG VENTRICULAR RATE: 64 BPM
EPI CELLS #/AREA URNS AUTO: 1 /HPF (ref 0–5)
GFR SERPLBLD CREATININE-BSD FMLA CKD-EPI: 13 ML/MIN/{1.73_M2}
GFR SERPLBLD CREATININE-BSD FMLA CKD-EPI: 14 ML/MIN/{1.73_M2}
GFR SERPLBLD CREATININE-BSD FMLA CKD-EPI: 15 ML/MIN/{1.73_M2}
GLUCOSE BLD-MCNC: 509 MG/DL (ref 70–99)
GLUCOSE BLD-MCNC: 528 MG/DL (ref 70–99)
GLUCOSE BLD-MCNC: 535 MG/DL (ref 70–99)
GLUCOSE BLD-MCNC: 610 MG/DL (ref 70–99)
GLUCOSE BLD-MCNC: 615 MG/DL (ref 70–99)
GLUCOSE BLD-MCNC: 623 MG/DL (ref 70–99)
GLUCOSE BLD-MCNC: 641 MG/DL (ref 70–99)
GLUCOSE BLD-MCNC: >600 MG/DL (ref 70–99)
GLUCOSE BLD-MCNC: >600 MG/DL (ref 70–99)
GLUCOSE BLD-MCNC: >700 MG/DL (ref 70–99)
GLUCOSE SERPL-MCNC: 1044 MG/DL (ref 70–99)
GLUCOSE SERPL-MCNC: 1241 MG/DL (ref 70–99)
GLUCOSE SERPL-MCNC: 1409 MG/DL (ref 70–99)
GLUCOSE SERPL-MCNC: 1580 MG/DL (ref 70–99)
GLUCOSE SERPL-MCNC: 1645 MG/DL (ref 70–99)
GLUCOSE SERPL-MCNC: 560 MG/DL (ref 70–99)
GLUCOSE SERPL-MCNC: 634 MG/DL (ref 70–99)
GLUCOSE SERPL-MCNC: 648 MG/DL (ref 70–99)
GLUCOSE SERPL-MCNC: 649 MG/DL (ref 70–99)
GLUCOSE SERPL-MCNC: 664 MG/DL (ref 70–99)
GLUCOSE SERPL-MCNC: 743 MG/DL (ref 70–99)
GLUCOSE SERPL-MCNC: 865 MG/DL (ref 70–99)
GLUCOSE SERPL-MCNC: 920 MG/DL (ref 70–99)
GLUCOSE SERPL-MCNC: 965 MG/DL (ref 70–99)
GLUCOSE UR STRIP.AUTO-MCNC: >=1000 MG/DL
HCO3 BLDV-SCNC: 19 MMOL/L (ref 23–29)
HGB UR QL STRIP.AUTO: NEGATIVE
HYALINE CASTS #/AREA URNS AUTO: 2 /LPF (ref 0–8)
KETONES UR STRIP.AUTO-MCNC: ABNORMAL MG/DL
LACTATE BLDV-SCNC: 1.4 MMOL/L (ref 0.4–2)
LACTATE BLDV-SCNC: 3.9 MMOL/L (ref 0.4–2)
LACTATE BLDV-SCNC: 4.1 MMOL/L (ref 0.4–2)
LEUKOCYTE ESTERASE UR QL STRIP.AUTO: NEGATIVE
MAGNESIUM SERPL-MCNC: 1.5 MG/DL (ref 1.8–2.4)
MAGNESIUM SERPL-MCNC: 1.7 MG/DL (ref 1.8–2.4)
MAGNESIUM SERPL-MCNC: 1.7 MG/DL (ref 1.8–2.4)
MAGNESIUM SERPL-MCNC: 1.8 MG/DL (ref 1.8–2.4)
MAGNESIUM SERPL-MCNC: 1.9 MG/DL (ref 1.8–2.4)
MAGNESIUM SERPL-MCNC: 2 MG/DL (ref 1.8–2.4)
METHGB MFR BLDV: 1.1 %
NITRITE UR QL STRIP.AUTO: NEGATIVE
O2 THERAPY: ABNORMAL
PCO2 BLDV: 41.5 MMHG (ref 40–50)
PERFORMED ON: ABNORMAL
PH BLDV: 7.26 [PH] (ref 7.35–7.45)
PH UR STRIP.AUTO: 5.5 [PH] (ref 5–8)
PHOSPHATE SERPL-MCNC: 4.3 MG/DL (ref 2.5–4.9)
PHOSPHATE SERPL-MCNC: 4.6 MG/DL (ref 2.5–4.9)
PHOSPHATE SERPL-MCNC: 4.6 MG/DL (ref 2.5–4.9)
PHOSPHATE SERPL-MCNC: 4.9 MG/DL (ref 2.5–4.9)
PHOSPHATE SERPL-MCNC: 4.9 MG/DL (ref 2.5–4.9)
PHOSPHATE SERPL-MCNC: 5.1 MG/DL (ref 2.5–4.9)
PO2 BLDV: 33 MMHG
POC SAMPLE TYPE: ABNORMAL
POTASSIUM SERPL-SCNC: 4.6 MMOL/L (ref 3.5–5.1)
POTASSIUM SERPL-SCNC: 4.7 MMOL/L (ref 3.5–5.1)
POTASSIUM SERPL-SCNC: 4.8 MMOL/L (ref 3.5–5.1)
POTASSIUM SERPL-SCNC: 5 MMOL/L (ref 3.5–5.1)
POTASSIUM SERPL-SCNC: 5.7 MMOL/L (ref 3.5–5.1)
POTASSIUM SERPL-SCNC: 6 MMOL/L (ref 3.5–5.1)
POTASSIUM SERPL-SCNC: 6.3 MMOL/L (ref 3.5–5.1)
PROCALCITONIN SERPL IA-MCNC: 7.8 NG/ML (ref 0–0.15)
PROT SERPL-MCNC: 5.4 G/DL (ref 6.4–8.2)
PROT SERPL-MCNC: 5.5 G/DL (ref 6.4–8.2)
PROT SERPL-MCNC: 5.6 G/DL (ref 6.4–8.2)
PROT UR STRIP.AUTO-MCNC: 100 MG/DL
RBC CLUMPS #/AREA URNS AUTO: 0 /HPF (ref 0–4)
SAO2 % BLDV: 60 %
SODIUM SERPL-SCNC: 117 MMOL/L (ref 136–145)
SODIUM SERPL-SCNC: 118 MMOL/L (ref 136–145)
SODIUM SERPL-SCNC: 119 MMOL/L (ref 136–145)
SODIUM SERPL-SCNC: 120 MMOL/L (ref 136–145)
SODIUM SERPL-SCNC: 121 MMOL/L (ref 136–145)
SP GR UR STRIP.AUTO: 1.01 (ref 1–1.03)
UA COMPLETE W REFLEX CULTURE PNL UR: ABNORMAL
UA DIPSTICK W REFLEX MICRO PNL UR: YES
URN SPEC COLLECT METH UR: ABNORMAL
UROBILINOGEN UR STRIP-ACNC: 0.2 E.U./DL
WBC #/AREA URNS AUTO: 2 /HPF (ref 0–5)

## 2023-08-10 PROCEDURE — 83036 HEMOGLOBIN GLYCOSYLATED A1C: CPT

## 2023-08-10 PROCEDURE — 6360000002 HC RX W HCPCS: Performed by: STUDENT IN AN ORGANIZED HEALTH CARE EDUCATION/TRAINING PROGRAM

## 2023-08-10 PROCEDURE — 2000000000 HC ICU R&B

## 2023-08-10 PROCEDURE — 6360000002 HC RX W HCPCS: Performed by: NURSE PRACTITIONER

## 2023-08-10 PROCEDURE — 80053 COMPREHEN METABOLIC PANEL: CPT

## 2023-08-10 PROCEDURE — 6360000002 HC RX W HCPCS: Performed by: INTERNAL MEDICINE

## 2023-08-10 PROCEDURE — 99291 CRITICAL CARE FIRST HOUR: CPT | Performed by: INTERNAL MEDICINE

## 2023-08-10 PROCEDURE — 2580000003 HC RX 258: Performed by: INTERNAL MEDICINE

## 2023-08-10 PROCEDURE — 2500000003 HC RX 250 WO HCPCS: Performed by: STUDENT IN AN ORGANIZED HEALTH CARE EDUCATION/TRAINING PROGRAM

## 2023-08-10 PROCEDURE — 96366 THER/PROPH/DIAG IV INF ADDON: CPT

## 2023-08-10 PROCEDURE — 36415 COLL VENOUS BLD VENIPUNCTURE: CPT

## 2023-08-10 PROCEDURE — 87040 BLOOD CULTURE FOR BACTERIA: CPT

## 2023-08-10 PROCEDURE — 96375 TX/PRO/DX INJ NEW DRUG ADDON: CPT

## 2023-08-10 PROCEDURE — 36592 COLLECT BLOOD FROM PICC: CPT

## 2023-08-10 PROCEDURE — 82947 ASSAY GLUCOSE BLOOD QUANT: CPT

## 2023-08-10 PROCEDURE — 84100 ASSAY OF PHOSPHORUS: CPT

## 2023-08-10 PROCEDURE — 82803 BLOOD GASES ANY COMBINATION: CPT

## 2023-08-10 PROCEDURE — 2580000003 HC RX 258: Performed by: STUDENT IN AN ORGANIZED HEALTH CARE EDUCATION/TRAINING PROGRAM

## 2023-08-10 PROCEDURE — 83735 ASSAY OF MAGNESIUM: CPT

## 2023-08-10 PROCEDURE — 6360000002 HC RX W HCPCS: Performed by: EMERGENCY MEDICINE

## 2023-08-10 PROCEDURE — C9113 INJ PANTOPRAZOLE SODIUM, VIA: HCPCS | Performed by: EMERGENCY MEDICINE

## 2023-08-10 PROCEDURE — 84145 PROCALCITONIN (PCT): CPT

## 2023-08-10 PROCEDURE — 83605 ASSAY OF LACTIC ACID: CPT

## 2023-08-10 RX ORDER — ESCITALOPRAM OXALATE 10 MG/1
10 TABLET ORAL DAILY
Status: DISCONTINUED | OUTPATIENT
Start: 2023-08-10 | End: 2023-08-15 | Stop reason: HOSPADM

## 2023-08-10 RX ORDER — SODIUM CHLORIDE 9 MG/ML
INJECTION, SOLUTION INTRAVENOUS PRN
Status: ACTIVE | OUTPATIENT
Start: 2023-08-10 | End: 2023-08-11

## 2023-08-10 RX ORDER — 0.9 % SODIUM CHLORIDE 0.9 %
1000 INTRAVENOUS SOLUTION INTRAVENOUS ONCE
Status: COMPLETED | OUTPATIENT
Start: 2023-08-10 | End: 2023-08-10

## 2023-08-10 RX ORDER — HYDROCORTISONE 5 MG/1
5 TABLET ORAL NIGHTLY
Status: DISCONTINUED | OUTPATIENT
Start: 2023-08-10 | End: 2023-08-11

## 2023-08-10 RX ORDER — DEXTROSE AND SODIUM CHLORIDE 5; .45 G/100ML; G/100ML
INJECTION, SOLUTION INTRAVENOUS CONTINUOUS PRN
Status: DISCONTINUED | OUTPATIENT
Start: 2023-08-10 | End: 2023-08-11

## 2023-08-10 RX ORDER — POLYETHYLENE GLYCOL 3350 17 G/17G
17 POWDER, FOR SOLUTION ORAL DAILY PRN
Status: DISCONTINUED | OUTPATIENT
Start: 2023-08-10 | End: 2023-08-15 | Stop reason: HOSPADM

## 2023-08-10 RX ORDER — HYDROCORTISONE 10 MG/1
10 TABLET ORAL DAILY
Status: DISCONTINUED | OUTPATIENT
Start: 2023-08-10 | End: 2023-08-11

## 2023-08-10 RX ORDER — POTASSIUM CHLORIDE 7.45 MG/ML
10 INJECTION INTRAVENOUS PRN
Status: DISCONTINUED | OUTPATIENT
Start: 2023-08-10 | End: 2023-08-12

## 2023-08-10 RX ORDER — SODIUM CHLORIDE 450 MG/100ML
INJECTION, SOLUTION INTRAVENOUS CONTINUOUS
Status: DISCONTINUED | OUTPATIENT
Start: 2023-08-10 | End: 2023-08-11

## 2023-08-10 RX ORDER — HEPARIN SODIUM 5000 [USP'U]/ML
5000 INJECTION, SOLUTION INTRAVENOUS; SUBCUTANEOUS 2 TIMES DAILY
Status: DISCONTINUED | OUTPATIENT
Start: 2023-08-10 | End: 2023-08-15 | Stop reason: HOSPADM

## 2023-08-10 RX ORDER — ONDANSETRON 2 MG/ML
4 INJECTION INTRAMUSCULAR; INTRAVENOUS EVERY 6 HOURS PRN
Status: DISCONTINUED | OUTPATIENT
Start: 2023-08-10 | End: 2023-08-15 | Stop reason: HOSPADM

## 2023-08-10 RX ORDER — MAGNESIUM SULFATE IN WATER 40 MG/ML
2000 INJECTION, SOLUTION INTRAVENOUS PRN
Status: DISCONTINUED | OUTPATIENT
Start: 2023-08-10 | End: 2023-08-15 | Stop reason: HOSPADM

## 2023-08-10 RX ORDER — ROPINIROLE 1 MG/1
TABLET, FILM COATED ORAL
Qty: 90 TABLET | Refills: 0 | Status: SHIPPED | OUTPATIENT
Start: 2023-08-10

## 2023-08-10 RX ADMIN — SODIUM CHLORIDE: 4.5 INJECTION, SOLUTION INTRAVENOUS at 18:52

## 2023-08-10 RX ADMIN — HEPARIN SODIUM 5000 UNITS: 5000 INJECTION INTRAVENOUS; SUBCUTANEOUS at 20:25

## 2023-08-10 RX ADMIN — POTASSIUM CHLORIDE 10 MEQ: 7.46 INJECTION, SOLUTION INTRAVENOUS at 12:03

## 2023-08-10 RX ADMIN — SODIUM BICARBONATE: 84 INJECTION, SOLUTION INTRAVENOUS at 21:11

## 2023-08-10 RX ADMIN — POTASSIUM CHLORIDE 10 MEQ: 7.46 INJECTION, SOLUTION INTRAVENOUS at 22:29

## 2023-08-10 RX ADMIN — PIPERACILLIN AND TAZOBACTAM 3375 MG: 3; .375 INJECTION, POWDER, LYOPHILIZED, FOR SOLUTION INTRAVENOUS at 22:24

## 2023-08-10 RX ADMIN — HYDROCORTISONE SODIUM SUCCINATE 100 MG: 100 INJECTION, POWDER, FOR SOLUTION INTRAMUSCULAR; INTRAVENOUS at 20:25

## 2023-08-10 RX ADMIN — POTASSIUM CHLORIDE 10 MEQ: 7.46 INJECTION, SOLUTION INTRAVENOUS at 03:20

## 2023-08-10 RX ADMIN — SODIUM CHLORIDE: 9 INJECTION, SOLUTION INTRAVENOUS at 13:15

## 2023-08-10 RX ADMIN — MAGNESIUM SULFATE HEPTAHYDRATE 2000 MG: 40 INJECTION, SOLUTION INTRAVENOUS at 16:38

## 2023-08-10 RX ADMIN — SODIUM CHLORIDE: 4.5 INJECTION, SOLUTION INTRAVENOUS at 22:53

## 2023-08-10 RX ADMIN — POTASSIUM CHLORIDE 10 MEQ: 7.46 INJECTION, SOLUTION INTRAVENOUS at 14:24

## 2023-08-10 RX ADMIN — HYDROCORTISONE SODIUM SUCCINATE 50 MG: 100 INJECTION, POWDER, FOR SOLUTION INTRAMUSCULAR; INTRAVENOUS at 10:58

## 2023-08-10 RX ADMIN — HEPARIN SODIUM 5000 UNITS: 5000 INJECTION INTRAVENOUS; SUBCUTANEOUS at 08:00

## 2023-08-10 RX ADMIN — SODIUM CHLORIDE: 4.5 INJECTION, SOLUTION INTRAVENOUS at 06:50

## 2023-08-10 RX ADMIN — SODIUM CHLORIDE: 4.5 INJECTION, SOLUTION INTRAVENOUS at 02:57

## 2023-08-10 RX ADMIN — SODIUM CHLORIDE: 4.5 INJECTION, SOLUTION INTRAVENOUS at 10:49

## 2023-08-10 RX ADMIN — ONDANSETRON 4 MG: 2 INJECTION INTRAMUSCULAR; INTRAVENOUS at 07:59

## 2023-08-10 RX ADMIN — POTASSIUM CHLORIDE 10 MEQ: 7.46 INJECTION, SOLUTION INTRAVENOUS at 10:51

## 2023-08-10 RX ADMIN — SODIUM CHLORIDE 1000 ML: 9 INJECTION, SOLUTION INTRAVENOUS at 09:31

## 2023-08-10 RX ADMIN — Medication 2 MCG/MIN: at 11:57

## 2023-08-10 RX ADMIN — PIPERACILLIN AND TAZOBACTAM 3375 MG: 3; .375 INJECTION, POWDER, LYOPHILIZED, FOR SOLUTION INTRAVENOUS at 10:59

## 2023-08-10 RX ADMIN — POTASSIUM CHLORIDE 10 MEQ: 7.46 INJECTION, SOLUTION INTRAVENOUS at 23:32

## 2023-08-10 RX ADMIN — SODIUM CHLORIDE: 4.5 INJECTION, SOLUTION INTRAVENOUS at 14:50

## 2023-08-10 RX ADMIN — PANTOPRAZOLE SODIUM 40 MG: 40 INJECTION, POWDER, FOR SOLUTION INTRAVENOUS at 00:17

## 2023-08-10 RX ADMIN — POTASSIUM CHLORIDE 10 MEQ: 7.46 INJECTION, SOLUTION INTRAVENOUS at 04:32

## 2023-08-10 RX ADMIN — HYDROCORTISONE SODIUM SUCCINATE 100 MG: 100 INJECTION, POWDER, FOR SOLUTION INTRAMUSCULAR; INTRAVENOUS at 14:25

## 2023-08-10 RX ADMIN — POTASSIUM CHLORIDE 10 MEQ: 7.46 INJECTION, SOLUTION INTRAVENOUS at 16:34

## 2023-08-10 ASSESSMENT — PAIN SCALES - WONG BAKER
WONGBAKER_NUMERICALRESPONSE: 2
WONGBAKER_NUMERICALRESPONSE: 0
WONGBAKER_NUMERICALRESPONSE: 0

## 2023-08-10 ASSESSMENT — PAIN SCALES - GENERAL
PAINLEVEL_OUTOF10: 0
PAINLEVEL_OUTOF10: 0
PAINLEVEL_OUTOF10: 2

## 2023-08-10 NOTE — ED PROVIDER NOTES
EMERGENCY DEPARTMENT SUPERVISING PHYSICIAN NOTE    I have seen this patient & have reviewed history and findings with the PA, NP, or resident physician and provided direct personal supervision as needed. I was present for key portions of any procedures performed. I concur with their assessment and plans except for any discrepancies noted below. I've participated in medical management, monitoring, and treatment of this patient with the provider. I have reviewed documentation, test results, and laboratory results in the interim. Care plan has been developed collaboratively. Castillo Ray is a 57y.o. year old female presenting to the emergency department for Hyperglycemia (Bs reading high at home. Feeling sick, lethargic )  . Brief HPI:  54yrs F has reportedly been vomiting repeatedly over the last 36 hours  Has appeared increasingly ill and somnolent during this same time  Family checked her blood glucose earlier and found it to be markedly elevated so she was brought here to be evaluated  On my evaluation she is excessively somnolent and unable to provide additional H&P details.     Pertinent Exam Findings:  Somnolent, very ill-appearing  Moving all extremities well  Answers yes / no questions appropriately  Follows brief, simple commands    Results:    Recent Results (from the past 12 hour(s))   POCT Glucose    Collection Time: 08/09/23  5:51 PM   Result Value Ref Range    POC Glucose >600 (AA) 70 - 99 mg/dl    Performed on ACCU-CHEK    Blood Gas, Venous    Collection Time: 08/09/23  6:29 PM   Result Value Ref Range    pH, Sammy 6.968 (LL) 7.350 - 7.450    pCO2, Sammy 24.7 (L) 40.0 - 50.0 mmHg    pO2, Sammy 89 Not Established mmHg    HCO3, Venous 6 (L) 23 - 29 mmol/L    Base Excess, Sammy -24.4 Not Established mmol/L    O2 Sat, Sammy 94 Not Established %    Carboxyhemoglobin 1.5 %    MetHgb, Sammy 0.8 <1.5 %    TC02 (Calc), Sammy 6 Not Established mmol/L    O2 Therapy Unknown    SPECIMEN REJECTION    Collection Time: pulse oximetry, ordering and review of radiographic studies, ordering and review of laboratory studies and ordering and performing treatments and interventions    I assumed direction of critical care for this patient from another provider in my specialty: no      Midline Venous Catheter Procedure Note    Indication:   Critical / life-threatening illness with failed prior PIV attempts / difficulty maintaining multiple IV access sites    Equipment:  12cm, 20G, single lumen catheter    Consent:  Benefits, risks (including bleeding, infection, injury, etc.), and alternatives explained to the patient/family. Questions were sought and answered. Family verbalized understanding and gave verbal consent agreeing to proceed with the procedure. Time-Out:  A \"time-out\" was completed verifying the correct patient name Castillo Ray) and other identification ([id number], 1960), procedure, site, positioning, and implant(s) or special equipment if applicable. Procedure:  Using strict sterile technique the area was prepped, sterilely draped, and anesthetized using 2cc of 1% lidocaine. Ultrasound guidance was then used to localize a vein within the medial left upper arm. A needle was advanced into the vein. A wire was then advanced through the needle. The needle was then removed. A catheter was inserted over the wire and the wire was removed. There was good blood return, a saline lock was placed on the catheter lumen, and the catheter flushed easily. The line was secured and a sterile dressing applied. Patient tolerated procedure well. EBL: < 2 cc    Total Attempts: 1    Complications: none immediately    Plan:  Critically ill with very severe DKA, severe acidosis.   Also has significantly elevated procalcitonin level   No overt source of infection readily identifiable on exam or initial testing  She has been aneuric even after receiving 3,000cc IVF boluses and continuous IVF infusion  Placing catheter now to

## 2023-08-10 NOTE — PROGRESS NOTES
Pt arrived to unit in stable condition on mobile monitor. VSS on Levophed. Insulin and bicarb gtts infusing. Insulin titrated per MAR. Skin assessed.

## 2023-08-10 NOTE — PROGRESS NOTES
Rounds with Dr. Ketan Weber at bedside. Verbal orders for one time dose of 50 mg Solu-Cortef and to place a consult for Critical Care. Orders placed. Discussed patient's hyponatremia and hyperkalemia. Verbal orders to just trend labs at this time.     Electronically signed by Melody Nagel RN on 8/10/2023 at 7:33 PM

## 2023-08-10 NOTE — TELEPHONE ENCOUNTER
Spoke w/ Annette Read. Emailing [facility] to contact pt next week as she is in ICU. Per nicole young, contact pt's  next week.  Email sent to [facility]

## 2023-08-10 NOTE — H&P
History and Physical      Name:  Eliseo Adan /Age/Sex: 1960  (57 y.o. female)   MRN & CSN:  [id number] & [id number] Encounter Date/Time: 8/10/2023 1:26 AM EDT   Location:  [address] [place] [address] PCP: CABRERA Carlson CNP         Date of Exam: 8/10/2023       Chief Complaint:     Chief Complaint   Patient presents with    Hyperglycemia     Bs reading high at home. Feeling sick, lethargic          Assessment/Plan:     Sepsis of unknown source, with hypotension: Blood pressure of 96/72 mmHg, white cell count of 18.8, procalcitonin was 5.54. Blood cultures were taken. A UA was not suggestive of UTI. CXR unremarkable for infection. CT of the chest and abdomen with esophageal thickening, trace pleural effusions as well as ascites. Patient was started on Levophed drip, Zyvox + Zosyn with some interval improvement in hemodynamic status in the ED. Continue intravenous fluid. Follow culture results and CBC. Type I diabetic ketoacidosis with mental status changes: Possibly due to acute infection. Patient has been started on DKA protocol, including intravenous fluid, intravenous insulin, serial chemistry studies, with plan to replace electrolytes as needed. Pseudohyponatremia - corrected Na is 159    Acute metabolic encephalopathy, characterized by lethargy/grunting. This could be due to DKA vs sepsis. Treat underlying etiologies. Monitor mental status closely in ICU. CT-head was nonacute    FAVIAN on CKD with hyperkalemia: SCr was 3.3, potassium was 6.6. Continue IV fluids. Expect blood pressure to be better with insulin GTT. Follow CMP.   Avoid nephrotoxins, renally dose medications, monitor I's and O's    Adrenal insufficiency: Continue home dose steroids    Macrocytic anemia: Check B12 and folate    Other problems include T1DM with insulin pump, hypertension, RLS and depression      Disposition:  Patient was admitted to the ICU    Unable to discuss with patient on account of altered mental

## 2023-08-10 NOTE — PROGRESS NOTES
4 Eyes Skin Assessment     NAME:  Jesse Santana  YOB: 1960  MEDICAL RECORD NUMBER:  [id number]    The patient is being assessed for  Admission    I agree that at least one RN has performed a thorough Head to Toe Skin Assessment on the patient. ALL assessment sites listed below have been assessed. Areas assessed by both nurses:    Head, Face, Ears, Shoulders, Back, Chest, Arms, Elbows, Hands, Sacrum. Buttock, Coccyx, Ischium, Legs. Feet and Heels, and Under Medical Devices         Does the Patient have a Wound?  No noted wound(s)       Uriah Prevention initiated by RN: Yes  Wound Care Orders initiated by RN: No    Pressure Injury (Stage 3,4, Unstageable, DTI, NWPT, and Complex wounds) if present, place Wound referral order by RN under : No    New Ostomies, if present place, Ostomy referral order under : No     Nurse 1 eSignature: Electronically signed by Garrett Shah RN on 8/10/23 at 7:14 AM EDT    **SHARE this note so that the co-signing nurse can place an eSignature**    Nurse 2 eSignature: {Esignature:[id number]}

## 2023-08-10 NOTE — TELEPHONE ENCOUNTER
Call from [facility] clinical educator @ [facility] asking how to go about getting more pump training (Omnipod) for patient.  She has been admitted for DKA     CB# [facility] [phone]

## 2023-08-10 NOTE — PROGRESS NOTES
Focus:  Diabetes Education     Telma Cardona opens eyes to name but does not engage in conversation. Insulin drip and DKA protocol active. Follow up with endocrinology currently scheduled:    Shubham Rodriguez Follow Up Appointment 1:50 PM   Dr. Volodymyr Aggarwal MD  [address]   [address] [facility]   [phone]     Office contacted for process for additional pump training. Message left with Jhoana Anglin for support service options with Associate Care Management. Electronically signed by Vanda Villarreal RN on 8/10/2023 at 1:25 PM      Dr. Maico Dubon office will place a request  or the [address] [place] [address]   to contact Telma Cardona after discharge.        Electronically signed by Vanda Villarreal RN on 8/10/2023 at 1:54 PM

## 2023-08-10 NOTE — PROGRESS NOTES
4 Eyes Skin Assessment     NAME:  Benita Rose  YOB: 1960  MEDICAL RECORD NUMBER:  [id number]    The patient is being assessed for  Shift Handoff    I agree that at least one RN has performed a thorough Head to Toe Skin Assessment on the patient. ALL assessment sites listed below have been assessed. Areas assessed by both nurses:    Head, Face, Ears, Shoulders, Back, Chest, Arms, Elbows, Hands, Sacrum. Buttock, Coccyx, Ischium, Legs. Feet and Heels, and Under Medical Devices         Does the Patient have a Wound?  No noted wound(s)       Uriah Prevention initiated by RN: Yes  Wound Care Orders initiated by RN: No    Pressure Injury (Stage 3,4, Unstageable, DTI, NWPT, and Complex wounds) if present, place Wound referral order by RN under : No    New Ostomies, if present place, Ostomy referral order under : No     Nurse 1 eSignature: Electronically signed by Artem Stewart RN on 8/10/23 at 6:39 PM EDT    **SHARE this note so that the co-signing nurse can place an eSignature**    Nurse 2 eSignature: Electronically signed by Edward Martinez RN on 8/10/23 at 7:30 PM EDT

## 2023-08-10 NOTE — ACP (ADVANCE CARE PLANNING)
Advance Care Planning     Advance Care Planning Activator (Inpatient)  Conversation Note      Date of ACP Conversation: 8/10/2023     Conversation Conducted with: Patient with Decision Making Capacity and spouse present in room    ACP Activator: Gabi Mendoza, [address] [facility]:     Current Designated Health Care Decision Maker:     Primary Decision Maker: Costaair Rizo - [phone]    Secondary Decision Maker: Antelmo Rendon - Child - [phone]    Supplemental (Other) Decision Maker: Michael Garcia - Child - [phone]  Click here to complete [place] Preferences    Ventilation: \"If you were in your present state of health and suddenly became very ill and were unable to breathe on your own, what would your preference be about the use of a ventilator (breathing machine) if it were available to you? \"      Would the patient desire the use of ventilator (breathing machine)?: yes    \"If your health worsens and it becomes clear that your chance of recovery is unlikely, what would your preference be about the use of a ventilator (breathing machine) if it were available to you? \"     Would the patient desire the use of ventilator (breathing machine)?: No      Resuscitation  \"CPR works best to restart the heart when there is a sudden event, like a heart attack, in someone who is otherwise healthy. Unfortunately, CPR does not typically restart the heart for people who have serious health conditions or who are very sick. \"    \"In the event your heart stopped as a result of an underlying serious health condition, would you want attempts to be made to restart your heart (answer \"yes\" for attempt to resuscitate) or would you prefer a natural death (answer \"no\" for do not attempt to resuscitate)? \" yes       [] Yes   [] No   Educated Patient / Magali Carito regarding differences between Advance Directives and portable DNR orders.     Length of ACP Conversation in minutes: Conversation Outcomes:  ACP discussion completed    Follow-up plan:    [] Schedule follow-up conversation to continue planning  [] Referred individual to Provider for additional questions/concerns   [] Advised patient/agent/surrogate to review completed ACP document and update if needed with changes in condition, patient preferences or care setting    [x] This note routed to one or more involved healthcare providers    Electronically signed by BERTRAND Amanda, PREMA, Case Management on 8/10/2023 at 11:41 AM

## 2023-08-10 NOTE — PROGRESS NOTES
Patient's blood sugar and insulin gtt titrations completed by another RN at 1725. Rate was changed in the [facility], but rate of 0.15 units/kg/hr was not programmed on the pump. Pump was still running at previous rate of 0.0095 units/kg/hr from 1536. Rate dose change for 1823 done using 1536 rate that was running on the pump.      Electronically signed by Griselda Dunn RN on 8/10/2023 at 6:29 PM

## 2023-08-10 NOTE — PLAN OF CARE
Problem: Discharge Planning  Goal: Discharge to home or other facility with appropriate resources  Outcome: Progressing  Flowsheets (Taken 8/10/2023 0755)  Discharge to home or other facility with appropriate resources:   Identify barriers to discharge with patient and caregiver   Arrange for needed discharge resources and transportation as appropriate     Problem: Safety - Adult  Goal: Free from fall injury  Outcome: Progressing     Problem: Confusion  Goal: Confusion, delirium, dementia, or psychosis is improved or at baseline  Description: INTERVENTIONS:  1. Assess for possible contributors to thought disturbance, including medications, impaired vision or hearing, underlying metabolic abnormalities, dehydration, psychiatric diagnoses, and notify attending LIP  2. [place] high risk fall precautions, as indicated  3. Provide frequent short contacts to provide reality reorientation, refocusing and direction  4. Decrease environmental stimuli, including noise as appropriate  5. Monitor and intervene to maintain adequate nutrition, hydration, elimination, sleep and activity  6. If unable to ensure safety without constant attention obtain sitter and review sitter guidelines with assigned personnel  7. Initiate Psychosocial CNS and Spiritual Care consult, as indicated  Outcome: Progressing  Flowsheets (Taken 8/10/2023 0755)  Effect of thought disturbance (confusion, delirium, dementia, or psychosis) are managed with adequate functional status:   Assess for contributors to thought disturbance, including medications, impaired vision or hearing, underlying metabolic abnormalities, dehydration, psychiatric diagnoses, notify [address] high risk fall precautions, as indicated   Provide frequent short contacts to provide reality reorientation, refocusing and direction     Problem: Skin/Tissue Integrity  Goal: Absence of new skin breakdown  Description: 1. Monitor for areas of redness and/or skin breakdown  2.

## 2023-08-10 NOTE — CARE COORDINATION
Case Management Assessment  Initial Evaluation    Date/Time of Evaluation: 8/10/2023 11:33 AM  Assessment Completed by: JEOVANNY Corbin    If patient is discharged prior to next notation, then this note serves as note for discharge by case management. Patient Name: Abdifatah Verde                   YOB: 1960  Diagnosis: Hyperkalemia [E87.5]  FAVIAN (acute kidney injury) ([address]) [N17.9]  DKA, type 2, not at goal [facility]) [E11.10]  Diabetic ketoacidosis without coma associated with type 1 diabetes mellitus ([address]) [E10.10]                   Date / Time: 8/9/2023  5:44 PM    Patient Admission Status: Inpatient   Readmission Risk (Low < 19, Mod (19-27), High > 27): Readmission Risk Score: 28.4    Current PCP: CABRERA Chandler CNP  PCP verified by CM? Yes    Chart Reviewed: Yes      History Provided by: Patient, Spouse, Medical Record  Patient Orientation: Alert and Oriented    Patient Cognition: Alert    Hospitalization in the last 30 days (Readmission):  Yes    If yes, Readmission Assessment in CM Navigator will be completed. Advance Directives:      Code Status: Full Code   Patient's Primary Decision Maker is: Legal Next of Kin    Primary Decision MakerMarilee Render Spouse - [phone]    Secondary Decision Maker: Jhony Lowe  Child - [phone]    Supplemental (Other) Decision Maker: [facility] Child - [phone]    Discharge Planning:    Patient lives with: Spouse/Significant Other Type of Home: House  Primary Care Giver: Self  Patient Support Systems include: Spouse/Significant Other, Children   Current Financial resources: Medicare  Current community resources: None  Current services prior to admission: Other (Comment) (insulin pump)            Current DME:              Type of Home Care services:  None    ADLS  Prior functional level:  Independent in ADLs/IADLs  Current functional level: Assistance with the following:, Cooking, Housework, Mobility    PT AM-PAC:   /24  OT AM-PAC:   /24    Family can provide assistance at DC: Yes  Would you like Case Management to discuss the discharge plan with any other family members/significant others, and if so, who? Yes  Plans to Return to Present Housing: Yes  Other Identified Issues/Barriers to RETURNING to current housing: none  Potential Assistance needed at discharge: N/A            Potential DME:  none  Patient expects to discharge to: [address] [place] [address] for transportation at discharge: Family    Financial    Payor: Yolanda Calderon / [facility] / Product Type: *No Product type* /     Does insurance require precert for SNF: Yes    Potential assistance Purchasing Medications: No  Meds-to-Beds request: Yes      Roseline Phillips [address] [phone] - F [address] [place] [id number]  Phone: [phone] Fax: [phone]    [address] [place] [address], [address] [place] [address], [address] [place] [address]  Phone: [phone] Fax: [phone]    [address], [address] [phone] Austin Lino [phone]  [address] [place] [address]  Phone: [phone] Fax: [phone]      Notes:    Factors facilitating achievement of predicted outcomes: Family support, Motivated, and Cooperative    Barriers to discharge: none    Additional Case Management Notes: Patient plans to return home with support of spouse. Patient confirmed this plan. Spouse reported that patient has an insulin pump that she manages. He believes that the pump is causing her issues and needs to be looked at.      The Plan for Transition of Care is related to the following treatment goals of Hyperkalemia [E87.5]  FAVIAN (acute kidney injury) ([address] ) [N17.9]  DKA, type 2, not at goal [facility]) [E11.10]  Diabetic ketoacidosis without coma associated

## 2023-08-10 NOTE — CONSULTS
REASON FOR CONSULTATION/CC: DKA      Consult at request of William Moreno MD for     PCP: CABRERA Swanson - CNP  Established Pulmonologist:  None    HISTORY OF PRESENT ILLNESS: Carroll Hernandes is a 57y.o. year old female with a history of  who presents with       Currently having some decreased mental status. All history per the chart and . Having chills. She has been feeling poorly for 24 hours or so. No preceding illness.  is very concerned about insulin pump. She has had recent issues and pump has given her warnings to change site. He is not aware of who her endocrinologist is. This note may have been  transcribed using [facility]. Please disregard any translational errors. Assessment:       History of CKD  Microcytic anemia    Plan:      Hospital Day 0     DKA  Current on DKA protocol. Started glucose at 1600. Started on insulin drip. Now down to 900. Progressing well. Severe pseudohyponatremia. Trending. Etiology of DKA unclear. Treating empirically for infection.  is very concerned that insulin pump may be not functioning correctly. Unfortunately, he did not know any of her settings or adjustments. She has completely managed this without his knowledge. Adrenal insufficiency  Discussed with hospitalist.  Will keep on stress dose steroids for 24 hours. She is on an insulin drip therefore hyperglycemia not of concern. Blood pressure improving significantly with fluids and stress dose steroids. Acute renal failure with metabolic acidosis  Received 4 L bolus upfront. Currently on bicarb drip and maintenance IV fluids. Ph7.26. Hyperkalemia improving      ID  Empiric antibiotics. Infectious disease consulted. Follow-up chest x-ray tomorrow. Track procalcitonin. I spent 40 minutes of critical care time with this patient excluding any procedures. This note was transcribed using [facility]. Lymph: No cervical LAD. No supraclavicular LAD. M/S: No cyanosis. No clubbing. No joint deformity. Neuro: Moves all four extremities. Psych: Wakes briefly but is not speaking. Data Reviewed:   LABS:  CBC:   Recent Labs     08/09/23 2055   WBC 18.8*   HGB 8.4*   HCT 33.3*   .7*        BMP:   Recent Labs     08/10/23  0410 08/10/23  0614 08/10/23  0900   * 118* 121*   K 4.6 6.3* 4.8   CL 84* 85* 87*   CO2 12* 13* 15*   PHOS 4.9 4.9 5.1*   BUN 68* 67* 64*   CREATININE 3.8* 3.5* 3.5*     LIVER PROFILE:   Recent Labs     08/09/23 2055 08/09/23  2315 08/10/23  0042 08/10/23  0158   AST 26 25 28 25   ALT 15 14 11 12   LIPASE 19.0  --   --   --    BILITOT <0.2 <0.2 <0.2 <0.2   ALKPHOS 114 109 107 104     PT/INR: No results for input(s): PROTIME, INR in the last 72 hours. APTT: No results for input(s): APTT in the last 72 hours. UA:  Recent Labs     08/09/23  2328   COLORU Yellow   PHUR 5.5   WBCUA 2   RBCUA 0   BACTERIA None Seen   CLARITYU Clear   SPECGRAV 1.010   LEUKOCYTESUR Negative   UROBILINOGEN 0.2   BILIRUBINUR Negative   BLOODU Negative   GLUCOSEU >=1000*     No results for input(s): PHART, WEA7BLW, PO2ART in the last 72 hours. Radiology Review:  Pertinent images / reports were reviewed as a part of this visit. CT Chest w/ contrast: No results found for this or any previous visit. CT Chest w/o contrast: No results found for this or any previous visit. CTPA: No results found for this or any previous visit.       CXR PA/LAT: Results for orders placed during the hospital encounter of 04/19/23    XR CHEST (2 [facility])    Narrative  EXAMINATION:  TWO XRAY VIEWS OF THE CHEST    4/19/2023 11:13 pm    COMPARISON:  01/18/2023    HISTORY:  ORDERING SYSTEM PROVIDED HISTORY: nausea, weakness  TECHNOLOGIST PROVIDED HISTORY:  Reason for exam:->nausea, weakness  Reason for Exam: nausea, weakness    FINDINGS:  Heart size and pulmonary vasculature are normal.  The lungs are clear

## 2023-08-10 NOTE — ED NOTES
Attempted blood cultures at this time    Unsuccessful in those attempts    Lab called to obtain blood cultures     [facility] Ricky  08/09/23 5192

## 2023-08-10 NOTE — CARE COORDINATION
08/10/23 1134   Readmission Assessment   Number of Days since last admission? 8-30 days   Previous Disposition Home with Family   Who is being Interviewed Patient;Caregiver  (spouse present in room)   What was the patient's/caregiver's perception as to why they think they needed to return back to the hospital? Other (Comment)  (Spouse reported that patient has an insulin pump that she manages. He believes that the pump is causing her issues and needs to be looked at.)   Did you visit your Primary Care Physician after you left the hospital, before you returned this time? No   Why weren't you able to visit your PCP? Did not have an appointment   Did you see a specialist, such as Cardiac, Pulmonary, Orthopedic Physician, etc. after you left the hospital? No   Who advised the patient to return to the hospital? Self-referral   Does the patient report anything that got in the way of taking their medications? No   In our efforts to provide the best possible care to you and others like you, can you think of anything that we could have done to help you after you left the hospital the first time, so that you might not have needed to return so soon?  Other (Comment)  (nothing)

## 2023-08-10 NOTE — PROGRESS NOTES
[place]    [facility] [place]      Name:  Aylin Ortiz /Age/Sex: 1960  (57 y.o. female)   MRN & CSN:  [id number] & [id number] Encounter Date/Time: 8/10/2023 9:04 AM EDT   Location:  [id number] PCP: CABRERA Singh - CNP     Attending:Nikolas Keys MD       Hospital Day: 2    Assessment and Recommendations   Aylin Ortiz is a 57 y.o. female who presents with Type 1 diabetes mellitus with ketoacidosis without coma ([address])      Plan:   Sepsis no obvious source with elevated procalcitonin leukocytosis patient on admission started on Levophed Zyvox and Zosyn. DKA, insulin drip. Acute metabolic encephalopathy, improving  FAVIAN on top of CKD improved  Adrenal insufficiency on steroids currently hypotensive, encephalopathic with hyperkalemia I will add 50 mg of hydrocortisone IV as patient likely having relative adrenal crisis will follow on further recommendations from critical care team  Anemia microcytic anemia B12 and folate checked  Essential hypertension hold p.o. medication at this time      Diet Diet NPO   DVT Prophylaxis [] Lovenox, []  Heparin, [] SCDs, [] Ambulation,  [] Eliquis, [] Xarelto  [] Coumadin   Code Status Full Code             Personally reviewed Lab Studies and Imaging       Medical Decision Making: The following items were considered in medical decision making:  Discussion of patient care with other providers  Reviewed clinical lab tests  Reviewed radiology tests  Reviewed other diagnostic tests/interventions  Independent review of radiologic images  Microbiology cultures and other micro tests reviewed      Subjective:     Chief Complaint: Lethargy    Aylin Ortiz is a 57 y.o. female who presents with lethargy in ICU hypotensive on Levophed      Review of Systems:      Pertinent positives and negatives discussed in HPI    Objective:      Intake/Output Summary (Last 24 hours) at 8/10/2023 0904  Last data filed at 8/10/2023 0600  Gross per 24 hour Negative 08/09/2023 11:28 PM    UROBILINOGEN 0.2 08/09/2023 11:28 PM    BILIRUBINUR Negative 08/09/2023 11:28 PM    BILIRUBINUR Negative 06/26/2023 12:25 PM    BILIRUBINUR NEGATIVE 05/25/2012 09:30 AM    BLOODU Negative 08/09/2023 11:28 PM    GLUCOSEU >=1000 08/09/2023 11:28 PM    GLUCOSEU >=1000 05/25/2012 09:30 AM    KETUA TRACE 08/09/2023 11:28 PM     Urine Cultures:   Lab Results   Component Value Date/Time    LABURIN No growth at 18 to 36 hours 06/26/2023 12:38 PM     Blood Cultures:   Lab Results   Component Value Date/Time    BC No Growth after 4 days of incubation. 07/23/2023 09:14 PM     Lab Results   Component Value Date/Time    BLOODCULT2 No Growth after 4 days of incubation. 07/23/2023 09:58 PM     Organism: No results found for: [facility]      Electronically signed by Mario Dominique MD on 8/10/2023 at 9:04 AM  Comment: Please note this report has been produced using speech recognition software and may contain errors related to that system including errors in grammar, punctuation, and spelling, as well as words and phrases that may be inappropriate. If there are any questions or concerns, please feel free to contact the dictating provider for clarification.

## 2023-08-11 LAB
ANION GAP SERPL CALCULATED.3IONS-SCNC: 13 MMOL/L (ref 3–16)
ANION GAP SERPL CALCULATED.3IONS-SCNC: 14 MMOL/L (ref 3–16)
ANION GAP SERPL CALCULATED.3IONS-SCNC: 17 MMOL/L (ref 3–16)
ANION GAP SERPL CALCULATED.3IONS-SCNC: 19 MMOL/L (ref 3–16)
BASOPHILS # BLD: 0 K/UL (ref 0–0.2)
BASOPHILS NFR BLD: 0.1 %
BETA-HYDROXYBUTYRATE: 0.09 MMOL/L (ref 0–0.27)
BUN SERPL-MCNC: 41 MG/DL (ref 7–20)
BUN SERPL-MCNC: 44 MG/DL (ref 7–20)
BUN SERPL-MCNC: 47 MG/DL (ref 7–20)
BUN SERPL-MCNC: 48 MG/DL (ref 7–20)
BUN SERPL-MCNC: 48 MG/DL (ref 7–20)
BUN SERPL-MCNC: 54 MG/DL (ref 7–20)
CALCIUM SERPL-MCNC: 8.1 MG/DL (ref 8.3–10.6)
CALCIUM SERPL-MCNC: 8.2 MG/DL (ref 8.3–10.6)
CALCIUM SERPL-MCNC: 8.3 MG/DL (ref 8.3–10.6)
CALCIUM SERPL-MCNC: 8.4 MG/DL (ref 8.3–10.6)
CALCIUM SERPL-MCNC: 8.7 MG/DL (ref 8.3–10.6)
CALCIUM SERPL-MCNC: 8.9 MG/DL (ref 8.3–10.6)
CHLORIDE SERPL-SCNC: 91 MMOL/L (ref 99–110)
CHLORIDE SERPL-SCNC: 92 MMOL/L (ref 99–110)
CHLORIDE SERPL-SCNC: 93 MMOL/L (ref 99–110)
CHLORIDE SERPL-SCNC: 94 MMOL/L (ref 99–110)
CHLORIDE SERPL-SCNC: 95 MMOL/L (ref 99–110)
CHLORIDE SERPL-SCNC: 98 MMOL/L (ref 99–110)
CO2 SERPL-SCNC: 15 MMOL/L (ref 21–32)
CO2 SERPL-SCNC: 17 MMOL/L (ref 21–32)
CO2 SERPL-SCNC: 17 MMOL/L (ref 21–32)
CO2 SERPL-SCNC: 21 MMOL/L (ref 21–32)
CO2 SERPL-SCNC: 23 MMOL/L (ref 21–32)
CO2 SERPL-SCNC: 24 MMOL/L (ref 21–32)
CORTIS AM PEAK SERPL-MCNC: 34.8 UG/DL (ref 4.3–22.4)
CREAT SERPL-MCNC: 2.5 MG/DL (ref 0.6–1.2)
CREAT SERPL-MCNC: 2.6 MG/DL (ref 0.6–1.2)
CREAT SERPL-MCNC: 2.7 MG/DL (ref 0.6–1.2)
CREAT SERPL-MCNC: 2.8 MG/DL (ref 0.6–1.2)
CREAT SERPL-MCNC: 2.9 MG/DL (ref 0.6–1.2)
CREAT SERPL-MCNC: 2.9 MG/DL (ref 0.6–1.2)
DEPRECATED RDW RBC AUTO: 17.2 % (ref 12.4–15.4)
EOSINOPHIL # BLD: 0 K/UL (ref 0–0.6)
EOSINOPHIL NFR BLD: 0 %
GFR SERPLBLD CREATININE-BSD FMLA CKD-EPI: 17 ML/MIN/{1.73_M2}
GFR SERPLBLD CREATININE-BSD FMLA CKD-EPI: 17 ML/MIN/{1.73_M2}
GFR SERPLBLD CREATININE-BSD FMLA CKD-EPI: 18 ML/MIN/{1.73_M2}
GFR SERPLBLD CREATININE-BSD FMLA CKD-EPI: 19 ML/MIN/{1.73_M2}
GFR SERPLBLD CREATININE-BSD FMLA CKD-EPI: 20 ML/MIN/{1.73_M2}
GFR SERPLBLD CREATININE-BSD FMLA CKD-EPI: 21 ML/MIN/{1.73_M2}
GLUCOSE BLD-MCNC: 103 MG/DL (ref 70–99)
GLUCOSE BLD-MCNC: 106 MG/DL (ref 70–99)
GLUCOSE BLD-MCNC: 110 MG/DL (ref 70–99)
GLUCOSE BLD-MCNC: 115 MG/DL (ref 70–99)
GLUCOSE BLD-MCNC: 126 MG/DL (ref 70–99)
GLUCOSE BLD-MCNC: 135 MG/DL (ref 70–99)
GLUCOSE BLD-MCNC: 157 MG/DL (ref 70–99)
GLUCOSE BLD-MCNC: 167 MG/DL (ref 70–99)
GLUCOSE BLD-MCNC: 175 MG/DL (ref 70–99)
GLUCOSE BLD-MCNC: 212 MG/DL (ref 70–99)
GLUCOSE BLD-MCNC: 213 MG/DL (ref 70–99)
GLUCOSE BLD-MCNC: 221 MG/DL (ref 70–99)
GLUCOSE BLD-MCNC: 231 MG/DL (ref 70–99)
GLUCOSE BLD-MCNC: 235 MG/DL (ref 70–99)
GLUCOSE BLD-MCNC: 246 MG/DL (ref 70–99)
GLUCOSE BLD-MCNC: 307 MG/DL (ref 70–99)
GLUCOSE BLD-MCNC: 309 MG/DL (ref 70–99)
GLUCOSE BLD-MCNC: 322 MG/DL (ref 70–99)
GLUCOSE BLD-MCNC: 354 MG/DL (ref 70–99)
GLUCOSE BLD-MCNC: 355 MG/DL (ref 70–99)
GLUCOSE BLD-MCNC: 364 MG/DL (ref 70–99)
GLUCOSE BLD-MCNC: 370 MG/DL (ref 70–99)
GLUCOSE BLD-MCNC: 413 MG/DL (ref 70–99)
GLUCOSE BLD-MCNC: 416 MG/DL (ref 70–99)
GLUCOSE BLD-MCNC: 498 MG/DL (ref 70–99)
GLUCOSE BLD-MCNC: 85 MG/DL (ref 70–99)
GLUCOSE SERPL-MCNC: 117 MG/DL (ref 70–99)
GLUCOSE SERPL-MCNC: 122 MG/DL (ref 70–99)
GLUCOSE SERPL-MCNC: 197 MG/DL (ref 70–99)
GLUCOSE SERPL-MCNC: 222 MG/DL (ref 70–99)
GLUCOSE SERPL-MCNC: 302 MG/DL (ref 70–99)
GLUCOSE SERPL-MCNC: 425 MG/DL (ref 70–99)
HCT VFR BLD AUTO: 22.6 % (ref 36–48)
HGB BLD-MCNC: 7.8 G/DL (ref 12–16)
LACTATE BLDV-SCNC: 1.8 MMOL/L (ref 0.4–2)
LACTATE BLDV-SCNC: 3.5 MMOL/L (ref 0.4–2)
LACTATE BLDV-SCNC: 3.5 MMOL/L (ref 0.4–2)
LYMPHOCYTES # BLD: 0.9 K/UL (ref 1–5.1)
LYMPHOCYTES NFR BLD: 9.8 %
MAGNESIUM SERPL-MCNC: 1.7 MG/DL (ref 1.8–2.4)
MAGNESIUM SERPL-MCNC: 1.8 MG/DL (ref 1.8–2.4)
MAGNESIUM SERPL-MCNC: 1.9 MG/DL (ref 1.8–2.4)
MAGNESIUM SERPL-MCNC: 2.1 MG/DL (ref 1.8–2.4)
MCH RBC QN AUTO: 26.4 PG (ref 26–34)
MCHC RBC AUTO-ENTMCNC: 34.6 G/DL (ref 31–36)
MCV RBC AUTO: 76.3 FL (ref 80–100)
MONOCYTES # BLD: 0.5 K/UL (ref 0–1.3)
MONOCYTES NFR BLD: 5.1 %
NEUTROPHILS # BLD: 7.4 K/UL (ref 1.7–7.7)
NEUTROPHILS NFR BLD: 85 %
OSMOLALITY SERPL: 293 MOSM/KG (ref 280–301)
OSMOLALITY UR: 225 MOSM/KG (ref 390–1070)
PERFORMED ON: ABNORMAL
PERFORMED ON: NORMAL
PHOSPHATE SERPL-MCNC: 2.9 MG/DL (ref 2.5–4.9)
PHOSPHATE SERPL-MCNC: 3.4 MG/DL (ref 2.5–4.9)
PHOSPHATE SERPL-MCNC: 3.5 MG/DL (ref 2.5–4.9)
PHOSPHATE SERPL-MCNC: 3.6 MG/DL (ref 2.5–4.9)
PHOSPHATE SERPL-MCNC: 3.7 MG/DL (ref 2.5–4.9)
PHOSPHATE SERPL-MCNC: 4.2 MG/DL (ref 2.5–4.9)
PLATELET # BLD AUTO: 148 K/UL (ref 135–450)
PMV BLD AUTO: 7.6 FL (ref 5–10.5)
POTASSIUM SERPL-SCNC: 3.1 MMOL/L (ref 3.5–5.1)
POTASSIUM SERPL-SCNC: 3.5 MMOL/L (ref 3.5–5.1)
POTASSIUM SERPL-SCNC: 3.6 MMOL/L (ref 3.5–5.1)
POTASSIUM SERPL-SCNC: 3.8 MMOL/L (ref 3.5–5.1)
POTASSIUM SERPL-SCNC: 3.9 MMOL/L (ref 3.5–5.1)
POTASSIUM SERPL-SCNC: 4.5 MMOL/L (ref 3.5–5.1)
PROCALCITONIN SERPL IA-MCNC: 6.18 NG/ML (ref 0–0.15)
RBC # BLD AUTO: 2.96 M/UL (ref 4–5.2)
SODIUM SERPL-SCNC: 125 MMOL/L (ref 136–145)
SODIUM SERPL-SCNC: 126 MMOL/L (ref 136–145)
SODIUM SERPL-SCNC: 127 MMOL/L (ref 136–145)
SODIUM SERPL-SCNC: 130 MMOL/L (ref 136–145)
SODIUM SERPL-SCNC: 130 MMOL/L (ref 136–145)
SODIUM SERPL-SCNC: 139 MMOL/L (ref 136–145)
SODIUM UR-SCNC: 80 MMOL/L
WBC # BLD AUTO: 8.8 K/UL (ref 4–11)

## 2023-08-11 PROCEDURE — 80048 BASIC METABOLIC PNL TOTAL CA: CPT

## 2023-08-11 PROCEDURE — 99291 CRITICAL CARE FIRST HOUR: CPT | Performed by: INTERNAL MEDICINE

## 2023-08-11 PROCEDURE — 85025 COMPLETE CBC W/AUTO DIFF WBC: CPT

## 2023-08-11 PROCEDURE — 36415 COLL VENOUS BLD VENIPUNCTURE: CPT

## 2023-08-11 PROCEDURE — 6360000002 HC RX W HCPCS: Performed by: NURSE PRACTITIONER

## 2023-08-11 PROCEDURE — 6360000002 HC RX W HCPCS: Performed by: STUDENT IN AN ORGANIZED HEALTH CARE EDUCATION/TRAINING PROGRAM

## 2023-08-11 PROCEDURE — 6370000000 HC RX 637 (ALT 250 FOR IP): Performed by: STUDENT IN AN ORGANIZED HEALTH CARE EDUCATION/TRAINING PROGRAM

## 2023-08-11 PROCEDURE — 83735 ASSAY OF MAGNESIUM: CPT

## 2023-08-11 PROCEDURE — 2580000003 HC RX 258: Performed by: STUDENT IN AN ORGANIZED HEALTH CARE EDUCATION/TRAINING PROGRAM

## 2023-08-11 PROCEDURE — 84145 PROCALCITONIN (PCT): CPT

## 2023-08-11 PROCEDURE — 2000000000 HC ICU R&B

## 2023-08-11 PROCEDURE — 94760 N-INVAS EAR/PLS OXIMETRY 1: CPT

## 2023-08-11 PROCEDURE — 6360000002 HC RX W HCPCS: Performed by: INTERNAL MEDICINE

## 2023-08-11 PROCEDURE — 84100 ASSAY OF PHOSPHORUS: CPT

## 2023-08-11 PROCEDURE — 82533 TOTAL CORTISOL: CPT

## 2023-08-11 PROCEDURE — 83605 ASSAY OF LACTIC ACID: CPT

## 2023-08-11 PROCEDURE — 2580000003 HC RX 258: Performed by: INTERNAL MEDICINE

## 2023-08-11 PROCEDURE — 84300 ASSAY OF URINE SODIUM: CPT

## 2023-08-11 PROCEDURE — 83935 ASSAY OF URINE OSMOLALITY: CPT

## 2023-08-11 PROCEDURE — 82010 KETONE BODYS QUAN: CPT

## 2023-08-11 PROCEDURE — 83930 ASSAY OF BLOOD OSMOLALITY: CPT

## 2023-08-11 RX ORDER — 0.9 % SODIUM CHLORIDE 0.9 %
1000 INTRAVENOUS SOLUTION INTRAVENOUS ONCE
Status: DISCONTINUED | OUTPATIENT
Start: 2023-08-11 | End: 2023-08-15 | Stop reason: HOSPADM

## 2023-08-11 RX ORDER — AMLODIPINE BESYLATE 5 MG/1
5 TABLET ORAL DAILY
Status: DISCONTINUED | OUTPATIENT
Start: 2023-08-11 | End: 2023-08-15 | Stop reason: HOSPADM

## 2023-08-11 RX ORDER — MORPHINE SULFATE 2 MG/ML
0.5 INJECTION, SOLUTION INTRAMUSCULAR; INTRAVENOUS EVERY 6 HOURS PRN
Status: DISCONTINUED | OUTPATIENT
Start: 2023-08-11 | End: 2023-08-12

## 2023-08-11 RX ORDER — OXYCODONE HYDROCHLORIDE 5 MG/1
5 TABLET ORAL 3 TIMES DAILY
Status: DISCONTINUED | OUTPATIENT
Start: 2023-08-11 | End: 2023-08-13

## 2023-08-11 RX ORDER — LABETALOL HYDROCHLORIDE 5 MG/ML
10 INJECTION, SOLUTION INTRAVENOUS EVERY 4 HOURS PRN
Status: DISCONTINUED | OUTPATIENT
Start: 2023-08-11 | End: 2023-08-15 | Stop reason: HOSPADM

## 2023-08-11 RX ORDER — DEXTROSE AND SODIUM CHLORIDE 5; .9 G/100ML; G/100ML
INJECTION, SOLUTION INTRAVENOUS CONTINUOUS
Status: DISCONTINUED | OUTPATIENT
Start: 2023-08-11 | End: 2023-08-12

## 2023-08-11 RX ADMIN — POTASSIUM CHLORIDE 10 MEQ: 7.46 INJECTION, SOLUTION INTRAVENOUS at 05:00

## 2023-08-11 RX ADMIN — DEXTROSE AND SODIUM CHLORIDE: 5; 450 INJECTION, SOLUTION INTRAVENOUS at 12:33

## 2023-08-11 RX ADMIN — POTASSIUM CHLORIDE 10 MEQ: 7.46 INJECTION, SOLUTION INTRAVENOUS at 06:09

## 2023-08-11 RX ADMIN — POTASSIUM CHLORIDE 10 MEQ: 7.46 INJECTION, SOLUTION INTRAVENOUS at 11:42

## 2023-08-11 RX ADMIN — POTASSIUM CHLORIDE 10 MEQ: 7.46 INJECTION, SOLUTION INTRAVENOUS at 10:31

## 2023-08-11 RX ADMIN — DEXTROSE AND SODIUM CHLORIDE: 5; 450 INJECTION, SOLUTION INTRAVENOUS at 06:01

## 2023-08-11 RX ADMIN — ONDANSETRON 4 MG: 2 INJECTION INTRAMUSCULAR; INTRAVENOUS at 13:14

## 2023-08-11 RX ADMIN — HYDROCORTISONE SODIUM SUCCINATE 50 MG: 100 INJECTION, POWDER, FOR SOLUTION INTRAMUSCULAR; INTRAVENOUS at 17:28

## 2023-08-11 RX ADMIN — DEXTROSE AND SODIUM CHLORIDE: 5; 900 INJECTION, SOLUTION INTRAVENOUS at 17:25

## 2023-08-11 RX ADMIN — PIPERACILLIN AND TAZOBACTAM 3375 MG: 3; .375 INJECTION, POWDER, LYOPHILIZED, FOR SOLUTION INTRAVENOUS at 23:12

## 2023-08-11 RX ADMIN — MORPHINE SULFATE 0.5 MG: 2 INJECTION, SOLUTION INTRAMUSCULAR; INTRAVENOUS at 22:59

## 2023-08-11 RX ADMIN — HEPARIN SODIUM 5000 UNITS: 5000 INJECTION INTRAVENOUS; SUBCUTANEOUS at 21:03

## 2023-08-11 RX ADMIN — HYDROCORTISONE SODIUM SUCCINATE 100 MG: 100 INJECTION, POWDER, FOR SOLUTION INTRAMUSCULAR; INTRAVENOUS at 05:01

## 2023-08-11 RX ADMIN — SODIUM CHLORIDE: 4.5 INJECTION, SOLUTION INTRAVENOUS at 02:54

## 2023-08-11 RX ADMIN — POTASSIUM CHLORIDE 10 MEQ: 7.46 INJECTION, SOLUTION INTRAVENOUS at 23:29

## 2023-08-11 RX ADMIN — HEPARIN SODIUM 5000 UNITS: 5000 INJECTION INTRAVENOUS; SUBCUTANEOUS at 10:29

## 2023-08-11 RX ADMIN — PIPERACILLIN AND TAZOBACTAM 3375 MG: 3; .375 INJECTION, POWDER, LYOPHILIZED, FOR SOLUTION INTRAVENOUS at 11:38

## 2023-08-11 RX ADMIN — POTASSIUM CHLORIDE 10 MEQ: 7.46 INJECTION, SOLUTION INTRAVENOUS at 13:30

## 2023-08-11 RX ADMIN — INSULIN HUMAN 0.03 UNITS/KG/HR: 1 INJECTION, SOLUTION INTRAVENOUS at 12:12

## 2023-08-11 RX ADMIN — MORPHINE SULFATE 0.5 MG: 2 INJECTION, SOLUTION INTRAMUSCULAR; INTRAVENOUS at 13:14

## 2023-08-11 ASSESSMENT — PAIN SCALES - GENERAL
PAINLEVEL_OUTOF10: 0
PAINLEVEL_OUTOF10: 4
PAINLEVEL_OUTOF10: 7

## 2023-08-11 ASSESSMENT — PAIN DESCRIPTION - LOCATION: LOCATION: GENERALIZED

## 2023-08-11 ASSESSMENT — PAIN DESCRIPTION - DESCRIPTORS: DESCRIPTORS: ACHING

## 2023-08-11 ASSESSMENT — PAIN DESCRIPTION - ORIENTATION: ORIENTATION: OTHER (COMMENT)

## 2023-08-11 NOTE — PROGRESS NOTES
Chief complaint:   Chief Complaint   Patient presents with   â¢ Cough   â¢ Shortness of Breath       Vitals:  Visit Vitals  BP 99/54   Pulse 94   Temp 98.4 Â°F (36.9 Â°C)   Resp 18   Wt (!) 33.6 kg   SpO2 100%       HISTORY OF PRESENT ILLNESS     Patient with a history of seasonal allergies and asthma. Per mom she noted a flare of allergies over this past weekend. Today at school patient has a cough and then shortness of breath and difficulty breathing. School nurse said pulse ox as upper 80's/low 90's. Mom states she ordered an inhaler for school and it hasn't come in yet. Cough   This is a new problem. The current episode started today. The problem has been waxing and waning. Associated symptoms include coughing. Pertinent negatives include no abdominal pain, change in bowel habit, chest pain, fever, nausea, sore throat or vomiting. Treatments tried: zyrtec. The treatment provided no relief.        Other significant problems:  Patient Active Problem List    Diagnosis Date Noted   â¢ Nonconvulsive generalized seizure disorder (CMS/[facility]) 09/10/2015     Priority: Low   â¢ Migraine 2015     Priority: Low   â¢ Head trauma in child 2014     Priority: Low   â¢ Headache(784.0) 2014     Priority: Low   â¢ Reading disability, developmental 2013     Priority: Low   â¢ White matter abnormality on MRI of brain 2013     Priority: Low   â¢ Attention deficit disorder with hyperactivity(314.01) 2013     Priority: Low   â¢ Acquired ADHD (attention deficit hyperactivity disorder), inattentive type 2013     Priority: Low   â¢ Expressive language disorder 2013     Priority: Low   â¢ Behavioral disorder 2011     Priority: Low   â¢ 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 Hector Avitia notified of GAP being closed and CO2 WNL. MD doesn't want to transition off insulin gtt due to pt being too lethargic to eat or drink. (weight)(765.10) 10/17/2010     Priority: Low   â¢ Unspecified adverse effect of other drug, medicinal and biological substance(995.29) 10/17/2010     Priority: Low       PAST MEDICAL, FAMILY AND SOCIAL HISTORY     Medications:  Current Outpatient Medications   Medication   â¢ EPINEPHrine (EPIPEN 2-BRAYAN) 0.3 MG/0.3ML auto-injector   â¢ polyethylene glycol (MIRALAX) powder   â¢ lamoTRIgine (LAMICTAL) 25 MG tablet   â¢ cetirizine (ZYRTEC CHILDRENS ALLERGY) 5 MG chewable tablet   â¢ Spacer/Aero-Holding Chambers (AEROCHAMBER)   â¢ DOCUSATE CALCIUM PO   â¢ diazepam (DIASTAT ACUDIAL) 10 MG GEL   â¢ albuterol (PROAIR HFA) 108 (90 Base) MCG/ACT inhaler   â¢ Probiotic Product ([id number]) Cap     Current Facility-Administered Medications   Medication   â¢ albuterol-ipratropium 2.5 mg/0.5 mg (DUONEB) nebulizer solution 3 mL       Allergies:  ALLERGIES:   Allergen Reactions   â¢ Barley HIVES   â¢ Dairy Aid [employer]   (Food Or Med)] GI UPSET     Severe stomach cramping   â¢ Jean Claude HIVES and GI UPSET   â¢ Peanut - Dietary Use Only SWELLING   â¢ Phenobarbital GI UPSET   â¢ Pineapple SWELLING   â¢ [place] Other (See Comments)     Pt gets reaction of wheezing   â¢ Tree Nuts    (Food) HIVES   â¢ Versed GI UPSET       Past Medical  History/Surgeries:  Past Medical History:   Diagnosis Date   â¢ Allergy    â¢ Gastroesophageal reflux disease    â¢ Heart murmur    â¢ Otitis media    â¢ Seizure (CMS/[facility]) 2009       Past Surgical History:   Procedure Laterality Date   â¢ ------------other-------------      right and left pe tube placement   â¢ ------------other-------------  2008    dental work at [facility]   â¢ Tympanostomy tube placement         Family History:  History reviewed. No pertinent family history.     Social History:  Social History     Tobacco Use   â¢ Smoking status: Never Smoker   â¢ Smokeless tobacco: Never Used   â¢ Tobacco comment: mom smoker   Substance Use Topics   â¢ Alcohol use: No       REVIEW OF SYSTEMS     Review of Systems Constitutional: Negative for fever. HENT: Positive for rhinorrhea. Negative for ear discharge, ear pain, sinus pressure and sore throat. Eyes: Negative for pain, discharge, redness and visual disturbance. Respiratory: Positive for cough, chest tightness and shortness of breath. Cardiovascular: Negative for chest pain. Gastrointestinal: Negative for abdominal pain, change in bowel habit, nausea and vomiting. PHYSICAL EXAM     Physical Exam   Constitutional: She is oriented to person, place, and time. She appears well-developed and well-nourished. No distress. HENT:   Head: Normocephalic and atraumatic. Right Ear: Tympanic membrane, external ear and ear canal normal. No drainage, swelling or tenderness. Tympanic membrane is not perforated, not erythematous, not retracted and not bulging. Left Ear: Tympanic membrane, external ear and ear canal normal. No drainage, swelling or tenderness. Tympanic membrane is not perforated, not erythematous, not retracted and not bulging. Nose: Mucosal edema and rhinorrhea present. Right sinus exhibits no maxillary sinus tenderness and no frontal sinus tenderness. Left sinus exhibits no maxillary sinus tenderness and no frontal sinus tenderness. Mouth/Throat: Uvula is midline and mucous membranes are normal. No oropharyngeal exudate, posterior oropharyngeal edema, posterior oropharyngeal erythema or tonsillar abscesses. Eyes: Pupils are equal, round, and reactive to light. Conjunctivae and EOM are normal. Right eye exhibits no discharge. Left eye exhibits no discharge. Right conjunctiva is not injected. Left conjunctiva is not injected. Right eye exhibits normal extraocular motion. Left eye exhibits normal extraocular motion. Right pupil is round and reactive. Left pupil is round and reactive. Pupils are equal.   Neck: Normal range of motion. Neck supple. Cardiovascular: Normal rate and regular rhythm.    Pulmonary/Chest: Effort normal and breath sounds normal. No tachypnea and no bradypnea. No respiratory distress. She has no decreased breath sounds. She has no wheezes. She has no rhonchi. She has no rales. After nebulizer patient states chest tightness and shortness of breath has resolved. She is feeling much better. Lymphadenopathy:     She has no cervical adenopathy. Neurological: She is alert and oriented to person, place, and time. Skin: Skin is warm and dry. No rash noted. She is not diaphoretic. Psychiatric: She has a normal mood and affect. Her behavior is normal. Judgment and thought content normal.   Nursing note and vitals reviewed. ASSESSMENT/PLAN     Jose was seen today for cough and shortness of breath. Diagnoses and all orders for this visit:    Asthma exacerbation, mild  -     albuterol-ipratropium 2.5 mg/0.5 mg (DUONEB) nebulizer solution 3 mL    Other orders  -     albuterol (PROAIR HFA) 108 (90 Base) MCG/ACT inhaler; Inhale 2 puffs into the lungs every 4 hours as needed for Shortness of Breath or Wheezing. Every 4-6 hours prn    Script for albuterol sent to [facility]. How to use inhaler and asthma and allergy management reviewed with mom. Comfort care measures, over the counter treatments, when to follow up and seek emergency care; all reviewed with mom. After visit summary reviewed with mom.   Judd Brito NP

## 2023-08-11 NOTE — PROGRESS NOTES
Pt has had an increase in urine output from yesterday. Pt had out 750mL from 1100 to 1200. MD notified. Awaiting response.

## 2023-08-11 NOTE — PLAN OF CARE
Problem: Discharge Planning  Goal: Discharge to home or other facility with appropriate resources  8/11/2023 1754 by Lei Betts RN  Outcome: Progressing  Flowsheets (Taken 8/10/2023 2000 by Violeta Francis RN)  Discharge to home or other facility with appropriate resources: Identify barriers to discharge with patient and caregiver  8/11/2023 1754 by Lei Betts RN  Outcome: Progressing     Problem: Safety - Adult  Goal: Free from fall injury  8/11/2023 1754 by Lei Betts RN  Outcome: Progressing  Flowsheets (Taken 8/11/2023 1754)  Free From Fall Injury: Instruct family/caregiver on patient safety  8/11/2023 1754 by Lei Betts RN  Outcome: Progressing  Flowsheets (Taken 8/11/2023 1754)  Free From Fall Injury: Instruct family/caregiver on patient safety     Problem: Confusion  Goal: Confusion, delirium, dementia, or psychosis is improved or at baseline  Description: INTERVENTIONS:  1. Assess for possible contributors to thought disturbance, including medications, impaired vision or hearing, underlying metabolic abnormalities, dehydration, psychiatric diagnoses, and notify attending LIP  2. [place] high risk fall precautions, as indicated  3. Provide frequent short contacts to provide reality reorientation, refocusing and direction  4. Decrease environmental stimuli, including noise as appropriate  5. Monitor and intervene to maintain adequate nutrition, hydration, elimination, sleep and activity  6. If unable to ensure safety without constant attention obtain sitter and review sitter guidelines with assigned personnel  7.  Initiate Psychosocial CNS and Spiritual Care consult, as indicated  8/11/2023 1754 by Lei Betts RN  Outcome: Progressing  Flowsheets (Taken 8/10/2023 0755 by Camille Segura RN)  Effect of thought disturbance (confusion, delirium, dementia, or psychosis) are managed with adequate functional status:   Assess for contributors to thought disturbance, including medications, impaired vision or hearing, underlying metabolic abnormalities, dehydration, psychiatric diagnoses, notify [address] high risk fall precautions, as indicated   Provide frequent short contacts to provide reality reorientation, refocusing and direction  8/11/2023 1754 by Wendy Leo RN  Outcome: Progressing     Problem: Skin/Tissue Integrity  Goal: Absence of new skin breakdown  Description: 1. Monitor for areas of redness and/or skin breakdown  2. Assess vascular access sites hourly  3. Every 4-6 hours minimum:  Change oxygen saturation probe site  4. Every 4-6 hours:  If on nasal continuous positive airway pressure, respiratory therapy assess nares and determine need for appliance change or resting period.   8/11/2023 1754 by Wendy Leo RN  Outcome: Progressing  8/11/2023 1754 by Wendy Leo RN  Outcome: Progressing     Problem: Pain  Goal: Verbalizes/displays adequate comfort level or baseline comfort level  8/11/2023 1754 by Wendy Leo RN  Outcome: Progressing  Flowsheets (Taken 8/10/2023 2000 by Chino Durant RN)  Verbalizes/displays adequate comfort level or baseline comfort level:   Encourage patient to monitor pain and request assistance   Assess pain using appropriate pain scale   Implement non-pharmacological measures as appropriate and evaluate response  8/11/2023 1754 by Wendy Leo RN  Outcome: Progressing

## 2023-08-11 NOTE — PROGRESS NOTES
Dr Coleen Hanna at bedside, he talked to Dr. Hector Avitia regarding IV steroids, order placed. IV fluids changed per orders. Bicarb gtt off.

## 2023-08-11 NOTE — PROGRESS NOTES
4 Eyes Skin Assessment     NAME:  Divina Hale  YOB: 1960  MEDICAL RECORD NUMBER:  [id number]    The patient is being assessed for  Shift Handoff    I agree that at least one RN has performed a thorough Head to Toe Skin Assessment on the patient. ALL assessment sites listed below have been assessed. Areas assessed by both nurses:    Head, Face, Ears, Shoulders, Back, Chest, Arms, Elbows, Hands, Sacrum. Buttock, Coccyx, Ischium, and Legs. Feet and Heels        Does the Patient have a Wound?  No noted wound(s)       Uriah Prevention initiated by RN: Yes  Wound Care Orders initiated by RN: No    Pressure Injury (Stage 3,4, Unstageable, DTI, NWPT, and Complex wounds) if present, place Wound referral order by RN under : No    New Ostomies, if present place, Ostomy referral order under : No     Nurse 1 eSignature: Electronically signed by Gail Kapadia RN on 8/11/23 at 6:40 AM EDT    **SHARE this note so that the co-signing nurse can place an eSignature**    Nurse 2 eSignature: Electronically signed by Sabiha Nolan RN on 8/11/23 at 7:14 AM EDT

## 2023-08-11 NOTE — PROGRESS NOTES
[address]  Hyperglycemia Event      NAME: Brian Chang  MEDICAL RECORD NUMBER:  [id number]  AGE: 57 y.o. GENDER: female  : 1960  EPISODE DATE:  2023     Data     Recent Labs     23  8680 23  0550 23  5684 23  1910 23  0744 23  0905   POCGLU 322* 235* 246* 231* 213* 175*       Medications  Scheduled Medications:   amLODIPine  5 mg Oral Daily    oxyCODONE  5 mg Oral TID    escitalopram  10 mg Oral Daily    hydrocortisone  10 mg Oral Daily    hydrocortisone  5 mg Oral Nightly    heparin (porcine)  5,000 Units SubCUTAneous BID    piperacillin-tazobactam  3,375 mg IntraVENous Q12H       Diet  Current diet/supplement order: Diet NPO     Recorded PO:   last meal in flowsheets      Action     DKA protocol active. Moaning and reaching with left arm. Not ready for diabetes self management. Notified Mr. Ananya Myers to expect contact from [facility] Training contact  from [address]. He confirms struggles with self care with insulin pump.        Electronically signed by Randall Elias RN on 2023 at 9:58 AM

## 2023-08-11 NOTE — PROGRESS NOTES
4 Eyes Skin Assessment     NAME:  Rafael Stanley  YOB: 1960  MEDICAL RECORD NUMBER:  [id number]    The patient is being assessed for  Shift Handoff    I agree that at least one RN has performed a thorough Head to Toe Skin Assessment on the patient. ALL assessment sites listed below have been assessed. Areas assessed by both nurses:    Head, Face, Ears, Shoulders, Back, Chest, Arms, Elbows, Hands, Sacrum. Buttock, Coccyx, Ischium, Legs. Feet and Heels, and Under Medical Devices         Does the Patient have a Wound?  No noted wound(s)       Uriah Prevention initiated by RN: No  Wound Care Orders initiated by RN: No    Pressure Injury (Stage 3,4, Unstageable, DTI, NWPT, and Complex wounds) if present, place Wound referral order by RN under : No    New Ostomies, if present place, Ostomy referral order under : No     Nurse 1 eSignature: Electronically signed by Wenceslao Lloyd RN on 8/11/23 at 4:49 PM EDT    **SHARE this note so that the co-signing nurse can place an eSignature**    Nurse 2 eSignature: Electronically signed by Ginny Davis RN on 8/11/23 at 8:39 PM EDT

## 2023-08-11 NOTE — PROGRESS NOTES
Infectious Diseases Inpatient Progress Note        CHIEF COMPLAINT:     DKA  Lactic acidosis  Septic shock  WBC elevation  Procal elevation    Metastatic Melanoma         HISTORY OF PRESENT ILLNESS:  57 y.o. woman with a significant history for melanoma of the right eye and h/o DM on insulin, immune therapy, diabetes type 1, hypertension, insulin pump in place, adrenal insufficiency on steroid replacement admitted hospital secondary change mental status and blood glucose reading very high on the meter. Per family patient is currently on therapy for metastatic myeloma. Labs indicate sodium 116, potassium 6.6 creatinine 3.3 blood glucose 1645, procalcitonin elevated at 5.54 LFT normal hemoglobin 8.4 WBC 18.8, lactic acid 3.9. Patient is very encephalopathic unable to provide any history she is currently in ICU secondary to septic shock hypotension and DKA. CT chest Abdo pelvis indicate circumferential wall thickening of the distal esophagus concerning for esophagitis trace bilateral pleural effusion. Chest x-ray negative chest chest port in place.   CT head negative    Interval History : Remains in ICU slow to respond more awake but not following command confused and BP is better BG slowly improving off Pressor therapy  , Procal remains elevated ,     Past Medical History:    Past Medical History:   Diagnosis Date    Adrenal insufficiency ([address])     Cancer ([address]) 2002    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    Depression     Diabetes mellitus ([address])     Type I    Hx of radiation therapy     melanoma right eye    Hypertension     Pt. denies having history of Hypertension    Hyponatremia     Insulin pump in place     Melanoma ([address]) 01/13/2023    in stomach, pancreas    Prolonged emergence from general anesthesia     slow to wake up    Restless leg syndrome        Past Surgical History:    Past Surgical History:   Procedure Laterality Date    CARPAL TUNNEL RELEASE Bilateral 12/2017    CHOLECYSTECTOMY      CT BIOPSY ABDOMEN RETROPERITONEUM  12/27/2022    CT BIOPSY ABDOMEN RETROPERITONEUM 12/27/2022 [facility] CT SCAN    EYE SURGERY Right     biopsy    HYSTERECTOMY (CERVIX STATUS UNKNOWN)      LIPOMA RESECTION      LIVER BIOPSY Right     PORT SURGERY Right 01/18/2023    RIGHT POWER PORT PLACEMENT performed by Radha Laguna MD at [facility] ARTHROSCOPY Right 08/31/2018    RIGHT SHOULDER ARTHROSCOPE, SUBACROMIAL DECOMPRESSION, DISTALCLAVICLE EXCISION, CAPSULAR RELEASE, MANIPULATION UNDER ANESTHESIA, DEBRIDEMENT    SHOULDER SURGERY      UPPER GASTROINTESTINAL ENDOSCOPY  10/22/2014    UPPER GASTROINTESTINAL ENDOSCOPY N/A 01/13/2023    EGD W/EUS FNA performed by Elisa Hernandez MD at [facility]  01/13/2023    EGD BIOPSY performed by Elisa Hernandez MD at [facility]  12/16/2022    US GUIDED LIVER BIOPSY PERCUTANEOUS 12/16/2022 [facility] ULTRASOUND       Current Medications:    No outpatient medications have been marked as taking for the 8/9/23 encounter [facility] Encounter). Allergies:  Patient has no active allergies.     Immunizations :   Immunization History   Administered Date(s) Administered    COVID-19, MODERNA BLUE border, Primary or Immunocompromised, (age 12y+), IM, 100 mcg/0.5mL 02/10/2021, 03/10/2021    Hep B, ENGERIX-B, (age 20y+), IM, 1mL 01/14/2022    Influenza Virus Vaccine 10/18/2013, 11/01/2013, 10/16/2014, 10/01/2018, 10/15/2019, 10/07/2020    Influenza Whole 11/11/2005, 10/01/2009, 10/01/2010, 10/16/2014, 10/30/2017    Influenza, FLUARIX, FLULAVAL, FLUZONE (age 10 mo+) AND AFLURIA, (age 1 y+), PF, 0.5mL 10/15/2019, 10/07/2020    Influenza, Triv, 3 Years and older, IM (Afluria (5 yrs and older) 10/26/2021    Pneumococcal, PPSV23, PNEUMOVAX 21, (age 2y+), SC/IM, 0.5mL 01/01/2000, 10/20/2014    TDaP, ADACEL (age 6y-58y), BOOSTRIX (age 10y+), IM, 0.5mL 02/24/2014         Social History:     Social History     Tobacco Use    Smoking

## 2023-08-11 NOTE — CONSULTS
[facility]  Phone: [phone]  Fax: [phone]  [email or web address]         Reason for Consult:  Hyponatremia FAVIAN   Requesting Physician:  Dr. Tuyet Cobb    History Obtained From:  staff, electronic medical record    History of Present Ilness:  57 y.o. woman with history of metastatic melanoma of the right eye on immune therapy ,Type 1  DM insulin pump in place, adrenal insufficiency on steroid replacement admitted hospital secondary change mental status and blood glucose reading very high on the meter.   She has h/o Hyponatremia , seen in 6/23 suspected to be due to hypovolemia, poor solute intake and SSRI   Subsequently readmitted in 7/23 with FAVIAN Uncontrolled HTN and elevated troponin   Cr was 1.9 mg on discharge on 7/26/23  Now admitted with AMS and not eating or drinking   Noted to have FAVIAN, DKA LActic Acidosis and BS 1645 Na+ 116 meq ( corrected Na+ 147 meq) Cr was 3.3 mg   She was started on IV ABX and treated with IVF's and Insulin gtt per DKA protocol   Na+ corrected  down to 131 meq at 2 AM on 8/11  Has been getting D51/2 NS as well as isotonic    Nahco3 gtt   Noted to have excessive UOP over the last few hours ~ 5 L so far Renal consult obtained for electrolyte issues and polyuria   Pt remains lethargic and unable to provide information or follow commands  She was switched from stress dose steroids to oral steroid replacement but has been unable to take PO         Past Medical History:        Diagnosis Date    Adrenal insufficiency ([address])     Cancer ([address]) 2002    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    Depression     Diabetes mellitus ([address])     Type I    Hx of radiation therapy     melanoma right eye    Hypertension     Pt. denies having history of Hypertension    Hyponatremia     Insulin pump in place     Melanoma ([address]) 01/13/2023    in stomach, pancreas    Prolonged emergence from general anesthesia     slow to wake up    Restless leg syndrome        Past Surgical History:        Procedure bedtime 30 tablet 1    Ergocalciferol (VITAMIN D) 45265 units CAPS Take 50,000 Units by mouth once a week for 7 doses 7 capsule 0    oxyCODONE (ROXICODONE) 5 MG immediate release tablet TAKE 1 TABLET BY MOUTH EVERY 4 TO 6 HOURS AS NEEDED FOR PAIN      ondansetron (ZOFRAN) 8 MG tablet TAKE 1 TABLET BY MOUTH EVERY 8 HOURS AS NEEDED FOR NAUSEA OR VOMITING         Allergies:  Patient has no active allergies. Social History:    Social History     Socioeconomic History    Marital status:      Spouse name: Not on file    Number of children: Not on file    Years of education: Not on file    Highest education level: Not on file   Occupational History    Not on file   Tobacco Use    Smoking status: Former     Packs/day: 1.00     Years: 10.00     Pack years: 10.00     Types: Cigarettes     Quit date: 1980     Years since quittin.7    Smokeless tobacco: Never   Vaping Use    Vaping Use: Never used   Substance and Sexual Activity    Alcohol use: No    Drug use: No    Sexual activity: Not on file   Other Topics Concern    Not on file   Social History Narrative    Not on file     Social Determinants of Health     Financial Resource Strain: Low Risk     Difficulty of Paying Living Expenses: Not hard at all   Food Insecurity: No Food Insecurity    Worried About [place] in the Last Year: Never true    [address] in the Last Year: Never true   Transportation Needs: No Transportation Needs    Lack of Transportation (Medical): No    Lack of Transportation (Non-Medical):  No   Physical Activity: Not on file   Stress: Not on file   Social Connections: Not on file   Intimate Partner Violence: Not on file   Housing Stability: Unknown    Unable to Pay for Housing in the Last Year: Not on file    Number of Places Lived in the Last Year: Not on file    Unstable Housing in the Last Year: No       Family History:   Family History   Problem Relation Age of Onset    High Blood Pressure Mother     Breast Cancer

## 2023-08-11 NOTE — PROGRESS NOTES
Sent MD a message concerning pts urine output for the second time. Urine osmolality and sodium sent. Nephro consult placed.

## 2023-08-11 NOTE — PROGRESS NOTES
Dr. Marlin Pimentel notified of GAP being closed and CO2 WNL. MD doesn't want to transition off insulin gtt at this time.

## 2023-08-11 NOTE — PROGRESS NOTES
Soft Tissues/Bones:  No acute abnormality of the visualized osseous structures. Subacute nondisplaced fracture in the mid sternum. Abdomen/Pelvis: Organs: Liver steatosis. The unenhanced liver, spleen, pancreas, adrenal glands and kidneys demonstrate no acute abnormality. Status post cholecystectomy. GI/Bowel: No acutely dilated or inflamed loops of bowel. Pelvis: No pelvic mass, adenopathy, or fluid collection. Peritoneum/Retroperitoneum: Small amount of ascites in the right upper quadrant has developed. No adenopathy. No abdominal aortic aneurysm. Bones/Soft Tissues:  No acute abnormality of the visualized osseous structures. 1. Circumferential wall thickening of the mid to distal esophagus has developed concerning for esophagitis. 2. Trace pleural effusions. 3. Small amount of ascites in the right upper quadrant. Otherwise, no acute abnormality in the abdomen or pelvis on the noncontrast CT.       CBC:   Recent Labs     08/09/23 2055   WBC 18.8*   HGB 8.4*        BMP:    Recent Labs     08/10/23  2156 08/11/23  0200 08/11/23  0613   * 125* 127*   K 4.7 4.5 3.8   CL 89* 91* 93*   CO2 17* 17* 17*   BUN 58* 54* 48*   CREATININE 3.2* 2.9* 2.9*   GLUCOSE 560* 425* 222*     Hepatic:   Recent Labs     08/09/23  2315 08/10/23  0042 08/10/23  0158   AST 25 28 25   ALT 14 11 12   BILITOT <0.2 <0.2 <0.2   ALKPHOS 109 107 104     Lipids:   Lab Results   Component Value Date/Time    CHOL 217 04/08/2022 06:21 AM    HDL 81 02/20/2023 08:31 AM     12/07/2011 07:50 AM    TRIG 118 04/08/2022 06:21 AM     Hemoglobin A1C:   Lab Results   Component Value Date/Time    LABA1C 7.7 07/24/2023 12:45 AM     TSH:   Lab Results   Component Value Date/Time    TSH 3.00 06/14/2023 09:46 PM     Troponin: No results found for: TROPONINT  Lactic Acid:   Recent Labs     08/10/23  1051 08/10/23  1415 08/10/23  1960   LACTA 4.1* 3.9* 1.4     BNP: No results for input(s): PROBNP in the last 72 hours.   UA:  Lab Results Component Value Date/Time    NITRU Negative 08/09/2023 11:28 PM    COLORU Yellow 08/09/2023 11:28 PM    PHUR 5.5 08/09/2023 11:28 PM    WBCUA 2 08/09/2023 11:28 PM    RBCUA 0 08/09/2023 11:28 PM    BACTERIA None Seen 08/09/2023 11:28 PM    CLARITYU Clear 08/09/2023 11:28 PM    SPECGRAV 1.010 08/09/2023 11:28 PM    LEUKOCYTESUR Negative 08/09/2023 11:28 PM    UROBILINOGEN 0.2 08/09/2023 11:28 PM    BILIRUBINUR Negative 08/09/2023 11:28 PM    BILIRUBINUR Negative 06/26/2023 12:25 PM    BILIRUBINUR NEGATIVE 05/25/2012 09:30 AM    BLOODU Negative 08/09/2023 11:28 PM    GLUCOSEU >=1000 08/09/2023 11:28 PM    GLUCOSEU >=1000 05/25/2012 09:30 AM    KETUA TRACE 08/09/2023 11:28 PM     Urine Cultures:   Lab Results   Component Value Date/Time    LABURIN No growth at 18 to 36 hours 08/09/2023 11:28 PM     Blood Cultures:   Lab Results   Component Value Date/Time    [facility]  08/09/2023 10:39 PM     Gram stain Aerobic bottle:  Gram positive rods  Information to follow       Lab Results   Component Value Date/Time    BLOODCULT2  08/10/2023 03:12 AM     No Growth to date. Any change in status will be called. Organism: No results found for: [facility]      Electronically signed by Paulette Pinedo MD on 8/11/2023 at 8:27 AM  Comment: Please note this report has been produced using speech recognition software and may contain errors related to that system including errors in grammar, punctuation, and spelling, as well as words and phrases that may be inappropriate. If there are any questions or concerns, please feel free to contact the dictating provider for clarification.

## 2023-08-12 LAB
ANION GAP SERPL CALCULATED.3IONS-SCNC: 12 MMOL/L (ref 3–16)
ANION GAP SERPL CALCULATED.3IONS-SCNC: 14 MMOL/L (ref 3–16)
ANION GAP SERPL CALCULATED.3IONS-SCNC: 18 MMOL/L (ref 3–16)
BUN SERPL-MCNC: 33 MG/DL (ref 7–20)
BUN SERPL-MCNC: 38 MG/DL (ref 7–20)
BUN SERPL-MCNC: 41 MG/DL (ref 7–20)
CALCIUM SERPL-MCNC: 8.5 MG/DL (ref 8.3–10.6)
CALCIUM SERPL-MCNC: 8.5 MG/DL (ref 8.3–10.6)
CALCIUM SERPL-MCNC: 8.6 MG/DL (ref 8.3–10.6)
CHLORIDE SERPL-SCNC: 100 MMOL/L (ref 99–110)
CHLORIDE SERPL-SCNC: 97 MMOL/L (ref 99–110)
CHLORIDE SERPL-SCNC: 99 MMOL/L (ref 99–110)
CO2 SERPL-SCNC: 22 MMOL/L (ref 21–32)
CO2 SERPL-SCNC: 24 MMOL/L (ref 21–32)
CO2 SERPL-SCNC: 24 MMOL/L (ref 21–32)
CREAT SERPL-MCNC: 2.1 MG/DL (ref 0.6–1.2)
CREAT SERPL-MCNC: 2.2 MG/DL (ref 0.6–1.2)
CREAT SERPL-MCNC: 2.3 MG/DL (ref 0.6–1.2)
EST. AVERAGE GLUCOSE BLD GHB EST-MCNC: 197.3 MG/DL
GFR SERPLBLD CREATININE-BSD FMLA CKD-EPI: 23 ML/MIN/{1.73_M2}
GFR SERPLBLD CREATININE-BSD FMLA CKD-EPI: 24 ML/MIN/{1.73_M2}
GFR SERPLBLD CREATININE-BSD FMLA CKD-EPI: 26 ML/MIN/{1.73_M2}
GLUCOSE BLD-MCNC: 125 MG/DL (ref 70–99)
GLUCOSE BLD-MCNC: 135 MG/DL (ref 70–99)
GLUCOSE BLD-MCNC: 136 MG/DL (ref 70–99)
GLUCOSE BLD-MCNC: 154 MG/DL (ref 70–99)
GLUCOSE BLD-MCNC: 169 MG/DL (ref 70–99)
GLUCOSE BLD-MCNC: 170 MG/DL (ref 70–99)
GLUCOSE BLD-MCNC: 182 MG/DL (ref 70–99)
GLUCOSE BLD-MCNC: 200 MG/DL (ref 70–99)
GLUCOSE BLD-MCNC: 228 MG/DL (ref 70–99)
GLUCOSE BLD-MCNC: 242 MG/DL (ref 70–99)
GLUCOSE BLD-MCNC: 72 MG/DL (ref 70–99)
GLUCOSE BLD-MCNC: 98 MG/DL (ref 70–99)
GLUCOSE SERPL-MCNC: 130 MG/DL (ref 70–99)
GLUCOSE SERPL-MCNC: 143 MG/DL (ref 70–99)
GLUCOSE SERPL-MCNC: 241 MG/DL (ref 70–99)
HBA1C MFR BLD: 8.5 %
LACTATE BLDV-SCNC: 1.2 MMOL/L (ref 0.4–2)
LACTATE BLDV-SCNC: 1.9 MMOL/L (ref 0.4–2)
LACTATE BLDV-SCNC: 2.4 MMOL/L (ref 0.4–2)
MAGNESIUM SERPL-MCNC: 1.6 MG/DL (ref 1.8–2.4)
MAGNESIUM SERPL-MCNC: 2 MG/DL (ref 1.8–2.4)
MAGNESIUM SERPL-MCNC: 2.3 MG/DL (ref 1.8–2.4)
OSMOLALITY UR: 322 MOSM/KG (ref 390–1070)
PERFORMED ON: ABNORMAL
PERFORMED ON: NORMAL
PERFORMED ON: NORMAL
PHOSPHATE SERPL-MCNC: 2.5 MG/DL (ref 2.5–4.9)
PHOSPHATE SERPL-MCNC: 2.8 MG/DL (ref 2.5–4.9)
PHOSPHATE SERPL-MCNC: 2.9 MG/DL (ref 2.5–4.9)
PHOSPHATE SERPL-MCNC: 4.1 MG/DL (ref 2.5–4.9)
POTASSIUM SERPL-SCNC: 3 MMOL/L (ref 3.5–5.1)
POTASSIUM SERPL-SCNC: 3.1 MMOL/L (ref 3.5–5.1)
SODIUM SERPL-SCNC: 135 MMOL/L (ref 136–145)
SODIUM SERPL-SCNC: 137 MMOL/L (ref 136–145)
SODIUM SERPL-SCNC: 138 MMOL/L (ref 136–145)
SODIUM UR-SCNC: 106 MMOL/L
UUN UR-MCNC: 197.1 MG/DL (ref 800–1666)

## 2023-08-12 PROCEDURE — 80048 BASIC METABOLIC PNL TOTAL CA: CPT

## 2023-08-12 PROCEDURE — 2580000003 HC RX 258: Performed by: INTERNAL MEDICINE

## 2023-08-12 PROCEDURE — 83036 HEMOGLOBIN GLYCOSYLATED A1C: CPT

## 2023-08-12 PROCEDURE — 84132 ASSAY OF SERUM POTASSIUM: CPT

## 2023-08-12 PROCEDURE — 83935 ASSAY OF URINE OSMOLALITY: CPT

## 2023-08-12 PROCEDURE — 2580000003 HC RX 258: Performed by: STUDENT IN AN ORGANIZED HEALTH CARE EDUCATION/TRAINING PROGRAM

## 2023-08-12 PROCEDURE — 6360000002 HC RX W HCPCS: Performed by: INTERNAL MEDICINE

## 2023-08-12 PROCEDURE — 6370000000 HC RX 637 (ALT 250 FOR IP): Performed by: STUDENT IN AN ORGANIZED HEALTH CARE EDUCATION/TRAINING PROGRAM

## 2023-08-12 PROCEDURE — 2500000003 HC RX 250 WO HCPCS: Performed by: STUDENT IN AN ORGANIZED HEALTH CARE EDUCATION/TRAINING PROGRAM

## 2023-08-12 PROCEDURE — 84540 ASSAY OF URINE/UREA-N: CPT

## 2023-08-12 PROCEDURE — 36415 COLL VENOUS BLD VENIPUNCTURE: CPT

## 2023-08-12 PROCEDURE — 83735 ASSAY OF MAGNESIUM: CPT

## 2023-08-12 PROCEDURE — 6370000000 HC RX 637 (ALT 250 FOR IP): Performed by: INTERNAL MEDICINE

## 2023-08-12 PROCEDURE — 84300 ASSAY OF URINE SODIUM: CPT

## 2023-08-12 PROCEDURE — 84100 ASSAY OF PHOSPHORUS: CPT

## 2023-08-12 PROCEDURE — 6360000002 HC RX W HCPCS: Performed by: STUDENT IN AN ORGANIZED HEALTH CARE EDUCATION/TRAINING PROGRAM

## 2023-08-12 PROCEDURE — 83605 ASSAY OF LACTIC ACID: CPT

## 2023-08-12 PROCEDURE — 99233 SBSQ HOSP IP/OBS HIGH 50: CPT | Performed by: INTERNAL MEDICINE

## 2023-08-12 PROCEDURE — 2000000000 HC ICU R&B

## 2023-08-12 RX ORDER — INSULIN GLARGINE 100 [IU]/ML
8 INJECTION, SOLUTION SUBCUTANEOUS DAILY
Status: DISCONTINUED | OUTPATIENT
Start: 2023-08-12 | End: 2023-08-15

## 2023-08-12 RX ORDER — INSULIN LISPRO 100 [IU]/ML
0-4 INJECTION, SOLUTION INTRAVENOUS; SUBCUTANEOUS NIGHTLY
Status: DISCONTINUED | OUTPATIENT
Start: 2023-08-12 | End: 2023-08-15

## 2023-08-12 RX ORDER — ROPINIROLE 1 MG/1
1 TABLET, FILM COATED ORAL NIGHTLY
Status: DISCONTINUED | OUTPATIENT
Start: 2023-08-13 | End: 2023-08-12 | Stop reason: SDUPTHER

## 2023-08-12 RX ORDER — HYDROCORTISONE 10 MG/1
10 TABLET ORAL DAILY
Status: DISCONTINUED | OUTPATIENT
Start: 2023-08-13 | End: 2023-08-15 | Stop reason: HOSPADM

## 2023-08-12 RX ORDER — INSULIN LISPRO 100 [IU]/ML
0-4 INJECTION, SOLUTION INTRAVENOUS; SUBCUTANEOUS
Status: DISCONTINUED | OUTPATIENT
Start: 2023-08-12 | End: 2023-08-15

## 2023-08-12 RX ORDER — ROPINIROLE 0.5 MG/1
0.5 TABLET, FILM COATED ORAL ONCE
Status: COMPLETED | OUTPATIENT
Start: 2023-08-12 | End: 2023-08-12

## 2023-08-12 RX ORDER — ROPINIROLE 1 MG/1
1 TABLET, FILM COATED ORAL NIGHTLY
Status: DISCONTINUED | OUTPATIENT
Start: 2023-08-13 | End: 2023-08-15 | Stop reason: HOSPADM

## 2023-08-12 RX ORDER — HYDROCORTISONE 10 MG/1
10 TABLET ORAL DAILY
Status: DISCONTINUED | OUTPATIENT
Start: 2023-08-12 | End: 2023-08-12

## 2023-08-12 RX ORDER — ROPINIROLE 1 MG/1
1 TABLET, FILM COATED ORAL NIGHTLY
Status: DISCONTINUED | OUTPATIENT
Start: 2023-08-12 | End: 2023-08-12 | Stop reason: SDUPTHER

## 2023-08-12 RX ORDER — DEXTROSE MONOHYDRATE 100 MG/ML
INJECTION, SOLUTION INTRAVENOUS CONTINUOUS PRN
Status: DISCONTINUED | OUTPATIENT
Start: 2023-08-12 | End: 2023-08-15 | Stop reason: HOSPADM

## 2023-08-12 RX ORDER — HYDROCORTISONE 5 MG/1
5 TABLET ORAL NIGHTLY
Status: DISCONTINUED | OUTPATIENT
Start: 2023-08-12 | End: 2023-08-12

## 2023-08-12 RX ORDER — POTASSIUM CHLORIDE 20 MEQ/1
40 TABLET, EXTENDED RELEASE ORAL ONCE
Status: COMPLETED | OUTPATIENT
Start: 2023-08-12 | End: 2023-08-12

## 2023-08-12 RX ORDER — HYDROCORTISONE 5 MG/1
5 TABLET ORAL NIGHTLY
Status: DISCONTINUED | OUTPATIENT
Start: 2023-08-13 | End: 2023-08-15 | Stop reason: HOSPADM

## 2023-08-12 RX ADMIN — POTASSIUM CHLORIDE 10 MEQ: 7.46 INJECTION, SOLUTION INTRAVENOUS at 05:28

## 2023-08-12 RX ADMIN — INSULIN GLARGINE 8 UNITS: 100 INJECTION, SOLUTION SUBCUTANEOUS at 07:36

## 2023-08-12 RX ADMIN — POTASSIUM CHLORIDE 40 MEQ: 1500 TABLET, EXTENDED RELEASE ORAL at 18:16

## 2023-08-12 RX ADMIN — POTASSIUM CHLORIDE 10 MEQ: 7.46 INJECTION, SOLUTION INTRAVENOUS at 00:46

## 2023-08-12 RX ADMIN — PIPERACILLIN AND TAZOBACTAM 3375 MG: 3; .375 INJECTION, POWDER, LYOPHILIZED, FOR SOLUTION INTRAVENOUS at 23:40

## 2023-08-12 RX ADMIN — POTASSIUM CHLORIDE 10 MEQ: 7.46 INJECTION, SOLUTION INTRAVENOUS at 12:47

## 2023-08-12 RX ADMIN — DEXTROSE AND SODIUM CHLORIDE: 5; 900 INJECTION, SOLUTION INTRAVENOUS at 02:04

## 2023-08-12 RX ADMIN — OXYCODONE HYDROCHLORIDE 5 MG: 5 TABLET ORAL at 07:32

## 2023-08-12 RX ADMIN — ONDANSETRON 4 MG: 2 INJECTION INTRAMUSCULAR; INTRAVENOUS at 09:45

## 2023-08-12 RX ADMIN — OXYCODONE HYDROCHLORIDE 5 MG: 5 TABLET ORAL at 21:32

## 2023-08-12 RX ADMIN — PIPERACILLIN AND TAZOBACTAM 3375 MG: 3; .375 INJECTION, POWDER, LYOPHILIZED, FOR SOLUTION INTRAVENOUS at 11:29

## 2023-08-12 RX ADMIN — ROPINIROLE HYDROCHLORIDE 0.5 MG: 0.5 TABLET, FILM COATED ORAL at 21:31

## 2023-08-12 RX ADMIN — MAGNESIUM SULFATE HEPTAHYDRATE 2000 MG: 40 INJECTION, SOLUTION INTRAVENOUS at 03:17

## 2023-08-12 RX ADMIN — POTASSIUM CHLORIDE 10 MEQ: 7.46 INJECTION, SOLUTION INTRAVENOUS at 06:31

## 2023-08-12 RX ADMIN — POTASSIUM CHLORIDE 10 MEQ: 7.46 INJECTION, SOLUTION INTRAVENOUS at 09:48

## 2023-08-12 RX ADMIN — POTASSIUM CHLORIDE 10 MEQ: 7.46 INJECTION, SOLUTION INTRAVENOUS at 02:03

## 2023-08-12 RX ADMIN — POTASSIUM CHLORIDE 10 MEQ: 7.46 INJECTION, SOLUTION INTRAVENOUS at 11:20

## 2023-08-12 RX ADMIN — HYDROCORTISONE SODIUM SUCCINATE 50 MG: 100 INJECTION, POWDER, FOR SOLUTION INTRAMUSCULAR; INTRAVENOUS at 07:36

## 2023-08-12 RX ADMIN — HEPARIN SODIUM 5000 UNITS: 5000 INJECTION INTRAVENOUS; SUBCUTANEOUS at 21:32

## 2023-08-12 RX ADMIN — ROPINIROLE HYDROCHLORIDE 0.5 MG: 0.5 TABLET, FILM COATED ORAL at 16:37

## 2023-08-12 RX ADMIN — POTASSIUM CHLORIDE 10 MEQ: 7.46 INJECTION, SOLUTION INTRAVENOUS at 04:22

## 2023-08-12 RX ADMIN — ESCITALOPRAM OXALATE 10 MG: 10 TABLET ORAL at 07:34

## 2023-08-12 RX ADMIN — HEPARIN SODIUM 5000 UNITS: 5000 INJECTION INTRAVENOUS; SUBCUTANEOUS at 08:19

## 2023-08-12 RX ADMIN — LABETALOL HYDROCHLORIDE 10 MG: 5 INJECTION, SOLUTION INTRAVENOUS at 03:48

## 2023-08-12 RX ADMIN — POTASSIUM CHLORIDE 10 MEQ: 7.46 INJECTION, SOLUTION INTRAVENOUS at 03:15

## 2023-08-12 RX ADMIN — AMLODIPINE BESYLATE 5 MG: 5 TABLET ORAL at 07:32

## 2023-08-12 RX ADMIN — SODIUM PHOSPHATE, MONOBASIC, MONOHYDRATE AND SODIUM PHOSPHATE, DIBASIC, ANHYDROUS 10 MMOL: 142; 276 INJECTION, SOLUTION INTRAVENOUS at 09:31

## 2023-08-12 RX ADMIN — OXYCODONE HYDROCHLORIDE 5 MG: 5 TABLET ORAL at 14:48

## 2023-08-12 RX ADMIN — POTASSIUM CHLORIDE 10 MEQ: 7.46 INJECTION, SOLUTION INTRAVENOUS at 08:29

## 2023-08-12 ASSESSMENT — PAIN DESCRIPTION - ONSET
ONSET: ON-GOING
ONSET: ON-GOING

## 2023-08-12 ASSESSMENT — PAIN SCALES - GENERAL
PAINLEVEL_OUTOF10: 8
PAINLEVEL_OUTOF10: 5
PAINLEVEL_OUTOF10: 8
PAINLEVEL_OUTOF10: 4

## 2023-08-12 ASSESSMENT — PAIN DESCRIPTION - PAIN TYPE
TYPE: CHRONIC PAIN
TYPE: CHRONIC PAIN

## 2023-08-12 ASSESSMENT — PAIN DESCRIPTION - LOCATION
LOCATION: ABDOMEN
LOCATION: ABDOMEN

## 2023-08-12 ASSESSMENT — PAIN DESCRIPTION - FREQUENCY
FREQUENCY: INTERMITTENT
FREQUENCY: INTERMITTENT

## 2023-08-12 ASSESSMENT — PAIN DESCRIPTION - DESCRIPTORS
DESCRIPTORS: DISCOMFORT
DESCRIPTORS: ACHING;DISCOMFORT

## 2023-08-12 ASSESSMENT — PAIN DESCRIPTION - ORIENTATION
ORIENTATION: OTHER (COMMENT)
ORIENTATION: OTHER (COMMENT)

## 2023-08-12 NOTE — PROGRESS NOTES
Pt during handoff from [facility] was alert and oriented and able to appropriately answer all orientation questions and hold a conversation. Reviewed fall precautions with pt who verbalized understanding. Bed alarm activated. [place] removed at this time.

## 2023-08-12 NOTE — PROGRESS NOTES
Patient's blood glucose was 85. Darlyn Herrera NP notified. Orders received to stop insulin gtt at this time until blood glucose is above 150. See MAR.

## 2023-08-12 NOTE — PROGRESS NOTES
Pulmonary Progress Note    Date of Admission: 8/9/2023   LOS: 2 days       CC:  Chief Complaint   Patient presents with    Hyperglycemia     Bs reading high at home. Feeling sick, lethargic         Subjective:  No complaints of pain. Not hungry. Thirsty. Review of systems  No chest pain no diarrhea      Assessment:          Plan: This note may have been transcribed using [employer]. Please disregard any translational errors. Hospital Day: 2     ID  Wound culture positive for gram-positive rods. Continue to follow. Infectious disease following. Currently on Zosyn. DKA  Hyperglycemia mostly resolved. Continues on insulin drip. Anion gap briefly closed. Continues to have mild anion gap. This may be secondary to renal failure. Beta hydroxybutyrate was low. Therefore, we will discontinue insulin drip today while monitoring electrolytes closely. Stop D5. Start diabetic diet. Patient currently at approximately 24 units/day. Start with Lantus 8 units with low-dose sliding scale. Acute kidney injury with metabolic acidosis  Creatinine slowly improving. Bicarb drip stopped yesterday. Volume status controlled. Adrenal insufficiency  Stress dose steroids off. Home medications. Mental status  Mental status completely resolved. Alert and oriented. Awake. Depression  Lexapro. Chronic pain  Using oxycodone at home. Patient fills 180 oxycodone at 5 mg routinely every 30 days. This would translate to 10 mg 3 times daily. Patient is awake, she states she takes 4 tablets a day. Continue 5 mg 3 times daily. Patient may request additional pain medications if needed      Hypertension    home amlodipine at 5 mg daily. Nutrition  - ADULT DIET;  Regular; 4 carb choices (60 gm/meal)  -   Intake/Output Summary (Last 24 hours) at 8/12/2023 0645  Last data filed at 8/12/2023 4280  Gross per 24 hour   Intake 4494.54 ml   Output 87493 ml   Net -6045.46 ml effusion or pneumothorax. Heart is  normal in size. Pulmonary vascular markings are normal.  Bones are  unremarkable. Port catheter tip terminates in the SVC RA junction. Impression  Normal chest radiograph. This note was transcribed using [employer]. Please disregard any translational errors.       [facility]

## 2023-08-12 NOTE — PLAN OF CARE
Problem: Discharge Planning  Goal: Discharge to home or other facility with appropriate resources  8/12/2023 0423 by Saul Alberto RN  Outcome: Progressing  Flowsheets (Taken 8/11/2023 1949)  Discharge to home or other facility with appropriate resources:   Identify barriers to discharge with patient and caregiver   Arrange for needed discharge resources and transportation as appropriate   Identify discharge learning needs (meds, wound care, etc)     Problem: Safety - Adult  Goal: Free from fall injury  8/12/2023 0423 by Saul Alberto RN  Outcome: Progressing  Flowsheets (Taken 8/11/2023 1754 by Char Garcia RN)  Free From Fall Injury: Instruct family/caregiver on patient safety     Problem: Confusion  Goal: Confusion, delirium, dementia, or psychosis is improved or at baseline  Description: INTERVENTIONS:  1. Assess for possible contributors to thought disturbance, including medications, impaired vision or hearing, underlying metabolic abnormalities, dehydration, psychiatric diagnoses, and notify attending LIP  2. [place] high risk fall precautions, as indicated  3. Provide frequent short contacts to provide reality reorientation, refocusing and direction  4. Decrease environmental stimuli, including noise as appropriate  5. Monitor and intervene to maintain adequate nutrition, hydration, elimination, sleep and activity  6. If unable to ensure safety without constant attention obtain sitter and review sitter guidelines with assigned personnel  7.  Initiate Psychosocial CNS and Spiritual Care consult, as indicated  8/12/2023 0423 by Saul Alberto RN  Outcome: Progressing  Flowsheets (Taken 8/11/2023 1949)  Effect of thought disturbance (confusion, delirium, dementia, or psychosis) are managed with adequate functional status:   Assess for contributors to thought disturbance, including medications, impaired vision or hearing, underlying metabolic abnormalities, dehydration, psychiatric diagnoses, notify LIP device):   Determine that other, less restrictive measures have been tried or would not be effective before applying the restraint   Evaluate the patient's condition at the time of restraint application   Inform patient/family regarding the reason for restraint   Every 2 hours: Monitor safety, psychosocial status, comfort, nutrition and hydration

## 2023-08-12 NOTE — PROGRESS NOTES
Secure message sent to MD [facility] in regards to QT/QTc.   QT 0.44-0.54  QTc 0.49-0.57    MD is aware. No new orders received.

## 2023-08-12 NOTE — PROGRESS NOTES
4 Eyes Skin Assessment     NAME:  Castillo Coffman  YOB: 1960  MEDICAL RECORD NUMBER:  [id number]    The patient is being assessed for  Shift Handoff    I agree that at least one RN has performed a thorough Head to Toe Skin Assessment on the patient. ALL assessment sites listed below have been assessed. Areas assessed by both nurses:    Head, Face, Ears, Shoulders, Back, Chest, Arms, Elbows, Hands, Sacrum. Buttock, Coccyx, Ischium, Legs. Feet and Heels, and Under Medical Devices         Does the Patient have a Wound?  No noted wound(s)       Uriah Prevention initiated by RN: Yes  Wound Care Orders initiated by RN: No    Pressure Injury (Stage 3,4, Unstageable, DTI, NWPT, and Complex wounds) if present, place Wound referral order by RN under : No    New Ostomies, if present place, Ostomy referral order under : No     Nurse 1 eSignature: Electronically signed by Sven Salas RN on 8/12/23 at 6:16 AM EDT    **SHARE this note so that the co-signing nurse can place an eSignature**    Nurse 2 eSignature: {Esignature:[id number]}

## 2023-08-13 ENCOUNTER — PATIENT MESSAGE (OUTPATIENT)
Dept: ENDOCRINOLOGY | Age: 63
End: 2023-08-13

## 2023-08-13 DIAGNOSIS — E10.40 POORLY CONTROLLED TYPE 1 DIABETES MELLITUS WITH NEUROPATHY (HCC): Primary | ICD-10-CM

## 2023-08-13 DIAGNOSIS — E10.65 POORLY CONTROLLED TYPE 1 DIABETES MELLITUS WITH NEUROPATHY (HCC): Primary | ICD-10-CM

## 2023-08-13 LAB
ANION GAP SERPL CALCULATED.3IONS-SCNC: 11 MMOL/L (ref 3–16)
BACTERIA BLD CULT: ABNORMAL
BUN SERPL-MCNC: 26 MG/DL (ref 7–20)
CALCIUM SERPL-MCNC: 8 MG/DL (ref 8.3–10.6)
CHLORIDE SERPL-SCNC: 100 MMOL/L (ref 99–110)
CO2 SERPL-SCNC: 29 MMOL/L (ref 21–32)
CREAT SERPL-MCNC: 1.8 MG/DL (ref 0.6–1.2)
GFR SERPLBLD CREATININE-BSD FMLA CKD-EPI: 31 ML/MIN/{1.73_M2}
GLUCOSE BLD-MCNC: 116 MG/DL (ref 70–99)
GLUCOSE BLD-MCNC: 117 MG/DL (ref 70–99)
GLUCOSE BLD-MCNC: 140 MG/DL (ref 70–99)
GLUCOSE BLD-MCNC: 162 MG/DL (ref 70–99)
GLUCOSE BLD-MCNC: 174 MG/DL (ref 70–99)
GLUCOSE BLD-MCNC: 74 MG/DL (ref 70–99)
GLUCOSE SERPL-MCNC: 165 MG/DL (ref 70–99)
MAGNESIUM SERPL-MCNC: 1.6 MG/DL (ref 1.8–2.4)
MISCELLANEOUS LAB TEST ORDER: ABNORMAL
ORGANISM: ABNORMAL
PERFORMED ON: ABNORMAL
PERFORMED ON: NORMAL
PHOSPHATE SERPL-MCNC: 3.2 MG/DL (ref 2.5–4.9)
POTASSIUM SERPL-SCNC: 3 MMOL/L (ref 3.5–5.1)
POTASSIUM SERPL-SCNC: 3.1 MMOL/L (ref 3.5–5.1)
POTASSIUM SERPL-SCNC: 3.9 MMOL/L (ref 3.5–5.1)
PROCALCITONIN SERPL IA-MCNC: 1.35 NG/ML (ref 0–0.15)
SODIUM SERPL-SCNC: 140 MMOL/L (ref 136–145)

## 2023-08-13 PROCEDURE — 84132 ASSAY OF SERUM POTASSIUM: CPT

## 2023-08-13 PROCEDURE — 97161 PT EVAL LOW COMPLEX 20 MIN: CPT

## 2023-08-13 PROCEDURE — 6370000000 HC RX 637 (ALT 250 FOR IP): Performed by: INTERNAL MEDICINE

## 2023-08-13 PROCEDURE — 2580000003 HC RX 258: Performed by: INTERNAL MEDICINE

## 2023-08-13 PROCEDURE — 6360000002 HC RX W HCPCS: Performed by: INTERNAL MEDICINE

## 2023-08-13 PROCEDURE — 84100 ASSAY OF PHOSPHORUS: CPT

## 2023-08-13 PROCEDURE — 84145 PROCALCITONIN (PCT): CPT

## 2023-08-13 PROCEDURE — 99233 SBSQ HOSP IP/OBS HIGH 50: CPT | Performed by: INTERNAL MEDICINE

## 2023-08-13 PROCEDURE — 6360000002 HC RX W HCPCS: Performed by: STUDENT IN AN ORGANIZED HEALTH CARE EDUCATION/TRAINING PROGRAM

## 2023-08-13 PROCEDURE — 94760 N-INVAS EAR/PLS OXIMETRY 1: CPT

## 2023-08-13 PROCEDURE — 80048 BASIC METABOLIC PNL TOTAL CA: CPT

## 2023-08-13 PROCEDURE — 83735 ASSAY OF MAGNESIUM: CPT

## 2023-08-13 PROCEDURE — 97530 THERAPEUTIC ACTIVITIES: CPT

## 2023-08-13 PROCEDURE — 36415 COLL VENOUS BLD VENIPUNCTURE: CPT

## 2023-08-13 PROCEDURE — 6370000000 HC RX 637 (ALT 250 FOR IP): Performed by: STUDENT IN AN ORGANIZED HEALTH CARE EDUCATION/TRAINING PROGRAM

## 2023-08-13 PROCEDURE — 1200000000 HC SEMI PRIVATE

## 2023-08-13 RX ORDER — ACETAMINOPHEN 325 MG/1
650 TABLET ORAL EVERY 4 HOURS PRN
Status: DISCONTINUED | OUTPATIENT
Start: 2023-08-13 | End: 2023-08-15 | Stop reason: HOSPADM

## 2023-08-13 RX ORDER — POTASSIUM CHLORIDE 20 MEQ/1
40 TABLET, EXTENDED RELEASE ORAL ONCE
Status: DISCONTINUED | OUTPATIENT
Start: 2023-08-13 | End: 2023-08-13

## 2023-08-13 RX ORDER — POTASSIUM CHLORIDE 7.45 MG/ML
10 INJECTION INTRAVENOUS PRN
Status: DISCONTINUED | OUTPATIENT
Start: 2023-08-13 | End: 2023-08-13

## 2023-08-13 RX ORDER — PROPOFOL 10 MG/ML
INJECTION, EMULSION INTRAVENOUS
Status: DISPENSED
Start: 2023-08-13 | End: 2023-08-14

## 2023-08-13 RX ORDER — MAGNESIUM SULFATE IN WATER 40 MG/ML
2000 INJECTION, SOLUTION INTRAVENOUS ONCE
Status: COMPLETED | OUTPATIENT
Start: 2023-08-13 | End: 2023-08-13

## 2023-08-13 RX ORDER — POTASSIUM CHLORIDE 20 MEQ/1
40 TABLET, EXTENDED RELEASE ORAL PRN
Status: DISCONTINUED | OUTPATIENT
Start: 2023-08-13 | End: 2023-08-13

## 2023-08-13 RX ORDER — POTASSIUM CHLORIDE 29.8 MG/ML
20 INJECTION INTRAVENOUS PRN
Status: DISCONTINUED | OUTPATIENT
Start: 2023-08-13 | End: 2023-08-15 | Stop reason: HOSPADM

## 2023-08-13 RX ORDER — OXYCODONE HYDROCHLORIDE 5 MG/1
5 TABLET ORAL EVERY 4 HOURS PRN
Status: DISCONTINUED | OUTPATIENT
Start: 2023-08-13 | End: 2023-08-15 | Stop reason: HOSPADM

## 2023-08-13 RX ADMIN — ESCITALOPRAM OXALATE 10 MG: 10 TABLET ORAL at 08:31

## 2023-08-13 RX ADMIN — OXYCODONE HYDROCHLORIDE 5 MG: 5 TABLET ORAL at 13:12

## 2023-08-13 RX ADMIN — PIPERACILLIN AND TAZOBACTAM 3375 MG: 3; .375 INJECTION, POWDER, LYOPHILIZED, FOR SOLUTION INTRAVENOUS at 23:29

## 2023-08-13 RX ADMIN — MAGNESIUM SULFATE HEPTAHYDRATE 2000 MG: 40 INJECTION, SOLUTION INTRAVENOUS at 08:29

## 2023-08-13 RX ADMIN — OXYCODONE HYDROCHLORIDE 5 MG: 5 TABLET ORAL at 23:20

## 2023-08-13 RX ADMIN — INSULIN GLARGINE 8 UNITS: 100 INJECTION, SOLUTION SUBCUTANEOUS at 08:31

## 2023-08-13 RX ADMIN — ONDANSETRON 4 MG: 2 INJECTION INTRAMUSCULAR; INTRAVENOUS at 18:55

## 2023-08-13 RX ADMIN — POTASSIUM CHLORIDE 20 MEQ: 29.8 INJECTION, SOLUTION INTRAVENOUS at 16:26

## 2023-08-13 RX ADMIN — HEPARIN SODIUM 5000 UNITS: 5000 INJECTION INTRAVENOUS; SUBCUTANEOUS at 20:52

## 2023-08-13 RX ADMIN — HYDROCORTISONE 10 MG: 10 TABLET ORAL at 08:31

## 2023-08-13 RX ADMIN — POTASSIUM CHLORIDE 20 MEQ: 29.8 INJECTION, SOLUTION INTRAVENOUS at 11:25

## 2023-08-13 RX ADMIN — OXYCODONE HYDROCHLORIDE 5 MG: 5 TABLET ORAL at 17:28

## 2023-08-13 RX ADMIN — POTASSIUM CHLORIDE 20 MEQ: 29.8 INJECTION, SOLUTION INTRAVENOUS at 17:30

## 2023-08-13 RX ADMIN — ACETAMINOPHEN 650 MG: 325 TABLET ORAL at 16:24

## 2023-08-13 RX ADMIN — POTASSIUM CHLORIDE 20 MEQ: 29.8 INJECTION, SOLUTION INTRAVENOUS at 08:23

## 2023-08-13 RX ADMIN — HYDROCORTISONE 5 MG: 5 TABLET ORAL at 23:33

## 2023-08-13 RX ADMIN — POTASSIUM CHLORIDE 20 MEQ: 29.8 INJECTION, SOLUTION INTRAVENOUS at 13:13

## 2023-08-13 RX ADMIN — OXYCODONE HYDROCHLORIDE 5 MG: 5 TABLET ORAL at 08:31

## 2023-08-13 RX ADMIN — PIPERACILLIN AND TAZOBACTAM 3375 MG: 3; .375 INJECTION, POWDER, LYOPHILIZED, FOR SOLUTION INTRAVENOUS at 11:27

## 2023-08-13 RX ADMIN — ACETAMINOPHEN 650 MG: 325 TABLET ORAL at 02:36

## 2023-08-13 RX ADMIN — HEPARIN SODIUM 5000 UNITS: 5000 INJECTION INTRAVENOUS; SUBCUTANEOUS at 08:31

## 2023-08-13 RX ADMIN — AMLODIPINE BESYLATE 5 MG: 5 TABLET ORAL at 08:31

## 2023-08-13 RX ADMIN — ROPINIROLE HYDROCHLORIDE 1 MG: 1 TABLET, FILM COATED ORAL at 23:20

## 2023-08-13 ASSESSMENT — PAIN SCALES - GENERAL
PAINLEVEL_OUTOF10: 6
PAINLEVEL_OUTOF10: 7
PAINLEVEL_OUTOF10: 8
PAINLEVEL_OUTOF10: 3
PAINLEVEL_OUTOF10: 4
PAINLEVEL_OUTOF10: 0
PAINLEVEL_OUTOF10: 6
PAINLEVEL_OUTOF10: 5
PAINLEVEL_OUTOF10: 3
PAINLEVEL_OUTOF10: 4
PAINLEVEL_OUTOF10: 0
PAINLEVEL_OUTOF10: 8

## 2023-08-13 ASSESSMENT — PAIN DESCRIPTION - FREQUENCY
FREQUENCY: INTERMITTENT

## 2023-08-13 ASSESSMENT — PAIN SCALES - WONG BAKER
WONGBAKER_NUMERICALRESPONSE: 0

## 2023-08-13 ASSESSMENT — PAIN DESCRIPTION - DESCRIPTORS
DESCRIPTORS: ACHING;DISCOMFORT
DESCRIPTORS: ACHING
DESCRIPTORS: ACHING;DISCOMFORT
DESCRIPTORS: ACHING
DESCRIPTORS: ACHING;DISCOMFORT

## 2023-08-13 ASSESSMENT — PAIN DESCRIPTION - ORIENTATION
ORIENTATION: RIGHT;LEFT
ORIENTATION: RIGHT;LEFT;LOWER;MID;UPPER
ORIENTATION: RIGHT;LEFT;MID;LOWER;UPPER
ORIENTATION: MID

## 2023-08-13 ASSESSMENT — PAIN - FUNCTIONAL ASSESSMENT
PAIN_FUNCTIONAL_ASSESSMENT: ACTIVITIES ARE NOT PREVENTED

## 2023-08-13 ASSESSMENT — PAIN DESCRIPTION - LOCATION
LOCATION: ABDOMEN
LOCATION: HEAD
LOCATION: ABDOMEN

## 2023-08-13 ASSESSMENT — PAIN DESCRIPTION - ONSET
ONSET: ON-GOING

## 2023-08-13 ASSESSMENT — PAIN DESCRIPTION - PAIN TYPE
TYPE: CHRONIC PAIN

## 2023-08-13 NOTE — PROGRESS NOTES
Pt transported to room 4120 via wheelchair wit all belongings including glasses and cell phone.    ITZ Fregoso transported patient

## 2023-08-13 NOTE — PROGRESS NOTES
1845 Arrived to room 4120 via wheelchair from icu. Patient complains of nausea, thinks its from motion from wheelchair transfer. Zofran iv given. A/Ox4. Denies pain at this time. Call light in reach, instructed to call for assistance.

## 2023-08-13 NOTE — PROGRESS NOTES
Physical Therapy  Facility/Department: [address] ICU  Physical Therapy Initial Assessment    Name: Diana Contreras  : 1960  MRN: [id number]  Date of Service: 2023    Discharge Recommendations:  Continue to assess pending progress, Home with assist PRN   PT Equipment Recommendations  Equipment Needed: No      Diana Contreras scored a 21/24 on the AM-PAC short mobility form. At this time, no further PT is recommended upon discharge. Assessment   Body Structures, Functions, Activity Limitations Requiring Skilled Therapeutic Intervention: Decreased functional mobility ; Decreased endurance;Decreased balance  Assessment: 58 y/o female admit 2023 with DKA, FAVIAN, Sepsis, Anemia. CT Head : negative. PMH as noted including Pancreatic Mass, Malignant Melanoma of Choroid, Liver Mets, Adrenal Insufficiency, DM, Retinopathy. PTA pt living with  in home with several steps to enter and 1st floor bed/bath; independent daily care and functional mobility; independent daily care and functional mobility. Currently, pt arnulfo oob, short distance at bedside. Anticipate adequate progress and assist/support for d/c home. Do not anticipate need cont PT upon d/c. Will monitor pt's progress. Therapy Prognosis: Good  Decision Making: Low Complexity  History: 58 y/o female admit 2023 with DKA, FAVIAN, Sepsis, Anemia. CT Head : negative. PMH as noted including Pancreatic Mass, Malignant Melanoma of Choroid, Liver Mets, Adrenal Insufficiency, DM, Retinopathy. Exam: See above. Clinical Presentation: See above. Barriers to Learning: None.   Requires PT Follow-Up: Yes  Activity Tolerance  Activity Tolerance: Patient tolerated treatment well     Plan   Physcial Therapy Plan  General Plan: 3-5 times per week  Current Treatment Recommendations: Therapeutic activities, Balance training, Functional mobility training, Transfer training, Gait training, Stair training, Safety education & training, Patient/Caregiver [facility] NEPHROLOGY  Jarrell Payne M.D.  Terry Collins D.O.  Kenia Blackburn M.D.  Ayah Longoria, MSN, ANP-C    Telephone: [phone]  Facsimile: [phone]    [address] [place] [address]

## 2023-08-13 NOTE — PLAN OF CARE
Problem: Discharge Planning  Goal: Discharge to home or other facility with appropriate resources  8/13/2023 0950 by Mirna Pennington RN  Outcome: Progressing  8/13/2023 0249 by Sukhjinder Landa RN  Outcome: Progressing     Problem: Safety - Adult  Goal: Free from fall injury  8/13/2023 0950 by Mirna Pennington RN  Outcome: Progressing  8/13/2023 0249 by Sukhjinder Landa RN  Outcome: Progressing     Problem: Confusion  Goal: Confusion, delirium, dementia, or psychosis is improved or at baseline  Description: INTERVENTIONS:  1. Assess for possible contributors to thought disturbance, including medications, impaired vision or hearing, underlying metabolic abnormalities, dehydration, psychiatric diagnoses, and notify attending LIP  2. [place] high risk fall precautions, as indicated  3. Provide frequent short contacts to provide reality reorientation, refocusing and direction  4. Decrease environmental stimuli, including noise as appropriate  5. Monitor and intervene to maintain adequate nutrition, hydration, elimination, sleep and activity  6. If unable to ensure safety without constant attention obtain sitter and review sitter guidelines with assigned personnel  7. Initiate Psychosocial CNS and Spiritual Care consult, as indicated  8/13/2023 0950 by Mirna Pennington RN  Outcome: Completed  8/13/2023 0249 by Sukhjinder Landa RN  Outcome: Progressing     Problem: Skin/Tissue Integrity  Goal: Absence of new skin breakdown  Description: 1. Monitor for areas of redness and/or skin breakdown  2. Assess vascular access sites hourly  3. Every 4-6 hours minimum:  Change oxygen saturation probe site  4. Every 4-6 hours:  If on nasal continuous positive airway pressure, respiratory therapy assess nares and determine need for appliance change or resting period.   8/13/2023 0950 by Mirna Pennington RN  Outcome: Progressing  8/13/2023 0249 by Sukhjinder Landa RN  Outcome: Progressing     Problem: Pain  Goal: Verbalizes/displays adequate comfort level or baseline comfort level  8/13/2023 0950 by Henrene Jeans, RN  Outcome: Progressing  8/13/2023 0249 by Andreia Madden RN  Outcome: Progressing     Problem: Safety - Medical Restraint  Goal: Remains free of injury from restraints (Restraint for Interference with Medical Device)  Description: INTERVENTIONS:  1. Determine that other, less restrictive measures have been tried or would not be effective before applying the restraint  2. Evaluate the patient's condition at the time of restraint application  3. Inform patient/family regarding the reason for restraint  4.  Q2H: Monitor safety, psychosocial status, comfort, nutrition and hydration  Outcome: Completed

## 2023-08-13 NOTE — PROGRESS NOTES
at bedside. Per pt she will need a new transmitter and sensor for home insulin pump.  Luis Enrique Niece states he has all other supplies at home and will bring first thing in the morning. I did call Evie Garcia from pharmacy to update. Retail pharmacy is closed today. Pt gets meds filled at [facility].

## 2023-08-13 NOTE — PLAN OF CARE
Problem: Discharge Planning  Goal: Discharge to home or other facility with appropriate resources  Outcome: Progressing     Problem: Safety - Adult  Goal: Free from fall injury  Outcome: Progressing     Problem: Confusion  Goal: Confusion, delirium, dementia, or psychosis is improved or at baseline  Description: INTERVENTIONS:  1. Assess for possible contributors to thought disturbance, including medications, impaired vision or hearing, underlying metabolic abnormalities, dehydration, psychiatric diagnoses, and notify attending LIP  2. [place] high risk fall precautions, as indicated  3. Provide frequent short contacts to provide reality reorientation, refocusing and direction  4. Decrease environmental stimuli, including noise as appropriate  5. Monitor and intervene to maintain adequate nutrition, hydration, elimination, sleep and activity  6. If unable to ensure safety without constant attention obtain sitter and review sitter guidelines with assigned personnel  7. Initiate Psychosocial CNS and Spiritual Care consult, as indicated  Outcome: Progressing     Problem: Skin/Tissue Integrity  Goal: Absence of new skin breakdown  Description: 1. Monitor for areas of redness and/or skin breakdown  2. Assess vascular access sites hourly  3. Every 4-6 hours minimum:  Change oxygen saturation probe site  4. Every 4-6 hours:  If on nasal continuous positive airway pressure, respiratory therapy assess nares and determine need for appliance change or resting period.   Outcome: Progressing     Problem: Pain  Goal: Verbalizes/displays adequate comfort level or baseline comfort level  Outcome: Progressing

## 2023-08-13 NOTE — PROGRESS NOTES
are provided for review. Automated exposure control, iterative reconstruction, and/or weight based adjustment of the mA/kV was utilized to reduce the radiation dose to as low as reasonably achievable. COMPARISON: 08/01/2023, 04/27/2023 HISTORY: ORDERING SYSTEM PROVIDED HISTORY: Sepsis / shock of unclear origin TECHNOLOGIST PROVIDED HISTORY: Reason for exam:->Sepsis / shock of unclear origin Additional Contrast?->None Decision Support Exception - unselect if not a suspected or confirmed emergency medical condition->Emergency Medical Condition (MA) Reason for Exam: Sepsis / shock of unclear origin FINDINGS: Chest: Mediastinum: Right chest port with catheter tip at the cavoatrial junction. No thoracic adenopathy. Heart size is normal.  No pericardial effusion. Thoracic aorta is normal caliber. Circumferential wall thickening of the mid to distal esophagus has developed concerning for esophagitis. Lungs/pleura: Trace pleural effusions. Apical left upper lobe 5 mm nodule is stable. No acute lung consolidation. No pneumothorax. No central endobronchial lesion. Soft Tissues/Bones:  No acute abnormality of the visualized osseous structures. Subacute nondisplaced fracture in the mid sternum. Abdomen/Pelvis: Organs: Liver steatosis. The unenhanced liver, spleen, pancreas, adrenal glands and kidneys demonstrate no acute abnormality. Status post cholecystectomy. GI/Bowel: No acutely dilated or inflamed loops of bowel. Pelvis: No pelvic mass, adenopathy, or fluid collection. Peritoneum/Retroperitoneum: Small amount of ascites in the right upper quadrant has developed. No adenopathy. No abdominal aortic aneurysm. Bones/Soft Tissues:  No acute abnormality of the visualized osseous structures. 1. Circumferential wall thickening of the mid to distal esophagus has developed concerning for esophagitis. 2. Trace pleural effusions. 3. Small amount of ascites in the right upper quadrant.   Otherwise, no acute abnormality in

## 2023-08-14 LAB
ANION GAP SERPL CALCULATED.3IONS-SCNC: 9 MMOL/L (ref 3–16)
BACTERIA BLD CULT ORG #2: NORMAL
BUN SERPL-MCNC: 16 MG/DL (ref 7–20)
CALCIUM SERPL-MCNC: 8.3 MG/DL (ref 8.3–10.6)
CHLORIDE SERPL-SCNC: 97 MMOL/L (ref 99–110)
CO2 SERPL-SCNC: 31 MMOL/L (ref 21–32)
CREAT SERPL-MCNC: 1.6 MG/DL (ref 0.6–1.2)
GFR SERPLBLD CREATININE-BSD FMLA CKD-EPI: 36 ML/MIN/{1.73_M2}
GLUCOSE BLD-MCNC: 112 MG/DL (ref 70–99)
GLUCOSE BLD-MCNC: 179 MG/DL (ref 70–99)
GLUCOSE BLD-MCNC: 59 MG/DL (ref 70–99)
GLUCOSE BLD-MCNC: 72 MG/DL (ref 70–99)
GLUCOSE BLD-MCNC: 98 MG/DL (ref 70–99)
GLUCOSE SERPL-MCNC: 106 MG/DL (ref 70–99)
MAGNESIUM SERPL-MCNC: 1.9 MG/DL (ref 1.8–2.4)
PERFORMED ON: ABNORMAL
PERFORMED ON: NORMAL
PERFORMED ON: NORMAL
PHOSPHATE SERPL-MCNC: 3 MG/DL (ref 2.5–4.9)
POTASSIUM SERPL-SCNC: 3.7 MMOL/L (ref 3.5–5.1)
SODIUM SERPL-SCNC: 137 MMOL/L (ref 136–145)

## 2023-08-14 PROCEDURE — 6370000000 HC RX 637 (ALT 250 FOR IP): Performed by: INTERNAL MEDICINE

## 2023-08-14 PROCEDURE — 1200000000 HC SEMI PRIVATE

## 2023-08-14 PROCEDURE — 80048 BASIC METABOLIC PNL TOTAL CA: CPT

## 2023-08-14 PROCEDURE — 6360000002 HC RX W HCPCS: Performed by: STUDENT IN AN ORGANIZED HEALTH CARE EDUCATION/TRAINING PROGRAM

## 2023-08-14 PROCEDURE — 83735 ASSAY OF MAGNESIUM: CPT

## 2023-08-14 PROCEDURE — 6360000002 HC RX W HCPCS: Performed by: INTERNAL MEDICINE

## 2023-08-14 PROCEDURE — 84100 ASSAY OF PHOSPHORUS: CPT

## 2023-08-14 PROCEDURE — 9990000010 HC NO CHARGE VISIT

## 2023-08-14 PROCEDURE — 36415 COLL VENOUS BLD VENIPUNCTURE: CPT

## 2023-08-14 PROCEDURE — 2580000003 HC RX 258: Performed by: INTERNAL MEDICINE

## 2023-08-14 PROCEDURE — 6370000000 HC RX 637 (ALT 250 FOR IP): Performed by: STUDENT IN AN ORGANIZED HEALTH CARE EDUCATION/TRAINING PROGRAM

## 2023-08-14 PROCEDURE — 94760 N-INVAS EAR/PLS OXIMETRY 1: CPT

## 2023-08-14 RX ADMIN — HYDROCORTISONE 5 MG: 5 TABLET ORAL at 20:06

## 2023-08-14 RX ADMIN — HEPARIN SODIUM 5000 UNITS: 5000 INJECTION INTRAVENOUS; SUBCUTANEOUS at 08:38

## 2023-08-14 RX ADMIN — HEPARIN SODIUM 5000 UNITS: 5000 INJECTION INTRAVENOUS; SUBCUTANEOUS at 20:07

## 2023-08-14 RX ADMIN — OXYCODONE HYDROCHLORIDE 5 MG: 5 TABLET ORAL at 20:06

## 2023-08-14 RX ADMIN — ESCITALOPRAM OXALATE 10 MG: 10 TABLET ORAL at 08:38

## 2023-08-14 RX ADMIN — ACETAMINOPHEN 650 MG: 325 TABLET ORAL at 12:39

## 2023-08-14 RX ADMIN — HYDROCORTISONE 10 MG: 10 TABLET ORAL at 08:37

## 2023-08-14 RX ADMIN — AMLODIPINE BESYLATE 5 MG: 5 TABLET ORAL at 08:38

## 2023-08-14 RX ADMIN — PIPERACILLIN AND TAZOBACTAM 3375 MG: 3; .375 INJECTION, POWDER, LYOPHILIZED, FOR SOLUTION INTRAVENOUS at 11:56

## 2023-08-14 RX ADMIN — INSULIN GLARGINE 8 UNITS: 100 INJECTION, SOLUTION SUBCUTANEOUS at 08:38

## 2023-08-14 RX ADMIN — OXYCODONE HYDROCHLORIDE 5 MG: 5 TABLET ORAL at 14:54

## 2023-08-14 RX ADMIN — OXYCODONE HYDROCHLORIDE 5 MG: 5 TABLET ORAL at 10:12

## 2023-08-14 RX ADMIN — OXYCODONE HYDROCHLORIDE 5 MG: 5 TABLET ORAL at 05:26

## 2023-08-14 ASSESSMENT — PAIN DESCRIPTION - DESCRIPTORS
DESCRIPTORS: ACHING
DESCRIPTORS: ACHING;DISCOMFORT
DESCRIPTORS: ACHING
DESCRIPTORS: ACHING;DISCOMFORT
DESCRIPTORS: ACHING;DISCOMFORT

## 2023-08-14 ASSESSMENT — PAIN DESCRIPTION - LOCATION
LOCATION: ABDOMEN
LOCATION: ABDOMEN;HEAD
LOCATION: ABDOMEN
LOCATION: HEAD
LOCATION: ABDOMEN

## 2023-08-14 ASSESSMENT — PAIN - FUNCTIONAL ASSESSMENT
PAIN_FUNCTIONAL_ASSESSMENT: ACTIVITIES ARE NOT PREVENTED

## 2023-08-14 ASSESSMENT — PAIN DESCRIPTION - FREQUENCY
FREQUENCY: CONTINUOUS
FREQUENCY: INTERMITTENT

## 2023-08-14 ASSESSMENT — PAIN SCALES - GENERAL
PAINLEVEL_OUTOF10: 7
PAINLEVEL_OUTOF10: 8
PAINLEVEL_OUTOF10: 9
PAINLEVEL_OUTOF10: 8
PAINLEVEL_OUTOF10: 7

## 2023-08-14 ASSESSMENT — PAIN DESCRIPTION - ORIENTATION: ORIENTATION: MID

## 2023-08-14 ASSESSMENT — PAIN DESCRIPTION - ONSET
ONSET: PROGRESSIVE
ONSET: PROGRESSIVE

## 2023-08-14 ASSESSMENT — PAIN DESCRIPTION - PAIN TYPE
TYPE: ACUTE PAIN
TYPE: ACUTE PAIN

## 2023-08-14 NOTE — CARE COORDINATION
8/14 Plan: Return to home with family. From ICU. ID-should d/c on po abx. Insulin Pump issues- Diabetes Nurse deferred to PCP-out of her scope of practice. Follow.  Electronically signed by Kd Jeffrey RN on 8/14/2023 at 1:27 PM

## 2023-08-14 NOTE — PROGRESS NOTES
frequently represents skin contamination. Additional testing can be ordered by calling the  Microbiology Department. Lab Results   Component Value Date/Time    BLOODCULT2  08/10/2023 03:12 AM     No Growth to date. Any change in status will be called. Organism:   Lab Results   Component Value Date/Time    ORG Diphtheroids 08/09/2023 10:39 PM         Electronically signed by Ania Lundy MD on 8/14/2023 at 6:43 AM  Comment: Please note this report has been produced using speech recognition software and may contain errors related to that system including errors in grammar, punctuation, and spelling, as well as words and phrases that may be inappropriate. If there are any questions or concerns, please feel free to contact the dictating provider for clarification.

## 2023-08-14 NOTE — PROGRESS NOTES
Patients BS 59. This RN gave patient apple juice with 2 sugar packets, and cecelia crackers. Recheck BS in 30 minutes.

## 2023-08-14 NOTE — PROGRESS NOTES
Family asked if pt could have insulin pump re attached and if not does she have to wait 24 hours for discharge. Family states that they were told this was our policy. This RN spoke with Charge RN and messaged Attending. Attending does not want the pump attached today and there is no policy that requires pt to wait 24 hours. Pt and family are agreeable. Call light and bedside table within reach.

## 2023-08-14 NOTE — PROGRESS NOTES
Patient alert and oriented x4. Patients BS was 74 at 2013. Apple juice given to patient. BS recheck at  2132 . Patient tolerated night medication. Patient verbalized understanding of education. Standard safety measures in place. Needed items within reach. Call light within reach.

## 2023-08-14 NOTE — PROGRESS NOTES
Occupational Therapy  Benita Rose  Chart reviewed, pt in room with family, getting ready for dinner. Stated she has been up adlib in room. No difficulty with self care or mobility at this time. Stated she has a shower chair and walker at home if she needs it for energy conservation per family. Discharge from OT due to no  needs identified.   Yessy Galarza, OTR/L  #[id number] 8/14/23 5:39 PM

## 2023-08-14 NOTE — PLAN OF CARE
Problem: Discharge Planning  Goal: Discharge to home or other facility with appropriate resources  Outcome: Progressing     Problem: Safety - Adult  Goal: Free from fall injury  Outcome: Progressing     Problem: Skin/Tissue Integrity  Goal: Absence of new skin breakdown  Description: 1. Monitor for areas of redness and/or skin breakdown  2. Assess vascular access sites hourly  3. Every 4-6 hours minimum:  Change oxygen saturation probe site  4. Every 4-6 hours:  If on nasal continuous positive airway pressure, respiratory therapy assess nares and determine need for appliance change or resting period.   Outcome: Progressing     Problem: Pain  Goal: Verbalizes/displays adequate comfort level or baseline comfort level  Outcome: Progressing     Problem: Gastrointestinal - Adult  Goal: Minimal or absence of nausea and vomiting  Outcome: Progressing     Problem: Gastrointestinal - Adult  Goal: Maintains adequate nutritional intake  Outcome: Progressing     Problem: Infection - Adult  Goal: Absence of infection at discharge  Outcome: Progressing     Problem: Metabolic/Fluid and Electrolytes - Adult  Goal: Electrolytes maintained within normal limits  Outcome: Progressing

## 2023-08-14 NOTE — PROGRESS NOTES
Infectious Diseases Inpatient Progress Note        CHIEF COMPLAINT:     DKA  Lactic acidosis  Septic shock  WBC elevation  Procal elevation    Metastatic Melanoma         HISTORY OF PRESENT ILLNESS:  57 y.o. woman with a significant history for melanoma of the right eye and h/o DM on insulin, immune therapy, diabetes type 1, hypertension, insulin pump in place, adrenal insufficiency on steroid replacement admitted hospital secondary change mental status and blood glucose reading very high on the meter. Per family patient is currently on therapy for metastatic myeloma. Labs indicate sodium 116, potassium 6.6 creatinine 3.3 blood glucose 1645, procalcitonin elevated at 5.54 LFT normal hemoglobin 8.4 WBC 18.8, lactic acid 3.9. Patient is very encephalopathic unable to provide any history she is currently in ICU secondary to septic shock hypotension and DKA. CT chest Abdo pelvis indicate circumferential wall thickening of the distal esophagus concerning for esophagitis trace bilateral pleural effusion. Chest x-ray negative chest chest port in place.   CT head negative    Interval History : tx out of ICU BG is better and no chills no fevers and Mentation back to normal and she does not recall the admission to hospital till now -    Past Medical History:    Past Medical History:   Diagnosis Date    Adrenal insufficiency ([address])     Cancer ([address]) 2002    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    Depression     Diabetes mellitus ([address])     Type I    Hx of radiation therapy     melanoma right eye    Hypertension     Pt. denies having history of Hypertension    Hyponatremia     Insulin pump in place     Melanoma ([address]) 01/13/2023    in stomach, pancreas    Prolonged emergence from general anesthesia     slow to wake up    Restless leg syndrome        Past Surgical History:    Past Surgical History:   Procedure Laterality Date    CARPAL TUNNEL RELEASE Bilateral 12/2017    CHOLECYSTECTOMY      CT BIOPSY ABDOMEN RETROPERITONEUM  12/27/2022

## 2023-08-15 VITALS
SYSTOLIC BLOOD PRESSURE: 150 MMHG | RESPIRATION RATE: 16 BRPM | DIASTOLIC BLOOD PRESSURE: 67 MMHG | OXYGEN SATURATION: 99 % | BODY MASS INDEX: 21.56 KG/M2 | WEIGHT: 106.92 LBS | HEIGHT: 59 IN | HEART RATE: 76 BPM | TEMPERATURE: 98.8 F

## 2023-08-15 LAB
ANION GAP SERPL CALCULATED.3IONS-SCNC: 8 MMOL/L (ref 3–16)
ANION GAP SERPL CALCULATED.3IONS-SCNC: 8 MMOL/L (ref 3–16)
BUN SERPL-MCNC: 10 MG/DL (ref 7–20)
BUN SERPL-MCNC: 11 MG/DL (ref 7–20)
CALCIUM SERPL-MCNC: 8.3 MG/DL (ref 8.3–10.6)
CALCIUM SERPL-MCNC: 8.6 MG/DL (ref 8.3–10.6)
CHLORIDE SERPL-SCNC: 98 MMOL/L (ref 99–110)
CHLORIDE SERPL-SCNC: 98 MMOL/L (ref 99–110)
CO2 SERPL-SCNC: 30 MMOL/L (ref 21–32)
CO2 SERPL-SCNC: 30 MMOL/L (ref 21–32)
CREAT SERPL-MCNC: 1.3 MG/DL (ref 0.6–1.2)
CREAT SERPL-MCNC: 1.3 MG/DL (ref 0.6–1.2)
GFR SERPLBLD CREATININE-BSD FMLA CKD-EPI: 46 ML/MIN/{1.73_M2}
GFR SERPLBLD CREATININE-BSD FMLA CKD-EPI: 46 ML/MIN/{1.73_M2}
GLUCOSE BLD-MCNC: 111 MG/DL (ref 70–99)
GLUCOSE BLD-MCNC: 127 MG/DL (ref 70–99)
GLUCOSE BLD-MCNC: 145 MG/DL (ref 70–99)
GLUCOSE BLD-MCNC: 188 MG/DL (ref 70–99)
GLUCOSE BLD-MCNC: 75 MG/DL (ref 70–99)
GLUCOSE SERPL-MCNC: 130 MG/DL (ref 70–99)
GLUCOSE SERPL-MCNC: 61 MG/DL (ref 70–99)
MAGNESIUM SERPL-MCNC: 1.6 MG/DL (ref 1.8–2.4)
PERFORMED ON: ABNORMAL
PERFORMED ON: NORMAL
PHOSPHATE SERPL-MCNC: 3.2 MG/DL (ref 2.5–4.9)
POTASSIUM SERPL-SCNC: 3 MMOL/L (ref 3.5–5.1)
POTASSIUM SERPL-SCNC: 3.3 MMOL/L (ref 3.5–5.1)
SODIUM SERPL-SCNC: 136 MMOL/L (ref 136–145)
SODIUM SERPL-SCNC: 136 MMOL/L (ref 136–145)

## 2023-08-15 PROCEDURE — 83735 ASSAY OF MAGNESIUM: CPT

## 2023-08-15 PROCEDURE — 6370000000 HC RX 637 (ALT 250 FOR IP): Performed by: STUDENT IN AN ORGANIZED HEALTH CARE EDUCATION/TRAINING PROGRAM

## 2023-08-15 PROCEDURE — 6360000002 HC RX W HCPCS: Performed by: STUDENT IN AN ORGANIZED HEALTH CARE EDUCATION/TRAINING PROGRAM

## 2023-08-15 PROCEDURE — 6370000000 HC RX 637 (ALT 250 FOR IP): Performed by: INTERNAL MEDICINE

## 2023-08-15 PROCEDURE — 80048 BASIC METABOLIC PNL TOTAL CA: CPT

## 2023-08-15 PROCEDURE — 84100 ASSAY OF PHOSPHORUS: CPT

## 2023-08-15 PROCEDURE — 36415 COLL VENOUS BLD VENIPUNCTURE: CPT

## 2023-08-15 RX ORDER — DEXTROSE MONOHYDRATE 100 MG/ML
INJECTION, SOLUTION INTRAVENOUS CONTINUOUS PRN
Status: DISCONTINUED | OUTPATIENT
Start: 2023-08-15 | End: 2023-08-15 | Stop reason: HOSPADM

## 2023-08-15 RX ORDER — POTASSIUM CHLORIDE 750 MG/1
40 TABLET, FILM COATED, EXTENDED RELEASE ORAL 3 TIMES DAILY
Status: DISCONTINUED | OUTPATIENT
Start: 2023-08-15 | End: 2023-08-15 | Stop reason: HOSPADM

## 2023-08-15 RX ORDER — MAGNESIUM SULFATE IN WATER 40 MG/ML
2000 INJECTION, SOLUTION INTRAVENOUS ONCE
Status: COMPLETED | OUTPATIENT
Start: 2023-08-15 | End: 2023-08-15

## 2023-08-15 RX ORDER — POTASSIUM CHLORIDE 1500 MG/1
40 TABLET, EXTENDED RELEASE ORAL 2 TIMES DAILY
Qty: 8 TABLET | Refills: 0 | Status: SHIPPED | OUTPATIENT
Start: 2023-08-15 | End: 2023-08-17

## 2023-08-15 RX ADMIN — ONDANSETRON 4 MG: 2 INJECTION INTRAMUSCULAR; INTRAVENOUS at 08:46

## 2023-08-15 RX ADMIN — POTASSIUM CHLORIDE 40 MEQ: 750 TABLET, FILM COATED, EXTENDED RELEASE ORAL at 09:04

## 2023-08-15 RX ADMIN — OXYCODONE HYDROCHLORIDE 5 MG: 5 TABLET ORAL at 08:46

## 2023-08-15 RX ADMIN — HYDROCORTISONE 10 MG: 10 TABLET ORAL at 11:28

## 2023-08-15 RX ADMIN — ROPINIROLE HYDROCHLORIDE 1 MG: 1 TABLET, FILM COATED ORAL at 00:08

## 2023-08-15 RX ADMIN — OXYCODONE HYDROCHLORIDE 5 MG: 5 TABLET ORAL at 13:34

## 2023-08-15 RX ADMIN — ESCITALOPRAM OXALATE 10 MG: 10 TABLET ORAL at 08:46

## 2023-08-15 RX ADMIN — POTASSIUM CHLORIDE 40 MEQ: 750 TABLET, FILM COATED, EXTENDED RELEASE ORAL at 13:34

## 2023-08-15 RX ADMIN — OXYCODONE HYDROCHLORIDE 5 MG: 5 TABLET ORAL at 00:09

## 2023-08-15 RX ADMIN — MAGNESIUM SULFATE HEPTAHYDRATE 2000 MG: 40 INJECTION, SOLUTION INTRAVENOUS at 08:52

## 2023-08-15 RX ADMIN — HEPARIN SODIUM 5000 UNITS: 5000 INJECTION INTRAVENOUS; SUBCUTANEOUS at 08:47

## 2023-08-15 RX ADMIN — AMLODIPINE BESYLATE 5 MG: 5 TABLET ORAL at 08:46

## 2023-08-15 ASSESSMENT — PAIN DESCRIPTION - DESCRIPTORS
DESCRIPTORS: ACHING
DESCRIPTORS: ACHING
DESCRIPTORS: DISCOMFORT;ACHING

## 2023-08-15 ASSESSMENT — PAIN SCALES - GENERAL
PAINLEVEL_OUTOF10: 7
PAINLEVEL_OUTOF10: 9
PAINLEVEL_OUTOF10: 8

## 2023-08-15 ASSESSMENT — PAIN DESCRIPTION - LOCATION
LOCATION: ABDOMEN

## 2023-08-15 ASSESSMENT — PAIN - FUNCTIONAL ASSESSMENT: PAIN_FUNCTIONAL_ASSESSMENT: ACTIVITIES ARE NOT PREVENTED

## 2023-08-15 NOTE — PROGRESS NOTES
Physical Therapy  Treatment Attempt and Discharge    08/15/23    Name: Suyapa Mccauley   : 1960    MRN: [id number]    Patient states that she has been up ad heide and does not need therapy at this time. PT will discharge from caseload this date. Anticipate safe d/c home independently.     Electronically signed by Esther Krishnan PT on 8/15/2023 at 10:18 AM

## 2023-08-15 NOTE — CARE COORDINATION
Case Management Discharge Note          Date / Time of Note: 8/15/2023 4:53 PM                  Patient Name: Benita Rose   YOB: 1960  Diagnosis: Hyperkalemia [E87.5]  FAVIAN (acute kidney injury) ([facility]) [N17.9]  DKA, type 2, not at goal [facility]) [E11.10]  Diabetic ketoacidosis without coma associated with type 1 diabetes mellitus ([facility]) [E10.10]   Date / Time: 8/9/2023  5:44 PM    Financial:  Payor: Derrick Mckenzie / Plan: ROBERTO [facility] / Product Type: *No Product type* /      Pharmacy:    [facility] #[id number] - [place], [address] [place] [address]  [facility]  Phone: [phone] Fax: [phone]    [address] [place] [address] Drive, [address] [place] [address] [phone] - f [phone]  [address] [place] [address], [address] [place] [address]  Phone: [phone] Fax: [phone]    [address], [address] [phone] Jadiel Tanner [phone]  [address] [address] [facility]  Phone: [phone] Fax: [phone]        DISCHARGE Disposition: Home- No Services Needed      Transportation:  Transportation PLAN for discharge: family   Mode of Transport: Private Car    IMM Completed:   Not Indicated    Additional CM Notes: Home with family support. The Plan for Transition of Care is related to the following treatment goals of Hyperkalemia [E87.5]  FAVIAN (acute kidney injury) ([facility]) [N17.9]  DKA, type 2, not at goal [facility]) [E11.10]  Diabetic ketoacidosis without coma associated with type 1 diabetes mellitus ([facility]) [E10.10]    The Patient and/or patient representative Lina Pineda and her family were provided with a choice of provider and agrees with the discharge plan Yes    Freedom of choice list was provided with basic dialogue that supports the patient's individualized plan of care/goals and shares the quality data associated with the providers.

## 2023-08-15 NOTE — PLAN OF CARE
Problem: Discharge Planning  Goal: Discharge to home or other facility with appropriate resources  Outcome: Progressing  Flowsheets (Taken 8/14/2023 0900 by Alex Adams, RN)  Discharge to home or other facility with appropriate resources: Identify barriers to discharge with patient and caregiver     Problem: Safety - Adult  Goal: Free from fall injury  Outcome: Progressing     Problem: Skin/Tissue Integrity  Goal: Absence of new skin breakdown  Description: 1. Monitor for areas of redness and/or skin breakdown  2. Assess vascular access sites hourly  3. Every 4-6 hours minimum:  Change oxygen saturation probe site  4. Every 4-6 hours:  If on nasal continuous positive airway pressure, respiratory therapy assess nares and determine need for appliance change or resting period.   Outcome: Progressing     Problem: Pain  Goal: Verbalizes/displays adequate comfort level or baseline comfort level  Outcome: Progressing     Problem: Gastrointestinal - Adult  Goal: Minimal or absence of nausea and vomiting  Outcome: Progressing  Flowsheets (Taken 8/14/2023 0900 by Alex Adams, RN)  Minimal or absence of nausea and vomiting: Administer IV fluids as ordered to ensure adequate hydration     Problem: Gastrointestinal - Adult  Goal: Maintains adequate nutritional intake  Outcome: Progressing  Flowsheets (Taken 8/14/2023 0900 by Alex Adams, RN)  Maintains adequate nutritional intake: Monitor percentage of each meal consumed     Problem: Infection - Adult  Goal: Absence of infection at discharge  Outcome: Progressing  Flowsheets (Taken 8/14/2023 0900 by Alex Adams, RN)  Absence of infection at discharge: Assess and monitor for signs and symptoms of infection     Problem: Metabolic/Fluid and Electrolytes - Adult  Goal: Electrolytes maintained within normal limits  Outcome: Progressing  Flowsheets (Taken 8/14/2023 0900 by Alex Adams, RN)  Electrolytes maintained within normal limits: Monitor labs and assess patient for signs and symptoms of electrolyte imbalances

## 2023-08-15 NOTE — PROGRESS NOTES
This RN discussed discharge instructions with pt. Pt educated on the importance of blood glucose monitoring. Pt educated on the importance of follow up appointment. Midline removed w/o complication. Pressure applied and petroleum gauze dressing applied per protocol. Pt tolerated well. Port de-accessed, cleaned with alcohol swab and band aid placed. Pt discharged via wheelchair with all of her belongings. Pt  drove her home.

## 2023-08-15 NOTE — PROGRESS NOTES
Magnesium and Potassium replaced per order. Pt c/o abd pain 9/10. Pt medicated per MAR. Call light and bedside table within reach.

## 2023-08-15 NOTE — PROGRESS NOTES
[facility]  Diabetes Education   Progress Note       NAME:  Braden De La Torre  MEDICAL RECORD NUMBER:  [id number]  AGE: 57 y.o. GENDER: female  : 1960  TODAY'S DATE:  8/15/2023    Subjective   Reason for Diabetic Education Evaluation and Assessment: insulin pump    [address] is awake, alert and oriented. She is eager to resume her insulin pump. She has all supplies available. Dexcom G 6 CGM. Omni Pod with Humalog insulin. Basal rate in auto mode. Target , Carb 1:15, Correction 1:50, Active insulin 3 hours.       Visit Type: follow-up      Braden De La Torer is a 57 y.o. female referred by:   [x] Physician  [] Nursing  [] Chart Review   [] Other:     PAST MEDICAL HISTORY        Diagnosis Date    Adrenal insufficiency ([address])     Cancer ([address])     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    Depression     Diabetes mellitus ([address])     Type I    Hx of radiation therapy     melanoma right eye    Hypertension     Pt. denies having history of Hypertension    Hyponatremia     Insulin pump in place     Melanoma ([address]) 2023    in stomach, pancreas    Prolonged emergence from general anesthesia     slow to wake up    Restless leg syndrome        PAST SURGICAL HISTORY    Past Surgical History:   Procedure Laterality Date    CARPAL TUNNEL RELEASE Bilateral 2017    CHOLECYSTECTOMY      CT BIOPSY ABDOMEN RETROPERITONEUM  2022    CT BIOPSY ABDOMEN RETROPERITONEUM 2022 [facility] CT SCAN    EYE SURGERY Right     biopsy    HYSTERECTOMY (CERVIX STATUS UNKNOWN)      LIPOMA RESECTION      LIVER BIOPSY Right     PORT SURGERY Right 2023    RIGHT POWER PORT PLACEMENT performed by Modesta Jenkins MD at [address] [place] [address] ARTHROSCOPY Right 2018    RIGHT SHOULDER ARTHROSCOPE, SUBACROMIAL DECOMPRESSION, DISTALCLAVICLE EXCISION, CAPSULAR RELEASE, MANIPULATION UNDER ANESTHESIA, DEBRIDEMENT    SHOULDER SURGERY      UPPER GASTROINTESTINAL ENDOSCOPY  10/22/2014    UPPER GASTROINTESTINAL ENDOSCOPY N/A 2023    EGD W/EUS FNA performed by Isha Prado MD at [address]  2023    EGD BIOPSY performed by Isha Prado MD at [address]  2022    US GUIDED LIVER BIOPSY PERCUTANEOUS 2022 [address] [facility] ULTRASOUND       FAMILY HISTORY    Family History   Problem Relation Age of Onset    High Blood Pressure Mother     Breast Cancer Mother         breast w mets to bone    Diabetes Father     Stroke Father     Other Cancer Father         bladder       SOCIAL HISTORY    Social History     Tobacco Use    Smoking status: Former     Packs/day: 1.00     Years: 10.00     Pack years: 10.00     Types: Cigarettes     Quit date: 1980     Years since quittin.7    Smokeless tobacco: Never   Vaping Use    Vaping Use: Never used   Substance Use Topics    Alcohol use: No    Drug use: No       ALLERGIES    No Active Allergies    MEDICATIONS     potassium chloride  40 mEq Oral TID    magnesium sulfate  2,000 mg IntraVENous Once    Insulin Pump - Bolus Dose   SubCUTAneous 4x Daily AC & HS    Insulin Pump - Basal Dose   SubCUTAneous Daily    hydrocortisone  10 mg Oral Daily    hydrocortisone  5 mg Oral Nightly    rOPINIRole  1 mg Oral Nightly    amLODIPine  5 mg Oral Daily    sodium chloride  1,000 mL IntraVENous Once    escitalopram  10 mg Oral Daily    heparin (porcine)  5,000 Units SubCUTAneous BID       Objective        Patient Active Problem List   Diagnosis Code    Duodenal erosion K26.9    Left elbow pain M25.522    Type 1 diabetes mellitus (HCC) E10.9    Vitamin D deficiency E55.9    Family history of thyroid disease Z83.49    Diabetic nephropathy (HCC) E11.21    Thyroid enlargement E04.9    Gastroesophageal reflux disease without esophagitis K21.9    Cataract, left eye H26.9    Insulin pump status Z96.41    Lateral epicondylitis of right elbow M77.11    Lumbar disc herniation with radiculopathy M51.16

## 2023-08-15 NOTE — PLAN OF CARE
Problem: Discharge Planning  Goal: Discharge to home or other facility with appropriate resources  Outcome: Adequate for Discharge  Flowsheets (Taken 8/15/2023 0800)  Discharge to home or other facility with appropriate resources: Identify barriers to discharge with patient and caregiver     Problem: Safety - Adult  Goal: Free from fall injury  Outcome: Adequate for Discharge     Problem: Confusion  Goal: Confusion, delirium, dementia, or psychosis is improved or at baseline  Description: INTERVENTIONS:  1. Assess for possible contributors to thought disturbance, including medications, impaired vision or hearing, underlying metabolic abnormalities, dehydration, psychiatric diagnoses, and notify attending LIP  2. [place] high risk fall precautions, as indicated  3. Provide frequent short contacts to provide reality reorientation, refocusing and direction  4. Decrease environmental stimuli, including noise as appropriate  5. Monitor and intervene to maintain adequate nutrition, hydration, elimination, sleep and activity  6. If unable to ensure safety without constant attention obtain sitter and review sitter guidelines with assigned personnel  7. Initiate Psychosocial CNS and Spiritual Care consult, as indicated  Recent Flowsheet Documentation  Taken 8/15/2023 0800 by Berta Bernal RN  Effect of thought disturbance (confusion, delirium, dementia, or psychosis) are managed with adequate functional status:   Monitor and intervene to maintain adequate nutrition, hydration, elimination, sleep and activity   Assess for contributors to thought disturbance, including medications, impaired vision or hearing, underlying metabolic abnormalities, dehydration, psychiatric diagnoses, notify LIP     Problem: Skin/Tissue Integrity  Goal: Absence of new skin breakdown  Description: 1. Monitor for areas of redness and/or skin breakdown  2. Assess vascular access sites hourly  3.   Every 4-6 hours minimum:  Change oxygen saturation probe site  4. Every 4-6 hours:  If on nasal continuous positive airway pressure, respiratory therapy assess nares and determine need for appliance change or resting period.   Outcome: Adequate for Discharge     Problem: Pain  Goal: Verbalizes/displays adequate comfort level or baseline comfort level  Outcome: Adequate for Discharge     Problem: Gastrointestinal - Adult  Goal: Minimal or absence of nausea and vomiting  Outcome: Adequate for Discharge  Flowsheets (Taken 8/15/2023 0800)  Minimal or absence of nausea and vomiting: Administer IV fluids as ordered to ensure adequate hydration  Goal: Maintains adequate nutritional intake  Outcome: Adequate for Discharge  Flowsheets (Taken 8/15/2023 0800)  Maintains adequate nutritional intake: Monitor percentage of each meal consumed     Problem: Infection - Adult  Goal: Absence of infection at discharge  Outcome: Adequate for Discharge  Flowsheets (Taken 8/15/2023 0800)  Absence of infection at discharge: Assess and monitor for signs and symptoms of infection     Problem: Metabolic/Fluid and Electrolytes - Adult  Goal: Electrolytes maintained within normal limits  Outcome: Adequate for Discharge  Flowsheets (Taken 8/15/2023 0800)  Electrolytes maintained within normal limits: Monitor labs and assess patient for signs and symptoms of electrolyte imbalances

## 2023-08-15 NOTE — PROGRESS NOTES
Patient alert and oriented x4. Patient complains of ABD pain 7/10. Medication administered. See MAR. Patient tolerated night medications. Patient verbalized understanding of education. Standard safety measures in place. Needed items within reach. Call light within reach.

## 2023-08-16 ENCOUNTER — CARE COORDINATION (OUTPATIENT)
Dept: OTHER | Facility: CLINIC | Age: 63
End: 2023-08-16

## 2023-08-16 RX ORDER — PROCHLORPERAZINE 25 MG/1
SUPPOSITORY RECTAL
Qty: 1 EACH | Refills: 0 | Status: SHIPPED | OUTPATIENT
Start: 2023-08-16

## 2023-08-16 NOTE — CARE COORDINATION
Care Transitions Outreach Attempt    Call within 2 business days of discharge: Yes   Attempted to reach patient for transitions of care follow up. Unable to reach patient. Patient: Diana Contreras Patient : 1960 MRN: [id number]    Last Discharge [address]       Date Complaint Diagnosis Description Type Department Provider    23 Hyperglycemia Diabetic ketoacidosis without coma associated with type 1 diabetes mellitus ([facility]) . .. ED to Hosp-Admission (Discharged) (ADMITTED) Krisitan Walters MD; [address] [place] [address] .. HIPAA compliant message left requesting a return phone call at patients convenience. Will continue to follow. Was this an external facility discharge?  No Discharge Facility: [facility]    Noted following upcoming appointments from discharge chart review:   [facility] follow up appointment(s):   Future Appointments   Date Time Provider [address]   2023  1:50 PM MD Alyse Clay [facility]   2024 11:30 AM MD Alyse Clay [facility]   2024 11:30 AM MD Alyse [facility]     Non-[facility] follow up appointment(s): n/a

## 2023-08-17 ENCOUNTER — CARE COORDINATION (OUTPATIENT)
Dept: OTHER | Facility: CLINIC | Age: 63
End: 2023-08-17

## 2023-08-17 NOTE — CARE COORDINATION
Care Transitions Outreach Attempt    Call within 2 business days of discharge: Yes   Attempted to reach patient for transitions of care follow up. Unable to reach patient. Patient: Megan Correia Patient : 1960 MRN: [id number]    Last Discharge [address]       Date Complaint Diagnosis Description Type Department Provider    23 Hyperglycemia Diabetic ketoacidosis without coma associated with type 1 diabetes mellitus ([address] ) . .. ED to Hosp-Admission (Discharged) (ADMITTED) Cihquita Blount MD; [address] [place] [address] .. HIPAA compliant message left requesting a return phone call at patients convenience. Will continue to follow. Was this an external facility discharge?  No Discharge Facility: \[facility]\""     Noted following upcoming appointments from discharge chart review:   [facility] follow up appointment(s):   Future Appointments   Date Time Provider [address]   2023  1:50 PM MD Alyse Ryan [facility]   2024 11:30 AM MD Alyse Ryan [facility]   2024 11:30 AM MD Alyse Ryan [facility]     Non-[facility] follow up appointment(s): n/a

## 2023-08-22 PROBLEM — R79.89 ELEVATED TROPONIN: Status: RESOLVED | Noted: 2023-07-23 | Resolved: 2023-08-22

## 2023-08-22 PROBLEM — R77.8 ELEVATED TROPONIN: Status: RESOLVED | Noted: 2023-07-23 | Resolved: 2023-08-22

## 2023-08-23 ENCOUNTER — CARE COORDINATION (OUTPATIENT)
Dept: OTHER | Facility: CLINIC | Age: 63
End: 2023-08-23

## 2023-08-23 NOTE — CARE COORDINATION
Final Transition of Care Outreach Attempt     ACM attempted to reach patient for final Care Transitions call. HIPAA compliant message left requesting a return phone call at patient convenience. Final Unable to Reach Letter sent via My Chart . No further outreach scheduled with this ACM, patient has been provided with this ACM's contact information. ACM will sign off care team at this time. Episode of care resolved. Future Appointments   Date Time Provider [address] [place] [address]   11/28/2023  1:50 PM MD Alyse Ryan [facility]   2/29/2024 11:30 AM MD Alyse Ryan [facility]   5/28/2024 11:30 AM MD Alyse Ryan [facility]         Matthew CELISN, RN- [facility]  Associate Care Manager  [phone]  [email or web address]

## 2023-09-10 DIAGNOSIS — F32.2 CURRENT SEVERE EPISODE OF MAJOR DEPRESSIVE DISORDER WITHOUT PSYCHOTIC FEATURES WITHOUT PRIOR EPISODE (HCC): ICD-10-CM

## 2023-09-11 RX ORDER — ESCITALOPRAM OXALATE 10 MG/1
TABLET ORAL
Qty: 30 TABLET | Refills: 1 | Status: SHIPPED | OUTPATIENT
Start: 2023-09-11

## 2023-09-11 NOTE — TELEPHONE ENCOUNTER
Medication:   Requested Prescriptions     Pending Prescriptions Disp Refills    escitalopram (LEXAPRO) 10 MG tablet [Pharmacy Med Name: ESCITALOPRAM OXALATE 10MG TABS] 30 tablet 1     Sig: TAKE ONE TABLET BY MOUTH ONCE A DAY        Last Filled:  7/10/2023    Patient Phone Number: [phone] (home)     Last appt: 6/26/2023   Next appt: Visit date not found    Last OARRS:        No data to display

## 2023-09-19 ENCOUNTER — TELEPHONE (OUTPATIENT)
Dept: ENDOCRINOLOGY | Age: 63
End: 2023-09-19

## 2023-09-19 ENCOUNTER — PATIENT MESSAGE (OUTPATIENT)
Dept: ENDOCRINOLOGY | Age: 63
End: 2023-09-19

## 2023-09-19 NOTE — TELEPHONE ENCOUNTER
Call from Bambi Alonso with Dr. Vincent Prim office wanting to inform Dr. Shannan Pathak that the pt has been prescribed prednisone for an IO induced rash due to her cancer     Bambi Alonso stated that if Dr. Shannan Pathak has any questions they can be reached att [phone] Ext [phone]

## 2023-09-19 NOTE — TELEPHONE ENCOUNTER
From: Suyapa Mccauley  To: Dr. Rolanda Ferguson  Sent: 9/19/2023 12:16 PM EDT  Subject: Steroid    Dr Michael Moncada, my oncologist is putting me on prednisone and wanted me to let you know. Should I adjust my dose of insulin while I am taking it? Please let me know asap so I can start them right away. I have gotten a rash from my treatments and it is driving me crazy.

## 2023-09-19 NOTE — TELEPHONE ENCOUNTER
JOSELITO  Spoke with patient. She will start prednisone on 9/20/2023. It is prescribed 20 mg 3 times a day. Therefore most likely she will have hyperglycemia all day and night. Advised patient to use OmniPod CGM correction feature every 3-4 hours for her to correct hyperglycemia. If glucose stays very high, then we will send prescription or give her samples of rapid acting insulin injections with a pen or syringe in addition to her pump. Patient will notify us.

## 2023-10-24 DIAGNOSIS — E10.40 POORLY CONTROLLED TYPE 1 DIABETES MELLITUS WITH NEUROPATHY (HCC): ICD-10-CM

## 2023-10-24 DIAGNOSIS — E10.65 POORLY CONTROLLED TYPE 1 DIABETES MELLITUS WITH NEUROPATHY (HCC): ICD-10-CM

## 2023-10-24 RX ORDER — PROCHLORPERAZINE 25 MG/1
SUPPOSITORY RECTAL
Qty: 1 EACH | Refills: 2 | Status: SHIPPED | OUTPATIENT
Start: 2023-10-24

## 2023-11-05 DIAGNOSIS — F32.2 CURRENT SEVERE EPISODE OF MAJOR DEPRESSIVE DISORDER WITHOUT PSYCHOTIC FEATURES WITHOUT PRIOR EPISODE (HCC): ICD-10-CM

## 2023-11-06 RX ORDER — ESCITALOPRAM OXALATE 10 MG/1
TABLET ORAL
Qty: 30 TABLET | Refills: 1 | Status: SHIPPED | OUTPATIENT
Start: 2023-11-06

## 2023-11-06 NOTE — TELEPHONE ENCOUNTER
Medication:   Requested Prescriptions     Pending Prescriptions Disp Refills    escitalopram (LEXAPRO) 10 MG tablet [Pharmacy Med Name: ESCITALOPRAM OXALATE 10MG TABS] 30 tablet 1     Sig: TAKE ONE TABLET BY MOUTH ONCE A DAY        Last Filled:  9/11/23    Patient Phone Number: [phone] (home)     Last appt: 6/26/2023   Next appt: Visit date not found    Last OARRS:        No data to display

## 2023-11-18 DIAGNOSIS — I10 PRIMARY HYPERTENSION: ICD-10-CM

## 2023-11-18 DIAGNOSIS — E55.9 VITAMIN D DEFICIENCY: ICD-10-CM

## 2023-11-18 DIAGNOSIS — E10.65 POORLY CONTROLLED TYPE 1 DIABETES MELLITUS WITH RETINOPATHY (HCC): ICD-10-CM

## 2023-11-18 DIAGNOSIS — E10.40 POORLY CONTROLLED TYPE 1 DIABETES MELLITUS WITH NEUROPATHY (HCC): ICD-10-CM

## 2023-11-18 DIAGNOSIS — Z83.49 FAMILY HISTORY OF THYROID DISEASE: ICD-10-CM

## 2023-11-18 DIAGNOSIS — C79.70: ICD-10-CM

## 2023-11-18 DIAGNOSIS — E04.9 THYROID ENLARGEMENT: ICD-10-CM

## 2023-11-18 DIAGNOSIS — E10.319 POORLY CONTROLLED TYPE 1 DIABETES MELLITUS WITH RETINOPATHY (HCC): ICD-10-CM

## 2023-11-18 DIAGNOSIS — E78.2 MIXED HYPERLIPIDEMIA: ICD-10-CM

## 2023-11-18 DIAGNOSIS — E10.65 POORLY CONTROLLED TYPE 1 DIABETES MELLITUS WITH NEUROPATHY (HCC): ICD-10-CM

## 2023-11-18 DIAGNOSIS — C43.9: ICD-10-CM

## 2023-11-18 LAB
25(OH)D3 SERPL-MCNC: 27.4 NG/ML
ALBUMIN SERPL-MCNC: 4.6 G/DL (ref 3.4–5)
ALBUMIN/GLOB SERPL: 1.9 {RATIO} (ref 1.1–2.2)
ALP SERPL-CCNC: 115 U/L (ref 40–129)
ALT SERPL-CCNC: 16 U/L (ref 10–40)
ANION GAP SERPL CALCULATED.3IONS-SCNC: 14 MMOL/L (ref 3–16)
AST SERPL-CCNC: 21 U/L (ref 15–37)
BILIRUB SERPL-MCNC: <0.2 MG/DL (ref 0–1)
BUN SERPL-MCNC: 50 MG/DL (ref 7–20)
CALCIUM SERPL-MCNC: 9.7 MG/DL (ref 8.3–10.6)
CHLORIDE SERPL-SCNC: 105 MMOL/L (ref 99–110)
CHOLEST SERPL-MCNC: 306 MG/DL (ref 0–199)
CO2 SERPL-SCNC: 21 MMOL/L (ref 21–32)
CREAT SERPL-MCNC: 2 MG/DL (ref 0.6–1.2)
CREAT UR-MCNC: 30.3 MG/DL (ref 28–259)
EST. AVERAGE GLUCOSE BLD GHB EST-MCNC: 182.9 MG/DL
GFR SERPLBLD CREATININE-BSD FMLA CKD-EPI: 27 ML/MIN/{1.73_M2}
GLUCOSE SERPL-MCNC: 71 MG/DL (ref 70–99)
HBA1C MFR BLD: 8 %
HDLC SERPL-MCNC: 215 MG/DL (ref 40–60)
LDL CHOLESTEROL CALCULATED: 66 MG/DL
MICROALBUMIN UR DL<=1MG/L-MCNC: 12.3 MG/DL
MICROALBUMIN/CREAT UR: 405.9 MG/G (ref 0–30)
POTASSIUM SERPL-SCNC: 4.6 MMOL/L (ref 3.5–5.1)
PROT SERPL-MCNC: 7 G/DL (ref 6.4–8.2)
SODIUM SERPL-SCNC: 140 MMOL/L (ref 136–145)
T4 FREE SERPL-MCNC: 1.1 NG/DL (ref 0.9–1.8)
TRIGL SERPL-MCNC: 124 MG/DL (ref 0–150)
TSH SERPL DL<=0.005 MIU/L-ACNC: 2.72 UIU/ML (ref 0.27–4.2)
VLDLC SERPL CALC-MCNC: 25 MG/DL

## 2023-11-28 ENCOUNTER — OFFICE VISIT (OUTPATIENT)
Dept: ENDOCRINOLOGY | Age: 63
End: 2023-11-28
Payer: COMMERCIAL

## 2023-11-28 VITALS
TEMPERATURE: 98 F | HEIGHT: 59 IN | BODY MASS INDEX: 22.18 KG/M2 | OXYGEN SATURATION: 99 % | RESPIRATION RATE: 14 BRPM | HEART RATE: 66 BPM | SYSTOLIC BLOOD PRESSURE: 194 MMHG | WEIGHT: 110 LBS | DIASTOLIC BLOOD PRESSURE: 107 MMHG

## 2023-11-28 DIAGNOSIS — E10.65 POORLY CONTROLLED TYPE 1 DIABETES MELLITUS WITH RETINOPATHY (HCC): ICD-10-CM

## 2023-11-28 DIAGNOSIS — E10.40 POORLY CONTROLLED TYPE 1 DIABETES MELLITUS WITH NEUROPATHY (HCC): Primary | ICD-10-CM

## 2023-11-28 DIAGNOSIS — C79.70: ICD-10-CM

## 2023-11-28 DIAGNOSIS — C43.9: ICD-10-CM

## 2023-11-28 DIAGNOSIS — N18.30 STAGE 3 CHRONIC KIDNEY DISEASE, UNSPECIFIED WHETHER STAGE 3A OR 3B CKD (HCC): ICD-10-CM

## 2023-11-28 DIAGNOSIS — I10 PRIMARY HYPERTENSION: ICD-10-CM

## 2023-11-28 DIAGNOSIS — E10.319 POORLY CONTROLLED TYPE 1 DIABETES MELLITUS WITH RETINOPATHY (HCC): ICD-10-CM

## 2023-11-28 DIAGNOSIS — Z83.49 FAMILY HISTORY OF THYROID DISEASE: ICD-10-CM

## 2023-11-28 DIAGNOSIS — E04.9 THYROID ENLARGEMENT: ICD-10-CM

## 2023-11-28 DIAGNOSIS — E10.65 POORLY CONTROLLED TYPE 1 DIABETES MELLITUS WITH NEUROPATHY (HCC): Primary | ICD-10-CM

## 2023-11-28 DIAGNOSIS — E55.9 VITAMIN D DEFICIENCY: ICD-10-CM

## 2023-11-28 DIAGNOSIS — E78.2 MIXED HYPERLIPIDEMIA: ICD-10-CM

## 2023-11-28 DIAGNOSIS — E10.21 DIABETIC NEPHROPATHY ASSOCIATED WITH TYPE 1 DIABETES MELLITUS (HCC): ICD-10-CM

## 2023-11-28 PROCEDURE — 3077F SYST BP >= 140 MM HG: CPT | Performed by: INTERNAL MEDICINE

## 2023-11-28 PROCEDURE — 95251 CONT GLUC MNTR ANALYSIS I&R: CPT | Performed by: INTERNAL MEDICINE

## 2023-11-28 PROCEDURE — 1123F ACP DISCUSS/DSCN MKR DOCD: CPT | Performed by: INTERNAL MEDICINE

## 2023-11-28 PROCEDURE — 3080F DIAST BP >= 90 MM HG: CPT | Performed by: INTERNAL MEDICINE

## 2023-11-28 PROCEDURE — 3052F HG A1C>EQUAL 8.0%<EQUAL 9.0%: CPT | Performed by: INTERNAL MEDICINE

## 2023-11-28 PROCEDURE — 99214 OFFICE O/P EST MOD 30 MIN: CPT | Performed by: INTERNAL MEDICINE

## 2023-11-28 RX ORDER — ERGOCALCIFEROL 1.25 MG/1
50000 CAPSULE ORAL WEEKLY
Qty: 12 CAPSULE | Refills: 1 | Status: SHIPPED | OUTPATIENT
Start: 2023-11-28

## 2023-12-12 ENCOUNTER — HOSPITAL ENCOUNTER (OUTPATIENT)
Dept: CT IMAGING | Age: 63
Discharge: HOME | End: 2023-12-12
Attending: INTERNAL MEDICINE
Payer: COMMERCIAL

## 2023-12-12 DIAGNOSIS — C43.9 METASTATIC MELANOMA (HCC): ICD-10-CM

## 2023-12-12 DIAGNOSIS — C77.2 SECONDARY AND UNSPECIFIED MALIGNANT NEOPLASM OF INTRA-ABDOMINAL LYMPH NODES (HCC): ICD-10-CM

## 2023-12-12 DIAGNOSIS — C78.00 MALIGNANT NEOPLASM METASTATIC TO LUNG, UNSPECIFIED LATERALITY (HCC): ICD-10-CM

## 2023-12-12 DIAGNOSIS — C69.31 MALIGNANT NEOPLASM OF RIGHT CHOROID (HCC): ICD-10-CM

## 2023-12-12 DIAGNOSIS — C78.7 MALIGNANT NEOPLASM METASTATIC TO LIVER (HCC): ICD-10-CM

## 2023-12-12 LAB
PERFORMED ON: ABNORMAL
POC CREATININE: 1.6 MG/DL (ref 0.6–1.2)
POC SAMPLE TYPE: ABNORMAL

## 2023-12-12 PROCEDURE — 82565 ASSAY OF CREATININE: CPT

## 2023-12-12 PROCEDURE — 74176 CT ABD & PELVIS W/O CONTRAST: CPT

## 2023-12-12 PROCEDURE — 6360000004 HC RX CONTRAST MEDICATION: Performed by: INTERNAL MEDICINE

## 2023-12-12 RX ADMIN — IOPAMIDOL 50 ML: 612 INJECTION, SOLUTION INTRAVENOUS at 13:40

## 2023-12-12 RX ADMIN — IOPAMIDOL 75 ML: 755 INJECTION, SOLUTION INTRAVENOUS at 13:40

## 2023-12-21 DIAGNOSIS — E87.1 HYPONATREMIA: ICD-10-CM

## 2023-12-21 LAB
ANION GAP SERPL CALCULATED.3IONS-SCNC: 12 MMOL/L (ref 3–16)
BUN SERPL-MCNC: 60 MG/DL (ref 7–20)
CALCIUM SERPL-MCNC: 8.6 MG/DL (ref 8.3–10.6)
CHLORIDE SERPL-SCNC: 96 MMOL/L (ref 99–110)
CO2 SERPL-SCNC: 21 MMOL/L (ref 21–32)
CREAT SERPL-MCNC: 1.9 MG/DL (ref 0.6–1.2)
GFR SERPLBLD CREATININE-BSD FMLA CKD-EPI: 29 ML/MIN/{1.73_M2}
GLUCOSE SERPL-MCNC: 524 MG/DL (ref 70–99)
POTASSIUM SERPL-SCNC: 4.4 MMOL/L (ref 3.5–5.1)
SODIUM SERPL-SCNC: 129 MMOL/L (ref 136–145)

## 2023-12-26 ENCOUNTER — PATIENT MESSAGE (OUTPATIENT)
Dept: FAMILY MEDICINE CLINIC | Age: 63
End: 2023-12-26

## 2023-12-28 ENCOUNTER — TELEPHONE (OUTPATIENT)
Dept: FAMILY MEDICINE CLINIC | Age: 63
End: 2023-12-28

## 2023-12-28 DIAGNOSIS — E87.1 HYPONATREMIA: ICD-10-CM

## 2023-12-28 LAB
ANION GAP SERPL CALCULATED.3IONS-SCNC: 12 MMOL/L (ref 3–16)
BUN SERPL-MCNC: 52 MG/DL (ref 7–20)
CALCIUM SERPL-MCNC: 8.9 MG/DL (ref 8.3–10.6)
CHLORIDE SERPL-SCNC: 102 MMOL/L (ref 99–110)
CO2 SERPL-SCNC: 25 MMOL/L (ref 21–32)
CREAT SERPL-MCNC: 1.7 MG/DL (ref 0.6–1.2)
GFR SERPLBLD CREATININE-BSD FMLA CKD-EPI: 33 ML/MIN/{1.73_M2}
GLUCOSE SERPL-MCNC: 134 MG/DL (ref 70–99)
POTASSIUM SERPL-SCNC: 5.1 MMOL/L (ref 3.5–5.1)
SODIUM SERPL-SCNC: 139 MMOL/L (ref 136–145)

## 2024-01-01 DIAGNOSIS — F32.2 CURRENT SEVERE EPISODE OF MAJOR DEPRESSIVE DISORDER WITHOUT PSYCHOTIC FEATURES WITHOUT PRIOR EPISODE (HCC): ICD-10-CM

## 2024-01-02 RX ORDER — ESCITALOPRAM OXALATE 10 MG/1
TABLET ORAL
Qty: 30 TABLET | Refills: 0 | Status: SHIPPED | OUTPATIENT
Start: 2024-01-02

## 2024-01-02 NOTE — TELEPHONE ENCOUNTER
Medication:   Requested Prescriptions     Pending Prescriptions Disp Refills    escitalopram (LEXAPRO) 10 MG tablet [Pharmacy Med Name: ESCITALOPRAM OXALATE 10MG TABS] 30 tablet 1     Sig: TAKE ONE TABLET BY MOUTH ONCE A DAY        Last Filled:  11/6/23    Patient Phone Number: [phone] (home)     Last appt: 6/26/2023   Next appt: Visit date not found    Last OARRS:        No data to display

## 2024-01-16 DIAGNOSIS — G25.81 RESTLESS LEG SYNDROME: ICD-10-CM

## 2024-01-16 NOTE — TELEPHONE ENCOUNTER
Medication:   Requested Prescriptions     Pending Prescriptions Disp Refills    rOPINIRole (REQUIP) 1 MG tablet 90 tablet 0     Sig: Take 1 tablet by mouth nightly        Last Filled:  8/10/23    Patient Phone Number: [phone] (home)     Last appt: 6/26/2023   Next appt: Visit date not found    Last OARRS:        No data to display                Left message for pt to schedule

## 2024-01-18 RX ORDER — ROPINIROLE 1 MG/1
1 TABLET, FILM COATED ORAL NIGHTLY
Qty: 90 TABLET | Refills: 0 | Status: SHIPPED | OUTPATIENT
Start: 2024-01-18

## 2024-01-19 ENCOUNTER — HOSPITAL ENCOUNTER (OUTPATIENT)
Dept: MRI IMAGING | Age: 64
Discharge: HOME | End: 2024-01-19
Attending: INTERNAL MEDICINE
Payer: COMMERCIAL

## 2024-01-19 DIAGNOSIS — C43.9 METASTATIC MELANOMA (HCC): ICD-10-CM

## 2024-01-19 DIAGNOSIS — C77.2 SECONDARY AND UNSPECIFIED MALIGNANT NEOPLASM OF INTRA-ABDOMINAL LYMPH NODES (HCC): ICD-10-CM

## 2024-01-19 DIAGNOSIS — C78.00 MALIGNANT NEOPLASM METASTATIC TO LUNG, UNSPECIFIED LATERALITY (HCC): ICD-10-CM

## 2024-01-19 DIAGNOSIS — D37.8 NEOPLASM OF UNCERTAIN BEHAVIOR OF OTHER SPECIFIED DIGESTIVE ORGANS: ICD-10-CM

## 2024-01-19 DIAGNOSIS — C69.31 MALIGNANT NEOPLASM OF RIGHT CHOROID (HCC): ICD-10-CM

## 2024-01-19 PROCEDURE — 70553 MRI BRAIN STEM W/O & W/DYE: CPT

## 2024-01-19 PROCEDURE — 6360000004 HC RX CONTRAST MEDICATION: Performed by: INTERNAL MEDICINE

## 2024-01-19 PROCEDURE — A9579 GAD-BASE MR CONTRAST NOS,1ML: HCPCS | Performed by: INTERNAL MEDICINE

## 2024-01-19 RX ADMIN — GADOTERIDOL 10 ML: 279.3 INJECTION, SOLUTION INTRAVENOUS at 11:50

## 2024-01-19 NOTE — TELEPHONE ENCOUNTER
Voicemail left to inform patient an appointment is needed for future refills.   Also mychart message sent.

## 2024-01-30 RX ORDER — HYDROCORTISONE 10 MG/1
10 TABLET ORAL DAILY
Qty: 90 TABLET | Refills: 1 | Status: SHIPPED | OUTPATIENT
Start: 2024-01-30

## 2024-01-30 RX ORDER — HYDROCORTISONE 5 MG/1
5 TABLET ORAL DAILY
Qty: 90 TABLET | Refills: 1 | Status: SHIPPED | OUTPATIENT
Start: 2024-01-30

## 2024-02-06 DIAGNOSIS — G25.81 RESTLESS LEG SYNDROME: ICD-10-CM

## 2024-02-06 RX ORDER — ROPINIROLE 1 MG/1
1 TABLET, FILM COATED ORAL NIGHTLY
Qty: 90 TABLET | Refills: 0 | OUTPATIENT
Start: 2024-02-06

## 2024-02-06 NOTE — TELEPHONE ENCOUNTER
Medication:   Requested Prescriptions     Pending Prescriptions Disp Refills    rOPINIRole (REQUIP) 1 MG tablet [Pharmacy Med Name: ROPINIROLE HCL 1MG TABS] 90 tablet 0     Sig: TAKE ONE TABLET BY MOUTH EVERY NIGHT        Last Filled:  1/18/24 TO EARLY     Patient Phone Number: [phone] (home)     Last appt: 6/26/2023   Next appt: Visit date not found    Last OARRS:        No data to display

## 2024-02-22 ENCOUNTER — CLINICAL DOCUMENTATION (OUTPATIENT)
Dept: PHARMACY | Facility: CLINIC | Age: 64
End: 2024-02-22

## 2024-02-22 LAB
25(OH)D3 SERPL-MCNC: 33 NG/ML
ALBUMIN SERPL-MCNC: 4 G/DL (ref 3.4–5)
ALBUMIN/GLOB SERPL: 2.1 {RATIO} (ref 1.1–2.2)
ALP SERPL-CCNC: 130 U/L (ref 40–129)
ALT SERPL-CCNC: 20 U/L (ref 10–40)
ANION GAP SERPL CALCULATED.3IONS-SCNC: 13 MMOL/L (ref 3–16)
AST SERPL-CCNC: 21 U/L (ref 15–37)
BILIRUB SERPL-MCNC: 0.3 MG/DL (ref 0–1)
BUN SERPL-MCNC: 39 MG/DL (ref 7–20)
CALCIUM SERPL-MCNC: 9.3 MG/DL (ref 8.3–10.6)
CHLORIDE SERPL-SCNC: 99 MMOL/L (ref 99–110)
CHOLEST SERPL-MCNC: 247 MG/DL (ref 0–199)
CO2 SERPL-SCNC: 24 MMOL/L (ref 21–32)
CREAT SERPL-MCNC: 1.9 MG/DL (ref 0.6–1.2)
CREAT UR-MCNC: 56.4 MG/DL (ref 28–259)
EST. AVERAGE GLUCOSE BLD GHB EST-MCNC: 171.4 MG/DL
GFR SERPLBLD CREATININE-BSD FMLA CKD-EPI: 29 ML/MIN/{1.73_M2}
GLUCOSE SERPL-MCNC: 349 MG/DL (ref 70–99)
HBA1C MFR BLD: 7.6 %
HDLC SERPL-MCNC: 114 MG/DL (ref 40–60)
LDL CHOLESTEROL CALCULATED: 110 MG/DL
MICROALBUMIN UR DL<=1MG/L-MCNC: 50.7 MG/DL
MICROALBUMIN/CREAT UR: 898.9 MG/G (ref 0–30)
POTASSIUM SERPL-SCNC: 5.1 MMOL/L (ref 3.5–5.1)
PROT SERPL-MCNC: 5.9 G/DL (ref 6.4–8.2)
SODIUM SERPL-SCNC: 136 MMOL/L (ref 136–145)
TRIGL SERPL-MCNC: 113 MG/DL (ref 0–150)
VLDLC SERPL CALC-MCNC: 23 MG/DL

## 2024-02-22 NOTE — PROGRESS NOTES
**Patient is BS**  Pharmacy Pop [facility] Documentation:   Patient has completed all the requirements and therefore will be enrolled in the DM Program.     Application received: via [employer].    Letter mailed to patient.     Isela Serrano CphT  [facility]  Clinical Pharmacy   Department, toll free: [phone] Option #3    For Pharmacy Admin Tracking Only    Program: [facility]  CPA in place:  No  Gap Closed?: Yes   Time Spent (min): 5

## 2024-02-29 ENCOUNTER — OFFICE VISIT (OUTPATIENT)
Dept: FAMILY MEDICINE CLINIC | Age: 64
End: 2024-02-29
Payer: COMMERCIAL

## 2024-02-29 ENCOUNTER — OFFICE VISIT (OUTPATIENT)
Dept: ENDOCRINOLOGY | Age: 64
End: 2024-02-29

## 2024-02-29 VITALS
WEIGHT: 119 LBS | OXYGEN SATURATION: 97 % | DIASTOLIC BLOOD PRESSURE: 82 MMHG | HEIGHT: 59 IN | HEART RATE: 76 BPM | BODY MASS INDEX: 23.99 KG/M2 | SYSTOLIC BLOOD PRESSURE: 160 MMHG

## 2024-02-29 VITALS
RESPIRATION RATE: 14 BRPM | BODY MASS INDEX: 23.99 KG/M2 | OXYGEN SATURATION: 100 % | SYSTOLIC BLOOD PRESSURE: 170 MMHG | TEMPERATURE: 98 F | HEART RATE: 79 BPM | DIASTOLIC BLOOD PRESSURE: 76 MMHG | WEIGHT: 119 LBS | HEIGHT: 59 IN

## 2024-02-29 DIAGNOSIS — E78.2 MIXED HYPERLIPIDEMIA: ICD-10-CM

## 2024-02-29 DIAGNOSIS — Z83.49 FAMILY HISTORY OF THYROID DISEASE: ICD-10-CM

## 2024-02-29 DIAGNOSIS — C79.70: ICD-10-CM

## 2024-02-29 DIAGNOSIS — E10.40 POORLY CONTROLLED TYPE 1 DIABETES MELLITUS WITH NEUROPATHY (HCC): Primary | ICD-10-CM

## 2024-02-29 DIAGNOSIS — E55.9 VITAMIN D DEFICIENCY: ICD-10-CM

## 2024-02-29 DIAGNOSIS — I10 PRIMARY HYPERTENSION: ICD-10-CM

## 2024-02-29 DIAGNOSIS — E10.65 POORLY CONTROLLED TYPE 1 DIABETES MELLITUS WITH NEUROPATHY (HCC): Primary | ICD-10-CM

## 2024-02-29 DIAGNOSIS — E10.319 POORLY CONTROLLED TYPE 1 DIABETES MELLITUS WITH RETINOPATHY (HCC): ICD-10-CM

## 2024-02-29 DIAGNOSIS — N18.30 STAGE 3 CHRONIC KIDNEY DISEASE, UNSPECIFIED WHETHER STAGE 3A OR 3B CKD (HCC): ICD-10-CM

## 2024-02-29 DIAGNOSIS — E10.65 POORLY CONTROLLED TYPE 1 DIABETES MELLITUS WITH RETINOPATHY (HCC): ICD-10-CM

## 2024-02-29 DIAGNOSIS — M79.89 SWELLING OF LOWER EXTREMITY: Primary | ICD-10-CM

## 2024-02-29 DIAGNOSIS — E04.9 THYROID ENLARGEMENT: ICD-10-CM

## 2024-02-29 DIAGNOSIS — E27.40 UNSPECIFIED ADRENOCORTICAL INSUFFICIENCY (HCC): ICD-10-CM

## 2024-02-29 DIAGNOSIS — G43.001 MIGRAINE WITHOUT AURA AND WITH STATUS MIGRAINOSUS, NOT INTRACTABLE: ICD-10-CM

## 2024-02-29 DIAGNOSIS — E10.21 DIABETIC NEPHROPATHY ASSOCIATED WITH TYPE 1 DIABETES MELLITUS (HCC): ICD-10-CM

## 2024-02-29 DIAGNOSIS — R22.0 FACIAL SWELLING: ICD-10-CM

## 2024-02-29 PROCEDURE — 1123F ACP DISCUSS/DSCN MKR DOCD: CPT | Performed by: NURSE PRACTITIONER

## 2024-02-29 PROCEDURE — 3079F DIAST BP 80-89 MM HG: CPT | Performed by: NURSE PRACTITIONER

## 2024-02-29 PROCEDURE — 99214 OFFICE O/P EST MOD 30 MIN: CPT | Performed by: NURSE PRACTITIONER

## 2024-02-29 PROCEDURE — 3077F SYST BP >= 140 MM HG: CPT | Performed by: NURSE PRACTITIONER

## 2024-02-29 RX ORDER — RIMEGEPANT SULFATE 75 MG/75MG
TABLET, ORALLY DISINTEGRATING ORAL
Qty: 10 TABLET | Refills: 2 | Status: SHIPPED | OUTPATIENT
Start: 2024-02-29

## 2024-02-29 RX ORDER — INSULIN LISPRO 100 [IU]/ML
INJECTION, SOLUTION INTRAVENOUS; SUBCUTANEOUS
Qty: 40 ML | Refills: 1 | Status: SHIPPED | OUTPATIENT
Start: 2024-02-29

## 2024-02-29 RX ORDER — HYDROCORTISONE 5 MG/1
5 TABLET ORAL DAILY
Qty: 90 TABLET | Refills: 1 | Status: SHIPPED | OUTPATIENT
Start: 2024-02-29

## 2024-02-29 RX ORDER — PROCHLORPERAZINE 25 MG/1
SUPPOSITORY RECTAL
Qty: 1 EACH | Refills: 1 | Status: SHIPPED | OUTPATIENT
Start: 2024-02-29

## 2024-02-29 RX ORDER — PROCHLORPERAZINE 25 MG/1
SUPPOSITORY RECTAL
Qty: 9 EACH | Refills: 1 | Status: SHIPPED | OUTPATIENT
Start: 2024-02-29

## 2024-02-29 RX ORDER — INSULIN PMP CART,AUT,G6/7,CNTR
EACH SUBCUTANEOUS
Qty: 30 EACH | Refills: 1 | Status: SHIPPED | OUTPATIENT
Start: 2024-02-29

## 2024-02-29 RX ORDER — ERGOCALCIFEROL 1.25 MG/1
50000 CAPSULE ORAL WEEKLY
Qty: 12 CAPSULE | Refills: 1 | Status: SHIPPED | OUTPATIENT
Start: 2024-02-29

## 2024-02-29 RX ORDER — HYDROCORTISONE 10 MG/1
10 TABLET ORAL DAILY
Qty: 90 TABLET | Refills: 1 | Status: SHIPPED | OUTPATIENT
Start: 2024-02-29

## 2024-02-29 ASSESSMENT — PATIENT HEALTH QUESTIONNAIRE - PHQ9
SUM OF ALL RESPONSES TO PHQ QUESTIONS 1-9: 0
SUM OF ALL RESPONSES TO PHQ9 QUESTIONS 1 & 2: 0
SUM OF ALL RESPONSES TO PHQ QUESTIONS 1-9: 0
2. FEELING DOWN, DEPRESSED OR HOPELESS: 0
1. LITTLE INTEREST OR PLEASURE IN DOING THINGS: 0

## 2024-02-29 NOTE — PROGRESS NOTES
imaging measuring 14 x 6 mm series 306 image 33, tumor thrombus . 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   *  Distance to SMV: 30 mm   *  Aberrant anatomy ( replaced or accessory):  None.   *  Atherosclerosis origins of celiac axis/SMA:  None.           ADRENAL GLANDS: Normal right adrenal gland.   KIDNEYS AND URETERS: No hydronephrosis. No urolithiasis.   URINARY BLADDER: Normal.       REPRODUCTIVE ORGANS: Uterus is been removed. No adnexal masses       BOWEL: No dilatation.  Normal appendix.   There is thickening and prominence of the ligament of Treitz with anterior rightward orientation, partial malrotation. No obstructive fluid levels. Edematous changes of the proximal jejunum are noted.       ABDOMINAL WALL: Normal.   BONES: No destructive process.           Impression   1. Mass involving the tail the pancreas and splenic hilus with retroperitoneal lymphadenopathy and splenic vein tumor thrombus, concordant with pancreatic neoplasm   2. Metastatic disease of the lung, liver, left adrenal gland and spleen       Past Medical History:   Diagnosis Date    Adrenal insufficiency (HCC)     Cancer (HCC) 2002    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    Depression     Diabetes mellitus (HCC)     Type I    Hx of radiation therapy     melanoma right eye    Hypertension     Pt. denies having history of Hypertension    Hyponatremia     Insulin pump in place     Melanoma (HCC) 01/13/2023    in stomach, pancreas    Prolonged emergence from general anesthesia     slow to wake up    Restless leg syndrome       Patient Active Problem List   Diagnosis    Duodenal erosion    Left elbow pain    Type 1 diabetes mellitus (HCC)    Vitamin D deficiency    Family history of thyroid disease    Diabetic nephropathy (HCC)    Thyroid enlargement    Gastroesophageal reflux disease without esophagitis    Cataract, left eye

## 2024-02-29 NOTE — PROGRESS NOTES
Elvia Arguelles (:  1960) is a 63 y.o. female,Established patient, here for evaluation of the following chief complaint(s):  Swelling (Patient stated  she having swelling some in face, but mostly her legs)       ASSESSMENT/PLAN:  1. Swelling of lower extremity  Not progressing;  Encouraged patient to limit sodium intake and wear compression stockings. BNP ordered.  -     Brain Natriuretic Peptide; Future  2. Facial swelling  Not progressing;  Discussed likely related to steroids patient has john receiving with her treatments. Patient to discuss with oncology.  3. Migraine without aura and with status migrainosus, not intractable  Not progressing;  Patient cannot take triptans with uncontrolled HYPERTENSION.  Begin nurtec.  Risks and benefits discussed.  Continue to hydrate well.  -     Rimegepant Sulfate (NURTEC) 75 MG TBDP; 75 mg as a single dose. Maximum: 75 mg per 24 hours, Disp-10 tablet, R-2Normal  4. Primary hypertension  Not progressing;  Elevated in the office.  Discussed a referral to nephrology/ HYPERTENSION clinic- patient declines.  5. 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  6. Unspecified adrenocortical insufficiency (HCC)  Stable; See 5  7. Mixed hyperlipidemia  Not progressing;  Reviewed blood work.  Patient declines statin.  Continue to work on diet and exercise.    Return in about 4 weeks (around 3/28/2024).       Subjective   SUBJECTIVE/OBJECTIVE:  HPI  Swelling in legs and face  X 1 week  Was suppose to have another treatment- cancelled d/t rash (put her on prednisone)  A little shortness of breath- sitting and on exertion; does not need to sleep in a recliner or adding a pillow  Stomach is sore, puffy, bloated- ? Cancer related  Pitting edema in legs  Does not watch sodium intake  Wearing compression stockings    Headaches- constant; bilateral  Gets worse  Wakes up at 3-4 am  Can vomit, nausea  Light sensitivity  Neck is always

## 2024-03-08 ENCOUNTER — TELEPHONE (OUTPATIENT)
Dept: PHARMACY | Facility: CLINIC | Age: 64
End: 2024-03-08

## 2024-03-08 NOTE — TELEPHONE ENCOUNTER
**Patient is [facility]**     2024 Annual Pharmacist Visit    Called patient to schedule 2024 yearly pharmacist appointment to discuss medications for Diabetes Management Program.    No answer. Left VM.     Please call back at [phone], option #3         Ashu Mckinley [facility]  Clinical Pharmacy   Phone: [phone], option #3

## 2024-03-18 NOTE — TELEPHONE ENCOUNTER
Second attempt made to contact patient to schedule 2024 yearly pharmacist appointment to discuss medications for Diabetes Management Program.    No answer. Left VM.     Please call back at [phone], option #3.     PreAppst message sent.        Ashu Mckinley [facility]  Clinical Pharmacy   Phone: [phone], option #3       For Pharmacy Admin Tracking Only    Program: [employer]  CPA in place:  No  Gap Closed?: No   Time Spent (min): 10

## 2024-03-27 DIAGNOSIS — F32.2 CURRENT SEVERE EPISODE OF MAJOR DEPRESSIVE DISORDER WITHOUT PSYCHOTIC FEATURES WITHOUT PRIOR EPISODE (HCC): ICD-10-CM

## 2024-03-27 RX ORDER — ESCITALOPRAM OXALATE 10 MG/1
TABLET ORAL
Qty: 30 TABLET | Refills: 0 | Status: ON HOLD | OUTPATIENT
Start: 2024-03-27

## 2024-03-27 NOTE — TELEPHONE ENCOUNTER
Medication:   Requested Prescriptions     Pending Prescriptions Disp Refills    escitalopram (LEXAPRO) 10 MG tablet [Pharmacy Med Name: ESCITALOPRAM OXALATE 10MG TABS] 30 tablet 0     Sig: TAKE ONE TABLET BY MOUTH ONCE A DAY       Last Filled:  1/2/24    Patient Phone Number: [phone] (home)     Last appt: 2/29/2024   Next appt: Visit date not found    Last Labs DM:   Lab Results   Component Value Date/Time    LABA1C 7.6 02/22/2024 10:00 AM     Last Lipid:   Lab Results   Component Value Date/Time    CHOL 217 04/08/2022 06:21 AM    TRIG 118 04/08/2022 06:21 AM     02/22/2024 10:00 AM     12/07/2011 07:50 AM    LDLCALC 110 02/22/2024 10:00 AM     Last PSA: No results found for: \"PSA\"  Last Thyroid:   Lab Results   Component Value Date/Time    TSH 2.72 11/18/2023 10:34 AM    FT3 2.2 02/20/2023 08:31 AM    T4FREE 1.1 11/18/2023 10:34 AM

## 2024-03-31 ENCOUNTER — APPOINTMENT (OUTPATIENT)
Dept: GENERAL RADIOLOGY | Age: 64
DRG: 638 | End: 2024-03-31
Payer: COMMERCIAL

## 2024-03-31 ENCOUNTER — HOSPITAL ENCOUNTER (INPATIENT)
Age: 64
LOS: 3 days | Discharge: HOME | DRG: 638 | End: 2024-04-03
Attending: FAMILY MEDICINE | Admitting: FAMILY MEDICINE
Payer: COMMERCIAL

## 2024-03-31 DIAGNOSIS — E10.10 DIABETIC KETOACIDOSIS WITHOUT COMA ASSOCIATED WITH TYPE 1 DIABETES MELLITUS (HCC): Primary | ICD-10-CM

## 2024-03-31 DIAGNOSIS — N17.9 AKI (ACUTE KIDNEY INJURY) (HCC): ICD-10-CM

## 2024-03-31 LAB
ALBUMIN SERPL-MCNC: 3.9 G/DL (ref 3.4–5)
ALBUMIN/GLOB SERPL: 1.5 {RATIO} (ref 1.1–2.2)
ALP SERPL-CCNC: 114 U/L (ref 40–129)
ALT SERPL-CCNC: 42 U/L (ref 10–40)
ANION GAP SERPL CALCULATED.3IONS-SCNC: 22 MMOL/L (ref 3–16)
AST SERPL-CCNC: 18 U/L (ref 15–37)
BASE EXCESS BLDV CALC-SCNC: 0.2 MMOL/L
BASOPHILS # BLD: 0.2 K/UL (ref 0–0.2)
BASOPHILS NFR BLD: 1.2 %
BILIRUB SERPL-MCNC: 0.4 MG/DL (ref 0–1)
BUN SERPL-MCNC: 89 MG/DL (ref 7–20)
CALCIUM SERPL-MCNC: 9.4 MG/DL (ref 8.3–10.6)
CHLORIDE SERPL-SCNC: 94 MMOL/L (ref 99–110)
CO2 BLDV-SCNC: 27 MMOL/L
CO2 SERPL-SCNC: 20 MMOL/L (ref 21–32)
COHGB MFR BLDV: 1.5 %
CREAT SERPL-MCNC: 3.7 MG/DL (ref 0.6–1.2)
DEPRECATED RDW RBC AUTO: 13.9 % (ref 12.4–15.4)
EOSINOPHIL # BLD: 0.3 K/UL (ref 0–0.6)
EOSINOPHIL NFR BLD: 1.6 %
GFR SERPLBLD CREATININE-BSD FMLA CKD-EPI: 13 ML/MIN/{1.73_M2}
GLUCOSE BLD-MCNC: 106 MG/DL (ref 70–99)
GLUCOSE BLD-MCNC: 161 MG/DL (ref 70–99)
GLUCOSE BLD-MCNC: 277 MG/DL (ref 70–99)
GLUCOSE SERPL-MCNC: 280 MG/DL (ref 70–99)
HCO3 BLDV-SCNC: 25 MMOL/L (ref 23–29)
HCT VFR BLD AUTO: 35.6 % (ref 36–48)
HGB BLD-MCNC: 12 G/DL (ref 12–16)
LACTATE BLDV-SCNC: 3.2 MMOL/L (ref 0.4–2)
LDH SERPL L TO P-CCNC: 341 U/L (ref 100–190)
LIPASE SERPL-CCNC: 62 U/L (ref 13–60)
LYMPHOCYTES # BLD: 2.5 K/UL (ref 1–5.1)
LYMPHOCYTES NFR BLD: 15.3 %
MCH RBC QN AUTO: 29.3 PG (ref 26–34)
MCHC RBC AUTO-ENTMCNC: 33.8 G/DL (ref 31–36)
MCV RBC AUTO: 86.6 FL (ref 80–100)
METHGB MFR BLDV: 0.6 %
MONOCYTES # BLD: 1.4 K/UL (ref 0–1.3)
MONOCYTES NFR BLD: 8.7 %
NEUTROPHILS # BLD: 12.1 K/UL (ref 1.7–7.7)
NEUTROPHILS NFR BLD: 73.2 %
O2 THERAPY: NORMAL
PCO2 BLDV: 42.2 MMHG (ref 40–50)
PERFORMED ON: ABNORMAL
PH BLDV: 7.39 [PH] (ref 7.35–7.45)
PLATELET # BLD AUTO: 262 K/UL (ref 135–450)
PMV BLD AUTO: 7.7 FL (ref 5–10.5)
PO2 BLDV: 38 MMHG
POTASSIUM SERPL-SCNC: 4.5 MMOL/L (ref 3.5–5.1)
PROCALCITONIN SERPL IA-MCNC: 10.13 NG/ML (ref 0–0.15)
PROT SERPL-MCNC: 6.5 G/DL (ref 6.4–8.2)
RBC # BLD AUTO: 4.11 M/UL (ref 4–5.2)
SAO2 % BLDV: 76 %
SODIUM SERPL-SCNC: 136 MMOL/L (ref 136–145)
WBC # BLD AUTO: 16.5 K/UL (ref 4–11)

## 2024-03-31 PROCEDURE — 84145 PROCALCITONIN (PCT): CPT

## 2024-03-31 PROCEDURE — 85025 COMPLETE CBC W/AUTO DIFF WBC: CPT

## 2024-03-31 PROCEDURE — 2000000000 HC ICU R&B

## 2024-03-31 PROCEDURE — 2580000003 HC RX 258

## 2024-03-31 PROCEDURE — 80053 COMPREHEN METABOLIC PANEL: CPT

## 2024-03-31 PROCEDURE — 71046 X-RAY EXAM CHEST 2 VIEWS: CPT

## 2024-03-31 PROCEDURE — 99285 EMERGENCY DEPT VISIT HI MDM: CPT

## 2024-03-31 PROCEDURE — 83615 LACTATE (LD) (LDH) ENZYME: CPT

## 2024-03-31 PROCEDURE — 96365 THER/PROPH/DIAG IV INF INIT: CPT

## 2024-03-31 PROCEDURE — 36415 COLL VENOUS BLD VENIPUNCTURE: CPT

## 2024-03-31 PROCEDURE — 83036 HEMOGLOBIN GLYCOSYLATED A1C: CPT

## 2024-03-31 PROCEDURE — 93005 ELECTROCARDIOGRAM TRACING: CPT

## 2024-03-31 PROCEDURE — 83690 ASSAY OF LIPASE: CPT

## 2024-03-31 PROCEDURE — 96375 TX/PRO/DX INJ NEW DRUG ADDON: CPT

## 2024-03-31 PROCEDURE — 83605 ASSAY OF LACTIC ACID: CPT

## 2024-03-31 PROCEDURE — 6360000002 HC RX W HCPCS

## 2024-03-31 PROCEDURE — 6360000002 HC RX W HCPCS: Performed by: NURSE PRACTITIONER

## 2024-03-31 PROCEDURE — 82803 BLOOD GASES ANY COMBINATION: CPT

## 2024-03-31 RX ORDER — SODIUM CHLORIDE 9 MG/ML
INJECTION, SOLUTION INTRAVENOUS CONTINUOUS
Status: DISCONTINUED | OUTPATIENT
Start: 2024-04-01 | End: 2024-04-01 | Stop reason: SDUPTHER

## 2024-03-31 RX ORDER — MAGNESIUM SULFATE IN WATER 40 MG/ML
2000 INJECTION, SOLUTION INTRAVENOUS PRN
Status: DISCONTINUED | OUTPATIENT
Start: 2024-03-31 | End: 2024-03-31

## 2024-03-31 RX ORDER — ONDANSETRON 2 MG/ML
4 INJECTION INTRAMUSCULAR; INTRAVENOUS EVERY 6 HOURS PRN
Status: DISCONTINUED | OUTPATIENT
Start: 2024-03-31 | End: 2024-04-03 | Stop reason: HOSPADM

## 2024-03-31 RX ORDER — HEPARIN SODIUM 5000 [USP'U]/ML
5000 INJECTION, SOLUTION INTRAVENOUS; SUBCUTANEOUS EVERY 8 HOURS SCHEDULED
Status: DISCONTINUED | OUTPATIENT
Start: 2024-04-01 | End: 2024-04-03 | Stop reason: HOSPADM

## 2024-03-31 RX ORDER — DEXTROSE MONOHYDRATE 100 MG/ML
INJECTION, SOLUTION INTRAVENOUS CONTINUOUS PRN
Status: DISCONTINUED | OUTPATIENT
Start: 2024-03-31 | End: 2024-04-01 | Stop reason: SDUPTHER

## 2024-03-31 RX ORDER — POTASSIUM CHLORIDE 7.45 MG/ML
10 INJECTION INTRAVENOUS PRN
Status: DISCONTINUED | OUTPATIENT
Start: 2024-03-31 | End: 2024-03-31

## 2024-03-31 RX ORDER — DEXTROSE AND SODIUM CHLORIDE 5; .45 G/100ML; G/100ML
INJECTION, SOLUTION INTRAVENOUS CONTINUOUS PRN
Status: DISCONTINUED | OUTPATIENT
Start: 2024-03-31 | End: 2024-04-03 | Stop reason: HOSPADM

## 2024-03-31 RX ORDER — ONDANSETRON 2 MG/ML
4 INJECTION INTRAMUSCULAR; INTRAVENOUS ONCE
Status: COMPLETED | OUTPATIENT
Start: 2024-03-31 | End: 2024-03-31

## 2024-03-31 RX ORDER — 0.9 % SODIUM CHLORIDE 0.9 %
1000 INTRAVENOUS SOLUTION INTRAVENOUS ONCE
Status: COMPLETED | OUTPATIENT
Start: 2024-03-31 | End: 2024-03-31

## 2024-03-31 RX ORDER — GLUCAGON 1 MG/ML
1 KIT INJECTION PRN
Status: DISCONTINUED | OUTPATIENT
Start: 2024-03-31 | End: 2024-04-01

## 2024-03-31 RX ORDER — DEXTROSE AND SODIUM CHLORIDE 5; .45 G/100ML; G/100ML
INJECTION, SOLUTION INTRAVENOUS CONTINUOUS
Status: DISCONTINUED | OUTPATIENT
Start: 2024-03-31 | End: 2024-04-01

## 2024-03-31 RX ORDER — POLYETHYLENE GLYCOL 3350 17 G/17G
17 POWDER, FOR SOLUTION ORAL DAILY PRN
Status: DISCONTINUED | OUTPATIENT
Start: 2024-03-31 | End: 2024-04-03 | Stop reason: HOSPADM

## 2024-03-31 RX ADMIN — ONDANSETRON 4 MG: 2 INJECTION INTRAMUSCULAR; INTRAVENOUS at 23:55

## 2024-03-31 RX ADMIN — SODIUM CHLORIDE 1000 ML: 9 INJECTION, SOLUTION INTRAVENOUS at 19:34

## 2024-03-31 RX ADMIN — ONDANSETRON 4 MG: 2 INJECTION INTRAMUSCULAR; INTRAVENOUS at 18:18

## 2024-03-31 RX ADMIN — SODIUM CHLORIDE 1000 ML: 9 INJECTION, SOLUTION INTRAVENOUS at 18:18

## 2024-03-31 RX ADMIN — DEXTROSE AND SODIUM CHLORIDE: 5; 450 INJECTION, SOLUTION INTRAVENOUS at 20:39

## 2024-03-31 ASSESSMENT — PAIN SCALES - GENERAL: PAINLEVEL_OUTOF10: 0

## 2024-03-31 NOTE — ED TRIAGE NOTES
Patient presents to ED from home for c/o N/V since last night. Patient states she is a diabetic, has insulin pump. . Patient reports hx of DKA and states her symptoms feel similar.

## 2024-03-31 NOTE — ED PROVIDER NOTES
WITH REFLEX TO CULTURE   PROCALCITONIN   HEMOGLOBIN A1C   POCT GLUCOSE       When ordered only abnormal lab results are displayed. All other labs were within normal range or not returned as of this dictation.    EKG: When ordered, EKG's are interpreted by the Emergency Department Physician in the absence of a cardiologist.  Please see their note for interpretation of EKG.    RADIOLOGY:   Non-plain film images such as CT, Ultrasound and MRI are read by the radiologist. Plain radiographic images are visualized and preliminarily interpreted by the ED Provider with the below findings:    Interpretation per the Radiologist below, if available at the time of this note:    XR CHEST (2 VW)    (Results Pending)     No results found.     No results found.    PROCEDURES   Unless otherwise noted below, none     Procedures    CRITICAL CARE TIME (.cctime)       PAST MEDICAL HISTORY      has a past medical history of Adrenal insufficiency (HCC), Cancer (HCC) (2002), Depression, Diabetes mellitus (HCC), radiation therapy, Hypertension, Hyponatremia, Insulin pump in place, Melanoma (HCC) (01/13/2023), Prolonged emergence from general anesthesia, and Restless leg syndrome.     EMERGENCY DEPARTMENT COURSE and DIFFERENTIAL DIAGNOSIS/MDM:   Vitals:    Vitals:    03/31/24 1800 03/31/24 1815   BP: 113/64 (!) 124/59   Pulse: 98 99   Resp: 15 25   Temp: 97.7 °F (36.5 °C)    TempSrc: Oral    SpO2: 98% 98%   Weight: 53.3 kg (117 lb 8.1 oz)        Patient was given the following medications:  Medications   sodium chloride 0.9 % bolus 1,000 mL (1,000 mLs IntraVENous New Bag 3/31/24 1818)   glucose chewable tablet 16 g (has no administration in time range)   dextrose bolus 10% 125 mL (has no administration in time range)     Or   dextrose bolus 10% 250 mL (has no administration in time range)   glucagon injection 1 mg (has no administration in time range)   dextrose 10 % infusion (has no administration in time range)   insulin regular (HUMULIN

## 2024-04-01 ENCOUNTER — APPOINTMENT (OUTPATIENT)
Dept: ULTRASOUND IMAGING | Age: 64
DRG: 638 | End: 2024-04-01
Payer: COMMERCIAL

## 2024-04-01 LAB
ANION GAP SERPL CALCULATED.3IONS-SCNC: 13 MMOL/L (ref 3–16)
ANION GAP SERPL CALCULATED.3IONS-SCNC: 13 MMOL/L (ref 3–16)
BACTERIA URNS QL MICRO: NORMAL /HPF
BASOPHILS # BLD: 0.1 K/UL (ref 0–0.2)
BASOPHILS NFR BLD: 0.8 %
BILIRUB UR QL STRIP.AUTO: NEGATIVE
BUN SERPL-MCNC: 73 MG/DL (ref 7–20)
BUN SERPL-MCNC: 76 MG/DL (ref 7–20)
CALCIUM SERPL-MCNC: 8.1 MG/DL (ref 8.3–10.6)
CALCIUM SERPL-MCNC: 8.8 MG/DL (ref 8.3–10.6)
CHLORIDE SERPL-SCNC: 102 MMOL/L (ref 99–110)
CHLORIDE SERPL-SCNC: 103 MMOL/L (ref 99–110)
CLARITY UR: CLEAR
CO2 SERPL-SCNC: 24 MMOL/L (ref 21–32)
CO2 SERPL-SCNC: 24 MMOL/L (ref 21–32)
COLOR UR: YELLOW
CREAT SERPL-MCNC: 3 MG/DL (ref 0.6–1.2)
CREAT SERPL-MCNC: 3.2 MG/DL (ref 0.6–1.2)
DEPRECATED RDW RBC AUTO: 14 % (ref 12.4–15.4)
EKG ATRIAL RATE: 100 BPM
EKG DIAGNOSIS: NORMAL
EKG P AXIS: 70 DEGREES
EKG P-R INTERVAL: 114 MS
EKG Q-T INTERVAL: 332 MS
EKG QRS DURATION: 70 MS
EKG QTC CALCULATION (BAZETT): 428 MS
EKG R AXIS: 15 DEGREES
EKG T AXIS: 100 DEGREES
EKG VENTRICULAR RATE: 100 BPM
EOSINOPHIL # BLD: 0.2 K/UL (ref 0–0.6)
EOSINOPHIL NFR BLD: 2 %
EPI CELLS #/AREA URNS AUTO: 0 /HPF (ref 0–5)
EST. AVERAGE GLUCOSE BLD GHB EST-MCNC: 182.9 MG/DL
GFR SERPLBLD CREATININE-BSD FMLA CKD-EPI: 15 ML/MIN/{1.73_M2}
GFR SERPLBLD CREATININE-BSD FMLA CKD-EPI: 17 ML/MIN/{1.73_M2}
GLUCOSE BLD-MCNC: 111 MG/DL (ref 70–99)
GLUCOSE BLD-MCNC: 118 MG/DL (ref 70–99)
GLUCOSE BLD-MCNC: 120 MG/DL (ref 70–99)
GLUCOSE BLD-MCNC: 122 MG/DL (ref 70–99)
GLUCOSE BLD-MCNC: 188 MG/DL (ref 70–99)
GLUCOSE BLD-MCNC: 223 MG/DL (ref 70–99)
GLUCOSE BLD-MCNC: 241 MG/DL (ref 70–99)
GLUCOSE BLD-MCNC: 299 MG/DL (ref 70–99)
GLUCOSE BLD-MCNC: 91 MG/DL (ref 70–99)
GLUCOSE SERPL-MCNC: 107 MG/DL (ref 70–99)
GLUCOSE SERPL-MCNC: 136 MG/DL (ref 70–99)
GLUCOSE UR STRIP.AUTO-MCNC: 100 MG/DL
HBA1C MFR BLD: 8 %
HCT VFR BLD AUTO: 38.7 % (ref 36–48)
HGB BLD-MCNC: 13.3 G/DL (ref 12–16)
HGB UR QL STRIP.AUTO: ABNORMAL
HYALINE CASTS #/AREA URNS AUTO: 0 /LPF (ref 0–8)
KETONES UR STRIP.AUTO-MCNC: NEGATIVE MG/DL
LACTATE BLDV-SCNC: 1.1 MMOL/L (ref 0.4–2)
LEUKOCYTE ESTERASE UR QL STRIP.AUTO: NEGATIVE
LYMPHOCYTES # BLD: 1.9 K/UL (ref 1–5.1)
LYMPHOCYTES NFR BLD: 19 %
MAGNESIUM SERPL-MCNC: 1.5 MG/DL (ref 1.8–2.4)
MAGNESIUM SERPL-MCNC: 1.6 MG/DL (ref 1.8–2.4)
MAGNESIUM SERPL-MCNC: 1.9 MG/DL (ref 1.8–2.4)
MCH RBC QN AUTO: 29.5 PG (ref 26–34)
MCHC RBC AUTO-ENTMCNC: 34.3 G/DL (ref 31–36)
MCV RBC AUTO: 86 FL (ref 80–100)
MONOCYTES # BLD: 0.8 K/UL (ref 0–1.3)
MONOCYTES NFR BLD: 7.9 %
NEUTROPHILS # BLD: 7 K/UL (ref 1.7–7.7)
NEUTROPHILS NFR BLD: 70.3 %
NITRITE UR QL STRIP.AUTO: NEGATIVE
PERFORMED ON: ABNORMAL
PERFORMED ON: NORMAL
PH UR STRIP.AUTO: 6 [PH] (ref 5–8)
PHOSPHATE SERPL-MCNC: 4 MG/DL (ref 2.5–4.9)
PHOSPHATE SERPL-MCNC: 4.1 MG/DL (ref 2.5–4.9)
PHOSPHATE SERPL-MCNC: 4.1 MG/DL (ref 2.5–4.9)
PLATELET # BLD AUTO: 152 K/UL (ref 135–450)
PMV BLD AUTO: 7.4 FL (ref 5–10.5)
POTASSIUM SERPL-SCNC: 3.6 MMOL/L (ref 3.5–5.1)
POTASSIUM SERPL-SCNC: 4.1 MMOL/L (ref 3.5–5.1)
PROT UR STRIP.AUTO-MCNC: 100 MG/DL
RBC # BLD AUTO: 4.5 M/UL (ref 4–5.2)
RBC CLUMPS #/AREA URNS AUTO: 1 /HPF (ref 0–4)
SODIUM SERPL-SCNC: 139 MMOL/L (ref 136–145)
SODIUM SERPL-SCNC: 140 MMOL/L (ref 136–145)
SP GR UR STRIP.AUTO: 1.01 (ref 1–1.03)
UA COMPLETE W REFLEX CULTURE PNL UR: ABNORMAL
UA DIPSTICK W REFLEX MICRO PNL UR: YES
URN SPEC COLLECT METH UR: ABNORMAL
UROBILINOGEN UR STRIP-ACNC: 0.2 E.U./DL
WBC # BLD AUTO: 10 K/UL (ref 4–11)
WBC #/AREA URNS AUTO: 2 /HPF (ref 0–5)

## 2024-04-01 PROCEDURE — 94760 N-INVAS EAR/PLS OXIMETRY 1: CPT

## 2024-04-01 PROCEDURE — 76770 US EXAM ABDO BACK WALL COMP: CPT

## 2024-04-01 PROCEDURE — 6370000000 HC RX 637 (ALT 250 FOR IP): Performed by: STUDENT IN AN ORGANIZED HEALTH CARE EDUCATION/TRAINING PROGRAM

## 2024-04-01 PROCEDURE — 2580000003 HC RX 258: Performed by: INTERNAL MEDICINE

## 2024-04-01 PROCEDURE — 81001 URINALYSIS AUTO W/SCOPE: CPT

## 2024-04-01 PROCEDURE — 2580000003 HC RX 258: Performed by: FAMILY MEDICINE

## 2024-04-01 PROCEDURE — 36415 COLL VENOUS BLD VENIPUNCTURE: CPT

## 2024-04-01 PROCEDURE — 80048 BASIC METABOLIC PNL TOTAL CA: CPT

## 2024-04-01 PROCEDURE — 6360000002 HC RX W HCPCS: Performed by: STUDENT IN AN ORGANIZED HEALTH CARE EDUCATION/TRAINING PROGRAM

## 2024-04-01 PROCEDURE — 84100 ASSAY OF PHOSPHORUS: CPT

## 2024-04-01 PROCEDURE — 6370000000 HC RX 637 (ALT 250 FOR IP): Performed by: NURSE PRACTITIONER

## 2024-04-01 PROCEDURE — 1200000000 HC SEMI PRIVATE

## 2024-04-01 PROCEDURE — 6360000002 HC RX W HCPCS: Performed by: FAMILY MEDICINE

## 2024-04-01 PROCEDURE — 83735 ASSAY OF MAGNESIUM: CPT

## 2024-04-01 PROCEDURE — 85025 COMPLETE CBC W/AUTO DIFF WBC: CPT

## 2024-04-01 PROCEDURE — 93010 ELECTROCARDIOGRAM REPORT: CPT | Performed by: INTERNAL MEDICINE

## 2024-04-01 PROCEDURE — 51798 US URINE CAPACITY MEASURE: CPT

## 2024-04-01 PROCEDURE — 36592 COLLECT BLOOD FROM PICC: CPT

## 2024-04-01 PROCEDURE — 6360000002 HC RX W HCPCS: Performed by: NURSE PRACTITIONER

## 2024-04-01 RX ORDER — INSULIN GLARGINE 100 [IU]/ML
8 INJECTION, SOLUTION SUBCUTANEOUS NIGHTLY
Status: DISCONTINUED | OUTPATIENT
Start: 2024-04-01 | End: 2024-04-01

## 2024-04-01 RX ORDER — ESCITALOPRAM OXALATE 10 MG/1
10 TABLET ORAL DAILY
Status: DISCONTINUED | OUTPATIENT
Start: 2024-04-01 | End: 2024-04-03 | Stop reason: HOSPADM

## 2024-04-01 RX ORDER — HYDROCORTISONE 10 MG/1
10 TABLET ORAL DAILY
Status: DISCONTINUED | OUTPATIENT
Start: 2024-04-01 | End: 2024-04-03 | Stop reason: HOSPADM

## 2024-04-01 RX ORDER — INSULIN GLARGINE 100 [IU]/ML
8 INJECTION, SOLUTION SUBCUTANEOUS DAILY
Status: DISCONTINUED | OUTPATIENT
Start: 2024-04-02 | End: 2024-04-02

## 2024-04-01 RX ORDER — ACETAMINOPHEN 325 MG/1
650 TABLET ORAL EVERY 4 HOURS PRN
Status: DISCONTINUED | OUTPATIENT
Start: 2024-04-01 | End: 2024-04-03 | Stop reason: HOSPADM

## 2024-04-01 RX ORDER — HYDROCORTISONE 5 MG/1
5 TABLET ORAL EVERY EVENING
Status: DISCONTINUED | OUTPATIENT
Start: 2024-04-01 | End: 2024-04-03 | Stop reason: HOSPADM

## 2024-04-01 RX ORDER — OXYCODONE HYDROCHLORIDE 5 MG/1
5 TABLET ORAL EVERY 6 HOURS PRN
Status: DISCONTINUED | OUTPATIENT
Start: 2024-04-01 | End: 2024-04-03 | Stop reason: HOSPADM

## 2024-04-01 RX ORDER — PROCHLORPERAZINE EDISYLATE 5 MG/ML
10 INJECTION INTRAMUSCULAR; INTRAVENOUS EVERY 6 HOURS PRN
Status: DISCONTINUED | OUTPATIENT
Start: 2024-04-01 | End: 2024-04-03 | Stop reason: HOSPADM

## 2024-04-01 RX ORDER — OXYCODONE HYDROCHLORIDE 5 MG/1
5 TABLET ORAL ONCE
Status: DISCONTINUED | OUTPATIENT
Start: 2024-04-01 | End: 2024-04-03 | Stop reason: HOSPADM

## 2024-04-01 RX ORDER — INSULIN LISPRO 100 [IU]/ML
0-4 INJECTION, SOLUTION INTRAVENOUS; SUBCUTANEOUS NIGHTLY
Status: DISCONTINUED | OUTPATIENT
Start: 2024-04-01 | End: 2024-04-03

## 2024-04-01 RX ORDER — INSULIN LISPRO 100 [IU]/ML
2 INJECTION, SOLUTION INTRAVENOUS; SUBCUTANEOUS
Status: DISCONTINUED | OUTPATIENT
Start: 2024-04-01 | End: 2024-04-03

## 2024-04-01 RX ORDER — ROPINIROLE 1 MG/1
1 TABLET, FILM COATED ORAL 3 TIMES DAILY
Status: DISCONTINUED | OUTPATIENT
Start: 2024-04-01 | End: 2024-04-01

## 2024-04-01 RX ORDER — INSULIN LISPRO 100 [IU]/ML
0-4 INJECTION, SOLUTION INTRAVENOUS; SUBCUTANEOUS
Status: DISCONTINUED | OUTPATIENT
Start: 2024-04-01 | End: 2024-04-03

## 2024-04-01 RX ORDER — HYDRALAZINE HYDROCHLORIDE 20 MG/ML
10 INJECTION INTRAMUSCULAR; INTRAVENOUS ONCE
Status: COMPLETED | OUTPATIENT
Start: 2024-04-01 | End: 2024-04-01

## 2024-04-01 RX ORDER — ROPINIROLE 1 MG/1
1 TABLET, FILM COATED ORAL NIGHTLY
Status: DISCONTINUED | OUTPATIENT
Start: 2024-04-01 | End: 2024-04-03 | Stop reason: HOSPADM

## 2024-04-01 RX ORDER — GLUCAGON 1 MG/ML
1 KIT INJECTION PRN
Status: DISCONTINUED | OUTPATIENT
Start: 2024-04-01 | End: 2024-04-03 | Stop reason: SDUPTHER

## 2024-04-01 RX ORDER — DEXTROSE MONOHYDRATE 100 MG/ML
INJECTION, SOLUTION INTRAVENOUS CONTINUOUS PRN
Status: DISCONTINUED | OUTPATIENT
Start: 2024-04-01 | End: 2024-04-03 | Stop reason: SDUPTHER

## 2024-04-01 RX ORDER — SODIUM CHLORIDE 9 MG/ML
INJECTION, SOLUTION INTRAVENOUS CONTINUOUS
Status: DISCONTINUED | OUTPATIENT
Start: 2024-04-01 | End: 2024-04-03 | Stop reason: HOSPADM

## 2024-04-01 RX ORDER — MAGNESIUM SULFATE IN WATER 40 MG/ML
2000 INJECTION, SOLUTION INTRAVENOUS PRN
Status: DISCONTINUED | OUTPATIENT
Start: 2024-04-01 | End: 2024-04-03 | Stop reason: HOSPADM

## 2024-04-01 RX ADMIN — HEPARIN SODIUM 5000 UNITS: 5000 INJECTION INTRAVENOUS; SUBCUTANEOUS at 20:49

## 2024-04-01 RX ADMIN — OXYCODONE 5 MG: 5 TABLET ORAL at 23:57

## 2024-04-01 RX ADMIN — ACETAMINOPHEN 325MG 650 MG: 325 TABLET ORAL at 02:54

## 2024-04-01 RX ADMIN — ACETAMINOPHEN 325MG 650 MG: 325 TABLET ORAL at 23:56

## 2024-04-01 RX ADMIN — HYDRALAZINE HYDROCHLORIDE 10 MG: 20 INJECTION, SOLUTION INTRAMUSCULAR; INTRAVENOUS at 19:00

## 2024-04-01 RX ADMIN — ACETAMINOPHEN 325MG 650 MG: 325 TABLET ORAL at 17:47

## 2024-04-01 RX ADMIN — INSULIN LISPRO 1 UNITS: 100 INJECTION, SOLUTION INTRAVENOUS; SUBCUTANEOUS at 17:47

## 2024-04-01 RX ADMIN — PROCHLORPERAZINE EDISYLATE 10 MG: 5 INJECTION INTRAMUSCULAR; INTRAVENOUS at 15:38

## 2024-04-01 RX ADMIN — ACETAMINOPHEN 325MG 650 MG: 325 TABLET ORAL at 09:19

## 2024-04-01 RX ADMIN — MAGNESIUM SULFATE HEPTAHYDRATE 2000 MG: 40 INJECTION, SOLUTION INTRAVENOUS at 13:28

## 2024-04-01 RX ADMIN — DEXTROSE AND SODIUM CHLORIDE: 5; 450 INJECTION, SOLUTION INTRAVENOUS at 05:08

## 2024-04-01 RX ADMIN — ONDANSETRON 4 MG: 2 INJECTION INTRAMUSCULAR; INTRAVENOUS at 13:12

## 2024-04-01 RX ADMIN — OXYCODONE 5 MG: 5 TABLET ORAL at 15:41

## 2024-04-01 RX ADMIN — HYDROCORTISONE 10 MG: 10 TABLET ORAL at 11:11

## 2024-04-01 RX ADMIN — SODIUM CHLORIDE: 9 INJECTION, SOLUTION INTRAVENOUS at 22:44

## 2024-04-01 RX ADMIN — ESCITALOPRAM OXALATE 10 MG: 10 TABLET ORAL at 11:12

## 2024-04-01 RX ADMIN — WATER 1000 MG: 1 INJECTION INTRAMUSCULAR; INTRAVENOUS; SUBCUTANEOUS at 02:55

## 2024-04-01 RX ADMIN — INSULIN GLARGINE 8 UNITS: 100 INJECTION, SOLUTION SUBCUTANEOUS at 11:10

## 2024-04-01 RX ADMIN — SODIUM CHLORIDE: 9 INJECTION, SOLUTION INTRAVENOUS at 12:24

## 2024-04-01 RX ADMIN — ROPINIROLE HYDROCHLORIDE 1 MG: 1 TABLET, FILM COATED ORAL at 20:49

## 2024-04-01 ASSESSMENT — PAIN DESCRIPTION - LOCATION
LOCATION: HEAD;ABDOMEN
LOCATION: ABDOMEN
LOCATION: HEAD
LOCATION: HEAD

## 2024-04-01 ASSESSMENT — PAIN DESCRIPTION - DESCRIPTORS
DESCRIPTORS: ACHING
DESCRIPTORS: ACHING;DISCOMFORT
DESCRIPTORS: ACHING;DISCOMFORT

## 2024-04-01 ASSESSMENT — PAIN SCALES - GENERAL
PAINLEVEL_OUTOF10: 5
PAINLEVEL_OUTOF10: 0
PAINLEVEL_OUTOF10: 8
PAINLEVEL_OUTOF10: 7

## 2024-04-01 ASSESSMENT — PAIN DESCRIPTION - ORIENTATION: ORIENTATION: MID

## 2024-04-01 NOTE — DISCHARGE INSTRUCTIONS
Diabetes Association:     About Diabetes: https://diabetes.org/about-diabetes     Life with Diabetes: https://diabetes.org/living-with-diabetes     Health & Wellness: https://diabetes.org/health-wellness     Food & Nutrition: https://diabetes.org/food-nutrition     Tools & Resources: https://diabetes.org/tools-resources

## 2024-04-01 NOTE — H&P
Hospital Medicine History & Physical      Date of Admission: 3/31/2024    Date of Service:  Pt seen/examined on 3/31/2024    [x]Admitted to Inpatient with expected LOS greater than two midnights due to medical therapy.  []Placed in Observation status.    Chief Admission Complaint: Nausea/vomiting    Presenting Admission History:      63 y.o. female with past medical history significant for type 1 diabetes on insulin pump, diabetic nephropathy, GERD, metastatic malignant melanoma, adrenal insufficiency, hyperlipidemia, hypertension, CKD 3 presents with 1 day history of nausea with vomiting.  No hematemesis or coffee-ground, no fevers, chills, no chest pain, palpitations, abdominal pain, current dysuria, polyuria, hematuria,, diarrhea, constipation, melena, hematochezia.    Several days ago she reports that she did have some dysuria which resolved.    ED evaluation: Vital signs stable  Creatinine 3.7 with a baseline of around 1.8  Initial glucose 280 down to 161.  Anion gap 22.    WBC 16.5  Lactic acid 3.2    EKG shows normal sinus rhythm-my interpretation    Assessment/Plan:      Current Principal Problem:  DKA, type 1, not at goal (HCC)    #1 DKA type I  DKA protocol  Patient placed currently on D5 half-normal saline as glucose is 161 with anion gap of 22  Anticipate switching to her insulin pump once anion gap is closed.  BMP every 4  Admit to ICU  Telemetry  N.p.o. for now.  E once her anion gap is closed and will transition her to subcutaneous    2.  Metastatic melanoma    3.  GERD    4.  Adrenal insufficiency  Currently vital signs are stable  Do not feel that she needs a stress dose of steroids.    5. Presumed UTI.  UA pending.  Start Ceftriaxone once UA with Ucx obtained.    Have asked nursing to complete a med reconciliation.    Plan will be to restart home medications as appropriate once med reconciliation is complete.      Discussed management of the case in detail w/ the Emergency Department Provider:

## 2024-04-01 NOTE — CONSULTS
[facility]  Phone: [phone]  Fax: [phone]  [employer]         Reason for Consult:  favian/ckd   Requesting Physician:  Dr. Crain    History Obtained From:  patient, electronic medical record    History of Present Ilness: 62 y/o WF with h/o type 1 DM, Melanoma R eye  and ho Adrenal Insufficiency , previous episodes of FAVIAN presented with one day h/o nausea vomiting and poor oral intake Her BS was reading \"high\" at home   Denies dysuria hematuria flank pains or decrease in UOP   Noted to have FAVIAN with baseline Cr ~ 1.9 mg   lACTIC ACID 3.2   Started on DKA protocol   Reports nausea vomiting improved today         Past Medical History:        Diagnosis Date    Adrenal insufficiency (HCC)     Cancer (HCC) 2002    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    Depression     Diabetes mellitus (HCC)     Type I    Hx of radiation therapy     melanoma right eye    Hypertension     Pt. denies having history of Hypertension    Hyponatremia     Insulin pump in place     Melanoma (HCC) 01/13/2023    in stomach, pancreas    Prolonged emergence from general anesthesia     slow to wake up    Restless leg syndrome        Past Surgical History:        Procedure Laterality Date    CARPAL TUNNEL RELEASE Bilateral 12/2017    CHOLECYSTECTOMY      CT BIOPSY ABDOMEN RETROPERITONEUM  12/27/2022    CT BIOPSY ABDOMEN RETROPERITONEUM 12/27/2022 [facility] CT SCAN    EYE SURGERY Right     biopsy    HYSTERECTOMY (CERVIX STATUS UNKNOWN)      LIPOMA RESECTION      LIVER BIOPSY Right     PORT SURGERY Right 01/18/2023    RIGHT POWER PORT PLACEMENT performed by Satish Corley MD at [facility] OR    SHOULDER ARTHROSCOPY Right 08/31/2018    RIGHT SHOULDER ARTHROSCOPE, SUBACROMIAL DECOMPRESSION, DISTALCLAVICLE EXCISION, CAPSULAR RELEASE, MANIPULATION UNDER ANESTHESIA, DEBRIDEMENT    SHOULDER SURGERY      UPPER GASTROINTESTINAL ENDOSCOPY  10/22/2014    UPPER GASTROINTESTINAL ENDOSCOPY N/A 01/13/2023    EGD W/EUS FNA performed by Yuri

## 2024-04-02 LAB
ANION GAP SERPL CALCULATED.3IONS-SCNC: 15 MMOL/L (ref 3–16)
BASOPHILS # BLD: 0.1 K/UL (ref 0–0.2)
BASOPHILS NFR BLD: 1.1 %
BUN SERPL-MCNC: 38 MG/DL (ref 7–20)
CALCIUM SERPL-MCNC: 8.3 MG/DL (ref 8.3–10.6)
CHLORIDE SERPL-SCNC: 106 MMOL/L (ref 99–110)
CO2 SERPL-SCNC: 20 MMOL/L (ref 21–32)
CREAT SERPL-MCNC: 2.3 MG/DL (ref 0.6–1.2)
DEPRECATED RDW RBC AUTO: 14 % (ref 12.4–15.4)
EOSINOPHIL # BLD: 0.2 K/UL (ref 0–0.6)
EOSINOPHIL NFR BLD: 1.6 %
GFR SERPLBLD CREATININE-BSD FMLA CKD-EPI: 23 ML/MIN/{1.73_M2}
GLUCOSE BLD-MCNC: 113 MG/DL (ref 70–99)
GLUCOSE BLD-MCNC: 153 MG/DL (ref 70–99)
GLUCOSE BLD-MCNC: 226 MG/DL (ref 70–99)
GLUCOSE BLD-MCNC: 278 MG/DL (ref 70–99)
GLUCOSE BLD-MCNC: 298 MG/DL (ref 70–99)
GLUCOSE BLD-MCNC: 305 MG/DL (ref 70–99)
GLUCOSE BLD-MCNC: 339 MG/DL (ref 70–99)
GLUCOSE BLD-MCNC: 51 MG/DL (ref 70–99)
GLUCOSE BLD-MCNC: 72 MG/DL (ref 70–99)
GLUCOSE SERPL-MCNC: 317 MG/DL (ref 70–99)
HCT VFR BLD AUTO: 30.2 % (ref 36–48)
HGB BLD-MCNC: 10 G/DL (ref 12–16)
LYMPHOCYTES # BLD: 1.8 K/UL (ref 1–5.1)
LYMPHOCYTES NFR BLD: 14.4 %
MAGNESIUM SERPL-MCNC: 1.6 MG/DL (ref 1.8–2.4)
MCH RBC QN AUTO: 29.3 PG (ref 26–34)
MCHC RBC AUTO-ENTMCNC: 33 G/DL (ref 31–36)
MCV RBC AUTO: 88.9 FL (ref 80–100)
MONOCYTES # BLD: 0.8 K/UL (ref 0–1.3)
MONOCYTES NFR BLD: 6.5 %
NEUTROPHILS # BLD: 9.3 K/UL (ref 1.7–7.7)
NEUTROPHILS NFR BLD: 76.4 %
PERFORMED ON: ABNORMAL
PERFORMED ON: NORMAL
PHOSPHATE SERPL-MCNC: 3.2 MG/DL (ref 2.5–4.9)
PLATELET # BLD AUTO: 152 K/UL (ref 135–450)
PMV BLD AUTO: 7.7 FL (ref 5–10.5)
POTASSIUM SERPL-SCNC: 3.5 MMOL/L (ref 3.5–5.1)
RBC # BLD AUTO: 3.39 M/UL (ref 4–5.2)
SODIUM SERPL-SCNC: 141 MMOL/L (ref 136–145)
WBC # BLD AUTO: 12.2 K/UL (ref 4–11)

## 2024-04-02 PROCEDURE — 94760 N-INVAS EAR/PLS OXIMETRY 1: CPT

## 2024-04-02 PROCEDURE — 83735 ASSAY OF MAGNESIUM: CPT

## 2024-04-02 PROCEDURE — 6360000002 HC RX W HCPCS: Performed by: FAMILY MEDICINE

## 2024-04-02 PROCEDURE — 6370000000 HC RX 637 (ALT 250 FOR IP): Performed by: HOSPITALIST

## 2024-04-02 PROCEDURE — 6370000000 HC RX 637 (ALT 250 FOR IP): Performed by: STUDENT IN AN ORGANIZED HEALTH CARE EDUCATION/TRAINING PROGRAM

## 2024-04-02 PROCEDURE — 80048 BASIC METABOLIC PNL TOTAL CA: CPT

## 2024-04-02 PROCEDURE — 6360000002 HC RX W HCPCS: Performed by: REGISTERED NURSE

## 2024-04-02 PROCEDURE — 85025 COMPLETE CBC W/AUTO DIFF WBC: CPT

## 2024-04-02 PROCEDURE — 1200000000 HC SEMI PRIVATE

## 2024-04-02 PROCEDURE — 6360000002 HC RX W HCPCS: Performed by: HOSPITALIST

## 2024-04-02 PROCEDURE — 6360000002 HC RX W HCPCS: Performed by: STUDENT IN AN ORGANIZED HEALTH CARE EDUCATION/TRAINING PROGRAM

## 2024-04-02 PROCEDURE — 2580000003 HC RX 258: Performed by: FAMILY MEDICINE

## 2024-04-02 PROCEDURE — 2580000003 HC RX 258: Performed by: INTERNAL MEDICINE

## 2024-04-02 PROCEDURE — 6360000002 HC RX W HCPCS: Performed by: NURSE PRACTITIONER

## 2024-04-02 PROCEDURE — 84100 ASSAY OF PHOSPHORUS: CPT

## 2024-04-02 RX ORDER — HYDRALAZINE HYDROCHLORIDE 20 MG/ML
10 INJECTION INTRAMUSCULAR; INTRAVENOUS EVERY 4 HOURS PRN
Status: DISCONTINUED | OUTPATIENT
Start: 2024-04-02 | End: 2024-04-03 | Stop reason: HOSPADM

## 2024-04-02 RX ORDER — INSULIN GLARGINE 100 [IU]/ML
16 INJECTION, SOLUTION SUBCUTANEOUS DAILY
Status: DISCONTINUED | OUTPATIENT
Start: 2024-04-02 | End: 2024-04-03

## 2024-04-02 RX ORDER — AMLODIPINE BESYLATE 5 MG/1
5 TABLET ORAL DAILY
Status: DISCONTINUED | OUTPATIENT
Start: 2024-04-02 | End: 2024-04-03 | Stop reason: HOSPADM

## 2024-04-02 RX ORDER — METOCLOPRAMIDE HYDROCHLORIDE 5 MG/ML
5 INJECTION INTRAMUSCULAR; INTRAVENOUS EVERY 6 HOURS
Status: DISCONTINUED | OUTPATIENT
Start: 2024-04-02 | End: 2024-04-03 | Stop reason: HOSPADM

## 2024-04-02 RX ADMIN — HEPARIN SODIUM 5000 UNITS: 5000 INJECTION INTRAVENOUS; SUBCUTANEOUS at 20:36

## 2024-04-02 RX ADMIN — HYDRALAZINE HYDROCHLORIDE 10 MG: 20 INJECTION, SOLUTION INTRAMUSCULAR; INTRAVENOUS at 05:10

## 2024-04-02 RX ADMIN — HEPARIN SODIUM 5000 UNITS: 5000 INJECTION INTRAVENOUS; SUBCUTANEOUS at 13:39

## 2024-04-02 RX ADMIN — ONDANSETRON 4 MG: 2 INJECTION INTRAMUSCULAR; INTRAVENOUS at 03:05

## 2024-04-02 RX ADMIN — INSULIN LISPRO 3 UNITS: 100 INJECTION, SOLUTION INTRAVENOUS; SUBCUTANEOUS at 08:40

## 2024-04-02 RX ADMIN — ROPINIROLE HYDROCHLORIDE 1 MG: 1 TABLET, FILM COATED ORAL at 20:36

## 2024-04-02 RX ADMIN — HYDROCORTISONE 10 MG: 10 TABLET ORAL at 08:39

## 2024-04-02 RX ADMIN — HYDRALAZINE HYDROCHLORIDE 10 MG: 20 INJECTION, SOLUTION INTRAMUSCULAR; INTRAVENOUS at 10:13

## 2024-04-02 RX ADMIN — ONDANSETRON 4 MG: 2 INJECTION INTRAMUSCULAR; INTRAVENOUS at 10:04

## 2024-04-02 RX ADMIN — METOCLOPRAMIDE 5 MG: 5 INJECTION, SOLUTION INTRAMUSCULAR; INTRAVENOUS at 13:39

## 2024-04-02 RX ADMIN — HYDROCORTISONE 5 MG: 5 TABLET ORAL at 17:49

## 2024-04-02 RX ADMIN — MAGNESIUM SULFATE HEPTAHYDRATE 2000 MG: 40 INJECTION, SOLUTION INTRAVENOUS at 17:49

## 2024-04-02 RX ADMIN — HEPARIN SODIUM 5000 UNITS: 5000 INJECTION INTRAVENOUS; SUBCUTANEOUS at 05:09

## 2024-04-02 RX ADMIN — Medication 16 G: at 19:38

## 2024-04-02 RX ADMIN — OXYCODONE 5 MG: 5 TABLET ORAL at 12:25

## 2024-04-02 RX ADMIN — SODIUM CHLORIDE: 9 INJECTION, SOLUTION INTRAVENOUS at 08:38

## 2024-04-02 RX ADMIN — WATER 1000 MG: 1 INJECTION INTRAMUSCULAR; INTRAVENOUS; SUBCUTANEOUS at 03:04

## 2024-04-02 RX ADMIN — ESCITALOPRAM OXALATE 10 MG: 10 TABLET ORAL at 08:40

## 2024-04-02 RX ADMIN — OXYCODONE 5 MG: 5 TABLET ORAL at 06:33

## 2024-04-02 RX ADMIN — INSULIN GLARGINE 16 UNITS: 100 INJECTION, SOLUTION SUBCUTANEOUS at 10:04

## 2024-04-02 RX ADMIN — SODIUM CHLORIDE: 9 INJECTION, SOLUTION INTRAVENOUS at 20:35

## 2024-04-02 RX ADMIN — INSULIN LISPRO 2 UNITS: 100 INJECTION, SOLUTION INTRAVENOUS; SUBCUTANEOUS at 12:26

## 2024-04-02 RX ADMIN — METOCLOPRAMIDE 5 MG: 5 INJECTION, SOLUTION INTRAMUSCULAR; INTRAVENOUS at 20:36

## 2024-04-02 RX ADMIN — PROCHLORPERAZINE EDISYLATE 10 MG: 5 INJECTION INTRAMUSCULAR; INTRAVENOUS at 05:01

## 2024-04-02 ASSESSMENT — PAIN DESCRIPTION - LOCATION
LOCATION: BACK
LOCATION: BACK

## 2024-04-02 ASSESSMENT — PAIN DESCRIPTION - ORIENTATION: ORIENTATION: MID

## 2024-04-02 ASSESSMENT — PAIN DESCRIPTION - DESCRIPTORS: DESCRIPTORS: ACHING

## 2024-04-02 ASSESSMENT — PAIN SCALES - GENERAL
PAINLEVEL_OUTOF10: 7
PAINLEVEL_OUTOF10: 7
PAINLEVEL_OUTOF10: 8
PAINLEVEL_OUTOF10: 10

## 2024-04-02 NOTE — PLAN OF CARE
Problem: Discharge Planning  Goal: Discharge to home or other facility with appropriate resources  4/1/2024 2220 by Bala Holman RN  Outcome: Progressing  4/1/2024 1822 by Justine Singleton RN  Outcome: Progressing     Problem: Safety - Adult  Goal: Free from fall injury  4/1/2024 2220 by Bala Holman RN  Outcome: Progressing  4/1/2024 1822 by Justine Singleton RN  Outcome: Progressing     Problem: ABCDS Injury Assessment  Goal: Absence of physical injury  4/1/2024 2220 by Bala Holman RN  Outcome: Progressing  4/1/2024 1822 by Justine Singleton RN  Outcome: Progressing

## 2024-04-02 NOTE — CARE COORDINATION
Case Management Assessment  Initial Evaluation    Date/Time of Evaluation: 4/2/2024 1:22 PM  Assessment Completed by: JEOVANNY Ghotra    If patient is discharged prior to next notation, then this note serves as note for discharge by case management.    Patient Name: Elvia Arguelles                   YOB: 1960  Diagnosis: FAVIAN (acute kidney injury) (HCC) [N17.9]  DKA, type 1, not at goal (HCC) [E10.10]  Diabetic ketoacidosis without coma associated with type 1 diabetes mellitus (HCC) [E10.10]                   Date / Time: 3/31/2024  5:54 PM    Patient Admission Status: Inpatient   Readmission Risk (Low < 19, Mod (19-27), High > 27): Readmission Risk Score: 22.1    Current PCP: Meg Ellis APRN - CNP  PCP verified by CM? (P) Yes    Chart Reviewed: Yes      History Provided by: (P) Patient, Significant Other  Patient Orientation: (P) Alert and Oriented    Patient Cognition: (P) Alert    Hospitalization in the last 30 days (Readmission):  No    If yes, Readmission Assessment in CM Navigator will be completed.    Advance Directives:      Code Status: Full Code   Patient's Primary Decision Maker is: (P) Legal Next of Kin    Primary Decision Maker: Shant Arguelles - Spouse - [phone]    Secondary Decision Maker: Kindra Cohen)Esther - Child - [phone]    Supplemental (Other) Decision Maker: Yas Shafer - Child - [phone]    Discharge Planning:    Patient lives with: (P) Spouse/Significant Other Type of Home: (P) House  Primary Care Giver: (P) Self  Patient Support Systems include: (P) Spouse/Significant Other   Current Financial resources: (P) None  Current community resources: (P) None  Current services prior to admission: (P) None            Current DME:              Type of Home Care services:  (P) None    ADLS  Prior functional level: (P) Independent in ADLs/IADLs  Current functional level: (P) Independent in ADLs/IADLs    PT AM-PAC:   /24  OT AM-PAC:   /24    Family can

## 2024-04-02 NOTE — ACP (ADVANCE CARE PLANNING)
plan:    [] Schedule follow-up conversation to continue planning  [] Referred individual to Provider for additional questions/concerns   [] Advised patient/agent/surrogate to review completed ACP document and update if needed with changes in condition, patient preferences or care setting    [x] This note routed to one or more involved healthcare providers    Electronically signed by JEOVANNY Ghotra on 4/2/2024 at 1:43 PM

## 2024-04-03 VITALS
BODY MASS INDEX: 25.47 KG/M2 | WEIGHT: 126.32 LBS | HEIGHT: 59 IN | RESPIRATION RATE: 17 BRPM | SYSTOLIC BLOOD PRESSURE: 112 MMHG | HEART RATE: 81 BPM | DIASTOLIC BLOOD PRESSURE: 62 MMHG | OXYGEN SATURATION: 95 % | TEMPERATURE: 99.4 F

## 2024-04-03 LAB
ANION GAP SERPL CALCULATED.3IONS-SCNC: 9 MMOL/L (ref 3–16)
BASOPHILS # BLD: 0 K/UL (ref 0–0.2)
BASOPHILS NFR BLD: 0.4 %
BUN SERPL-MCNC: 26 MG/DL (ref 7–20)
CALCIUM SERPL-MCNC: 8.5 MG/DL (ref 8.3–10.6)
CHLORIDE SERPL-SCNC: 107 MMOL/L (ref 99–110)
CO2 SERPL-SCNC: 23 MMOL/L (ref 21–32)
CREAT SERPL-MCNC: 2.2 MG/DL (ref 0.6–1.2)
DEPRECATED RDW RBC AUTO: 14.2 % (ref 12.4–15.4)
EOSINOPHIL # BLD: 0.2 K/UL (ref 0–0.6)
EOSINOPHIL NFR BLD: 2.5 %
GFR SERPLBLD CREATININE-BSD FMLA CKD-EPI: 24 ML/MIN/{1.73_M2}
GLUCOSE BLD-MCNC: 107 MG/DL (ref 70–99)
GLUCOSE BLD-MCNC: 112 MG/DL (ref 70–99)
GLUCOSE BLD-MCNC: 203 MG/DL (ref 70–99)
GLUCOSE BLD-MCNC: 234 MG/DL (ref 70–99)
GLUCOSE BLD-MCNC: 330 MG/DL (ref 70–99)
GLUCOSE BLD-MCNC: 49 MG/DL (ref 70–99)
GLUCOSE BLD-MCNC: 67 MG/DL (ref 70–99)
GLUCOSE SERPL-MCNC: 79 MG/DL (ref 70–99)
HCT VFR BLD AUTO: 27.6 % (ref 36–48)
HGB BLD-MCNC: 9.2 G/DL (ref 12–16)
LYMPHOCYTES # BLD: 1.5 K/UL (ref 1–5.1)
LYMPHOCYTES NFR BLD: 20 %
MAGNESIUM SERPL-MCNC: 2.2 MG/DL (ref 1.8–2.4)
MCH RBC QN AUTO: 29.5 PG (ref 26–34)
MCHC RBC AUTO-ENTMCNC: 33.5 G/DL (ref 31–36)
MCV RBC AUTO: 88 FL (ref 80–100)
MONOCYTES # BLD: 0.7 K/UL (ref 0–1.3)
MONOCYTES NFR BLD: 9.4 %
NEUTROPHILS # BLD: 5.1 K/UL (ref 1.7–7.7)
NEUTROPHILS NFR BLD: 67.7 %
PERFORMED ON: ABNORMAL
PHOSPHATE SERPL-MCNC: 3 MG/DL (ref 2.5–4.9)
PLATELET # BLD AUTO: 133 K/UL (ref 135–450)
PMV BLD AUTO: 7.5 FL (ref 5–10.5)
POTASSIUM SERPL-SCNC: 3.8 MMOL/L (ref 3.5–5.1)
RBC # BLD AUTO: 3.13 M/UL (ref 4–5.2)
SODIUM SERPL-SCNC: 139 MMOL/L (ref 136–145)
WBC # BLD AUTO: 7.5 K/UL (ref 4–11)

## 2024-04-03 PROCEDURE — 6370000000 HC RX 637 (ALT 250 FOR IP): Performed by: STUDENT IN AN ORGANIZED HEALTH CARE EDUCATION/TRAINING PROGRAM

## 2024-04-03 PROCEDURE — 2580000003 HC RX 258: Performed by: STUDENT IN AN ORGANIZED HEALTH CARE EDUCATION/TRAINING PROGRAM

## 2024-04-03 PROCEDURE — 6370000000 HC RX 637 (ALT 250 FOR IP): Performed by: HOSPITALIST

## 2024-04-03 PROCEDURE — 80048 BASIC METABOLIC PNL TOTAL CA: CPT

## 2024-04-03 PROCEDURE — 2580000003 HC RX 258: Performed by: INTERNAL MEDICINE

## 2024-04-03 PROCEDURE — 83735 ASSAY OF MAGNESIUM: CPT

## 2024-04-03 PROCEDURE — 6370000000 HC RX 637 (ALT 250 FOR IP): Performed by: NURSE PRACTITIONER

## 2024-04-03 PROCEDURE — 83036 HEMOGLOBIN GLYCOSYLATED A1C: CPT

## 2024-04-03 PROCEDURE — 6370000000 HC RX 637 (ALT 250 FOR IP): Performed by: INTERNAL MEDICINE

## 2024-04-03 PROCEDURE — 6360000002 HC RX W HCPCS: Performed by: HOSPITALIST

## 2024-04-03 PROCEDURE — 84100 ASSAY OF PHOSPHORUS: CPT

## 2024-04-03 PROCEDURE — 85025 COMPLETE CBC W/AUTO DIFF WBC: CPT

## 2024-04-03 PROCEDURE — 6360000002 HC RX W HCPCS: Performed by: FAMILY MEDICINE

## 2024-04-03 PROCEDURE — 6360000002 HC RX W HCPCS: Performed by: STUDENT IN AN ORGANIZED HEALTH CARE EDUCATION/TRAINING PROGRAM

## 2024-04-03 PROCEDURE — 2580000003 HC RX 258: Performed by: FAMILY MEDICINE

## 2024-04-03 PROCEDURE — 94760 N-INVAS EAR/PLS OXIMETRY 1: CPT

## 2024-04-03 RX ORDER — INSULIN GLARGINE 100 [IU]/ML
10 INJECTION, SOLUTION SUBCUTANEOUS DAILY
Status: DISCONTINUED | OUTPATIENT
Start: 2024-04-03 | End: 2024-04-03

## 2024-04-03 RX ORDER — GLUCAGON 1 MG/ML
1 KIT INJECTION PRN
Status: DISCONTINUED | OUTPATIENT
Start: 2024-04-03 | End: 2024-04-03 | Stop reason: HOSPADM

## 2024-04-03 RX ORDER — ONDANSETRON 4 MG/1
4 TABLET, ORALLY DISINTEGRATING ORAL 3 TIMES DAILY PRN
Qty: 21 TABLET | Refills: 0 | Status: SHIPPED | OUTPATIENT
Start: 2024-04-03

## 2024-04-03 RX ORDER — DEXTROSE MONOHYDRATE 100 MG/ML
INJECTION, SOLUTION INTRAVENOUS CONTINUOUS PRN
Status: DISCONTINUED | OUTPATIENT
Start: 2024-04-03 | End: 2024-04-03 | Stop reason: HOSPADM

## 2024-04-03 RX ADMIN — AMLODIPINE BESYLATE 5 MG: 5 TABLET ORAL at 09:47

## 2024-04-03 RX ADMIN — Medication 16 G: at 03:02

## 2024-04-03 RX ADMIN — SODIUM CHLORIDE: 9 INJECTION, SOLUTION INTRAVENOUS at 07:54

## 2024-04-03 RX ADMIN — METOCLOPRAMIDE 5 MG: 5 INJECTION, SOLUTION INTRAMUSCULAR; INTRAVENOUS at 01:21

## 2024-04-03 RX ADMIN — ESCITALOPRAM OXALATE 10 MG: 10 TABLET ORAL at 09:47

## 2024-04-03 RX ADMIN — METOCLOPRAMIDE 5 MG: 5 INJECTION, SOLUTION INTRAMUSCULAR; INTRAVENOUS at 05:42

## 2024-04-03 RX ADMIN — OXYCODONE 5 MG: 5 TABLET ORAL at 01:19

## 2024-04-03 RX ADMIN — WATER 1000 MG: 1 INJECTION INTRAMUSCULAR; INTRAVENOUS; SUBCUTANEOUS at 02:59

## 2024-04-03 RX ADMIN — DEXTROSE MONOHYDRATE: 100 INJECTION, SOLUTION INTRAVENOUS at 06:23

## 2024-04-03 RX ADMIN — GLUCAGON 1 MG: KIT at 06:09

## 2024-04-03 RX ADMIN — ACETAMINOPHEN 325MG 650 MG: 325 TABLET ORAL at 06:52

## 2024-04-03 RX ADMIN — HEPARIN SODIUM 5000 UNITS: 5000 INJECTION INTRAVENOUS; SUBCUTANEOUS at 05:43

## 2024-04-03 RX ADMIN — HYDROCORTISONE 10 MG: 10 TABLET ORAL at 09:52

## 2024-04-03 ASSESSMENT — PAIN DESCRIPTION - DESCRIPTORS
DESCRIPTORS: ACHING
DESCRIPTORS: ACHING

## 2024-04-03 ASSESSMENT — PAIN SCALES - GENERAL
PAINLEVEL_OUTOF10: 8
PAINLEVEL_OUTOF10: 7
PAINLEVEL_OUTOF10: 1
PAINLEVEL_OUTOF10: 0

## 2024-04-03 ASSESSMENT — PAIN DESCRIPTION - LOCATION
LOCATION: ABDOMEN
LOCATION: HEAD
LOCATION: ABDOMEN

## 2024-04-03 NOTE — DISCHARGE INSTR - DIET

## 2024-04-03 NOTE — PROGRESS NOTES
Pharmacy Note - Renal dose adjustment made per P/T protocol    Original order:  Metoclopramide 10 mg iv q6h    Estimated Creatinine Clearance: 19 mL/min (A) (based on SCr of 2.3 mg/dL (H)).    Recent Labs     04/01/24  0018 04/01/24  0330 04/02/24  0945   BUN 76* 73* 38*   CREATININE 3.2* 3.0* 2.3*       Renally adjusted order:  Metoclopramide 5 mg iv q6h    Please call pharmacy with any questions.    Thank you,  [place], [facility]  4/2/2024 12:34 PM    
    V2.0    [facility] Progress Note      Name:  Elvia Arguelles /Age/Sex: 1960  (63 y.o. female)   MRN & CSN:  [id number] & [id number] Encounter Date/Time: 2024 8:57 AM EDT   Location:  [id number] PCP: Meg Ellis, APRN - CNP     Attending:Nia Crain MD       Hospital Day: 2      HPI :   Chief Complaint: nausea and vomitting.     Elvia Arguelles is a 63 y.o. female who presents with  past medical history significant for type 1 diabetes on insulin pump, diabetic nephropathy, GERD, metastatic malignant melanoma, adrenal insufficiency, hyperlipidemia, hypertension, CKD 3 presents with 1 day history of nausea with vomiting.  No hematemesis or coffee-ground, no fevers, chills, no chest pain, palpitations, abdominal pain, current dysuria, polyuria, hematuria,, diarrhea, constipation, melena, hematochezia.     Several days ago she reports that she did have some dysuria which resolved.     Subjective:   Reported a mild headache  that had improved with tylenol.  She reported feeling very fatigued.     at bedside, updated.     Review of Systems:      Pertinent positives and negatives discussed in HPI    Objective:     Intake/Output Summary (Last 24 hours) at 2024 0722  Last data filed at 3/31/2024 2345  Gross per 24 hour   Intake --   Output 400 ml   Net -400 ml      Vitals:   Vitals:    24 2220 24 2300 24 0000 24 0100   BP: (!) 146/65 132/65 (!) 168/51 (!) 139/50   Pulse: 98 99 95 96   Resp: 19 19 19 21   Temp: 99.9 °F (37.7 °C)      TempSrc: Oral      SpO2: 97% 95% 90% (!) 85%   Weight:             Physical Exam:      Physical Exam Performed:    BP (!) 139/50   Pulse 96   Temp 99.9 °F (37.7 °C) (Oral)   Resp 21   Wt 53.3 kg (117 lb 8.1 oz)   LMP  (LMP Unknown) Comment: had a hysterectomy a long time ago  SpO2 (!) 85%   BMI 23.73 kg/m²     General appearance: pleasant female patient.   HEENT: Pupils equal, round, and reactive to light. 
  [facility]  Phone: [phone]  Fax: [phone]  [place]         Reason for Consult:  favian/ckd   Requesting Physician:  Dr. Crain    History Obtained From:  patient, electronic medical record    History of Present Ilness: 64 y/o WF with h/o type 1 DM, Melanoma R eye  and ho Adrenal Insufficiency , previous episodes of FAVIAN presented with one day h/o nausea vomiting and poor oral intake Her BS was reading \"high\" at home   Denies dysuria hematuria flank pains or decrease in UOP   Noted to have FAVIAN with baseline Cr ~ 1.9 mg   lACTIC ACID 3.2   Started on DKA protocol   Reports nausea vomiting improved today         Review of Systems: feels better Able to keep fluids down No abd pain       Physical exam:   Constitutional:  VITALS:  BP (!) 153/64   Pulse 96   Temp 98.7 °F (37.1 °C) (Oral)   Resp 18   Ht 1.499 m (4' 11\")   Wt 55.1 kg (121 lb 7.6 oz)   LMP  (LMP Unknown) Comment: had a hysterectomy a long time ago  SpO2 95%   BMI 24.53 kg/m²   Gen: alert, awake, nad  Skin: no rash, turgor wnl  Heent:  eomi, mmm  Neck: no bruits or jvd noted, thyroid normal  Cardiovascular:  S1, S2 without m/r/g  Respiratory: CTA B without w/r/r; respiratory effort normal  Abdomen:  +bs, soft, nt, nd, no hepatosplenomegaly  Ext: no  lower extremity edema    Data/  CBC:   Lab Results   Component Value Date/Time    WBC 12.2 04/02/2024 09:45 AM    RBC 3.39 04/02/2024 09:45 AM    HGB 10.0 04/02/2024 09:45 AM    HCT 30.2 04/02/2024 09:45 AM    MCV 88.9 04/02/2024 09:45 AM    MCH 29.3 04/02/2024 09:45 AM    MCHC 33.0 04/02/2024 09:45 AM    RDW 14.0 04/02/2024 09:45 AM     04/02/2024 09:45 AM    MPV 7.7 04/02/2024 09:45 AM     BMP:    Lab Results   Component Value Date/Time     04/02/2024 09:45 AM    K 3.5 04/02/2024 09:45 AM    K 4.5 03/31/2024 06:12 PM     04/02/2024 09:45 AM    CO2 20 04/02/2024 09:45 AM    BUN 38 04/02/2024 09:45 AM    LABALBU 3.9 03/31/2024 06:12 PM    CREATININE 
  [facility]  Phone: [phone]  Fax: [phone]  [employer]         Reason for Consult:  favian/ckd   Requesting Physician:  Dr. Crain    History Obtained From:  patient, electronic medical record    History of Present Ilness: 64 y/o WF with h/o type 1 DM, Melanoma R eye  and ho Adrenal Insufficiency , previous episodes of FAVIAN presented with one day h/o nausea vomiting and poor oral intake Her BS was reading \"high\" at home   Denies dysuria hematuria flank pains or decrease in UOP   Noted to have FAVIAN with baseline Cr ~ 1.9 mg   lACTIC ACID 3.2   Started on DKA protocol   Reports nausea vomiting improved today         Review of Systems: feels good No nausea vomiting    at bedside   Wants to go home      Physical exam:   Constitutional:  VITALS:  /62   Pulse 81   Temp 99.4 °F (37.4 °C) (Oral)   Resp 17   Ht 1.499 m (4' 11\")   Wt 57.3 kg (126 lb 5.2 oz)   LMP  (LMP Unknown) Comment: had a hysterectomy a long time ago  SpO2 95%   BMI 25.51 kg/m²   Gen: alert, awake, nad  Skin: no rash, turgor wnl  Heent:  eomi, mmm  Neck: no bruits or jvd noted, thyroid normal  Cardiovascular:  S1, S2 without m/r/g  Respiratory: CTA B without w/r/r; respiratory effort normal  Abdomen:  +bs, soft, nt, nd  Ext: no  lower extremity edema    Data/  CBC:   Lab Results   Component Value Date/Time    WBC 7.5 04/03/2024 05:40 AM    RBC 3.13 04/03/2024 05:40 AM    HGB 9.2 04/03/2024 05:40 AM    HCT 27.6 04/03/2024 05:40 AM    MCV 88.0 04/03/2024 05:40 AM    MCH 29.5 04/03/2024 05:40 AM    MCHC 33.5 04/03/2024 05:40 AM    RDW 14.2 04/03/2024 05:40 AM     04/03/2024 05:40 AM    MPV 7.5 04/03/2024 05:40 AM     BMP:    Lab Results   Component Value Date/Time     04/03/2024 05:40 AM    K 3.8 04/03/2024 05:40 AM    K 4.5 03/31/2024 06:12 PM     04/03/2024 05:40 AM    CO2 23 04/03/2024 05:40 AM    BUN 26 04/03/2024 05:40 AM    LABALBU 3.9 03/31/2024 06:12 PM    CREATININE 2.2 
4 Eyes Skin Assessment     NAME:  Elvia Arguelles  YOB: 1960  MEDICAL RECORD NUMBER:  [id number]    The patient is being assessed for  Admission    I agree that at least one RN has performed a thorough Head to Toe Skin Assessment on the patient. ALL assessment sites listed below have been assessed.      Areas assessed by both nurses:    Head, Face, Ears, Shoulders, Back, Chest, Arms, Elbows, Hands, Sacrum. Buttock, Coccyx, Ischium, and Legs. Feet and Heels        Does the Patient have a Wound? No noted wound(s)       Uriah Prevention initiated by RN: Yes  Wound Care Orders initiated by RN: No    Pressure Injury (Stage 3,4, Unstageable, DTI, NWPT, and Complex wounds) if present, place Wound referral order by RN under : No    New Ostomies, if present place, Ostomy referral order under : No     Nurse 1 eSignature: Electronically signed by Chula Gaines RN on 4/1/24 at 8:08 AM EDT    **SHARE this note so that the co-signing nurse can place an eSignature**    Nurse 2 eSignature: Electronically signed by Dilshad Jauregui RN on 4/1/24 at 6:39 PM EDT   
4 Eyes Skin Assessment     NAME:  Elvia Arguelles  YOB: 1960  MEDICAL RECORD NUMBER:  [id number]    The patient is being assessed for  Transfer to New Unit    I agree that at least one RN has performed a thorough Head to Toe Skin Assessment on the patient. ALL assessment sites listed below have been assessed.      Areas assessed by both nurses:    Head, Face, Ears, Shoulders, Back, Chest, Arms, Elbows, Hands, Sacrum. Buttock, Coccyx, Ischium, Legs. Feet and Heels, and Under Medical Devices         Does the Patient have a Wound? No noted wound(s)       Uriah Prevention initiated by RN: No  Wound Care Orders initiated by RN: No    Pressure Injury (Stage 3,4, Unstageable, DTI, NWPT, and Complex wounds) if present, place Wound referral order by RN under : No    New Ostomies, if present place, Ostomy referral order under : No     Nurse 1 eSignature: Electronically signed by Justine Singleton RN on 4/1/24 at 6:27 PM EDT    **SHARE this note so that the co-signing nurse can place an eSignature**    Nurse 2 eSignature: Electronically signed by Marlena Montalvo RN on 4/1/24 at 7:23 PM EDT    
Blood glucose 113. RN notified MD Raymundo per orders. No new orders.   
CLINICAL PHARMACY NOTE: MEDS TO BEDS    Total # of Prescriptions Filled: 1   The following medications were delivered to the patient:  Current Discharge Medication List        START taking these medications    Details   ondansetron (ZOFRAN-ODT) 4 MG disintegrating tablet Take 1 tablet by mouth 3 times daily as needed for Nausea or Vomiting  Qty: 21 tablet, Refills: 0               Additional Documentation:    
[facility]  Diabetes Education   Progress Note       NAME:  Elvia Arguelles  MEDICAL RECORD NUMBER:  [id number]  AGE: 63 y.o.   GENDER: female  : 1960  TODAY'S DATE:  2024    Subjective   Reason for Diabetic Education Evaluation and Assessment: Hyperglycemia, insulin pump use    Ghada reports nausea and back pain.  She denies being able to eat yesterday.  Closes eyes when not directly engaged in conversation.      Dr. Mae present.      Reviewed pump history:    3/30 Average  Basal 19.3 units Bolus 42.4 units TDD= 61.7 units  3/29 Average  Basal 25.8 units Bolus 21.4 units TDD = 47.2  3/28 Average  Basal 15.8 units Bolus 26 units   TDD = 41.8 units  3/27 Average  Basal 10.8 Bolus 25.4 units   TDD = 36 units     Visit Type: follow-up      Elvia Arguelles is a 63 y.o. female referred by:     [x] Physician  [] Nursing  [] Chart Review   [] Other:     PAST MEDICAL HISTORY        Diagnosis Date    Adrenal insufficiency (HCC)     Cancer (HCC)     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    Depression     Diabetes mellitus (HCC)     Type I    Hx of radiation therapy     melanoma right eye    Hypertension     Pt. denies having history of Hypertension    Hyponatremia     Insulin pump in place     Melanoma (HCC) 2023    in stomach, pancreas    Prolonged emergence from general anesthesia     slow to wake up    Restless leg syndrome        PAST SURGICAL HISTORY    Past Surgical History:   Procedure Laterality Date    CARPAL TUNNEL RELEASE Bilateral 2017    CHOLECYSTECTOMY      CT BIOPSY ABDOMEN RETROPERITONEUM  2022    CT BIOPSY ABDOMEN RETROPERITONEUM 2022 [facility] CT SCAN    EYE SURGERY Right     biopsy    HYSTERECTOMY (CERVIX STATUS UNKNOWN)      LIPOMA RESECTION      LIVER BIOPSY Right     PORT SURGERY Right 2023    RIGHT POWER PORT PLACEMENT performed by Satish Corley MD at [facility] OR    SHOULDER ARTHROSCOPY Right 2018    RIGHT SHOULDER 
MD came at bedside and evaluated pt. RN administered PRN Zofran per order for nausea and hydralazine per order for BP. RN continuing care.   
Patient BP at 214/78, provider notified, and 10 mg Hydralazine PRN ordered. Medication administered per order.  
Patient and family given verbal and written discharge instructions. Questions answered. No concerns.   
Patient blood glucose was 51, patient is administered 4 tablets of glucose chewable tablets (16g). Blood glucose checked after 15 minutes, and is at 153.  
Patient discharged via wheel chair with belongings in hand.   
Patient is admitted for DKA. Her vitals are stable. Her latest blood glucose was 67. 1mg of glucagon was administered, and dextrose 10% infusion administered at 100ml/hr. Her blood glucose was rechecked after 15min, and it has increased to 203  
Patient is transferred from ICU to room 4258. With a dx of DKA. VSS except for BP elevated. Pt is complaining if headache. RN notified MD Crain. See orders.   Pt is resting in bed. Bed low and locked.   
Patient says she has the Dex-com G 6 series glucose monitor. Patient denies having her insulin pump in place or with her this admission.   
Patient's blood glucose was 49, patient is administered 4 tablets of glucose chewable tablets. Blood glucose rechecked after 15 minutes, with the blood glucose increasing to 112. Provider notified  
Patient's spouse here with home insulin pump and insulin supply.  Dr. Raymundo here at bedside and notified. Held Lantus 10 units per MD  Order so patient can start home insulin regimen. See new orders placed per MD.   
Pt continues to be feel not the best with very little appetite. BP has been improving slightly. New medication added for BP control. Pt is in bed sleeping.   
Pt is alert and oriented X4. VSS with BP high. RN notified MD. Awaiting for orders. Pt is complaining of back pain 5/10 and nausea. Pt states she is not hungry. Pt received just 3 units of lispro per order. Pt is in bed. Bed low and locked.  
RN administered /81 med hydralazine 10 mg for BP.   
RN called report earlier to 4N RN. Patient placed on portable telemetry monitor 40-59 and confirmed with CMU. Patient's belongings gathered and given to patient prior to transfer. Patient transferred to floor via wheelchair with this RN. VSS. Right chest port patent and infusing NS @ 100ml/hr.  
Rounded on patient. Patient made aware of student nurse. Patient went back to sleep.  No further needs at this time.  
Shift Summary: uneventful shift, managed pt nausea w/ zofran. Admin tylenol for headache. Pt was able to use bedside commode and finally ambulate to toilet for BM         Family updated: no    Most recent vitals: BP (!) 154/53   Pulse 89   Temp 99.9 °F (37.7 °C) (Oral)   Resp 11   Wt 53.3 kg (117 lb 8.1 oz)   LMP  (LMP Unknown) Comment: had a hysterectomy a long time ago  SpO2 96%   BMI 23.73 kg/m²      Rhythm: Normal Sinus Rhythm      NC/HFNC- 0 lpm  Respiratory support: - No ventilator support    Vent days: Day 0    Admission weight Weight - Scale: 53.3 kg (117 lb 8.1 oz)  Today's weight   Wt Readings from Last 1 Encounters:   03/31/24 53.3 kg (117 lb 8.1 oz)         UOP >30ml/hr: yes -          Restraints: no  Order current and documentation up to date?    Lines/Drains reviewed @ bedside.  Single Lumen Implantable Port 03/31/24 Right Subclavian (Active)   Number of days: 0     Yi need assessed each shift: no      Drip rates at handoff:    dextrose      insulin Stopped (03/31/24 2349)    dextrose 5 % and 0.45 % NaCl 125 mL/hr at 04/01/24 0508    sodium chloride      dextrose 5 % and 0.45 % NaCl         Lab Data:   CBC:   Recent Labs     03/31/24 1812 04/01/24  0330   WBC 16.5* 10.0   HGB 12.0 13.3   HCT 35.6* 38.7   MCV 86.6 86.0    152     BMP:    Recent Labs     04/01/24  0018 04/01/24  0330    139   K 4.1 3.6   CO2 24 24   BUN 76* 73*   CREATININE 3.2* 3.0*     LIVR:   Recent Labs     03/31/24 1812   AST 18   ALT 42*     PT/INR:   Recent Labs     03/31/24 1812   PROT 6.5     APTT: No results for input(s): \"APTT\" in the last 72 hours.  ABG: No results for input(s): \"PHART\", \"ARF7MJS\", \"PO2ART\" in the last 72 hours.    Any consults during the shift? no    Any signed and held orders to be released? no        4 Eyes Skin Assessment     NAME:  Elvia Arguelles  YOB: 1960  MEDICAL RECORD NUMBER:  [id number]    The patient is being assessed for  Shift Handoff    I agree that 
Urine sample collected at this time.   
ENDOSCOPY  10/22/2014    UPPER GASTROINTESTINAL ENDOSCOPY N/A 2023    EGD W/EUS FNA performed by Yuri Varela MD at [facility] ENDOSCOPY    UPPER GASTROINTESTINAL ENDOSCOPY  2023    EGD BIOPSY performed by Yuri Varela MD at [facility] ENDOSCOPY    US GUIDED LIVER BIOPSY PERCUTANEOUS  2022    US GUIDED LIVER BIOPSY PERCUTANEOUS 2022 [facility] ULTRASOUND       FAMILY HISTORY    Family History   Problem Relation Age of Onset    High Blood Pressure Mother     Breast Cancer Mother         breast w mets to bone    Diabetes Father     Stroke Father     Other Cancer Father         bladder       SOCIAL HISTORY    Social History     Tobacco Use    Smoking status: Former     Current packs/day: 0.00     Average packs/day: 1 pack/day for 10.0 years (10.0 ttl pk-yrs)     Types: Cigarettes     Start date: 1970     Quit date: 1980     Years since quittin.3    Smokeless tobacco: Never   Vaping Use    Vaping Use: Never used   Substance Use Topics    Alcohol use: No    Drug use: No       ALLERGIES    No Known Allergies    MEDICATIONS     Insulin Pump - Bolus Dose   SubCUTAneous 4x Daily AC & HS    Insulin Pump - Basal Dose   SubCUTAneous Daily    metoclopramide  5 mg IntraVENous Q6H    amLODIPine  5 mg Oral Daily    oxyCODONE  5 mg Oral Once    escitalopram  10 mg Oral Daily    hydrocortisone  10 mg Oral Daily    hydrocortisone  5 mg Oral QPM    rOPINIRole  1 mg Oral Nightly    heparin (porcine)  5,000 Units SubCUTAneous 3 times per day       Objective        Patient Active Problem List   Diagnosis Code    Duodenal erosion K26.9    Left elbow pain M25.522    Type 1 diabetes mellitus (HCC) E10.9    Vitamin D deficiency E55.9    Family history of thyroid disease Z83.49    Diabetic nephropathy (HCC) E11.21    Thyroid enlargement E04.9    Gastroesophageal reflux disease without esophagitis K21.9    Cataract, left eye H26.9    Insulin pump status Z96.41    Lateral epicondylitis of right elbow M77.11    
    Diabetes Management and Education    Does the patient have a Primary Care Physician? Yes, Meg Ellis, CABRERA - CNP  Endocrinology with Dr. Latif       Does the patient require new medication instruction? TBD - used Omni pod pump at home.      Does the patient/caregiver monitor Blood Glucoses? Yes - Dexcom G 6     Does the patient/caregiver follow a Meal Plan? Yes: carb counts.         Does the patient/caregiver understand S/S of Hypoglycemia?  Yes  Reviewed symptoms, prevention and treatment.     Level of patient/caregiver understanding able to:      [] Verbalized Understanding   [] Demonstrated Understanding       [] Teach Back       [] Needs Reinforcement     [x]  Other:  agrees to notify staff of symptoms or BG trends.                    Does the patient/caregiver understand S/S of Hyperglycemia?  No:   Report nausea has improved.    Reviewed symptoms, prevention and treatment.    Level of patient/caregiver understanding able to:        [] Verbalized Understanding   [] Demonstrated Understanding       [] Teach Back       [x] Needs Reinforcement     []  Other:           Plan        Ongoing diabetes education and blood glucose monitoring.    Notified Nia Crain MD of pump status and self bolus.         Teaching Time Diabetes Education:  20 minutes     Electronically signed by VAHE Hodgson on 4/1/2024 at 10:13 AM    Plan discontinue insulin pump and initiate insulin orders.  Patch pump removed and disposed.  [place] and Omni pod hand held device at bedside with cell phone.  Lantus started.   and trending down.  Will leave CGM in place (only 2 days in on 10 day wear time).      Mr. Arguelles agrees with plan for nursing to administer insulin today.  He will bring insulin pump supplies to the hospital tomorrow.      
Luis      Personally reviewed Lab Studies and Imaging        Medical Decision Making:  The following items were considered in medical decision making:  Discussion of patient care with other providers  Reviewed clinical lab tests  Reviewed radiology tests  Reviewed other diagnostic tests/interventions  Independent review of radiologic images  Microbiology cultures and other micro tests reviewed        Electronically signed by Jeremy Raymundo MD on 4/2/2024 at 8:48 AM  Comment: Please note this report has been produced using speech recognition software and may contain errors related to that system including errors in grammar, punctuation, and spelling, as well as words and phrases that may be inappropriate. If there are any questions or concerns, please feel free to contact the dictating provider for clarification.

## 2024-04-03 NOTE — DISCHARGE SUMMARY
V2.0  Discharge Summary    Name:  Elvia Arguelles /Age/Sex: 1960 (63 y.o. female)   Admit Date: 3/31/2024  Discharge Date: 4/3/24    MRN & CSN:  [id number] & [id number] Encounter Date and Time 4/3/24 11:20 AM EDT    Attending:  Jeremy Raymundo MD Discharging Provider: Jeremy Raymundo MD       Hospital Course:     Brief HPI: Elvia Arguelles is a 63 y.o. female who presents with  past medical history significant for type 1 diabetes on insulin pump, diabetic nephropathy, GERD, metastatic malignant melanoma, adrenal insufficiency, hyperlipidemia, hypertension, CKD 3 presents with 1 day history of nausea with vomiting.  No hematemesis or coffee-ground, no fevers, chills, no chest pain, palpitations, abdominal pain, current dysuria, polyuria, hematuria,, diarrhea, constipation, melena, hematochezia.     Several days ago she reports that she did have some dysuria which resolved.    Brief Problem Based Course:     Type 1 diabetes mellitus, uncontrolled with hyperglycemia  DKA--resolved     Managed with DKA protocol and transition to p.o. as well as insulin, resumed insulin pump prior to discharge.  Follow-up with endocrinologist  r     FAVIAN on CKD stage III  Creatinine baseline 1.9, 3 on presentation.  Likely prerenal given nausea and vomiting.  Creatinine peaked at 3.7, now trending down to 2.2 at discharge     Nausea and vomiting  Currently with no vomiting, continue antiemetics for ongoing nausea     Migraine with aura.  Currently asymptomatic     Metastatic melanoma.   Followed by oncology.      Adrenal insufficiency patient is currently on immunotherapy continue hydrocortisone 10 mg a.m. and 5 mg p.m.     Generalized pain.  Improved with treatment    The patient expressed appropriate understanding of, and agreement with the discharge recommendations, medications, and plan.     Consults this admission:  IP CONSULT TO PHARMACY  IP CONSULT TO DIABETES EDUCATOR  IP CONSULT TO NEPHROLOGY  IP CONSULT TO

## 2024-04-03 NOTE — CARE COORDINATION
CASE MANAGEMENT DISCHARGE SUMMARY: Discharge order noted. Patient returning to home with spouse. No needs identified.     DISCHARGE DATE: 4/3/24    DISCHARGED TO HOME     TRANSPORTATION: Spouse             TIME: Afternoon     PREFERRED PHARMACY: [facility]       Isatu SIERRA, RN  Case Management  [phone]             Electronically signed by Isatu Tello RN on 4/3/2024 at 12:35 PM

## 2024-04-04 ENCOUNTER — APPOINTMENT (OUTPATIENT)
Dept: CT IMAGING | Age: 64
DRG: 381 | End: 2024-04-04
Payer: COMMERCIAL

## 2024-04-04 ENCOUNTER — ANESTHESIA EVENT (OUTPATIENT)
Dept: ENDOSCOPY | Age: 64
End: 2024-04-04
Payer: COMMERCIAL

## 2024-04-04 ENCOUNTER — HOSPITAL ENCOUNTER (INPATIENT)
Age: 64
LOS: 3 days | Discharge: HOME | DRG: 381 | End: 2024-04-07
Attending: EMERGENCY MEDICINE | Admitting: STUDENT IN AN ORGANIZED HEALTH CARE EDUCATION/TRAINING PROGRAM
Payer: COMMERCIAL

## 2024-04-04 ENCOUNTER — CARE COORDINATION (OUTPATIENT)
Dept: OTHER | Facility: CLINIC | Age: 64
End: 2024-04-04

## 2024-04-04 DIAGNOSIS — E83.42 HYPOMAGNESEMIA: ICD-10-CM

## 2024-04-04 DIAGNOSIS — R11.2 NAUSEA AND VOMITING, UNSPECIFIED VOMITING TYPE: ICD-10-CM

## 2024-04-04 DIAGNOSIS — Z71.89 GOALS OF CARE, COUNSELING/DISCUSSION: ICD-10-CM

## 2024-04-04 DIAGNOSIS — R52 INTRACTABLE PAIN: ICD-10-CM

## 2024-04-04 DIAGNOSIS — R91.1 PULMONARY NODULE, RIGHT: ICD-10-CM

## 2024-04-04 DIAGNOSIS — R11.2 INTRACTABLE NAUSEA AND VOMITING: ICD-10-CM

## 2024-04-04 DIAGNOSIS — R10.10 UPPER ABDOMINAL PAIN: Primary | ICD-10-CM

## 2024-04-04 DIAGNOSIS — K22.89 ESOPHAGEAL THICKENING: ICD-10-CM

## 2024-04-04 DIAGNOSIS — D69.6 THROMBOCYTOPENIA (HCC): ICD-10-CM

## 2024-04-04 DIAGNOSIS — J90 BILATERAL PLEURAL EFFUSION: ICD-10-CM

## 2024-04-04 DIAGNOSIS — E87.6 HYPOKALEMIA: ICD-10-CM

## 2024-04-04 DIAGNOSIS — F32.2 CURRENT SEVERE EPISODE OF MAJOR DEPRESSIVE DISORDER WITHOUT PSYCHOTIC FEATURES WITHOUT PRIOR EPISODE (HCC): ICD-10-CM

## 2024-04-04 LAB
ALBUMIN SERPL-MCNC: 3.4 G/DL (ref 3.4–5)
ALBUMIN/GLOB SERPL: 1.4 {RATIO} (ref 1.1–2.2)
ALP SERPL-CCNC: 89 U/L (ref 40–129)
ALT SERPL-CCNC: 24 U/L (ref 10–40)
ANION GAP SERPL CALCULATED.3IONS-SCNC: 16 MMOL/L (ref 3–16)
ANISOCYTOSIS BLD QL SMEAR: ABNORMAL
AST SERPL-CCNC: 21 U/L (ref 15–37)
BACTERIA URNS QL MICRO: NORMAL /HPF
BASE EXCESS BLDV CALC-SCNC: 3.5 MMOL/L
BASOPHILS # BLD: 0 K/UL (ref 0–0.2)
BASOPHILS NFR BLD: 0.4 %
BETA-HYDROXYBUTYRATE: 2.73 MMOL/L (ref 0–0.27)
BILIRUB SERPL-MCNC: 0.3 MG/DL (ref 0–1)
BILIRUB UR QL STRIP.AUTO: NEGATIVE
BUN SERPL-MCNC: 14 MG/DL (ref 7–20)
CALCIUM SERPL-MCNC: 8.7 MG/DL (ref 8.3–10.6)
CHLORIDE SERPL-SCNC: 99 MMOL/L (ref 99–110)
CLARITY UR: CLEAR
CO2 BLDV-SCNC: 28 MMOL/L
CO2 SERPL-SCNC: 22 MMOL/L (ref 21–32)
COHGB MFR BLDV: 1.5 %
COLOR UR: YELLOW
CREAT SERPL-MCNC: 1.5 MG/DL (ref 0.6–1.2)
DEPRECATED RDW RBC AUTO: 13.5 % (ref 12.4–15.4)
EKG ATRIAL RATE: 84 BPM
EKG DIAGNOSIS: NORMAL
EKG P AXIS: 58 DEGREES
EKG P-R INTERVAL: 112 MS
EKG Q-T INTERVAL: 396 MS
EKG QRS DURATION: 76 MS
EKG QTC CALCULATION (BAZETT): 467 MS
EKG R AXIS: 18 DEGREES
EKG T AXIS: 47 DEGREES
EKG VENTRICULAR RATE: 84 BPM
EOSINOPHIL # BLD: 0.2 K/UL (ref 0–0.6)
EOSINOPHIL NFR BLD: 2.6 %
EPI CELLS #/AREA URNS AUTO: 0 /HPF (ref 0–5)
EST. AVERAGE GLUCOSE BLD GHB EST-MCNC: 177.2 MG/DL
GFR SERPLBLD CREATININE-BSD FMLA CKD-EPI: 38 ML/MIN/{1.73_M2}
GLUCOSE BLD-MCNC: 101 MG/DL (ref 70–99)
GLUCOSE BLD-MCNC: 117 MG/DL (ref 70–99)
GLUCOSE BLD-MCNC: 82 MG/DL (ref 70–99)
GLUCOSE SERPL-MCNC: 99 MG/DL (ref 70–99)
GLUCOSE UR STRIP.AUTO-MCNC: NEGATIVE MG/DL
HBA1C MFR BLD: 7.8 %
HCO3 BLDV-SCNC: 27 MMOL/L (ref 23–29)
HCT VFR BLD AUTO: 39.3 % (ref 36–48)
HGB BLD-MCNC: 13.3 G/DL (ref 12–16)
HGB UR QL STRIP.AUTO: NEGATIVE
HYALINE CASTS #/AREA URNS AUTO: 0 /LPF (ref 0–8)
INR PPP: 0.8 (ref 0.85–1.15)
KETONES UR STRIP.AUTO-MCNC: 15 MG/DL
LACTATE BLDV-SCNC: 1 MMOL/L (ref 0.4–2)
LEUKOCYTE ESTERASE UR QL STRIP.AUTO: NEGATIVE
LIPASE SERPL-CCNC: 24 U/L (ref 13–60)
LYMPHOCYTES # BLD: 1.2 K/UL (ref 1–5.1)
LYMPHOCYTES NFR BLD: 17 %
MAGNESIUM SERPL-MCNC: 1.3 MG/DL (ref 1.8–2.4)
MAGNESIUM SERPL-MCNC: 2.6 MG/DL (ref 1.8–2.4)
MCH RBC QN AUTO: 29.5 PG (ref 26–34)
MCHC RBC AUTO-ENTMCNC: 33.9 G/DL (ref 31–36)
MCV RBC AUTO: 87 FL (ref 80–100)
METHGB MFR BLDV: 0.8 %
MONOCYTES # BLD: 0.5 K/UL (ref 0–1.3)
MONOCYTES NFR BLD: 7.3 %
NEUTROPHILS # BLD: 5 K/UL (ref 1.7–7.7)
NEUTROPHILS NFR BLD: 72.7 %
NITRITE UR QL STRIP.AUTO: NEGATIVE
O2 THERAPY: ABNORMAL
PCO2 BLDV: 35.7 MMHG (ref 40–50)
PERFORMED ON: ABNORMAL
PERFORMED ON: ABNORMAL
PERFORMED ON: NORMAL
PH BLDV: 7.49 [PH] (ref 7.35–7.45)
PH UR STRIP.AUTO: 6.5 [PH] (ref 5–8)
PLATELET # BLD AUTO: 117 K/UL (ref 135–450)
PLATELET BLD QL SMEAR: ABNORMAL
PMV BLD AUTO: 7.7 FL (ref 5–10.5)
PO2 BLDV: 40 MMHG
POTASSIUM SERPL-SCNC: 2.8 MMOL/L (ref 3.5–5.1)
POTASSIUM SERPL-SCNC: 4.7 MMOL/L (ref 3.5–5.1)
PROT SERPL-MCNC: 5.8 G/DL (ref 6.4–8.2)
PROT UR STRIP.AUTO-MCNC: 100 MG/DL
PROTHROMBIN TIME: 11.3 SEC (ref 11.9–14.9)
RBC # BLD AUTO: 4.52 M/UL (ref 4–5.2)
RBC CLUMPS #/AREA URNS AUTO: 1 /HPF (ref 0–4)
SAO2 % BLDV: 82 %
SLIDE REVIEW: ABNORMAL
SODIUM SERPL-SCNC: 137 MMOL/L (ref 136–145)
SP GR UR STRIP.AUTO: 1.02 (ref 1–1.03)
TROPONIN, HIGH SENSITIVITY: 33 NG/L (ref 0–14)
UA COMPLETE W REFLEX CULTURE PNL UR: ABNORMAL
UA DIPSTICK W REFLEX MICRO PNL UR: YES
URN SPEC COLLECT METH UR: ABNORMAL
UROBILINOGEN UR STRIP-ACNC: 0.2 E.U./DL
WBC # BLD AUTO: 6.8 K/UL (ref 4–11)
WBC #/AREA URNS AUTO: 0 /HPF (ref 0–5)

## 2024-04-04 PROCEDURE — 36415 COLL VENOUS BLD VENIPUNCTURE: CPT

## 2024-04-04 PROCEDURE — 85610 PROTHROMBIN TIME: CPT

## 2024-04-04 PROCEDURE — 6360000002 HC RX W HCPCS: Performed by: STUDENT IN AN ORGANIZED HEALTH CARE EDUCATION/TRAINING PROGRAM

## 2024-04-04 PROCEDURE — 74177 CT ABD & PELVIS W/CONTRAST: CPT

## 2024-04-04 PROCEDURE — 6370000000 HC RX 637 (ALT 250 FOR IP): Performed by: STUDENT IN AN ORGANIZED HEALTH CARE EDUCATION/TRAINING PROGRAM

## 2024-04-04 PROCEDURE — 83690 ASSAY OF LIPASE: CPT

## 2024-04-04 PROCEDURE — 6360000002 HC RX W HCPCS

## 2024-04-04 PROCEDURE — 96365 THER/PROPH/DIAG IV INF INIT: CPT

## 2024-04-04 PROCEDURE — 96375 TX/PRO/DX INJ NEW DRUG ADDON: CPT

## 2024-04-04 PROCEDURE — 85025 COMPLETE CBC W/AUTO DIFF WBC: CPT

## 2024-04-04 PROCEDURE — 83605 ASSAY OF LACTIC ACID: CPT

## 2024-04-04 PROCEDURE — 81001 URINALYSIS AUTO W/SCOPE: CPT

## 2024-04-04 PROCEDURE — 84484 ASSAY OF TROPONIN QUANT: CPT

## 2024-04-04 PROCEDURE — 6360000002 HC RX W HCPCS: Performed by: HOSPITALIST

## 2024-04-04 PROCEDURE — 2580000003 HC RX 258

## 2024-04-04 PROCEDURE — 82010 KETONE BODYS QUAN: CPT

## 2024-04-04 PROCEDURE — 80053 COMPREHEN METABOLIC PANEL: CPT

## 2024-04-04 PROCEDURE — 93005 ELECTROCARDIOGRAM TRACING: CPT

## 2024-04-04 PROCEDURE — 83735 ASSAY OF MAGNESIUM: CPT

## 2024-04-04 PROCEDURE — 1200000000 HC SEMI PRIVATE

## 2024-04-04 PROCEDURE — 84132 ASSAY OF SERUM POTASSIUM: CPT

## 2024-04-04 PROCEDURE — C9113 INJ PANTOPRAZOLE SODIUM, VIA: HCPCS | Performed by: HOSPITALIST

## 2024-04-04 PROCEDURE — 94760 N-INVAS EAR/PLS OXIMETRY 1: CPT

## 2024-04-04 PROCEDURE — 6360000004 HC RX CONTRAST MEDICATION

## 2024-04-04 PROCEDURE — 82803 BLOOD GASES ANY COMBINATION: CPT

## 2024-04-04 PROCEDURE — 99285 EMERGENCY DEPT VISIT HI MDM: CPT

## 2024-04-04 PROCEDURE — 93010 ELECTROCARDIOGRAM REPORT: CPT | Performed by: INTERNAL MEDICINE

## 2024-04-04 PROCEDURE — 96368 THER/DIAG CONCURRENT INF: CPT

## 2024-04-04 PROCEDURE — 2580000003 HC RX 258: Performed by: STUDENT IN AN ORGANIZED HEALTH CARE EDUCATION/TRAINING PROGRAM

## 2024-04-04 RX ORDER — ENOXAPARIN SODIUM 100 MG/ML
30 INJECTION SUBCUTANEOUS DAILY
Status: DISCONTINUED | OUTPATIENT
Start: 2024-04-05 | End: 2024-04-07 | Stop reason: HOSPADM

## 2024-04-04 RX ORDER — HYDROCORTISONE 5 MG/1
5 TABLET ORAL DAILY
Status: DISCONTINUED | OUTPATIENT
Start: 2024-04-04 | End: 2024-04-07 | Stop reason: HOSPADM

## 2024-04-04 RX ORDER — SODIUM CHLORIDE 9 MG/ML
INJECTION, SOLUTION INTRAVENOUS PRN
Status: DISCONTINUED | OUTPATIENT
Start: 2024-04-04 | End: 2024-04-07 | Stop reason: HOSPADM

## 2024-04-04 RX ORDER — POTASSIUM CHLORIDE 29.8 MG/ML
20 INJECTION INTRAVENOUS
Status: COMPLETED | OUTPATIENT
Start: 2024-04-04 | End: 2024-04-04

## 2024-04-04 RX ORDER — ACETAMINOPHEN 650 MG/1
650 SUPPOSITORY RECTAL EVERY 6 HOURS PRN
Status: DISCONTINUED | OUTPATIENT
Start: 2024-04-04 | End: 2024-04-07 | Stop reason: HOSPADM

## 2024-04-04 RX ORDER — ACETAMINOPHEN 325 MG/1
650 TABLET ORAL EVERY 6 HOURS PRN
Status: DISCONTINUED | OUTPATIENT
Start: 2024-04-04 | End: 2024-04-07 | Stop reason: HOSPADM

## 2024-04-04 RX ORDER — 0.9 % SODIUM CHLORIDE 0.9 %
1000 INTRAVENOUS SOLUTION INTRAVENOUS ONCE
Status: COMPLETED | OUTPATIENT
Start: 2024-04-04 | End: 2024-04-04

## 2024-04-04 RX ORDER — POTASSIUM CHLORIDE 7.45 MG/ML
10 INJECTION INTRAVENOUS ONCE
Status: COMPLETED | OUTPATIENT
Start: 2024-04-04 | End: 2024-04-04

## 2024-04-04 RX ORDER — HYDROCORTISONE 10 MG/1
10 TABLET ORAL DAILY
Status: DISCONTINUED | OUTPATIENT
Start: 2024-04-04 | End: 2024-04-07 | Stop reason: HOSPADM

## 2024-04-04 RX ORDER — POLYETHYLENE GLYCOL 3350 17 G/17G
17 POWDER, FOR SOLUTION ORAL DAILY PRN
Status: DISCONTINUED | OUTPATIENT
Start: 2024-04-04 | End: 2024-04-07 | Stop reason: HOSPADM

## 2024-04-04 RX ORDER — SODIUM CHLORIDE 0.9 % (FLUSH) 0.9 %
5-40 SYRINGE (ML) INJECTION PRN
Status: DISCONTINUED | OUTPATIENT
Start: 2024-04-04 | End: 2024-04-07 | Stop reason: HOSPADM

## 2024-04-04 RX ORDER — POTASSIUM CHLORIDE 7.45 MG/ML
10 INJECTION INTRAVENOUS
Status: COMPLETED | OUTPATIENT
Start: 2024-04-04 | End: 2024-04-04

## 2024-04-04 RX ORDER — SODIUM CHLORIDE 9 MG/ML
INJECTION, SOLUTION INTRAVENOUS CONTINUOUS
Status: DISCONTINUED | OUTPATIENT
Start: 2024-04-04 | End: 2024-04-05

## 2024-04-04 RX ORDER — HYDROXYZINE PAMOATE 25 MG/1
25 CAPSULE ORAL 3 TIMES DAILY PRN
COMMUNITY
Start: 2024-04-02

## 2024-04-04 RX ORDER — MORPHINE SULFATE 2 MG/ML
2 INJECTION, SOLUTION INTRAMUSCULAR; INTRAVENOUS
Status: DISCONTINUED | OUTPATIENT
Start: 2024-04-04 | End: 2024-04-04 | Stop reason: HOSPADM

## 2024-04-04 RX ORDER — METOCLOPRAMIDE HYDROCHLORIDE 5 MG/ML
10 INJECTION INTRAMUSCULAR; INTRAVENOUS EVERY 6 HOURS
Status: DISCONTINUED | OUTPATIENT
Start: 2024-04-04 | End: 2024-04-07 | Stop reason: HOSPADM

## 2024-04-04 RX ORDER — DEXTROSE MONOHYDRATE 100 MG/ML
INJECTION, SOLUTION INTRAVENOUS CONTINUOUS PRN
Status: DISCONTINUED | OUTPATIENT
Start: 2024-04-04 | End: 2024-04-07 | Stop reason: HOSPADM

## 2024-04-04 RX ORDER — ESCITALOPRAM OXALATE 10 MG/1
10 TABLET ORAL DAILY
Status: DISCONTINUED | OUTPATIENT
Start: 2024-04-04 | End: 2024-04-07 | Stop reason: HOSPADM

## 2024-04-04 RX ORDER — POTASSIUM CHLORIDE 7.45 MG/ML
10 INJECTION INTRAVENOUS
Status: DISCONTINUED | OUTPATIENT
Start: 2024-04-04 | End: 2024-04-04

## 2024-04-04 RX ORDER — PANTOPRAZOLE SODIUM 40 MG/10ML
40 INJECTION, POWDER, LYOPHILIZED, FOR SOLUTION INTRAVENOUS 2 TIMES DAILY
Status: DISCONTINUED | OUTPATIENT
Start: 2024-04-04 | End: 2024-04-05

## 2024-04-04 RX ORDER — ONDANSETRON 2 MG/ML
4 INJECTION INTRAMUSCULAR; INTRAVENOUS EVERY 30 MIN PRN
Status: DISCONTINUED | OUTPATIENT
Start: 2024-04-04 | End: 2024-04-04

## 2024-04-04 RX ORDER — PREDNISONE 10 MG/1
10 TABLET ORAL DAILY
COMMUNITY
Start: 2024-02-29

## 2024-04-04 RX ORDER — MORPHINE SULFATE 2 MG/ML
2 INJECTION, SOLUTION INTRAMUSCULAR; INTRAVENOUS EVERY 4 HOURS PRN
Status: DISCONTINUED | OUTPATIENT
Start: 2024-04-04 | End: 2024-04-06

## 2024-04-04 RX ORDER — GLUCAGON 1 MG/ML
1 KIT INJECTION PRN
Status: DISCONTINUED | OUTPATIENT
Start: 2024-04-04 | End: 2024-04-07 | Stop reason: HOSPADM

## 2024-04-04 RX ORDER — ONDANSETRON 4 MG/1
4 TABLET, ORALLY DISINTEGRATING ORAL EVERY 8 HOURS PRN
Status: DISCONTINUED | OUTPATIENT
Start: 2024-04-04 | End: 2024-04-07 | Stop reason: HOSPADM

## 2024-04-04 RX ORDER — MAGNESIUM SULFATE IN WATER 40 MG/ML
2000 INJECTION, SOLUTION INTRAVENOUS ONCE
Status: COMPLETED | OUTPATIENT
Start: 2024-04-04 | End: 2024-04-04

## 2024-04-04 RX ORDER — SODIUM CHLORIDE 9 MG/ML
INJECTION, SOLUTION INTRAVENOUS CONTINUOUS
Status: DISCONTINUED | OUTPATIENT
Start: 2024-04-04 | End: 2024-04-04 | Stop reason: HOSPADM

## 2024-04-04 RX ORDER — ROPINIROLE 1 MG/1
1 TABLET, FILM COATED ORAL NIGHTLY
Status: DISCONTINUED | OUTPATIENT
Start: 2024-04-04 | End: 2024-04-07 | Stop reason: HOSPADM

## 2024-04-04 RX ORDER — SODIUM CHLORIDE 0.9 % (FLUSH) 0.9 %
5-40 SYRINGE (ML) INJECTION EVERY 12 HOURS SCHEDULED
Status: DISCONTINUED | OUTPATIENT
Start: 2024-04-04 | End: 2024-04-07 | Stop reason: HOSPADM

## 2024-04-04 RX ORDER — ONDANSETRON 2 MG/ML
4 INJECTION INTRAMUSCULAR; INTRAVENOUS EVERY 6 HOURS PRN
Status: DISCONTINUED | OUTPATIENT
Start: 2024-04-04 | End: 2024-04-07 | Stop reason: HOSPADM

## 2024-04-04 RX ORDER — MORPHINE SULFATE 4 MG/ML
4 INJECTION, SOLUTION INTRAMUSCULAR; INTRAVENOUS
Status: DISCONTINUED | OUTPATIENT
Start: 2024-04-04 | End: 2024-04-04 | Stop reason: HOSPADM

## 2024-04-04 RX ADMIN — POTASSIUM CHLORIDE 10 MEQ: 7.46 INJECTION, SOLUTION INTRAVENOUS at 09:08

## 2024-04-04 RX ADMIN — SODIUM CHLORIDE: 9 INJECTION, SOLUTION INTRAVENOUS at 09:03

## 2024-04-04 RX ADMIN — SODIUM CHLORIDE 1000 ML: 9 INJECTION, SOLUTION INTRAVENOUS at 07:17

## 2024-04-04 RX ADMIN — HYDROCORTISONE 5 MG: 10 TABLET ORAL at 13:33

## 2024-04-04 RX ADMIN — ONDANSETRON 4 MG: 2 INJECTION INTRAMUSCULAR; INTRAVENOUS at 07:18

## 2024-04-04 RX ADMIN — MORPHINE SULFATE 4 MG: 4 INJECTION, SOLUTION INTRAMUSCULAR; INTRAVENOUS at 07:18

## 2024-04-04 RX ADMIN — HYDROCORTISONE 10 MG: 10 TABLET ORAL at 13:32

## 2024-04-04 RX ADMIN — PANTOPRAZOLE SODIUM 40 MG: 40 INJECTION, POWDER, FOR SOLUTION INTRAVENOUS at 20:39

## 2024-04-04 RX ADMIN — POTASSIUM CHLORIDE 10 MEQ: 7.46 INJECTION, SOLUTION INTRAVENOUS at 10:16

## 2024-04-04 RX ADMIN — METOCLOPRAMIDE 10 MG: 5 INJECTION, SOLUTION INTRAMUSCULAR; INTRAVENOUS at 13:34

## 2024-04-04 RX ADMIN — POTASSIUM CHLORIDE 20 MEQ: 29.8 INJECTION, SOLUTION INTRAVENOUS at 11:46

## 2024-04-04 RX ADMIN — IOPAMIDOL 75 ML: 755 INJECTION, SOLUTION INTRAVENOUS at 08:36

## 2024-04-04 RX ADMIN — MAGNESIUM SULFATE HEPTAHYDRATE 2000 MG: 40 INJECTION, SOLUTION INTRAVENOUS at 11:45

## 2024-04-04 RX ADMIN — SODIUM CHLORIDE, PRESERVATIVE FREE 10 ML: 5 INJECTION INTRAVENOUS at 20:39

## 2024-04-04 RX ADMIN — POTASSIUM CHLORIDE 20 MEQ: 29.8 INJECTION, SOLUTION INTRAVENOUS at 12:47

## 2024-04-04 RX ADMIN — METOCLOPRAMIDE 10 MG: 5 INJECTION, SOLUTION INTRAMUSCULAR; INTRAVENOUS at 18:48

## 2024-04-04 RX ADMIN — SODIUM CHLORIDE: 9 INJECTION, SOLUTION INTRAVENOUS at 16:26

## 2024-04-04 RX ADMIN — ROPINIROLE HYDROCHLORIDE 1 MG: 1 TABLET, FILM COATED ORAL at 20:39

## 2024-04-04 RX ADMIN — MAGNESIUM SULFATE HEPTAHYDRATE 2000 MG: 40 INJECTION, SOLUTION INTRAVENOUS at 08:48

## 2024-04-04 RX ADMIN — ESCITALOPRAM OXALATE 10 MG: 10 TABLET ORAL at 13:32

## 2024-04-04 RX ADMIN — MORPHINE SULFATE 2 MG: 2 INJECTION, SOLUTION INTRAMUSCULAR; INTRAVENOUS at 19:08

## 2024-04-04 RX ADMIN — HYDROCORTISONE SODIUM SUCCINATE 100 MG: 100 INJECTION, POWDER, FOR SOLUTION INTRAMUSCULAR; INTRAVENOUS at 07:47

## 2024-04-04 ASSESSMENT — PAIN SCALES - GENERAL
PAINLEVEL_OUTOF10: 3
PAINLEVEL_OUTOF10: 8
PAINLEVEL_OUTOF10: 3
PAINLEVEL_OUTOF10: 8
PAINLEVEL_OUTOF10: 4
PAINLEVEL_OUTOF10: 7

## 2024-04-04 ASSESSMENT — PAIN - FUNCTIONAL ASSESSMENT
PAIN_FUNCTIONAL_ASSESSMENT: ACTIVITIES ARE NOT PREVENTED
PAIN_FUNCTIONAL_ASSESSMENT: 0-10
PAIN_FUNCTIONAL_ASSESSMENT: PREVENTS OR INTERFERES SOME ACTIVE ACTIVITIES AND ADLS

## 2024-04-04 ASSESSMENT — PAIN DESCRIPTION - LOCATION
LOCATION: ABDOMEN

## 2024-04-04 ASSESSMENT — PAIN DESCRIPTION - PAIN TYPE
TYPE: ACUTE PAIN
TYPE: ACUTE PAIN

## 2024-04-04 ASSESSMENT — PAIN DESCRIPTION - DESCRIPTORS
DESCRIPTORS: DISCOMFORT
DESCRIPTORS: ACHING

## 2024-04-04 ASSESSMENT — PAIN DESCRIPTION - ORIENTATION
ORIENTATION: UPPER
ORIENTATION: MID
ORIENTATION: RIGHT;LEFT;MID
ORIENTATION: UPPER

## 2024-04-04 ASSESSMENT — LIFESTYLE VARIABLES
SMOKING_STATUS: 0
HOW OFTEN DO YOU HAVE A DRINK CONTAINING ALCOHOL: NEVER
HOW MANY STANDARD DRINKS CONTAINING ALCOHOL DO YOU HAVE ON A TYPICAL DAY: PATIENT DOES NOT DRINK

## 2024-04-04 ASSESSMENT — PAIN DESCRIPTION - FREQUENCY
FREQUENCY: CONTINUOUS
FREQUENCY: CONTINUOUS

## 2024-04-04 NOTE — DISCHARGE INSTRUCTIONS
Dial-a-Dietitian  [phone]  #[phone]     Ask about diet education for Healthy Carb's, healthy snack ideas  Ask for Meal Planners, Grocery List and Tips for Healthy Eating    __________________________________________________________________    Diabetes Association:    About Diabetes: https://diabetes.org/about-diabetes    Life with Diabetes: https://diabetes.org/living-with-diabetes    Health & Wellness: https://diabetes.org/health-wellness    Food & Nutrition: https://diabetes.org/food-nutrition    Tools & Resources: https://diabetes.org/tools-resources

## 2024-04-04 NOTE — CARE COORDINATION
Assigned patient from [facility] discharge list due to admission on 3/31/24.  Patient presented to ED today at 0633, unable to attempt outreach.  Will continue to follow.

## 2024-04-04 NOTE — ACP (ADVANCE CARE PLANNING)
Advance Care Planning     Advance Care Planning Activator (Inpatient)  Conversation Note      Date of ACP Conversation: 4/4/2024     Conversation Conducted with: Patient with Decision Making Capacity    ACP Activator: Germaine Velasquez KIRK        Health Care Decision Maker:     Current Designated Health Care Decision Maker:     Primary Decision Maker (Active): Shant Arguelles - Spouse - [phone]    Secondary Decision Maker: Kindra (Hatfield)Esther - Child - [phone]    Supplemental (Other) Decision Maker: HollisYas davila - Child - [phone]    Today we documented Decision Maker(s) consistent with Legal Next of Kin hierarchy.    Care Preferences    Ventilation:  \"If you were in your present state of health and suddenly became very ill and were unable to breathe on your own, what would your preference be about the use of a ventilator (breathing machine) if it were available to you?\"      Would the patient desire the use of ventilator (breathing machine)?: yes    \"If your health worsens and it becomes clear that your chance of recovery is unlikely, what would your preference be about the use of a ventilator (breathing machine) if it were available to you?\"     Would the patient desire the use of ventilator (breathing machine)?: Yes      Resuscitation  \"CPR works best to restart the heart when there is a sudden event, like a heart attack, in someone who is otherwise healthy. Unfortunately, CPR does not typically restart the heart for people who have serious health conditions or who are very sick.\"    \"In the event your heart stopped as a result of an underlying serious health condition, would you want attempts to be made to restart your heart (answer \"yes\" for attempt to resuscitate) or would you prefer a natural death (answer \"no\" for do not attempt to resuscitate)?\" yes       [] Yes   [] No   Educated Patient / Decision Maker regarding differences between Advance Directives and portable DNR orders.    Length of ACP

## 2024-04-04 NOTE — CARE COORDINATION
Case Management Assessment  Initial Evaluation    Date/Time of Evaluation: 4/4/2024 1:08 PM  Assessment Completed by: JEOVANNY Pro    If patient is discharged prior to next notation, then this note serves as note for discharge by case management.    Patient Name: Elvia Arguelles                   YOB: 1960  Diagnosis: Hypokalemia [E87.6]  Hypomagnesemia [E83.42]  Thrombocytopenia (HCC) [D69.6]  Bilateral pleural effusion [J90]  Pulmonary nodule, right [R91.1]  Goals of care, counseling/discussion [Z71.89]  Upper abdominal pain [R10.10]  Intractable pain [R52]  Intractable nausea and vomiting [R11.2]                   Date / Time: 4/4/2024  6:34 AM    Patient Admission Status: Inpatient   Readmission Risk (Low < 19, Mod (19-27), High > 27): Readmission Risk Score: 24    Current PCP: Meg Ellis APRN - CNP  PCP verified by CM? Yes    Chart Reviewed: Yes      History Provided by: Patient  Patient Orientation: Alert and Oriented    Patient Cognition: Alert    Hospitalization in the last 30 days (Readmission):  Yes    If yes, Readmission Assessment in CM Navigator will be completed.    Advance Directives:      Code Status: Full Code   Patient's Primary Decision Maker is: Legal Next of Kin    Primary Decision Maker (Active): Shant Arguelles - Spouse - [phone]    Secondary Decision Maker: Kindra (Liu)Esther - Child - [phone]    Supplemental (Other) Decision Maker: Yas Shafer - Child - [phone]    Discharge Planning:    Patient lives with: Spouse/Significant Other Type of Home: House  Primary Care Giver: Self  Patient Support Systems include: Spouse/Significant Other   Current Financial resources: Medicare  Current community resources: None  Current services prior to admission: None            Current DME:              Type of Home Care services:  None    ADLS  Prior functional level: Independent in ADLs/IADLs  Current functional level: Independent in ADLs/IADLs    PT

## 2024-04-04 NOTE — ED PROVIDER NOTES
[facility] EMERGENCY DEPARTMENT  [address] [address]  Dept: [phone]  Loc: [phone]    Open Note Time:  7:26 AM EDT    I independently performed a history and physical on Elvia Arguelles.      Chief Complaint  Abdominal Pain and Nausea (Pt present to the ED via ems from home c/o abdominal pain and n/v that started this morning. Per ems pt was recently admitted for DKA. Pt has history of stomach cancer. Pt states that she took oxycone w/o relief. Pt AXO 4, VSS.)       This is a very pleasant 63 y.o. female  who was evaluated in the emergency department for abdominal pain and vomiting    EKG  The Ekg interpreted by me shows  normal sinus rhythm with a rate of 84  Axis is   Normal  QTc is  within an acceptable range  Intervals and Durations are unremarkable.      ST Segments: no acute change  Delta waves, Brugada Syndrome, and Short VT are not present.  Prior EKG to compare with was available. No significant changes compared to prior EKG from March 31, 2024      Unless otherwise stated in this report or unable to obtain because of the patient's clinical or mental status as evidenced by the medical record, this patient's positive and negative responses for review of systems, constitutional, psych, eyes, ENT, cardiovascular, respiratory, gastrointestinal, neurological, genitourinary, musculoskeletal, integument systems and systems related to the presenting problem are either stated in the preceding paragraph or were not pertinent or were negative for the symptoms and/or complaints related to the medical problem.    I wore goggles, surgical mask, N95 mask and gloves when I evaluated the patient.    Focused exam:  Body mass index is 25.87 kg/m².  The physical exam reveals an alert and oriented patient who does not appear to be confused, non-ill-appearing, no respiratory distress, normal respiratory effort, speaking comfortably in full sentences, 
insufficiency and symptoms today she was also ordered dose of stress dose hydrocortisone    Labs:  UA without obvious infection.  CBC with worsening thrombocytopenia.  CMP with significant hypokalemia which is repleted.  Hypomagnesemia which is also repleted.  Her kidney function is mildly improved from recent.  Lipase, lactic negative.  Serum ketones minimally elevated however patient has no open gap concerning for DKA.    VBG with mild alkalosis.  Troponin elevated but lower than prior.  Appears to be similar to baseline from June 2023.  With her history of liver disease coags were reviewed.    With worsening pain CT scan is ordered which shows developing pleural effusions and new nodules in the right lung.  Diffuse thickening of the esophagus and hiatal hernia are also noted as above.    We attempted p.o. challenge and were unsuccessful.  As such patient requires admission.  She has significant pain.  Requires pain control.  Plan goals of care were discussed.  Patient is agreeable to admission prefers to be full code at this time.    Case was discussed with the medicine team for admission    Disposition Considerations (tests considered but not done, Admit vs D/C, Shared Decision Making, Pt Expectation of Test or Tx.): above     The patient tolerated their visit well.  The patient and / or the family were informed of the results of any tests, a time was given to answer questions, a plan was proposed and they agreed with plan.    I am the Primary Clinician of Record.  FINAL IMPRESSION      1. Upper abdominal pain    2. Intractable pain    3. Intractable nausea and vomiting    4. Thrombocytopenia (HCC)    5. Hypokalemia    6. Hypomagnesemia    7. Bilateral pleural effusion    8. Pulmonary nodule, right    9. Goals of care, counseling/discussion          DISPOSITION/PLAN     DISPOSITION Admitted 04/04/2024 09:54:51 AM      PATIENT REFERRED TO:  No follow-up provider specified.    DISCHARGE MEDICATIONS:  Current

## 2024-04-04 NOTE — ED NOTES
Report received from Rudy Serrato laying in bed c/o 8/10 abd pain,  even respirations, no distress noted. VSS, NSR  Bed in lowest position, call light within reach, side rails up x2   Will continue to monitor

## 2024-04-04 NOTE — CARE COORDINATION
[facility] Ostomy Continence Nurse  Consult Note       NAME:  Elvia Arguelles  MEDICAL RECORD NUMBER:  [id number]  AGE: 63 y.o.   GENDER: female  : 1960  TODAY'S DATE:  2024    Subjective   Reason for WOCN Evaluation and Assessment: right foot      Elvia Arguelles is a 63 y.o. female referred by:   [] Physician  [x] Nursing  [] Other:     Wound Identification:  Wound Type: diabetic/traumatic  Contributing Factors: diabetes, pt picked at wound    Wound History: pt had \"crack\" in heel that she picked last night. Admits to being a \"\"  Current Wound Care Treatment:  bandaid    Patient Goal of Care:  [x] Wound Healing  [] Odor Control  [] Palliative Care  [] Pain Control   [] Other:         PAST MEDICAL HISTORY        Diagnosis Date    Adrenal insufficiency (HCC)     Cancer (HCC)     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    Depression     Diabetes mellitus (HCC)     Type I    Hx of radiation therapy     melanoma right eye    Hypertension     Pt. denies having history of Hypertension    Hyponatremia     Insulin pump in place     Melanoma (HCC) 2023    in stomach, pancreas    Prolonged emergence from general anesthesia     slow to wake up    Restless leg syndrome        PAST SURGICAL HISTORY    Past Surgical History:   Procedure Laterality Date    CARPAL TUNNEL RELEASE Bilateral 2017    CHOLECYSTECTOMY      CT BIOPSY ABDOMEN RETROPERITONEUM  2022    CT BIOPSY ABDOMEN RETROPERITONEUM 2022 [facility] CT SCAN    EYE SURGERY Right     biopsy    HYSTERECTOMY (CERVIX STATUS UNKNOWN)      LIPOMA RESECTION      LIVER BIOPSY Right     PORT SURGERY Right 2023    RIGHT POWER PORT PLACEMENT performed by Satish Corley MD at [facility] OR    SHOULDER ARTHROSCOPY Right 2018    RIGHT SHOULDER ARTHROSCOPE, SUBACROMIAL DECOMPRESSION, DISTALCLAVICLE EXCISION, CAPSULAR RELEASE, MANIPULATION UNDER ANESTHESIA, DEBRIDEMENT    SHOULDER SURGERY      UPPER GASTROINTESTINAL ENDOSCOPY  10/22/2014

## 2024-04-04 NOTE — CONSULTS
GASTROENTEROLOGY INPATIENT CONSULTATION        IDENTIFYING DATA/REASON FOR CONSULTATION   PATIENT:  Elvia Arguelles  MRN:  [id number]  ADMIT DATE: 4/4/2024  TIME OF EVALUATION: 4/4/2024 1:57 PM  HOSPITAL STAY:   LOS: 0 days     REASON FOR CONSULTATION:  nausea, vomiting, abdominal pain    HISTORY OF PRESENT ILLNESS   Elvia Arguelles is a 63 y.o. female with a PMH of DM type I, HTN, HLD, CKD, adrenal insufficiency, metastatic melanoma with involvement of the lymph nodes, pancreas, spleen, adrenal gland, lung who presented on 4/4/2024 with nausea, vomiting, abdominal pain.  She reports symptoms started on Sunday.  She denies any obvious precipitating factors.  No associated fever, chills.  No recent sick contacts or ingestion of undercooked/raw/spoiled foods.  She came to the ED on Sunday and was admitted with DKA.  She was discharged yesterday.  Once home she attempted to eat chicken broth and developed recurrent nausea and vomiting.  She denies seeing blood or coffee grounds in her vomit.  She reports her stools are loose which is chronic.  She denies seeing blood in her stool or black stools.  She suffers frequently from reflux.  Over the past month she has been noticing food and liquids getting stuck in her esophagus but eventually goes down.  She denies NSAID use.    CT a/p showed interval development of pulmonary nodules in RLL, bilateral pleural effusions, stable pancreatic tail mass, diffuse mucosal thickening of the distal esophagus, hiatal hernia.        Prior Endoscopic Evaluations:  EGD/EUS with FNA 1/2023 with Dr. Varela  Moderate portal gastropathy and small gastric varices in the fundus.    There was a 1cm pigmented nodule in the proximal body of the stomach on the greater curve biopsied  Extensive retroperitoneal lymphadenopathy adjacent to the tail of the pancreas.  Biopsy performed.  Splenic vein thrombosis.  1.25 x 1.18 cm hyperechoic liver mass  Stomach, biopsy:   - Malignant melanoma- See

## 2024-04-04 NOTE — ACP (ADVANCE CARE PLANNING)
Advance Care Planning     Advance Care Planning Inpatient Note  Spiritual Care Department    Today's Date: 4/4/2024  Unit: [facility] 5N PROGRESSIVE CARE    Received request from IDT Member.  Upon review of chart and communication with care team, patient's decision making abilities are not in question.. Patient and Child/Children was/were present in the room during visit.    Goals of ACP Conversation:  Discuss advance care planning documents  Facilitate a discussion related to patient's goals of care as they align with the patient's values and beliefs.    Health Care Decision Makers:       Primary Decision Maker (Active): Shant Arguelles - Spouse - [phone]    Secondary Decision Maker: Kindra ([place]atfield),Esther - Child - [phone]    Supplemental (Other) Decision Maker: Yas Shafer - Child - [phone]  Summary:  Verified Healthcare Decision Maker  Documented Next of Kin, per patient report    Advance Care Planning Documents (Patient Wishes):  None     Assessment:   met with patient to discuss ACP and complete documentation. Patient has ACP docs per patient. Requested copy from patient/family for our review/records. All patient's questioned answered. Patient encouraged to call Spiritual Care Services at [facility] if questions arise    Interventions:  Requested patient/family to submit existing document for our records: Healthcare Power of /Advance Directive Appointment of Health Care Agent  Living Will/Advance Directive  Encouraged ongoing ACP conversation with future decision makers and loved ones    Care Preferences Communicated:   No    Outcomes/Plan:  ACP Discussion: Completed    Electronically signed by Chaplain JANNY on 4/4/2024 at 2:11 PM

## 2024-04-04 NOTE — ED NOTES
Pharmacy Medication Reconciliation Note     List of medications patient is currently taking is complete.    Source of information:   Patient  EMR    Notes regarding home medications:   Reports she was started on a prednisone taper back when she had a reaction to immunotherapy. She is currently down to 10 mg daily  Was prescribed hydroxyzine, but has not started yet       Fausto Christian, PharmJENNIFER  4/4/2024  2:34 PM

## 2024-04-04 NOTE — TELEPHONE ENCOUNTER
Medication:   Requested Prescriptions     Pending Prescriptions Disp Refills    escitalopram (LEXAPRO) 10 MG tablet [Pharmacy Med Name: ESCITALOPRAM OXALATE 10MG TABS] 30 tablet 0     Sig: TAKE ONE TABLET BY MOUTH ONCE A DAY        Last Filled:  03/27/24    Patient Phone Number: [phone] (home)     Last appt: 2/29/2024   Next appt: Visit date not found    Last OARRS:        No data to display

## 2024-04-04 NOTE — H&P
04/04/2024 01:30 PM    BACTERIA None Seen 04/04/2024 01:30 PM    RBCUA 1 04/04/2024 01:30 PM    BLOODU Negative 04/04/2024 01:30 PM    SPECGRAV 1.020 04/04/2024 01:30 PM    GLUCOSEU Negative 04/04/2024 01:30 PM    GLUCOSEU >=1000 05/25/2012 09:30 AM       Radiology:     CXR: I have reviewed the CXR with the following interpretation:   EKG:  I have reviewed the EKG with the following interpretation:     CT ABDOMEN PELVIS W IV CONTRAST Additional Contrast? None   Final Result   Interval development of pulmonary nodules in the right lower lung zone and   bilateral pleural effusions.      Stable pancreatic tail mass.      Diffuse mucosal thickening in the distal esophagus and hiatal hernia.      Consider PET-CT scan to further evaluate as appropriate.             Consults:    IP CONSULT TO HEM/ONC  IP CONSULT TO GI  IP CONSULT TO ONCOLOGY  IP CONSULT TO SPIRITUAL SERVICES  IP CONSULT TO DIABETES EDUCATOR  IP CONSULT TO DIABETES EDUCATOR    ASSESSMENT:    Active Hospital Problems    Diagnosis Date Noted    Intractable nausea and vomiting [R11.2] 04/04/2024       Recurrent nausea and vomiting, upper abdominal pain     Possible diabetic gastroparesis rule out gastritis, esophagitis..  CT shows diffuse mucosal thickening in the distal esophagus and hiatal hernia, no other acute abdominal issues  Start scheduled Reglan, IV fluids, PPI, clear liquid diet  GI consult    Severe hypokalemia ,K2.8  Severe hypomagnesemia, mag 1,2     Replete potassium and magnesium, closely monitor levels, observe on telemetry      Acute kidney injury on CKD stage III  Continues to improve from recent admission..  Creatinine had peaked at 3.7, discharged at 2.2, with a previous baseline of 1.9..  Currently 1.5  Continue IV fluids..  Patient is at risk of contrast-induced nephropathy following CT contrast in the ED    Metastatic melanoma with intra-abdominal metastasis  Currently on immunotherapy..  Follow-up with oncology  CT abdomen showed

## 2024-04-04 NOTE — CARE COORDINATION
04/04/24 1306   Readmission Assessment   Number of Days since last admission? 1-7 days   Previous Disposition Home with Family   Who is being Interviewed Patient   What was the patient's/caregiver's perception as to why they think they needed to return back to the hospital? Other (Comment)  (Illness)   Did you visit your Primary Care Physician after you left the hospital, before you returned this time? No   Why weren't you able to visit your PCP? Did not have an appointment   Did you see a specialist, such as Cardiac, Pulmonary, Orthopedic Physician, etc. after you left the hospital? No   Who advised the patient to return to the hospital? Self-referral   Does the patient report anything that got in the way of taking their medications? No   In our efforts to provide the best possible care to you and others like you, can you think of anything that we could have done to help you after you left the hospital the first time, so that you might not have needed to return so soon? Other (Comment)  (Nothing)

## 2024-04-05 ENCOUNTER — ANESTHESIA (OUTPATIENT)
Dept: ENDOSCOPY | Age: 64
End: 2024-04-05
Payer: COMMERCIAL

## 2024-04-05 LAB
ALBUMIN SERPL-MCNC: 2.7 G/DL (ref 3.4–5)
ANION GAP SERPL CALCULATED.3IONS-SCNC: 8 MMOL/L (ref 3–16)
BASOPHILS # BLD: 0 K/UL (ref 0–0.2)
BASOPHILS NFR BLD: 0.6 %
BUN SERPL-MCNC: 11 MG/DL (ref 7–20)
CALCIUM SERPL-MCNC: 7.5 MG/DL (ref 8.3–10.6)
CHLORIDE SERPL-SCNC: 107 MMOL/L (ref 99–110)
CO2 SERPL-SCNC: 23 MMOL/L (ref 21–32)
CREAT SERPL-MCNC: 1.7 MG/DL (ref 0.6–1.2)
DEPRECATED RDW RBC AUTO: 13.4 % (ref 12.4–15.4)
EOSINOPHIL # BLD: 0.2 K/UL (ref 0–0.6)
EOSINOPHIL NFR BLD: 2.4 %
GFR SERPLBLD CREATININE-BSD FMLA CKD-EPI: 33 ML/MIN/{1.73_M2}
GLUCOSE BLD-MCNC: 101 MG/DL (ref 70–99)
GLUCOSE BLD-MCNC: 60 MG/DL (ref 70–99)
GLUCOSE BLD-MCNC: 70 MG/DL (ref 70–99)
GLUCOSE BLD-MCNC: 70 MG/DL (ref 70–99)
GLUCOSE BLD-MCNC: 82 MG/DL (ref 70–99)
GLUCOSE BLD-MCNC: 89 MG/DL (ref 70–99)
GLUCOSE BLD-MCNC: 96 MG/DL (ref 70–99)
GLUCOSE SERPL-MCNC: 96 MG/DL (ref 70–99)
HCT VFR BLD AUTO: 27.9 % (ref 36–48)
HGB BLD-MCNC: 9.4 G/DL (ref 12–16)
LYMPHOCYTES # BLD: 1.5 K/UL (ref 1–5.1)
LYMPHOCYTES NFR BLD: 23.4 %
MAGNESIUM SERPL-MCNC: 2 MG/DL (ref 1.8–2.4)
MCH RBC QN AUTO: 29.4 PG (ref 26–34)
MCHC RBC AUTO-ENTMCNC: 33.6 G/DL (ref 31–36)
MCV RBC AUTO: 87.4 FL (ref 80–100)
MONOCYTES # BLD: 0.6 K/UL (ref 0–1.3)
MONOCYTES NFR BLD: 8.7 %
NEUTROPHILS # BLD: 4.3 K/UL (ref 1.7–7.7)
NEUTROPHILS NFR BLD: 64.9 %
PERFORMED ON: ABNORMAL
PERFORMED ON: ABNORMAL
PERFORMED ON: NORMAL
PHOSPHATE SERPL-MCNC: 2.5 MG/DL (ref 2.5–4.9)
PLATELET # BLD AUTO: 173 K/UL (ref 135–450)
PMV BLD AUTO: 7.7 FL (ref 5–10.5)
POTASSIUM SERPL-SCNC: 4.1 MMOL/L (ref 3.5–5.1)
RBC # BLD AUTO: 3.19 M/UL (ref 4–5.2)
REASON FOR REJECTION: NORMAL
REJECTED TEST: NORMAL
SODIUM SERPL-SCNC: 138 MMOL/L (ref 136–145)
WBC # BLD AUTO: 6.6 K/UL (ref 4–11)

## 2024-04-05 PROCEDURE — 3700000001 HC ADD 15 MINUTES (ANESTHESIA): Performed by: INTERNAL MEDICINE

## 2024-04-05 PROCEDURE — 0DB68ZX EXCISION OF STOMACH, VIA NATURAL OR ARTIFICIAL OPENING ENDOSCOPIC, DIAGNOSTIC: ICD-10-PCS | Performed by: INTERNAL MEDICINE

## 2024-04-05 PROCEDURE — 1200000000 HC SEMI PRIVATE

## 2024-04-05 PROCEDURE — 3700000000 HC ANESTHESIA ATTENDED CARE: Performed by: INTERNAL MEDICINE

## 2024-04-05 PROCEDURE — 3609012400 HC EGD TRANSORAL BIOPSY SINGLE/MULTIPLE: Performed by: INTERNAL MEDICINE

## 2024-04-05 PROCEDURE — 6360000002 HC RX W HCPCS: Performed by: STUDENT IN AN ORGANIZED HEALTH CARE EDUCATION/TRAINING PROGRAM

## 2024-04-05 PROCEDURE — 2580000003 HC RX 258: Performed by: STUDENT IN AN ORGANIZED HEALTH CARE EDUCATION/TRAINING PROGRAM

## 2024-04-05 PROCEDURE — 2500000003 HC RX 250 WO HCPCS

## 2024-04-05 PROCEDURE — 88342 IMHCHEM/IMCYTCHM 1ST ANTB: CPT

## 2024-04-05 PROCEDURE — 85025 COMPLETE CBC W/AUTO DIFF WBC: CPT

## 2024-04-05 PROCEDURE — 6370000000 HC RX 637 (ALT 250 FOR IP): Performed by: STUDENT IN AN ORGANIZED HEALTH CARE EDUCATION/TRAINING PROGRAM

## 2024-04-05 PROCEDURE — 2580000003 HC RX 258

## 2024-04-05 PROCEDURE — 7100000001 HC PACU RECOVERY - ADDTL 15 MIN: Performed by: INTERNAL MEDICINE

## 2024-04-05 PROCEDURE — 88305 TISSUE EXAM BY PATHOLOGIST: CPT

## 2024-04-05 PROCEDURE — 80069 RENAL FUNCTION PANEL: CPT

## 2024-04-05 PROCEDURE — 94760 N-INVAS EAR/PLS OXIMETRY 1: CPT

## 2024-04-05 PROCEDURE — C9113 INJ PANTOPRAZOLE SODIUM, VIA: HCPCS | Performed by: HOSPITALIST

## 2024-04-05 PROCEDURE — 7100000000 HC PACU RECOVERY - FIRST 15 MIN: Performed by: INTERNAL MEDICINE

## 2024-04-05 PROCEDURE — 6370000000 HC RX 637 (ALT 250 FOR IP): Performed by: HOSPITALIST

## 2024-04-05 PROCEDURE — 2709999900 HC NON-CHARGEABLE SUPPLY: Performed by: INTERNAL MEDICINE

## 2024-04-05 PROCEDURE — 83735 ASSAY OF MAGNESIUM: CPT

## 2024-04-05 PROCEDURE — 6360000002 HC RX W HCPCS

## 2024-04-05 PROCEDURE — 6360000002 HC RX W HCPCS: Performed by: HOSPITALIST

## 2024-04-05 RX ORDER — ONDANSETRON 2 MG/ML
4 INJECTION INTRAMUSCULAR; INTRAVENOUS
Status: ACTIVE | OUTPATIENT
Start: 2024-04-05 | End: 2024-04-06

## 2024-04-05 RX ORDER — SODIUM CHLORIDE 0.9 % (FLUSH) 0.9 %
5-40 SYRINGE (ML) INJECTION PRN
Status: DISCONTINUED | OUTPATIENT
Start: 2024-04-05 | End: 2024-04-06 | Stop reason: SDUPTHER

## 2024-04-05 RX ORDER — GLYCOPYRROLATE 0.2 MG/ML
INJECTION INTRAMUSCULAR; INTRAVENOUS PRN
Status: DISCONTINUED | OUTPATIENT
Start: 2024-04-05 | End: 2024-04-05 | Stop reason: SDUPTHER

## 2024-04-05 RX ORDER — PROPOFOL 10 MG/ML
INJECTION, EMULSION INTRAVENOUS PRN
Status: DISCONTINUED | OUTPATIENT
Start: 2024-04-05 | End: 2024-04-05 | Stop reason: SDUPTHER

## 2024-04-05 RX ORDER — LIDOCAINE HYDROCHLORIDE 20 MG/ML
INJECTION, SOLUTION EPIDURAL; INFILTRATION; INTRACAUDAL; PERINEURAL PRN
Status: DISCONTINUED | OUTPATIENT
Start: 2024-04-05 | End: 2024-04-05 | Stop reason: SDUPTHER

## 2024-04-05 RX ORDER — IPRATROPIUM BROMIDE AND ALBUTEROL SULFATE 2.5; .5 MG/3ML; MG/3ML
1 SOLUTION RESPIRATORY (INHALATION)
Status: ACTIVE | OUTPATIENT
Start: 2024-04-05 | End: 2024-04-06

## 2024-04-05 RX ORDER — PANTOPRAZOLE SODIUM 40 MG/1
40 TABLET, DELAYED RELEASE ORAL
Status: DISCONTINUED | OUTPATIENT
Start: 2024-04-05 | End: 2024-04-07 | Stop reason: HOSPADM

## 2024-04-05 RX ORDER — ESCITALOPRAM OXALATE 10 MG/1
TABLET ORAL
Qty: 30 TABLET | Refills: 0 | Status: SHIPPED | OUTPATIENT
Start: 2024-04-05

## 2024-04-05 RX ORDER — SODIUM CHLORIDE 9 MG/ML
INJECTION, SOLUTION INTRAVENOUS PRN
Status: DISCONTINUED | OUTPATIENT
Start: 2024-04-05 | End: 2024-04-06 | Stop reason: SDUPTHER

## 2024-04-05 RX ORDER — PHENYLEPHRINE HCL IN 0.9% NACL 1 MG/10 ML
SYRINGE (ML) INTRAVENOUS PRN
Status: DISCONTINUED | OUTPATIENT
Start: 2024-04-05 | End: 2024-04-05 | Stop reason: SDUPTHER

## 2024-04-05 RX ORDER — SODIUM CHLORIDE 0.9 % (FLUSH) 0.9 %
5-40 SYRINGE (ML) INJECTION EVERY 12 HOURS SCHEDULED
Status: DISCONTINUED | OUTPATIENT
Start: 2024-04-05 | End: 2024-04-06 | Stop reason: SDUPTHER

## 2024-04-05 RX ADMIN — PROPOFOL 30 MG: 10 INJECTION, EMULSION INTRAVENOUS at 11:12

## 2024-04-05 RX ADMIN — PROPOFOL 100 MG: 10 INJECTION, EMULSION INTRAVENOUS at 11:09

## 2024-04-05 RX ADMIN — METOCLOPRAMIDE 10 MG: 5 INJECTION, SOLUTION INTRAMUSCULAR; INTRAVENOUS at 06:10

## 2024-04-05 RX ADMIN — HYDROCORTISONE 5 MG: 10 TABLET ORAL at 08:54

## 2024-04-05 RX ADMIN — LIDOCAINE HYDROCHLORIDE 100 MG: 20 INJECTION, SOLUTION EPIDURAL; INFILTRATION; INTRACAUDAL; PERINEURAL at 11:09

## 2024-04-05 RX ADMIN — METOCLOPRAMIDE 10 MG: 5 INJECTION, SOLUTION INTRAMUSCULAR; INTRAVENOUS at 17:47

## 2024-04-05 RX ADMIN — HYDROCORTISONE 10 MG: 10 TABLET ORAL at 08:54

## 2024-04-05 RX ADMIN — METOCLOPRAMIDE 10 MG: 5 INJECTION, SOLUTION INTRAMUSCULAR; INTRAVENOUS at 13:03

## 2024-04-05 RX ADMIN — PANTOPRAZOLE SODIUM 40 MG: 40 INJECTION, POWDER, FOR SOLUTION INTRAVENOUS at 08:53

## 2024-04-05 RX ADMIN — PANTOPRAZOLE SODIUM 40 MG: 40 TABLET, DELAYED RELEASE ORAL at 16:01

## 2024-04-05 RX ADMIN — PROPOFOL 20 MG: 10 INJECTION, EMULSION INTRAVENOUS at 11:10

## 2024-04-05 RX ADMIN — Medication 10 ML: at 21:03

## 2024-04-05 RX ADMIN — METOCLOPRAMIDE 10 MG: 5 INJECTION, SOLUTION INTRAMUSCULAR; INTRAVENOUS at 00:46

## 2024-04-05 RX ADMIN — MORPHINE SULFATE 2 MG: 2 INJECTION, SOLUTION INTRAMUSCULAR; INTRAVENOUS at 04:14

## 2024-04-05 RX ADMIN — SODIUM CHLORIDE: 9 INJECTION, SOLUTION INTRAVENOUS at 06:10

## 2024-04-05 RX ADMIN — MORPHINE SULFATE 2 MG: 2 INJECTION, SOLUTION INTRAMUSCULAR; INTRAVENOUS at 17:59

## 2024-04-05 RX ADMIN — ESCITALOPRAM OXALATE 10 MG: 10 TABLET ORAL at 08:53

## 2024-04-05 RX ADMIN — Medication 100 MCG: at 11:13

## 2024-04-05 RX ADMIN — ONDANSETRON 4 MG: 2 INJECTION INTRAMUSCULAR; INTRAVENOUS at 00:46

## 2024-04-05 RX ADMIN — SODIUM CHLORIDE, PRESERVATIVE FREE 5 ML: 5 INJECTION INTRAVENOUS at 22:49

## 2024-04-05 RX ADMIN — ROPINIROLE HYDROCHLORIDE 1 MG: 1 TABLET, FILM COATED ORAL at 21:02

## 2024-04-05 RX ADMIN — SODIUM CHLORIDE, PRESERVATIVE FREE 10 ML: 5 INJECTION INTRAVENOUS at 08:54

## 2024-04-05 RX ADMIN — GLYCOPYRROLATE 0.2 MG: 0.2 INJECTION INTRAMUSCULAR; INTRAVENOUS at 11:13

## 2024-04-05 ASSESSMENT — PAIN DESCRIPTION - ORIENTATION: ORIENTATION: RIGHT;LEFT

## 2024-04-05 ASSESSMENT — ENCOUNTER SYMPTOMS: SHORTNESS OF BREATH: 0

## 2024-04-05 ASSESSMENT — PAIN SCALES - GENERAL
PAINLEVEL_OUTOF10: 7
PAINLEVEL_OUTOF10: 0
PAINLEVEL_OUTOF10: 3
PAINLEVEL_OUTOF10: 6
PAINLEVEL_OUTOF10: 0
PAINLEVEL_OUTOF10: 0

## 2024-04-05 ASSESSMENT — PAIN DESCRIPTION - LOCATION
LOCATION: ABDOMEN
LOCATION: ABDOMEN

## 2024-04-05 ASSESSMENT — PAIN DESCRIPTION - DESCRIPTORS
DESCRIPTORS: SORE;ACHING
DESCRIPTORS: ACHING

## 2024-04-05 ASSESSMENT — PAIN - FUNCTIONAL ASSESSMENT
PAIN_FUNCTIONAL_ASSESSMENT: NONE - DENIES PAIN
PAIN_FUNCTIONAL_ASSESSMENT: NONE - DENIES PAIN
PAIN_FUNCTIONAL_ASSESSMENT: ACTIVITIES ARE NOT PREVENTED
PAIN_FUNCTIONAL_ASSESSMENT: ACTIVITIES ARE NOT PREVENTED

## 2024-04-05 ASSESSMENT — PAIN DESCRIPTION - ONSET: ONSET: AWAKENED FROM SLEEP

## 2024-04-05 ASSESSMENT — PAIN SCALES - WONG BAKER: WONGBAKER_NUMERICALRESPONSE: NO HURT

## 2024-04-05 ASSESSMENT — PAIN DESCRIPTION - FREQUENCY: FREQUENCY: INTERMITTENT

## 2024-04-05 ASSESSMENT — PAIN DESCRIPTION - PAIN TYPE: TYPE: ACUTE PAIN

## 2024-04-05 NOTE — OP NOTE
Esophagogastroduodenoscopy Note    Patient:   Elvia Arguelles    :    1960    Facility:   [facility] [Outpatient]   Referring/PCP: Meg Ellis APRN - CNP    Procedure:   Esophagogastroduodenoscopy   Date:     2024   Endoscopist:  Tylor Staley MD     Preoperative Diagnosis:    nausea, vomiting, epigastric pain, history of metastatic melanoma    Postoperative Diagnosis: LA grade D ulcerative esophagitis.  Gastritis.  Biopsies obtained.    Anesthesia: MAC    Estimated blood loss: Minimal    Complications: None    Specimen: Biopsy of gastric mucosa.    Instrument:     Description of Procedure:  Informed consent was obtained from the patient after explanation of the procedure including indications, description of the procedure,  benefits and possible risks and complications of the procedure, and alternatives. Questions were answered.  The patient's history was reviewed and a directed physical examination was performed prior to the procedure.    Patient was monitored throughout the procedure with pulse oximetry and periodic assessment of vital signs. Patient was sedated as noted above. With the patient in the left lateral decubitus position, the Olympus videoendoscope was placed in the patient's mouth and under direct visualization passed into the esophagus.  Visualization of the esophagus, stomach, and duodenum was performed during both introduction and withdrawal of the endoscope and retroflexed view of the proximal stomach was obtained. The scope was passed to the 2nd portion of the duodenum.  The patient tolerated the procedure well and was taken to the recovery area in good condition.    Findings: Changes of LA grade D ulcerative esophagitis were noted in the mid and distal esophagus.  There was no evidence of bleeding.  There was no evidence of esophageal stricture.  Examination of the stomach revealed diffuse patchy erythema of the gastric

## 2024-04-05 NOTE — PLAN OF CARE
Problem: Discharge Planning  Goal: Discharge to home or other facility with appropriate resources  Outcome: Progressing  Flowsheets (Taken 4/4/2024 2039)  Discharge to home or other facility with appropriate resources: Identify barriers to discharge with patient and caregiver     Problem: Pain  Goal: Verbalizes/displays adequate comfort level or baseline comfort level  Outcome: Progressing     Problem: Safety - Adult  Goal: Free from fall injury  Outcome: Progressing     Problem: Chronic Conditions and Co-morbidities  Goal: Patient's chronic conditions and co-morbidity symptoms are monitored and maintained or improved  Outcome: Progressing  Flowsheets (Taken 4/4/2024 2039)  Care Plan - Patient's Chronic Conditions and Co-Morbidity Symptoms are Monitored and Maintained or Improved: Monitor and assess patient's chronic conditions and comorbid symptoms for stability, deterioration, or improvement

## 2024-04-05 NOTE — ANESTHESIA POSTPROCEDURE EVALUATION
Department of Anesthesiology  Postprocedure Note    Patient: Elvia Arguelles  MRN: [id number]  YOB: 1960  Date of evaluation: 4/5/2024    Procedure Summary       Date: 04/05/24 Room / Location: [address]    Anesthesia Start: 1105 Anesthesia Stop: 1124    Procedure: ESOPHAGOGASTRODUODENOSCOPY BIOPSY Diagnosis:       Nausea and vomiting, unspecified vomiting type      Esophageal thickening      (Nausea and vomiting, unspecified vomiting type [R11.2])      (Esophageal thickening [K22.89])    Surgeons: Tylor Staley MD Responsible Provider: Bucky Tello MD    Anesthesia Type: MAC ASA Status: 3            Anesthesia Type: MAC    Rosaura Phase I: Rosaura Score: 9    Rosaura Phase II:      Anesthesia Post Evaluation    Patient location during evaluation: PACU  Patient participation: complete - patient participated  Level of consciousness: awake  Airway patency: patent  Nausea & Vomiting: no nausea and no vomiting  Cardiovascular status: hemodynamically stable and blood pressure returned to baseline  Respiratory status: spontaneous ventilation, nonlabored ventilation and room air  Hydration status: stable  Comments: Uneventful MAC anesthetic. Ms. Arguelles was seen resting comfortably following her procedure. Appropriate for return to floor.   Pain management: adequate    No notable events documented.

## 2024-04-05 NOTE — PLAN OF CARE
Problem: Pain  Goal: Verbalizes/displays adequate comfort level or baseline comfort level  4/5/2024 1031 by Christine Walsh, RN  Outcome: Progressing  4/5/2024 0452 by Rosita Celeste, RN  Outcome: Progressing     Problem: Safety - Adult  Goal: Free from fall injury  4/5/2024 0452 by Rosita Celeste, RN  Outcome: Progressing      62-year-old male past medical  of CVA with no residual deficits, HTN, HLD, T2DM, NICM (EF 32%), CAD, on Eliquis 5mg BID per his cardiologist presents today with 1 week of nausea,vomiting and abd pain, found to have perforated appendicitis with a 4.8cm abscess, now pending IR for today     Plan:  - NPO, IVF, IV Abx   - pain control prn  - IR today for drainage of abscess  - monitor labs, replete prn  - Hold eliquis and valsartan for IR  - OOB/ambulate   - Hold DVT ppx for IR today  - dispo: possible d/c tomorrow     B team  [id number]

## 2024-04-05 NOTE — ANESTHESIA PRE PROCEDURE
comment: EKG 4/4/2024:  Normal sinus rhythm with sinus arrhythmia   Non-specific ST abnormality   Abnormal ECG   When compared with ECG of 31-MAR-2024 20:51,   Criteria for Septal infarct are no longer Present   Nonspecific T wave abnormality no longer evident in Anterolateral leads    Echocardiogram 7/24/2023:  Summary:  Left ventricular cavity size is normal. There is mildly increased left ventricular wall thickness. Overall left ventricular systolic function appears normal. Ejection fraction is visually estimated to be 60-65%. No regional wall motion abnormalities are noted. Grade I diastolic dysfunction with normal LV filling pressures. The right ventricle is normal in size and function. There are no significant valvular abnormalities appreciated in this study. No pericardial effusion noted.       Neuro/Psych:   (+) headaches: migraine headachesdepression/anxiety    (-) neuromuscular disease            ROS comment: Diabetic neuropathy  Chronic pain  Chronic opiate use: oxycodone  + RLS GI/Hepatic/Renal:   (+) hiatal hernia (diffuse thickening of the mucosa in the distal esophagus and a hiatal hernia on CT), GERD: well controlled, PUD, liver disease (liver metastases with splenic vein thrombosis and portal hypertensive gastropathy with small gastric varices): portal hypertension, esophageal varices, renal disease (SCr: 1.5): CRI     (-) no morbid obesity      ROS comment: Presented to ED 4/4 with nausea / vomiting .   Endo/Other:    (+) Diabetes (insulin pump; HgbA1c: 7.8-8.5%)Type I DM, using insulin, electrolyte abnormalities (potassium 2.8 on admission), malignancy/cancer (melanoma right eye over years ag with recurrance metastatic to pancreas/liver/stoamach (2023) s/p chemo).                  ROS comment: Recent history of DKA, discharted 3/31/2024    Adrenal insufficiency, adrenal mets Abdominal:       Abdomen: soft.      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

## 2024-04-05 NOTE — CONSULTS
[facility]    Consult Note        PRIMARY PROVIDER: Meg Ellis, APRN - CNP      HISTORY OF PRESENT ILLNESS:      Patient is a 63-year-old female who is followed by our partner Dr. Mauri Ayala for metastatic melanoma.  She is a type I diabetic who experienced left-sided abdominal pain for a period of months.  Her primary care provider then requested a CT of the abdomen to further evaluate.  The scans suggested metastatic disease and she underwent EGD with biopsy in January 2023 with pathology demonstrating malignant melanoma.  An endoscopic ultrasound-guided biopsy of a mass in the pancreatic tail was also positive for melanoma.  A PET scan January 12, 2023 demonstrated metastatic disease involving the pancreatic tail, the splenic hilum, left adrenal gland, retrograde full lymph nodes, posterior mediastinal nodes, supra clavicular nodes, and lungs.  [facility] molecular profiling revealed no mutations in BRAF or HER2.  The patient was started on combination ipilimumab and nivolumab on 2/10/2023.  And continued therapy through December 21, 2023.  On 1/11/2024 the ipilimumab was discontinued and she was subsequently continued on single agent nivolumab.  Her last dose was on 2/1/2024. Treatment has since been on hold because of a rash.  She has been placed on high-dose prednisone. He also has been suffering headaches.  Brain MRI January 2024 was negative for metastatic disease.  As result of the steroid therapy her blood sugars have been difficult to manage.  She has been working with Bebe Latif, her endocrinologist, to assist in management.    The patient presented to the emergency room with nausea vomiting and epigastric pain on 4/4/2024.  On CT scan there was thickening of the esophagus and GI was consulted.  She anticipates upper endoscopy.  Nephrology is also seeing the patient as there was evidence of acute on chronic renal insufficiency.  Her creatinine peaked at 3.7.  Her

## 2024-04-06 LAB
ALBUMIN SERPL-MCNC: 2.9 G/DL (ref 3.4–5)
ANION GAP SERPL CALCULATED.3IONS-SCNC: 9 MMOL/L (ref 3–16)
BASOPHILS # BLD: 0 K/UL (ref 0–0.2)
BASOPHILS NFR BLD: 0.7 %
BUN SERPL-MCNC: 12 MG/DL (ref 7–20)
CALCIUM SERPL-MCNC: 8 MG/DL (ref 8.3–10.6)
CHLORIDE SERPL-SCNC: 107 MMOL/L (ref 99–110)
CO2 SERPL-SCNC: 21 MMOL/L (ref 21–32)
CREAT SERPL-MCNC: 2 MG/DL (ref 0.6–1.2)
DEPRECATED RDW RBC AUTO: 13.9 % (ref 12.4–15.4)
EOSINOPHIL # BLD: 0.2 K/UL (ref 0–0.6)
EOSINOPHIL NFR BLD: 3.8 %
GFR SERPLBLD CREATININE-BSD FMLA CKD-EPI: 27 ML/MIN/{1.73_M2}
GLUCOSE BLD-MCNC: 141 MG/DL (ref 70–99)
GLUCOSE BLD-MCNC: 192 MG/DL (ref 70–99)
GLUCOSE BLD-MCNC: 84 MG/DL (ref 70–99)
GLUCOSE BLD-MCNC: 94 MG/DL (ref 70–99)
GLUCOSE SERPL-MCNC: 94 MG/DL (ref 70–99)
HCT VFR BLD AUTO: 26.9 % (ref 36–48)
HGB BLD-MCNC: 9.2 G/DL (ref 12–16)
LYMPHOCYTES # BLD: 1.8 K/UL (ref 1–5.1)
LYMPHOCYTES NFR BLD: 29 %
MAGNESIUM SERPL-MCNC: 1.8 MG/DL (ref 1.8–2.4)
MCH RBC QN AUTO: 29.6 PG (ref 26–34)
MCHC RBC AUTO-ENTMCNC: 34 G/DL (ref 31–36)
MCV RBC AUTO: 87 FL (ref 80–100)
MONOCYTES # BLD: 0.5 K/UL (ref 0–1.3)
MONOCYTES NFR BLD: 7.7 %
NEUTROPHILS # BLD: 3.7 K/UL (ref 1.7–7.7)
NEUTROPHILS NFR BLD: 58.8 %
PERFORMED ON: ABNORMAL
PERFORMED ON: ABNORMAL
PERFORMED ON: NORMAL
PERFORMED ON: NORMAL
PHOSPHATE SERPL-MCNC: 2.8 MG/DL (ref 2.5–4.9)
PLATELET # BLD AUTO: 187 K/UL (ref 135–450)
PMV BLD AUTO: 7.6 FL (ref 5–10.5)
POTASSIUM SERPL-SCNC: 4 MMOL/L (ref 3.5–5.1)
RBC # BLD AUTO: 3.09 M/UL (ref 4–5.2)
SODIUM SERPL-SCNC: 137 MMOL/L (ref 136–145)
WBC # BLD AUTO: 6.3 K/UL (ref 4–11)

## 2024-04-06 PROCEDURE — 6370000000 HC RX 637 (ALT 250 FOR IP): Performed by: HOSPITALIST

## 2024-04-06 PROCEDURE — 80069 RENAL FUNCTION PANEL: CPT

## 2024-04-06 PROCEDURE — 1200000000 HC SEMI PRIVATE

## 2024-04-06 PROCEDURE — 6370000000 HC RX 637 (ALT 250 FOR IP): Performed by: STUDENT IN AN ORGANIZED HEALTH CARE EDUCATION/TRAINING PROGRAM

## 2024-04-06 PROCEDURE — 6360000002 HC RX W HCPCS: Performed by: HOSPITALIST

## 2024-04-06 PROCEDURE — 6360000002 HC RX W HCPCS: Performed by: STUDENT IN AN ORGANIZED HEALTH CARE EDUCATION/TRAINING PROGRAM

## 2024-04-06 PROCEDURE — 2580000003 HC RX 258: Performed by: HOSPITALIST

## 2024-04-06 PROCEDURE — 83735 ASSAY OF MAGNESIUM: CPT

## 2024-04-06 PROCEDURE — 2580000003 HC RX 258: Performed by: STUDENT IN AN ORGANIZED HEALTH CARE EDUCATION/TRAINING PROGRAM

## 2024-04-06 PROCEDURE — 85025 COMPLETE CBC W/AUTO DIFF WBC: CPT

## 2024-04-06 RX ORDER — ZOLPIDEM TARTRATE 5 MG/1
5 TABLET ORAL NIGHTLY PRN
Status: DISCONTINUED | OUTPATIENT
Start: 2024-04-06 | End: 2024-04-07 | Stop reason: HOSPADM

## 2024-04-06 RX ORDER — DIPHENHYDRAMINE HCL 25 MG
25 TABLET ORAL NIGHTLY PRN
Status: DISCONTINUED | OUTPATIENT
Start: 2024-04-06 | End: 2024-04-07 | Stop reason: HOSPADM

## 2024-04-06 RX ORDER — SODIUM CHLORIDE 9 MG/ML
INJECTION, SOLUTION INTRAVENOUS CONTINUOUS
Status: DISCONTINUED | OUTPATIENT
Start: 2024-04-06 | End: 2024-04-07 | Stop reason: HOSPADM

## 2024-04-06 RX ORDER — OXYCODONE HYDROCHLORIDE 5 MG/1
5 TABLET ORAL EVERY 4 HOURS PRN
Status: DISCONTINUED | OUTPATIENT
Start: 2024-04-06 | End: 2024-04-07 | Stop reason: HOSPADM

## 2024-04-06 RX ADMIN — HYDROCORTISONE 10 MG: 10 TABLET ORAL at 08:49

## 2024-04-06 RX ADMIN — PANTOPRAZOLE SODIUM 40 MG: 40 TABLET, DELAYED RELEASE ORAL at 06:35

## 2024-04-06 RX ADMIN — DIPHENHYDRAMINE HCL 25 MG: 25 TABLET ORAL at 21:26

## 2024-04-06 RX ADMIN — SODIUM CHLORIDE: 9 INJECTION, SOLUTION INTRAVENOUS at 19:54

## 2024-04-06 RX ADMIN — OXYCODONE 5 MG: 5 TABLET ORAL at 21:29

## 2024-04-06 RX ADMIN — ROPINIROLE HYDROCHLORIDE 1 MG: 1 TABLET, FILM COATED ORAL at 21:26

## 2024-04-06 RX ADMIN — ACETAMINOPHEN 650 MG: 325 TABLET ORAL at 13:52

## 2024-04-06 RX ADMIN — SODIUM CHLORIDE: 9 INJECTION, SOLUTION INTRAVENOUS at 10:51

## 2024-04-06 RX ADMIN — METOCLOPRAMIDE 10 MG: 5 INJECTION, SOLUTION INTRAMUSCULAR; INTRAVENOUS at 17:54

## 2024-04-06 RX ADMIN — MORPHINE SULFATE 2 MG: 2 INJECTION, SOLUTION INTRAMUSCULAR; INTRAVENOUS at 00:02

## 2024-04-06 RX ADMIN — METOCLOPRAMIDE 10 MG: 5 INJECTION, SOLUTION INTRAMUSCULAR; INTRAVENOUS at 00:00

## 2024-04-06 RX ADMIN — ESCITALOPRAM OXALATE 10 MG: 10 TABLET ORAL at 08:49

## 2024-04-06 RX ADMIN — ENOXAPARIN SODIUM 30 MG: 100 INJECTION SUBCUTANEOUS at 08:49

## 2024-04-06 RX ADMIN — HYDROCORTISONE 5 MG: 10 TABLET ORAL at 08:49

## 2024-04-06 RX ADMIN — PANTOPRAZOLE SODIUM 40 MG: 40 TABLET, DELAYED RELEASE ORAL at 16:13

## 2024-04-06 RX ADMIN — OXYCODONE 5 MG: 5 TABLET ORAL at 18:03

## 2024-04-06 RX ADMIN — SODIUM CHLORIDE, PRESERVATIVE FREE 10 ML: 5 INJECTION INTRAVENOUS at 08:50

## 2024-04-06 RX ADMIN — METOCLOPRAMIDE 10 MG: 5 INJECTION, SOLUTION INTRAMUSCULAR; INTRAVENOUS at 06:35

## 2024-04-06 RX ADMIN — METOCLOPRAMIDE 10 MG: 5 INJECTION, SOLUTION INTRAMUSCULAR; INTRAVENOUS at 13:41

## 2024-04-06 ASSESSMENT — PAIN DESCRIPTION - FREQUENCY
FREQUENCY: INTERMITTENT
FREQUENCY: INTERMITTENT

## 2024-04-06 ASSESSMENT — PAIN DESCRIPTION - LOCATION
LOCATION: HEAD
LOCATION: ABDOMEN;HEAD
LOCATION: LEG
LOCATION: ABDOMEN;HEAD

## 2024-04-06 ASSESSMENT — PAIN SCALES - GENERAL
PAINLEVEL_OUTOF10: 0
PAINLEVEL_OUTOF10: 0
PAINLEVEL_OUTOF10: 7
PAINLEVEL_OUTOF10: 7
PAINLEVEL_OUTOF10: 0
PAINLEVEL_OUTOF10: 8
PAINLEVEL_OUTOF10: 7
PAINLEVEL_OUTOF10: 0

## 2024-04-06 ASSESSMENT — PAIN DESCRIPTION - DESCRIPTORS
DESCRIPTORS: TINGLING
DESCRIPTORS: ACHING
DESCRIPTORS: ACHING;THROBBING
DESCRIPTORS: ACHING

## 2024-04-06 ASSESSMENT — PAIN SCALES - WONG BAKER
WONGBAKER_NUMERICALRESPONSE: NO HURT

## 2024-04-06 ASSESSMENT — PAIN DESCRIPTION - PAIN TYPE: TYPE: CHRONIC PAIN

## 2024-04-06 ASSESSMENT — PAIN DESCRIPTION - ORIENTATION
ORIENTATION: RIGHT;LEFT
ORIENTATION: RIGHT;LEFT

## 2024-04-06 ASSESSMENT — PAIN DESCRIPTION - ONSET
ONSET: ON-GOING
ONSET: ON-GOING

## 2024-04-06 NOTE — PLAN OF CARE
Problem: Discharge Planning  Goal: Discharge to home or other facility with appropriate resources  Outcome: Progressing     Problem: Pain  Goal: Verbalizes/displays adequate comfort level or baseline comfort level  Outcome: Progressing     Problem: Safety - Adult  Goal: Free from fall injury  Outcome: Progressing     Problem: Chronic Conditions and Co-morbidities  Goal: Patient's chronic conditions and co-morbidity symptoms are monitored and maintained or improved  Outcome: Progressing     Problem: Skin/Tissue Integrity - Adult  Goal: Skin integrity remains intact  Outcome: Progressing     Problem: Gastrointestinal - Adult  Goal: Minimal or absence of nausea and vomiting  Outcome: Progressing     Problem: ABCDS Injury Assessment  Goal: Absence of physical injury  Outcome: Progressing

## 2024-04-06 NOTE — PLAN OF CARE
Problem: Discharge Planning  Goal: Discharge to home or other facility with appropriate resources  4/6/2024 0833 by Shannan Hollingsworth, RN  Outcome: Not Progressing  Note: Pt wanting to discuss discharge barriers with physician team. Rn wrote a reminder on pt's white board for her to discuss during rounds.     Problem: Pain  Goal: Verbalizes/displays adequate comfort level or baseline comfort level  4/6/2024 0833 by Shannan Hollingsworth, RN  Outcome: Progressing  Note: Pt denies pain.     Problem: Skin/Tissue Integrity - Adult  Goal: Skin integrity remains intact  4/6/2024 0833 by Shannan Hollingsworth, RN  Outcome: Progressing  Note: Pt is to change her insulin pump site today. She manages independently. She has a superficial wound on her R heel. Mepilex drsg is CDI. It is due to be changed 4/7.     Problem: Safety - Adult  Goal: Free from fall injury  4/6/2024 0833 by Shannan Hollingsworth, RN  Outcome: Progressing  Note: Pt is a medium fall risk. Bed in locked, low position with side rails up X 2. Bed alarm deferred because pt is UAL and oriented to her abilities. Fall risk sign and socks in place. Pt has socks off while in bed but states she has shoes at bedside and nonskid socks in the bathroom.

## 2024-04-06 NOTE — FLOWSHEET NOTE
Pt updated on communication with physician team t/o shift.     Pt VU of plan and aware of interventions in place to help her rest.     RN offered ear plugs but pt declined. RN placed sign outside pt's room regarding do not disturb time.     04/06/24 1148 04/06/24 3865   Treatment Team Notification   Reason for Communication Patient/Family request  (pt c/o difficulty sleeping overnight.) Patient/Family request  (pt states she takes oxycodone 5 mg PO Q4H while awake PRN for pain. She has morphine and tylenol currently ordered. Pt is asking to have morphine d/c'd and oxycodone ordered.)   Name of Team Member Notified Breanne Raymundo   Treatment Team Role Attending Provider Attending Provider   Method of Communication Secure Message  (Benadryl 25 mg PO and ambien 5 mg PO QHS prn ordered. Dr. Cobb gave a verbal order stating it was okay to defer VS/care from 10p-4a to allow pt time to rest.) Secure Message  (morphine d/c'd and oxycodone ordered-see MAR.)   Response See orders See orders   Notification Time [id number]

## 2024-04-06 NOTE — FLOWSHEET NOTE
Pt's SBP trending up after MIVF started. Her SBP is 160-170's. RN verified with a manual. Pt asymptomatic. Pt eating and drinking well.     RN messaged Dr. Raymundo. No new orders.     Pt resting in bed eating [place].      04/06/24 1757 04/06/24 1817   Vital Signs   BP (!) 174/78 (!) 168/70   MAP (Calculated) 110 103   MAP (mmHg) 107  --    BP Location Left upper arm  --    BP Method Automatic Manual

## 2024-04-06 NOTE — FLOWSHEET NOTE
RN updated pt on discussions with physicians.     Pt VU of plan. Visitors at bedside.      04/06/24 1056 04/06/24 1131   Treatment Team Notification   Reason for Communication Patient/Family request  (pt asking to have telemetry monitor discontinued. pt frustrated that being started on NS @ 100 ml/hr and wants to talk to physician about why this is happening.) Review case  (pt with questions about starting IV fluids, discharge barriers, and kidney function.)   Name of Team Member Notified Breanne Barros   Treatment Team Role Attending Provider Consulting Provider   Method of Communication Secure Message Secure Message   Response See orders  (d/c telemetry. notify nephrology of pt's concerns/questions.) En route  (Physician stated he would be around to discuss with pt.)   Notification Time [id number]

## 2024-04-07 VITALS
OXYGEN SATURATION: 95 % | HEART RATE: 94 BPM | BODY MASS INDEX: 27.38 KG/M2 | RESPIRATION RATE: 15 BRPM | SYSTOLIC BLOOD PRESSURE: 160 MMHG | HEIGHT: 59 IN | TEMPERATURE: 98.9 F | DIASTOLIC BLOOD PRESSURE: 70 MMHG | WEIGHT: 135.8 LBS

## 2024-04-07 LAB
ALBUMIN SERPL-MCNC: 3.1 G/DL (ref 3.4–5)
ANION GAP SERPL CALCULATED.3IONS-SCNC: 8 MMOL/L (ref 3–16)
BASOPHILS # BLD: 0 K/UL (ref 0–0.2)
BASOPHILS NFR BLD: 0.6 %
BUN SERPL-MCNC: 11 MG/DL (ref 7–20)
CALCIUM SERPL-MCNC: 8.1 MG/DL (ref 8.3–10.6)
CHLORIDE SERPL-SCNC: 109 MMOL/L (ref 99–110)
CO2 SERPL-SCNC: 22 MMOL/L (ref 21–32)
CREAT SERPL-MCNC: 1.9 MG/DL (ref 0.6–1.2)
DEPRECATED RDW RBC AUTO: 13.7 % (ref 12.4–15.4)
EOSINOPHIL # BLD: 0.2 K/UL (ref 0–0.6)
EOSINOPHIL NFR BLD: 3.1 %
GFR SERPLBLD CREATININE-BSD FMLA CKD-EPI: 29 ML/MIN/{1.73_M2}
GLUCOSE BLD-MCNC: 107 MG/DL (ref 70–99)
GLUCOSE BLD-MCNC: 95 MG/DL (ref 70–99)
GLUCOSE SERPL-MCNC: 88 MG/DL (ref 70–99)
HCT VFR BLD AUTO: 25.2 % (ref 36–48)
HGB BLD-MCNC: 8.5 G/DL (ref 12–16)
LYMPHOCYTES # BLD: 1.5 K/UL (ref 1–5.1)
LYMPHOCYTES NFR BLD: 28.7 %
MAGNESIUM SERPL-MCNC: 1.5 MG/DL (ref 1.8–2.4)
MCH RBC QN AUTO: 29.5 PG (ref 26–34)
MCHC RBC AUTO-ENTMCNC: 33.7 G/DL (ref 31–36)
MCV RBC AUTO: 87.4 FL (ref 80–100)
MONOCYTES # BLD: 0.5 K/UL (ref 0–1.3)
MONOCYTES NFR BLD: 9.7 %
NEUTROPHILS # BLD: 2.9 K/UL (ref 1.7–7.7)
NEUTROPHILS NFR BLD: 57.9 %
PERFORMED ON: ABNORMAL
PERFORMED ON: NORMAL
PHOSPHATE SERPL-MCNC: 2.9 MG/DL (ref 2.5–4.9)
PLATELET # BLD AUTO: 211 K/UL (ref 135–450)
PMV BLD AUTO: 7.8 FL (ref 5–10.5)
POTASSIUM SERPL-SCNC: 3.6 MMOL/L (ref 3.5–5.1)
RBC # BLD AUTO: 2.88 M/UL (ref 4–5.2)
SODIUM SERPL-SCNC: 139 MMOL/L (ref 136–145)
WBC # BLD AUTO: 5.1 K/UL (ref 4–11)

## 2024-04-07 PROCEDURE — 6360000002 HC RX W HCPCS: Performed by: NURSE PRACTITIONER

## 2024-04-07 PROCEDURE — 2580000003 HC RX 258: Performed by: STUDENT IN AN ORGANIZED HEALTH CARE EDUCATION/TRAINING PROGRAM

## 2024-04-07 PROCEDURE — 2580000003 HC RX 258: Performed by: HOSPITALIST

## 2024-04-07 PROCEDURE — 85025 COMPLETE CBC W/AUTO DIFF WBC: CPT

## 2024-04-07 PROCEDURE — 6360000002 HC RX W HCPCS: Performed by: HOSPITALIST

## 2024-04-07 PROCEDURE — 6370000000 HC RX 637 (ALT 250 FOR IP): Performed by: STUDENT IN AN ORGANIZED HEALTH CARE EDUCATION/TRAINING PROGRAM

## 2024-04-07 PROCEDURE — 94760 N-INVAS EAR/PLS OXIMETRY 1: CPT

## 2024-04-07 PROCEDURE — 80069 RENAL FUNCTION PANEL: CPT

## 2024-04-07 PROCEDURE — 83735 ASSAY OF MAGNESIUM: CPT

## 2024-04-07 PROCEDURE — 6360000002 HC RX W HCPCS: Performed by: STUDENT IN AN ORGANIZED HEALTH CARE EDUCATION/TRAINING PROGRAM

## 2024-04-07 PROCEDURE — 6370000000 HC RX 637 (ALT 250 FOR IP): Performed by: HOSPITALIST

## 2024-04-07 RX ORDER — HYDRALAZINE HYDROCHLORIDE 20 MG/ML
10 INJECTION INTRAMUSCULAR; INTRAVENOUS ONCE
Status: COMPLETED | OUTPATIENT
Start: 2024-04-07 | End: 2024-04-07

## 2024-04-07 RX ORDER — MAGNESIUM SULFATE IN WATER 40 MG/ML
2000 INJECTION, SOLUTION INTRAVENOUS ONCE
Status: COMPLETED | OUTPATIENT
Start: 2024-04-07 | End: 2024-04-07

## 2024-04-07 RX ORDER — AMLODIPINE BESYLATE 5 MG/1
5 TABLET ORAL DAILY
Status: DISCONTINUED | OUTPATIENT
Start: 2024-04-07 | End: 2024-04-07 | Stop reason: HOSPADM

## 2024-04-07 RX ADMIN — HYDRALAZINE HYDROCHLORIDE 10 MG: 20 INJECTION, SOLUTION INTRAMUSCULAR; INTRAVENOUS at 05:35

## 2024-04-07 RX ADMIN — SODIUM CHLORIDE: 9 INJECTION, SOLUTION INTRAVENOUS at 04:43

## 2024-04-07 RX ADMIN — ESCITALOPRAM OXALATE 10 MG: 10 TABLET ORAL at 08:42

## 2024-04-07 RX ADMIN — HYDROCORTISONE 10 MG: 10 TABLET ORAL at 09:27

## 2024-04-07 RX ADMIN — PANTOPRAZOLE SODIUM 40 MG: 40 TABLET, DELAYED RELEASE ORAL at 05:35

## 2024-04-07 RX ADMIN — SODIUM CHLORIDE, PRESERVATIVE FREE 10 ML: 5 INJECTION INTRAVENOUS at 08:43

## 2024-04-07 RX ADMIN — ACETAMINOPHEN 650 MG: 325 TABLET ORAL at 14:46

## 2024-04-07 RX ADMIN — ENOXAPARIN SODIUM 30 MG: 100 INJECTION SUBCUTANEOUS at 08:42

## 2024-04-07 RX ADMIN — OXYCODONE 5 MG: 5 TABLET ORAL at 04:31

## 2024-04-07 RX ADMIN — METOCLOPRAMIDE 10 MG: 5 INJECTION, SOLUTION INTRAMUSCULAR; INTRAVENOUS at 00:28

## 2024-04-07 RX ADMIN — METOCLOPRAMIDE 10 MG: 5 INJECTION, SOLUTION INTRAMUSCULAR; INTRAVENOUS at 05:35

## 2024-04-07 RX ADMIN — MAGNESIUM SULFATE HEPTAHYDRATE 2000 MG: 40 INJECTION, SOLUTION INTRAVENOUS at 09:27

## 2024-04-07 ASSESSMENT — PAIN DESCRIPTION - LOCATION: LOCATION: HEAD

## 2024-04-07 ASSESSMENT — PAIN SCALES - GENERAL
PAINLEVEL_OUTOF10: 0
PAINLEVEL_OUTOF10: 8

## 2024-04-07 ASSESSMENT — PAIN DESCRIPTION - DESCRIPTORS: DESCRIPTORS: ACHING

## 2024-04-07 ASSESSMENT — PAIN SCALES - WONG BAKER: WONGBAKER_NUMERICALRESPONSE: NO HURT

## 2024-04-07 ASSESSMENT — PAIN - FUNCTIONAL ASSESSMENT: PAIN_FUNCTIONAL_ASSESSMENT: PREVENTS OR INTERFERES SOME ACTIVE ACTIVITIES AND ADLS

## 2024-04-07 NOTE — DISCHARGE SUMMARY
effusion is seen.  There is a right Port-A-Cath in place which is unchanged.  The bones are intact.     Stable chronic changes with no acute abnormality seen.       CBC:   Recent Labs     04/07/24  0445   WBC 5.1   HGB 8.5*        BMP:    Recent Labs     04/07/24  0445      K 3.6      CO2 22   BUN 11   CREATININE 1.9*   GLUCOSE 88     Hepatic: No results for input(s): \"AST\", \"ALT\", \"ALB\", \"BILITOT\", \"ALKPHOS\" in the last 72 hours.  Lipids:   Lab Results   Component Value Date/Time    CHOL 217 04/08/2022 06:21 AM     02/22/2024 10:00 AM     12/07/2011 07:50 AM    TRIG 118 04/08/2022 06:21 AM     Hemoglobin A1C:   Lab Results   Component Value Date/Time    LABA1C 7.8 04/03/2024 05:40 AM     TSH:   Lab Results   Component Value Date/Time    TSH 2.72 11/18/2023 10:34 AM     Troponin: No results found for: \"TROPONINT\"  Lactic Acid: No results for input(s): \"LACTA\" in the last 72 hours.  BNP: No results for input(s): \"PROBNP\" in the last 72 hours.  UA:  Lab Results   Component Value Date/Time    NITRU Negative 04/04/2024 01:30 PM    COLORU Yellow 04/04/2024 01:30 PM    PHUR 6.5 04/04/2024 01:30 PM    WBCUA 0 04/04/2024 01:30 PM    RBCUA 1 04/04/2024 01:30 PM    BACTERIA None Seen 04/04/2024 01:30 PM    CLARITYU Clear 04/04/2024 01:30 PM    SPECGRAV 1.020 04/04/2024 01:30 PM    LEUKOCYTESUR Negative 04/04/2024 01:30 PM    UROBILINOGEN 0.2 04/04/2024 01:30 PM    BILIRUBINUR Negative 04/04/2024 01:30 PM    BILIRUBINUR Negative 06/26/2023 12:25 PM    BILIRUBINUR NEGATIVE 05/25/2012 09:30 AM    BLOODU Negative 04/04/2024 01:30 PM    GLUCOSEU Negative 04/04/2024 01:30 PM    GLUCOSEU >=1000 05/25/2012 09:30 AM    KETUA 15 04/04/2024 01:30 PM     Urine Cultures:   Lab Results   Component Value Date/Time    LABURIN No growth at 18 to 36 hours 08/09/2023 11:28 PM     Blood Cultures:   Lab Results   Component Value Date/Time    BC  08/09/2023 10:39 PM     POSITIVE for  This organism was isolated in

## 2024-04-07 NOTE — PROGRESS NOTES
ONCOLOGY HEMATOLOGY CARE PROGRESS NOTE      SUBJECTIVE:      ROS:     Constitutional:  No weight loss, No fever, No chills, No night sweats.  Energy level good.  Eyes:  No impairment or change in vision  ENT / Mouth:  No pain, abnormal ulceration, bleeding, nasal drip or change in voice or hearing  Cardiovascular:  No chest pain, palpitations, new edema, or calf discomfort  Respiratory:  No pain, hemoptysis, change to breathing  Breast:  No pain, discharge, change in appearance or texture  Gastrointestinal:  No pain, cramping, jaundice, change to eating and bowel habits  Urinary:  No pain, bleeding or change in continence  Genitalia: No pain, bleeding or discharge  Musculoskeletal:  No redness, pain, edema or weakness  Skin:  No pruritus, rash, change to nodules or lesions  Neurologic:  No discomfort, change in mental status, speech, sensory or motor activity  Psychiatric:  No change in concentration or change to affect or mood  Endocrine:  No hot flashes, increased thirst, or change to urine production  Hematologic: No petechiae, ecchymosis or bleeding  Lymphatic:  No lymphadenopathy or lymphedema  Allergy / Immunologic:  No eczema, hives, frequent or recurrent infections    OBJECTIVE        Physical    VITALS:  Patient Vitals for the past 24 hrs:   BP Temp Temp src Pulse Resp SpO2 Weight   04/07/24 0903 -- -- -- -- -- 95 % --   04/07/24 0806 (!) 160/70 98.9 °F (37.2 °C) -- 94 15 94 % --   04/07/24 0535 (!) 164/63 -- -- -- -- -- --   04/07/24 0501 -- -- -- -- 20 -- --   04/07/24 0431 (!) 182/78 98.2 °F (36.8 °C) Oral (!) 104 20 96 % 61.6 kg (135 lb 12.9 oz)   04/07/24 0426 -- 98.2 °F (36.8 °C) Oral -- -- -- --   04/07/24 0022 (!) 168/70 98 °F (36.7 °C) Oral 84 -- -- --   04/06/24 2159 -- -- -- -- 20 -- --   04/06/24 2129 -- -- -- -- 20 -- --   04/06/24 1833 -- -- -- -- 20 -- --   04/06/24 1817 (!) 168/70 -- -- -- -- -- --   04/06/24 1803 -- -- -- -- 14 -- --   04/06/24 1757 (!) 
    V2.0    [facility] [place] Progress Note      Name:  Elvia Arguelles /Age/Sex: 1960  (63 y.o. female)   MRN & CSN:  [id number] & [id number] Encounter Date/Time: 2024 8:57 AM EDT   Location:  [id number] PCP: Meg Ellis, APRN - CNP     Attending:Jeremy Raymundo MD       Hospital Day: 2      HPI :   Chief Complaint: Nausea vomiting and abdominal pain    Elvia Arguelles is a 63 y.o. female who presents with recurrent nausea, vomiting and upper abdominal pain      Subjective:     The patient feels much better today..  Reports not pain at the moment, no nausea or vomiting since admission.    Review of Systems:      Pertinent positives and negatives discussed in HPI    Objective:     Intake/Output Summary (Last 24 hours) at 2024 1207  Last data filed at 2024 1203  Gross per 24 hour   Intake 4210.85 ml   Output 1000 ml   Net 3210.85 ml      Vitals:   Vitals:    24 1130 24 1135 24 1139 24 1200   BP: (!) 87/44 (!) 106/54  126/74   Pulse: 81 84 84    Resp: 18 17 20    Temp:   97 °F (36.1 °C)    TempSrc:       SpO2: 99% 94% 99%    Weight:             Physical Exam:      Physical Exam Performed:    /74   Pulse 84   Temp 97 °F (36.1 °C)   Resp 20   Wt 57.3 kg (126 lb 5.2 oz)   LMP  (LMP Unknown) Comment: had a hysterectomy a long time ago  SpO2 99%   BMI 25.51 kg/m²     General appearance: No apparent distress, appears stated age and cooperative.  HEENT: Pupils equal, round, and reactive to light. Conjunctivae/corneas clear.  Neck: Supple, with full range of motion. No jugular venous distention. Trachea midline.  Respiratory:  Normal respiratory effort. Clear to auscultation, bilaterally without Rales/Wheezes/Rhonchi.  Cardiovascular: Regular rate and rhythm with normal S1/S2 without murmurs, rubs or gallops.  Abdomen: Soft, non-tender, non-distended with normal bowel sounds.  Musculoskeletal: No clubbing, cyanosis or edema bilaterally.  Full range of 
  [facility]  Phone: [phone]  Fax: [phone]  [facility]         Reason for Consult:  favian/ckd   Requesting Physician:  Dr. Crain    History Obtained From:  patient, electronic medical record    History of Present Ilness: 62 y/o WF with h/o type 1 DM, Melanoma R eye  and ho Adrenal Insufficiency , previous episodes of FAVIAN presented with one day h/o nausea vomiting and poor oral intake Her BS was reading \"high\" at home   Denies dysuria hematuria flank pains or decrease in UOP   Noted to have FAVIAN with baseline Cr ~ 1.9 mg   Lactic acid 3.2   Started on DKA protocol   Patient then discharged 4/3/2024 and back 4/4/2024 with complaints of abdominal pain with N/V    Review of Systems: Denies SOB. Eating and drinking OK now  Disappointed at increase in Cr today   Family present   .       Physical exam:   Constitutional:  VITALS:  /68   Pulse 80   Temp 98.1 °F (36.7 °C) (Oral)   Resp 12   Ht 1.499 m (4' 11\")   Wt 58.6 kg (129 lb 3 oz)   LMP  (LMP Unknown) Comment: had a hysterectomy a long time ago  SpO2 98%   BMI 26.09 kg/m²   Gen: alert, awake, nad  Skin: no rash, turgor wnl  Heent:  eomi, mmm  Neck: no bruits or jvd noted, thyroid normal  Cardiovascular:  S1, S2 without m/r/g  Respiratory: CTA B without w/r/r; respiratory effort normal  Abdomen:  +bs, soft, nt, nd  Ext: no  lower extremity edema    Data/  CBC:   Lab Results   Component Value Date/Time    WBC 6.3 04/06/2024 06:30 AM    RBC 3.09 04/06/2024 06:30 AM    HGB 9.2 04/06/2024 06:30 AM    HCT 26.9 04/06/2024 06:30 AM    MCV 87.0 04/06/2024 06:30 AM    MCH 29.6 04/06/2024 06:30 AM    MCHC 34.0 04/06/2024 06:30 AM    RDW 13.9 04/06/2024 06:30 AM     04/06/2024 06:30 AM    MPV 7.6 04/06/2024 06:30 AM     BMP:    Lab Results   Component Value Date/Time     04/06/2024 06:30 AM    K 4.0 04/06/2024 06:30 AM    K 2.8 04/04/2024 07:07 AM     04/06/2024 06:30 AM    CO2 21 04/06/2024 06:30 AM    BUN 12 
  [facility]  Phone: [phone]  Fax: [phone]  [employer]         Reason for Consult:  favian/ckd   Requesting Physician:  Dr. Crain    History Obtained From:  patient, electronic medical record    History of Present Ilness: 64 y/o WF with h/o type 1 DM, Melanoma R eye  and ho Adrenal Insufficiency , previous episodes of FAVIAN presented with one day h/o nausea vomiting and poor oral intake Her BS was reading \"high\" at home   Denies dysuria hematuria flank pains or decrease in UOP   Noted to have FAVIAN with baseline Cr ~ 1.9 mg   Lactic acid 3.2   Started on DKA protocol   Patient then discharged 4/3/2024 and back 4/4/2024 with complaints of abdominal pain with N/V    Review of Systems: Denies SOB. Eating and drinking well   Anxious to go home today     .       Physical exam:   Constitutional:  VITALS:  BP (!) 160/70   Pulse 94   Temp 98.9 °F (37.2 °C)   Resp 15   Ht 1.499 m (4' 11\")   Wt 61.6 kg (135 lb 12.9 oz)   LMP  (LMP Unknown) Comment: had a hysterectomy a long time ago  SpO2 95%   BMI 27.43 kg/m²   Gen: alert, awake, nad  Skin: no rash, turgor wnl  Heent:  eomi, mmm  Neck: no bruits or jvd noted, thyroid normal  Cardiovascular:  S1, S2 without m/r/g  Respiratory: CTA B without w/r/r; respiratory effort normal  Abdomen:  +bs, soft, nt, nd  Ext: no  lower extremity edema    Data/  CBC:   Lab Results   Component Value Date/Time    WBC 5.1 04/07/2024 04:45 AM    RBC 2.88 04/07/2024 04:45 AM    HGB 8.5 04/07/2024 04:45 AM    HCT 25.2 04/07/2024 04:45 AM    MCV 87.4 04/07/2024 04:45 AM    MCH 29.5 04/07/2024 04:45 AM    MCHC 33.7 04/07/2024 04:45 AM    RDW 13.7 04/07/2024 04:45 AM     04/07/2024 04:45 AM    MPV 7.8 04/07/2024 04:45 AM     BMP:    Lab Results   Component Value Date/Time     04/07/2024 04:45 AM    K 3.6 04/07/2024 04:45 AM    K 2.8 04/04/2024 07:07 AM     04/07/2024 04:45 AM    CO2 22 04/07/2024 04:45 AM    BUN 11 04/07/2024 04:45 AM    
  [facility]  Phone: [phone]  Fax: [phone]  [employer]         Reason for Consult:  favian/ckd   Requesting Physician:  Dr. Crain    History Obtained From:  patient, electronic medical record    History of Present Ilness: 64 y/o WF with h/o type 1 DM, Melanoma R eye  and ho Adrenal Insufficiency , previous episodes of FAVIAN presented with one day h/o nausea vomiting and poor oral intake Her BS was reading \"high\" at home   Denies dysuria hematuria flank pains or decrease in UOP   Noted to have FAVIAN with baseline Cr ~ 1.9 mg   Lactic acid 3.2   Started on DKA protocol   Patient then discharged 4/3/2024 and back 4/4/2024 with complaints of abdominal pain with N/V    Review of Systems: Denies SOB. Eating and drinking OK now.       Physical exam:   Constitutional:  VITALS:  /74   Pulse 84   Temp 97 °F (36.1 °C)   Resp 20   Wt 57.3 kg (126 lb 5.2 oz)   LMP  (LMP Unknown) Comment: had a hysterectomy a long time ago  SpO2 99%   BMI 25.51 kg/m²   Gen: alert, awake, nad  Skin: no rash, turgor wnl  Heent:  eomi, mmm  Neck: no bruits or jvd noted, thyroid normal  Cardiovascular:  S1, S2 without m/r/g  Respiratory: CTA B without w/r/r; respiratory effort normal  Abdomen:  +bs, soft, nt, nd  Ext: no  lower extremity edema    Data/  CBC:   Lab Results   Component Value Date/Time    WBC 6.6 04/05/2024 08:20 AM    RBC 3.19 04/05/2024 08:20 AM    HGB 9.4 04/05/2024 08:20 AM    HCT 27.9 04/05/2024 08:20 AM    MCV 87.4 04/05/2024 08:20 AM    MCH 29.4 04/05/2024 08:20 AM    MCHC 33.6 04/05/2024 08:20 AM    RDW 13.4 04/05/2024 08:20 AM     04/05/2024 08:20 AM    MPV 7.7 04/05/2024 08:20 AM     BMP:    Lab Results   Component Value Date/Time     04/05/2024 06:07 AM    K 4.1 04/05/2024 06:07 AM    K 2.8 04/04/2024 07:07 AM     04/05/2024 06:07 AM    CO2 23 04/05/2024 06:07 AM    BUN 11 04/05/2024 06:07 AM    LABALBU 2.7 04/05/2024 06:07 AM    CREATININE 1.7 04/05/2024 06:07 
  [facility]  Phone: [phone]  Fax: [phone]  [facility]         Reason for Consult:  favian/ckd   Requesting Physician:  Dr. Crain    History Obtained From:  patient, electronic medical record    History of Present Ilness: 64 y/o WF with h/o type 1 DM, Melanoma R eye  and ho Adrenal Insufficiency , previous episodes of FAVIAN presented with one day h/o nausea vomiting and poor oral intake Her BS was reading \"high\" at home   Denies dysuria hematuria flank pains or decrease in UOP   Noted to have FAVIAN with baseline Cr ~ 1.9 mg   Lactic acid 3.2   Started on DKA protocol   Patient then discharged 4/3/2024 and back 4/4/2024 with complaints of abdominal pain with N/V    Review of Systems: Abdominal pain, N/V      Physical exam:   Constitutional:  VITALS:  BP (!) 99/53   Pulse 83   Temp 98.4 °F (36.9 °C) (Oral)   Resp 14   Wt 58.1 kg (128 lb 1.4 oz)   LMP  (LMP Unknown) Comment: had a hysterectomy a long time ago  SpO2 99%   BMI 25.87 kg/m²   Gen: alert, awake, nad  Skin: no rash, turgor wnl  Heent:  eomi, mmm  Neck: no bruits or jvd noted, thyroid normal  Cardiovascular:  S1, S2 without m/r/g  Respiratory: CTA B without w/r/r; respiratory effort normal  Abdomen:  +bs, soft, nt, nd  Ext: no  lower extremity edema    Data/  CBC:   Lab Results   Component Value Date/Time    WBC 6.8 04/04/2024 07:07 AM    RBC 4.52 04/04/2024 07:07 AM    HGB 13.3 04/04/2024 07:07 AM    HCT 39.3 04/04/2024 07:07 AM    MCV 87.0 04/04/2024 07:07 AM    MCH 29.5 04/04/2024 07:07 AM    MCHC 33.9 04/04/2024 07:07 AM    RDW 13.5 04/04/2024 07:07 AM     04/04/2024 07:07 AM    MPV 7.7 04/04/2024 07:07 AM     BMP:    Lab Results   Component Value Date/Time     04/04/2024 07:07 AM    K 2.8 04/04/2024 07:07 AM    CL 99 04/04/2024 07:07 AM    CO2 22 04/04/2024 07:07 AM    BUN 14 04/04/2024 07:07 AM    LABALBU 3.4 04/04/2024 07:07 AM    CREATININE 1.5 04/04/2024 07:07 AM    CALCIUM 8.7 04/04/2024 07:07 AM 
4 Eyes Skin Assessment     NAME:  Elvia Arguelles  YOB: 1960  MEDICAL RECORD NUMBER:  [id number]    The patient is being assessed for  Admission    I agree that at least one RN has performed a thorough Head to Toe Skin Assessment on the patient. ALL assessment sites listed below have been assessed.      Areas assessed by both nurses:    Head, Face, Ears, Shoulders, Back, Chest, Arms, Elbows, Hands, Sacrum. Buttock, Coccyx, Ischium, Legs. Feet and Heels, and Under Medical Devices         Does the Patient have a Wound? No noted wound(s)       Uriah Prevention initiated by RN: No  Wound Care Orders initiated by RN: No    Pressure Injury (Stage 3,4, Unstageable, DTI, NWPT, and Complex wounds) if present, place Wound referral order by RN under : No    New Ostomies, if present place, Ostomy referral order under : No     Nurse 1 eSignature: Electronically signed by Rosita Celeste RN on 4/5/24 at 7:24 AM EDT    **SHARE this note so that the co-signing nurse can place an eSignature**    Nurse 2 eSignature: Electronically signed by Christine Walsh RN on 4/5/24 at 10:58 AM EDT    
EDSBAR report has been reviewed.      Electronically signed by Xiomara Mckeon RN on 4/4/2024 at 10:34 AM      
[facility]  Diabetes Education   Progress Note       NAME:  Elvia Arguelles  MEDICAL RECORD NUMBER:  [id number]  AGE: 63 y.o.   GENDER: female  : 1960  TODAY'S DATE:  2024    Subjective   Reason for Diabetes Education Evaluation and Assessment: Insulin Pump    Visit Type: evaluation      Elvia Arguelles is a 63 y.o. female referred by:  [x] Physician     Pt has been Type 1 DM for several years, Dx when she was in her 30's. Pt uses Omnipod changes every 3 days- last changed 4/3/2024. Pt changed CGM today while inpt at 1330. Pt has pump supplies at bedside. Pt agreeable to take off pump and change to basal/bolus if needed during admission. Glucose 115 on CGM at time of assessment. Inulin pump signed and placed in hard chart by staff nurse.     Pt voiced she met with GI today. Per pt she has no Hx of stomach issues. Per pt, GI provider is \"wanting to put a scope down my throat to check me\".     Omnipod   Total insulin history:   2024: total insulin 7.3 units, 7.3 units basal, 0 bolus, 0 carbs  4/3/2024: total insulin 11.7 units, 8.45 units basal, 3.25 units bolus, 0 carb    PAST MEDICAL HISTORY        Diagnosis Date    Adrenal insufficiency (HCC)     Cancer (HCC)     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    Depression     Diabetes mellitus (HCC)     Type I    Hx of radiation therapy     melanoma right eye    Hypertension     Pt. denies having history of Hypertension    Hyponatremia     Insulin pump in place     Melanoma (HCC) 2023    in stomach, pancreas    Prolonged emergence from general anesthesia     slow to wake up    Restless leg syndrome        PAST SURGICAL HISTORY    Past Surgical History:   Procedure Laterality Date    CARPAL TUNNEL RELEASE Bilateral 2017    CHOLECYSTECTOMY      CT BIOPSY ABDOMEN RETROPERITONEUM  2022    CT BIOPSY ABDOMEN RETROPERITONEUM 2022 [facility] CT SCAN    EYE SURGERY Right     biopsy    HYSTERECTOMY (CERVIX STATUS UNKNOWN)      LIPOMA 
[facility]  Glycemic Control      NAME: Elvia Arguelles  MEDICAL RECORD NUMBER:  [id number]  AGE: 63 y.o.   GENDER: female  : 1960  EPISODE DATE:  2024     Data     Recent Labs     24  1224 24  1218 24  1732 24  1956 24  0818 24  0958   POCGLU 107* 82 101* 117* 70 89       HgBA1c:    Lab Results   Component Value Date/Time    LABA1C 7.8 2024 05:40 AM       BUN/Creatinine:    Lab Results   Component Value Date/Time    BUN 11 2024 06:07 AM    CREATININE 1.7 2024 06:07 AM       Medications  Scheduled Medications:   sodium chloride flush  5-40 mL IntraVENous 2 times per day    escitalopram  10 mg Oral Daily    hydrocortisone  10 mg Oral Daily    hydrocortisone  5 mg Oral Daily    rOPINIRole  1 mg Oral Nightly    sodium chloride flush  5-40 mL IntraVENous 2 times per day    enoxaparin  30 mg SubCUTAneous Daily    metoclopramide  10 mg IntraVENous Q6H    pantoprazole  40 mg IntraVENous BID    Insulin Pump - Bolus Dose   SubCUTAneous 4x Daily AC & HS    Insulin Pump - insulin lispro   SubCUTAneous Daily       Diet  Current diet/supplement order: ADULT DIET; Regular     Recorded PO: PO Meals Eaten (%): 51 - 75% last meal in flowsheets      Action      Glucose Target: 140-180 (prevent hypoglycemia)    Pt off unit at procedure. Pt's spouse, Shant at bedside. Pt's spouse aware to monitor glucose and follow up with Endocrinology and PCP after D/C.    Recommendation: Continue  current regimen     Electronically signed by Dora Mac RN on 2024 at 11:19 AM    
Lovenox not given per endo RN.   
Patient A&Ox4, VSS with exception to an elevated BP, pt asymptomatic. Patient denies pain or nausea, tolerating diet well. Patient ambulating independently room, anticipating discharge home today. Will continue to monitor for needs.   
Patient arrived on floor. Patient oriented to room, call light within reach. Patient placed on tele monitor and verified with CMU. 4 Eyes Skin Assessment     NAME:  Elvia Arguelles  YOB: 1960  MEDICAL RECORD NUMBER:  [id number]    The patient is being assessed for  Admission    I agree that at least one RN has performed a thorough Head to Toe Skin Assessment on the patient. ALL assessment sites listed below have been assessed.      Areas assessed by both nurses:    Head, Face, Ears, Shoulders, Back, Chest, Arms, Elbows, Hands, Sacrum. Buttock, Coccyx, Ischium, Legs. Feet and Heels, and Under Medical Devices         Does the Patient have a Wound? Yes wound(s) were present on assessment. LDA wound assessment was Initiated and completed by RN       Uriah Prevention initiated by RN: No  Wound Care Orders initiated by RN: Yes    Pressure Injury (Stage 3,4, Unstageable, DTI, NWPT, and Complex wounds) if present, place Wound referral order by RN under : No    New Ostomies, if present place, Ostomy referral order under : No     Nurse 1 eSignature: Electronically signed by Xiomara Mckeon RN on 4/4/24 at 12:44 PM EDT    **SHARE this note so that the co-signing nurse can place an eSignature**    Nurse 2 eSignature: Electronically signed by Christine Walsh RN on 4/5/24 at 10:58 AM EDT    
Patient discharged with belongings and follow up instructions. Port deacessed without complications. Patient ambulating with  to main entrance.   
Pt Omnipod on and infusing. Pt glucose currently 125 via Dexcom G6. Pt's total insulin taken yesterday 14.4 units. Pt has not bolus'd with any meals due to glucoses running lower (70, 82, 70 with hospital POC glucometer.     Dora CELISN, RN Diabetic Educator  [phone]  
Pt awake, denies pain.  Abd soft.  Report to Christine.  To room per stretcher.   
Pt with 's.  Secure message sent to the hospitalist regarding elevated BP.  Orders received.  Hydralazine 10 mg IVP given for elevated BP.  Will continue to monitor. Call light in reach.   
To pacu from endo. Pt awake, denies pain. Abd soft.  IV infusing per port.  Monitor in sinus rhythm.   
      dextrose 10 % infusion   IntraVENous Continuous PRN Jeremy Raymundo MD        morphine (PF) injection 2 mg  2 mg IntraVENous Q4H PRN Jeremy Raymundo MD   2 mg at 04/06/24 0002         Recent Imaging:   EGD  No dictation        Labs:   Recent Labs     04/04/24  0707 04/04/24  1702 04/05/24  0607 04/05/24  0820 04/06/24  0630   WBC 6.8  --   --  6.6 6.3   HGB 13.3  --   --  9.4* 9.2*   *  --   --  173 187   ALKPHOS 89  --   --   --   --    ALT 24  --   --   --   --    AST 21  --   --   --   --    BILITOT 0.3  --   --   --   --    BUN 14  --  11  --  12   CREATININE 1.5*  --  1.7*  --  2.0*     --  138  --  137   K 2.8* 4.7 4.1  --  4.0   INR 0.80*  --   --   --   --    PROT 5.8*  --   --   --   --    LABALBU 3.4  --  2.7*  --  2.9*          Assessment:  Hospital Problems             Last Modified POA    * (Principal) Intractable nausea and vomiting 4/4/2024 Yes       63 y.o. female with a PMH of DM type I, HTN, HLD, CKD, adrenal insufficiency, metastatic melanoma with involvement of the lymph nodes, pancreas, spleen, adrenal gland, lung who presented on 4/4/2024 with nausea, vomiting, abdominal pain.  CT a/p showed interval development of pulmonary nodules in RLL, bilateral pleural effusions, stable pancreatic tail mass, diffuse mucosal thickening of the distal esophagus, hiatal hernia.       IMPRESSION:     Acute nausea, vomiting, abdominal pain, symptoms resolved at this point.  Patient tolerating a regular diet.  Esophageal wall thickening on CT, upper endoscopy consistent with LA grade D reflux esophagitis.  History of metastatic melanoma, followed by oncology.  History of splenic vein thrombosis.  History of moderate portal gastropathy with small gastric varices  Stable metastatic lesion to pancreas.    Plan:  Continue PPI twice a day for reflux esophagitis.  No further GI evaluation recommended at this point as long thanks continues to do well  We will sign off, call with 
 --  99 107   < > 94*   CO2 21 23  --  22 23   < > 20*   GLUCOSE 94 96  --  99 79   < > 280*   BUN 12 11  --  14 26*   < > 89*   CREATININE 2.0* 1.7*  --  1.5* 2.2*   < > 3.7*   LABGLOM 27* 33*  --  38* 24*   < > 13*   CALCIUM 8.0* 7.5*  --  8.7 8.5   < > 9.4   PROT  --   --   --  5.8*  --   --  6.5   LABALBU 2.9* 2.7*  --  3.4  --   --  3.9   AGRATIO  --   --   --  1.4  --   --  1.5   BILITOT  --   --   --  0.3  --   --  0.4   ALKPHOS  --   --   --  89  --   --  114   ALT  --   --   --  24  --   --  42*   AST  --   --   --  21  --   --  18   MG 1.80 2.00 2.60* 1.30* 2.20   < >  --     < > = values in this interval not displayed.       Lab Results   Component Value Date    CALCIUM 8.0 (L) 04/06/2024    PHOS 2.8 04/06/2024       LDH:  Recent Labs     03/31/24  1812   *       Radiology Review:  EGD  No dictation       Problem List  Patient Active Problem List   Diagnosis    Duodenal erosion    Left elbow pain    Type 1 diabetes mellitus (HCC)    Vitamin D deficiency    Family history of 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 
contamination.  Additional testing can be ordered by calling the  Microbiology Department.       Lab Results   Component Value Date/Time    BLOODCULT2 No Growth after 4 days of incubation. 08/10/2023 03:12 AM     Organism:   Lab Results   Component Value Date/Time    ORG Diphtheroids 08/09/2023 10:39 PM         Assessment and Recommendations   Elvia Arguelles is a 63 y.o. female who presents with recurrent nausea, vomiting and abdominal pain      -Recurrent nausea and vomiting, upper abdominal pain due to esophagitis/gastritis-resolved  -GERD with esophagitis  -Gastritis       CT showed diffuse mucosal thickening in the distal esophagus and hiatal hernia, no other acute abdominal issues     EGD  4/5 shows LA grade D ulcerative esophagitis  noted in the mid and distal esophagus ,diffuse patchy erythema of the gastric mucosa consistent with gastritis. Biopsies were obtained. no endoscopic evidence of metastatic involvement of the stomach with melanoma   Continue PPI, avoid NSAIDs  Symptoms resolved, tolerating regular diet     Severe hypokalemia ,K2.8 on presentation  Severe hypomagnesemia, mag 1,2 on presentation     Repleted--continue to monitor        Acute kidney injury on CKD stage III  .  Creatinine had peaked at 3.7 on recent admission, discharged at 2.2, with a previous baseline of 1.9..  Currently 1.5-1.7-2.0..  May have a component of contrast-induced nephropathy/hypovolemia  IV fluids resumed  Follow-up with nephrology     Metastatic melanoma with intra-abdominal metastasis  Currently on immunotherapy..  Follow-up with oncology  CT abdomen showed diffuse interval development of pulmonary nodules..  PET/CT recommended     Diabetes mellitus type 1,  Recent DKA--resolved with no recurrence  Continue insulin pump-managed by patient     Adrenal insufficiency     Continue Solu-Cortef    Diet ADULT DIET; Regular; 4 carb choices (60 gm/meal)     DVT Prophylaxis [x] Lovenox, []  Heparin, [] SCDs, [] Ambulation,

## 2024-04-08 ENCOUNTER — CARE COORDINATION (OUTPATIENT)
Dept: OTHER | Facility: CLINIC | Age: 64
End: 2024-04-08

## 2024-04-08 DIAGNOSIS — G25.81 RESTLESS LEG SYNDROME: ICD-10-CM

## 2024-04-08 RX ORDER — ROPINIROLE 1 MG/1
1 TABLET, FILM COATED ORAL NIGHTLY
Qty: 90 TABLET | Refills: 0 | Status: SHIPPED | OUTPATIENT
Start: 2024-04-08

## 2024-04-08 NOTE — CARE COORDINATION
Care Transitions Outreach Attempt    Call within 2 business days of discharge: Yes   Attempted to reach patient for transitions of care follow up. Unable to reach patient.    Patient: Elvia Arguelles Patient : 1960 MRN: [id number]    Last Discharge Facility       Date Complaint Diagnosis Description Type Department Provider    24 Abdominal Pain; Nausea Upper abdominal pain ... ED to Hosp-Admission (Discharged) (ADMITTED) REYNA WallN Jeremy Raymundo MD; Clifton Smith..              Was this an external facility discharge? No Discharge Facility Name: [facility]    Noted following upcoming appointments from discharge chart review:   [facility] follow up appointment(s):   Future Appointments   Date Time Provider Department Center   2024 11:30 AM Bebe Latif MD [facility]   9/3/2024 11:10 AM Bebe Latif MD [facility]   12/10/2024  1:30 PM Bebe Latif MD [facility]     Non-[facility]  follow up appointment(s): unknown    ACM attempted to reach patient for introduction to Associate Care Management related to admission x 2. HIPAA compliant message left requesting a return phone call at patient convenience.     Plan for follow-up call in 1-2 days

## 2024-04-08 NOTE — TELEPHONE ENCOUNTER
Medication:   Requested Prescriptions     Pending Prescriptions Disp Refills    rOPINIRole (REQUIP) 1 MG tablet 90 tablet 0     Sig: Take 1 tablet by mouth nightly       Last Filled:  1/18/24    Patient Phone Number: [phone] (home)     Last appt: 2/29/2024   Next appt: Visit date not found    Last Labs DM:   Lab Results   Component Value Date/Time    LABA1C 7.8 04/03/2024 05:40 AM     Last Lipid:   Lab Results   Component Value Date/Time    CHOL 217 04/08/2022 06:21 AM    TRIG 118 04/08/2022 06:21 AM     02/22/2024 10:00 AM     12/07/2011 07:50 AM    LDLCALC 110 02/22/2024 10:00 AM     Last PSA: No results found for: \"PSA\"  Last Thyroid:   Lab Results   Component Value Date/Time    TSH 2.72 11/18/2023 10:34 AM    FT3 2.2 02/20/2023 08:31 AM    T4FREE 1.1 11/18/2023 10:34 AM

## 2024-04-09 ENCOUNTER — CARE COORDINATION (OUTPATIENT)
Dept: OTHER | Facility: CLINIC | Age: 64
End: 2024-04-09

## 2024-04-09 RX ORDER — PANTOPRAZOLE SODIUM 40 MG/1
40 TABLET, DELAYED RELEASE ORAL
Qty: 60 TABLET | Refills: 2 | Status: SHIPPED | OUTPATIENT
Start: 2024-04-09

## 2024-04-09 NOTE — CARE COORDINATION
Care Transitions Outreach Attempt    Call within 2 business days of discharge: Yes   Attempted to reach patient for transitions of care follow up. Unable to reach patient.    Patient: Elvia Arguelles Patient : 1960 MRN: [id number]    Last Discharge Facility       Date Complaint Diagnosis Description Type Department Provider    24 Abdominal Pain; Nausea Upper abdominal pain ... ED to Hosp-Admission (Discharged) (ADMITTED) Jeremy Mcneal MD; Clifton Smith..              Was this an external facility discharge? No Discharge Facility Name: [facility]    Noted following upcoming appointments from discharge chart review:   [facility] follow up appointment(s):   Future Appointments   Date Time Provider Department Center   2024 11:30 AM Bebe Latif MD [facility]   9/3/2024 11:10 AM Bebe Latif MD [facility] Endo [facility]   12/10/2024  1:30 PM Bebe Latif MD [facility]     Non-[facility]  follow up appointment(s): unknown    ACM attempted 2nd outreach to patient for introduction to Associate Care Management related to admission for abdominal pain and nausea. HIPAA compliant message left requesting a return phone call at patient convenience.     Unable to Reach Letter sent to patient via [employer].    Will continue to outreach.

## 2024-04-10 ENCOUNTER — CARE COORDINATION (OUTPATIENT)
Dept: OTHER | Facility: CLINIC | Age: 64
End: 2024-04-10

## 2024-04-10 NOTE — CARE COORDINATION
Final Transition of Care Outreach Attempt     ACM attempted to reach patient for final Care Transitions call. HIPAA compliant message left requesting a return phone call at patient convenience.     Final Unable to Reach Letter sent via [employer] and mail.    No further outreach scheduled with this ACM, patient has been provided with this ACM's contact information. ACM will sign off care team at this time. Program closed.    Future Appointments   Date Time Provider Department [place]   5/28/2024 11:30 AM Bebe Latif MD [facility]   9/3/2024 11:10 AM Bebe Latif MD [facility]   12/10/2024  1:30 PM Bebe Latif MD [facility]

## 2024-04-11 ENCOUNTER — TELEPHONE (OUTPATIENT)
Dept: ENDOCRINOLOGY | Age: 64
End: 2024-04-11

## 2024-04-24 ENCOUNTER — TELEPHONE (OUTPATIENT)
Dept: ENDOCRINOLOGY | Age: 64
End: 2024-04-24

## 2024-04-24 NOTE — TELEPHONE ENCOUNTER
Submitted PA for Dexcom Transmitter  Via CM Key: [id number]  STATUS: PENDING.    Follow up done daily; if no decision with in three days we will refax.  If another three days goes by with no decision will call the insurance for status.

## 2024-04-26 NOTE — TELEPHONE ENCOUNTER
Approved today  The request has been approved. The authorization is effective from 04/26/2024 to 04/25/2025, as long as the member is enrolled in their current health plan.

## 2024-05-14 ENCOUNTER — TELEPHONE (OUTPATIENT)
Dept: ENDOCRINOLOGY | Age: 64
End: 2024-05-14

## 2024-05-14 DIAGNOSIS — E78.2 MIXED HYPERLIPIDEMIA: ICD-10-CM

## 2024-05-14 DIAGNOSIS — Z83.49 FAMILY HISTORY OF THYROID DISEASE: ICD-10-CM

## 2024-05-14 DIAGNOSIS — E10.319 POORLY CONTROLLED TYPE 1 DIABETES MELLITUS WITH RETINOPATHY (HCC): ICD-10-CM

## 2024-05-14 DIAGNOSIS — E10.65 POORLY CONTROLLED TYPE 1 DIABETES MELLITUS WITH NEUROPATHY (HCC): ICD-10-CM

## 2024-05-14 DIAGNOSIS — I10 PRIMARY HYPERTENSION: ICD-10-CM

## 2024-05-14 DIAGNOSIS — E10.40 POORLY CONTROLLED TYPE 1 DIABETES MELLITUS WITH NEUROPATHY (HCC): ICD-10-CM

## 2024-05-14 DIAGNOSIS — E55.9 VITAMIN D DEFICIENCY: ICD-10-CM

## 2024-05-14 DIAGNOSIS — E10.21 DIABETIC NEPHROPATHY ASSOCIATED WITH TYPE 1 DIABETES MELLITUS (HCC): ICD-10-CM

## 2024-05-14 DIAGNOSIS — E10.65 POORLY CONTROLLED TYPE 1 DIABETES MELLITUS WITH RETINOPATHY (HCC): ICD-10-CM

## 2024-05-14 NOTE — TELEPHONE ENCOUNTER
Zoraida, are you able to assist me in understanding this? We rec'd all of these within 12 hours on our fax ( I don't recall order). Pt states she got an approval and denial call. I'm confused as to what is needed. She's got 7 days of sensors left.

## 2024-05-14 NOTE — TELEPHONE ENCOUNTER
Call from pt stating that her insurance will not cover the omnipod     Pt stated that her insurance is asking that Dr. Latif send in a Formulary Exception letter to [place]    Pt is also wanting to know if there is an alternative for the omnipod?    Please advise   CB# [phone]

## 2024-05-15 RX ORDER — INSULIN PMP CART,AUT,G6/7,CNTR
EACH SUBCUTANEOUS
Qty: 30 EACH | Refills: 1 | Status: SHIPPED | OUTPATIENT
Start: 2024-05-15 | End: 2024-05-15 | Stop reason: SDUPTHER

## 2024-05-15 RX ORDER — INSULIN PMP CART,AUT,G6/7,CNTR
EACH SUBCUTANEOUS
Qty: 30 EACH | Refills: 1 | Status: ON HOLD | OUTPATIENT
Start: 2024-05-15

## 2024-05-15 RX ORDER — INSULIN PMP CART,AUT,G6/7,CNTR
EACH SUBCUTANEOUS
Qty: 5 EACH | Refills: 0 | Status: ON HOLD | OUTPATIENT
Start: 2024-05-15

## 2024-05-15 NOTE — TELEPHONE ENCOUNTER
Spoke w/ pt. Her new insurance has her using [facility].    Pt will call her pharmacy. She will also let us know if we need to change rx to change q 2 days

## 2024-05-15 NOTE — TELEPHONE ENCOUNTER
Spoke w/ pt. Sent 1 box to [facility] pharmacy. Updated rx to change q 2 days to [facility].

## 2024-05-15 NOTE — TELEPHONE ENCOUNTER
Patient returning Chiquita's call, states she available the rest of the day for a call back at [phone].    I was unsure if this call about the refill due on the 25th is regarding pods or sensors as both are mentioned in messages below.

## 2024-05-15 NOTE — TELEPHONE ENCOUNTER
LVM to return call      Spoke / [facility], per pharmacist they stated it's to soon to fill and will be filled on 25th. They stated that they were out of add'l refills for the pt's rx. Insurance pt has is no longer covered. Needs to be sent to preferred pharmacy per her new insurance.

## 2024-05-18 ENCOUNTER — APPOINTMENT (OUTPATIENT)
Dept: CT IMAGING | Age: 64
DRG: 956 | End: 2024-05-18
Payer: MEDICARE

## 2024-05-18 ENCOUNTER — APPOINTMENT (OUTPATIENT)
Dept: GENERAL RADIOLOGY | Age: 64
DRG: 956 | End: 2024-05-18
Payer: MEDICARE

## 2024-05-18 ENCOUNTER — HOSPITAL ENCOUNTER (INPATIENT)
Age: 64
LOS: 9 days | Discharge: NURSING, REHAB OR LONG-TERM CARE | DRG: 956 | End: 2024-05-27
Attending: EMERGENCY MEDICINE | Admitting: STUDENT IN AN ORGANIZED HEALTH CARE EDUCATION/TRAINING PROGRAM
Payer: MEDICARE

## 2024-05-18 DIAGNOSIS — R90.89 ABNORMAL BRAIN MRI: ICD-10-CM

## 2024-05-18 DIAGNOSIS — R79.89 ELEVATED TROPONIN: ICD-10-CM

## 2024-05-18 DIAGNOSIS — S72.101A CLOSED FRACTURE OF TROCHANTER OF RIGHT FEMUR, INITIAL ENCOUNTER (HCC): ICD-10-CM

## 2024-05-18 DIAGNOSIS — T79.6XXA TRAUMATIC RHABDOMYOLYSIS, INITIAL ENCOUNTER (HCC): Primary | ICD-10-CM

## 2024-05-18 DIAGNOSIS — S72.001A CLOSED RIGHT HIP FRACTURE, INITIAL ENCOUNTER (HCC): ICD-10-CM

## 2024-05-18 DIAGNOSIS — R79.89 ELEVATED TROPONIN LEVEL: ICD-10-CM

## 2024-05-18 DIAGNOSIS — N17.9 AKI (ACUTE KIDNEY INJURY) (HCC): ICD-10-CM

## 2024-05-18 DIAGNOSIS — R74.01 TRANSAMINITIS: ICD-10-CM

## 2024-05-18 PROBLEM — S72.141A CLOSED DISPLACED INTERTROCHANTERIC FRACTURE OF RIGHT FEMUR (HCC): Status: ACTIVE | Noted: 2024-05-18

## 2024-05-18 LAB
25(OH)D3 SERPL-MCNC: 22.7 NG/ML
ALBUMIN SERPL-MCNC: 3.7 G/DL (ref 3.4–5)
ALBUMIN/GLOB SERPL: 1.4 {RATIO} (ref 1.1–2.2)
ALP SERPL-CCNC: 99 U/L (ref 40–129)
ALT SERPL-CCNC: 75 U/L (ref 10–40)
ANION GAP SERPL CALCULATED.3IONS-SCNC: 20 MMOL/L (ref 3–16)
AST SERPL-CCNC: 146 U/L (ref 15–37)
BACTERIA URNS QL MICRO: NORMAL /HPF
BASOPHILS # BLD: 0.2 K/UL (ref 0–0.2)
BASOPHILS NFR BLD: 1 %
BILIRUB SERPL-MCNC: 0.3 MG/DL (ref 0–1)
BILIRUB UR QL STRIP.AUTO: NEGATIVE
BUN SERPL-MCNC: 40 MG/DL (ref 7–20)
CALCIUM SERPL-MCNC: 9.7 MG/DL (ref 8.3–10.6)
CHLORIDE SERPL-SCNC: 99 MMOL/L (ref 99–110)
CHOLEST SERPL-MCNC: 209 MG/DL (ref 0–199)
CK SERPL-CCNC: 1910 U/L (ref 26–192)
CLARITY UR: CLEAR
CO2 SERPL-SCNC: 20 MMOL/L (ref 21–32)
COLOR UR: YELLOW
CREAT SERPL-MCNC: 4.1 MG/DL (ref 0.6–1.2)
DEPRECATED RDW RBC AUTO: 15 % (ref 12.4–15.4)
EOSINOPHIL # BLD: 0.2 K/UL (ref 0–0.6)
EOSINOPHIL NFR BLD: 1 %
EPI CELLS #/AREA URNS AUTO: 0 /HPF (ref 0–5)
GFR SERPLBLD CREATININE-BSD FMLA CKD-EPI: 11 ML/MIN/{1.73_M2}
GLUCOSE BLD-MCNC: 111 MG/DL (ref 70–99)
GLUCOSE SERPL-MCNC: 81 MG/DL (ref 70–99)
GLUCOSE UR STRIP.AUTO-MCNC: 100 MG/DL
HCT VFR BLD AUTO: 34.9 % (ref 36–48)
HDLC SERPL-MCNC: 95 MG/DL (ref 40–60)
HGB BLD-MCNC: 11.6 G/DL (ref 12–16)
HGB UR QL STRIP.AUTO: NEGATIVE
HYALINE CASTS #/AREA URNS AUTO: 0 /LPF (ref 0–8)
INR PPP: 1.08 (ref 0.85–1.15)
KETONES UR STRIP.AUTO-MCNC: NEGATIVE MG/DL
LACTATE BLDV-SCNC: 2.2 MMOL/L (ref 0.4–1.9)
LACTATE BLDV-SCNC: 2.3 MMOL/L (ref 0.4–1.9)
LDLC SERPL CALC-MCNC: ABNORMAL MG/DL
LDLC SERPL-MCNC: 37 MG/DL
LEUKOCYTE ESTERASE UR QL STRIP.AUTO: NEGATIVE
LYMPHOCYTES # BLD: 4.6 K/UL (ref 1–5.1)
LYMPHOCYTES NFR BLD: 18 %
MCH RBC QN AUTO: 28.9 PG (ref 26–34)
MCHC RBC AUTO-ENTMCNC: 33.1 G/DL (ref 31–36)
MCV RBC AUTO: 87.3 FL (ref 80–100)
MONOCYTES # BLD: 1.4 K/UL (ref 0–1.3)
MONOCYTES NFR BLD: 6 %
NEUTROPHILS # BLD: 16.4 K/UL (ref 1.7–7.7)
NEUTROPHILS NFR BLD: 59 %
NEUTS BAND NFR BLD MANUAL: 13 % (ref 0–7)
NITRITE UR QL STRIP.AUTO: NEGATIVE
PERFORMED ON: ABNORMAL
PH UR STRIP.AUTO: 6.5 [PH] (ref 5–8)
PLATELET # BLD AUTO: 283 K/UL (ref 135–450)
PLATELET BLD QL SMEAR: ADEQUATE
PMV BLD AUTO: 8.2 FL (ref 5–10.5)
POTASSIUM SERPL-SCNC: 3.9 MMOL/L (ref 3.5–5.1)
PROT SERPL-MCNC: 6.4 G/DL (ref 6.4–8.2)
PROT UR STRIP.AUTO-MCNC: >=1000 MG/DL
PROTHROMBIN TIME: 14.2 SEC (ref 11.9–14.9)
RBC # BLD AUTO: 4 M/UL (ref 4–5.2)
RBC CLUMPS #/AREA URNS AUTO: 2 /HPF (ref 0–4)
SLIDE REVIEW: ABNORMAL
SODIUM SERPL-SCNC: 139 MMOL/L (ref 136–145)
SP GR UR STRIP.AUTO: 1.01 (ref 1–1.03)
TRIGL SERPL-MCNC: 403 MG/DL (ref 0–150)
TROPONIN, HIGH SENSITIVITY: 236 NG/L (ref 0–14)
TROPONIN, HIGH SENSITIVITY: 250 NG/L (ref 0–14)
TROPONIN, HIGH SENSITIVITY: 345 NG/L (ref 0–14)
UA COMPLETE W REFLEX CULTURE PNL UR: ABNORMAL
UA DIPSTICK W REFLEX MICRO PNL UR: YES
URN SPEC COLLECT METH UR: ABNORMAL
UROBILINOGEN UR STRIP-ACNC: 0.2 E.U./DL
VARIANT LYMPHS NFR BLD MANUAL: 2 % (ref 0–6)
VLDLC SERPL CALC-MCNC: ABNORMAL MG/DL
WBC # BLD AUTO: 22.8 K/UL (ref 4–11)
WBC #/AREA URNS AUTO: 0 /HPF (ref 0–5)

## 2024-05-18 PROCEDURE — 81001 URINALYSIS AUTO W/SCOPE: CPT

## 2024-05-18 PROCEDURE — 82550 ASSAY OF CK (CPK): CPT

## 2024-05-18 PROCEDURE — 72125 CT NECK SPINE W/O DYE: CPT

## 2024-05-18 PROCEDURE — 96374 THER/PROPH/DIAG INJ IV PUSH: CPT

## 2024-05-18 PROCEDURE — 87040 BLOOD CULTURE FOR BACTERIA: CPT

## 2024-05-18 PROCEDURE — 83605 ASSAY OF LACTIC ACID: CPT

## 2024-05-18 PROCEDURE — 2580000003 HC RX 258: Performed by: STUDENT IN AN ORGANIZED HEALTH CARE EDUCATION/TRAINING PROGRAM

## 2024-05-18 PROCEDURE — 84484 ASSAY OF TROPONIN QUANT: CPT

## 2024-05-18 PROCEDURE — 82306 VITAMIN D 25 HYDROXY: CPT

## 2024-05-18 PROCEDURE — 6360000002 HC RX W HCPCS: Performed by: STUDENT IN AN ORGANIZED HEALTH CARE EDUCATION/TRAINING PROGRAM

## 2024-05-18 PROCEDURE — 6370000000 HC RX 637 (ALT 250 FOR IP): Performed by: STUDENT IN AN ORGANIZED HEALTH CARE EDUCATION/TRAINING PROGRAM

## 2024-05-18 PROCEDURE — 2580000003 HC RX 258: Performed by: PHYSICIAN ASSISTANT

## 2024-05-18 PROCEDURE — 85025 COMPLETE CBC W/AUTO DIFF WBC: CPT

## 2024-05-18 PROCEDURE — 70450 CT HEAD/BRAIN W/O DYE: CPT

## 2024-05-18 PROCEDURE — 99222 1ST HOSP IP/OBS MODERATE 55: CPT | Performed by: ORTHOPAEDIC SURGERY

## 2024-05-18 PROCEDURE — 83721 ASSAY OF BLOOD LIPOPROTEIN: CPT

## 2024-05-18 PROCEDURE — 80053 COMPREHEN METABOLIC PANEL: CPT

## 2024-05-18 PROCEDURE — 99285 EMERGENCY DEPT VISIT HI MDM: CPT

## 2024-05-18 PROCEDURE — 83036 HEMOGLOBIN GLYCOSYLATED A1C: CPT

## 2024-05-18 PROCEDURE — 6360000002 HC RX W HCPCS: Performed by: PHYSICIAN ASSISTANT

## 2024-05-18 PROCEDURE — 36415 COLL VENOUS BLD VENIPUNCTURE: CPT

## 2024-05-18 PROCEDURE — 96361 HYDRATE IV INFUSION ADD-ON: CPT

## 2024-05-18 PROCEDURE — 93005 ELECTROCARDIOGRAM TRACING: CPT | Performed by: EMERGENCY MEDICINE

## 2024-05-18 PROCEDURE — 2060000000 HC ICU INTERMEDIATE R&B

## 2024-05-18 PROCEDURE — 74176 CT ABD & PELVIS W/O CONTRAST: CPT

## 2024-05-18 PROCEDURE — 80061 LIPID PANEL: CPT

## 2024-05-18 PROCEDURE — 73502 X-RAY EXAM HIP UNI 2-3 VIEWS: CPT

## 2024-05-18 PROCEDURE — 85610 PROTHROMBIN TIME: CPT

## 2024-05-18 PROCEDURE — 96376 TX/PRO/DX INJ SAME DRUG ADON: CPT

## 2024-05-18 RX ORDER — ONDANSETRON 4 MG/1
4 TABLET, ORALLY DISINTEGRATING ORAL EVERY 8 HOURS PRN
Status: DISCONTINUED | OUTPATIENT
Start: 2024-05-18 | End: 2024-05-27 | Stop reason: HOSPADM

## 2024-05-18 RX ORDER — HYDROCORTISONE 10 MG/1
10 TABLET ORAL DAILY
Status: DISCONTINUED | OUTPATIENT
Start: 2024-05-19 | End: 2024-05-27 | Stop reason: HOSPADM

## 2024-05-18 RX ORDER — SODIUM CHLORIDE 0.9 % (FLUSH) 0.9 %
5-40 SYRINGE (ML) INJECTION EVERY 12 HOURS SCHEDULED
Status: DISCONTINUED | OUTPATIENT
Start: 2024-05-18 | End: 2024-05-27 | Stop reason: HOSPADM

## 2024-05-18 RX ORDER — ESCITALOPRAM OXALATE 10 MG/1
10 TABLET ORAL DAILY
Status: DISCONTINUED | OUTPATIENT
Start: 2024-05-19 | End: 2024-05-27 | Stop reason: HOSPADM

## 2024-05-18 RX ORDER — ACETAMINOPHEN 325 MG/1
650 TABLET ORAL EVERY 6 HOURS PRN
Status: DISCONTINUED | OUTPATIENT
Start: 2024-05-18 | End: 2024-05-27 | Stop reason: HOSPADM

## 2024-05-18 RX ORDER — ROPINIROLE 1 MG/1
1 TABLET, FILM COATED ORAL NIGHTLY
Status: DISCONTINUED | OUTPATIENT
Start: 2024-05-18 | End: 2024-05-25

## 2024-05-18 RX ORDER — FENTANYL CITRATE 50 UG/ML
25 INJECTION, SOLUTION INTRAMUSCULAR; INTRAVENOUS ONCE
Status: COMPLETED | OUTPATIENT
Start: 2024-05-18 | End: 2024-05-18

## 2024-05-18 RX ORDER — HYDROXYZINE PAMOATE 25 MG/1
25 CAPSULE ORAL 3 TIMES DAILY PRN
Status: DISCONTINUED | OUTPATIENT
Start: 2024-05-18 | End: 2024-05-27 | Stop reason: HOSPADM

## 2024-05-18 RX ORDER — FENTANYL CITRATE 50 UG/ML
50 INJECTION, SOLUTION INTRAMUSCULAR; INTRAVENOUS ONCE
Status: COMPLETED | OUTPATIENT
Start: 2024-05-18 | End: 2024-05-18

## 2024-05-18 RX ORDER — HYDROCORTISONE 5 MG/1
5 TABLET ORAL DAILY
Status: DISCONTINUED | OUTPATIENT
Start: 2024-05-19 | End: 2024-05-19

## 2024-05-18 RX ORDER — SODIUM CHLORIDE 0.9 % (FLUSH) 0.9 %
5-40 SYRINGE (ML) INJECTION PRN
Status: DISCONTINUED | OUTPATIENT
Start: 2024-05-18 | End: 2024-05-27 | Stop reason: HOSPADM

## 2024-05-18 RX ORDER — PREDNISONE 10 MG/1
10 TABLET ORAL DAILY
Status: DISCONTINUED | OUTPATIENT
Start: 2024-05-19 | End: 2024-05-20

## 2024-05-18 RX ORDER — SODIUM CHLORIDE 9 MG/ML
INJECTION, SOLUTION INTRAVENOUS PRN
Status: DISCONTINUED | OUTPATIENT
Start: 2024-05-18 | End: 2024-05-27 | Stop reason: HOSPADM

## 2024-05-18 RX ORDER — 0.9 % SODIUM CHLORIDE 0.9 %
30 INTRAVENOUS SOLUTION INTRAVENOUS ONCE
Status: COMPLETED | OUTPATIENT
Start: 2024-05-18 | End: 2024-05-18

## 2024-05-18 RX ORDER — PANTOPRAZOLE SODIUM 40 MG/1
40 TABLET, DELAYED RELEASE ORAL
Status: DISCONTINUED | OUTPATIENT
Start: 2024-05-18 | End: 2024-05-27 | Stop reason: HOSPADM

## 2024-05-18 RX ORDER — ACETAMINOPHEN 650 MG/1
650 SUPPOSITORY RECTAL EVERY 6 HOURS PRN
Status: DISCONTINUED | OUTPATIENT
Start: 2024-05-18 | End: 2024-05-27 | Stop reason: HOSPADM

## 2024-05-18 RX ORDER — ONDANSETRON 2 MG/ML
4 INJECTION INTRAMUSCULAR; INTRAVENOUS EVERY 6 HOURS PRN
Status: DISCONTINUED | OUTPATIENT
Start: 2024-05-18 | End: 2024-05-27 | Stop reason: HOSPADM

## 2024-05-18 RX ORDER — INSULIN GLARGINE 100 [IU]/ML
20 INJECTION, SOLUTION SUBCUTANEOUS 2 TIMES DAILY
Status: DISCONTINUED | OUTPATIENT
Start: 2024-05-19 | End: 2024-05-19

## 2024-05-18 RX ORDER — INSULIN LISPRO 100 [IU]/ML
0-4 INJECTION, SOLUTION INTRAVENOUS; SUBCUTANEOUS NIGHTLY
Status: DISCONTINUED | OUTPATIENT
Start: 2024-05-18 | End: 2024-05-27 | Stop reason: HOSPADM

## 2024-05-18 RX ORDER — INSULIN LISPRO 100 [IU]/ML
0-8 INJECTION, SOLUTION INTRAVENOUS; SUBCUTANEOUS
Status: DISCONTINUED | OUTPATIENT
Start: 2024-05-19 | End: 2024-05-27 | Stop reason: HOSPADM

## 2024-05-18 RX ORDER — DEXTROSE MONOHYDRATE 100 MG/ML
INJECTION, SOLUTION INTRAVENOUS CONTINUOUS PRN
Status: DISCONTINUED | OUTPATIENT
Start: 2024-05-18 | End: 2024-05-19

## 2024-05-18 RX ORDER — HEPARIN SODIUM 5000 [USP'U]/ML
5000 INJECTION, SOLUTION INTRAVENOUS; SUBCUTANEOUS EVERY 8 HOURS SCHEDULED
Status: DISCONTINUED | OUTPATIENT
Start: 2024-05-19 | End: 2024-05-27 | Stop reason: HOSPADM

## 2024-05-18 RX ORDER — GLUCAGON 1 MG/ML
1 KIT INJECTION PRN
Status: DISCONTINUED | OUTPATIENT
Start: 2024-05-18 | End: 2024-05-19

## 2024-05-18 RX ORDER — POLYETHYLENE GLYCOL 3350 17 G/17G
17 POWDER, FOR SOLUTION ORAL DAILY PRN
Status: DISCONTINUED | OUTPATIENT
Start: 2024-05-18 | End: 2024-05-27 | Stop reason: HOSPADM

## 2024-05-18 RX ADMIN — PANTOPRAZOLE SODIUM 40 MG: 40 TABLET, DELAYED RELEASE ORAL at 21:57

## 2024-05-18 RX ADMIN — HYDROMORPHONE HYDROCHLORIDE 1 MG: 1 INJECTION, SOLUTION INTRAMUSCULAR; INTRAVENOUS; SUBCUTANEOUS at 18:29

## 2024-05-18 RX ADMIN — FENTANYL CITRATE 25 MCG: 50 INJECTION INTRAMUSCULAR; INTRAVENOUS at 17:07

## 2024-05-18 RX ADMIN — HYDROMORPHONE HYDROCHLORIDE 1 MG: 1 INJECTION, SOLUTION INTRAMUSCULAR; INTRAVENOUS; SUBCUTANEOUS at 21:59

## 2024-05-18 RX ADMIN — FENTANYL CITRATE 25 MCG: 50 INJECTION INTRAMUSCULAR; INTRAVENOUS at 15:02

## 2024-05-18 RX ADMIN — SODIUM CHLORIDE 1464 ML: 9 INJECTION, SOLUTION INTRAVENOUS at 12:51

## 2024-05-18 RX ADMIN — ROPINIROLE HYDROCHLORIDE 1 MG: 1 TABLET, FILM COATED ORAL at 21:57

## 2024-05-18 RX ADMIN — FENTANYL CITRATE 50 MCG: 50 INJECTION INTRAMUSCULAR; INTRAVENOUS at 12:54

## 2024-05-18 RX ADMIN — SODIUM CHLORIDE, PRESERVATIVE FREE 10 ML: 5 INJECTION INTRAVENOUS at 22:06

## 2024-05-18 ASSESSMENT — PAIN SCALES - GENERAL
PAINLEVEL_OUTOF10: 10
PAINLEVEL_OUTOF10: 0
PAINLEVEL_OUTOF10: 10

## 2024-05-18 ASSESSMENT — PAIN DESCRIPTION - ORIENTATION
ORIENTATION: RIGHT

## 2024-05-18 ASSESSMENT — PAIN DESCRIPTION - LOCATION
LOCATION: LEG
LOCATION: HIP

## 2024-05-18 ASSESSMENT — PAIN - FUNCTIONAL ASSESSMENT: PAIN_FUNCTIONAL_ASSESSMENT: INTOLERABLE, UNABLE TO DO ANY ACTIVE OR PASSIVE ACTIVITIES

## 2024-05-18 ASSESSMENT — PAIN DESCRIPTION - DESCRIPTORS: DESCRIPTORS: SHARP

## 2024-05-18 NOTE — ED NOTES
Patient's daughter here at this time. States she found patient in the middle of the living room floor. States patient's  last spoke to her around 8pm and daughter found her around 11am when her father called her and asked daughter to check on patient.

## 2024-05-18 NOTE — ED PROVIDER NOTES
[facility] CARE  EMERGENCY DEPARTMENT ENCOUNTER        Pt Name: Elvia Arguelles  MRN: [id number]  Birthdate 1960  Date of evaluation: 5/18/2024  Provider: MADAN Galindo  PCP: Meg Ellis APRN - CNP  Note Started: 1:49 PM EDT 5/18/24       I have seen and evaluated this patient with my supervising physician Melvin Zavala, *.      CHIEF COMPLAINT       Chief Complaint   Patient presents with    Fall    Hip Pain       HISTORY OF PRESENT ILLNESS: 1 or more Elements     History From: patient, , daughter  Limitations to history : Altered Mental Status    Elvia Arguelles is a 63 y.o. female who presents to the ED after an unwitnessed fall that presumably occurred yesterday evening after 8 PM.  Patient has no recollection of the fall or what caused it.  Patient's  last spoke with her on the phone around 8 PM, so this is her last known well.  Patient does have known history of lightheadedness with standing quickly, so family suspects that may have been what occurred, although no way to confirm this  She was found this morning by her daughter.  Patient was covered in feces, and had the appearance of having laid on the floor all night.  Patient's chief complaint is severe right hip pain and deformity.  At this time patient is denying any chest pain or shortness of breath.  Initial history very limited by patient's pain.  Also limited by the fact that patient does not remember the events of what occurred, although at this time she is alert and oriented.  Once pain is somewhat controlled, patient is admitting to some abdominal pain as well, although family reports this to be somewhat chronic.  Patient unsure if she sustained a head injury or if she experienced any loss of consciousness.  Patient was home alone all evening.    Nursing Notes were all reviewed and agreed with or any disagreements were addressed in the HPI.    REVIEW OF SYSTEMS :      Review of

## 2024-05-18 NOTE — ED TRIAGE NOTES
Patient presents to ED via [facility] from home for c/o fall and right hip pain. Patient states she doesn't remember how she fell. Patient states she lives at home with her  but  left for their camp yesterday. Patient states her daughter found her down today, unknown when patient fell. Patient noted with bruise to upper lip and patient with c/o pain to right hip, obvious deformity noted. Patient covered in BM from the waist down.Patient denies being on blood thinners, unsure of LOC.  per EMS.

## 2024-05-18 NOTE — ED PROVIDER NOTES
[address] PROGRESSIVE CARE     EMERGENCY DEPARTMENT ENCOUNTER     Location: [address] PROGRESSIVE CARE  5/18/2024  Note Started: 12:55 PM EDT 5/18/24      Patient Identification  Elvia Arguelles is a 63 y.o. female      HPI:Elvia Arguelles was evaluated in the Emergency Department for an unwitnessed fall.  Family last spoke to her around 8 PM last night.  She told her family she was going to bed then.  Daughter found around 11 AM.  She was in the middle living room floor.  She is complaining of right hip pain.  She does not remember how she fell.  She has no idea what time she fell.. Although initial history and physical exam information was obtained by YADIRA/NPP/MD/DO (who also dictated a record of this visit), I personally saw the patient and performed and made/approved the management plan and take responsibility for the patient management.      PHYSICAL EXAM:  HEENT: Mild swelling and bruising left upper lip.  Pupils equal round reactive.  Extraocular movements are intact.  Nose is clear.  Neck: Supple, nontender.  Heart: Regular rate and rhythm.  No murmurs gallops noted.  Lungs: Breath sounds equal bilaterally and clear.  Abdomen: Obese, soft, nontender.  Musculoskeletal: Right leg is shortened and externally rotated.  She has significant pain with any attempts at moving her in her right hip.  She has intact distal pulses.  Skin: Warm and dry, fair turgor.  She is covered in stool.    EKG Interpretation  Sinus rhythm, rate of 99, premature supraventricular complexes.  Left atrial enlargement, nonspecific ST-T wave changes.  Rhythm strip shows sinus rhythm with a rate of 99, DC interval 120 ms, QRS 68 ms with no other ectopy as interpreted by me.  Nonspecific ST changes are slightly more pronounced on today's tracing compared to 4/4/2024.    Labs Reviewed   CBC WITH AUTO DIFFERENTIAL - Abnormal; Notable for the following components:       Result Value    WBC 22.8 (*)     Hemoglobin 11.6 (*)     Hematocrit

## 2024-05-18 NOTE — H&P
V2.0  History and Physical      Name:  Elvia Arguelles /Age/Sex: 1960  (63 y.o. female)   MRN & CSN:  [id number] & [id number] Encounter Date/Time: 2024 6:41 PM EDT   [facility] PCP: Meg Ellis, CABRERA Ball CNP       Hospital Day: 1    Assessment and Plan:   Elvia Arguelles is a 63 y.o. female with a pmh of adrenal insufficiency on chronic steroid therapy IDDM chronic diastolic heart failure hypertension restless leg syndrome who presents with Closed right hip fracture, initial encounter ([facility])    Hospital Problems             Last Modified POA    * (Principal) Closed right hip fracture, initial encounter ([facility]) 2024 Yes    Closed displaced intertrochanteric fracture of right femur ([facility]) 2024 Yes       Fall unsure if mechanical nonmechanical unsure about loss of consciousness.  Troponin as high as 236 creatinine as high as 4.1 with a WBC count of 22.8 and a lactate of 2.3.  Cardiology to be consulted orthopedic to be consulted PT OT pain regimen trend troponins telemetry in place Lovenox for VT prophylaxis.  Consider starting the patient on heparin drip if recommended by cardiology.  May need cardiology clearance prior to orthopedic intervention  Elevated troponin EKG shows sinus rhythm with premature supraventricular complexes nonspecific ST and T changes.  Cardiology on board I believe this is more stress related but we will trend troponins telemetry in place.  Incidental pancreatic tail mass 4 cm.  Oncology to be consulted for evaluation for this pancreatic tail mass  Incidental lung nodules at the right lung base smaller than before the largest measuring 1.3 cm versus 1.8 cm continue to monitor with outpatient follow-up with CT chest low-dose  Adrenal insufficiency on steroid therapy chronic.  Recommend checking vitamin D levels as well  Insulin-dependent diabetes mellitus type 2 onset insulin pump at home we will start the patient on sliding scale insulin along with

## 2024-05-19 ENCOUNTER — ANESTHESIA (OUTPATIENT)
Dept: OPERATING ROOM | Age: 64
End: 2024-05-19
Payer: MEDICARE

## 2024-05-19 ENCOUNTER — APPOINTMENT (OUTPATIENT)
Dept: GENERAL RADIOLOGY | Age: 64
DRG: 956 | End: 2024-05-19
Payer: MEDICARE

## 2024-05-19 ENCOUNTER — ANESTHESIA EVENT (OUTPATIENT)
Dept: OPERATING ROOM | Age: 64
End: 2024-05-19
Payer: MEDICARE

## 2024-05-19 PROBLEM — Z01.818 PRE-OPERATIVE EXAMINATION: Status: ACTIVE | Noted: 2024-05-19

## 2024-05-19 PROBLEM — I24.89 DEMAND ISCHEMIA (HCC): Status: ACTIVE | Noted: 2024-05-19

## 2024-05-19 PROBLEM — N18.9 ACUTE KIDNEY INJURY SUPERIMPOSED ON CKD (HCC): Status: ACTIVE | Noted: 2023-08-10

## 2024-05-19 LAB
ANION GAP SERPL CALCULATED.3IONS-SCNC: 23 MMOL/L (ref 3–16)
BUN SERPL-MCNC: 56 MG/DL (ref 7–20)
CALCIUM SERPL-MCNC: 7.9 MG/DL (ref 8.3–10.6)
CHLORIDE SERPL-SCNC: 105 MMOL/L (ref 99–110)
CK SERPL-CCNC: 2939 U/L (ref 26–192)
CO2 SERPL-SCNC: 11 MMOL/L (ref 21–32)
CREAT SERPL-MCNC: 4.9 MG/DL (ref 0.6–1.2)
EKG ATRIAL RATE: 99 BPM
EKG DIAGNOSIS: NORMAL
EKG P AXIS: 74 DEGREES
EKG P-R INTERVAL: 120 MS
EKG Q-T INTERVAL: 376 MS
EKG QRS DURATION: 68 MS
EKG QTC CALCULATION (BAZETT): 482 MS
EKG R AXIS: 21 DEGREES
EKG T AXIS: 73 DEGREES
EKG VENTRICULAR RATE: 99 BPM
EST. AVERAGE GLUCOSE BLD GHB EST-MCNC: 159.9 MG/DL
GFR SERPLBLD CREATININE-BSD FMLA CKD-EPI: 9 ML/MIN/{1.73_M2}
GLUCOSE BLD-MCNC: 155 MG/DL (ref 70–99)
GLUCOSE BLD-MCNC: 180 MG/DL (ref 70–99)
GLUCOSE BLD-MCNC: 182 MG/DL (ref 70–99)
GLUCOSE BLD-MCNC: 211 MG/DL (ref 70–99)
GLUCOSE BLD-MCNC: 258 MG/DL (ref 70–99)
GLUCOSE BLD-MCNC: 91 MG/DL (ref 70–99)
GLUCOSE SERPL-MCNC: 249 MG/DL (ref 70–99)
HBA1C MFR BLD: 7.2 %
PERFORMED ON: ABNORMAL
PERFORMED ON: NORMAL
POTASSIUM SERPL-SCNC: 5.4 MMOL/L (ref 3.5–5.1)
REASON FOR REJECTION: NORMAL
REJECTED TEST: NORMAL
SODIUM SERPL-SCNC: 139 MMOL/L (ref 136–145)

## 2024-05-19 PROCEDURE — 6370000000 HC RX 637 (ALT 250 FOR IP): Performed by: NURSE PRACTITIONER

## 2024-05-19 PROCEDURE — C1713 ANCHOR/SCREW BN/BN,TIS/BN: HCPCS | Performed by: ORTHOPAEDIC SURGERY

## 2024-05-19 PROCEDURE — 36591 DRAW BLOOD OFF VENOUS DEVICE: CPT

## 2024-05-19 PROCEDURE — 2580000003 HC RX 258: Performed by: ORTHOPAEDIC SURGERY

## 2024-05-19 PROCEDURE — 2500000003 HC RX 250 WO HCPCS: Performed by: INTERNAL MEDICINE

## 2024-05-19 PROCEDURE — 2580000003 HC RX 258: Performed by: INTERNAL MEDICINE

## 2024-05-19 PROCEDURE — 82550 ASSAY OF CK (CPK): CPT

## 2024-05-19 PROCEDURE — 3600000004 HC SURGERY LEVEL 4 BASE: Performed by: ORTHOPAEDIC SURGERY

## 2024-05-19 PROCEDURE — 6370000000 HC RX 637 (ALT 250 FOR IP): Performed by: STUDENT IN AN ORGANIZED HEALTH CARE EDUCATION/TRAINING PROGRAM

## 2024-05-19 PROCEDURE — 73502 X-RAY EXAM HIP UNI 2-3 VIEWS: CPT

## 2024-05-19 PROCEDURE — 7100000001 HC PACU RECOVERY - ADDTL 15 MIN: Performed by: ORTHOPAEDIC SURGERY

## 2024-05-19 PROCEDURE — 6360000002 HC RX W HCPCS: Performed by: STUDENT IN AN ORGANIZED HEALTH CARE EDUCATION/TRAINING PROGRAM

## 2024-05-19 PROCEDURE — 6360000002 HC RX W HCPCS: Performed by: ANESTHESIOLOGY

## 2024-05-19 PROCEDURE — 6360000002 HC RX W HCPCS: Performed by: ORTHOPAEDIC SURGERY

## 2024-05-19 PROCEDURE — 1200000000 HC SEMI PRIVATE

## 2024-05-19 PROCEDURE — 27245 TREAT THIGH FRACTURE: CPT | Performed by: ORTHOPAEDIC SURGERY

## 2024-05-19 PROCEDURE — 3600000014 HC SURGERY LEVEL 4 ADDTL 15MIN: Performed by: ORTHOPAEDIC SURGERY

## 2024-05-19 PROCEDURE — C1769 GUIDE WIRE: HCPCS | Performed by: ORTHOPAEDIC SURGERY

## 2024-05-19 PROCEDURE — 7100000000 HC PACU RECOVERY - FIRST 15 MIN: Performed by: ORTHOPAEDIC SURGERY

## 2024-05-19 PROCEDURE — 2580000003 HC RX 258: Performed by: STUDENT IN AN ORGANIZED HEALTH CARE EDUCATION/TRAINING PROGRAM

## 2024-05-19 PROCEDURE — 6360000002 HC RX W HCPCS: Performed by: NURSE PRACTITIONER

## 2024-05-19 PROCEDURE — 93010 ELECTROCARDIOGRAM REPORT: CPT | Performed by: INTERNAL MEDICINE

## 2024-05-19 PROCEDURE — 3700000001 HC ADD 15 MINUTES (ANESTHESIA): Performed by: ORTHOPAEDIC SURGERY

## 2024-05-19 PROCEDURE — 3700000000 HC ANESTHESIA ATTENDED CARE: Performed by: ORTHOPAEDIC SURGERY

## 2024-05-19 PROCEDURE — 2700000000 HC OXYGEN THERAPY PER DAY

## 2024-05-19 PROCEDURE — 6360000002 HC RX W HCPCS: Performed by: INTERNAL MEDICINE

## 2024-05-19 PROCEDURE — 0QS606Z REPOSITION RIGHT UPPER FEMUR WITH INTRAMEDULLARY INTERNAL FIXATION DEVICE, OPEN APPROACH: ICD-10-PCS | Performed by: FAMILY MEDICINE

## 2024-05-19 PROCEDURE — 2709999900 HC NON-CHARGEABLE SUPPLY: Performed by: ORTHOPAEDIC SURGERY

## 2024-05-19 PROCEDURE — 2500000003 HC RX 250 WO HCPCS: Performed by: ANESTHESIOLOGY

## 2024-05-19 PROCEDURE — 2580000003 HC RX 258: Performed by: ANESTHESIOLOGY

## 2024-05-19 PROCEDURE — 2720000010 HC SURG SUPPLY STERILE: Performed by: ORTHOPAEDIC SURGERY

## 2024-05-19 PROCEDURE — 6370000000 HC RX 637 (ALT 250 FOR IP): Performed by: INTERNAL MEDICINE

## 2024-05-19 PROCEDURE — 2580000003 HC RX 258: Performed by: NURSE PRACTITIONER

## 2024-05-19 PROCEDURE — 80048 BASIC METABOLIC PNL TOTAL CA: CPT

## 2024-05-19 PROCEDURE — A4217 STERILE WATER/SALINE, 500 ML: HCPCS | Performed by: ORTHOPAEDIC SURGERY

## 2024-05-19 PROCEDURE — 6370000000 HC RX 637 (ALT 250 FOR IP): Performed by: ORTHOPAEDIC SURGERY

## 2024-05-19 PROCEDURE — 99223 1ST HOSP IP/OBS HIGH 75: CPT | Performed by: INTERNAL MEDICINE

## 2024-05-19 PROCEDURE — 94761 N-INVAS EAR/PLS OXIMETRY MLT: CPT

## 2024-05-19 PROCEDURE — 36415 COLL VENOUS BLD VENIPUNCTURE: CPT

## 2024-05-19 DEVICE — SCREW BNE L90MM DIA10.35MM PROX FEM G TI CANN FOR TFN ADV: Type: IMPLANTABLE DEVICE | Site: HIP | Status: FUNCTIONAL

## 2024-05-19 DEVICE — SCREW BNE L28MM DIA5MM LT GRN TI ST LOK FULL THRD T25: Type: IMPLANTABLE DEVICE | Site: HIP | Status: FUNCTIONAL

## 2024-05-19 DEVICE — NAIL IM L235MM DIA10MM 130DEG SHT R PROX FEM GRN TI CANN: Type: IMPLANTABLE DEVICE | Site: HIP | Status: FUNCTIONAL

## 2024-05-19 RX ORDER — SODIUM CHLORIDE 9 MG/ML
INJECTION, SOLUTION INTRAVENOUS PRN
Status: DISCONTINUED | OUTPATIENT
Start: 2024-05-19 | End: 2024-05-19

## 2024-05-19 RX ORDER — LABETALOL HYDROCHLORIDE 5 MG/ML
5 INJECTION, SOLUTION INTRAVENOUS
Status: DISCONTINUED | OUTPATIENT
Start: 2024-05-19 | End: 2024-05-19

## 2024-05-19 RX ORDER — SODIUM CHLORIDE 0.9 % (FLUSH) 0.9 %
5-40 SYRINGE (ML) INJECTION PRN
Status: DISCONTINUED | OUTPATIENT
Start: 2024-05-19 | End: 2024-05-19

## 2024-05-19 RX ORDER — OXYCODONE HYDROCHLORIDE 10 MG/1
10 TABLET ORAL EVERY 4 HOURS PRN
Status: DISCONTINUED | OUTPATIENT
Start: 2024-05-19 | End: 2024-05-22

## 2024-05-19 RX ORDER — OXYCODONE HYDROCHLORIDE 5 MG/1
5 TABLET ORAL EVERY 4 HOURS PRN
Status: DISCONTINUED | OUTPATIENT
Start: 2024-05-19 | End: 2024-05-22

## 2024-05-19 RX ORDER — SODIUM CHLORIDE 0.9 % (FLUSH) 0.9 %
5-40 SYRINGE (ML) INJECTION EVERY 12 HOURS SCHEDULED
Status: DISCONTINUED | OUTPATIENT
Start: 2024-05-19 | End: 2024-05-19

## 2024-05-19 RX ORDER — INSULIN GLARGINE 100 [IU]/ML
10 INJECTION, SOLUTION SUBCUTANEOUS 2 TIMES DAILY
Status: DISCONTINUED | OUTPATIENT
Start: 2024-05-20 | End: 2024-05-19

## 2024-05-19 RX ORDER — ONDANSETRON 2 MG/ML
4 INJECTION INTRAMUSCULAR; INTRAVENOUS
Status: DISCONTINUED | OUTPATIENT
Start: 2024-05-19 | End: 2024-05-19

## 2024-05-19 RX ORDER — DEXTROSE MONOHYDRATE 100 MG/ML
INJECTION, SOLUTION INTRAVENOUS CONTINUOUS PRN
Status: DISCONTINUED | OUTPATIENT
Start: 2024-05-19 | End: 2024-05-27 | Stop reason: HOSPADM

## 2024-05-19 RX ORDER — MAGNESIUM HYDROXIDE 1200 MG/15ML
LIQUID ORAL CONTINUOUS PRN
Status: COMPLETED | OUTPATIENT
Start: 2024-05-19 | End: 2024-05-19

## 2024-05-19 RX ORDER — SODIUM CHLORIDE, SODIUM LACTATE, POTASSIUM CHLORIDE, CALCIUM CHLORIDE 600; 310; 30; 20 MG/100ML; MG/100ML; MG/100ML; MG/100ML
INJECTION, SOLUTION INTRAVENOUS CONTINUOUS
Status: ACTIVE | OUTPATIENT
Start: 2024-05-19 | End: 2024-05-19

## 2024-05-19 RX ORDER — ENOXAPARIN SODIUM 100 MG/ML
40 INJECTION SUBCUTANEOUS DAILY
Status: DISCONTINUED | OUTPATIENT
Start: 2024-05-20 | End: 2024-05-19

## 2024-05-19 RX ORDER — HYDROCORTISONE 5 MG/1
5 TABLET ORAL NIGHTLY
Status: DISCONTINUED | OUTPATIENT
Start: 2024-05-19 | End: 2024-05-27 | Stop reason: HOSPADM

## 2024-05-19 RX ORDER — GLYCOPYRROLATE 0.2 MG/ML
INJECTION INTRAMUSCULAR; INTRAVENOUS PRN
Status: DISCONTINUED | OUTPATIENT
Start: 2024-05-19 | End: 2024-05-19 | Stop reason: SDUPTHER

## 2024-05-19 RX ORDER — ONDANSETRON 2 MG/ML
INJECTION INTRAMUSCULAR; INTRAVENOUS PRN
Status: DISCONTINUED | OUTPATIENT
Start: 2024-05-19 | End: 2024-05-19 | Stop reason: SDUPTHER

## 2024-05-19 RX ORDER — FENTANYL CITRATE 0.05 MG/ML
25 INJECTION, SOLUTION INTRAMUSCULAR; INTRAVENOUS EVERY 5 MIN PRN
Status: DISCONTINUED | OUTPATIENT
Start: 2024-05-19 | End: 2024-05-19

## 2024-05-19 RX ORDER — FUROSEMIDE 10 MG/ML
80 INJECTION INTRAMUSCULAR; INTRAVENOUS ONCE
Status: COMPLETED | OUTPATIENT
Start: 2024-05-19 | End: 2024-05-19

## 2024-05-19 RX ORDER — METHYLPREDNISOLONE SODIUM SUCCINATE 40 MG/ML
40 INJECTION, POWDER, LYOPHILIZED, FOR SOLUTION INTRAMUSCULAR; INTRAVENOUS ONCE
Status: COMPLETED | OUTPATIENT
Start: 2024-05-19 | End: 2024-05-19

## 2024-05-19 RX ORDER — INSULIN GLARGINE 100 [IU]/ML
10 INJECTION, SOLUTION SUBCUTANEOUS 2 TIMES DAILY
Status: DISCONTINUED | OUTPATIENT
Start: 2024-05-19 | End: 2024-05-26

## 2024-05-19 RX ORDER — LIDOCAINE HYDROCHLORIDE 20 MG/ML
INJECTION, SOLUTION EPIDURAL; INFILTRATION; INTRACAUDAL; PERINEURAL PRN
Status: DISCONTINUED | OUTPATIENT
Start: 2024-05-19 | End: 2024-05-19 | Stop reason: SDUPTHER

## 2024-05-19 RX ORDER — GLUCAGON 1 MG/ML
1 KIT INJECTION PRN
Status: CANCELLED | OUTPATIENT
Start: 2024-05-19

## 2024-05-19 RX ORDER — NALOXONE HYDROCHLORIDE 0.4 MG/ML
INJECTION, SOLUTION INTRAMUSCULAR; INTRAVENOUS; SUBCUTANEOUS PRN
Status: DISCONTINUED | OUTPATIENT
Start: 2024-05-19 | End: 2024-05-19

## 2024-05-19 RX ORDER — GLUCAGON 1 MG/ML
1 KIT INJECTION PRN
Status: DISCONTINUED | OUTPATIENT
Start: 2024-05-19 | End: 2024-05-27 | Stop reason: HOSPADM

## 2024-05-19 RX ORDER — SODIUM CHLORIDE, SODIUM LACTATE, POTASSIUM CHLORIDE, AND CALCIUM CHLORIDE .6; .31; .03; .02 G/100ML; G/100ML; G/100ML; G/100ML
500 INJECTION, SOLUTION INTRAVENOUS ONCE
Status: COMPLETED | OUTPATIENT
Start: 2024-05-19 | End: 2024-05-19

## 2024-05-19 RX ORDER — DEXTROSE MONOHYDRATE 100 MG/ML
INJECTION, SOLUTION INTRAVENOUS CONTINUOUS PRN
Status: CANCELLED | OUTPATIENT
Start: 2024-05-19

## 2024-05-19 RX ORDER — MORPHINE SULFATE 2 MG/ML
2 INJECTION, SOLUTION INTRAMUSCULAR; INTRAVENOUS
Status: DISCONTINUED | OUTPATIENT
Start: 2024-05-19 | End: 2024-05-19

## 2024-05-19 RX ORDER — SODIUM CHLORIDE 9 MG/ML
INJECTION, SOLUTION INTRAVENOUS CONTINUOUS
Status: DISCONTINUED | OUTPATIENT
Start: 2024-05-19 | End: 2024-05-19

## 2024-05-19 RX ORDER — BUPIVACAINE HYDROCHLORIDE 5 MG/ML
INJECTION, SOLUTION EPIDURAL; INTRACAUDAL
Status: COMPLETED | OUTPATIENT
Start: 2024-05-19 | End: 2024-05-19

## 2024-05-19 RX ORDER — SODIUM CHLORIDE 9 MG/ML
INJECTION, SOLUTION INTRAVENOUS CONTINUOUS PRN
Status: DISCONTINUED | OUTPATIENT
Start: 2024-05-19 | End: 2024-05-19 | Stop reason: SDUPTHER

## 2024-05-19 RX ORDER — PROPOFOL 10 MG/ML
INJECTION, EMULSION INTRAVENOUS PRN
Status: DISCONTINUED | OUTPATIENT
Start: 2024-05-19 | End: 2024-05-19 | Stop reason: SDUPTHER

## 2024-05-19 RX ORDER — FENTANYL CITRATE 50 UG/ML
INJECTION, SOLUTION INTRAMUSCULAR; INTRAVENOUS PRN
Status: DISCONTINUED | OUTPATIENT
Start: 2024-05-19 | End: 2024-05-19 | Stop reason: SDUPTHER

## 2024-05-19 RX ORDER — MORPHINE SULFATE 4 MG/ML
4 INJECTION, SOLUTION INTRAMUSCULAR; INTRAVENOUS
Status: DISCONTINUED | OUTPATIENT
Start: 2024-05-19 | End: 2024-05-19

## 2024-05-19 RX ORDER — DIPHENHYDRAMINE HYDROCHLORIDE 50 MG/ML
12.5 INJECTION INTRAMUSCULAR; INTRAVENOUS
Status: DISCONTINUED | OUTPATIENT
Start: 2024-05-19 | End: 2024-05-19

## 2024-05-19 RX ADMIN — SODIUM CHLORIDE: 9 INJECTION, SOLUTION INTRAVENOUS at 15:24

## 2024-05-19 RX ADMIN — SODIUM CHLORIDE 50 ML: 900 INJECTION INTRAVENOUS at 13:37

## 2024-05-19 RX ADMIN — PHENYLEPHRINE HYDROCHLORIDE 100 MCG: 10 INJECTION INTRAVENOUS at 13:58

## 2024-05-19 RX ADMIN — Medication: at 23:58

## 2024-05-19 RX ADMIN — PANTOPRAZOLE SODIUM 40 MG: 40 TABLET, DELAYED RELEASE ORAL at 05:47

## 2024-05-19 RX ADMIN — FENTANYL CITRATE 25 MCG: 50 INJECTION INTRAMUSCULAR; INTRAVENOUS at 13:54

## 2024-05-19 RX ADMIN — PHENYLEPHRINE HYDROCHLORIDE 100 MCG: 10 INJECTION INTRAVENOUS at 13:36

## 2024-05-19 RX ADMIN — PHENYLEPHRINE HYDROCHLORIDE 100 MCG: 10 INJECTION INTRAVENOUS at 13:31

## 2024-05-19 RX ADMIN — SODIUM CHLORIDE, POTASSIUM CHLORIDE, SODIUM LACTATE AND CALCIUM CHLORIDE 500 ML: 600; 310; 30; 20 INJECTION, SOLUTION INTRAVENOUS at 00:27

## 2024-05-19 RX ADMIN — SODIUM CHLORIDE, POTASSIUM CHLORIDE, SODIUM LACTATE AND CALCIUM CHLORIDE: 600; 310; 30; 20 INJECTION, SOLUTION INTRAVENOUS at 06:49

## 2024-05-19 RX ADMIN — HYDROMORPHONE HYDROCHLORIDE 1 MG: 1 INJECTION, SOLUTION INTRAMUSCULAR; INTRAVENOUS; SUBCUTANEOUS at 07:11

## 2024-05-19 RX ADMIN — HUMAN INSULIN 5 UNITS: 100 INJECTION, SOLUTION SUBCUTANEOUS at 23:37

## 2024-05-19 RX ADMIN — HEPARIN SODIUM 5000 UNITS: 5000 INJECTION INTRAVENOUS; SUBCUTANEOUS at 21:51

## 2024-05-19 RX ADMIN — SODIUM BICARBONATE 50 MEQ: 84 INJECTION INTRAVENOUS at 23:08

## 2024-05-19 RX ADMIN — PROPOFOL 50 MG: 10 INJECTION, EMULSION INTRAVENOUS at 13:29

## 2024-05-19 RX ADMIN — SODIUM CHLORIDE 2000 MG: 900 INJECTION INTRAVENOUS at 01:01

## 2024-05-19 RX ADMIN — INSULIN GLARGINE 20 UNITS: 100 INJECTION, SOLUTION SUBCUTANEOUS at 09:42

## 2024-05-19 RX ADMIN — SODIUM CHLORIDE, PRESERVATIVE FREE 10 ML: 5 INJECTION INTRAVENOUS at 21:15

## 2024-05-19 RX ADMIN — HYDROMORPHONE HYDROCHLORIDE 1 MG: 1 INJECTION, SOLUTION INTRAMUSCULAR; INTRAVENOUS; SUBCUTANEOUS at 11:35

## 2024-05-19 RX ADMIN — PHENYLEPHRINE HYDROCHLORIDE 100 MCG: 10 INJECTION INTRAVENOUS at 14:02

## 2024-05-19 RX ADMIN — FENTANYL CITRATE 25 MCG: 50 INJECTION INTRAMUSCULAR; INTRAVENOUS at 13:43

## 2024-05-19 RX ADMIN — INSULIN GLARGINE 10 UNITS: 100 INJECTION, SOLUTION SUBCUTANEOUS at 23:06

## 2024-05-19 RX ADMIN — FENTANYL CITRATE 25 MCG: 50 INJECTION INTRAMUSCULAR; INTRAVENOUS at 14:04

## 2024-05-19 RX ADMIN — LIDOCAINE HYDROCHLORIDE 80 MG: 20 INJECTION, SOLUTION EPIDURAL; INFILTRATION; INTRACAUDAL; PERINEURAL at 13:29

## 2024-05-19 RX ADMIN — FENTANYL CITRATE 25 MCG: 50 INJECTION INTRAMUSCULAR; INTRAVENOUS at 13:29

## 2024-05-19 RX ADMIN — METHYLPREDNISOLONE SODIUM SUCCINATE 40 MG: 40 INJECTION INTRAMUSCULAR; INTRAVENOUS at 11:48

## 2024-05-19 RX ADMIN — HYDROMORPHONE HYDROCHLORIDE 0.5 MG: 1 INJECTION, SOLUTION INTRAMUSCULAR; INTRAVENOUS; SUBCUTANEOUS at 15:50

## 2024-05-19 RX ADMIN — HYDROMORPHONE HYDROCHLORIDE 0.5 MG: 1 INJECTION, SOLUTION INTRAMUSCULAR; INTRAVENOUS; SUBCUTANEOUS at 22:52

## 2024-05-19 RX ADMIN — GLYCOPYRROLATE 0.1 MG: 0.2 INJECTION, SOLUTION INTRAMUSCULAR; INTRAVENOUS at 13:29

## 2024-05-19 RX ADMIN — PHENYLEPHRINE HYDROCHLORIDE 100 MCG: 10 INJECTION INTRAVENOUS at 13:40

## 2024-05-19 RX ADMIN — PHENYLEPHRINE HYDROCHLORIDE 100 MCG: 10 INJECTION INTRAVENOUS at 13:53

## 2024-05-19 RX ADMIN — HYDROCORTISONE SODIUM SUCCINATE 100 MG: 100 INJECTION, POWDER, FOR SOLUTION INTRAMUSCULAR; INTRAVENOUS at 11:49

## 2024-05-19 RX ADMIN — HYDROMORPHONE HYDROCHLORIDE 1 MG: 1 INJECTION, SOLUTION INTRAMUSCULAR; INTRAVENOUS; SUBCUTANEOUS at 00:59

## 2024-05-19 RX ADMIN — DEXTROSE MONOHYDRATE 250 ML: 100 INJECTION, SOLUTION INTRAVENOUS at 23:34

## 2024-05-19 RX ADMIN — SODIUM CHLORIDE 2000 MG: 900 INJECTION INTRAVENOUS at 21:14

## 2024-05-19 RX ADMIN — ONDANSETRON 4 MG: 2 INJECTION INTRAMUSCULAR; INTRAVENOUS at 13:43

## 2024-05-19 RX ADMIN — HYDROMORPHONE HYDROCHLORIDE 1 MG: 1 INJECTION, SOLUTION INTRAMUSCULAR; INTRAVENOUS; SUBCUTANEOUS at 04:03

## 2024-05-19 RX ADMIN — SODIUM CHLORIDE: 9 INJECTION, SOLUTION INTRAVENOUS at 13:22

## 2024-05-19 RX ADMIN — HYDROMORPHONE HYDROCHLORIDE 0.5 MG: 1 INJECTION, SOLUTION INTRAMUSCULAR; INTRAVENOUS; SUBCUTANEOUS at 19:24

## 2024-05-19 RX ADMIN — FUROSEMIDE 80 MG: 10 INJECTION, SOLUTION INTRAMUSCULAR; INTRAVENOUS at 22:55

## 2024-05-19 RX ADMIN — INSULIN LISPRO 2 UNITS: 100 INJECTION, SOLUTION INTRAVENOUS; SUBCUTANEOUS at 18:29

## 2024-05-19 ASSESSMENT — PAIN DESCRIPTION - ORIENTATION
ORIENTATION: RIGHT

## 2024-05-19 ASSESSMENT — PAIN - FUNCTIONAL ASSESSMENT
PAIN_FUNCTIONAL_ASSESSMENT: INTOLERABLE, UNABLE TO DO ANY ACTIVE OR PASSIVE ACTIVITIES
PAIN_FUNCTIONAL_ASSESSMENT: ADULT NONVERBAL PAIN SCALE (NPVS)
PAIN_FUNCTIONAL_ASSESSMENT: INTOLERABLE, UNABLE TO DO ANY ACTIVE OR PASSIVE ACTIVITIES
PAIN_FUNCTIONAL_ASSESSMENT: ADULT NONVERBAL PAIN SCALE (NPVS)

## 2024-05-19 ASSESSMENT — PAIN DESCRIPTION - LOCATION
LOCATION: HIP;LEG
LOCATION: LEG;HIP
LOCATION: HIP
LOCATION: HIP;LEG
LOCATION: LEG;HIP
LOCATION: HIP
LOCATION: HIP

## 2024-05-19 ASSESSMENT — PAIN SCALES - GENERAL
PAINLEVEL_OUTOF10: 10
PAINLEVEL_OUTOF10: 0
PAINLEVEL_OUTOF10: 0
PAINLEVEL_OUTOF10: 10
PAINLEVEL_OUTOF10: 10
PAINLEVEL_OUTOF10: 0
PAINLEVEL_OUTOF10: 10

## 2024-05-19 ASSESSMENT — PAIN DESCRIPTION - DESCRIPTORS
DESCRIPTORS: SHARP

## 2024-05-19 NOTE — OP NOTE
Patient: Elvia Arguelles  YOB: 1960  MRN: [id number]    Date of Procedure: 5/19/2024       Pre-Op Diagnosis: Right intertrochanteric femur fracture     Post-Op Diagnosis: Same       Procedure: Right femur cephalomedullary nail     Surgeon: Gonsalo White MD      Anesthesia: General     Estimated Blood Loss (mL): less than 100      Complications: None     Implants:  Synthes TFNA 10 x 235 mm  Lag screw 90 mm  Distal interlocking screw, 28 mm     Indications:  This is a 63 y.o. female who sustained a right intertrochanteric femur fracture from a mechanical fall.  We discussed the diagnosis and treatment options and I recommended surgical fixation.  We went over the risks and complications of surgery including bleeding, infection, decreased ROM, continued pain, instability, fracture, dislocation, neurovascular injury, post op cognitive disorder, leg length discrepancy, DVT, pulmonary embolism and need for further surgical procedures. Informed consent for surgery was obtained.     Details:  The patient was seen in the preoperative holding area where the site of surgery was marked and informed consent was confirmed.  The patient was brought back to the operating room by OR personnel.  General anesthesia was administered. The patient was positioned supine on the [place] table. A final and formal timeout was then performed which confirmed the correct patient, correct position, and correct site of surgery.  Closed reduction maneuvers were carried out on the traction table.  Fluoroscopic imaging was used to confirm appropriate reduction of the fracture in both AP and lateral planes.  The right lower extremity was then prepped and draped in a standard and sterile fashion. IV antibiotics were administered within 1 hour of skin incision.     A longitudinal incision was made in the lateral thigh just proximal to the greater trochanter.  Sharp dissection was carried down through skin and subcutaneous tissue.  The IT

## 2024-05-20 ENCOUNTER — APPOINTMENT (OUTPATIENT)
Age: 64
DRG: 956 | End: 2024-05-20
Attending: ORTHOPAEDIC SURGERY
Payer: MEDICARE

## 2024-05-20 LAB
ALBUMIN SERPL-MCNC: 2.8 G/DL (ref 3.4–5)
ALBUMIN SERPL-MCNC: 2.9 G/DL (ref 3.4–5)
ALBUMIN SERPL-MCNC: 2.9 G/DL (ref 3.4–5)
ALP SERPL-CCNC: 89 U/L (ref 40–129)
ALT SERPL-CCNC: 19 U/L (ref 10–40)
ANION GAP SERPL CALCULATED.3IONS-SCNC: 24 MMOL/L (ref 3–16)
ANION GAP SERPL CALCULATED.3IONS-SCNC: 30 MMOL/L (ref 3–16)
ANION GAP SERPL CALCULATED.3IONS-SCNC: 30 MMOL/L (ref 3–16)
AST SERPL-CCNC: 87 U/L (ref 15–37)
BACTERIA URNS QL MICRO: ABNORMAL /HPF
BASOPHILS # BLD: 0 K/UL (ref 0–0.2)
BASOPHILS NFR BLD: 0.2 %
BILIRUB DIRECT SERPL-MCNC: <0.2 MG/DL (ref 0–0.3)
BILIRUB INDIRECT SERPL-MCNC: ABNORMAL MG/DL (ref 0–1)
BILIRUB SERPL-MCNC: <0.2 MG/DL (ref 0–1)
BILIRUB UR QL STRIP.AUTO: NEGATIVE
BUN SERPL-MCNC: 63 MG/DL (ref 7–20)
BUN SERPL-MCNC: 64 MG/DL (ref 7–20)
BUN SERPL-MCNC: 64 MG/DL (ref 7–20)
CALCIUM SERPL-MCNC: 7.9 MG/DL (ref 8.3–10.6)
CALCIUM SERPL-MCNC: 8 MG/DL (ref 8.3–10.6)
CALCIUM SERPL-MCNC: 8.3 MG/DL (ref 8.3–10.6)
CHLORIDE SERPL-SCNC: 94 MMOL/L (ref 99–110)
CHLORIDE SERPL-SCNC: 96 MMOL/L (ref 99–110)
CHLORIDE SERPL-SCNC: 98 MMOL/L (ref 99–110)
CK SERPL-CCNC: 1699 U/L (ref 26–192)
CLARITY UR: CLEAR
CO2 SERPL-SCNC: 11 MMOL/L (ref 21–32)
CO2 SERPL-SCNC: 13 MMOL/L (ref 21–32)
CO2 SERPL-SCNC: 21 MMOL/L (ref 21–32)
COLOR UR: YELLOW
CREAT SERPL-MCNC: 5 MG/DL (ref 0.6–1.2)
CREAT SERPL-MCNC: 5.3 MG/DL (ref 0.6–1.2)
CREAT SERPL-MCNC: 5.4 MG/DL (ref 0.6–1.2)
CREAT UR-MCNC: 30.3 MG/DL (ref 28–259)
CREAT UR-MCNC: 30.6 MG/DL (ref 28–259)
DEPRECATED RDW RBC AUTO: 15.5 % (ref 12.4–15.4)
ECHO BSA: 1.63 M2
ECHO IVC PROX: 1.9 CM
ECHO LA AREA 2C: 18.7 CM2
ECHO LA AREA 4C: 16.5 CM2
ECHO LA MAJOR AXIS: 5.2 CM
ECHO LA MINOR AXIS: 5.3 CM
ECHO LA VOL BP: 44 ML (ref 22–52)
ECHO LA VOL MOD A2C: 50 ML (ref 22–52)
ECHO LA VOL MOD A4C: 36 ML (ref 22–52)
ECHO LA VOL/BSA BIPLANE: 28 ML/M2 (ref 16–34)
ECHO LA VOLUME INDEX MOD A2C: 31 ML/M2 (ref 16–34)
ECHO LA VOLUME INDEX MOD A4C: 23 ML/M2 (ref 16–34)
ECHO LV E' LATERAL VELOCITY: 11 CM/S
ECHO LV E' SEPTAL VELOCITY: 8 CM/S
ECHO LV EDV A2C: 69 ML
ECHO LV EDV A4C: 89 ML
ECHO LV EDV INDEX A4C: 56 ML/M2
ECHO LV EDV NDEX A2C: 43 ML/M2
ECHO LV EJECTION FRACTION A2C: 58 %
ECHO LV EJECTION FRACTION A4C: 62 %
ECHO LV EJECTION FRACTION BIPLANE: 61 % (ref 55–100)
ECHO LV ESV A2C: 29 ML
ECHO LV ESV A4C: 34 ML
ECHO LV ESV INDEX A2C: 18 ML/M2
ECHO LV ESV INDEX A4C: 21 ML/M2
ECHO LV FRACTIONAL SHORTENING: 50 % (ref 28–44)
ECHO LV GLOBAL LONGITUDINAL STRAIN (GLS): -23 %
ECHO LV GLOBAL LONGITUDINAL STRAIN (GLS): -27.4 %
ECHO LV GLOBAL LONGITUDINAL STRAIN (GLS): -27.7 %
ECHO LV GLOBAL LONGITUDINAL STRAIN (GLS): -32.9 %
ECHO LV INTERNAL DIMENSION DIASTOLE INDEX: 2.52 CM/M2
ECHO LV INTERNAL DIMENSION DIASTOLIC: 4 CM (ref 3.9–5.3)
ECHO LV INTERNAL DIMENSION SYSTOLIC INDEX: 1.26 CM/M2
ECHO LV INTERNAL DIMENSION SYSTOLIC: 2 CM
ECHO LV IVSD: 0.9 CM (ref 0.6–0.9)
ECHO LV MASS 2D: 118.2 G (ref 67–162)
ECHO LV MASS INDEX 2D: 74.4 G/M2 (ref 43–95)
ECHO LV POSTERIOR WALL DIASTOLIC: 1 CM (ref 0.6–0.9)
ECHO LV RELATIVE WALL THICKNESS RATIO: 0.5
ECHO MV A VELOCITY: 1.33 M/S
ECHO MV E DECELERATION TIME (DT): 242 MS
ECHO MV E VELOCITY: 1.07 M/S
ECHO MV E/A RATIO: 0.8
ECHO MV E/E' LATERAL: 9.73
ECHO MV E/E' RATIO (AVERAGED): 11.55
ECHO MV E/E' SEPTAL: 13.38
ECHO RV FREE WALL PEAK S': 16 CM/S
EOSINOPHIL # BLD: 0 K/UL (ref 0–0.6)
EOSINOPHIL NFR BLD: 0 %
EPI CELLS #/AREA URNS AUTO: 0 /HPF (ref 0–5)
GFR SERPLBLD CREATININE-BSD FMLA CKD-EPI: 8 ML/MIN/{1.73_M2}
GFR SERPLBLD CREATININE-BSD FMLA CKD-EPI: 8 ML/MIN/{1.73_M2}
GFR SERPLBLD CREATININE-BSD FMLA CKD-EPI: 9 ML/MIN/{1.73_M2}
GLUCOSE BLD-MCNC: 169 MG/DL (ref 70–99)
GLUCOSE BLD-MCNC: 180 MG/DL (ref 70–99)
GLUCOSE BLD-MCNC: 321 MG/DL (ref 70–99)
GLUCOSE BLD-MCNC: 368 MG/DL (ref 70–99)
GLUCOSE BLD-MCNC: 372 MG/DL (ref 70–99)
GLUCOSE BLD-MCNC: 429 MG/DL (ref 70–99)
GLUCOSE BLD-MCNC: 457 MG/DL (ref 70–99)
GLUCOSE SERPL-MCNC: 191 MG/DL (ref 70–99)
GLUCOSE SERPL-MCNC: 406 MG/DL (ref 70–99)
GLUCOSE SERPL-MCNC: 433 MG/DL (ref 70–99)
GLUCOSE UR STRIP.AUTO-MCNC: 500 MG/DL
HCT VFR BLD AUTO: 22.1 % (ref 36–48)
HGB BLD-MCNC: 7.3 G/DL (ref 12–16)
HGB UR QL STRIP.AUTO: ABNORMAL
HYALINE CASTS #/AREA URNS AUTO: 5 /LPF (ref 0–8)
KETONES UR STRIP.AUTO-MCNC: 40 MG/DL
LACTATE BLDV-SCNC: 1.2 MMOL/L (ref 0.4–2)
LEUKOCYTE ESTERASE UR QL STRIP.AUTO: NEGATIVE
LYMPHOCYTES # BLD: 0.6 K/UL (ref 1–5.1)
LYMPHOCYTES NFR BLD: 4 %
MCH RBC QN AUTO: 29.1 PG (ref 26–34)
MCHC RBC AUTO-ENTMCNC: 32.8 G/DL (ref 31–36)
MCV RBC AUTO: 88.8 FL (ref 80–100)
MONOCYTES # BLD: 0.9 K/UL (ref 0–1.3)
MONOCYTES NFR BLD: 5.9 %
NEUTROPHILS # BLD: 13.4 K/UL (ref 1.7–7.7)
NEUTROPHILS NFR BLD: 89.9 %
NITRITE UR QL STRIP.AUTO: NEGATIVE
NT-PROBNP SERPL-MCNC: 7324 PG/ML (ref 0–124)
OSMOLALITY SERPL: 338 MOSM/KG (ref 280–301)
PERFORMED ON: ABNORMAL
PH UR STRIP.AUTO: 5.5 [PH] (ref 5–8)
PHOSPHATE SERPL-MCNC: 6.4 MG/DL (ref 2.5–4.9)
PHOSPHATE SERPL-MCNC: 7.8 MG/DL (ref 2.5–4.9)
PHOSPHATE SERPL-MCNC: 8.4 MG/DL (ref 2.5–4.9)
PLATELET # BLD AUTO: 148 K/UL (ref 135–450)
PMV BLD AUTO: 8.5 FL (ref 5–10.5)
POTASSIUM SERPL-SCNC: 3.7 MMOL/L (ref 3.5–5.1)
POTASSIUM SERPL-SCNC: 4.4 MMOL/L (ref 3.5–5.1)
POTASSIUM SERPL-SCNC: 5 MMOL/L (ref 3.5–5.1)
POTASSIUM SERPL-SCNC: 5.4 MMOL/L (ref 3.5–5.1)
POTASSIUM SERPL-SCNC: 5.4 MMOL/L (ref 3.5–5.1)
PROT SERPL-MCNC: 5.4 G/DL (ref 6.4–8.2)
PROT UR STRIP.AUTO-MCNC: 30 MG/DL
PROT UR-MCNC: 19 MG/DL
PROT/CREAT UR-RTO: 0.6 MG/DL
RBC # BLD AUTO: 2.49 M/UL (ref 4–5.2)
RBC CLUMPS #/AREA URNS AUTO: 2 /HPF (ref 0–4)
SODIUM SERPL-SCNC: 137 MMOL/L (ref 136–145)
SODIUM SERPL-SCNC: 139 MMOL/L (ref 136–145)
SODIUM SERPL-SCNC: 141 MMOL/L (ref 136–145)
SODIUM UR-SCNC: 105 MMOL/L
SP GR UR STRIP.AUTO: 1.01 (ref 1–1.03)
UA DIPSTICK W REFLEX MICRO PNL UR: YES
URN SPEC COLLECT METH UR: ABNORMAL
UROBILINOGEN UR STRIP-ACNC: 0.2 E.U./DL
WBC # BLD AUTO: 14.9 K/UL (ref 4–11)
WBC #/AREA URNS AUTO: 1 /HPF (ref 0–5)

## 2024-05-20 PROCEDURE — 93308 TTE F-UP OR LMTD: CPT

## 2024-05-20 PROCEDURE — 80069 RENAL FUNCTION PANEL: CPT

## 2024-05-20 PROCEDURE — 6370000000 HC RX 637 (ALT 250 FOR IP): Performed by: STUDENT IN AN ORGANIZED HEALTH CARE EDUCATION/TRAINING PROGRAM

## 2024-05-20 PROCEDURE — 2580000003 HC RX 258

## 2024-05-20 PROCEDURE — 6360000002 HC RX W HCPCS: Performed by: STUDENT IN AN ORGANIZED HEALTH CARE EDUCATION/TRAINING PROGRAM

## 2024-05-20 PROCEDURE — 6370000000 HC RX 637 (ALT 250 FOR IP): Performed by: NURSE PRACTITIONER

## 2024-05-20 PROCEDURE — 93325 DOPPLER ECHO COLOR FLOW MAPG: CPT | Performed by: INTERNAL MEDICINE

## 2024-05-20 PROCEDURE — 2580000003 HC RX 258: Performed by: ORTHOPAEDIC SURGERY

## 2024-05-20 PROCEDURE — 93356 MYOCRD STRAIN IMG SPCKL TRCK: CPT

## 2024-05-20 PROCEDURE — 97530 THERAPEUTIC ACTIVITIES: CPT | Performed by: PHYSICAL THERAPIST

## 2024-05-20 PROCEDURE — 97163 PT EVAL HIGH COMPLEX 45 MIN: CPT | Performed by: PHYSICAL THERAPIST

## 2024-05-20 PROCEDURE — 83930 ASSAY OF BLOOD OSMOLALITY: CPT

## 2024-05-20 PROCEDURE — 97166 OT EVAL MOD COMPLEX 45 MIN: CPT

## 2024-05-20 PROCEDURE — 97530 THERAPEUTIC ACTIVITIES: CPT

## 2024-05-20 PROCEDURE — 94760 N-INVAS EAR/PLS OXIMETRY 1: CPT

## 2024-05-20 PROCEDURE — 93321 DOPPLER ECHO F-UP/LMTD STD: CPT | Performed by: INTERNAL MEDICINE

## 2024-05-20 PROCEDURE — 36591 DRAW BLOOD OFF VENOUS DEVICE: CPT

## 2024-05-20 PROCEDURE — 82570 ASSAY OF URINE CREATININE: CPT

## 2024-05-20 PROCEDURE — 84132 ASSAY OF SERUM POTASSIUM: CPT

## 2024-05-20 PROCEDURE — 93356 MYOCRD STRAIN IMG SPCKL TRCK: CPT | Performed by: INTERNAL MEDICINE

## 2024-05-20 PROCEDURE — 93308 TTE F-UP OR LMTD: CPT | Performed by: INTERNAL MEDICINE

## 2024-05-20 PROCEDURE — 2500000003 HC RX 250 WO HCPCS

## 2024-05-20 PROCEDURE — 80076 HEPATIC FUNCTION PANEL: CPT

## 2024-05-20 PROCEDURE — 85025 COMPLETE CBC W/AUTO DIFF WBC: CPT

## 2024-05-20 PROCEDURE — 84156 ASSAY OF PROTEIN URINE: CPT

## 2024-05-20 PROCEDURE — 83605 ASSAY OF LACTIC ACID: CPT

## 2024-05-20 PROCEDURE — 6370000000 HC RX 637 (ALT 250 FOR IP): Performed by: ORTHOPAEDIC SURGERY

## 2024-05-20 PROCEDURE — 6360000002 HC RX W HCPCS: Performed by: ORTHOPAEDIC SURGERY

## 2024-05-20 PROCEDURE — 81001 URINALYSIS AUTO W/SCOPE: CPT

## 2024-05-20 PROCEDURE — 83880 ASSAY OF NATRIURETIC PEPTIDE: CPT

## 2024-05-20 PROCEDURE — 84300 ASSAY OF URINE SODIUM: CPT

## 2024-05-20 PROCEDURE — 1200000000 HC SEMI PRIVATE

## 2024-05-20 PROCEDURE — 82550 ASSAY OF CK (CPK): CPT

## 2024-05-20 RX ORDER — HEPARIN SODIUM 5000 [USP'U]/ML
5000 INJECTION, SOLUTION INTRAVENOUS; SUBCUTANEOUS EVERY 8 HOURS SCHEDULED
Qty: 90 ML | Refills: 0 | Status: ON HOLD | OUTPATIENT
Start: 2024-05-20 | End: 2024-06-19

## 2024-05-20 RX ORDER — INSULIN LISPRO 100 [IU]/ML
10 INJECTION, SOLUTION INTRAVENOUS; SUBCUTANEOUS ONCE
Status: COMPLETED | OUTPATIENT
Start: 2024-05-20 | End: 2024-05-20

## 2024-05-20 RX ORDER — OXYCODONE HYDROCHLORIDE 5 MG/1
5-10 TABLET ORAL EVERY 6 HOURS PRN
Qty: 40 TABLET | Refills: 0 | Status: SHIPPED | OUTPATIENT
Start: 2024-05-20 | End: 2024-05-25

## 2024-05-20 RX ORDER — GLUCAGON 1 MG/ML
1 KIT INJECTION PRN
Status: DISCONTINUED | OUTPATIENT
Start: 2024-05-20 | End: 2024-05-20 | Stop reason: SDUPTHER

## 2024-05-20 RX ORDER — DEXTROSE MONOHYDRATE 100 MG/ML
INJECTION, SOLUTION INTRAVENOUS CONTINUOUS PRN
Status: DISCONTINUED | OUTPATIENT
Start: 2024-05-20 | End: 2024-05-20 | Stop reason: SDUPTHER

## 2024-05-20 RX ADMIN — HYDROCORTISONE SODIUM SUCCINATE 50 MG: 100 INJECTION, POWDER, FOR SOLUTION INTRAMUSCULAR; INTRAVENOUS at 08:33

## 2024-05-20 RX ADMIN — HYDROMORPHONE HYDROCHLORIDE 0.5 MG: 1 INJECTION, SOLUTION INTRAMUSCULAR; INTRAVENOUS; SUBCUTANEOUS at 09:21

## 2024-05-20 RX ADMIN — HEPARIN SODIUM 5000 UNITS: 5000 INJECTION INTRAVENOUS; SUBCUTANEOUS at 05:59

## 2024-05-20 RX ADMIN — SODIUM CHLORIDE 2000 MG: 900 INJECTION INTRAVENOUS at 05:03

## 2024-05-20 RX ADMIN — INSULIN GLARGINE 10 UNITS: 100 INJECTION, SOLUTION SUBCUTANEOUS at 08:31

## 2024-05-20 RX ADMIN — HEPARIN SODIUM 5000 UNITS: 5000 INJECTION INTRAVENOUS; SUBCUTANEOUS at 22:12

## 2024-05-20 RX ADMIN — HYDROMORPHONE HYDROCHLORIDE 0.25 MG: 1 INJECTION, SOLUTION INTRAMUSCULAR; INTRAVENOUS; SUBCUTANEOUS at 22:13

## 2024-05-20 RX ADMIN — Medication: at 10:46

## 2024-05-20 RX ADMIN — HYDROCORTISONE SODIUM SUCCINATE 50 MG: 100 INJECTION, POWDER, FOR SOLUTION INTRAMUSCULAR; INTRAVENOUS at 16:30

## 2024-05-20 RX ADMIN — HYDROMORPHONE HYDROCHLORIDE 0.5 MG: 1 INJECTION, SOLUTION INTRAMUSCULAR; INTRAVENOUS; SUBCUTANEOUS at 04:57

## 2024-05-20 RX ADMIN — HYDROMORPHONE HYDROCHLORIDE 0.5 MG: 1 INJECTION, SOLUTION INTRAMUSCULAR; INTRAVENOUS; SUBCUTANEOUS at 01:52

## 2024-05-20 RX ADMIN — INSULIN GLARGINE 10 UNITS: 100 INJECTION, SOLUTION SUBCUTANEOUS at 22:12

## 2024-05-20 RX ADMIN — INSULIN LISPRO 6 UNITS: 100 INJECTION, SOLUTION INTRAVENOUS; SUBCUTANEOUS at 12:50

## 2024-05-20 RX ADMIN — INSULIN LISPRO 10 UNITS: 100 INJECTION, SOLUTION INTRAVENOUS; SUBCUTANEOUS at 10:20

## 2024-05-20 RX ADMIN — INSULIN LISPRO 8 UNITS: 100 INJECTION, SOLUTION INTRAVENOUS; SUBCUTANEOUS at 08:32

## 2024-05-20 RX ADMIN — HEPARIN SODIUM 5000 UNITS: 5000 INJECTION INTRAVENOUS; SUBCUTANEOUS at 12:50

## 2024-05-20 RX ADMIN — SODIUM CHLORIDE, PRESERVATIVE FREE 10 ML: 5 INJECTION INTRAVENOUS at 08:35

## 2024-05-20 ASSESSMENT — PAIN DESCRIPTION - DESCRIPTORS: DESCRIPTORS: ACHING

## 2024-05-20 ASSESSMENT — PAIN - FUNCTIONAL ASSESSMENT
PAIN_FUNCTIONAL_ASSESSMENT: PREVENTS OR INTERFERES WITH MANY ACTIVE NOT PASSIVE ACTIVITIES
PAIN_FUNCTIONAL_ASSESSMENT: INTOLERABLE, UNABLE TO DO ANY ACTIVE OR PASSIVE ACTIVITIES
PAIN_FUNCTIONAL_ASSESSMENT: INTOLERABLE, UNABLE TO DO ANY ACTIVE OR PASSIVE ACTIVITIES

## 2024-05-20 ASSESSMENT — PAIN DESCRIPTION - LOCATION
LOCATION: LEG;HIP
LOCATION: HIP
LOCATION: HIP;LEG

## 2024-05-20 ASSESSMENT — PAIN SCALES - GENERAL
PAINLEVEL_OUTOF10: 0
PAINLEVEL_OUTOF10: 10
PAINLEVEL_OUTOF10: 7
PAINLEVEL_OUTOF10: 0
PAINLEVEL_OUTOF10: 10
PAINLEVEL_OUTOF10: 0

## 2024-05-20 ASSESSMENT — PAIN DESCRIPTION - ORIENTATION
ORIENTATION: RIGHT

## 2024-05-20 ASSESSMENT — PAIN SCALES - WONG BAKER: WONGBAKER_NUMERICALRESPONSE: NO HURT

## 2024-05-20 NOTE — DISCHARGE INSTR - COC
[facility] Continuity of Care Form    Patient Name:  Elvia Arguelles  : 1960    MRN:  [id number]    Admit date:  2024  Discharge date:  ***    Code Status Order: Full Code  Advance Directives: {YES OR NO:}    Admitting Physician: Jose Martin Escamilla MD  PCP: Meg Ellis, APRN - CNP    Discharging Nurse: ***  Discharging Hospital Unit/Room#: N2W-3793/5110-01  Discharging Unit Phone Number: ***    Emergency Contact:        Past Surgical History:  Past Surgical History:   Procedure Laterality Date    CARPAL TUNNEL RELEASE Bilateral 2017    CHOLECYSTECTOMY      CT BIOPSY ABDOMEN RETROPERITONEUM  2022    CT BIOPSY ABDOMEN RETROPERITONEUM 2022 [facility] CT SCAN    EYE SURGERY Right     biopsy    HYSTERECTOMY (CERVIX STATUS UNKNOWN)      LIPOMA RESECTION      LIVER BIOPSY Right     PORT SURGERY Right 2023    RIGHT POWER PORT PLACEMENT performed by Satish Corley MD at [facility] OR    SHOULDER ARTHROSCOPY Right 2018    RIGHT SHOULDER ARTHROSCOPE, SUBACROMIAL DECOMPRESSION, DISTALCLAVICLE EXCISION, CAPSULAR RELEASE, MANIPULATION UNDER ANESTHESIA, DEBRIDEMENT    SHOULDER SURGERY      UPPER GASTROINTESTINAL ENDOSCOPY  10/22/2014    UPPER GASTROINTESTINAL ENDOSCOPY N/A 2023    EGD W/EUS FNA performed by Yuri Varela MD at [facility] ENDOSCOPY    UPPER GASTROINTESTINAL ENDOSCOPY  2023    EGD BIOPSY performed by Yuri Varela MD at [facility] ENDOSCOPY    UPPER GASTROINTESTINAL ENDOSCOPY N/A 2024    ESOPHAGOGASTRODUODENOSCOPY BIOPSY performed by Tylor Staley MD at [facility] ENDOSCOPY    US GUIDED LIVER BIOPSY PERCUTANEOUS  2022    US GUIDED LIVER BIOPSY PERCUTANEOUS 2022 [facility] ULTRASOUND       Immunization History:   Immunization History   Administered Date(s) Administered    COVID-19, MODERNA BLUE border, Primary or Immunocompromised, (age 12y+), IM, 100 mcg/0.5mL 02/10/2021, 03/10/2021    Hep B, ENGERIX-B, (age 20y+), IM, 1mL 2022    Influenza

## 2024-05-21 ENCOUNTER — APPOINTMENT (OUTPATIENT)
Dept: GENERAL RADIOLOGY | Age: 64
DRG: 956 | End: 2024-05-21
Payer: MEDICARE

## 2024-05-21 ENCOUNTER — APPOINTMENT (OUTPATIENT)
Dept: MRI IMAGING | Age: 64
DRG: 956 | End: 2024-05-21
Payer: MEDICARE

## 2024-05-21 ENCOUNTER — APPOINTMENT (OUTPATIENT)
Dept: CT IMAGING | Age: 64
DRG: 956 | End: 2024-05-21
Payer: MEDICARE

## 2024-05-21 LAB
ALBUMIN SERPL-MCNC: 2.8 G/DL (ref 3.4–5)
ANION GAP SERPL CALCULATED.3IONS-SCNC: 15 MMOL/L (ref 3–16)
BACTERIA URNS QL MICRO: ABNORMAL /HPF
BASOPHILS # BLD: 0 K/UL (ref 0–0.2)
BASOPHILS NFR BLD: 0.1 %
BETA-HYDROXYBUTYRATE: 0.8 MMOL/L (ref 0–0.27)
BILIRUB UR QL STRIP.AUTO: NEGATIVE
BUN SERPL-MCNC: 59 MG/DL (ref 7–20)
CALCIUM SERPL-MCNC: 7.8 MG/DL (ref 8.3–10.6)
CHARACTER UR: ABNORMAL
CHLORIDE SERPL-SCNC: 93 MMOL/L (ref 99–110)
CK SERPL-CCNC: 696 U/L (ref 26–192)
CLARITY UR: CLEAR
CO2 SERPL-SCNC: 35 MMOL/L (ref 21–32)
COLOR UR: YELLOW
CREAT SERPL-MCNC: 4.2 MG/DL (ref 0.6–1.2)
DEPRECATED RDW RBC AUTO: 15 % (ref 12.4–15.4)
DEPRECATED RDW RBC AUTO: 15.3 % (ref 12.4–15.4)
EOSINOPHIL # BLD: 0 K/UL (ref 0–0.6)
EOSINOPHIL NFR BLD: 0.1 %
EPI CELLS #/AREA URNS AUTO: 0 /HPF (ref 0–5)
FERRITIN SERPL IA-MCNC: 495.2 NG/ML (ref 15–150)
FOLATE SERPL-MCNC: 10.57 NG/ML (ref 4.78–24.2)
GFR SERPLBLD CREATININE-BSD FMLA CKD-EPI: 11 ML/MIN/{1.73_M2}
GLUCOSE BLD-MCNC: 133 MG/DL (ref 70–99)
GLUCOSE BLD-MCNC: 72 MG/DL (ref 70–99)
GLUCOSE BLD-MCNC: 79 MG/DL (ref 70–99)
GLUCOSE BLD-MCNC: 90 MG/DL (ref 70–99)
GLUCOSE BLD-MCNC: 98 MG/DL (ref 70–99)
GLUCOSE SERPL-MCNC: 93 MG/DL (ref 70–99)
GLUCOSE UR STRIP.AUTO-MCNC: NEGATIVE MG/DL
HCT VFR BLD AUTO: 19.5 % (ref 36–48)
HCT VFR BLD AUTO: 19.6 % (ref 36–48)
HCT VFR BLD AUTO: 20.8 % (ref 36–48)
HGB BLD-MCNC: 7 G/DL (ref 12–16)
HGB UR QL STRIP.AUTO: ABNORMAL
HYALINE CASTS #/AREA URNS AUTO: 0 /LPF (ref 0–8)
IRON SATN MFR SERPL: 8 % (ref 15–50)
IRON SERPL-MCNC: 16 UG/DL (ref 37–145)
KETONES UR STRIP.AUTO-MCNC: NEGATIVE MG/DL
LEUKOCYTE ESTERASE UR QL STRIP.AUTO: NEGATIVE
LYMPHOCYTES # BLD: 0.5 K/UL (ref 1–5.1)
LYMPHOCYTES NFR BLD: 5.3 %
MAGNESIUM SERPL-MCNC: 1.4 MG/DL (ref 1.8–2.4)
MCH RBC QN AUTO: 29.9 PG (ref 26–34)
MCH RBC QN AUTO: 30.1 PG (ref 26–34)
MCHC RBC AUTO-ENTMCNC: 35.7 G/DL (ref 31–36)
MCHC RBC AUTO-ENTMCNC: 35.8 G/DL (ref 31–36)
MCV RBC AUTO: 83.4 FL (ref 80–100)
MCV RBC AUTO: 84.2 FL (ref 80–100)
MONOCYTES # BLD: 0.6 K/UL (ref 0–1.3)
MONOCYTES NFR BLD: 5.7 %
NEUTROPHILS # BLD: 9.3 K/UL (ref 1.7–7.7)
NEUTROPHILS NFR BLD: 88.8 %
NITRITE UR QL STRIP.AUTO: NEGATIVE
PERFORMED ON: ABNORMAL
PERFORMED ON: NORMAL
PH UR STRIP.AUTO: 8.5 [PH] (ref 5–8)
PHOSPHATE SERPL-MCNC: 5.4 MG/DL (ref 2.5–4.9)
PLATELET # BLD AUTO: 132 K/UL (ref 135–450)
PLATELET # BLD AUTO: 141 K/UL (ref 135–450)
PMV BLD AUTO: 7.8 FL (ref 5–10.5)
PMV BLD AUTO: 7.8 FL (ref 5–10.5)
POTASSIUM SERPL-SCNC: 2.9 MMOL/L (ref 3.5–5.1)
POTASSIUM SERPL-SCNC: 2.9 MMOL/L (ref 3.5–5.1)
PROCALCITONIN SERPL IA-MCNC: 9.91 NG/ML (ref 0–0.15)
PROT UR STRIP.AUTO-MCNC: 100 MG/DL
RBC # BLD AUTO: 2.33 M/UL (ref 4–5.2)
RBC # BLD AUTO: 2.34 M/UL (ref 4–5.2)
RBC #/AREA URNS HPF: ABNORMAL /HPF (ref 0–4)
SODIUM SERPL-SCNC: 143 MMOL/L (ref 136–145)
SP GR UR STRIP.AUTO: 1.01 (ref 1–1.03)
TIBC SERPL-MCNC: 189 UG/DL (ref 260–445)
TRANSFERRIN SERPL-MCNC: 145 MG/DL (ref 200–360)
UA COMPLETE W REFLEX CULTURE PNL UR: ABNORMAL
UA DIPSTICK W REFLEX MICRO PNL UR: YES
URN SPEC COLLECT METH UR: ABNORMAL
UROBILINOGEN UR STRIP-ACNC: 0.2 E.U./DL
VIT B12 SERPL-MCNC: 497 PG/ML (ref 211–911)
WBC # BLD AUTO: 10 K/UL (ref 4–11)
WBC # BLD AUTO: 10.4 K/UL (ref 4–11)
WBC #/AREA URNS AUTO: 2 /HPF (ref 0–5)

## 2024-05-21 PROCEDURE — 6360000002 HC RX W HCPCS: Performed by: STUDENT IN AN ORGANIZED HEALTH CARE EDUCATION/TRAINING PROGRAM

## 2024-05-21 PROCEDURE — 97530 THERAPEUTIC ACTIVITIES: CPT | Performed by: PHYSICAL THERAPIST

## 2024-05-21 PROCEDURE — 82728 ASSAY OF FERRITIN: CPT

## 2024-05-21 PROCEDURE — 85025 COMPLETE CBC W/AUTO DIFF WBC: CPT

## 2024-05-21 PROCEDURE — 84466 ASSAY OF TRANSFERRIN: CPT

## 2024-05-21 PROCEDURE — 1200000000 HC SEMI PRIVATE

## 2024-05-21 PROCEDURE — 85014 HEMATOCRIT: CPT

## 2024-05-21 PROCEDURE — 2580000003 HC RX 258

## 2024-05-21 PROCEDURE — 97530 THERAPEUTIC ACTIVITIES: CPT

## 2024-05-21 PROCEDURE — 82607 VITAMIN B-12: CPT

## 2024-05-21 PROCEDURE — 84145 PROCALCITONIN (PCT): CPT

## 2024-05-21 PROCEDURE — 2500000003 HC RX 250 WO HCPCS

## 2024-05-21 PROCEDURE — 87040 BLOOD CULTURE FOR BACTERIA: CPT

## 2024-05-21 PROCEDURE — 85018 HEMOGLOBIN: CPT

## 2024-05-21 PROCEDURE — 82010 KETONE BODYS QUAN: CPT

## 2024-05-21 PROCEDURE — 81001 URINALYSIS AUTO W/SCOPE: CPT

## 2024-05-21 PROCEDURE — 85027 COMPLETE CBC AUTOMATED: CPT

## 2024-05-21 PROCEDURE — 84132 ASSAY OF SERUM POTASSIUM: CPT

## 2024-05-21 PROCEDURE — 70450 CT HEAD/BRAIN W/O DYE: CPT

## 2024-05-21 PROCEDURE — 6360000002 HC RX W HCPCS: Performed by: ORTHOPAEDIC SURGERY

## 2024-05-21 PROCEDURE — 70551 MRI BRAIN STEM W/O DYE: CPT

## 2024-05-21 PROCEDURE — 83735 ASSAY OF MAGNESIUM: CPT

## 2024-05-21 PROCEDURE — 83540 ASSAY OF IRON: CPT

## 2024-05-21 PROCEDURE — 82746 ASSAY OF FOLIC ACID SERUM: CPT

## 2024-05-21 PROCEDURE — 6360000002 HC RX W HCPCS

## 2024-05-21 PROCEDURE — 80069 RENAL FUNCTION PANEL: CPT

## 2024-05-21 PROCEDURE — 2580000003 HC RX 258: Performed by: INTERNAL MEDICINE

## 2024-05-21 PROCEDURE — 2060000000 HC ICU INTERMEDIATE R&B

## 2024-05-21 PROCEDURE — 82550 ASSAY OF CK (CPK): CPT

## 2024-05-21 PROCEDURE — 71045 X-RAY EXAM CHEST 1 VIEW: CPT

## 2024-05-21 PROCEDURE — 2580000003 HC RX 258: Performed by: STUDENT IN AN ORGANIZED HEALTH CARE EDUCATION/TRAINING PROGRAM

## 2024-05-21 PROCEDURE — 36415 COLL VENOUS BLD VENIPUNCTURE: CPT

## 2024-05-21 PROCEDURE — 2580000003 HC RX 258: Performed by: ORTHOPAEDIC SURGERY

## 2024-05-21 RX ORDER — POTASSIUM CHLORIDE 7.45 MG/ML
10 INJECTION INTRAVENOUS
Status: COMPLETED | OUTPATIENT
Start: 2024-05-21 | End: 2024-05-22

## 2024-05-21 RX ORDER — MAGNESIUM SULFATE 1 G/100ML
1000 INJECTION INTRAVENOUS ONCE
Status: COMPLETED | OUTPATIENT
Start: 2024-05-21 | End: 2024-05-21

## 2024-05-21 RX ORDER — SODIUM CHLORIDE 450 MG/100ML
INJECTION, SOLUTION INTRAVENOUS CONTINUOUS
Status: DISCONTINUED | OUTPATIENT
Start: 2024-05-21 | End: 2024-05-22

## 2024-05-21 RX ORDER — HYDRALAZINE HYDROCHLORIDE 20 MG/ML
5 INJECTION INTRAMUSCULAR; INTRAVENOUS EVERY 6 HOURS PRN
Status: DISCONTINUED | OUTPATIENT
Start: 2024-05-21 | End: 2024-05-22

## 2024-05-21 RX ORDER — POTASSIUM CHLORIDE 7.45 MG/ML
10 INJECTION INTRAVENOUS
Status: COMPLETED | OUTPATIENT
Start: 2024-05-21 | End: 2024-05-21

## 2024-05-21 RX ADMIN — POTASSIUM CHLORIDE 10 MEQ: 7.46 INJECTION, SOLUTION INTRAVENOUS at 20:22

## 2024-05-21 RX ADMIN — HYDROCORTISONE SODIUM SUCCINATE 50 MG: 100 INJECTION, POWDER, FOR SOLUTION INTRAMUSCULAR; INTRAVENOUS at 08:27

## 2024-05-21 RX ADMIN — HYDRALAZINE HYDROCHLORIDE 5 MG: 20 INJECTION INTRAMUSCULAR; INTRAVENOUS at 23:23

## 2024-05-21 RX ADMIN — POTASSIUM CHLORIDE 10 MEQ: 7.46 INJECTION, SOLUTION INTRAVENOUS at 22:55

## 2024-05-21 RX ADMIN — HEPARIN SODIUM 5000 UNITS: 5000 INJECTION INTRAVENOUS; SUBCUTANEOUS at 05:30

## 2024-05-21 RX ADMIN — HEPARIN SODIUM 5000 UNITS: 5000 INJECTION INTRAVENOUS; SUBCUTANEOUS at 13:41

## 2024-05-21 RX ADMIN — HYDROCORTISONE SODIUM SUCCINATE 50 MG: 100 INJECTION, POWDER, FOR SOLUTION INTRAMUSCULAR; INTRAVENOUS at 17:54

## 2024-05-21 RX ADMIN — POTASSIUM CHLORIDE 10 MEQ: 7.46 INJECTION, SOLUTION INTRAVENOUS at 14:39

## 2024-05-21 RX ADMIN — POTASSIUM CHLORIDE 10 MEQ: 7.46 INJECTION, SOLUTION INTRAVENOUS at 12:20

## 2024-05-21 RX ADMIN — Medication: at 01:19

## 2024-05-21 RX ADMIN — DEXTROSE MONOHYDRATE: 100 INJECTION, SOLUTION INTRAVENOUS at 20:48

## 2024-05-21 RX ADMIN — IRON SUCROSE 200 MG: 20 INJECTION, SOLUTION INTRAVENOUS at 13:41

## 2024-05-21 RX ADMIN — SODIUM CHLORIDE: 4.5 INJECTION, SOLUTION INTRAVENOUS at 20:25

## 2024-05-21 RX ADMIN — Medication: at 08:32

## 2024-05-21 RX ADMIN — SODIUM CHLORIDE, PRESERVATIVE FREE 10 ML: 5 INJECTION INTRAVENOUS at 08:29

## 2024-05-21 RX ADMIN — SODIUM CHLORIDE: 4.5 INJECTION, SOLUTION INTRAVENOUS at 09:09

## 2024-05-21 RX ADMIN — MAGNESIUM SULFATE HEPTAHYDRATE 1000 MG: 1 INJECTION, SOLUTION INTRAVENOUS at 09:14

## 2024-05-21 RX ADMIN — HEPARIN SODIUM 5000 UNITS: 5000 INJECTION INTRAVENOUS; SUBCUTANEOUS at 21:03

## 2024-05-21 RX ADMIN — POTASSIUM CHLORIDE 10 MEQ: 7.46 INJECTION, SOLUTION INTRAVENOUS at 10:17

## 2024-05-21 RX ADMIN — HYDROCORTISONE SODIUM SUCCINATE 50 MG: 100 INJECTION, POWDER, FOR SOLUTION INTRAMUSCULAR; INTRAVENOUS at 01:21

## 2024-05-21 RX ADMIN — CEFTRIAXONE SODIUM 2000 MG: 2 INJECTION, POWDER, FOR SOLUTION INTRAMUSCULAR; INTRAVENOUS at 13:50

## 2024-05-21 RX ADMIN — POTASSIUM CHLORIDE 10 MEQ: 7.46 INJECTION, SOLUTION INTRAVENOUS at 19:03

## 2024-05-21 RX ADMIN — POTASSIUM CHLORIDE 10 MEQ: 7.46 INJECTION, SOLUTION INTRAVENOUS at 06:44

## 2024-05-21 ASSESSMENT — PAIN SCALES - GENERAL
PAINLEVEL_OUTOF10: 0

## 2024-05-21 ASSESSMENT — PAIN SCALES - WONG BAKER: WONGBAKER_NUMERICALRESPONSE: NO HURT

## 2024-05-22 LAB
ALBUMIN SERPL-MCNC: 2.9 G/DL (ref 3.4–5)
ANION GAP SERPL CALCULATED.3IONS-SCNC: 15 MMOL/L (ref 3–16)
BACTERIA BLD CULT ORG #2: NORMAL
BASOPHILS # BLD: 0 K/UL (ref 0–0.2)
BASOPHILS NFR BLD: 0.1 %
BUN SERPL-MCNC: 54 MG/DL (ref 7–20)
CALCIUM SERPL-MCNC: 8 MG/DL (ref 8.3–10.6)
CHLORIDE SERPL-SCNC: 95 MMOL/L (ref 99–110)
CO2 SERPL-SCNC: 39 MMOL/L (ref 21–32)
CREAT SERPL-MCNC: 2.8 MG/DL (ref 0.6–1.2)
DEPRECATED RDW RBC AUTO: 14.4 % (ref 12.4–15.4)
EOSINOPHIL # BLD: 0 K/UL (ref 0–0.6)
EOSINOPHIL NFR BLD: 0 %
GFR SERPLBLD CREATININE-BSD FMLA CKD-EPI: 18 ML/MIN/{1.73_M2}
GLUCOSE BLD-MCNC: 178 MG/DL (ref 70–99)
GLUCOSE BLD-MCNC: 202 MG/DL (ref 70–99)
GLUCOSE BLD-MCNC: 216 MG/DL (ref 70–99)
GLUCOSE BLD-MCNC: 225 MG/DL (ref 70–99)
GLUCOSE BLD-MCNC: 280 MG/DL (ref 70–99)
GLUCOSE BLD-MCNC: 292 MG/DL (ref 70–99)
GLUCOSE SERPL-MCNC: 191 MG/DL (ref 70–99)
HCT VFR BLD AUTO: 20.2 % (ref 36–48)
HGB BLD-MCNC: 7.1 G/DL (ref 12–16)
LYMPHOCYTES # BLD: 0.4 K/UL (ref 1–5.1)
LYMPHOCYTES NFR BLD: 5.7 %
MCH RBC QN AUTO: 30 PG (ref 26–34)
MCHC RBC AUTO-ENTMCNC: 35.2 G/DL (ref 31–36)
MCV RBC AUTO: 85.4 FL (ref 80–100)
MONOCYTES # BLD: 0.5 K/UL (ref 0–1.3)
MONOCYTES NFR BLD: 6.9 %
NEUTROPHILS # BLD: 6.2 K/UL (ref 1.7–7.7)
NEUTROPHILS NFR BLD: 87.3 %
PERFORMED ON: ABNORMAL
PHOSPHATE SERPL-MCNC: 4.1 MG/DL (ref 2.5–4.9)
PLATELET # BLD AUTO: 112 K/UL (ref 135–450)
PMV BLD AUTO: 7.7 FL (ref 5–10.5)
POTASSIUM SERPL-SCNC: 3.2 MMOL/L (ref 3.5–5.1)
RBC # BLD AUTO: 2.37 M/UL (ref 4–5.2)
SODIUM SERPL-SCNC: 134 MMOL/L (ref 136–145)
SODIUM SERPL-SCNC: 149 MMOL/L (ref 136–145)
WBC # BLD AUTO: 7.2 K/UL (ref 4–11)

## 2024-05-22 PROCEDURE — 6360000002 HC RX W HCPCS: Performed by: STUDENT IN AN ORGANIZED HEALTH CARE EDUCATION/TRAINING PROGRAM

## 2024-05-22 PROCEDURE — 6370000000 HC RX 637 (ALT 250 FOR IP): Performed by: NURSE PRACTITIONER

## 2024-05-22 PROCEDURE — 2580000003 HC RX 258: Performed by: ORTHOPAEDIC SURGERY

## 2024-05-22 PROCEDURE — 2580000003 HC RX 258

## 2024-05-22 PROCEDURE — 6360000002 HC RX W HCPCS: Performed by: ORTHOPAEDIC SURGERY

## 2024-05-22 PROCEDURE — 97530 THERAPEUTIC ACTIVITIES: CPT

## 2024-05-22 PROCEDURE — 94760 N-INVAS EAR/PLS OXIMETRY 1: CPT

## 2024-05-22 PROCEDURE — 84295 ASSAY OF SERUM SODIUM: CPT

## 2024-05-22 PROCEDURE — 2060000000 HC ICU INTERMEDIATE R&B

## 2024-05-22 PROCEDURE — 6370000000 HC RX 637 (ALT 250 FOR IP): Performed by: ORTHOPAEDIC SURGERY

## 2024-05-22 PROCEDURE — 85025 COMPLETE CBC W/AUTO DIFF WBC: CPT

## 2024-05-22 PROCEDURE — 6370000000 HC RX 637 (ALT 250 FOR IP)

## 2024-05-22 PROCEDURE — 6360000002 HC RX W HCPCS

## 2024-05-22 PROCEDURE — 6370000000 HC RX 637 (ALT 250 FOR IP): Performed by: STUDENT IN AN ORGANIZED HEALTH CARE EDUCATION/TRAINING PROGRAM

## 2024-05-22 PROCEDURE — 80069 RENAL FUNCTION PANEL: CPT

## 2024-05-22 PROCEDURE — 97530 THERAPEUTIC ACTIVITIES: CPT | Performed by: PHYSICAL THERAPIST

## 2024-05-22 PROCEDURE — 2580000003 HC RX 258: Performed by: STUDENT IN AN ORGANIZED HEALTH CARE EDUCATION/TRAINING PROGRAM

## 2024-05-22 RX ORDER — LOPERAMIDE HYDROCHLORIDE 2 MG/1
2 CAPSULE ORAL 3 TIMES DAILY PRN
Status: DISCONTINUED | OUTPATIENT
Start: 2024-05-22 | End: 2024-05-27 | Stop reason: HOSPADM

## 2024-05-22 RX ORDER — DEXTROSE MONOHYDRATE 50 MG/ML
INJECTION, SOLUTION INTRAVENOUS CONTINUOUS
Status: DISCONTINUED | OUTPATIENT
Start: 2024-05-22 | End: 2024-05-22

## 2024-05-22 RX ORDER — INSULIN LISPRO 100 [IU]/ML
5 INJECTION, SOLUTION INTRAVENOUS; SUBCUTANEOUS ONCE
Status: COMPLETED | OUTPATIENT
Start: 2024-05-22 | End: 2024-05-22

## 2024-05-22 RX ORDER — HYDRALAZINE HYDROCHLORIDE 20 MG/ML
5 INJECTION INTRAMUSCULAR; INTRAVENOUS EVERY 6 HOURS PRN
Status: DISCONTINUED | OUTPATIENT
Start: 2024-05-22 | End: 2024-05-27 | Stop reason: HOSPADM

## 2024-05-22 RX ORDER — SODIUM CHLORIDE 450 MG/100ML
INJECTION, SOLUTION INTRAVENOUS CONTINUOUS
Status: DISCONTINUED | OUTPATIENT
Start: 2024-05-22 | End: 2024-05-22

## 2024-05-22 RX ORDER — NIFEDIPINE 30 MG/1
30 TABLET, EXTENDED RELEASE ORAL DAILY
Status: DISCONTINUED | OUTPATIENT
Start: 2024-05-22 | End: 2024-05-27 | Stop reason: HOSPADM

## 2024-05-22 RX ORDER — OXYCODONE HYDROCHLORIDE 5 MG/1
5 TABLET ORAL EVERY 6 HOURS PRN
Status: DISCONTINUED | OUTPATIENT
Start: 2024-05-22 | End: 2024-05-24

## 2024-05-22 RX ORDER — ROPINIROLE 0.5 MG/1
0.5 TABLET, FILM COATED ORAL ONCE
Status: COMPLETED | OUTPATIENT
Start: 2024-05-22 | End: 2024-05-22

## 2024-05-22 RX ORDER — SODIUM CHLORIDE 9 MG/ML
INJECTION, SOLUTION INTRAVENOUS CONTINUOUS
Status: DISCONTINUED | OUTPATIENT
Start: 2024-05-22 | End: 2024-05-24

## 2024-05-22 RX ORDER — POTASSIUM CHLORIDE 20 MEQ/1
40 TABLET, EXTENDED RELEASE ORAL ONCE
Status: COMPLETED | OUTPATIENT
Start: 2024-05-22 | End: 2024-05-22

## 2024-05-22 RX ADMIN — ESCITALOPRAM OXALATE 10 MG: 10 TABLET ORAL at 09:05

## 2024-05-22 RX ADMIN — SODIUM CHLORIDE: 9 INJECTION, SOLUTION INTRAVENOUS at 16:52

## 2024-05-22 RX ADMIN — HEPARIN SODIUM 5000 UNITS: 5000 INJECTION INTRAVENOUS; SUBCUTANEOUS at 21:05

## 2024-05-22 RX ADMIN — OXYCODONE 5 MG: 5 TABLET ORAL at 17:53

## 2024-05-22 RX ADMIN — IRON SUCROSE 200 MG: 20 INJECTION, SOLUTION INTRAVENOUS at 09:05

## 2024-05-22 RX ADMIN — HYDRALAZINE HYDROCHLORIDE 5 MG: 20 INJECTION INTRAMUSCULAR; INTRAVENOUS at 14:35

## 2024-05-22 RX ADMIN — HYDROCORTISONE SODIUM SUCCINATE 50 MG: 100 INJECTION, POWDER, FOR SOLUTION INTRAMUSCULAR; INTRAVENOUS at 09:05

## 2024-05-22 RX ADMIN — PANTOPRAZOLE SODIUM 40 MG: 40 TABLET, DELAYED RELEASE ORAL at 16:53

## 2024-05-22 RX ADMIN — HEPARIN SODIUM 5000 UNITS: 5000 INJECTION INTRAVENOUS; SUBCUTANEOUS at 05:46

## 2024-05-22 RX ADMIN — NIFEDIPINE 30 MG: 30 TABLET, EXTENDED RELEASE ORAL at 12:46

## 2024-05-22 RX ADMIN — HYDROCORTISONE SODIUM SUCCINATE 50 MG: 100 INJECTION, POWDER, FOR SOLUTION INTRAMUSCULAR; INTRAVENOUS at 01:34

## 2024-05-22 RX ADMIN — SODIUM CHLORIDE, PRESERVATIVE FREE 10 ML: 5 INJECTION INTRAVENOUS at 21:12

## 2024-05-22 RX ADMIN — SODIUM CHLORIDE, PRESERVATIVE FREE 10 ML: 5 INJECTION INTRAVENOUS at 09:12

## 2024-05-22 RX ADMIN — POTASSIUM CHLORIDE 40 MEQ: 1500 TABLET, EXTENDED RELEASE ORAL at 10:45

## 2024-05-22 RX ADMIN — HYDRALAZINE HYDROCHLORIDE 5 MG: 20 INJECTION INTRAMUSCULAR; INTRAVENOUS at 11:20

## 2024-05-22 RX ADMIN — ROPINIROLE HYDROCHLORIDE 0.5 MG: 0.5 TABLET, FILM COATED ORAL at 11:16

## 2024-05-22 RX ADMIN — POTASSIUM CHLORIDE 10 MEQ: 7.46 INJECTION, SOLUTION INTRAVENOUS at 00:01

## 2024-05-22 RX ADMIN — ROPINIROLE HYDROCHLORIDE 1 MG: 1 TABLET, FILM COATED ORAL at 21:06

## 2024-05-22 RX ADMIN — LOPERAMIDE HYDROCHLORIDE 2 MG: 2 CAPSULE ORAL at 11:17

## 2024-05-22 RX ADMIN — INSULIN GLARGINE 10 UNITS: 100 INJECTION, SOLUTION SUBCUTANEOUS at 09:05

## 2024-05-22 RX ADMIN — INSULIN LISPRO 5 UNITS: 100 INJECTION, SOLUTION INTRAVENOUS; SUBCUTANEOUS at 14:34

## 2024-05-22 RX ADMIN — INSULIN GLARGINE 10 UNITS: 100 INJECTION, SOLUTION SUBCUTANEOUS at 21:06

## 2024-05-22 RX ADMIN — HEPARIN SODIUM 5000 UNITS: 5000 INJECTION INTRAVENOUS; SUBCUTANEOUS at 14:35

## 2024-05-22 RX ADMIN — HYDROCORTISONE SODIUM SUCCINATE 50 MG: 100 INJECTION, POWDER, FOR SOLUTION INTRAMUSCULAR; INTRAVENOUS at 16:51

## 2024-05-22 RX ADMIN — CEFTRIAXONE SODIUM 2000 MG: 2 INJECTION, POWDER, FOR SOLUTION INTRAMUSCULAR; INTRAVENOUS at 12:46

## 2024-05-22 ASSESSMENT — PAIN DESCRIPTION - LOCATION
LOCATION: HIP
LOCATION: HIP

## 2024-05-22 ASSESSMENT — PAIN SCALES - GENERAL
PAINLEVEL_OUTOF10: 7
PAINLEVEL_OUTOF10: 7
PAINLEVEL_OUTOF10: 0
PAINLEVEL_OUTOF10: 4

## 2024-05-22 ASSESSMENT — PAIN DESCRIPTION - DESCRIPTORS
DESCRIPTORS: ACHING;DISCOMFORT
DESCRIPTORS: DISCOMFORT;ACHING

## 2024-05-22 ASSESSMENT — PAIN DESCRIPTION - ORIENTATION
ORIENTATION: RIGHT
ORIENTATION: RIGHT

## 2024-05-22 NOTE — ACP (ADVANCE CARE PLANNING)
Advance Care Planning     Advance Care Planning Activator (Inpatient)  Conversation Note      Date of ACP Conversation: 5/22/2024     Conversation Conducted with: Legal next of kin    ACP Activator: BERTRAND Currie        Health Care Decision Maker:     Current Designated Health Care Decision Maker:     Primary Decision Maker (Active): Shant Arguelles - Spouse - [phone]    Secondary Decision Maker: Kindra ([place])Esther - Child - [phone]    Supplemental (Other) Decision Maker: HollisYas - Child - [phone]    Today we documented Decision Maker(s) consistent with ACP documents on file.    Care Preferences    Ventilation:  \"If you were in your present state of health and suddenly became very ill and were unable to breathe on your own, what would your preference be about the use of a ventilator (breathing machine) if it were available to you?\"      Would the patient desire the use of ventilator (breathing machine)?: yes    \"If your health worsens and it becomes clear that your chance of recovery is unlikely, what would your preference be about the use of a ventilator (breathing machine) if it were available to you?\"     Would the patient desire the use of ventilator (breathing machine)?: Yes      Resuscitation  \"CPR works best to restart the heart when there is a sudden event, like a heart attack, in someone who is otherwise healthy. Unfortunately, CPR does not typically restart the heart for people who have serious health conditions or who are very sick.\"    \"In the event your heart stopped as a result of an underlying serious health condition, would you want attempts to be made to restart your heart (answer \"yes\" for attempt to resuscitate) or would you prefer a natural death (answer \"no\" for do not attempt to resuscitate)?\" yes       [] Yes   [x] No   Educated Patient / Decision Maker regarding differences between Advance Directives and portable DNR orders.    Length of ACP Conversation in

## 2024-05-22 NOTE — CARE COORDINATION
Case Management Assessment  Initial Evaluation    Date/Time of Evaluation: 5/22/2024 10:09 AM  Assessment Completed by: BERTRAND Currie    If patient is discharged prior to next notation, then this note serves as note for discharge by case management.    Patient Name: Elvia Agruelles                   YOB: 1960  Diagnosis: Transaminitis [R74.01]  Elevated troponin [R79.89]  FAVIAN (acute kidney injury) ([facility]) [N17.9]  Closed fracture of trochanter of right femur, initial encounter ([facility]) [S72.101A]  Closed right hip fracture, initial encounter ([facility]) [S72.001A]  Traumatic rhabdomyolysis, initial encounter ([facility]) [T79.6XXA]                   Date / Time: 5/18/2024 11:48 AM    Patient Admission Status: Inpatient   Readmission Risk (Low < 19, Mod (19-27), High > 27): Readmission Risk Score: 25.7    Current PCP: Meg Ellis APRN - CNP  PCP verified by CM? (P) Yes    Chart Reviewed: Yes      History Provided by: (P) Spouse  Patient Orientation: (P) Unable to Assess    Patient Cognition: (P) Severely Impaired    Hospitalization in the last 30 days (Readmission):  No    If yes, Readmission Assessment in  Navigator will be completed.    Advance Directives:      Code Status: Full Code   Patient's Primary Decision Maker is: (P) Legal Next of Kin    Primary Decision Maker (Active): Shant Arguelles - Spouse - [phone]    Secondary Decision Maker: Esther Arguelles ([place]) - Child - [phone]    Supplemental (Other) Decision Maker: Yas Shafer - Child - [phone]    Discharge Planning:    Patient lives with: (P) Spouse/Significant Other Type of Home: (P) House  Primary Care Giver: (P) Self  Patient Support Systems include: (P) Spouse/Significant Other, Children, Family Members   Current Financial resources: (P) Medicare  Current community resources: (P) ECF/Home Care  Current services prior to admission: (P) Durable Medical Equipment            Current DME: (P) Walker            Type of Home

## 2024-05-23 ENCOUNTER — APPOINTMENT (OUTPATIENT)
Dept: MRI IMAGING | Age: 64
DRG: 956 | End: 2024-05-23
Payer: MEDICARE

## 2024-05-23 LAB
ABO + RH BLD: NORMAL
ALBUMIN SERPL-MCNC: 2.4 G/DL (ref 3.4–5)
ANION GAP SERPL CALCULATED.3IONS-SCNC: 10 MMOL/L (ref 3–16)
BACTERIA BLD CULT: NORMAL
BASOPHILS NFR BLD: 0.2 %
BLD GP AB SCN SERPL QL: NORMAL
BLOOD BANK DISPENSE STATUS: NORMAL
BLOOD BANK PRODUCT CODE: NORMAL
BPU ID: NORMAL
BUN SERPL-MCNC: 36 MG/DL (ref 7–20)
CALCIUM SERPL-MCNC: 7.3 MG/DL (ref 8.3–10.6)
CHLORIDE SERPL-SCNC: 100 MMOL/L (ref 99–110)
CO2 SERPL-SCNC: 29 MMOL/L (ref 21–32)
CREAT SERPL-MCNC: 1.8 MG/DL (ref 0.6–1.2)
DEPRECATED RDW RBC AUTO: 13.9 % (ref 12.4–15.4)
EOSINOPHIL # BLD: 0 K/UL (ref 0–0.6)
EOSINOPHIL NFR BLD: 0.1 %
GFR SERPLBLD CREATININE-BSD FMLA CKD-EPI: 31 ML/MIN/{1.73_M2}
GLUCOSE BLD-MCNC: 153 MG/DL (ref 70–99)
GLUCOSE BLD-MCNC: 187 MG/DL (ref 70–99)
GLUCOSE BLD-MCNC: 223 MG/DL (ref 70–99)
GLUCOSE BLD-MCNC: 279 MG/DL (ref 70–99)
GLUCOSE SERPL-MCNC: 131 MG/DL (ref 70–99)
HCT VFR BLD AUTO: 17.2 % (ref 36–48)
HCT VFR BLD AUTO: 26 % (ref 36–48)
HGB BLD-MCNC: 5.9 G/DL (ref 12–16)
HGB BLD-MCNC: 8.9 G/DL (ref 12–16)
LYMPHOCYTES # BLD: 0.4 K/UL (ref 1–5.1)
LYMPHOCYTES NFR BLD: 7.1 %
MAGNESIUM SERPL-MCNC: 1.1 MG/DL (ref 1.8–2.4)
MCH RBC QN AUTO: 29.6 PG (ref 26–34)
MCHC RBC AUTO-ENTMCNC: 34.6 G/DL (ref 31–36)
MCV RBC AUTO: 85.4 FL (ref 80–100)
MONOCYTES # BLD: 0.4 K/UL (ref 0–1.3)
MONOCYTES NFR BLD: 6.8 %
NEUTROPHILS # BLD: 4.5 K/UL (ref 1.7–7.7)
NEUTROPHILS NFR BLD: 85.8 %
PERFORMED ON: ABNORMAL
PHOSPHATE SERPL-MCNC: 3 MG/DL (ref 2.5–4.9)
PLATELET # BLD AUTO: 107 K/UL (ref 135–450)
PMV BLD AUTO: 8.1 FL (ref 5–10.5)
POTASSIUM SERPL-SCNC: 2.2 MMOL/L (ref 3.5–5.1)
POTASSIUM SERPL-SCNC: 3.5 MMOL/L (ref 3.5–5.1)
RBC # BLD AUTO: 2.01 M/UL (ref 4–5.2)
SODIUM SERPL-SCNC: 139 MMOL/L (ref 136–145)
WBC # BLD AUTO: 5.2 K/UL (ref 4–11)

## 2024-05-23 PROCEDURE — 6360000002 HC RX W HCPCS: Performed by: STUDENT IN AN ORGANIZED HEALTH CARE EDUCATION/TRAINING PROGRAM

## 2024-05-23 PROCEDURE — 86900 BLOOD TYPING SEROLOGIC ABO: CPT

## 2024-05-23 PROCEDURE — 9990000010 HC NO CHARGE VISIT: Performed by: PHYSICAL THERAPIST

## 2024-05-23 PROCEDURE — 2580000003 HC RX 258: Performed by: STUDENT IN AN ORGANIZED HEALTH CARE EDUCATION/TRAINING PROGRAM

## 2024-05-23 PROCEDURE — 80069 RENAL FUNCTION PANEL: CPT

## 2024-05-23 PROCEDURE — 6360000002 HC RX W HCPCS: Performed by: NURSE PRACTITIONER

## 2024-05-23 PROCEDURE — 6370000000 HC RX 637 (ALT 250 FOR IP): Performed by: NURSE PRACTITIONER

## 2024-05-23 PROCEDURE — 86923 COMPATIBILITY TEST ELECTRIC: CPT

## 2024-05-23 PROCEDURE — 6360000002 HC RX W HCPCS: Performed by: ORTHOPAEDIC SURGERY

## 2024-05-23 PROCEDURE — 6370000000 HC RX 637 (ALT 250 FOR IP): Performed by: ORTHOPAEDIC SURGERY

## 2024-05-23 PROCEDURE — 85014 HEMATOCRIT: CPT

## 2024-05-23 PROCEDURE — 2700000000 HC OXYGEN THERAPY PER DAY

## 2024-05-23 PROCEDURE — 85025 COMPLETE CBC W/AUTO DIFF WBC: CPT

## 2024-05-23 PROCEDURE — 6360000002 HC RX W HCPCS

## 2024-05-23 PROCEDURE — 6370000000 HC RX 637 (ALT 250 FOR IP): Performed by: STUDENT IN AN ORGANIZED HEALTH CARE EDUCATION/TRAINING PROGRAM

## 2024-05-23 PROCEDURE — 86901 BLOOD TYPING SEROLOGIC RH(D): CPT

## 2024-05-23 PROCEDURE — 94761 N-INVAS EAR/PLS OXIMETRY MLT: CPT

## 2024-05-23 PROCEDURE — 6370000000 HC RX 637 (ALT 250 FOR IP)

## 2024-05-23 PROCEDURE — 86850 RBC ANTIBODY SCREEN: CPT

## 2024-05-23 PROCEDURE — 83735 ASSAY OF MAGNESIUM: CPT

## 2024-05-23 PROCEDURE — 2580000003 HC RX 258: Performed by: ORTHOPAEDIC SURGERY

## 2024-05-23 PROCEDURE — 84132 ASSAY OF SERUM POTASSIUM: CPT

## 2024-05-23 PROCEDURE — 36430 TRANSFUSION BLD/BLD COMPNT: CPT

## 2024-05-23 PROCEDURE — P9016 RBC LEUKOCYTES REDUCED: HCPCS

## 2024-05-23 PROCEDURE — 2060000000 HC ICU INTERMEDIATE R&B

## 2024-05-23 PROCEDURE — 85018 HEMOGLOBIN: CPT

## 2024-05-23 RX ORDER — POTASSIUM CHLORIDE 7.45 MG/ML
10 INJECTION INTRAVENOUS ONCE
Status: COMPLETED | OUTPATIENT
Start: 2024-05-23 | End: 2024-05-23

## 2024-05-23 RX ORDER — SODIUM CHLORIDE 9 MG/ML
INJECTION, SOLUTION INTRAVENOUS PRN
Status: DISCONTINUED | OUTPATIENT
Start: 2024-05-23 | End: 2024-05-27 | Stop reason: HOSPADM

## 2024-05-23 RX ORDER — LACTOBACILLUS RHAMNOSUS GG 10B CELL
2 CAPSULE ORAL 2 TIMES DAILY WITH MEALS
Status: DISCONTINUED | OUTPATIENT
Start: 2024-05-23 | End: 2024-05-27 | Stop reason: HOSPADM

## 2024-05-23 RX ORDER — POTASSIUM CHLORIDE 20 MEQ/1
40 TABLET, EXTENDED RELEASE ORAL 3 TIMES DAILY
Status: COMPLETED | OUTPATIENT
Start: 2024-05-23 | End: 2024-05-23

## 2024-05-23 RX ORDER — MAGNESIUM SULFATE IN WATER 40 MG/ML
2000 INJECTION, SOLUTION INTRAVENOUS PRN
Status: DISCONTINUED | OUTPATIENT
Start: 2024-05-23 | End: 2024-05-23

## 2024-05-23 RX ORDER — MAGNESIUM SULFATE IN WATER 40 MG/ML
4000 INJECTION, SOLUTION INTRAVENOUS ONCE
Status: COMPLETED | OUTPATIENT
Start: 2024-05-23 | End: 2024-05-24

## 2024-05-23 RX ORDER — POTASSIUM CHLORIDE 7.45 MG/ML
10 INJECTION INTRAVENOUS
Status: DISCONTINUED | OUTPATIENT
Start: 2024-05-23 | End: 2024-05-23

## 2024-05-23 RX ORDER — POTASSIUM CHLORIDE 7.45 MG/ML
10 INJECTION INTRAVENOUS
Status: DISPENSED | OUTPATIENT
Start: 2024-05-23 | End: 2024-05-23

## 2024-05-23 RX ADMIN — PANTOPRAZOLE SODIUM 40 MG: 40 TABLET, DELAYED RELEASE ORAL at 16:51

## 2024-05-23 RX ADMIN — IRON SUCROSE 200 MG: 20 INJECTION, SOLUTION INTRAVENOUS at 10:23

## 2024-05-23 RX ADMIN — MAGNESIUM SULFATE HEPTAHYDRATE 4000 MG: 40 INJECTION, SOLUTION INTRAVENOUS at 22:32

## 2024-05-23 RX ADMIN — PANTOPRAZOLE SODIUM 40 MG: 40 TABLET, DELAYED RELEASE ORAL at 06:39

## 2024-05-23 RX ADMIN — OXYCODONE 5 MG: 5 TABLET ORAL at 16:50

## 2024-05-23 RX ADMIN — HEPARIN SODIUM 5000 UNITS: 5000 INJECTION INTRAVENOUS; SUBCUTANEOUS at 06:39

## 2024-05-23 RX ADMIN — ROPINIROLE HYDROCHLORIDE 1 MG: 1 TABLET, FILM COATED ORAL at 22:24

## 2024-05-23 RX ADMIN — POTASSIUM CHLORIDE 40 MEQ: 1500 TABLET, EXTENDED RELEASE ORAL at 13:51

## 2024-05-23 RX ADMIN — POTASSIUM CHLORIDE 40 MEQ: 1500 TABLET, EXTENDED RELEASE ORAL at 10:17

## 2024-05-23 RX ADMIN — NIFEDIPINE 30 MG: 30 TABLET, EXTENDED RELEASE ORAL at 10:17

## 2024-05-23 RX ADMIN — OXYCODONE 5 MG: 5 TABLET ORAL at 10:25

## 2024-05-23 RX ADMIN — HYDROCORTISONE SODIUM SUCCINATE 50 MG: 100 INJECTION, POWDER, FOR SOLUTION INTRAMUSCULAR; INTRAVENOUS at 01:35

## 2024-05-23 RX ADMIN — ESCITALOPRAM OXALATE 10 MG: 10 TABLET ORAL at 10:17

## 2024-05-23 RX ADMIN — POTASSIUM CHLORIDE 40 MEQ: 1500 TABLET, EXTENDED RELEASE ORAL at 22:24

## 2024-05-23 RX ADMIN — HYDROCORTISONE SODIUM SUCCINATE 50 MG: 100 INJECTION, POWDER, FOR SOLUTION INTRAMUSCULAR; INTRAVENOUS at 10:18

## 2024-05-23 RX ADMIN — SODIUM CHLORIDE, PRESERVATIVE FREE 10 ML: 5 INJECTION INTRAVENOUS at 10:25

## 2024-05-23 RX ADMIN — POTASSIUM CHLORIDE 10 MEQ: 7.46 INJECTION, SOLUTION INTRAVENOUS at 13:48

## 2024-05-23 RX ADMIN — CEFTRIAXONE SODIUM 2000 MG: 2 INJECTION, POWDER, FOR SOLUTION INTRAMUSCULAR; INTRAVENOUS at 12:28

## 2024-05-23 RX ADMIN — POTASSIUM CHLORIDE 10 MEQ: 7.46 INJECTION, SOLUTION INTRAVENOUS at 12:06

## 2024-05-23 RX ADMIN — POTASSIUM CHLORIDE 10 MEQ: 7.46 INJECTION, SOLUTION INTRAVENOUS at 16:30

## 2024-05-23 RX ADMIN — HYDRALAZINE HYDROCHLORIDE 5 MG: 20 INJECTION INTRAMUSCULAR; INTRAVENOUS at 16:44

## 2024-05-23 RX ADMIN — HEPARIN SODIUM 5000 UNITS: 5000 INJECTION INTRAVENOUS; SUBCUTANEOUS at 13:50

## 2024-05-23 RX ADMIN — POTASSIUM CHLORIDE 10 MEQ: 7.46 INJECTION, SOLUTION INTRAVENOUS at 11:00

## 2024-05-23 RX ADMIN — Medication 2 CAPSULE: at 22:24

## 2024-05-23 RX ADMIN — SODIUM CHLORIDE, PRESERVATIVE FREE 10 ML: 5 INJECTION INTRAVENOUS at 22:25

## 2024-05-23 RX ADMIN — OXYCODONE 5 MG: 5 TABLET ORAL at 01:34

## 2024-05-23 RX ADMIN — INSULIN GLARGINE 10 UNITS: 100 INJECTION, SOLUTION SUBCUTANEOUS at 10:19

## 2024-05-23 RX ADMIN — LOPERAMIDE HYDROCHLORIDE 2 MG: 2 CAPSULE ORAL at 22:24

## 2024-05-23 RX ADMIN — HYDROCORTISONE SODIUM SUCCINATE 50 MG: 100 INJECTION, POWDER, FOR SOLUTION INTRAMUSCULAR; INTRAVENOUS at 16:44

## 2024-05-23 RX ADMIN — INSULIN GLARGINE 10 UNITS: 100 INJECTION, SOLUTION SUBCUTANEOUS at 22:23

## 2024-05-23 RX ADMIN — OXYCODONE 5 MG: 5 TABLET ORAL at 22:55

## 2024-05-23 RX ADMIN — POTASSIUM CHLORIDE 10 MEQ: 7.46 INJECTION, SOLUTION INTRAVENOUS at 14:49

## 2024-05-23 RX ADMIN — HEPARIN SODIUM 5000 UNITS: 5000 INJECTION INTRAVENOUS; SUBCUTANEOUS at 22:24

## 2024-05-23 RX ADMIN — LOPERAMIDE HYDROCHLORIDE 2 MG: 2 CAPSULE ORAL at 13:51

## 2024-05-23 RX ADMIN — INSULIN LISPRO 2 UNITS: 100 INJECTION, SOLUTION INTRAVENOUS; SUBCUTANEOUS at 13:50

## 2024-05-23 ASSESSMENT — PAIN DESCRIPTION - DESCRIPTORS
DESCRIPTORS: THROBBING
DESCRIPTORS: THROBBING;ACHING
DESCRIPTORS: ACHING;THROBBING
DESCRIPTORS: THROBBING

## 2024-05-23 ASSESSMENT — PAIN SCALES - GENERAL
PAINLEVEL_OUTOF10: 9
PAINLEVEL_OUTOF10: 8
PAINLEVEL_OUTOF10: 3
PAINLEVEL_OUTOF10: 7
PAINLEVEL_OUTOF10: 8
PAINLEVEL_OUTOF10: 4
PAINLEVEL_OUTOF10: 7
PAINLEVEL_OUTOF10: 8
PAINLEVEL_OUTOF10: 4
PAINLEVEL_OUTOF10: 7
PAINLEVEL_OUTOF10: 6

## 2024-05-23 ASSESSMENT — PAIN DESCRIPTION - LOCATION
LOCATION: HEAD
LOCATION: OTHER (COMMENT)
LOCATION: HIP
LOCATION: HIP
LOCATION: HEAD;HIP
LOCATION: HEAD

## 2024-05-23 ASSESSMENT — PAIN DESCRIPTION - ORIENTATION
ORIENTATION: RIGHT

## 2024-05-23 ASSESSMENT — PAIN - FUNCTIONAL ASSESSMENT
PAIN_FUNCTIONAL_ASSESSMENT: ACTIVITIES ARE NOT PREVENTED
PAIN_FUNCTIONAL_ASSESSMENT: PREVENTS OR INTERFERES SOME ACTIVE ACTIVITIES AND ADLS

## 2024-05-23 NOTE — CARE COORDINATION
Discharge Planning:    This RN CM spoke with patient and  at bedside re: discharge planning. Patient/family confirmed that primary dc goal would be to return home when medically stable for dc. Patient/family verbalized understanding that current recommendations are for 3-5 sessions/week of ongoing therapy at a SNF level of care.     Freedom of Choice list provided to patient/family for their review. CM to follow up in am for referral preferences.    Electronically signed by PRUDENCIO CACERES RN on 5/23/2024 at 12:34 PM

## 2024-05-23 NOTE — CONSENT
Informed Consent for Blood Component Transfusion Note    I have discussed with the patient the rationale for blood component transfusion; its benefits in treating or preventing fatigue, organ damage, or death; and its risk which includes mild transfusion reactions, rare risk of blood borne infection, or more serious but rare reactions. I have discussed the alternatives to transfusion, including the risk and consequences of not receiving transfusion. The patient had an opportunity to ask questions and had agreed to proceed with transfusion of blood components.    Electronically signed by Nia Crain MD on 5/23/24 at 8:59 AM EDT

## 2024-05-23 NOTE — FLOWSHEET NOTE
05/23/24 1951   Treatment Team Notification   Reason for Communication Review case   Name of Team Member Notified Zoya JANE   Treatment Team Role Advanced Practice Nurse   Response See orders   Notification Time 0752     Mag replacement orders given for 1.1 mag levels.    Lactobacillus ordered for frequent bowel movements.    Patient having increased redness on bottom due to frequent bowel movements.  Cream applied.

## 2024-05-24 ENCOUNTER — APPOINTMENT (OUTPATIENT)
Dept: MRI IMAGING | Age: 64
DRG: 956 | End: 2024-05-24
Payer: MEDICARE

## 2024-05-24 LAB
ALBUMIN SERPL-MCNC: 2.9 G/DL (ref 3.4–5)
ANION GAP SERPL CALCULATED.3IONS-SCNC: 4 MMOL/L (ref 3–16)
BASOPHILS # BLD: 0 K/UL (ref 0–0.2)
BASOPHILS NFR BLD: 0 %
BUN SERPL-MCNC: 35 MG/DL (ref 7–20)
CALCIUM SERPL-MCNC: 8.5 MG/DL (ref 8.3–10.6)
CHLORIDE SERPL-SCNC: 102 MMOL/L (ref 99–110)
CO2 SERPL-SCNC: 36 MMOL/L (ref 21–32)
CREAT SERPL-MCNC: 1.6 MG/DL (ref 0.6–1.2)
DEPRECATED RDW RBC AUTO: 14.9 % (ref 12.4–15.4)
EOSINOPHIL # BLD: 0 K/UL (ref 0–0.6)
EOSINOPHIL NFR BLD: 0.1 %
GFR SERPLBLD CREATININE-BSD FMLA CKD-EPI: 35 ML/MIN/{1.73_M2}
GLUCOSE BLD-MCNC: 148 MG/DL (ref 70–99)
GLUCOSE BLD-MCNC: 71 MG/DL (ref 70–99)
GLUCOSE BLD-MCNC: 99 MG/DL (ref 70–99)
GLUCOSE SERPL-MCNC: 76 MG/DL (ref 70–99)
HCT VFR BLD AUTO: 24.1 % (ref 36–48)
HGB BLD-MCNC: 8.2 G/DL (ref 12–16)
LYMPHOCYTES # BLD: 0.6 K/UL (ref 1–5.1)
LYMPHOCYTES NFR BLD: 6.9 %
MAGNESIUM SERPL-MCNC: 2.8 MG/DL (ref 1.8–2.4)
MCH RBC QN AUTO: 28.7 PG (ref 26–34)
MCHC RBC AUTO-ENTMCNC: 34.3 G/DL (ref 31–36)
MCV RBC AUTO: 83.8 FL (ref 80–100)
MONOCYTES # BLD: 0.5 K/UL (ref 0–1.3)
MONOCYTES NFR BLD: 6 %
NEUTROPHILS # BLD: 7.3 K/UL (ref 1.7–7.7)
NEUTROPHILS NFR BLD: 87 %
PERFORMED ON: ABNORMAL
PERFORMED ON: NORMAL
PERFORMED ON: NORMAL
PHOSPHATE SERPL-MCNC: 2.4 MG/DL (ref 2.5–4.9)
PLATELET # BLD AUTO: 170 K/UL (ref 135–450)
PMV BLD AUTO: 8.3 FL (ref 5–10.5)
POTASSIUM SERPL-SCNC: 3.2 MMOL/L (ref 3.5–5.1)
RBC # BLD AUTO: 2.87 M/UL (ref 4–5.2)
SODIUM SERPL-SCNC: 142 MMOL/L (ref 136–145)
WBC # BLD AUTO: 8.3 K/UL (ref 4–11)

## 2024-05-24 PROCEDURE — 6360000002 HC RX W HCPCS: Performed by: STUDENT IN AN ORGANIZED HEALTH CARE EDUCATION/TRAINING PROGRAM

## 2024-05-24 PROCEDURE — 6360000002 HC RX W HCPCS: Performed by: ORTHOPAEDIC SURGERY

## 2024-05-24 PROCEDURE — 2500000003 HC RX 250 WO HCPCS

## 2024-05-24 PROCEDURE — 97530 THERAPEUTIC ACTIVITIES: CPT | Performed by: PHYSICAL THERAPIST

## 2024-05-24 PROCEDURE — 6370000000 HC RX 637 (ALT 250 FOR IP): Performed by: ORTHOPAEDIC SURGERY

## 2024-05-24 PROCEDURE — 30233N1 TRANSFUSION OF NONAUTOLOGOUS RED BLOOD CELLS INTO PERIPHERAL VEIN, PERCUTANEOUS APPROACH: ICD-10-PCS | Performed by: FAMILY MEDICINE

## 2024-05-24 PROCEDURE — 6370000000 HC RX 637 (ALT 250 FOR IP): Performed by: STUDENT IN AN ORGANIZED HEALTH CARE EDUCATION/TRAINING PROGRAM

## 2024-05-24 PROCEDURE — 6360000002 HC RX W HCPCS: Performed by: NURSE PRACTITIONER

## 2024-05-24 PROCEDURE — 2580000003 HC RX 258: Performed by: ORTHOPAEDIC SURGERY

## 2024-05-24 PROCEDURE — 6370000000 HC RX 637 (ALT 250 FOR IP)

## 2024-05-24 PROCEDURE — 2700000000 HC OXYGEN THERAPY PER DAY

## 2024-05-24 PROCEDURE — 95819 EEG AWAKE AND ASLEEP: CPT

## 2024-05-24 PROCEDURE — 2060000000 HC ICU INTERMEDIATE R&B

## 2024-05-24 PROCEDURE — 94761 N-INVAS EAR/PLS OXIMETRY MLT: CPT

## 2024-05-24 PROCEDURE — 70544 MR ANGIOGRAPHY HEAD W/O DYE: CPT

## 2024-05-24 PROCEDURE — 85025 COMPLETE CBC W/AUTO DIFF WBC: CPT

## 2024-05-24 PROCEDURE — 6370000000 HC RX 637 (ALT 250 FOR IP): Performed by: NURSE PRACTITIONER

## 2024-05-24 PROCEDURE — 2580000003 HC RX 258

## 2024-05-24 PROCEDURE — 97530 THERAPEUTIC ACTIVITIES: CPT

## 2024-05-24 PROCEDURE — 80069 RENAL FUNCTION PANEL: CPT

## 2024-05-24 PROCEDURE — 6360000002 HC RX W HCPCS

## 2024-05-24 PROCEDURE — 83735 ASSAY OF MAGNESIUM: CPT

## 2024-05-24 PROCEDURE — 2580000003 HC RX 258: Performed by: STUDENT IN AN ORGANIZED HEALTH CARE EDUCATION/TRAINING PROGRAM

## 2024-05-24 RX ORDER — OXYCODONE HYDROCHLORIDE 5 MG/1
5 TABLET ORAL EVERY 4 HOURS PRN
Status: DISCONTINUED | OUTPATIENT
Start: 2024-05-24 | End: 2024-05-27 | Stop reason: HOSPADM

## 2024-05-24 RX ORDER — LEVETIRACETAM 500 MG/5ML
1000 INJECTION, SOLUTION, CONCENTRATE INTRAVENOUS ONCE
Status: COMPLETED | OUTPATIENT
Start: 2024-05-24 | End: 2024-05-24

## 2024-05-24 RX ORDER — LORAZEPAM 1 MG/1
1 TABLET ORAL
Status: COMPLETED | OUTPATIENT
Start: 2024-05-24 | End: 2024-05-24

## 2024-05-24 RX ORDER — LEVETIRACETAM 500 MG/5ML
500 INJECTION, SOLUTION, CONCENTRATE INTRAVENOUS EVERY 12 HOURS
Status: DISCONTINUED | OUTPATIENT
Start: 2024-05-24 | End: 2024-05-27 | Stop reason: HOSPADM

## 2024-05-24 RX ORDER — LEVETIRACETAM 10 MG/ML
1000 INJECTION INTRAVASCULAR ONCE
Status: DISCONTINUED | OUTPATIENT
Start: 2024-05-24 | End: 2024-05-24 | Stop reason: SDUPTHER

## 2024-05-24 RX ORDER — LEVETIRACETAM 5 MG/ML
500 INJECTION INTRAVASCULAR EVERY 12 HOURS
Status: DISCONTINUED | OUTPATIENT
Start: 2024-05-24 | End: 2024-05-24 | Stop reason: SDUPTHER

## 2024-05-24 RX ORDER — POTASSIUM CHLORIDE 20 MEQ/1
40 TABLET, EXTENDED RELEASE ORAL 3 TIMES DAILY
Status: COMPLETED | OUTPATIENT
Start: 2024-05-24 | End: 2024-05-24

## 2024-05-24 RX ADMIN — OXYCODONE 5 MG: 5 TABLET ORAL at 19:12

## 2024-05-24 RX ADMIN — NIFEDIPINE 30 MG: 30 TABLET, EXTENDED RELEASE ORAL at 09:15

## 2024-05-24 RX ADMIN — LEVETIRACETAM 1000 MG: 100 INJECTION, SOLUTION INTRAVENOUS at 14:59

## 2024-05-24 RX ADMIN — ROPINIROLE HYDROCHLORIDE 1 MG: 1 TABLET, FILM COATED ORAL at 21:07

## 2024-05-24 RX ADMIN — SODIUM CHLORIDE, PRESERVATIVE FREE 10 ML: 5 INJECTION INTRAVENOUS at 09:19

## 2024-05-24 RX ADMIN — POTASSIUM CHLORIDE 40 MEQ: 1500 TABLET, EXTENDED RELEASE ORAL at 14:58

## 2024-05-24 RX ADMIN — LOPERAMIDE HYDROCHLORIDE 2 MG: 2 CAPSULE ORAL at 09:15

## 2024-05-24 RX ADMIN — OXYCODONE 5 MG: 5 TABLET ORAL at 14:57

## 2024-05-24 RX ADMIN — PANTOPRAZOLE SODIUM 40 MG: 40 TABLET, DELAYED RELEASE ORAL at 06:04

## 2024-05-24 RX ADMIN — HEPARIN SODIUM 5000 UNITS: 5000 INJECTION INTRAVENOUS; SUBCUTANEOUS at 06:04

## 2024-05-24 RX ADMIN — Medication 2 CAPSULE: at 09:17

## 2024-05-24 RX ADMIN — HYDROCORTISONE SODIUM SUCCINATE 50 MG: 100 INJECTION, POWDER, FOR SOLUTION INTRAMUSCULAR; INTRAVENOUS at 18:11

## 2024-05-24 RX ADMIN — PANTOPRAZOLE SODIUM 40 MG: 40 TABLET, DELAYED RELEASE ORAL at 14:58

## 2024-05-24 RX ADMIN — Medication 2 CAPSULE: at 18:10

## 2024-05-24 RX ADMIN — SODIUM CHLORIDE: 9 INJECTION, SOLUTION INTRAVENOUS at 09:22

## 2024-05-24 RX ADMIN — POTASSIUM CHLORIDE 40 MEQ: 1500 TABLET, EXTENDED RELEASE ORAL at 09:16

## 2024-05-24 RX ADMIN — HYDROCORTISONE SODIUM SUCCINATE 50 MG: 100 INJECTION, POWDER, FOR SOLUTION INTRAMUSCULAR; INTRAVENOUS at 02:04

## 2024-05-24 RX ADMIN — LORAZEPAM 1 MG: 1 TABLET ORAL at 11:40

## 2024-05-24 RX ADMIN — POTASSIUM CHLORIDE 40 MEQ: 1500 TABLET, EXTENDED RELEASE ORAL at 21:07

## 2024-05-24 RX ADMIN — HEPARIN SODIUM 5000 UNITS: 5000 INJECTION INTRAVENOUS; SUBCUTANEOUS at 21:07

## 2024-05-24 RX ADMIN — IRON SUCROSE 200 MG: 20 INJECTION, SOLUTION INTRAVENOUS at 14:58

## 2024-05-24 RX ADMIN — OXYCODONE 5 MG: 5 TABLET ORAL at 06:10

## 2024-05-24 RX ADMIN — HEPARIN SODIUM 5000 UNITS: 5000 INJECTION INTRAVENOUS; SUBCUTANEOUS at 14:59

## 2024-05-24 RX ADMIN — CEFTRIAXONE SODIUM 2000 MG: 2 INJECTION, POWDER, FOR SOLUTION INTRAMUSCULAR; INTRAVENOUS at 15:10

## 2024-05-24 RX ADMIN — LEVETIRACETAM 500 MG: 100 INJECTION, SOLUTION INTRAVENOUS at 21:07

## 2024-05-24 RX ADMIN — ESCITALOPRAM OXALATE 10 MG: 10 TABLET ORAL at 09:15

## 2024-05-24 RX ADMIN — POTASSIUM PHOSPHATE, MONOBASIC POTASSIUM PHOSPHATE, DIBASIC 10 MMOL: 224; 236 INJECTION, SOLUTION, CONCENTRATE INTRAVENOUS at 15:18

## 2024-05-24 RX ADMIN — SODIUM CHLORIDE: 9 INJECTION, SOLUTION INTRAVENOUS at 15:04

## 2024-05-24 RX ADMIN — LOPERAMIDE HYDROCHLORIDE 2 MG: 2 CAPSULE ORAL at 18:09

## 2024-05-24 RX ADMIN — HYDROCORTISONE SODIUM SUCCINATE 50 MG: 100 INJECTION, POWDER, FOR SOLUTION INTRAMUSCULAR; INTRAVENOUS at 09:14

## 2024-05-24 RX ADMIN — SODIUM CHLORIDE: 9 INJECTION, SOLUTION INTRAVENOUS at 15:09

## 2024-05-24 ASSESSMENT — PAIN DESCRIPTION - DESCRIPTORS
DESCRIPTORS: ACHING
DESCRIPTORS: ACHING
DESCRIPTORS: ACHING;THROBBING

## 2024-05-24 ASSESSMENT — PAIN DESCRIPTION - LOCATION
LOCATION: HIP

## 2024-05-24 ASSESSMENT — PAIN DESCRIPTION - ORIENTATION
ORIENTATION: RIGHT

## 2024-05-24 ASSESSMENT — PAIN SCALES - GENERAL
PAINLEVEL_OUTOF10: 7
PAINLEVEL_OUTOF10: 9
PAINLEVEL_OUTOF10: 9
PAINLEVEL_OUTOF10: 7
PAINLEVEL_OUTOF10: 5
PAINLEVEL_OUTOF10: 3
PAINLEVEL_OUTOF10: 2

## 2024-05-24 ASSESSMENT — PAIN - FUNCTIONAL ASSESSMENT
PAIN_FUNCTIONAL_ASSESSMENT: PREVENTS OR INTERFERES SOME ACTIVE ACTIVITIES AND ADLS

## 2024-05-24 ASSESSMENT — PAIN DESCRIPTION - FREQUENCY: FREQUENCY: CONTINUOUS

## 2024-05-24 ASSESSMENT — PAIN DESCRIPTION - ONSET: ONSET: ON-GOING

## 2024-05-24 ASSESSMENT — PAIN SCALES - WONG BAKER: WONGBAKER_NUMERICALRESPONSE: NO HURT

## 2024-05-24 ASSESSMENT — PAIN DESCRIPTION - PAIN TYPE: TYPE: SURGICAL PAIN

## 2024-05-24 NOTE — PROCEDURES
[facility]          [address] [place] [address]                       ELECTROENCEPHALOGRAM REPORT      PATIENT NAME: CARLOS ANDERSON           : 1960  MED REC NO: [id number]                      ROOM: [place] [address]  ACCOUNT NO: [id number]                       ADMIT DATE: 2024  PROVIDER: Flip Jenkins DO      DATE OF EE2024    REFERRING PHYSICIAN:  YVROSE ELIZABETH    REASON FOR STUDY:  PRES syndrome, eyes fixed.    BRIEF HISTORY AND NEUROLOGIC FINDINGS:  The patient is a 63-year-old female being evaluated for posterior reversible encephalopathy syndrome.    MEDICATIONS:  The patient's centrally acting medications listed include Requip, oxycodone immediate release, and Lexapro.    FINDINGS:  This is a 20-channel digital EEG performed utilizing bipolar and referential montages.  Wakefulness, drowsiness, and sleep were obtained during the recording.  During maximal wakefulness, there is a moderate voltage, fairly symmetric at times, reactive though disorganized background consisting of mixed alpha and theta frequencies.  The anterior background consists of low voltage fast frequencies.  Drowsiness is manifested by attenuation of waking and background rhythms.  Sleep is manifested by somewhat poorly formed K complexes and sleep spindles.    There appeared to be electrographic seizure in the right parieto-occipital region, initially beginning with beta activity in that region followed by brief theta and delta frequencies, with eventual return to theta and alpha frequencies, then briefly into beta frequencies again and further spreading in the right hemisphere and to a lesser extent in the posterior left hemisphere with mixed frequencies and prominent delta frequencies ranging as low as 1 to 1.5 Hz, followed by bihemispheric suppression.  The seizure in total appeared to last approximately 2 minutes.  There is eventual return to theta and

## 2024-05-24 NOTE — CARE COORDINATION
Discharge Planning:    PMR consult noted. This RN CM spoke with patient/family (daughter) at bedside, providing information on ARU level of care and freedom of choice list.     The Plan for Transition of Care is related to the following treatment goals: ARU    The Patient was provided with a choice of provider and agrees   with the discharge plan. [x] Yes [] No    Freedom of choice list was provided with basic dialogue that supports the patient's individualized plan of care/goals, treatment preferences and shares the quality data associated with the providers. [x] Yes [] No    Patient/family confirmed they are interested in ARU and requested patient to attend ARU at [facility]. Call placed to ARU admissions to inform. [facility] confirmed PMR physician will be to bedside tomorrow to assess patient. CM to continue to monitor.    Electronically signed by PRUDENCIO CACERES RN on 5/24/2024 at 4:12 PM

## 2024-05-25 LAB
ALBUMIN SERPL-MCNC: 2.7 G/DL (ref 3.4–5)
ANION GAP SERPL CALCULATED.3IONS-SCNC: 12 MMOL/L (ref 3–16)
ANISOCYTOSIS BLD QL SMEAR: ABNORMAL
BACTERIA BLD CULT ORG #2: NORMAL
BACTERIA BLD CULT: NORMAL
BASOPHILS # BLD: 0 K/UL (ref 0–0.2)
BASOPHILS NFR BLD: 0 %
BUN SERPL-MCNC: 33 MG/DL (ref 7–20)
CALCIUM SERPL-MCNC: 8.2 MG/DL (ref 8.3–10.6)
CHLORIDE SERPL-SCNC: 98 MMOL/L (ref 99–110)
CO2 SERPL-SCNC: 25 MMOL/L (ref 21–32)
CREAT SERPL-MCNC: 1.7 MG/DL (ref 0.6–1.2)
DEPRECATED RDW RBC AUTO: 15.1 % (ref 12.4–15.4)
EOSINOPHIL # BLD: 0 K/UL (ref 0–0.6)
EOSINOPHIL NFR BLD: 0 %
GFR SERPLBLD CREATININE-BSD FMLA CKD-EPI: 33 ML/MIN/{1.73_M2}
GLUCOSE BLD-MCNC: 225 MG/DL (ref 70–99)
GLUCOSE BLD-MCNC: 256 MG/DL (ref 70–99)
GLUCOSE BLD-MCNC: 263 MG/DL (ref 70–99)
GLUCOSE BLD-MCNC: 486 MG/DL (ref 70–99)
GLUCOSE BLD-MCNC: 584 MG/DL (ref 70–99)
GLUCOSE BLD-MCNC: 596 MG/DL (ref 70–99)
GLUCOSE SERPL-MCNC: 225 MG/DL (ref 70–99)
HCT VFR BLD AUTO: 23.2 % (ref 36–48)
HGB BLD-MCNC: 8.1 G/DL (ref 12–16)
LYMPHOCYTES # BLD: 0.1 K/UL (ref 1–5.1)
LYMPHOCYTES NFR BLD: 1 %
MAGNESIUM SERPL-MCNC: 1.8 MG/DL (ref 1.8–2.4)
MCH RBC QN AUTO: 29.3 PG (ref 26–34)
MCHC RBC AUTO-ENTMCNC: 34.7 G/DL (ref 31–36)
MCV RBC AUTO: 84.6 FL (ref 80–100)
MICROCYTES BLD QL SMEAR: ABNORMAL
MONOCYTES # BLD: 0.3 K/UL (ref 0–1.3)
MONOCYTES NFR BLD: 3 %
NEUTROPHILS # BLD: 8.6 K/UL (ref 1.7–7.7)
NEUTROPHILS NFR BLD: 96 %
OVALOCYTES BLD QL SMEAR: ABNORMAL
PERFORMED ON: ABNORMAL
PHOSPHATE SERPL-MCNC: 2.8 MG/DL (ref 2.5–4.9)
PLATELET # BLD AUTO: 183 K/UL (ref 135–450)
PMV BLD AUTO: 8.7 FL (ref 5–10.5)
POIKILOCYTOSIS BLD QL SMEAR: ABNORMAL
POTASSIUM SERPL-SCNC: 4.3 MMOL/L (ref 3.5–5.1)
RBC # BLD AUTO: 2.75 M/UL (ref 4–5.2)
SLIDE REVIEW: ABNORMAL
SODIUM SERPL-SCNC: 135 MMOL/L (ref 136–145)
WBC # BLD AUTO: 9 K/UL (ref 4–11)

## 2024-05-25 PROCEDURE — 6370000000 HC RX 637 (ALT 250 FOR IP)

## 2024-05-25 PROCEDURE — 6360000002 HC RX W HCPCS: Performed by: NURSE PRACTITIONER

## 2024-05-25 PROCEDURE — 6370000000 HC RX 637 (ALT 250 FOR IP): Performed by: ORTHOPAEDIC SURGERY

## 2024-05-25 PROCEDURE — 6370000000 HC RX 637 (ALT 250 FOR IP): Performed by: STUDENT IN AN ORGANIZED HEALTH CARE EDUCATION/TRAINING PROGRAM

## 2024-05-25 PROCEDURE — 94760 N-INVAS EAR/PLS OXIMETRY 1: CPT

## 2024-05-25 PROCEDURE — 6360000002 HC RX W HCPCS

## 2024-05-25 PROCEDURE — 80069 RENAL FUNCTION PANEL: CPT

## 2024-05-25 PROCEDURE — 85025 COMPLETE CBC W/AUTO DIFF WBC: CPT

## 2024-05-25 PROCEDURE — 83735 ASSAY OF MAGNESIUM: CPT

## 2024-05-25 PROCEDURE — 6360000002 HC RX W HCPCS: Performed by: ORTHOPAEDIC SURGERY

## 2024-05-25 PROCEDURE — 2060000000 HC ICU INTERMEDIATE R&B

## 2024-05-25 PROCEDURE — 6370000000 HC RX 637 (ALT 250 FOR IP): Performed by: NURSE PRACTITIONER

## 2024-05-25 PROCEDURE — 6360000002 HC RX W HCPCS: Performed by: STUDENT IN AN ORGANIZED HEALTH CARE EDUCATION/TRAINING PROGRAM

## 2024-05-25 PROCEDURE — 2580000003 HC RX 258: Performed by: STUDENT IN AN ORGANIZED HEALTH CARE EDUCATION/TRAINING PROGRAM

## 2024-05-25 PROCEDURE — 2580000003 HC RX 258: Performed by: ORTHOPAEDIC SURGERY

## 2024-05-25 RX ORDER — INSULIN LISPRO 100 [IU]/ML
6 INJECTION, SOLUTION INTRAVENOUS; SUBCUTANEOUS ONCE
Status: COMPLETED | OUTPATIENT
Start: 2024-05-25 | End: 2024-05-25

## 2024-05-25 RX ORDER — ROPINIROLE 1 MG/1
2 TABLET, FILM COATED ORAL NIGHTLY
Status: DISCONTINUED | OUTPATIENT
Start: 2024-05-25 | End: 2024-05-27 | Stop reason: HOSPADM

## 2024-05-25 RX ORDER — INSULIN LISPRO 100 [IU]/ML
12 INJECTION, SOLUTION INTRAVENOUS; SUBCUTANEOUS ONCE
Status: COMPLETED | OUTPATIENT
Start: 2024-05-25 | End: 2024-05-25

## 2024-05-25 RX ADMIN — INSULIN LISPRO 4 UNITS: 100 INJECTION, SOLUTION INTRAVENOUS; SUBCUTANEOUS at 08:45

## 2024-05-25 RX ADMIN — INSULIN GLARGINE 10 UNITS: 100 INJECTION, SOLUTION SUBCUTANEOUS at 20:07

## 2024-05-25 RX ADMIN — HYDROCORTISONE SODIUM SUCCINATE 50 MG: 100 INJECTION, POWDER, FOR SOLUTION INTRAMUSCULAR; INTRAVENOUS at 01:16

## 2024-05-25 RX ADMIN — SODIUM CHLORIDE, PRESERVATIVE FREE 10 ML: 5 INJECTION INTRAVENOUS at 23:04

## 2024-05-25 RX ADMIN — OXYCODONE 5 MG: 5 TABLET ORAL at 03:14

## 2024-05-25 RX ADMIN — OXYCODONE 5 MG: 5 TABLET ORAL at 12:50

## 2024-05-25 RX ADMIN — NIFEDIPINE 30 MG: 30 TABLET, EXTENDED RELEASE ORAL at 08:46

## 2024-05-25 RX ADMIN — CEFTRIAXONE SODIUM 2000 MG: 2 INJECTION, POWDER, FOR SOLUTION INTRAMUSCULAR; INTRAVENOUS at 12:53

## 2024-05-25 RX ADMIN — PANTOPRAZOLE SODIUM 40 MG: 40 TABLET, DELAYED RELEASE ORAL at 16:52

## 2024-05-25 RX ADMIN — LOPERAMIDE HYDROCHLORIDE 2 MG: 2 CAPSULE ORAL at 23:04

## 2024-05-25 RX ADMIN — ESCITALOPRAM OXALATE 10 MG: 10 TABLET ORAL at 08:46

## 2024-05-25 RX ADMIN — HEPARIN SODIUM 5000 UNITS: 5000 INJECTION INTRAVENOUS; SUBCUTANEOUS at 20:05

## 2024-05-25 RX ADMIN — OXYCODONE 5 MG: 5 TABLET ORAL at 16:52

## 2024-05-25 RX ADMIN — LEVETIRACETAM 500 MG: 100 INJECTION, SOLUTION INTRAVENOUS at 20:05

## 2024-05-25 RX ADMIN — Medication 2 CAPSULE: at 16:52

## 2024-05-25 RX ADMIN — INSULIN LISPRO 4 UNITS: 100 INJECTION, SOLUTION INTRAVENOUS; SUBCUTANEOUS at 20:06

## 2024-05-25 RX ADMIN — OXYCODONE 5 MG: 5 TABLET ORAL at 08:45

## 2024-05-25 RX ADMIN — HEPARIN SODIUM 5000 UNITS: 5000 INJECTION INTRAVENOUS; SUBCUTANEOUS at 05:05

## 2024-05-25 RX ADMIN — IRON SUCROSE 200 MG: 20 INJECTION, SOLUTION INTRAVENOUS at 10:25

## 2024-05-25 RX ADMIN — PANTOPRAZOLE SODIUM 40 MG: 40 TABLET, DELAYED RELEASE ORAL at 05:05

## 2024-05-25 RX ADMIN — OXYCODONE 5 MG: 5 TABLET ORAL at 23:04

## 2024-05-25 RX ADMIN — HYDRALAZINE HYDROCHLORIDE 5 MG: 20 INJECTION INTRAMUSCULAR; INTRAVENOUS at 03:17

## 2024-05-25 RX ADMIN — INSULIN LISPRO 2 UNITS: 100 INJECTION, SOLUTION INTRAVENOUS; SUBCUTANEOUS at 13:24

## 2024-05-25 RX ADMIN — Medication 2 CAPSULE: at 08:46

## 2024-05-25 RX ADMIN — HYDROCORTISONE SODIUM SUCCINATE 50 MG: 100 INJECTION, POWDER, FOR SOLUTION INTRAMUSCULAR; INTRAVENOUS at 16:52

## 2024-05-25 RX ADMIN — ROPINIROLE HYDROCHLORIDE 2 MG: 1 TABLET, FILM COATED ORAL at 20:05

## 2024-05-25 RX ADMIN — HYDROCORTISONE SODIUM SUCCINATE 50 MG: 100 INJECTION, POWDER, FOR SOLUTION INTRAMUSCULAR; INTRAVENOUS at 08:48

## 2024-05-25 RX ADMIN — INSULIN LISPRO 6 UNITS: 100 INJECTION, SOLUTION INTRAVENOUS; SUBCUTANEOUS at 20:07

## 2024-05-25 RX ADMIN — LEVETIRACETAM 500 MG: 100 INJECTION, SOLUTION INTRAVENOUS at 08:50

## 2024-05-25 RX ADMIN — LOPERAMIDE HYDROCHLORIDE 2 MG: 2 CAPSULE ORAL at 03:14

## 2024-05-25 RX ADMIN — HEPARIN SODIUM 5000 UNITS: 5000 INJECTION INTRAVENOUS; SUBCUTANEOUS at 13:24

## 2024-05-25 RX ADMIN — INSULIN LISPRO 12 UNITS: 100 INJECTION, SOLUTION INTRAVENOUS; SUBCUTANEOUS at 18:26

## 2024-05-25 ASSESSMENT — PAIN SCALES - GENERAL
PAINLEVEL_OUTOF10: 8
PAINLEVEL_OUTOF10: 8
PAINLEVEL_OUTOF10: 6
PAINLEVEL_OUTOF10: 8
PAINLEVEL_OUTOF10: 0
PAINLEVEL_OUTOF10: 8
PAINLEVEL_OUTOF10: 4
PAINLEVEL_OUTOF10: 8
PAINLEVEL_OUTOF10: 5

## 2024-05-25 ASSESSMENT — PAIN SCALES - WONG BAKER: WONGBAKER_NUMERICALRESPONSE: NO HURT

## 2024-05-25 ASSESSMENT — PAIN DESCRIPTION - LOCATION: LOCATION: GENERALIZED

## 2024-05-26 LAB
ALBUMIN SERPL-MCNC: 2.8 G/DL (ref 3.4–5)
ANION GAP SERPL CALCULATED.3IONS-SCNC: 10 MMOL/L (ref 3–16)
ANISOCYTOSIS BLD QL SMEAR: ABNORMAL
BASOPHILS # BLD: 0 K/UL (ref 0–0.2)
BASOPHILS NFR BLD: 0 %
BUN SERPL-MCNC: 33 MG/DL (ref 7–20)
CALCIUM SERPL-MCNC: 8.8 MG/DL (ref 8.3–10.6)
CHLORIDE SERPL-SCNC: 102 MMOL/L (ref 99–110)
CO2 SERPL-SCNC: 26 MMOL/L (ref 21–32)
CREAT SERPL-MCNC: 1.8 MG/DL (ref 0.6–1.2)
DEPRECATED RDW RBC AUTO: 14.8 % (ref 12.4–15.4)
EOSINOPHIL # BLD: 0 K/UL (ref 0–0.6)
EOSINOPHIL NFR BLD: 0 %
GFR SERPLBLD CREATININE-BSD FMLA CKD-EPI: 31 ML/MIN/{1.73_M2}
GLUCOSE BLD-MCNC: 212 MG/DL (ref 70–99)
GLUCOSE BLD-MCNC: 220 MG/DL (ref 70–99)
GLUCOSE BLD-MCNC: 228 MG/DL (ref 70–99)
GLUCOSE BLD-MCNC: 298 MG/DL (ref 70–99)
GLUCOSE BLD-MCNC: 64 MG/DL (ref 70–99)
GLUCOSE SERPL-MCNC: 52 MG/DL (ref 70–99)
HCT VFR BLD AUTO: 26.5 % (ref 36–48)
HGB BLD-MCNC: 9 G/DL (ref 12–16)
LYMPHOCYTES # BLD: 0.6 K/UL (ref 1–5.1)
LYMPHOCYTES NFR BLD: 5 %
MAGNESIUM SERPL-MCNC: 1.8 MG/DL (ref 1.8–2.4)
MCH RBC QN AUTO: 28.8 PG (ref 26–34)
MCHC RBC AUTO-ENTMCNC: 34 G/DL (ref 31–36)
MCV RBC AUTO: 84.9 FL (ref 80–100)
METAMYELOCYTES NFR BLD MANUAL: 1 %
MICROCYTES BLD QL SMEAR: ABNORMAL
MONOCYTES # BLD: 0.8 K/UL (ref 0–1.3)
MONOCYTES NFR BLD: 7 %
MYELOCYTES NFR BLD MANUAL: 1 %
NEUTROPHILS # BLD: 10.2 K/UL (ref 1.7–7.7)
NEUTROPHILS NFR BLD: 84 %
NEUTS BAND NFR BLD MANUAL: 2 % (ref 0–7)
OVALOCYTES BLD QL SMEAR: ABNORMAL
PERFORMED ON: ABNORMAL
PHOSPHATE SERPL-MCNC: 3.4 MG/DL (ref 2.5–4.9)
PLATELET # BLD AUTO: 244 K/UL (ref 135–450)
PLATELET BLD QL SMEAR: ADEQUATE
PMV BLD AUTO: 8.5 FL (ref 5–10.5)
POIKILOCYTOSIS BLD QL SMEAR: ABNORMAL
POTASSIUM SERPL-SCNC: 3.1 MMOL/L (ref 3.5–5.1)
PROCALCITONIN SERPL IA-MCNC: 0.36 NG/ML (ref 0–0.15)
RBC # BLD AUTO: 3.12 M/UL (ref 4–5.2)
SLIDE REVIEW: ABNORMAL
SODIUM SERPL-SCNC: 138 MMOL/L (ref 136–145)
WBC # BLD AUTO: 11.6 K/UL (ref 4–11)

## 2024-05-26 PROCEDURE — 6360000002 HC RX W HCPCS: Performed by: NURSE PRACTITIONER

## 2024-05-26 PROCEDURE — 6360000002 HC RX W HCPCS: Performed by: STUDENT IN AN ORGANIZED HEALTH CARE EDUCATION/TRAINING PROGRAM

## 2024-05-26 PROCEDURE — 6360000002 HC RX W HCPCS: Performed by: ORTHOPAEDIC SURGERY

## 2024-05-26 PROCEDURE — 6370000000 HC RX 637 (ALT 250 FOR IP): Performed by: STUDENT IN AN ORGANIZED HEALTH CARE EDUCATION/TRAINING PROGRAM

## 2024-05-26 PROCEDURE — 2580000003 HC RX 258: Performed by: STUDENT IN AN ORGANIZED HEALTH CARE EDUCATION/TRAINING PROGRAM

## 2024-05-26 PROCEDURE — 6370000000 HC RX 637 (ALT 250 FOR IP)

## 2024-05-26 PROCEDURE — 83735 ASSAY OF MAGNESIUM: CPT

## 2024-05-26 PROCEDURE — 6370000000 HC RX 637 (ALT 250 FOR IP): Performed by: ORTHOPAEDIC SURGERY

## 2024-05-26 PROCEDURE — 2060000000 HC ICU INTERMEDIATE R&B

## 2024-05-26 PROCEDURE — 2580000003 HC RX 258: Performed by: ORTHOPAEDIC SURGERY

## 2024-05-26 PROCEDURE — 84145 PROCALCITONIN (PCT): CPT

## 2024-05-26 PROCEDURE — 80069 RENAL FUNCTION PANEL: CPT

## 2024-05-26 PROCEDURE — 6370000000 HC RX 637 (ALT 250 FOR IP): Performed by: NURSE PRACTITIONER

## 2024-05-26 PROCEDURE — 85025 COMPLETE CBC W/AUTO DIFF WBC: CPT

## 2024-05-26 RX ORDER — INSULIN GLARGINE 100 [IU]/ML
5 INJECTION, SOLUTION SUBCUTANEOUS NIGHTLY
Status: DISCONTINUED | OUTPATIENT
Start: 2024-05-26 | End: 2024-05-27 | Stop reason: HOSPADM

## 2024-05-26 RX ORDER — POTASSIUM CHLORIDE 20 MEQ/1
40 TABLET, EXTENDED RELEASE ORAL 3 TIMES DAILY
Status: DISPENSED | OUTPATIENT
Start: 2024-05-26 | End: 2024-05-27

## 2024-05-26 RX ADMIN — HEPARIN SODIUM 5000 UNITS: 5000 INJECTION INTRAVENOUS; SUBCUTANEOUS at 05:21

## 2024-05-26 RX ADMIN — INSULIN LISPRO 4 UNITS: 100 INJECTION, SOLUTION INTRAVENOUS; SUBCUTANEOUS at 12:01

## 2024-05-26 RX ADMIN — POTASSIUM CHLORIDE 40 MEQ: 1500 TABLET, EXTENDED RELEASE ORAL at 13:02

## 2024-05-26 RX ADMIN — OXYCODONE 5 MG: 5 TABLET ORAL at 20:42

## 2024-05-26 RX ADMIN — PANTOPRAZOLE SODIUM 40 MG: 40 TABLET, DELAYED RELEASE ORAL at 16:28

## 2024-05-26 RX ADMIN — OXYCODONE 5 MG: 5 TABLET ORAL at 16:28

## 2024-05-26 RX ADMIN — NIFEDIPINE 30 MG: 30 TABLET, EXTENDED RELEASE ORAL at 08:45

## 2024-05-26 RX ADMIN — SODIUM CHLORIDE, PRESERVATIVE FREE 10 ML: 5 INJECTION INTRAVENOUS at 21:52

## 2024-05-26 RX ADMIN — CEFTRIAXONE SODIUM 2000 MG: 2 INJECTION, POWDER, FOR SOLUTION INTRAMUSCULAR; INTRAVENOUS at 12:08

## 2024-05-26 RX ADMIN — HYDRALAZINE HYDROCHLORIDE 5 MG: 20 INJECTION INTRAMUSCULAR; INTRAVENOUS at 08:42

## 2024-05-26 RX ADMIN — HEPARIN SODIUM 5000 UNITS: 5000 INJECTION INTRAVENOUS; SUBCUTANEOUS at 20:49

## 2024-05-26 RX ADMIN — OXYCODONE 5 MG: 5 TABLET ORAL at 06:18

## 2024-05-26 RX ADMIN — HYDROCORTISONE 5 MG: 5 TABLET ORAL at 21:55

## 2024-05-26 RX ADMIN — Medication 2 CAPSULE: at 16:28

## 2024-05-26 RX ADMIN — HEPARIN SODIUM 5000 UNITS: 5000 INJECTION INTRAVENOUS; SUBCUTANEOUS at 13:02

## 2024-05-26 RX ADMIN — ROPINIROLE HYDROCHLORIDE 2 MG: 1 TABLET, FILM COATED ORAL at 20:43

## 2024-05-26 RX ADMIN — LEVETIRACETAM 500 MG: 100 INJECTION, SOLUTION INTRAVENOUS at 20:49

## 2024-05-26 RX ADMIN — INSULIN GLARGINE 5 UNITS: 100 INJECTION, SOLUTION SUBCUTANEOUS at 21:51

## 2024-05-26 RX ADMIN — INSULIN LISPRO 2 UNITS: 100 INJECTION, SOLUTION INTRAVENOUS; SUBCUTANEOUS at 18:43

## 2024-05-26 RX ADMIN — LEVETIRACETAM 500 MG: 100 INJECTION, SOLUTION INTRAVENOUS at 08:42

## 2024-05-26 RX ADMIN — HYDROCORTISONE SODIUM SUCCINATE 50 MG: 100 INJECTION, POWDER, FOR SOLUTION INTRAMUSCULAR; INTRAVENOUS at 00:54

## 2024-05-26 RX ADMIN — ESCITALOPRAM OXALATE 10 MG: 10 TABLET ORAL at 08:45

## 2024-05-26 RX ADMIN — HYDROCORTISONE SODIUM SUCCINATE 50 MG: 100 INJECTION, POWDER, FOR SOLUTION INTRAMUSCULAR; INTRAVENOUS at 08:42

## 2024-05-26 RX ADMIN — Medication 2 CAPSULE: at 08:45

## 2024-05-26 RX ADMIN — POTASSIUM CHLORIDE 40 MEQ: 1500 TABLET, EXTENDED RELEASE ORAL at 20:49

## 2024-05-26 RX ADMIN — PANTOPRAZOLE SODIUM 40 MG: 40 TABLET, DELAYED RELEASE ORAL at 05:21

## 2024-05-26 RX ADMIN — OXYCODONE 5 MG: 5 TABLET ORAL at 12:01

## 2024-05-26 RX ADMIN — LOPERAMIDE HYDROCHLORIDE 2 MG: 2 CAPSULE ORAL at 17:49

## 2024-05-26 RX ADMIN — LOPERAMIDE HYDROCHLORIDE 2 MG: 2 CAPSULE ORAL at 08:47

## 2024-05-26 ASSESSMENT — PAIN DESCRIPTION - ORIENTATION
ORIENTATION: RIGHT

## 2024-05-26 ASSESSMENT — PAIN DESCRIPTION - DESCRIPTORS
DESCRIPTORS: ACHING;BURNING
DESCRIPTORS: ACHING
DESCRIPTORS: ACHING;DISCOMFORT

## 2024-05-26 ASSESSMENT — PAIN SCALES - GENERAL
PAINLEVEL_OUTOF10: 0
PAINLEVEL_OUTOF10: 8
PAINLEVEL_OUTOF10: 7
PAINLEVEL_OUTOF10: 9
PAINLEVEL_OUTOF10: 0
PAINLEVEL_OUTOF10: 7

## 2024-05-26 ASSESSMENT — PAIN DESCRIPTION - LOCATION
LOCATION: HIP

## 2024-05-26 NOTE — CARE COORDINATION
Discharge planning:   Call from Clementina MOBLEY inquiring about discharge destination with possible discharge on 5/27.     [facility] ARU:   Called to [facility] in admission #[id number], voicemail left requesting return phone call regarding if they can accept patient.     BERTRAND Currie, LSW, Social Work/Case Management   [phone]  Electronically signed by BERTRAND Currie on 5/26/2024 at 3:27 PM

## 2024-05-27 ENCOUNTER — HOSPITAL ENCOUNTER (INPATIENT)
Age: 64
DRG: 559 | End: 2024-05-27
Attending: STUDENT IN AN ORGANIZED HEALTH CARE EDUCATION/TRAINING PROGRAM | Admitting: STUDENT IN AN ORGANIZED HEALTH CARE EDUCATION/TRAINING PROGRAM
Payer: MEDICARE

## 2024-05-27 VITALS
HEIGHT: 59 IN | OXYGEN SATURATION: 98 % | TEMPERATURE: 98.4 F | HEART RATE: 68 BPM | BODY MASS INDEX: 25.02 KG/M2 | DIASTOLIC BLOOD PRESSURE: 60 MMHG | SYSTOLIC BLOOD PRESSURE: 147 MMHG | RESPIRATION RATE: 12 BRPM | WEIGHT: 124.12 LBS

## 2024-05-27 DIAGNOSIS — S72.001A CLOSED RIGHT HIP FRACTURE, INITIAL ENCOUNTER (HCC): Primary | ICD-10-CM

## 2024-05-27 DIAGNOSIS — R90.89 ABNORMAL BRAIN MRI: ICD-10-CM

## 2024-05-27 LAB
ALBUMIN SERPL-MCNC: 2.7 G/DL (ref 3.4–5)
ANION GAP SERPL CALCULATED.3IONS-SCNC: 8 MMOL/L (ref 3–16)
BUN SERPL-MCNC: 29 MG/DL (ref 7–20)
CALCIUM SERPL-MCNC: 8.2 MG/DL (ref 8.3–10.6)
CHLORIDE SERPL-SCNC: 102 MMOL/L (ref 99–110)
CO2 SERPL-SCNC: 25 MMOL/L (ref 21–32)
CREAT SERPL-MCNC: 1.7 MG/DL (ref 0.6–1.2)
GFR SERPLBLD CREATININE-BSD FMLA CKD-EPI: 33 ML/MIN/{1.73_M2}
GLUCOSE BLD-MCNC: 134 MG/DL (ref 70–99)
GLUCOSE BLD-MCNC: 191 MG/DL (ref 70–99)
GLUCOSE BLD-MCNC: 439 MG/DL (ref 70–99)
GLUCOSE BLD-MCNC: 88 MG/DL (ref 70–99)
GLUCOSE SERPL-MCNC: 209 MG/DL (ref 70–99)
MAGNESIUM SERPL-MCNC: 1.5 MG/DL (ref 1.8–2.4)
PERFORMED ON: ABNORMAL
PERFORMED ON: NORMAL
PHOSPHATE SERPL-MCNC: 2.2 MG/DL (ref 2.5–4.9)
POTASSIUM SERPL-SCNC: 3.4 MMOL/L (ref 3.5–5.1)
SODIUM SERPL-SCNC: 135 MMOL/L (ref 136–145)

## 2024-05-27 PROCEDURE — 83735 ASSAY OF MAGNESIUM: CPT

## 2024-05-27 PROCEDURE — 6370000000 HC RX 637 (ALT 250 FOR IP)

## 2024-05-27 PROCEDURE — 6360000002 HC RX W HCPCS: Performed by: STUDENT IN AN ORGANIZED HEALTH CARE EDUCATION/TRAINING PROGRAM

## 2024-05-27 PROCEDURE — 94760 N-INVAS EAR/PLS OXIMETRY 1: CPT

## 2024-05-27 PROCEDURE — 6370000000 HC RX 637 (ALT 250 FOR IP): Performed by: NURSE PRACTITIONER

## 2024-05-27 PROCEDURE — 6370000000 HC RX 637 (ALT 250 FOR IP): Performed by: ORTHOPAEDIC SURGERY

## 2024-05-27 PROCEDURE — 2580000003 HC RX 258: Performed by: ORTHOPAEDIC SURGERY

## 2024-05-27 PROCEDURE — 6370000000 HC RX 637 (ALT 250 FOR IP): Performed by: STUDENT IN AN ORGANIZED HEALTH CARE EDUCATION/TRAINING PROGRAM

## 2024-05-27 PROCEDURE — 2580000003 HC RX 258: Performed by: STUDENT IN AN ORGANIZED HEALTH CARE EDUCATION/TRAINING PROGRAM

## 2024-05-27 PROCEDURE — 6360000002 HC RX W HCPCS: Performed by: ORTHOPAEDIC SURGERY

## 2024-05-27 PROCEDURE — 6360000002 HC RX W HCPCS: Performed by: NURSE PRACTITIONER

## 2024-05-27 PROCEDURE — 1280000000 HC REHAB R&B

## 2024-05-27 PROCEDURE — 80069 RENAL FUNCTION PANEL: CPT

## 2024-05-27 RX ORDER — LACTOBACILLUS RHAMNOSUS GG 10B CELL
2 CAPSULE ORAL 2 TIMES DAILY WITH MEALS
Status: DISCONTINUED | OUTPATIENT
Start: 2024-05-27 | End: 2024-06-14 | Stop reason: HOSPADM

## 2024-05-27 RX ORDER — HYDRALAZINE HYDROCHLORIDE 20 MG/ML
5 INJECTION INTRAMUSCULAR; INTRAVENOUS EVERY 6 HOURS PRN
Status: DISCONTINUED | OUTPATIENT
Start: 2024-05-27 | End: 2024-05-28

## 2024-05-27 RX ORDER — HYDRALAZINE HYDROCHLORIDE 20 MG/ML
5 INJECTION INTRAMUSCULAR; INTRAVENOUS EVERY 6 HOURS PRN
Status: CANCELLED | OUTPATIENT
Start: 2024-05-27

## 2024-05-27 RX ORDER — HYDROXYZINE PAMOATE 25 MG/1
25 CAPSULE ORAL 3 TIMES DAILY PRN
Status: CANCELLED | OUTPATIENT
Start: 2024-05-27

## 2024-05-27 RX ORDER — SODIUM CHLORIDE 0.9 % (FLUSH) 0.9 %
5-40 SYRINGE (ML) INJECTION EVERY 12 HOURS SCHEDULED
Status: DISCONTINUED | OUTPATIENT
Start: 2024-05-27 | End: 2024-06-01

## 2024-05-27 RX ORDER — ROPINIROLE 1 MG/1
2 TABLET, FILM COATED ORAL NIGHTLY
Status: CANCELLED | OUTPATIENT
Start: 2024-05-27

## 2024-05-27 RX ORDER — HYDROXYZINE PAMOATE 25 MG/1
25 CAPSULE ORAL 3 TIMES DAILY PRN
Status: DISCONTINUED | OUTPATIENT
Start: 2024-05-27 | End: 2024-06-14 | Stop reason: HOSPADM

## 2024-05-27 RX ORDER — HYDROCORTISONE 5 MG/1
5 TABLET ORAL NIGHTLY
Status: DISCONTINUED | OUTPATIENT
Start: 2024-05-27 | End: 2024-06-14 | Stop reason: HOSPADM

## 2024-05-27 RX ORDER — INSULIN LISPRO 100 [IU]/ML
0-8 INJECTION, SOLUTION INTRAVENOUS; SUBCUTANEOUS
Status: DISCONTINUED | OUTPATIENT
Start: 2024-05-27 | End: 2024-05-28

## 2024-05-27 RX ORDER — LEVETIRACETAM 500 MG/5ML
500 INJECTION, SOLUTION, CONCENTRATE INTRAVENOUS EVERY 12 HOURS
Status: CANCELLED | OUTPATIENT
Start: 2024-05-27

## 2024-05-27 RX ORDER — INSULIN GLARGINE 100 [IU]/ML
5 INJECTION, SOLUTION SUBCUTANEOUS NIGHTLY
Status: DISCONTINUED | OUTPATIENT
Start: 2024-05-27 | End: 2024-05-28

## 2024-05-27 RX ORDER — INSULIN GLARGINE 100 [IU]/ML
5 INJECTION, SOLUTION SUBCUTANEOUS NIGHTLY
Status: CANCELLED | OUTPATIENT
Start: 2024-05-27

## 2024-05-27 RX ORDER — POLYETHYLENE GLYCOL 3350 17 G/17G
17 POWDER, FOR SOLUTION ORAL DAILY PRN
Status: CANCELLED | OUTPATIENT
Start: 2024-05-27

## 2024-05-27 RX ORDER — OXYCODONE HYDROCHLORIDE 5 MG/1
5 TABLET ORAL EVERY 4 HOURS PRN
Status: CANCELLED | OUTPATIENT
Start: 2024-05-27

## 2024-05-27 RX ORDER — ACETAMINOPHEN 325 MG/1
650 TABLET ORAL EVERY 6 HOURS PRN
Status: CANCELLED | OUTPATIENT
Start: 2024-05-27

## 2024-05-27 RX ORDER — ESCITALOPRAM OXALATE 10 MG/1
10 TABLET ORAL DAILY
Status: CANCELLED | OUTPATIENT
Start: 2024-05-28

## 2024-05-27 RX ORDER — DEXTROSE MONOHYDRATE 100 MG/ML
INJECTION, SOLUTION INTRAVENOUS CONTINUOUS PRN
Status: DISCONTINUED | OUTPATIENT
Start: 2024-05-27 | End: 2024-06-13

## 2024-05-27 RX ORDER — ACETAMINOPHEN 650 MG/1
650 SUPPOSITORY RECTAL EVERY 6 HOURS PRN
Status: DISCONTINUED | OUTPATIENT
Start: 2024-05-27 | End: 2024-05-27 | Stop reason: SDUPTHER

## 2024-05-27 RX ORDER — ONDANSETRON 4 MG/1
4 TABLET, ORALLY DISINTEGRATING ORAL EVERY 8 HOURS PRN
Status: DISCONTINUED | OUTPATIENT
Start: 2024-05-27 | End: 2024-06-14 | Stop reason: HOSPADM

## 2024-05-27 RX ORDER — POTASSIUM CHLORIDE 20 MEQ/1
40 TABLET, EXTENDED RELEASE ORAL 3 TIMES DAILY
Status: DISCONTINUED | OUTPATIENT
Start: 2024-05-27 | End: 2024-05-27 | Stop reason: HOSPADM

## 2024-05-27 RX ORDER — INSULIN LISPRO 100 [IU]/ML
0-4 INJECTION, SOLUTION INTRAVENOUS; SUBCUTANEOUS NIGHTLY
Status: CANCELLED | OUTPATIENT
Start: 2024-05-27

## 2024-05-27 RX ORDER — ESCITALOPRAM OXALATE 10 MG/1
10 TABLET ORAL DAILY
Status: DISCONTINUED | OUTPATIENT
Start: 2024-05-28 | End: 2024-06-14 | Stop reason: HOSPADM

## 2024-05-27 RX ORDER — OXYCODONE HYDROCHLORIDE 5 MG/1
2.5 TABLET ORAL EVERY 4 HOURS PRN
Status: DISCONTINUED | OUTPATIENT
Start: 2024-05-27 | End: 2024-05-30

## 2024-05-27 RX ORDER — ACETAMINOPHEN 325 MG/1
650 TABLET ORAL EVERY 4 HOURS PRN
Status: CANCELLED | OUTPATIENT
Start: 2024-05-27

## 2024-05-27 RX ORDER — ROPINIROLE 1 MG/1
2 TABLET, FILM COATED ORAL NIGHTLY
Status: DISCONTINUED | OUTPATIENT
Start: 2024-05-27 | End: 2024-06-14 | Stop reason: HOSPADM

## 2024-05-27 RX ORDER — MAGNESIUM SULFATE IN WATER 40 MG/ML
2000 INJECTION, SOLUTION INTRAVENOUS PRN
Status: DISCONTINUED | OUTPATIENT
Start: 2024-05-27 | End: 2024-05-27

## 2024-05-27 RX ORDER — POLYETHYLENE GLYCOL 3350 17 G/17G
17 POWDER, FOR SOLUTION ORAL DAILY PRN
Status: DISCONTINUED | OUTPATIENT
Start: 2024-05-27 | End: 2024-05-27 | Stop reason: SDUPTHER

## 2024-05-27 RX ORDER — LACTOBACILLUS RHAMNOSUS GG 10B CELL
2 CAPSULE ORAL 2 TIMES DAILY WITH MEALS
Status: CANCELLED | OUTPATIENT
Start: 2024-05-27

## 2024-05-27 RX ORDER — HEPARIN SODIUM 5000 [USP'U]/ML
5000 INJECTION, SOLUTION INTRAVENOUS; SUBCUTANEOUS EVERY 8 HOURS SCHEDULED
Status: CANCELLED | OUTPATIENT
Start: 2024-05-27

## 2024-05-27 RX ORDER — PANTOPRAZOLE SODIUM 40 MG/1
40 TABLET, DELAYED RELEASE ORAL
Status: DISCONTINUED | OUTPATIENT
Start: 2024-05-28 | End: 2024-06-14 | Stop reason: HOSPADM

## 2024-05-27 RX ORDER — LOPERAMIDE HYDROCHLORIDE 2 MG/1
2 CAPSULE ORAL 3 TIMES DAILY PRN
Status: CANCELLED | OUTPATIENT
Start: 2024-05-27

## 2024-05-27 RX ORDER — INSULIN LISPRO 100 [IU]/ML
12 INJECTION, SOLUTION INTRAVENOUS; SUBCUTANEOUS ONCE
Status: COMPLETED | OUTPATIENT
Start: 2024-05-27 | End: 2024-05-27

## 2024-05-27 RX ORDER — OXYCODONE HYDROCHLORIDE 5 MG/1
5 TABLET ORAL EVERY 4 HOURS PRN
Status: DISCONTINUED | OUTPATIENT
Start: 2024-05-27 | End: 2024-05-30

## 2024-05-27 RX ORDER — HEPARIN SODIUM 5000 [USP'U]/ML
5000 INJECTION, SOLUTION INTRAVENOUS; SUBCUTANEOUS EVERY 8 HOURS SCHEDULED
Status: DISCONTINUED | OUTPATIENT
Start: 2024-05-27 | End: 2024-06-14 | Stop reason: HOSPADM

## 2024-05-27 RX ORDER — GLUCAGON 1 MG/ML
1 KIT INJECTION PRN
Status: CANCELLED | OUTPATIENT
Start: 2024-05-27

## 2024-05-27 RX ORDER — ONDANSETRON 2 MG/ML
4 INJECTION INTRAMUSCULAR; INTRAVENOUS EVERY 6 HOURS PRN
Status: DISCONTINUED | OUTPATIENT
Start: 2024-05-27 | End: 2024-06-14 | Stop reason: HOSPADM

## 2024-05-27 RX ORDER — HYDROCORTISONE 10 MG/1
10 TABLET ORAL DAILY
Status: DISCONTINUED | OUTPATIENT
Start: 2024-05-28 | End: 2024-06-14 | Stop reason: HOSPADM

## 2024-05-27 RX ORDER — INSULIN LISPRO 100 [IU]/ML
0-8 INJECTION, SOLUTION INTRAVENOUS; SUBCUTANEOUS
Status: CANCELLED | OUTPATIENT
Start: 2024-05-27

## 2024-05-27 RX ORDER — ACETAMINOPHEN 325 MG/1
650 TABLET ORAL EVERY 4 HOURS PRN
Status: DISCONTINUED | OUTPATIENT
Start: 2024-05-27 | End: 2024-06-14 | Stop reason: HOSPADM

## 2024-05-27 RX ORDER — SODIUM CHLORIDE 0.9 % (FLUSH) 0.9 %
5-40 SYRINGE (ML) INJECTION EVERY 12 HOURS SCHEDULED
Status: CANCELLED | OUTPATIENT
Start: 2024-05-27

## 2024-05-27 RX ORDER — POTASSIUM CHLORIDE 20 MEQ/1
40 TABLET, EXTENDED RELEASE ORAL 3 TIMES DAILY
Status: COMPLETED | OUTPATIENT
Start: 2024-05-27 | End: 2024-05-27

## 2024-05-27 RX ORDER — SODIUM CHLORIDE 9 MG/ML
INJECTION, SOLUTION INTRAVENOUS PRN
Status: DISCONTINUED | OUTPATIENT
Start: 2024-05-27 | End: 2024-06-01

## 2024-05-27 RX ORDER — MAGNESIUM SULFATE IN WATER 40 MG/ML
2000 INJECTION, SOLUTION INTRAVENOUS ONCE
Status: COMPLETED | OUTPATIENT
Start: 2024-05-27 | End: 2024-05-27

## 2024-05-27 RX ORDER — SODIUM CHLORIDE 9 MG/ML
INJECTION, SOLUTION INTRAVENOUS PRN
Status: DISCONTINUED | OUTPATIENT
Start: 2024-05-27 | End: 2024-05-28

## 2024-05-27 RX ORDER — NIFEDIPINE 30 MG/1
30 TABLET, EXTENDED RELEASE ORAL DAILY
Status: CANCELLED | OUTPATIENT
Start: 2024-05-28

## 2024-05-27 RX ORDER — LOPERAMIDE HYDROCHLORIDE 2 MG/1
2 CAPSULE ORAL 3 TIMES DAILY PRN
Status: DISCONTINUED | OUTPATIENT
Start: 2024-05-27 | End: 2024-06-14 | Stop reason: HOSPADM

## 2024-05-27 RX ORDER — DEXTROSE MONOHYDRATE 100 MG/ML
INJECTION, SOLUTION INTRAVENOUS CONTINUOUS PRN
Status: CANCELLED | OUTPATIENT
Start: 2024-05-27

## 2024-05-27 RX ORDER — GLUCAGON 1 MG/ML
1 KIT INJECTION PRN
Status: DISCONTINUED | OUTPATIENT
Start: 2024-05-27 | End: 2024-06-14 | Stop reason: HOSPADM

## 2024-05-27 RX ORDER — ONDANSETRON 4 MG/1
4 TABLET, ORALLY DISINTEGRATING ORAL EVERY 8 HOURS PRN
Status: CANCELLED | OUTPATIENT
Start: 2024-05-27

## 2024-05-27 RX ORDER — PANTOPRAZOLE SODIUM 40 MG/1
40 TABLET, DELAYED RELEASE ORAL
Status: CANCELLED | OUTPATIENT
Start: 2024-05-27

## 2024-05-27 RX ORDER — NIFEDIPINE 30 MG/1
30 TABLET, EXTENDED RELEASE ORAL DAILY
Qty: 30 TABLET | Refills: 3 | Status: ON HOLD | OUTPATIENT
Start: 2024-05-28

## 2024-05-27 RX ORDER — LEVETIRACETAM 500 MG/5ML
500 INJECTION, SOLUTION, CONCENTRATE INTRAVENOUS EVERY 12 HOURS
Status: DISCONTINUED | OUTPATIENT
Start: 2024-05-27 | End: 2024-05-30

## 2024-05-27 RX ORDER — POLYETHYLENE GLYCOL 3350 17 G/17G
17 POWDER, FOR SOLUTION ORAL DAILY PRN
Status: DISCONTINUED | OUTPATIENT
Start: 2024-05-27 | End: 2024-06-14 | Stop reason: HOSPADM

## 2024-05-27 RX ORDER — ONDANSETRON 2 MG/ML
4 INJECTION INTRAMUSCULAR; INTRAVENOUS EVERY 6 HOURS PRN
Status: CANCELLED | OUTPATIENT
Start: 2024-05-27

## 2024-05-27 RX ORDER — POTASSIUM CHLORIDE 20 MEQ/1
40 TABLET, EXTENDED RELEASE ORAL 3 TIMES DAILY
Status: CANCELLED | OUTPATIENT
Start: 2024-05-27 | End: 2024-05-28

## 2024-05-27 RX ORDER — NIFEDIPINE 30 MG/1
30 TABLET, EXTENDED RELEASE ORAL DAILY
Status: DISCONTINUED | OUTPATIENT
Start: 2024-05-28 | End: 2024-06-13

## 2024-05-27 RX ORDER — INSULIN LISPRO 100 [IU]/ML
0-4 INJECTION, SOLUTION INTRAVENOUS; SUBCUTANEOUS NIGHTLY
Status: DISCONTINUED | OUTPATIENT
Start: 2024-05-27 | End: 2024-05-28

## 2024-05-27 RX ORDER — ACETAMINOPHEN 325 MG/1
650 TABLET ORAL EVERY 6 HOURS PRN
Status: DISCONTINUED | OUTPATIENT
Start: 2024-05-27 | End: 2024-05-27 | Stop reason: SDUPTHER

## 2024-05-27 RX ORDER — HYDROCORTISONE 10 MG/1
10 TABLET ORAL DAILY
Status: CANCELLED | OUTPATIENT
Start: 2024-05-28

## 2024-05-27 RX ORDER — SODIUM CHLORIDE 0.9 % (FLUSH) 0.9 %
5-40 SYRINGE (ML) INJECTION PRN
Status: DISCONTINUED | OUTPATIENT
Start: 2024-05-27 | End: 2024-06-01

## 2024-05-27 RX ORDER — SODIUM CHLORIDE 0.9 % (FLUSH) 0.9 %
5-40 SYRINGE (ML) INJECTION PRN
Status: CANCELLED | OUTPATIENT
Start: 2024-05-27

## 2024-05-27 RX ORDER — LEVETIRACETAM 500 MG/1
500 TABLET ORAL 2 TIMES DAILY
Qty: 60 TABLET | Refills: 3 | Status: ON HOLD | OUTPATIENT
Start: 2024-05-27

## 2024-05-27 RX ORDER — HYDROCORTISONE 5 MG/1
5 TABLET ORAL NIGHTLY
Status: CANCELLED | OUTPATIENT
Start: 2024-05-27

## 2024-05-27 RX ORDER — SODIUM CHLORIDE 9 MG/ML
INJECTION, SOLUTION INTRAVENOUS PRN
Status: CANCELLED | OUTPATIENT
Start: 2024-05-27

## 2024-05-27 RX ORDER — ACETAMINOPHEN 650 MG/1
650 SUPPOSITORY RECTAL EVERY 6 HOURS PRN
Status: CANCELLED | OUTPATIENT
Start: 2024-05-27

## 2024-05-27 RX ADMIN — OXYCODONE 5 MG: 5 TABLET ORAL at 14:58

## 2024-05-27 RX ADMIN — HEPARIN SODIUM 5000 UNITS: 5000 INJECTION INTRAVENOUS; SUBCUTANEOUS at 13:17

## 2024-05-27 RX ADMIN — NIFEDIPINE 30 MG: 30 TABLET, EXTENDED RELEASE ORAL at 08:06

## 2024-05-27 RX ADMIN — HEPARIN SODIUM 5000 UNITS: 5000 INJECTION INTRAVENOUS; SUBCUTANEOUS at 21:10

## 2024-05-27 RX ADMIN — CEFTRIAXONE SODIUM 2000 MG: 2 INJECTION, POWDER, FOR SOLUTION INTRAMUSCULAR; INTRAVENOUS at 11:29

## 2024-05-27 RX ADMIN — HYDROCORTISONE 10 MG: 10 TABLET ORAL at 08:06

## 2024-05-27 RX ADMIN — OXYCODONE HYDROCHLORIDE 5 MG: 5 TABLET ORAL at 21:15

## 2024-05-27 RX ADMIN — HYDROCORTISONE 5 MG: 5 TABLET ORAL at 22:16

## 2024-05-27 RX ADMIN — Medication 2 CAPSULE: at 17:59

## 2024-05-27 RX ADMIN — Medication 2 CAPSULE: at 08:06

## 2024-05-27 RX ADMIN — ROPINIROLE HYDROCHLORIDE 2 MG: 1 TABLET, FILM COATED ORAL at 21:10

## 2024-05-27 RX ADMIN — SODIUM CHLORIDE, PRESERVATIVE FREE 10 ML: 5 INJECTION INTRAVENOUS at 22:15

## 2024-05-27 RX ADMIN — MAGNESIUM SULFATE HEPTAHYDRATE 2000 MG: 40 INJECTION, SOLUTION INTRAVENOUS at 08:13

## 2024-05-27 RX ADMIN — PANTOPRAZOLE SODIUM 40 MG: 40 TABLET, DELAYED RELEASE ORAL at 14:58

## 2024-05-27 RX ADMIN — PANTOPRAZOLE SODIUM 40 MG: 40 TABLET, DELAYED RELEASE ORAL at 06:18

## 2024-05-27 RX ADMIN — OXYCODONE 5 MG: 5 TABLET ORAL at 10:26

## 2024-05-27 RX ADMIN — ONDANSETRON 4 MG: 2 INJECTION INTRAMUSCULAR; INTRAVENOUS at 22:14

## 2024-05-27 RX ADMIN — POTASSIUM CHLORIDE 40 MEQ: 1500 TABLET, EXTENDED RELEASE ORAL at 13:18

## 2024-05-27 RX ADMIN — POTASSIUM CHLORIDE 40 MEQ: 1500 TABLET, EXTENDED RELEASE ORAL at 21:10

## 2024-05-27 RX ADMIN — LEVETIRACETAM 500 MG: 100 INJECTION, SOLUTION INTRAVENOUS at 22:15

## 2024-05-27 RX ADMIN — OXYCODONE 5 MG: 5 TABLET ORAL at 06:18

## 2024-05-27 RX ADMIN — INSULIN LISPRO 12 UNITS: 100 INJECTION, SOLUTION INTRAVENOUS; SUBCUTANEOUS at 13:17

## 2024-05-27 RX ADMIN — POTASSIUM CHLORIDE 40 MEQ: 1500 TABLET, EXTENDED RELEASE ORAL at 17:58

## 2024-05-27 RX ADMIN — LEVETIRACETAM 500 MG: 100 INJECTION, SOLUTION INTRAVENOUS at 08:04

## 2024-05-27 RX ADMIN — POTASSIUM CHLORIDE 40 MEQ: 1500 TABLET, EXTENDED RELEASE ORAL at 10:25

## 2024-05-27 RX ADMIN — INSULIN LISPRO 8 UNITS: 100 INJECTION, SOLUTION INTRAVENOUS; SUBCUTANEOUS at 13:17

## 2024-05-27 RX ADMIN — INSULIN GLARGINE 5 UNITS: 100 INJECTION, SOLUTION SUBCUTANEOUS at 21:10

## 2024-05-27 RX ADMIN — SODIUM CHLORIDE, PRESERVATIVE FREE 10 ML: 5 INJECTION INTRAVENOUS at 08:10

## 2024-05-27 RX ADMIN — ESCITALOPRAM OXALATE 10 MG: 10 TABLET ORAL at 08:06

## 2024-05-27 RX ADMIN — HEPARIN SODIUM 5000 UNITS: 5000 INJECTION INTRAVENOUS; SUBCUTANEOUS at 06:18

## 2024-05-27 ASSESSMENT — PAIN DESCRIPTION - FREQUENCY
FREQUENCY: INTERMITTENT
FREQUENCY: CONTINUOUS

## 2024-05-27 ASSESSMENT — PAIN SCALES - WONG BAKER: WONGBAKER_NUMERICALRESPONSE: NO HURT

## 2024-05-27 ASSESSMENT — PAIN SCALES - GENERAL
PAINLEVEL_OUTOF10: 0
PAINLEVEL_OUTOF10: 6
PAINLEVEL_OUTOF10: 7
PAINLEVEL_OUTOF10: 4
PAINLEVEL_OUTOF10: 7
PAINLEVEL_OUTOF10: 7
PAINLEVEL_OUTOF10: 8

## 2024-05-27 ASSESSMENT — PAIN DESCRIPTION - LOCATION
LOCATION: HIP

## 2024-05-27 ASSESSMENT — PAIN DESCRIPTION - DESCRIPTORS
DESCRIPTORS: THROBBING
DESCRIPTORS: ACHING;DISCOMFORT;SHOOTING
DESCRIPTORS: ACHING;DISCOMFORT
DESCRIPTORS: ACHING;DISCOMFORT
DESCRIPTORS: ACHING

## 2024-05-27 ASSESSMENT — PAIN - FUNCTIONAL ASSESSMENT
PAIN_FUNCTIONAL_ASSESSMENT: PREVENTS OR INTERFERES WITH ALL ACTIVE AND SOME PASSIVE ACTIVITIES
PAIN_FUNCTIONAL_ASSESSMENT: PREVENTS OR INTERFERES WITH ALL ACTIVE AND SOME PASSIVE ACTIVITIES
PAIN_FUNCTIONAL_ASSESSMENT: PREVENTS OR INTERFERES SOME ACTIVE ACTIVITIES AND ADLS
PAIN_FUNCTIONAL_ASSESSMENT: ACTIVITIES ARE NOT PREVENTED

## 2024-05-27 ASSESSMENT — PAIN DESCRIPTION - ORIENTATION
ORIENTATION: RIGHT

## 2024-05-27 ASSESSMENT — PAIN DESCRIPTION - ONSET
ONSET: ON-GOING
ONSET: ON-GOING

## 2024-05-27 ASSESSMENT — PAIN DESCRIPTION - PAIN TYPE
TYPE: SURGICAL PAIN
TYPE: SURGICAL PAIN

## 2024-05-27 NOTE — PLAN OF CARE
Problem: Discharge Planning  Goal: Discharge to home or other facility with appropriate resources  5/20/2024 0102 by Latanya Perez RN  Outcome: Progressing  Flowsheets (Taken 5/19/2024 2011)  Discharge to home or other facility with appropriate resources: Identify barriers to discharge with patient and caregiver     Problem: Pain  Goal: Verbalizes/displays adequate comfort level or baseline comfort level  5/20/2024 0102 by Latanya Perez RN  Outcome: Progressing  Flowsheets (Taken 5/20/2024 0102)  Verbalizes/displays adequate comfort level or baseline comfort level:   Encourage patient to monitor pain and request assistance   Assess pain using appropriate pain scale   Administer analgesics based on type and severity of pain and evaluate response   Implement non-pharmacological measures as appropriate and evaluate response     Problem: Safety - Adult  Goal: Free from fall injury  5/20/2024 0102 by Latanya Perez RN  Outcome: Progressing  Note: Patient assessed for fall risk; fall precautions initiated. Patient and family instructed about safety devices. Environment kept free of clutter and adequate lighting provided.  Bed locked and in lowest position.  Call light within reach. Will continue to monitor.       Problem: ABCDS Injury Assessment  Goal: Absence of physical injury  5/20/2024 0102 by Latanya Perez RN  Outcome: Progressing  Flowsheets (Taken 5/20/2024 0102)  Absence of Physical Injury: Implement safety measures based on patient assessment     Problem: Skin/Tissue Integrity  Goal: Absence of new skin breakdown  Description: 1.  Monitor for areas of redness and/or skin breakdown  2.  Assess vascular access sites hourly  3.  Every 4-6 hours minimum:  Change oxygen saturation probe site  4.  Every 4-6 hours:  If on nasal continuous positive airway pressure, respiratory therapy assess nares and determine need for appliance change or resting period.  5/20/2024 0102 by Latanya Perez, ITZ  Outcome: 
  Problem: Discharge Planning  Goal: Discharge to home or other facility with appropriate resources  5/20/2024 1033 by Elke Longo RN  Outcome: Not Progressing  5/20/2024 0102 by Latanya Perez RN  Outcome: Progressing  Flowsheets (Taken 5/19/2024 2011)  Discharge to home or other facility with appropriate resources: Identify barriers to discharge with patient and caregiver     Problem: Pain  Goal: Verbalizes/displays adequate comfort level or baseline comfort level  5/20/2024 1033 by Elke Longo RN  Outcome: Not Progressing  5/20/2024 0102 by Latanya Perez RN  Outcome: Progressing  Flowsheets (Taken 5/20/2024 0102)  Verbalizes/displays adequate comfort level or baseline comfort level:   Encourage patient to monitor pain and request assistance   Assess pain using appropriate pain scale   Administer analgesics based on type and severity of pain and evaluate response   Implement non-pharmacological measures as appropriate and evaluate response     Problem: Neurosensory - Adult  Goal: Achieves stable or improved neurological status  5/20/2024 1033 by Elke Longo RN  Outcome: Not Progressing  5/20/2024 0102 by Latanya Perez RN  Outcome: Progressing  Flowsheets (Taken 5/19/2024 2011)  Achieves stable or improved neurological status:   Assess for and report changes in neurological status   Monitor temperature, glucose, and sodium. Initiate appropriate interventions as ordered  Goal: Achieves maximal functionality and self care  5/20/2024 1033 by Elke Longo RN  Outcome: Not Progressing  5/20/2024 0102 by Latanya Perez RN  Outcome: Progressing  Flowsheets (Taken 5/19/2024 2011)  Achieves maximal functionality and self care: Monitor swallowing and airway patency with patient fatigue and changes in neurological status     Problem: Musculoskeletal - Adult  Goal: Return mobility to safest level of function  5/20/2024 1033 by Elke Longo RN  Outcome: Not Progressing  5/20/2024 0102 by Ana 
  Problem: Discharge Planning  Goal: Discharge to home or other facility with appropriate resources  5/24/2024 2213 by Karley Rose RN  Outcome: Progressing  5/24/2024 1724 by Yuri Donald RN  Outcome: Progressing     Problem: Pain  Goal: Verbalizes/displays adequate comfort level or baseline comfort level  5/24/2024 2213 by Karley Rose RN  Outcome: Progressing  5/24/2024 1724 by Yuri Donald RN  Outcome: Progressing     Problem: Safety - Adult  Goal: Free from fall injury  5/24/2024 2213 by Karley Rose RN  Outcome: Progressing  5/24/2024 1724 by Yuri Donald RN  Outcome: Progressing     Problem: ABCDS Injury Assessment  Goal: Absence of physical injury  5/24/2024 2213 by Karley Rose RN  Outcome: Progressing  5/24/2024 1724 by Yuri Donald RN  Outcome: Progressing     Problem: Skin/Tissue Integrity  Goal: Absence of new skin breakdown  Description: 1.  Monitor for areas of redness and/or skin breakdown  2.  Assess vascular access sites hourly  3.  Every 4-6 hours minimum:  Change oxygen saturation probe site  4.  Every 4-6 hours:  If on nasal continuous positive airway pressure, respiratory therapy assess nares and determine need for appliance change or resting period.  5/24/2024 2213 by Karley Rose RN  Outcome: Progressing  5/24/2024 1724 by Yuri Donald RN  Outcome: Progressing     Problem: Neurosensory - Adult  Goal: Achieves stable or improved neurological status  5/24/2024 2213 by Karley Rose RN  Outcome: Progressing  5/24/2024 1724 by Yuri Donald RN  Outcome: Progressing  Goal: Achieves maximal functionality and self care  5/24/2024 2213 by Karley Rose RN  Outcome: Progressing  5/24/2024 1724 by Yuri Donald RN  Outcome: Progressing     Problem: Skin/Tissue Integrity - Adult  Goal: Skin integrity remains intact  5/24/2024 2213 by Karley Rose RN  Outcome: Progressing  5/24/2024 1724 by Yuri Donald RN  Outcome: Progressing     Problem: Musculoskeletal - 
  Problem: Discharge Planning  Goal: Discharge to home or other facility with appropriate resources  5/25/2024 2111 by Karley Rose RN  Outcome: Progressing  5/25/2024 1058 by Eneida Moya RN  Outcome: Progressing  Flowsheets (Taken 5/25/2024 0830)  Discharge to home or other facility with appropriate resources: Refer to discharge planning if patient needs post-hospital services based on physician order or complex needs related to functional status, cognitive ability or social support system     Problem: Pain  Goal: Verbalizes/displays adequate comfort level or baseline comfort level  5/25/2024 2111 by Karley Rose RN  Outcome: Progressing  5/25/2024 1058 by Eneida Moya RN  Outcome: Progressing     Problem: Safety - Adult  Goal: Free from fall injury  5/25/2024 2111 by Karley Rose RN  Outcome: Progressing  5/25/2024 1058 by Eneida Moya RN  Outcome: Progressing     Problem: ABCDS Injury Assessment  Goal: Absence of physical injury  5/25/2024 2111 by Karley Rose RN  Outcome: Progressing  5/25/2024 1058 by Eneida Moya RN  Outcome: Progressing     Problem: Skin/Tissue Integrity  Goal: Absence of new skin breakdown  Description: 1.  Monitor for areas of redness and/or skin breakdown  2.  Assess vascular access sites hourly  3.  Every 4-6 hours minimum:  Change oxygen saturation probe site  4.  Every 4-6 hours:  If on nasal continuous positive airway pressure, respiratory therapy assess nares and determine need for appliance change or resting period.  5/25/2024 2111 by Karley Rose RN  Outcome: Progressing  5/25/2024 1058 by Eneida Moya RN  Outcome: Progressing     Problem: Neurosensory - Adult  Goal: Achieves stable or improved neurological status  5/25/2024 2111 by Karley Rose RN  Outcome: Progressing  5/25/2024 1058 by Eneida Moya RN  Outcome: Progressing  Flowsheets (Taken 5/25/2024 0830)  Achieves stable or improved neurological status:   Assess for and report changes in neurological 
  Problem: Discharge Planning  Goal: Discharge to home or other facility with appropriate resources  5/26/2024 2302 by Lisseth Villa RN  Outcome: Progressing     Problem: Pain  Goal: Verbalizes/displays adequate comfort level or baseline comfort level  5/26/2024 2302 by Lisseth Villa RN  Outcome: Progressing     Problem: Safety - Adult  Goal: Free from fall injury  5/26/2024 2302 by Lisseth Villa RN  Outcome: Progressing     Problem: ABCDS Injury Assessment  Goal: Absence of physical injury  5/26/2024 2302 by Lisseth Villa RN  Outcome: Progressing     Problem: Skin/Tissue Integrity  Goal: Absence of new skin breakdown  Description: 1.  Monitor for areas of redness and/or skin breakdown  2.  Assess vascular access sites hourly  3.  Every 4-6 hours minimum:  Change oxygen saturation probe site  4.  Every 4-6 hours:  If on nasal continuous positive airway pressure, respiratory therapy assess nares and determine need for appliance change or resting period.  5/26/2024 2302 by Lisseth Villa RN  Outcome: Progressing     Problem: Neurosensory - Adult  Goal: Achieves stable or improved neurological status  5/26/2024 2302 by Lisseth Villa RN  Outcome: Progressing     Problem: Neurosensory - Adult  Goal: Achieves maximal functionality and self care  5/26/2024 2302 by Lisseth Villa RN  Outcome: Progressing     Problem: Skin/Tissue Integrity - Adult  Goal: Skin integrity remains intact  5/26/2024 2302 by Lisseth Villa RN  Outcome: Progressing     Problem: Musculoskeletal - Adult  Goal: Return mobility to safest level of function  5/26/2024 2302 by Lisseth Villa RN  Outcome: Progressing     Problem: Musculoskeletal - Adult  Goal: Maintain proper alignment of affected body part  5/26/2024 2302 by Lisseth Villa RN  Outcome: Progressing     Problem: Musculoskeletal - Adult  Goal: Return ADL status to a safe level of function  5/26/2024 2302 by Lisseth Villa RN  Outcome: 
  Problem: Discharge Planning  Goal: Discharge to home or other facility with appropriate resources  5/27/2024 0046 by Anna Gonzalez RN  Outcome: Progressing  5/26/2024 2302 by Lisseth Villa RN  Outcome: Progressing  5/26/2024 1151 by Heather White RN  Outcome: Progressing     Problem: Pain  Goal: Verbalizes/displays adequate comfort level or baseline comfort level  5/27/2024 0046 by Anna Gonzalez RN  Outcome: Progressing  5/26/2024 2302 by Lisseth Villa RN  Outcome: Progressing  5/26/2024 1151 by Heather White RN  Outcome: Progressing     Problem: Safety - Adult  Goal: Free from fall injury  5/27/2024 0046 by Anna Gonzalez RN  Outcome: Progressing  5/26/2024 2302 by Lisseth Villa RN  Outcome: Progressing  5/26/2024 1151 by Heather White RN  Outcome: Progressing     Problem: ABCDS Injury Assessment  Goal: Absence of physical injury  5/27/2024 0046 by Anna Gonzalez RN  Outcome: Progressing  5/26/2024 2302 by Lisseth Villa RN  Outcome: Progressing  5/26/2024 1151 by Heather White RN  Outcome: Progressing     Problem: Skin/Tissue Integrity  Goal: Absence of new skin breakdown  Description: 1.  Monitor for areas of redness and/or skin breakdown  2.  Assess vascular access sites hourly  3.  Every 4-6 hours minimum:  Change oxygen saturation probe site  4.  Every 4-6 hours:  If on nasal continuous positive airway pressure, respiratory therapy assess nares and determine need for appliance change or resting period.  5/27/2024 0046 by Anna Gonzalez RN  Outcome: Progressing  5/26/2024 2302 by Lisseth Villa RN  Outcome: Progressing  5/26/2024 1151 by Heather White RN  Outcome: Progressing     Problem: Neurosensory - Adult  Goal: Achieves stable or improved neurological status  5/27/2024 0046 by Anna Gonzalez RN  Outcome: Progressing  Flowsheets (Taken 5/26/2024 2338)  Achieves stable or improved neurological status: Assess for and report changes in neurological 
  Problem: Discharge Planning  Goal: Discharge to home or other facility with appropriate resources  5/27/2024 0933 by Dayna Aguiar RN  Outcome: Progressing  5/27/2024 0046 by Anna Gonzalez RN  Outcome: Progressing  5/26/2024 2302 by Lisseth Villa RN  Outcome: Progressing     Problem: Pain  Goal: Verbalizes/displays adequate comfort level or baseline comfort level  5/27/2024 0933 by Dayna Aguiar RN  Outcome: Progressing  5/27/2024 0046 by Anna Gonzalez RN  Outcome: Progressing  5/26/2024 2302 by Lisseth Villa RN  Outcome: Progressing     Problem: Safety - Adult  Goal: Free from fall injury  5/27/2024 0933 by Dayna Aguiar RN  Outcome: Progressing  Note: Fall precautions in place. Bed locked and in lowest position. Alarm on. Call light within reach.   5/27/2024 0046 by Anna Gonzalez RN  Outcome: Progressing  5/26/2024 2302 by Lisseth Villa RN  Outcome: Progressing     Problem: ABCDS Injury Assessment  Goal: Absence of physical injury  5/27/2024 0933 by Dayna Aguiar RN  Outcome: Progressing  5/27/2024 0046 by Anna Gonzalez RN  Outcome: Progressing  5/26/2024 2302 by Lisseth Villa RN  Outcome: Progressing     Problem: Skin/Tissue Integrity  Goal: Absence of new skin breakdown  Description: 1.  Monitor for areas of redness and/or skin breakdown  2.  Assess vascular access sites hourly  3.  Every 4-6 hours minimum:  Change oxygen saturation probe site  4.  Every 4-6 hours:  If on nasal continuous positive airway pressure, respiratory therapy assess nares and determine need for appliance change or resting period.  5/27/2024 0933 by Dayna Aguiar RN  Outcome: Progressing  5/27/2024 0046 by Anna Gonzalez RN  Outcome: Progressing  5/26/2024 2302 by Lisseth Villa RN  Outcome: Progressing     Problem: Neurosensory - Adult  Goal: Achieves stable or improved neurological status  5/27/2024 0933 by Dayna Aguiar RN  Outcome: Progressing  5/27/2024 0046 by Lisa 
  Problem: Discharge Planning  Goal: Discharge to home or other facility with appropriate resources  Outcome: Progressing     Problem: Pain  Goal: Verbalizes/displays adequate comfort level or baseline comfort level  Outcome: Progressing     Problem: Safety - Adult  Goal: Free from fall injury  Outcome: Progressing     Problem: ABCDS Injury Assessment  Goal: Absence of physical injury  Outcome: Progressing     Problem: Skin/Tissue Integrity  Goal: Absence of new skin breakdown  Description: 1.  Monitor for areas of redness and/or skin breakdown  2.  Assess vascular access sites hourly  3.  Every 4-6 hours minimum:  Change oxygen saturation probe site  4.  Every 4-6 hours:  If on nasal continuous positive airway pressure, respiratory therapy assess nares and determine need for appliance change or resting period.  Outcome: Progressing     Problem: Neurosensory - Adult  Goal: Achieves stable or improved neurological status  Outcome: Progressing     Problem: Neurosensory - Adult  Goal: Achieves maximal functionality and self care  Outcome: Progressing     Problem: Skin/Tissue Integrity - Adult  Goal: Skin integrity remains intact  Outcome: Progressing     Problem: Musculoskeletal - Adult  Goal: Return mobility to safest level of function  Outcome: Progressing     Problem: Musculoskeletal - Adult  Goal: Maintain proper alignment of affected body part  Outcome: Progressing     Problem: Musculoskeletal - Adult  Goal: Return ADL status to a safe level of function  Outcome: Progressing     Problem: Genitourinary - Adult  Goal: Urinary catheter remains patent  Outcome: Progressing     Problem: Metabolic/Fluid and Electrolytes - Adult  Goal: Electrolytes maintained within normal limits  Outcome: Progressing     Problem: Metabolic/Fluid and Electrolytes - Adult  Goal: Glucose maintained within prescribed range  Outcome: Progressing     Problem: Behavior  Goal: Pt/Family maintain appropriate behavior and adhere to behavioral 
  Problem: Discharge Planning  Goal: Discharge to home or other facility with appropriate resources  Outcome: Progressing     Problem: Pain  Goal: Verbalizes/displays adequate comfort level or baseline comfort level  Outcome: Progressing     Problem: Safety - Adult  Goal: Free from fall injury  Outcome: Progressing     Problem: ABCDS Injury Assessment  Goal: Absence of physical injury  Outcome: Progressing     Problem: Skin/Tissue Integrity  Goal: Absence of new skin breakdown  Description: 1.  Monitor for areas of redness and/or skin breakdown  2.  Assess vascular access sites hourly  3.  Every 4-6 hours minimum:  Change oxygen saturation probe site  4.  Every 4-6 hours:  If on nasal continuous positive airway pressure, respiratory therapy assess nares and determine need for appliance change or resting period.  Outcome: Progressing     Problem: Neurosensory - Adult  Goal: Achieves stable or improved neurological status  Outcome: Progressing  Goal: Achieves maximal functionality and self care  Outcome: Progressing     Problem: Skin/Tissue Integrity - Adult  Goal: Skin integrity remains intact  Outcome: Progressing     Problem: Musculoskeletal - Adult  Goal: Return mobility to safest level of function  Outcome: Progressing  Goal: Maintain proper alignment of affected body part  Outcome: Progressing  Goal: Return ADL status to a safe level of function  Outcome: Progressing     Problem: Genitourinary - Adult  Goal: Urinary catheter remains patent  Outcome: Progressing     Problem: Metabolic/Fluid and Electrolytes - Adult  Goal: Electrolytes maintained within normal limits  Outcome: Progressing  Goal: Glucose maintained within prescribed range  Outcome: Progressing     Problem: Behavior  Goal: Pt/Family maintain appropriate behavior and adhere to behavioral management agreement, if implemented  Description: INTERVENTIONS:  1. Assess patient/family's coping skills and  non-compliant behavior (including use of illegal 
  Problem: Discharge Planning  Goal: Discharge to home or other facility with appropriate resources  Outcome: Progressing  Flowsheets (Taken 5/20/2024 2152)  Discharge to home or other facility with appropriate resources: Identify barriers to discharge with patient and caregiver     Problem: Pain  Goal: Verbalizes/displays adequate comfort level or baseline comfort level  Outcome: Progressing     Problem: Safety - Adult  Goal: Free from fall injury  Outcome: Progressing     Problem: ABCDS Injury Assessment  Goal: Absence of physical injury  Outcome: Progressing     Problem: Skin/Tissue Integrity  Goal: Absence of new skin breakdown  Description: 1.  Monitor for areas of redness and/or skin breakdown  2.  Assess vascular access sites hourly  3.  Every 4-6 hours minimum:  Change oxygen saturation probe site  4.  Every 4-6 hours:  If on nasal continuous positive airway pressure, respiratory therapy assess nares and determine need for appliance change or resting period.  Outcome: Progressing     
  Problem: Pain  Goal: Verbalizes/displays adequate comfort level or baseline comfort level  Outcome: Not Progressing     Problem: Discharge Planning  Goal: Discharge to home or other facility with appropriate resources  Outcome: Progressing     Problem: Safety - Adult  Goal: Free from fall injury  Outcome: Progressing     Problem: ABCDS Injury Assessment  Goal: Absence of physical injury  Outcome: Progressing     Problem: Skin/Tissue Integrity  Goal: Absence of new skin breakdown  Description: 1.  Monitor for areas of redness and/or skin breakdown  2.  Assess vascular access sites hourly  3.  Every 4-6 hours minimum:  Change oxygen saturation probe site  4.  Every 4-6 hours:  If on nasal continuous positive airway pressure, respiratory therapy assess nares and determine need for appliance change or resting period.  Outcome: Progressing     Problem: Metabolic/Fluid and Electrolytes - Adult  Goal: Electrolytes maintained within normal limits  Outcome: Progressing     Problem: Pain  Goal: Verbalizes/displays adequate comfort level or baseline comfort level  Outcome: Not Progressing     
MRA of Neck W/O not done at this time due to patient inability to lie still. Despite PO Ativan and Percoset, and a strap acroos the legs, she was in too much pain to keep from moving constantly. The MRA/MRV of the Brain were sent through to be read, limited quality due to motion.I would recommend either a CTA head/neck or carotid doppler if imaging of neck vessels still needed. Thank you.  
2152)  Return ADL Status to a Safe Level of Function: Administer medication as ordered     Problem: Metabolic/Fluid and Electrolytes - Adult  Goal: Electrolytes maintained within normal limits  5/21/2024 0741 by Elke Longo RN  Outcome: Not Progressing  5/21/2024 0106 by Anna Gonzalez RN  Outcome: Progressing  Flowsheets (Taken 5/20/2024 2152)  Electrolytes maintained within normal limits: Monitor labs and assess patient for signs and symptoms of electrolyte imbalances     Problem: Discharge Planning  Goal: Discharge to home or other facility with appropriate resources  5/21/2024 0741 by Elke Longo RN  Outcome: Not Progressing  5/21/2024 0106 by Anna Gonzalez RN  Outcome: Progressing  Flowsheets (Taken 5/20/2024 2152)  Discharge to home or other facility with appropriate resources: Identify barriers to discharge with patient and caregiver     Problem: Pain  Goal: Verbalizes/displays adequate comfort level or baseline comfort level  5/21/2024 0741 by Elke Longo RN  Outcome: Not Progressing  5/21/2024 0106 by Anna Gonzalez RN  Outcome: Progressing     Problem: Neurosensory - Adult  Goal: Achieves stable or improved neurological status  5/21/2024 0741 by Elke Longo RN  Outcome: Not Progressing  5/21/2024 0106 by Anna Gonzalez RN  Outcome: Progressing  Flowsheets (Taken 5/20/2024 2152)  Achieves stable or improved neurological status: Assess for and report changes in neurological status  Goal: Achieves maximal functionality and self care  5/21/2024 0741 by Elke Longo RN  Outcome: Not Progressing  5/21/2024 0106 by Anna Gonzalez RN  Outcome: Progressing  Flowsheets (Taken 5/20/2024 2152)  Achieves maximal functionality and self care: Monitor swallowing and airway patency with patient fatigue and changes in neurological status     Problem: Musculoskeletal - Adult  Goal: Return mobility to safest level of function  5/21/2024 0741 by Elke Longo RN  Outcome: Not 
RN  Outcome: Progressing  5/21/2024 2246 by Anna Gonzalez RN  Outcome: Progressing  Flowsheets (Taken 5/21/2024 2055)  Achieves maximal functionality and self care: Monitor swallowing and airway patency with patient fatigue and changes in neurological status     Problem: Skin/Tissue Integrity - Adult  Goal: Skin integrity remains intact  5/22/2024 1010 by Elke Longo RN  Outcome: Progressing  5/21/2024 2246 by Anna Gonzalez RN  Outcome: Progressing  Flowsheets  Taken 5/21/2024 2246  Skin Integrity Remains Intact: Monitor for areas of redness and/or skin breakdown  Taken 5/21/2024 2055  Skin Integrity Remains Intact: Monitor for areas of redness and/or skin breakdown     Problem: Musculoskeletal - Adult  Goal: Return mobility to safest level of function  5/22/2024 1010 by Elke Longo RN  Outcome: Progressing  5/21/2024 2246 by Anna Gonzalez RN  Outcome: Progressing  Flowsheets (Taken 5/21/2024 2055)  Return Mobility to Safest Level of Function: Assess patient stability and activity tolerance for standing, transferring and ambulating with or without assistive devices  Goal: Maintain proper alignment of affected body part  5/22/2024 1010 by Elke Longo RN  Outcome: Progressing  5/21/2024 2246 by Anna Gonzalez RN  Outcome: Progressing  Flowsheets (Taken 5/21/2024 2055)  Maintain proper alignment of affected body part: Support and protect limb and body alignment per provider's orders  Goal: Return ADL status to a safe level of function  5/22/2024 1010 by Elke Longo RN  Outcome: Progressing  5/21/2024 2246 by Anna Gonzalez RN  Outcome: Progressing  Flowsheets (Taken 5/21/2024 2055)  Return ADL Status to a Safe Level of Function: Administer medication as ordered     Problem: Genitourinary - Adult  Goal: Urinary catheter remains patent  5/22/2024 1010 by Elke Longo RN  Outcome: Progressing  5/21/2024 2246 by Anna Gonzalez RN  Outcome: Progressing     Problem: Metabolic/Fluid 
appropriate  Outcome: Progressing     Problem: Chronic Conditions and Co-morbidities  Goal: Patient's chronic conditions and co-morbidity symptoms are monitored and maintained or improved  Outcome: Progressing  Flowsheets (Taken 5/21/2024 2055)  Care Plan - Patient's Chronic Conditions and Co-Morbidity Symptoms are Monitored and Maintained or Improved: Monitor and assess patient's chronic conditions and comorbid symptoms for stability, deterioration, or improvement     
patient and family use of appropriate assistive device and precautions (e.g. spinal or hip dislocation precautions)  Goal: Return ADL status to a safe level of function  Outcome: Progressing  Flowsheets (Taken 5/18/2024 2243)  Return ADL Status to a Safe Level of Function:   Administer medication as ordered   Assess activities of daily living deficits and provide assistive devices as needed   Assist and instruct patient to increase activity and self care as tolerated   Obtain physical therapy/occupational therapy consults as needed     Problem: Genitourinary - Adult  Goal: Urinary catheter remains patent  Outcome: Progressing  Flowsheets (Taken 5/18/2024 2243)  Urinary catheter remains patent: Assess patency of urinary catheter     Problem: Metabolic/Fluid and Electrolytes - Adult  Goal: Electrolytes maintained within normal limits  Outcome: Progressing  Flowsheets (Taken 5/18/2024 2243)  Electrolytes maintained within normal limits: Monitor labs and assess patient for signs and symptoms of electrolyte imbalances  Goal: Glucose maintained within prescribed range  Outcome: Progressing  Flowsheets (Taken 5/18/2024 2243)  Glucose maintained within prescribed range:   Monitor blood glucose as ordered   Assess for signs and symptoms of hyperglycemia and hypoglycemia   Administer ordered medications to maintain glucose within target range   Assess barriers to adequate nutritional intake and initiate nutrition consult as needed   Instruct patient on self management of diabetes and initiate consult as needed     Problem: Behavior  Goal: Pt/Family maintain appropriate behavior and adhere to behavioral management agreement, if implemented  Description: INTERVENTIONS:  1. Assess patient/family's coping skills and  non-compliant behavior (including use of illegal substances)  2. Notify security of behavior or suspected illegal substances which indicate the need for search of the family and/or belongings  3. Encourage 
imbalances   Administer electrolyte replacement as ordered   Monitor response to electrolyte replacements, including repeat lab results as appropriate   Fluid restriction as ordered   Instruct patient on fluid and nutrition restrictions as appropriate  5/24/2024 2213 by Karley Rose RN  Outcome: Progressing  Goal: Glucose maintained within prescribed range  5/25/2024 1058 by Eneida Moya RN  Outcome: Progressing  Flowsheets (Taken 5/25/2024 0830)  Glucose maintained within prescribed range:   Monitor blood glucose as ordered   Assess for signs and symptoms of hyperglycemia and hypoglycemia   Administer ordered medications to maintain glucose within target range   Assess barriers to adequate nutritional intake and initiate nutrition consult as needed   Instruct patient on self management of diabetes and initiate consult as needed  5/24/2024 2213 by Karley Rose RN  Outcome: Progressing     Problem: Behavior  Goal: Pt/Family maintain appropriate behavior and adhere to behavioral management agreement, if implemented  Description: INTERVENTIONS:  1. Assess patient/family's coping skills and  non-compliant behavior (including use of illegal substances)  2. Notify security of behavior or suspected illegal substances which indicate the need for search of the family and/or belongings  3. Encourage verbalization of thoughts and concerns in a socially appropriate manner  4. Utilize positive, consistent limit setting strategies supporting safety of patient, staff and others  5. Encourage participation in the decision making process about the behavioral management agreement  6. If a visitor's behavior poses a threat to safety call refer to organization policy.  7. Initiate consult with , Psychosocial CNS, Spiritual Care as appropriate  5/25/2024 1058 by Eneida Moya, RN  Outcome: Progressing  Flowsheets (Taken 5/25/2024 0830)  Patient/family maintains appropriate behavior and adheres to behavioral management

## 2024-05-27 NOTE — PROGRESS NOTES
ONCOLOGY HEMATOLOGY CARE PROGRESS NOTE      SUBJECTIVE:  She is doing much better  Labs improving etc       ROS:     Constitutional:  No weight loss, No fever, No chills, No night sweats.  Energy level good.  Eyes:  No impairment or change in vision  ENT / Mouth:  No pain, abnormal ulceration, bleeding, nasal drip or change in voice or hearing  Cardiovascular:  No chest pain, palpitations, new edema, or calf discomfort  Respiratory:  No pain, hemoptysis, change to breathing  Breast:  No pain, discharge, change in appearance or texture  Gastrointestinal:  No pain, cramping, jaundice, change to eating and bowel habits  Urinary:  No pain, bleeding or change in continence  Genitalia: No pain, bleeding or discharge  Musculoskeletal:  No redness, pain, edema or weakness  Skin:  No pruritus, rash, change to nodules or lesions  Neurologic:  No discomfort, change in mental status, speech, sensory or motor activity  Psychiatric:  No change in concentration or change to affect or mood  Endocrine:  No hot flashes, increased thirst, or change to urine production  Hematologic: No petechiae, ecchymosis or bleeding  Lymphatic:  No lymphadenopathy or lymphedema  Allergy / Immunologic:  No eczema, hives, frequent or recurrent infections    OBJECTIVE        Physical    VITALS:  Patient Vitals for the past 24 hrs:   BP Temp Temp src Pulse Resp SpO2 Weight   05/24/24 0722 (!) 171/76 98.5 °F (36.9 °C) Oral 64 14 99 % --   05/24/24 0640 -- -- -- -- 18 -- --   05/24/24 0353 -- -- -- -- -- -- 58.7 kg (129 lb 8 oz)   05/24/24 0300 (!) 143/63 98.1 °F (36.7 °C) Oral 82 16 100 % --   05/23/24 2325 -- -- -- -- 20 -- --   05/23/24 2300 (!) 166/81 98.6 °F (37 °C) Oral 80 22 100 % --   05/23/24 1913 (!) 165/64 98.3 °F (36.8 °C) Oral -- 16 -- --   05/23/24 1720 -- -- -- -- 18 -- --   05/23/24 1658 (!) 170/72 97.8 °F (36.6 °C) Oral 100 14 100 % --   05/23/24 1644 (!) 170/74 -- -- -- -- -- --   05/23/24 1600 (!) 
    V2.0    [facility] [place] Progress Note      Name:  Elvia Arguelles /Age/Sex: 1960  (63 y.o. female)   MRN & CSN:  [id number] & [id number] Encounter Date/Time: 2024 8:57 AM EDT   Location:  [id number]-[id number]/5110-01 PCP: Meg Ellis, APRN - CNP     Attending:Nia Crain MD       Hospital Day: 9      HPI :   Chief Complaint: fall, hip fracture.     Elvia Arguelles is a 63 y.o. female who presents with P/W hip pain after fall at home  reports that he spoke to the patient Friday night she states she went to go lie down. When he spoke with her again Saturday. When he called on Saturday morning, patient did not answer and he became concerned. He sent daughter to go check on the patient and patient had fallen on the ground. Unclear when the fall occurred and how long patient had been on the ground for. Patient is having significant right pain in the right hip.. Patient was noted to have bruises on her upper lip. Patient taken to the ED which did show right intra trochanteric hip fracture. Patient had elevated CK, elevated troponin, and creatinine of 4.1 from baseline 1.7-2. Patient admitted for further evaluation and management     Subjective:   Alert and oriented.   BP on the higher side this morning.   Patient reported that she feel like her vision is blurry and had been since she woke up after surgery.     Review of Systems:      Pertinent positives and negatives discussed in HPI    Objective:     Intake/Output Summary (Last 24 hours) at 2024 1328  Last data filed at 2024 0935  Gross per 24 hour   Intake 360 ml   Output 850 ml   Net -490 ml        Vitals:   Vitals:    24 0515 24 0700 24 0749 24 1231   BP: (!) 159/76 (!) 173/71 (!) 175/73    Pulse:   61    Resp:   12 17   Temp:  97.3 °F (36.3 °C) 98 °F (36.7 °C)    TempSrc:  Oral Oral    SpO2:   99%    Weight:       Height:             Physical Exam:      Physical Exam Performed:    BP (!) 175/73   Pulse 61   
    V2.0    [facility] [place] Progress Note      Name:  Elvia Arguelles /Age/Sex: 1960  (63 y.o. female)   MRN & CSN:  [id number] & [id number] Encounter Date/Time: 2024 8:57 AM EDT   Location:  [id number]-[id number]/5110-01 PCP: Meg Ellis, APRN - CNP     Attending:Nia Crain MD       Hospital Day: 6      HPI :   Chief Complaint: fall, hip fracture.     Elvia Arguelles is a 63 y.o. female who presents with P/W hip pain after fall at home  reports that he spoke to the patient Friday night she states she went to go lie down. When he spoke with her again Saturday. When he called on Saturday morning, patient did not answer and he became concerned. He sent daughter to go check on the patient and patient had fallen on the ground. Unclear when the fall occurred and how long patient had been on the ground for. Patient is having significant right pain in the right hip.. Patient was noted to have bruises on her upper lip. Patient taken to the ED which did show right intra trochanteric hip fracture. Patient had elevated CK, elevated troponin, and creatinine of 4.1 from baseline 1.7-2. Patient admitted for further evaluation and management     Subjective:   Continues to be alert and oriented this morning, though seems lethargic.  Denies any complaints, did however report a headache.   No bleeding noted.   Review of Systems:      Pertinent positives and negatives discussed in HPI    Objective:     Intake/Output Summary (Last 24 hours) at 2024 1419  Last data filed at 2024 0818  Gross per 24 hour   Intake 480 ml   Output 2325 ml   Net -1845 ml        Vitals:   Vitals:    24 0818 24 0831 24 1055 24 1115   BP:    (!) 141/64   Pulse: 74 73  74   Resp:   18 12   Temp:    97.8 °F (36.6 °C)   TempSrc:    Oral   SpO2:  100%  100%   Weight:       Height:             Physical Exam:      Physical Exam Performed:    BP (!) 141/64   Pulse 74   Temp 97.8 °F (36.6 °C) (Oral)   Resp 12   
    V2.0    [facility] [place]      Name:  Elvia Arguelles /Age/Sex: 1960  (63 y.o. female)   MRN & CSN:  [id number] & [id number] Encounter Date/Time: 2024 8:57 AM EDT   Location:  [id number]-2155/5110-01 PCP: Meg Ellis, APRN - CNP     Attending:Nia Crain MD       Hospital Day: 5      HPI :   Chief Complaint: fall, hip fracture.     Elvia Arguelles is a 63 y.o. female who presents with P/W hip pain after fall at home  reports that he spoke to the patient Friday night she states she went to go lie down. When he spoke with her again Saturday. When he called on Saturday morning, patient did not answer and he became concerned. He sent daughter to go check on the patient and patient had fallen on the ground. Unclear when the fall occurred and how long patient had been on the ground for. Patient is having significant right pain in the right hip.. Patient was noted to have bruises on her upper lip. Patient taken to the ED which did show right intra trochanteric hip fracture. Patient had elevated CK, elevated troponin, and creatinine of 4.1 from baseline 1.7-2. Patient admitted for further evaluation and management     Subjective:   Today patient is alert and oriented to time, place and person.   BP better controlled.   Review of Systems:      Pertinent positives and negatives discussed in HPI    Objective:     Intake/Output Summary (Last 24 hours) at 2024 1316  Last data filed at 2024 0500  Gross per 24 hour   Intake 2163 ml   Output 2300 ml   Net -137 ml        Vitals:   Vitals:    24 0855 24 1105 24 1148 24 1227   BP:  (!) 214/86 (!) 174/81 (!) 158/64   Pulse: 95 97 89 83   Resp: 18 13 17 13   Temp:  98.6 °F (37 °C)     TempSrc:  Oral     SpO2:  100%     Weight:       Height:             Physical Exam:      Physical Exam Performed:    BP (!) 158/64   Pulse 83   Temp 98.6 °F (37 °C) (Oral)   Resp 13   Ht 1.499 m (4' 11\")   Wt 56.5 kg (124 lb 8 
    V2.0  [facility] [place]      Name:  Elvia Arguelles /Age/Sex: 1960  (63 y.o. female)   MRN & CSN:  [id number] & [id number] Encounter Date/Time: 2024 9:33 AM EDT    Location:  [id number]-[id number]/5110-01 PCP: Meg Ellis APRN - CNP       Hospital Day: 2    Assessment and Plan:   Elvia Arguelles is a 63 y.o. female       Plan:  Right intertrochanteric hip fracture  Fall  -unsure if mechanical fall; unsure about loss of consciousness; Head CT negative  -Cardiology cleared patient for surgery  -Ortho planning on OR today around noon   -Postoperative PT OT  -Pain control   Elevated troponin  -EKG shows sinus rhythm with premature supraventricular complexes nonspecific ST and T changes  -Likely demand ischemia, some component related to FAVIAN. cardiology on board.  Acute metabolic encephalopathy  -Patient constantly moaning during evaluation, essentially nonverbal  -May be related to pain.  May be related to cortisol deficiency as by description of  it seems likely patient has not gotten her medication in about 2 days  Adrenal insufficiency on chronic steroid therapy   -Will give dose of IV steroids today as patient has likely not gotten any in about 2 days  -Continue hydrocortisone 10 mg every morning, 5 mg every night  -Recommend checking vitamin D levels as well  FAVIAN on CKD  -Suspect due to rhabdomyolysis   -aggressive IV fluid resuscitation  -Will have nephrology on board  -avoid nephrotoxic medications  Rhabdomyolysis  -Unclear how long patient was on the ground.   was out of town and reports he spoke with the patient on the phone the previous night and when he called in the morning she was unable to be reached; when daughter was sent to check on her patient was on the ground  -Aggressive IV fluids  -Trend CK  Insulin-dependent diabetes mellitus type 2  -on insulin pump at home  -Lantus 20U twice daily and MDSSI  Anion gap metabolic acidosis  -may be due to acute renal 
  Department of Orthopedic Surgery  Nurse Practitioner   Progress Note    Subjective:     Post-Operative Day: 3 Status Post right hip IM nail  Systemic or Specific Complaints: Patient seen resting in bed. Patient now alert and talking. Patient moaning frequently, but denies pain. Patient consistently drawing up left leg and reports she feels she's unable to relax; endorses restless leg syndrome. , RN and hospitalist at bedside.     Objective:     Patient Vitals for the past 24 hrs:   BP Temp Temp src Pulse Resp SpO2 Weight   05/22/24 1105 (!) 214/86 98.6 °F (37 °C) Oral 97 13 100 % --   05/22/24 0855 -- -- -- 95 18 -- --   05/22/24 0709 (!) 174/66 97.7 °F (36.5 °C) Oral 84 (!) 9 100 % --   05/22/24 0331 (!) 173/77 98.6 °F (37 °C) Axillary 87 17 100 % --   05/22/24 0305 (!) 166/75 99.1 °F (37.3 °C) Axillary 85 22 99 % 56.5 kg (124 lb 8 oz)   05/21/24 2357 (!) 161/67 -- -- 88 15 98 % --   05/21/24 2323 (!) 186/61 -- -- -- -- -- --   05/21/24 2315 (!) 186/61 99.4 °F (37.4 °C) Axillary 84 10 100 % --   05/21/24 2109 (!) 164/61 -- -- 86 (!) 0 100 % --   05/21/24 2000 (!) 164/61 99.8 °F (37.7 °C) Axillary 84 20 100 % --   05/21/24 1926 (!) 195/83 99.7 °F (37.6 °C) Axillary 86 18 100 % --   05/21/24 1745 (!) 171/76 -- -- -- -- -- --   05/21/24 1649 (!) 196/86 99.2 °F (37.3 °C) Axillary 92 15 100 % --   05/21/24 1319 (!) 168/68 -- -- 83 20 99 % --   05/21/24 1130 (!) 196/79 98.6 °F (37 °C) Axillary 87 14 99 % --       General: alert, appears stated age, and cooperative   Wound: Wound clean and dry no evidence of infection.   Motion: Painful range of Motion   DVT Exam: No evidence of DVT seen on physical exam.     NVI in lower extremity. Thigh swollen but soft. Moving foot and ankle.  Mepilex clean dry and intact  Thigh compartments soft and compressible  Rotational alignment of left leg at neutral  DP and PT pulse 2+, foot warm and well perfused  EHL, FHL, gastroc, anterior tib motor intact    Data Review  CBC:   Lab 
  Nephrology Progress Note   [employer]      Reason for consultation: FAVIAN on CKD 3b -- baseline Cr ~ 1.9 mg/dL.     Subjective:    The patient has been seen and examined. Labs and chart reviewed. Patient's mentation continues to improve. Eating and drinking. Endorses continued diarrhea -- per nursing, this is chronic for patient. Cr back to baseline at 1.6 mg/dL. Na+ WNL. Hgb dropped to 8.2 g/dL s/p 1u PRBC yesterday.    Patient review of systems: Endroses feeling better    Allergies:  No Known Allergies     Scheduled Meds:   potassium chloride  40 mEq Oral TID    potassium phosphate IVPB (PERIPHERAL LINE)  10 mmol IntraVENous Once    lactobacillus  2 capsule Oral BID WC    NIFEdipine  30 mg Oral Daily    iron sucrose  200 mg IntraVENous Daily    cefTRIAXone (ROCEPHIN) IV  2,000 mg IntraVENous Q24H    hydrocortisone sodium succinate PF  50 mg IntraVENous Q8H    [Held by provider] hydrocortisone  5 mg Oral Nightly    [Held by provider] insulin glargine  10 Units SubCUTAneous BID    escitalopram  10 mg Oral Daily    [Held by provider] hydrocortisone  10 mg Oral Daily    insulin lispro  0-8 Units SubCUTAneous TID WC    insulin lispro  0-4 Units SubCUTAneous Nightly    rOPINIRole  1 mg Oral Nightly    pantoprazole  40 mg Oral BID AC    sodium chloride flush  5-40 mL IntraVENous 2 times per day    heparin (porcine)  5,000 Units SubCUTAneous 3 times per day        sodium chloride      sodium chloride 50 mL/hr at 05/24/24 0922    dextrose 100 mL/hr at 05/21/24 2048    sodium chloride         PRN Meds:LORazepam, sodium chloride, loperamide, oxyCODONE, hydrALAZINE, [Held by provider] HYDROmorphone, perflutren lipid microspheres, glucose, dextrose bolus **OR** dextrose bolus, glucagon (rDNA), dextrose, hydrOXYzine pamoate, sodium chloride flush, sodium chloride, ondansetron **OR** ondansetron, polyethylene glycol, acetaminophen **OR** acetaminophen    Physical Exam:    TEMPERATURE:  Current - Temp: 98.5 °F (36.9 °C); Max - 
  Neurology Progress Note    Updates  - Patient reportedly did not sleep well last night and is more tired today.  She thinks that her vision is improving, but she still has abnormal vision.    Medical History:  Past Medical History:   Diagnosis Date    Adrenal insufficiency (HCC)     Cancer (HCC) 2002    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    Closed displaced intertrochanteric fracture of right femur (HCC) 05/18/2024    Depression     Diabetes mellitus (HCC)     Type I    Hx of radiation therapy     melanoma right eye    Hypertension     Pt. denies having history of Hypertension    Hyponatremia     Insulin pump in place     Melanoma (HCC) 01/13/2023    in stomach, pancreas    Prolonged emergence from general anesthesia     slow to wake up    Restless leg syndrome      Past Surgical History:   Procedure Laterality Date    CARPAL TUNNEL RELEASE Bilateral 12/2017    CHOLECYSTECTOMY      CT BIOPSY ABDOMEN RETROPERITONEUM  12/27/2022    CT BIOPSY ABDOMEN RETROPERITONEUM 12/27/2022 [facility] CT SCAN    EYE SURGERY Right     biopsy    HIP SURGERY Right 5/19/2024    HIP INTRAMEDULLARY NAIL ROYCE INSERTION performed by Gonsalo White MD at [facility] OR    HYSTERECTOMY (CERVIX STATUS UNKNOWN)      LIPOMA RESECTION      LIVER BIOPSY Right     PORT SURGERY Right 01/18/2023    RIGHT POWER PORT PLACEMENT performed by Satish Corley MD at [facility] OR    SHOULDER ARTHROSCOPY Right 08/31/2018    RIGHT SHOULDER ARTHROSCOPE, SUBACROMIAL DECOMPRESSION, DISTALCLAVICLE EXCISION, CAPSULAR RELEASE, MANIPULATION UNDER ANESTHESIA, DEBRIDEMENT    SHOULDER SURGERY      UPPER GASTROINTESTINAL ENDOSCOPY  10/22/2014    UPPER GASTROINTESTINAL ENDOSCOPY N/A 01/13/2023    EGD W/EUS FNA performed by Yuri Varela MD at [facility] ENDOSCOPY    UPPER GASTROINTESTINAL ENDOSCOPY  01/13/2023    EGD BIOPSY performed by Yuri Varela MD at [facility] ENDOSCOPY    UPPER GASTROINTESTINAL ENDOSCOPY N/A 4/5/2024    ESOPHAGOGASTRODUODENOSCOPY BIOPSY performed by Tylor Staley MD at 
  Neurology Progress Note    Updates  - Patient reports that she is tired today and that her vision is not better.    Medical History:  Past Medical History:   Diagnosis Date    Adrenal insufficiency (HCC)     Cancer (HCC) 2002    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    Closed displaced intertrochanteric fracture of right femur (HCC) 05/18/2024    Depression     Diabetes mellitus (HCC)     Type I    Hx of radiation therapy     melanoma right eye    Hypertension     Pt. denies having history of Hypertension    Hyponatremia     Insulin pump in place     Melanoma (HCC) 01/13/2023    in stomach, pancreas    Prolonged emergence from general anesthesia     slow to wake up    Restless leg syndrome      Past Surgical History:   Procedure Laterality Date    CARPAL TUNNEL RELEASE Bilateral 12/2017    CHOLECYSTECTOMY      CT BIOPSY ABDOMEN RETROPERITONEUM  12/27/2022    CT BIOPSY ABDOMEN RETROPERITONEUM 12/27/2022 [facility] CT SCAN    EYE SURGERY Right     biopsy    HIP SURGERY Right 5/19/2024    HIP INTRAMEDULLARY NAIL ROYCE INSERTION performed by Gonsalo White MD at [facility] OR    HYSTERECTOMY (CERVIX STATUS UNKNOWN)      LIPOMA RESECTION      LIVER BIOPSY Right     PORT SURGERY Right 01/18/2023    RIGHT POWER PORT PLACEMENT performed by Satish Corley MD at [facility] OR    SHOULDER ARTHROSCOPY Right 08/31/2018    RIGHT SHOULDER ARTHROSCOPE, SUBACROMIAL DECOMPRESSION, DISTALCLAVICLE EXCISION, CAPSULAR RELEASE, MANIPULATION UNDER ANESTHESIA, DEBRIDEMENT    SHOULDER SURGERY      UPPER GASTROINTESTINAL ENDOSCOPY  10/22/2014    UPPER GASTROINTESTINAL ENDOSCOPY N/A 01/13/2023    EGD W/EUS FNA performed by Yuri Varela MD at [facility] ENDOSCOPY    UPPER GASTROINTESTINAL ENDOSCOPY  01/13/2023    EGD BIOPSY performed by Yuri Varela MD at [facility] ENDOSCOPY    UPPER GASTROINTESTINAL ENDOSCOPY N/A 4/5/2024    ESOPHAGOGASTRODUODENOSCOPY BIOPSY performed by Tylor Staley MD at [facility] ENDOSCOPY    US GUIDED LIVER BIOPSY PERCUTANEOUS  12/16/2022    US 
  Physician Progress Note      PATIENT:               CARLOS ANDERSON  CSN #:                  [id number]  :                       1960  ADMIT DATE:       2024 11:48 AM  DISCH DATE:  RESPONDING  PROVIDER #:        Salome Kennedy MD          QUERY TEXT:    Hematology,    Pt admitted with  rt trochanter fracture and has PMH of  metastatic melanoma   on maintenance Opdivo. Noted documentation of immunotherapy induced   encephalitis. It is unclear if  immunotherapy induced encephalitis is a   current dx, PMH or patient is at risk.  If possible, please document in   progress notes and discharge summary clarification current status of   immunotherapy induced encephalitis.    The medical record reflects the following:  Risk Factors: immunotherapy  Clinical Indicators: *Cognitive decline.  Most likely due to multiple factors,   the fall, anesthesia, rhabdomyolysis, acute renal failure.  We will hold off   on any steroids For immunotherapy induced encephalitis  Treatment: labs, imaging, medical management    Thank you,  Diana Davalos RN [facility]  [phone]  Options provided:  -- Immunotherapy induced encephalitis currently being treated/evaluated as   evidenced by, Please document supportive evidence.  -- No clinical evidence of immunotherapy induced encephalitis.currently  -- Immunotherapy induced encephalitis is PMH only  -- Other - I will add my own diagnosis  -- Disagree - Not applicable / Not valid  -- Disagree - Clinically unable to determine / Unknown  -- Refer to Clinical Documentation Reviewer    PROVIDER RESPONSE TEXT:    This patient does not have clinical evidence of immunotherapy induced   encephalitis currently.    Query created by: Diana Davalos on 2024 2:15 PM      Electronically signed by:  Salome Kennedy MD 2024 5:49 PM          
  Physician Progress Note      PATIENT:               CARLOS ANDERSON  [facility] #:                  [id number]  :                       1960  ADMIT DATE:       2024 11:48 AM  DISCH DATE:  RESPONDING  PROVIDER #:        Helen Willard          QUERY TEXT:    Nephrology,    Pt admitted with rt hip fx s/p IM nailing and has FAVIAN documented. If possible,   please document in the progress notes and discharge summary if you are   evaluating and/or treating any of the following:    The medical record reflects the following:  Risk Factors: CKD  Clinical Indicators: creatinine  baseline on 24 was 1.9, creatinine on   admission 4.1 with rise to 5.4, now 2.8  Treatment: labs, IVF, medical management    Defined by Kidney Disease Improving Global Outcomes (KDIGO) clinical practice   guideline for acute kidney injury:  -Increase in SCr by greater than or equal to 0.3 mg/dl within 48 hours; or  -Increase or decrease in SCr to greater than or equal to 1.5 times baseline,   which is known or presumed to have occurred within the prior 7 days; or  -Urine volume < 0.5ml/kg/h for 6 hours    Thank you,  Diana Davalos RN [facility]  [phone]  Options provided:  -- Acute kidney failure with acute tubular necrosis  -- Acute kidney failure with acute tubular necrosis ruled out  -- Other - I will add my own diagnosis  -- Disagree - Not applicable / Not valid  -- Disagree - Clinically unable to determine / Unknown  -- Refer to Clinical Documentation Reviewer    PROVIDER RESPONSE TEXT:    Acute kidney failure with acute tubular necrosis ruled out.    Query created by: Diana Davalos on 2024 2:42 PM      Electronically signed by:  Helen Willard 2024 10:53 AM          
  Physician Progress Note      PATIENT:               CARLOS ANDERSON  [facility] #:                  [id number]  :                       1960  ADMIT DATE:       2024 11:48 AM  DISCH DATE:  RESPONDING  PROVIDER #:        Nia Crain MD          QUERY TEXT:    Internal Medicine,    Patient admitted with right intertrochanteric hip fracture following a fall   and noted to have troponin elevation documented as demand ischemia. If   possible, please document in progress notes and discharge summary if the   demand ischemia can be further specified as any of the following:    The medical record reflects the following:  Risk Factors: renal failure  Clinical Indicators: troponin rise from 250 to 345  Treatment: labs, imaging, Cardiology consult, medical management    Thank you,  Diana Davalos RN [facility]  [phone]  Options provided:  -- Type 2 MI  -- Demand Ischemia with MI  -- Demand Ischemia only, no MI  -- Other - I will add my own diagnosis  -- Disagree - Not applicable / Not valid  -- Disagree - Clinically unable to determine / Unknown  -- Refer to Clinical Documentation Reviewer    PROVIDER RESPONSE TEXT:    This patient has demand ischemia only, no MI.    Query created by: Diana Davalos on 2024 2:30 PM      Electronically signed by:  Nia Crain MD 2024 9:00 AM          
4 Eyes Skin Assessment     NAME:  Elvia Arguelles  YOB: 1960  MEDICAL RECORD NUMBER:  [id number]    The patient is being assessed for  Admission    I agree that at least one RN has performed a thorough Head to Toe Skin Assessment on the patient. ALL assessment sites listed below have been assessed.      Areas assessed by both nurses:    Head, Face, Ears, Shoulders, Back, Chest, Arms, Elbows, Hands, Sacrum. Buttock, Coccyx, Ischium, Legs. Feet and Heels, and Under Medical Devices         Does the Patient have a Wound? No noted wound(s)       Uriah Prevention initiated by RN: Yes  Wound Care Orders initiated by RN: No    Pressure Injury (Stage 3,4, Unstageable, DTI, NWPT, and Complex wounds) if present, place Wound referral order by RN under : No    New Ostomies, if present place, Ostomy referral order under : No     Nurse 1 eSignature: Electronically signed by Latanya Perez RN on 5/19/24 at 1:23 AM EDT    **SHARE this note so that the co-signing nurse can place an eSignature**    Nurse 2 eSignature: Electronically signed by Luana Ledezma RN on 5/19/24 at 1:23 AM EDT    
Attempt made at this time to call rehab unit to discuss when pt will come down and to also attempt to give report. No answer at main desk number. Charge phone rang for 2 min- was answered with statement \"I'm on the other line\" and phone laid down. Unable to speak with anyone at this time. PCU charge nurse Christine notified.   Electronically signed by Dayna Aguiar RN on 5/27/24 at 12:41 PM EDT    Received return call from ARU. Awaiting rehab screening before pt can come down. This RN provided number and requested to be called when pt is able to come down.  Electronically signed by Dayna Aguiar RN on 5/27/24 at 12:44 PM EDT    Pt's BS noted to be 439 at this time. Daughter voiced frustration this morning stating that \"we always have problems when we are here\" in regards to her blood sugar. Daughter stated that they were frustrated that blood sugar was not rechecked the other day after family provided pt with cake and that she should have received additional insulin. Daughter stated pt's blood sugar was around 600 at dinner that day. At the same time that daughter is expressing these frustrations to RN she provided pt with a frappe that she stated was not sugar free when RN asked.  Dr. Pérez notified of elevated blood sugar at this time. Additional insulin ordered.  Electronically signed by Dayna Aguiar RN on 5/27/24 at 1:10 PM EDT      
Comprehensive Nutrition Assessment    Type and Reason for Visit:  Initial    Nutrition Recommendations/Plan:   Continue regular diet; 5CCC; RAÚL 3-4g; Low K - Please document and encourage intake  Fluid restriction per provider: 2000 ml     Malnutrition Assessment:  Malnutrition Status:  No malnutrition (05/24/24 1539)    Context:  Acute Illness     Findings of the 6 clinical characteristics of malnutrition:  Energy Intake:  75% or less of estimated energy requirements for 7 or more days  Weight Loss:  No significant weight loss     Body Fat Loss:  No significant body fat loss     Muscle Mass Loss:  No significant muscle mass loss    Fluid Accumulation:  Mild Extremities   Strength:  Not Performed    Nutrition Assessment:    Initial - pt presenting with R hip fracture, noted seizure activity with EEG. PMH CKD3, chronic diarrhea. Pt has gained significant over the last year, attributed to prescribed steroid use. Does not like/want protein supplementation. Family at bedside states she is asking for food and has been eating. Pt stated her appetite has been improving, but still decreased from baseline. Sleepy at time of visit s/p MRI. Will continue to monitor intake.    Nutrition Related Findings:    BG 71-280mg/dL + coverage. eGFR 35, Creat 1.6, BUN 35, K 3.2, Phos 2.4 + repletion, mag 2.8 overcorrected and monitoring. +iron sucrose. LLE edema nonpitting. LBM 5/24 Wound Type: Diabetic Ulcer, Surgical Incision, Multiple (incision to R hip, ulcer to foot)       Current Nutrition Intake & Therapies:    Average Meal Intake: Unable to assess (Only 1 meal documented)  Average Supplements Intake: None Ordered  ADULT DIET; Regular; 5 carb choices (75 gm/meal); No Added Salt (3-4 gm); Low Potassium (Less than 3000 mg/day); 2000 ml    Anthropometric Measures:  Height: 149.9 cm (4' 11.02\")  Ideal Body Weight (IBW): 95 lbs (43 kg)       Current Body Weight: 58.7 kg (129 lb 6.6 oz), 136.2 % IBW. Weight Source: Bed Scale  Current 
Continuous glucose monitor and insulin pump removed from patient per protocol because patient is unable to manage the pump herself. Nichelle Bentley notified via secure message. Sliding Scale insulin orders already in place. Electronically signed by Latanya Perez RN on 5/19/2024 at 2:44 AM    
Critical HCT received at 0731 - on call MD Pedersen notified. No new orders. Update on critical results provided to attending MD Crain as well.    Secure message sent requesting neuro consult to MD, no new consults at this time per MD.  
Critical lab results received.  Secure message sent to hospitalist regarding critical lab results.  Await return message or orders.    
EEG completed and available for interpretation on the [facility] database .     RN-transport Pt to MRI  
Mcgovern inserted for procedure and left for accurate Is&Os. Per chart review, Nephro OK to d/c mcgovern. Voiding trial will begin today, mcgovern to be removed this AM.     Electronically signed by Heather White RN on 5/26/2024 at 7:50 AM      Mcgovern removed. Voiding trial in process.     Electronically signed by Heather White RN on 5/26/2024 at 9:09 AM      Patient has voided this shift, voiding trial successful.    Electronically signed by Heather White RN on 5/26/2024 at 6:51 PM   
Nephrology ([facility]) Progress Note    C: FAVIAN on CKD3    Subjective:    HPI:  Breathing comfortably.  No CP.  Weak.  ROS:  In bed.  PMFSH:  medications reviewed.    Objective:  Blood pressure (!) 147/60, pulse 68, temperature 98.4 °F (36.9 °C), temperature source Oral, resp. rate 12, height 1.499 m (4' 11.02\"), weight 56.3 kg (124 lb 1.9 oz), SpO2 98 %.    Intake/Output Summary (Last 24 hours) at 5/27/2024 1618  Last data filed at 5/27/2024 1244  Gross per 24 hour   Intake 937.82 ml   Output 0 ml   Net 937.82 ml       General:  NAD, A+Ox3  Chest:  CTAB  CVS:  RRR  Abdominal:  NTND, soft, +BS  Extremities:  no edema  Skin:  no rash    Labs:  Renal panel:  Lab Results   Component Value Date/Time     (L) 05/27/2024 06:15 AM    K 3.4 (L) 05/27/2024 06:15 AM    K 5.4 (H) 05/20/2024 06:05 AM    CO2 25 05/27/2024 06:15 AM    BUN 29 (H) 05/27/2024 06:15 AM    CREATININE 1.7 (H) 05/27/2024 06:15 AM    CALCIUM 8.2 (L) 05/27/2024 06:15 AM    PHOS 2.2 (L) 05/27/2024 06:15 AM    MG 1.50 (L) 05/27/2024 06:15 AM     CBC:  Lab Results   Component Value Date/Time    WBC 11.6 (H) 05/26/2024 05:40 AM    HGB 9.0 (L) 05/26/2024 05:40 AM    HCT 26.5 (L) 05/26/2024 05:40 AM     05/26/2024 05:40 AM       Assessment/Plan:  Reviewed old records and labs.    FAVIAN on CKD 3: baseline Cr ~ 1.9 mg/dL. Etiology likely 2/2 relative hypotension, poor oral intake, and 2/2 acute rhabdomyolysis -- but CK only 1910>2939>1699. UPCR 0.6.   - Sees Dr. Celis              - renal function back to baseline              - OK to remove mcgovern and perform voiding trial from renal standpoint              - Repeat UA noted -- 5 hyaline casts present, mod blood, 30 protein. UPCR 0.6.              - D/c IVF              - No ACE/ARBs. No NSAIDs.               - Maintain normotension. Avoid nephrotoxic agents.               - Daily RFTs     Hypokalemia              - supplement prn     Hypophosphatemia              - supplement prn   
Nephrology ([facility]) Progress Note    C: FAVIAN on CKD3    Subjective:    HPI:  Breathing comfortably.  No CP.  Weak.  ROS:  In bed.  PMFSH:  medications reviewed.    Objective:  Blood pressure (!) 175/73, pulse 61, temperature 98 °F (36.7 °C), temperature source Oral, resp. rate 12, height 1.499 m (4' 11.02\"), weight 56.4 kg (124 lb 5.4 oz), SpO2 99 %.    Intake/Output Summary (Last 24 hours) at 5/26/2024 1230  Last data filed at 5/26/2024 0935  Gross per 24 hour   Intake 360 ml   Output 850 ml   Net -490 ml       General:  NAD, A+Ox3  Chest:  CTAB  CVS:  RRR  Abdominal:  NTND, soft, +BS  Extremities:  no edema  Skin:  no rash    Labs:  Renal panel:  Lab Results   Component Value Date/Time     05/26/2024 05:40 AM    K 3.1 (L) 05/26/2024 05:40 AM    K 5.4 (H) 05/20/2024 06:05 AM    CO2 26 05/26/2024 05:40 AM    BUN 33 (H) 05/26/2024 05:40 AM    CREATININE 1.8 (H) 05/26/2024 05:40 AM    CALCIUM 8.8 05/26/2024 05:40 AM    PHOS 3.4 05/26/2024 05:40 AM    MG 1.80 05/26/2024 05:40 AM     CBC:  Lab Results   Component Value Date/Time    WBC 11.6 (H) 05/26/2024 05:40 AM    HGB 9.0 (L) 05/26/2024 05:40 AM    HCT 26.5 (L) 05/26/2024 05:40 AM     05/26/2024 05:40 AM       Assessment/Plan:  Reviewed old records and labs.    FAVIAN on CKD 3: baseline Cr ~ 1.9 mg/dL. Etiology likely 2/2 relative hypotension, poor oral intake, and 2/2 acute rhabdomyolysis -- but CK only 1910>2939>1699. UPCR 0.6.   - Sees Dr. Celis              - renal function back to baseline              - OK to remove mcgovern and perform voiding trial from renal standpoint              - Repeat UA noted -- 5 hyaline casts present, mod blood, 30 protein. UPCR 0.6.              - D/c IVF              - No ACE/ARBs. No NSAIDs.               - Maintain normotension. Avoid nephrotoxic agents.               - Daily RFTs     Hypokalemia              - supplement prn     Hypophosphatemia              - supplement prn     Hypernatremia: 
Neuro NP came to bedside to see patient, during assessment patient was having a hard time moving left side extremities and with left side vision. Neuro requested handoff neuro assessments be completed to assure that neuro assessment is staying consistent.     MD Crain notified of Neuro's assessment. He stated he would talk to neuro.     Neuro NP called this RN and stated if patient's BP got above 160 systolic to notify primary team.    Patient's /72. Breann [place] notified. PRN parameters changed. MD directed to give patient a dose of PRN hydralazine.     PRN hydralazine administered.    BP recheck 136/55  
No new oncology recs at this time   Dr clark is aware pt is admitted   
Notified MD of deterioration score over 70. No new orders. Charge RN also notified.  
Occupational Therapy      Attempted to see for OT session however pt's HGB is 5.9 and RN starting transfusion. Will follow up as schedule and pt condition permit.    Electronically signed by Vangie Grant OT on 5/23/2024 at 2:20 PM    
PCA went into room at shift change, patient was awake and alert and able to speak. This RN went into assess and patient knew her name, birth date, where she is. She is unsure what happened.   
PRN ativan for MRI was given by this RN while patient was in EEG no scanner was available to scan patient or ativan.    Electronically signed by Yuri Donald RN on 5/24/2024 at 12:31 PM   
Patient has been drowsy for most of shift so far. She will open eyes when spoken to but does not verbally respond. I did get her to tell me her name one time.This is how she was last night as well according to night shift. Patient will vocalize and cry out when in pain.   
Patient has been very sleepy. Responds to voice and touch with noises throughout shift. MD notified.   
Patient resting in bed. R hip dressing in place. No loose stool noted at this time. R hip dressing in place. Medicated for complaints of pain. Pain resolved. Safety precautions in place. Care ongoing.  
Patient returned from PACU still very drowsy. She will not respond verbally but is moaning and writhing in pain. Patients blood pressure low, 103/53. PT has prn dilaudid and oxycodone she she could have. Patient unable to take oral medications due to drowsiness. I am concerned about patients blood pressure getting even lower. MD messaged via perfect serve. Patients prn dilaudid dosage lowered to 0.5 mg. Patient scoring 7 on NVPS. PRN dose given and blood pressure was rechecked before administering dose (115/59).   
Patient still very drowsy. Will open eyes when speaking to patient but does not verbally respond. Patient asleep but then will wake up moaning and writhing in pain.   
Patient's mentation continues to improve. Frequency of bowel movements decreased significantly today compared to yesterday. Potassium and phosphorus was replaced per orders.     Patient tolerated EEG and was given a loading dose of Keppra per neurology orders. PRN oxycodone modified to q4 hrs per patient request and MD approval. MRA/MRV unsuccessful due to patient unable to lie still despite Ativan and Percocet. Care ongoing.     Electronically signed by Yuri Donald RN on 5/24/2024 at 6:51 PM   
Patient's pupils fixed to the right side today. Upon assessment, notified charge RN and MD. MD came to assess patient, new orders for STAT CT and routine MRI. This RN accompanied patient to testing. Radiology called results of CT to this RN, MD notified.    Notified MD of MRI brain results.   
Patients blood pressure running low. Automatic was 96/38. Manual was 102/40. Dilaudid given 30 minutes prior. Was getting ready to message MD doing surgery when surgery team came to patients room to move her downstairs. Spoke with RN at bedside about hypotension. Blood pressure retaken 110/38. Fluid bolus will be given in pre op and RN will speak with anesthesia when downstairs.   
Physical Therapy      Elviasandi Burgosa  [id number]  [id number]    Attempted to see for PT session however pt's HGB is 5.9 and K+ 2.2; will defer therapy at this time  Electronically signed by LAURA CAI, PT on 5/23/2024 at 11:33 AM       
Physical Therapy  Facility/Department: [facility] 5 PROGRESSIVE CARE  Physical Therapy Initial Assessment    Name: Elvia Arguelles  : 1960  MRN: [id number]  Date of Service: 2024    Discharge Recommendations:  Subacute/Skilled Nursing Facility   PT Equipment Recommendations  Other: defer to next level of care      Elvia Arguelles scored a 6/24 on the AM-PAC short mobility form. Current research shows that an AM-PAC score of 17 or less is typically not associated with a discharge to the patient's home setting. Based on the patient's AM-PAC score and their current functional mobility deficits, it is recommended that the patient have 3-5 sessions per week of Physical Therapy at d/c to increase the patient's independence.  Please see assessment section for further patient specific details.    If patient discharges prior to next session this note will serve as a discharge summary.  Please see below for the latest assessment towards goals.      Patient Diagnosis(es): The primary encounter diagnosis was Traumatic rhabdomyolysis, initial encounter ([facility]ly [facility]). Diagnoses of FAVIAN (acute kidney injury) ([facility]ly [facility]), Elevated troponin, Transaminitis, Closed fracture of trochanter of right femur, initial encounter ([facility]), and Elevated troponin level were also pertinent to this visit.  Past Medical History:  has a past medical history of Adrenal insufficiency (HCC), Cancer (HCC), Closed displaced intertrochanteric fracture of right femur (HCC), Depression, Diabetes mellitus (HCC), Hx of radiation therapy, Hypertension, Hyponatremia, Insulin pump in place, Melanoma (HCC), Prolonged emergence from general anesthesia, and Restless leg syndrome.  Past Surgical History:  has a past surgical history that includes Hysterectomy; Cholecystectomy; shoulder surgery; Upper gastrointestinal endoscopy (10/22/2014); Carpal tunnel release (Bilateral, 2017); lipoma resection; Eye surgery (Right); Shoulder arthroscopy (Right, 2018); liver 
Physical Therapy  Facility/Department: [facility] 5 PROGRESSIVE CARE  Physical Therapy Treatment Note  Name: Elvia Arguelles  : 1960  MRN: [id number]  Date of Service: 2024    Discharge Recommendations:  Subacute/Skilled Nursing Facility, Continue to assess pending progress   PT Equipment Recommendations  Other: defer to next level of care      Elvia Arguelles scored a 8/24 on the AM-PAC short mobility form. Current research shows that an AM-PAC score of 17 or less is typically not associated with a discharge to the patient's home setting. Based on the patient's AM-PAC score and their current functional mobility deficits, it is recommended that the patient have 3-5 sessions per week of Physical Therapy at d/c to increase the patient's independence.  Please see assessment section for further patient specific details.    If patient discharges prior to next session this note will serve as a discharge summary.  Please see below for the latest assessment towards goals.      Patient Diagnosis(es): The primary encounter diagnosis was Traumatic rhabdomyolysis, initial encounter (Formerly [facility]). Diagnoses of FAVIAN (acute kidney injury) (Formerly [facility]), Elevated troponin, Transaminitis, Closed fracture of trochanter of right femur, initial encounter ([facility]), Elevated troponin level, and Closed right hip fracture, initial encounter ([facility]) were also pertinent to this visit.  Past Medical History:  has a past medical history of Adrenal insufficiency (HCC), Cancer (HCC), Closed displaced intertrochanteric fracture of right femur (HCC), Depression, Diabetes mellitus (HCC), Hx of radiation therapy, Hypertension, Hyponatremia, Insulin pump in place, Melanoma (HCC), Prolonged emergence from general anesthesia, and Restless leg syndrome.  Past Surgical History:  has a past surgical history that includes Hysterectomy; Cholecystectomy; shoulder surgery; Upper gastrointestinal endoscopy (10/22/2014); Carpal tunnel release (Bilateral, 2017); 
[facility] Orthopedic Surgery   Progress Note      S/P :  SUBJECTIVE  in bed. Asleep. Daughter states she just got comfortable and fell asleep. She is concerned about pts renal function. Pt does not appear in distress. Sleeps during my exam.     OBJECTIVE              Physical                      VITALS:  BP (!) 155/65   Pulse 86   Temp 97.9 °F (36.6 °C) (Axillary)   Resp 16   Ht 1.499 m (4' 11\")   Wt 64.2 kg (141 lb 8 oz)   LMP  (LMP Unknown) Comment: had a hysterectomy a long time ago  SpO2 96%   BMI 28.58 kg/m²                     MUSCULOSKELETAL:  right toes warm and pink with palpable pedal pulses noted. Right hip soft but full to palpation. NO notable bruising.                                  Surgical wound appears clean and dry right lateral hip with Mepilex AG dressing. Ice pack is on.     Data       CBC:   Lab Results   Component Value Date/Time    WBC 14.9 05/20/2024 06:05 AM    RBC 2.49 05/20/2024 06:05 AM    HGB 7.3 05/20/2024 06:05 AM    HCT 22.1 05/20/2024 06:05 AM    MCV 88.8 05/20/2024 06:05 AM    MCH 29.1 05/20/2024 06:05 AM    MCHC 32.8 05/20/2024 06:05 AM    RDW 15.5 05/20/2024 06:05 AM     05/20/2024 06:05 AM    MPV 8.5 05/20/2024 06:05 AM        WBC:    Lab Results   Component Value Date/Time    WBC 14.9 05/20/2024 06:05 AM        Hemoglobin/Hematocrit:    Lab Results   Component Value Date/Time    HGB 7.3 05/20/2024 06:05 AM    HCT 22.1 05/20/2024 06:05 AM        PT/INR:    Lab Results   Component Value Date/Time    PROTIME 14.2 05/18/2024 12:59 PM    INR 1.08 05/18/2024 12:59 PM              Current Inpatient Medications             Current Facility-Administered Medications: hydrocortisone sodium succinate PF (SOLU-CORTEF) injection 50 mg, 50 mg, IntraVENous, Q8H  HYDROmorphone (DILAUDID) injection 0.25 mg, 0.25 mg, IntraVENous, Q3H PRN  [Held by provider] hydrocortisone (CORTEF) tablet 5 mg, 5 mg, Oral, Nightly  perflutren lipid microspheres (DEFINITY) injection 1.5 mL, 1.5 mL, 
Pt A&Ox 4 on RA. AM assessment and vitals completed and put into flowsheets. AM medications given with no s/s of aspiration. Pt with no questions or concerns voiced to RN at this time. Fall precautions in place and call light within reach.   Vitals:    05/25/24 0700 05/25/24 0845 05/25/24 0915 05/25/24 1015   BP: (!) 143/75      Pulse: 70   80   Resp: 18 18 16 15   Temp: 97.9 °F (36.6 °C)      TempSrc: Oral      SpO2: 98%   97%   Weight:       Height:             
Pt arrived to PACU from OR. Pt asleep on 2l/nc. Right hip dressing C/D/I. Ice pack applied. +pulses noted.   
Pt arrived via stretcher from ED to room 5110.  Heart monitor connected and verified with CMU. VS, assessment, and admission complete. 4 eyes assessment complete. Pt and spouce oriented to unit and room. Call light and bedside table in reach. All questions answered. Pt resting quietly in bed with no complaints or voiced needs at this time. Will continue to monitor.  Electronically signed by Latanya Perez RN on 5/19/2024 at 1:22 AM    
Pt has been resting quietly in bed.  Pt noted to moan when repositioned and turning.  Dilaudid 0.25 mg IVP given earlier after turning.  Pt noted to be moaning out with pain after repositioning.  Pt noted to be resting quietly and appears to be sleeping after pain medication given.  Noted O2 sats dropping to upper 70's briefly ( a couple seconds), pt having brief periods of apnea with O2 sats drop, pt back in 90's seconds later.  O2 per NC started at 2 liters. Will continue to monitor.       
Pt with a /61.  Hydralazine 5 mg IVP given prn for sbp >180 per MD orders.  Will continue to monitor.  Call light in reach and bed alarm on.    
Pt with a HS blood glucose of 72.  Secure message sent to hospitalist regarding low Blood glucose and the order for Lantus 10 units at HS.  Await return message or orders.  Will continue to monitor pt.  Call light in reach and bed alarm is on.  
Pt with a critical hematocrit this morning of 20.2.  Secure message sent to  hospitalist regarding results.  Await orders or return message.   
RN called Dr Gunner Andino cardiology at [phone] regarding troponin that went from 236 to 345. Orders received to discontinue future troponins. No other orders at this time. Electronically signed by Latanya Perez RN on 5/19/2024 at 12:35 AM    
S/W RN: Idlan, Pt not available for eeg at the moment   
The patients OARRS report has been reviewed online and any prescribing of pain related medications is based on our findings.The patient has been issued narcotics to safely reduce postoperative pain and promote tolerance with physical therapies and ADL's. Reduction in dosing will be addressed with the next narcotic refill request. Dosing is adjusted for patients with history of chronic pain disorders.    
This morning patient had critical potassium of 2.2 and hemoglobin of 5.9. RN notified Breann [place]. Potassium replaced with 5 runners and p.o. 1U of PRBCs given and patient tolerated it well. Post replacement potassium 3.5 and post transfusion hemoglobin 8.9. Blood pressure was elevated post transfusion and PRN hydralazine given.     Patient complained of hip pain and was treated with PRN oxycodone.     Patient having a few bowel movements per hour PRN immodium was given without relief.    MRI control called this RN to notify of patient not able to stay still enough for MRA/MRV. Patient will likely need medication prior to attempting again. MRI also said there is limited availability to complete MRA/MRV tomorrow. Care ongoing.     Electronically signed by Yuri Donald RN on 5/23/2024 at 8:00 PM    
0859  Gross per 24 hour   Intake 3063 ml   Output 1505 ml   Net 1558 ml         Patient Vitals for the past 96 hrs (Last 3 readings):   Weight   05/21/24 0516 65 kg (143 lb 4.8 oz)   05/20/24 1232 64 kg (141 lb)   05/20/24 0511 64.2 kg (141 lb 8 oz)         Constitutional:  VITALS:  /74   Pulse 84   Temp 97 °F (36.1 °C)   Resp 20   Wt 57.3 kg (126 lb 5.2 oz)   LMP  (LMP Unknown) Comment: had a hysterectomy a long time ago  SpO2 99%   BMI 25.51 kg/m²     Gen: alert, awake, nad  Skin: no rash, turgor wnl  Heent:  eomi, mmm  Neck: no bruits or jvd noted, thyroid normal  Cardiovascular:  S1, S2 without m/r/g  Respiratory: CTA B without w/r/r; respiratory effort normal  Abdomen:  +bs, soft, nt, nd  Ext: no  lower extremity edema       LAB DATA:    CBC:   Lab Results   Component Value Date/Time    WBC 10.0 05/21/2024 07:00 AM    RBC 2.33 05/21/2024 07:00 AM    HGB 7.0 05/21/2024 07:00 AM    HCT 19.6 05/21/2024 07:00 AM    MCV 84.2 05/21/2024 07:00 AM    MCH 30.1 05/21/2024 07:00 AM    MCHC 35.7 05/21/2024 07:00 AM    RDW 15.3 05/21/2024 07:00 AM     05/21/2024 07:00 AM    MPV 7.8 05/21/2024 07:00 AM     BMP:    Lab Results   Component Value Date/Time     05/21/2024 05:30 AM    K 2.9 05/21/2024 05:30 AM    K 5.4 05/20/2024 06:05 AM    CL 93 05/21/2024 05:30 AM    CO2 35 05/21/2024 05:30 AM    BUN 59 05/21/2024 05:30 AM    CREATININE 4.2 05/21/2024 05:30 AM    CALCIUM 7.8 05/21/2024 05:30 AM    GFRAA 39 09/19/2022 10:11 AM    GFRAA >60 05/29/2012 03:09 AM    LABGLOM 11 05/21/2024 05:30 AM    LABGLOM 29 04/07/2024 04:45 AM    GLUCOSE 93 05/21/2024 05:30 AM     Ionized Calcium:  No components found for: \"IONCA\"  Magnesium:    Lab Results   Component Value Date/Time    MG 1.40 05/21/2024 05:30 AM     Phosphorus:    Lab Results   Component Value Date/Time    PHOS 5.4 05/21/2024 05:30 AM     U/A:    Lab Results   Component Value Date/Time    NITRITE Negative 06/26/2023 12:25 PM    COLORU Yellow 
initially hyperkalemic 2/2 FAVIAN              - Resolved     Rhabdomyolysis              - CK 1910>2939>1699>696     High Anion Gap Metabolic Acidosis: etiology possibly secondary to uremia or ?DKA. Bicarb 20>11>13. AG 30. Lactic acid 1.2. Beta-Hydroxybutyrate 2.7. 40 ketones noted in UA.              - Resolved     Hypomagnesemia              - Overcorrected with replacement.               - Monitor     Hypertension              - BP improved              - Nifedipine 30 mg daily (started 5/22/2024).              - Can increase nifedipine dose if needed              - PRN hydralazine on board     Acute metabolic encephalopathy              - MRI suspicious for PRES               - EEG ordered              - Nephrology following     R intertrochanteric femur fx: s/p R femur cephalomedullary nail 5/19/2024 with Dr. King Boykin              - Ortho following     Adrenal insufficiency on chronic steroid therapy              - On solu-cortef              - Management per primary     CKD-MBD              - Phos elevated              - Will need low phos diet when able to eat              - Monitor     Anemia               - s/p 1u PRBC 5/23/2024.              - Iron sat 8%. Ferritin 495. Transferrin 145. Finished venofer.   - B12 and folate adequate  
°F (37 °C)  99.2 °F (37.3 °C)    TempSrc: Axillary  Axillary    SpO2: 99% 99% 100%    Weight:       Height:             Physical Exam:      Physical Exam Performed:    BP (!) 171/76   Pulse 92   Temp 99.2 °F (37.3 °C) (Axillary)   Resp 15   Ht 1.499 m (4' 11\")   Wt 65 kg (143 lb 4.8 oz)   LMP  (LMP Unknown) Comment: had a hysterectomy a long time ago  SpO2 100%   BMI 28.94 kg/m²     General appearance: ill appearing, does not respond to pain.    HEENT: Pupils equal, eyes seem to intermittently fixed.   Neck: Supple, with full range of motion. No jugular venous distention. Trachea midline.  Respiratory:  Normal respiratory effort. Clear to auscultation, bilaterally without Rales/Wheezes/Rhonchi.  Cardiovascular: Regular rate and rhythm with normal S1/S2 without murmurs, rubs or gallops.  Abdomen: Soft, non-tender, non-distended with normal bowel sounds.  Musculoskeletal: No clubbing, cyanosis or edema bilaterally.  Full range of motion without deformity.  Capillary Refill: Brisk, 3 seconds, normal   Peripheral Pulses: +2 palpable, equal bilaterally       Medications:   Medications:    iron sucrose  200 mg IntraVENous Daily    cefTRIAXone (ROCEPHIN) IV  2,000 mg IntraVENous Q24H    hydrocortisone sodium succinate PF  50 mg IntraVENous Q8H    [Held by provider] hydrocortisone  5 mg Oral Nightly    insulin glargine  10 Units SubCUTAneous BID    escitalopram  10 mg Oral Daily    [Held by provider] hydrocortisone  10 mg Oral Daily    insulin lispro  0-8 Units SubCUTAneous TID WC    insulin lispro  0-4 Units SubCUTAneous Nightly    rOPINIRole  1 mg Oral Nightly    pantoprazole  40 mg Oral BID AC    sodium chloride flush  5-40 mL IntraVENous 2 times per day    heparin (porcine)  5,000 Units SubCUTAneous 3 times per day      Infusions:    sodium chloride 100 mL/hr at 05/21/24 0909    dextrose      sodium chloride       PRN Meds: hydrALAZINE, 5 mg, Q6H PRN  [Held by provider] HYDROmorphone, 0.25 mg, Q3H 
05/20/2024 10:23 AM    PHUR 5.5 05/20/2024 10:23 AM    PHUR 6.5 04/04/2024 01:30 PM    WBCUA 0 05/18/2024 01:34 PM    RBCUA 2 05/18/2024 01:34 PM    BACTERIA None Seen 05/18/2024 01:34 PM    CLARITYU Clear 05/20/2024 10:23 AM    SPECGRAV >=1.030 06/26/2023 12:25 PM    LEUKOCYTESUR Negative 05/20/2024 10:23 AM    UROBILINOGEN 0.2 05/20/2024 10:23 AM    BILIRUBINUR Negative 05/20/2024 10:23 AM    BLOODU MODERATE 05/20/2024 10:23 AM    GLUCOSEU 500 05/20/2024 10:23 AM    GLUCOSEU >=1000 05/25/2012 09:30 AM         IMPRESSION/RECOMMENDATIONS:      FAVIAN on CKD 3: baseline Cr ~ 1.9 mg/dL. Etiology likely 2/2 relative hypotension, poor oral intake, and 2/2 acute rhabdomyolysis -- but CK only 1910>2939>1699.    - Cr 4.1>4.9>5.4>5.3 mg/dL.    - Maintain mcgovern catheter and keep strict I/Os.    - Urine studies ordered. Repeat UA ordered and noted -- 5 hyaline casts present.   - UPCR ordered to quantify protein.   - Increase IVF rate   - No ACE/ARBs. No NSAIDs.    - Maintain normotension. Avoid nephrotoxic agents.    - No urgent indication for RRT but may require it in the near future if renal fx does not improve.    - Recheck RFT later this afternoon    Rhabdomyolysis   - CK 1910>2939>1699   - Continue IVF    High Anion Gap Metabolic Acidosis: etiology possibly secondary to uremia or ?DKA.   - Bicarb 20>11>13. AG 30.    - Serum osm ordered   - Lactic acid ordered -- 1.2 today   - Beta-Hydroxybutyrate ordered   - 40 ketones noted in today's UA   - Bicarb gtt rate increased this AM    Hyperkalemia: 2/2 FAVIAN   - K+ 5.4>4.4 mmol/L s/p 10 units of insulin   - Low K+ diet when able to eat   - Monitor    R intertrochanteric femur fx: s/p R femur cephalomedullary nail 5/19/2024 with Dr. Christopher  Fall   - Ortho following    Adrenal insufficiency on chronic steroid therapy   - On solu-cortef   - Management per primary    CKD-MBD   - Phos elevated   - Will need low phos diet when able to eat   - Monitor    Anemia    - Hgb 11.6>>7.3 g/dL   - 
05/22/2024 06:05 AM     U/A:    Lab Results   Component Value Date/Time    NITRITE Negative 06/26/2023 12:25 PM    COLORU Yellow 05/21/2024 02:45 PM    PHUR 8.5 05/21/2024 02:45 PM    PHUR 6.5 04/04/2024 01:30 PM    WBCUA 2 05/21/2024 02:45 PM    RBCUA 5-10 05/21/2024 02:45 PM    BACTERIA None Seen 05/21/2024 02:45 PM    CLARITYU Clear 05/21/2024 02:45 PM    SPECGRAV >=1.030 06/26/2023 12:25 PM    LEUKOCYTESUR Negative 05/21/2024 02:45 PM    UROBILINOGEN 0.2 05/21/2024 02:45 PM    BILIRUBINUR Negative 05/21/2024 02:45 PM    BLOODU MODERATE 05/21/2024 02:45 PM    GLUCOSEU Negative 05/21/2024 02:45 PM    GLUCOSEU >=1000 05/25/2012 09:30 AM         IMPRESSION/RECOMMENDATIONS:      FAVIAN on CKD 3: baseline Cr ~ 1.9 mg/dL. Etiology likely 2/2 relative hypotension, poor oral intake, and 2/2 acute rhabdomyolysis -- but CK only 1910>2939>1699. UPCR 0.6.   - Cr 4.1>4.9>5.4>5.3>4.2>2.8 mg/dL. Trending down.    - Maintain mcgovern catheter and keep strict I/Os.    - Repeat UA noted -- 5 hyaline casts present, mod blood, 30 protein. UPCR 0.6.   - Continue IVF   - No ACE/ARBs. No NSAIDs.    - Maintain normotension. Avoid nephrotoxic agents.    - Daily RFTs    Hypernatremia   - Na+ 149 mmol/L   - Recheck ~ 1200 -- pt now drinking this AM   - If no improvement in Na+ will switch from .045% NS to 0.25% NS    Rhabdomyolysis   - CK 1910>2939>1699>696   - Continue IVF    High Anion Gap Metabolic Acidosis: etiology possibly secondary to uremia or ?DKA. Bicarb 20>11>13. AG 30. Lactic acid 1.2.    - Resolved   - Beta-Hydroxybutyrate 2.7   - 40 ketones noted in UA   - D/c bicarb gtt    Hypokalemia: initially hyperkalemic 2/2 FAVIAN   - K+ 3.2 mmol/L -- replacement noted    Hypomagnesemia   - Check Mag tomorrow AM    Hypertension   - BP uncontrolled   - Start nifedipine 30 mg daily   - PRN hydralazine on board    Acute metabolic encephalopathy   - MRI suspicious for PRES     R intertrochanteric femur fx: s/p R femur cephalomedullary nail 5/19/2024 
Endurance training, Gait training, Safety education & training, Self-Care / ADL, Cognitive reorientation     Restrictions  Restrictions/Precautions  Restrictions/Precautions: Weight Bearing  Lower Extremity Weight Bearing Restrictions  Right Lower Extremity Weight Bearing: Weight Bearing As Tolerated  Position Activity Restriction  Other position/activity restrictions: R femur fx s/p Right femur cephalomedullary nail    Subjective   General  Chart Reviewed: Yes  Patient assessed for rehabilitation services?: Yes  Additional Pertinent Hx: 62 y/o female admitted 5/18/2024 with mechanical fall, Acute metabolic encephalopathy, rhabdo, and elevated troponin. Imaging showed displaced right intertrochanteric femur fracture. CT head negative.  5/19 s/p right femur cephalomedullary nail. Post op pt developed acute renal failure and started on fluids. Repeat CT head scan and MRI 5/21 suspicious for PRES. Neuro consulted.  Family / Caregiver Present: No  Referring Practitioner: Dr. White  Subjective  Subjective: Per RN ok for therapy. Pt seen bedside, more verbal this date, and inconsistently follows commands.  General Comment  Comments: Neuro in room at start of session.     Social/Functional History  Social/Functional History  Lives With: Spouse  Type of Home: House  Home Layout: Able to Live on Main level with bedroom/bathroom, One level (with basement)  Home Access: Stairs to enter with rails  Entrance Stairs - Number of Steps: 4+6  Bathroom Shower/Tub: Walk-in shower  Bathroom Toilet: Standard  Bathroom Equipment: None  ADL Assistance: Independent  Ambulation Assistance: Independent  Transfer Assistance: Independent  Additional Comments: Pt writhing in pain and does not follow commands this date. Social information from previous therapy encounter (2023). Unable to verify information this date.       Objective   Safety Devices  Type of Devices: Left in bed;Call light within reach;Bed alarm in place;Nurse notified;All fall 
\"IONCA\"  Magnesium:    Lab Results   Component Value Date/Time    MG 1.10 05/23/2024 07:01 AM     Phosphorus:    Lab Results   Component Value Date/Time    PHOS 3.0 05/23/2024 07:01 AM     U/A:    Lab Results   Component Value Date/Time    NITRITE Negative 06/26/2023 12:25 PM    COLORU Yellow 05/21/2024 02:45 PM    PHUR 8.5 05/21/2024 02:45 PM    PHUR 6.5 04/04/2024 01:30 PM    WBCUA 2 05/21/2024 02:45 PM    RBCUA 5-10 05/21/2024 02:45 PM    BACTERIA None Seen 05/21/2024 02:45 PM    CLARITYU Clear 05/21/2024 02:45 PM    SPECGRAV >=1.030 06/26/2023 12:25 PM    LEUKOCYTESUR Negative 05/21/2024 02:45 PM    UROBILINOGEN 0.2 05/21/2024 02:45 PM    BILIRUBINUR Negative 05/21/2024 02:45 PM    BLOODU MODERATE 05/21/2024 02:45 PM    GLUCOSEU Negative 05/21/2024 02:45 PM    GLUCOSEU >=1000 05/25/2012 09:30 AM         IMPRESSION/RECOMMENDATIONS:      FAVIAN on CKD 3: baseline Cr ~ 1.9 mg/dL. Etiology likely 2/2 relative hypotension, poor oral intake, and 2/2 acute rhabdomyolysis -- but CK only 1910>2939>1699. UPCR 0.6.   - Cr 4.1>4.9>5.4>5.3>4.2>2.8>1.8 mg/dL. Back to baseline   - Maintain mcgovern catheter and keep strict I/Os.    - Repeat UA noted -- 5 hyaline casts present, mod blood, 30 protein. UPCR 0.6.   - Decrease IVF rate -- can d/c if no more diarrhea   - No ACE/ARBs. No NSAIDs.    - Maintain normotension. Avoid nephrotoxic agents.    - Daily RFTs    Hypokalemia   - K+ 2.2 mmol/L -- replacement noted   - Recheck K+ this afternoon    Hypernatremia: initially hyperkalemic 2/2 FAVIAN   - Resolved    Rhabdomyolysis   - CK 1910>2939>1699>696    High Anion Gap Metabolic Acidosis: etiology possibly secondary to uremia or ?DKA. Bicarb 20>11>13. AG 30. Lactic acid 1.2. Beta-Hydroxybutyrate 2.7. 40 ketones noted in UA.   - Resolved    Hypomagnesemia   - Check Mag tomorrow AM    Hypertension   - BP better   - Nifedipine 30 mg daily (started 5/22/2024).   - PRN hydralazine on board    Acute metabolic encephalopathy   - MRI suspicious for 
ordered, Heparin tid as ordered.   PT OT for ADL's and ambulation as tolerated, WBAT  SS for DC planning, ECF needed  IV or PO pain med as ordered , Noted pt takes Oxycodone 5mg  6 per day prior to hip surgery. Per [facility] would have <10 tablets left. . Suggest Fentanyl patch for pain if needed since not taking po. At this time family does not feel she needs it.   Followup Dr White 2 weeks post hip surgery      Jessie Nick, APRN - CNP  5/21/2024  12:19 PM   
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)    History of melanoma    Hypoglycemia    Metastatic melanoma to pancreas (HCC)    Lactic acidosis    Hypercholesterolemia    Elevated brain natriuretic peptide (BNP) level    Microcytosis    Adrenal insufficiency (HCC)    Type 1 diabetes mellitus with ketoacidosis without coma (HCC)    Hyperkalemia    Septic shock (HCC)    Metabolic encephalopathy    Metastatic melanoma (HCC)    Elevated procalcitonin    Lactic acid acidosis    Neutrophilia    Acute kidney injury superimposed on CKD (HCC)    Diabetic ketoacidosis without coma associated with type 1 diabetes mellitus (HCC)    Poorly controlled type 1 diabetes mellitus with neuropathy (HCC)    Mixed hyperlipidemia    Poorly controlled type 1 diabetes mellitus with retinopathy (HCC)    Primary hypertension    Stage 3 chronic kidney disease (HCC)    DKA, type 1, not at goal (HCC)    Intractable nausea and vomiting    Closed displaced intertrochanteric fracture of right femur ([facility])    Closed right hip fracture, initial encounter ([facility])    Pre-operative examination    Demand ischemia ([facility])       ASSESSMENT AND PLAN:    Metastatic melanoma.Currently on maintenance Opdivo.  Last treatment 5/10/2024 resume outpt if dr clark feels appropriate -scans are better but always have to make sure right comparison made      Right femur fracture status post ORIF 5/19/2024.     Cognitive decline.  Most likely due to multiple factors, the fall, anesthesia, rhabdomyolysis, acute renal failure.  We will hold off on any steroids 
  Chloride 99 - 110 mmol/L 98 (L)   CARBON DIOXIDE 21 - 32 mmol/L 25   BUN,BUNPL 7 - 20 mg/dL 33 (H)   Creatinine 0.6 - 1.2 mg/dL 1.7 (H)   Anion Gap 3 - 16  12   Est, Glom Filt Rate >60  33 !   Magnesium 1.80 - 2.40 mg/dL 1.80   Glucose 70 - 99 mg/dL 225 (H)   Calcium 8.3 - 10.6 mg/dL 8.2 (L)   Phosphorus 2.5 - 4.9 mg/dL 2.8   (L): Data is abnormally low  (H): Data is abnormally high  !: Data is abnormal     Latest Reference Range & Units 05/25/24 05:00   WBC 4.0 - 11.0 K/uL 9.0   RBC 4.00 - 5.20 M/uL 2.75 (L)   Hemoglobin Quant 12.0 - 16.0 g/dL 8.1 (L)   Hematocrit 36.0 - 48.0 % 23.2 (L)   MCV 80.0 - 100.0 fL 84.6   MCH 26.0 - 34.0 pg 29.3   MCHC 31.0 - 36.0 g/dL 34.7   MPV 5.0 - 10.5 fL 8.7   RDW 12.4 - 15.4 % 15.1   Platelet Count 135 - 450 K/uL 183   (L): Data is abnormally low    Studies  EEG 5/24/24  DIAGNOSIS: Abnormal awake and asleep EEG secondary to a focal electrographic seizure in the right hemisphere, which appeared to begin in the right parieto-occipital region with involvement in extension to most of the right hemisphere and also into the left occipital region. Mild background slowing and disorganization were also present during the recording.     MRI brain w/o 5/21/24  Multiple T2 hyperintense signal within the bilateral occipital lobes, right  posterior parietal lobe and right posterior temporal lobe with some areas of  associated restricted diffusion. Multiple additional small foci of restricted  diffusion in the bilateral centrum semiovale, corona radiata and left  temporal lobe.  Findings are suspicious for PRES.  Superimposed areas of  ischemia are not excluded.    MRA and MRV 5-  IMPRESSION:  MRA head:     Severely limited by motion artifact.     Otherwise, no acute abnormality or flow-limiting stenosis of the major  arteries of the head.     MRV head:     Limited by motion artifacts.     Otherwise, the intracranial dural venous sinuses are within normal limits.    Limited TTE 5/20/24   
%.  Constitutional    Vital signs: BP, HR, and RR reviewed   General alert, no distress  Eyes: unable to visualize the fundi  Cardiovascular: no peripheral edema.  Psychiatric: cooperative with examination, no psychotic behavior noted.  Neurologic  Mental status:   orientation to person, place, time.     Attention intact as able to attend well to the exam     Language fluent in conversation   Comprehension intact; follows simple commands  Cranial nerves:   CN2: appears to have L homonymous hemianopsia.  VF on R better preserved.  CN 3,4,6: extraocular muscles intact.  Pupils are equal, round, reactive bilaterally.    CN5: facial sensation symmetric   CN7: face symmetric without dysarthria  CN8: hearing grossly intact  CN12: tongue midline with protrusion  Strength: good strength in BUE/LLE.  S/p R hip fracture repair.   Sensory: light touch intact in all 4 extremities.    Cerebellar/coordination: finger nose finger difficult to assess.    Tone: normal in all 4 extremities  Gait: deferred for safety.      Labs  Glucose 131  Na 139  K 2.2  Cl 100  BUN 36  Cr 1.8  Mg 1.10  Ca 7.3    WBC 5.2K  Hg 5.9  Platelets 107    Blood cultures NG  UA  negative    Studies  MRI brain w/o 5/21/24, independently reviewed  Multiple T2 hyperintense signal within the bilateral occipital lobes, right  posterior parietal lobe and right posterior temporal lobe with some areas of  associated restricted diffusion. Multiple additional small foci of restricted  diffusion in the bilateral centrum semiovale, corona radiata and left  temporal lobe.  Findings are suspicious for PRES.  Superimposed areas of  ischemia are not excluded.    Limited TTE 5/20/24    Limited study    Left Ventricle: Normal left ventricular systolic function with a visually estimated EF of 65 - 70%. Left ventricle size is normal. Normal wall thickness. Normal wall motion.    Right Ventricle: Right ventricle size is normal. Normal systolic 
acute renal failure and started on fluids. Repeat CT head scan and MRI 5/21 suspicious for PRES. Neuro consulted.  Response to previous treatment: Patient reporting fatigue but able to participate  Family / Caregiver Present: No  Referring Practitioner: Dr. White  Subjective  Subjective: Per RN ok for therapy. Pt seen bedside, more alert and verbal this date, able to follow commands. agreeable to OT/PT cotx. no pain reported.  General Comment  Comments: Neuro in room at start of session.     Social/Functional History  Social/Functional History  Lives With: Spouse  Type of Home: House  Home Layout: Able to Live on Main level with bedroom/bathroom, One level (with basement)  Home Access: Stairs to enter with rails  Entrance Stairs - Number of Steps: 4+6  Bathroom Shower/Tub: Walk-in shower  Bathroom Toilet: Standard  Bathroom Equipment: None  ADL Assistance: Independent  Ambulation Assistance: Independent  Transfer Assistance: Independent  Additional Comments: Pt writhing in pain and does not follow commands this date. Social information from previous therapy encounter (2023). Unable to verify information this date.       Objective      Safety Devices  Type of Devices: Left in bed;Call light within reach;Bed alarm in place;Nurse notified;All fall risk precautions in place;Gait belt;Patient at risk for falls  Restraints  Restraints Initially in Place: No        AROM: Within functional limits  Strength: Generally decreased, functional  ADL  Grooming Skilled Clinical Factors: OT assisted pt with washing hair with nonrinse shampoo cap and combing hair in stance at [facility]  Toileting Skilled Clinical Factors: mcgovern catheter in place  Functional Mobility Skilled Clinical Factors: unable to assess this date but practiced standing to [facility] several trials  Skin Care: Chlorhexidine wipes     Activity Tolerance  Activity Tolerance: Patient tolerated treatment well  Activity Tolerance Comments: limited by blury vision  Bed 
based on ROM and cognition observed     Activity Tolerance  Activity Tolerance: Patient limited by pain;Treatment limited secondary to decreased cognition    Bed mobility  Rolling to Left: Dependent/Total  Rolling to Right: Dependent/Total  Bed Mobility Comments: Rolled R/L to place clean chucks pad and reposition for comfort.    Transfers  Transfer Comments: unsafe to attempt transfer 2/2 cognition    Vision  Vision:  (DYLON)  Hearing  Hearing:  (DYLON)    Cognition  Overall Cognitive Status: Exceptions  Arousal/Alertness:  (pt constantly moaning in pain)  Following Commands: Does not follow commands  Attention Span: Unable to maintain attention  Safety Judgement: Impaired  Problem Solving: Impaired  Orientation  Overall Orientation Status: Impaired  Orientation Level: Disoriented X4       Education Given To: Patient  Education Provided: Transfer Training;Plan of Care;Role of Therapy  Education Method: Demonstration;Verbal  Barriers to Learning: Cognition  Education Outcome: Unable to verbalize;Unable to demonstrate understanding    LUE AROM (degrees)  LUE General AROM: grossly functional- unable to assess 2/2 command following- observed moving against gravity.  RUE AROM (degrees)  RUE General AROM: grossly functional- pt does tend to flex elbows and tense up with pain/movement    AM-PAC - ADL  AM-PAC Daily Activity - Inpatient   How much help is needed for putting on and taking off regular lower body clothing?: Total  How much help is needed for bathing (which includes washing, rinsing, drying)?: Total  How much help is needed for toileting (which includes using toilet, bedpan, or urinal)?: Total  How much help is needed for putting on and taking off regular upper body clothing?: Total  How much help is needed for taking care of personal grooming?: Total  How much help for eating meals?: Total  AM-PAC Inpatient Daily Activity Raw Score: 6  AM-PAC Inpatient ADL T-Scale Score : 17.07  ADL Inpatient CMS 0-100% Score: 
eyes are diverted down and to R corner of eyes; nursing called in to evaluate; per nursing pupils not reactive; nurse notifying MD as therapist left room  Subjective  Subjective: pt continues to flex B LEs and UEs intermittently; holds her breath at times but spO2 in upper 90s on 2 liters O2         Social/Functional History  Social/Functional History  Lives With: Spouse  Type of Home: House  Home Layout: Able to Live on Main level with bedroom/bathroom, One level (with basement)  Home Access: Stairs to enter with rails  Entrance Stairs - Number of Steps: 4+6  Bathroom Shower/Tub: Walk-in shower  Bathroom Toilet: Standard  Bathroom Equipment: None  ADL Assistance: Independent  Ambulation Assistance: Independent  Transfer Assistance: Independent  Additional Comments: Pt writhing in pain and does not follow commands this date. Social information from previous therapy encounter (2023). Unable to verify information this date.  Vision/Hearing  Vision  Vision:  (DYLON)  Hearing  Hearing:  (DYLON)    Cognition   Orientation  Overall Orientation Status: Impaired  Orientation Level: Disoriented X4  Cognition  Overall Cognitive Status: Exceptions  Arousal/Alertness:  (pt constantly moaning in pain)  Following Commands: Does not follow commands  Attention Span: Unable to maintain attention  Safety Judgement: Impaired  Problem Solving: Impaired     Objective         Bed mobility  Rolling to Left: Dependent/Total  Rolling to Right: Dependent/Total  Supine to Sit: Dependent/Total;2 Person assistance (HOB elevated)  Sit to Supine: 2 Person assistance;Dependent/Total           Balance  Comments: max A for sitting balance at EOB; pt did not appear to have any righting reactions  PROM Exercises: gentle ROM B LEs      Pt positioned in bed with pillows placed for support; SCDs applied to B LEs; pt left with nursing in room      AM-PAC - Mobility    AM-PAC Basic Mobility - Inpatient   How much help is needed turning from your back to your 
mildly distended with gas.  No significant small bowel distension is appreciated.  No wall thickening or inflammatory change.  No pneumatosis or portal venous gas. Pelvis: Mild diffuse bladder wall thickening. Peritoneum/Retroperitoneum: No free air or free fluid.  The aorta is normal in caliber.  The visceral branches are patent. Calcified atheromatous plaque is present.  No lymphadenopathy. Bones/Soft Tissues: Small fat containing umbilical hernia.  Severe compression deformity of L2 is again noted.     1.  Findings compatible diarrheal process.  Mild colonic distension is noted with mild mesenteric swirling but no evidence for volvulus.  This may be due to a partial obstructing process due to an underlying internal hernia.  No significant small bowel distension. 2.  Additional chronic and benign findings, as described.       CBC:   Recent Labs     05/22/24  0605 05/23/24  0701 05/23/24  1830 05/24/24  0600   WBC 7.2 5.2  --  8.3   HGB 7.1* 5.9* 8.9* 8.2*   * 107*  --  170       BMP:    Recent Labs     05/22/24  0605 05/22/24  1231 05/23/24  0701 05/23/24  1830 05/24/24  0600   * 134* 139  --  142   K 3.2*  --  2.2* 3.5 3.2*   CL 95*  --  100  --  102   CO2 39*  --  29  --  36*   BUN 54*  --  36*  --  35*   CREATININE 2.8*  --  1.8*  --  1.6*   GLUCOSE 191*  --  131*  --  76       Hepatic:   No results for input(s): \"AST\", \"ALT\", \"BILITOT\", \"ALKPHOS\" in the last 72 hours.    Invalid input(s): \"ALB\"    Lipids:   Lab Results   Component Value Date/Time    CHOL 209 05/18/2024 10:17 PM    HDL 95 05/18/2024 10:17 PM     12/07/2011 07:50 AM    TRIG 403 05/18/2024 10:17 PM     Hemoglobin A1C:   Lab Results   Component Value Date/Time    LABA1C 7.2 05/18/2024 10:17 PM     TSH:   Lab Results   Component Value Date/Time    TSH 2.72 11/18/2023 10:34 AM     Troponin: No results found for: \"TROPONINT\"  Lactic Acid:   No results for input(s): \"LACTA\" in the last 72 hours.    BNP:   No results for input(s): 
weight 58.7 kg (129 lb 8 oz), SpO2 99 %.  Constitutional    Vital signs: BP, HR, and RR reviewed   General alert, no distress  Eyes: unable to visualize the fundi  Cardiovascular: no peripheral edema.  Psychiatric: cooperative with examination, no psychotic behavior noted.  Neurologic  Mental status:   orientation to person, place, time.      Attention intact as able to attend well to the exam     Language fluent in conversation   Comprehension intact; follows simple commands  Cranial nerves:   CN2: L homonymous hemianopsia.  VF on R better preserved.  CN 3,4,6: extraocular muscles intact.  Pupils are equal, round, reactive bilaterally.    CN7: face symmetric without dysarthria  CN8: hearing grossly intact  CN12: tongue midline with protrusion  Strength: good strength in BUE/LLE.  S/p R hip fracture repair.   Sensory: light touch intact in all 4 extremities.    Cerebellar/coordination: finger nose finger difficult to assess.    Tone: normal in all 4 extremities  Gait: deferred for safety.      Labs  Glucose 76  Na 142  K 3.2  Cl 102  BUN 35  Cr 1.6  Mg 2.80  Ca 8.5    WBC 8.3K  Hg 8.2  Platelets 170    Blood cultures NG  UA negative    Studies  EEG 5/24/24  DIAGNOSIS: Abnormal awake and asleep EEG secondary to a focal electrographic seizure in the right hemisphere, which appeared to begin in the right parieto-occipital region with involvement in extension to most of the right hemisphere and also into the left occipital region. Mild background slowing and disorganization were also present during the recording.     MRI brain w/o 5/21/24  Multiple T2 hyperintense signal within the bilateral occipital lobes, right  posterior parietal lobe and right posterior temporal lobe with some areas of  associated restricted diffusion. Multiple additional small foci of restricted  diffusion in the bilateral centrum semiovale, corona radiata and left  temporal lobe.  Findings are suspicious for PRES.  Superimposed areas of  ischemia 
demonstrate understanding    AM-PAC - ADL  AM-PAC Daily Activity - Inpatient   How much help is needed for putting on and taking off regular lower body clothing?: Total  How much help is needed for bathing (which includes washing, rinsing, drying)?: Total  How much help is needed for toileting (which includes using toilet, bedpan, or urinal)?: Total  How much help is needed for putting on and taking off regular upper body clothing?: Total  How much help is needed for taking care of personal grooming?: Total  How much help for eating meals?: Total  AM-PAC Inpatient Daily Activity Raw Score: 6  AM-PAC Inpatient ADL T-Scale Score : 17.07  ADL Inpatient CMS 0-100% Score: 100  ADL Inpatient CMS G-Code Modifier : CN        Goals  Short Term Goals  Time Frame for Short Term Goals: Prior to DC: goals ongoing  Short Term Goal 1: Pt will complete bed mobility with min A in prep for ADL transfer  Short Term Goal 2: Pt will complete ADL transfer with min A  Short Term Goal 3: Pt will tolerate standing > 3 min for functional task with CGA  Short Term Goal 4: Pt will complete functional mobility with min A  Short Term Goal 5: Pt will complete grooming tasks with set up  Patient Goals   Patient goals : no goal stated 2/2 command following       Therapy Time   Individual Concurrent Group Co-treatment   Time In 1355         Time Out 1440         Minutes 45         Timed Code Treatment Minutes: 45 Minutes     This note to serve as OT d/c summary if pt is d/c-ed prior to next therapy session.    Vangie Grant, OTR/L     
with RW CGA/min A of 2  Patient Goals   Patient Goals : pt unable to state a goal       Education  Patient Education  Education Given To: Patient  Education Provided Comments: reviewed push ball call light as pt unable to see regular call button  Education Method: Verbal;Demonstration  Education Outcome: Verbalized understanding;Demonstrated understanding      Therapy Time   Individual Concurrent Group Co-treatment   Time In 1420         Time Out 1513         Minutes 53                 LAURA CAI, PT     Electronically signed by LAURA CAI PT on 5/24/2024 at 3:16 PM       
renal failure  -IV fluids  -continue to monitor    Leukocytosis  Elevated procalcitonin  -likely reactive;  -initial value likely hemoconcentrated; significant dilution drop on repeat this AM.  -Blood Cx NGTD, UA negative;  -imaging negative  -monitor with CBC  -Given immunocompromise state empirically started on ceftriaxone 5/21 with clinical improvement    Acute on chronic anemia.  Multifactorial including CKD and immunotherapy.  Hemoglobin downTo 5.9.  Received 1 unit of PRBC      Chronic heart failure with preserved ejection fraction  -Last echo 7/2023 G1 DD, EF 60-65%  -Not on diuretic therapy at home  -Closely monitor respiratory status with aggressive hydration in setting of rhabdo  -Will continue on IV fluids for now not hypervolemic    Incidental pancreatic tail mass 4 cm.Incidental lung nodules at the right lung base  -Recommend outpatient follow-up    Restless leg syndrome.   Increase dose of ropinirole to 2mg nightly     Diet ADULT DIET; Regular; 5 carb choices (75 gm/meal); No Added Salt (3-4 gm); Low Potassium (Less than 3000 mg/day); 2000 ml     DVT Prophylaxis [x] Lovenox, []  Heparin, [] SCDs, [] Ambulation,  [] Eliquis, [] Xarelto  [] Coumadin   Code Status Full Code   Disposition From: Home  Expected Disposition: ARU   Estimated Date of Discharge: > 5 days  Patient requires continued admission due to PRES syndrome, acute encephalopathy   Surrogate Decision Maker/ POA Luis     Personally reviewed Lab Studies and Imaging        Medical Decision Making:  The following items were considered in medical decision making:  Discussion of patient care with other providers  Reviewed clinical lab tests  Reviewed radiology tests  Reviewed other diagnostic tests/interventions  Independent review of radiologic images  Microbiology cultures and other micro tests reviewed        Electronically signed by Nia Crain MD on 5/25/2024 at 11:56 AM  Comment: Please note this report has been produced using 
noted within the lung apices consistent with the patient's known diagnosis of pulmonary metastatic disease.     No acute abnormality of the cervical spine.     CT HEAD WO CONTRAST    Result Date: 5/18/2024  EXAMINATION: CT OF THE HEAD WITHOUT CONTRAST  5/18/2024 12:56 pm TECHNIQUE: CT of the head was performed without the administration of intravenous contrast. Automated exposure control, iterative reconstruction, and/or weight based adjustment of the mA/kV was utilized to reduce the radiation dose to as low as reasonably achievable. COMPARISON: 08/09/2023 HISTORY: ORDERING SYSTEM PROVIDED HISTORY: fall TECHNOLOGIST PROVIDED HISTORY: Reason for exam:->fall Has a \"code stroke\" or \"stroke alert\" been called?->No Decision Support Exception - unselect if not a suspected or confirmed emergency medical condition->Emergency Medical Condition (MA) Reason for Exam: fall FINDINGS: BRAIN/VENTRICLES: There is no acute intracranial hemorrhage, mass effect or midline shift. No abnormal extra-axial fluid collection.  The gray-white differentiation is maintained without evidence of an acute infarct. There is prominence of the ventricles and sulci due to global parenchymal volume loss. There are nonspecific areas of hypoattenuation within the periventricular and subcortical white matter, which likely represent chronic microvascular ischemic change. ORBITS: The visualized portion of the orbits demonstrate no acute abnormality. SINUSES: The visualized paranasal sinuses and mastoid air cells demonstrate no acute abnormality. SOFT TISSUES/SKULL: No acute abnormality of the visualized skull or soft tissues.     No acute intracranial abnormality.       Electronically signed by Jose Martin Escamilla MD on 5/20/2024 at 7:36 AM

## 2024-05-27 NOTE — CARE COORDINATION
Received call from Maximo from [facility] ([phone])--they can accept patient when discharged.     Electronically signed by BERTRAND Mckoy, PREMA, Case Management on 5/27/2024 at 9:14 AM  [place] [phone]      12:31 PM  CASE MANAGEMENT DISCHARGE SUMMARY:    DISCHARGE DATE: 5/27/2024    DIS[facility]              COMMENTS: Informed patient and family that acute rehab can accept today. Informed Maximo on [facility] of patient's discharge.     Electronically signed by BERTRAND Mckoy, PREMA, Case Management on 5/27/2024 at 1:38 PM  [facility] [phone]

## 2024-05-27 NOTE — PROGRESS NOTES
A complete drug regimen review was completed for this patient.     [x]  No clinically significant medication issue was identified    []   Yes, a clinically significant medication issue was identified     []  Adverse Drug Event:      []  Allergy:      []  Side Effect:      []  Ineffective Therapy:      []  Drug Interaction:     []  Duplicated Therapy:     []  Untreated Indication:      []  Non-adherence:     []  Other:          Electronically signed by XENIA ASIF RN on 5/27/24 at 5:25 PM EDT

## 2024-05-27 NOTE — PROGRESS NOTES
Ethnicity  \"Are you of , /a, or [place] Islander origin?\"  Check all that apply:  [x] A.  No, not of , /a, or [place]er Origin  [] B.  Yes, Kazakh, Kazakh American, Chicano/a  [] C.  Yes, Tunisian  [] D.  Yes, French  [] E.  Yes, another , , or [place]er origin  [] X.  Patient unable to respond    Race  \"What is your race?\"  Check all that apply:  [x] A.  White  [] B.  Black or   [] C.   or   [] D.     [] E.  Chinese  [] F.  Comoran  [] G. Puerto Rican  [] H.  Welsh  [] I.  Swazi  [] J.  Other   [] K.    [] L.  Brazilian or Teresa  [] M.  Thai  [] N.  Other   [] X.  Patient unable to respond    High Risk Drug Classes:  Use and Indication    Is taking: Check if the pt is taking any medications by pharmacological classification, not how it is used, in the following classes  Indication noted: If column 1 is checked, check if there is an indication noted for all meds in the drug class Is taking  (check all that apply) Indication noted (check all that apply)   Antipsychotic [] []   Anticoagulant [x] []   Antibiotic [] []   Opioid [x] []   Antiplatelet [] []   Hypoglycemic (including insulin) [x] []   None of the above []     Special Treatments, Procedures, and Programs    Check all of the following treatments, procedures, and programs that apply on admission. On admission (check all that apply)   Cancer Treatments   A1. Chemotherapy []           A2. IV []           A3. Oral []           A10. Other []   B1. Radiation []   Respiratory Therapies   C1. Oxygen Therapy []           C2. Continuous []           C3. Intermittent []           C4. High-concentration []   D1. Suctioning []           D2. Scheduled []           D3. As needed []   E1. Tracheostomy Care []   F1. Invasive Mechanical Ventilator (ventilator or respirator) []   G1. Non-invasive Mechanical Ventilator []           G2. BiPAP []

## 2024-05-27 NOTE — DISCHARGE SUMMARY
Hospital Medicine Discharge Summary    Patient ID: Elvia Arguelles      Patient's PCP: Meg Ellis, APRN - CNP    Admit Date: 5/18/2024     Discharge Date:   05/27/24      Admitting Provider: Jose Martin Escamilla MD     Discharge Provider: Nia Crain MD     Discharge Diagnoses:       Active Hospital Problems    Diagnosis     Pre-operative examination [Z01.818]     Demand ischemia ([facility]) [I24.89]     Closed displaced intertrochanteric fracture of right femur ([facility]) [S72.141A]     Closed right hip fracture, initial encounter ([facility]) [S72.001A]     Acute kidney injury superimposed on CKD ([facility]) [N17.9, N18.9]        The patient was seen and examined on day of discharge and this discharge summary is in conjunction with any daily progress note from day of discharge.    Hospital Course:     63 y.o. female who presents with P/W hip pain after fall at home  reports that he spoke to the patient Friday night she states she went to go lie down. When he spoke with her again Saturday. When he called on Saturday morning, patient did not answer and he became concerned. He sent daughter to go check on the patient and patient had fallen on the ground. Unclear when the fall occurred and how long patient had been on the ground for. Patient is having significant right pain in the right hip.. Patient was noted to have bruises on her upper lip. Patient taken to the ED which did show right intra trochanteric hip fracture. Patient had elevated CK, elevated troponin, and creatinine of 4.1 from baseline 1.7-2. Patient admitted for further evaluation and management     Right intertrochanteric hip fracture  Fall  -unsure if mechanical fall; unsure about loss of consciousness; Head CT negative  -S/p ORIF  -Postoperative PT OT when patient able; currently not capable due to mental status  -Pain control: Continue on oxycodone as needed  - PT recommended ARU ( consulted 5/24) , seen by PMR and accepted.      Elevated

## 2024-05-27 NOTE — PROGRESS NOTES
Patient admitted to room 3272 per bed. Patient was oriented to the Call Light, Phone, TV, Thermostat, Bed Controls, Bathroom and Emergency Cord.  Patient verbalized and demonstrated understanding of all.  Patient was also given an over view of Unit Routines for Acute Rehab, including what to wear for therapy. The patient's role in goal setting was reviewed along with an explanation of the Interdisciplinary Team meeting, the 's role in coordinating services and the Discharge Planning/Continuum of Care process. Patient Rights and Responsibilities were reviewed. Meal times were explained, including how to order food.  The white board (used for communication) was pointed out emphasizing  the 3 hours/day Therapy Schedule (posted most evenings), the number (and process) for reporting grievances, and the Doctor's, Nurse's, and PCA's names. It was recommended that any family that will be care givers or any care givers the patient has, take part in therapy. Informed patient and family about visiting hours, lunch group, and conference. Electronically signed by XENIA ASIF RN on 5/27/2024 at 5:15 PM

## 2024-05-27 NOTE — CONSULTS
[facility]    Consult Note      Requesting Physician:       CHIEF COMPLAINT:  Pancreatic tail mass.      HISTORY OF PRESENT ILLNESS:    Ms. Arguelles  is a 63 y.o. female we are seeing in consultation for Known pancreatic tail mass.  Patient is followed by our partner Dr. Ayala at the [place] office for metastatic melanoma.  She is currently on maintenance Opdivo.  Last treatment 5/10/2024.  She is admitted at the hospital after a mechanical fall which fractured her right femur.  She underwent ORIF of the right femur yesterday 5/19/2024 with Dr. White.Patient unfortunately has developed a severe acute renal injury.  Creatinine continues to rise.  She was on the floor for several hours after the fall before being assisted to the hospital therefore she also went into rhabdomyolysis.Nephrology closely following.  Not currently on dialysis.Daughter also reports that she is cognitively declined since the fall as well.    ICD-10-CM    1. Traumatic rhabdomyolysis, initial encounter (formerly [facility])  T79.6XXA       2. FAVIAN (acute kidney injury) (formerly [facility])  N17.9       3. Elevated troponin  R79.89       4. Transaminitis  R74.01       5. Closed fracture of trochanter of right femur, initial encounter (formerly [facility])  S72.101A       6. Elevated troponin level  R79.89 Echo (TTE) limited (PRN contrast/bubble/strain/3D)     Echo (TTE) limited (PRN contrast/bubble/strain/3D)      7. Closed right hip fracture, initial encounter (formerly [facility])  S72.001A oxyCODONE (ROXICODONE) 5 MG immediate release tablet             Past Medical History:  Past Medical History:   Diagnosis Date    Adrenal insufficiency (formerly [facility])     Cancer (formerly [facility]) 2002    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    Closed displaced intertrochanteric fracture of right femur (formerly [facility]) 05/18/2024    Depression     Diabetes mellitus (formerly [facility])     Type I    Hx of radiation therapy     melanoma right eye 
                  [facility]          [address] [place] [address]                              CONSULTATION      PATIENT NAME: CARLOS ANDERSON           : 1960  MED REC NO: [id number]                      ROOM: [place] [address]  ACCOUNT NO: [id number]                       ADMIT DATE: 2024  PROVIDER: Ashley Wood MD      REASON FOR CONSULTATION:  Acute kidney injury.    HISTORY OF PRESENTING ILLNESS:  She is a 63-year-old female with past medical history significant for diabetes mellitus type 2, hypertension; adrenal insufficiency, on steroids; chronic kidney disease stage 3B, came to ER after having a fall.  The patient was found to have a right intertrochanteric femur fracture and underwent surgical intervention.  Postoperative course complicated by worsening kidney function.  Routine labs showed increased CPK of 2939.  The patient was started on IV fluids, and Renal consultation was called.    At the time of consultation, the patient is feeling weak, tired, decreased level of energy.  Most of the information obtained from the record.  Medications reviewed.  It does not look like that she received any NSAIDs postop.    PAST MEDICAL HISTORY:    1. Adrenal insufficiency.  2. Depression.  3. Diabetes mellitus type 2.  4. History of melanoma, status post radiation treatment.  5. Hypertension.  6. Chronic kidney disease.    PAST SURGICAL HISTORY:    1. Status post hysterectomy.  2. Status post carpal tunnel surgery.  3. Status post right shoulder surgery.  4. Status post endoscopy.    SOCIAL HISTORY:  Does not smoke or drink.    FAMILY HISTORY:  Per record, diabetes and hypertension.    OUTPATIENT MEDICATIONS:  Include Protonix, Requip, Lexapro, prednisone, Sensipar, Humalog insulin, vitamin supplement, oxycodone.    ALLERGIES:  NONE.      REVIEW OF SYSTEMS:  Limited, but the patient denies any chest pain, shortness of breath, feeling weak, tired, decreased level of 
    Referring Physician: Dr. ADRIANE Rodriguez  Reason for Consultation: \"Elavted trop liekely demand ischemia but significant elevatin\"  Chief Complaint: Hip pain    Subjective:   History of Present Illness:  Elvia Arguelles is a 63 y.o. patient who presented to the hospital with complaints hip pain after sustaining a fall at home.  The patient is currently quite somnolent and does not stay awake to answer questions.  Family reports she had recently received pain medications.  History is obtained via chart review and from the family.  Her  and daughter are present bedside.  Neither were present when the event happened.  Family became concerned when she did not answer the phone and a different daughter went to the house.  The patient was awake and lucid but unable to get off the ground.  The family states that she did not recall when or how she fell but was otherwise answering questions appropriately and did not seem confused.  The reason for consultation was elevated troponin.  There is no documentation of the patient complaining of chest pains or shortness of breath.  She has metastatic melanoma and follows with [facility].  She also has significant increase in her creatinine from baseline at 4.1.  Her lactate was also elevated at 2.3.  She was found to have a right hip fracture and orthopedics is planning on surgical intervention today.    Past Medical History:   has a past medical history of Adrenal insufficiency (HCC), Cancer (HCC), Closed displaced intertrochanteric fracture of right femur (HCC), Depression, Diabetes mellitus (HCC), Hx of radiation therapy, Hypertension, Hyponatremia, Insulin pump in place, Melanoma (HCC), Prolonged emergence from general anesthesia, and Restless leg syndrome.    Surgical History:   has a past surgical history that includes Hysterectomy; Cholecystectomy; shoulder surgery; Upper gastrointestinal endoscopy (10/22/2014); Carpal tunnel release (Bilateral, 12/2017); lipoma resection; Eye 
  Neurology Consult Note  Reason for Consult: ? PRES on CT head , MRI ordered     Chief complaint: \"I don't remember\"    Nia Theodore MD asked me to see Elvia Arguelles in consultation for evaluation of ? PRES on CT head , MRI ordered     History of Present Illness:  I obtained my information via the patient, supplemented with chart review.     Elvia Arguelles is a 63 y.o. female with hx of HTN, DM type I, adrenal insufficiency (chronic steroid therapy), metastatic melanoma (right eye, stomach, pancreas), see below for additional. She was admitted on 5/18/24 after suffering a fall  with uncertain LOC and found to have close right hip fracture, elevated troponin, rhabdomyolysis, metabolic acidosis, incidental pancreatic tail mass (oncology consulted). She underwent surgical repair of right hip fracture 5/19/24.     This admission the patient has been noted to have altered mental status reported to likely be multifactorial, component of acute metabolic encephalopathy. She has been described as non verbal, opens eyes to voice and look at you but not following commands. She had a CT head on admission which was unremarkable. Felt to be unlikely to tolerate MRI. Yesterday was noted to have an episode of a fixed gaze leading to repeat CT head studies.  Neurology was asked to evaluate as her repeat CT head study and subsequent MRI obtained yesterday was with concerns for PRES.       At time of encounter the patient is reported by nursing and  to be much improved. She is awake, responsive, can follow commands. There has been fluctuating in regards to ability to follow commands or any possible weakness in right or LUE. No forced gaze deviation, outward signs for seizure such as convulsions or staring episodes noted today per RN or .     At time of encounter the patient tells me she is not sure what the last thing she remembers. She does not remember falling or coming to the hospital. Her  
[facility] Orthopedic Surgery  Consult Note    Patient: Elvia Arguelles  Admit Date: 5/18/2024    Chief complaint: Right hip pain    HPI: Eliva Arguelles is a 63 y.o. female who presented to [facility] today with acute onset of right hip pain following mechanical fall that happened late last night and early this morning.  Family members are at bedside.  The patient is lying in bed and is in a significant amount of pain.  They deny prior history of right hip injury or surgery.  She lives independently and generally walks without assistive device, although she has needed a walker in the past.  She denies other areas of pain today.    Medical history is significant for diabetes (A1c of 7.8 in April 2024), CKD, metastatic melanoma.  She denies history of blood clots or use of blood thinners.    Relevant notes, labs and other tests reviewed.  Medical History:  Past Medical History:   Diagnosis Date    Adrenal insufficiency (HCC)     Cancer (HCC) 2002    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    Depression     Diabetes mellitus (HCC)     Type I    Hx of radiation therapy     melanoma right eye    Hypertension     Pt. denies having history of Hypertension    Hyponatremia     Insulin pump in place     Melanoma (HCC) 01/13/2023    in stomach, pancreas    Prolonged emergence from general anesthesia     slow to wake up    Restless leg syndrome      Past Surgical History:   Procedure Laterality Date    CARPAL TUNNEL RELEASE Bilateral 12/2017    CHOLECYSTECTOMY      CT BIOPSY ABDOMEN RETROPERITONEUM  12/27/2022    CT BIOPSY ABDOMEN RETROPERITONEUM 12/27/2022 [facility] CT SCAN    EYE SURGERY Right     biopsy    HYSTERECTOMY (CERVIX STATUS UNKNOWN)      LIPOMA RESECTION      LIVER BIOPSY Right     PORT SURGERY Right 01/18/2023    RIGHT POWER PORT PLACEMENT performed by Satish Corley MD at [facility] OR    SHOULDER ARTHROSCOPY Right 08/31/2018    RIGHT SHOULDER ARTHROSCOPE, SUBACROMIAL DECOMPRESSION, DISTALCLAVICLE EXCISION, CAPSULAR RELEASE, 
Pt seen and examined   Renal cons dictated   See orders fr detail  Continue with IVR  Monitor renal function/CPK  Avoid hypotension or nephrotoxins .     
CREATININE 1.8 (H) 05/26/2024    BUN 33 (H) 05/26/2024     05/26/2024    K 3.1 (L) 05/26/2024     05/26/2024    CO2 26 05/26/2024     Lab Results   Component Value Date    ALT 19 05/20/2024    AST 87 (H) 05/20/2024    ALKPHOS 89 05/20/2024    BILITOT <0.2 05/20/2024       Most recent echocardiogram revealed:  05/18/24    ECHO (TTE) LIMITED (PRN CONTRAST/BUBBLE/STRAIN/3D) 05/20/2024  3:26 PM (Final)    Interpretation Summary    Limited study    Left Ventricle: Normal left ventricular systolic function with a visually estimated EF of 65 - 70%. Left ventricle size is normal. Normal wall thickness. Normal wall motion.    Right Ventricle: Right ventricle size is normal. Normal systolic function.    Signed by: Rc Crowley MD on 5/20/2024  3:26 PM      Most recent EKG revealed:  Encounter Date: 05/18/24   EKG 12 Lead   Result Value    Ventricular Rate 99    Atrial Rate 99    P-R Interval 120    QRS Duration 68    Q-T Interval 376    QTc Calculation (Bazett) 482    P Axis 74    R Axis 21    T Axis 73    Diagnosis      Sinus rhythm with Premature supraventricular complexesNonspecific ST abnormalityAbnormal ECGWhen compared with ECG of 04-APR-2024 07:13,Premature supraventricular complexes are now PresentST more depressed in lateral leadsConfirmed by RC CROWLEY MD ([id number]) on 5/19/2024 1:29:20 PM         Most recent imaging studies revealed:  No results found.    Assessment:  1. R  Femoral fracture s/p IM nail  2. PRES   3. Rhabdo   4. Malignant melanoma   5. FAVIAN    Impairments: Decreased functional mobility ;Decreased safe awareness;Decreased balance;Decreased ADL status;Decreased endurance;Decreased high-level IADLs;Decreased cognition;Decreased strength     Recommendations:    Pre-Certification for Inpatient Rehabilitation.   Patient with new functional deficits and ongoing medical complexity. Demonstrates ability to tolerate 3 hours therapy/day. she is a good candidate for acute inpatient rehab when

## 2024-05-27 NOTE — PLAN OF CARE
Problem: Safety - Adult  Goal: Free from fall injury  Outcome: Progressing     Problem: Discharge Planning  Goal: Discharge to home or other facility with appropriate resources  Outcome: Progressing  Flowsheets (Taken 5/27/2024 1704)  Discharge to home or other facility with appropriate resources:   Identify barriers to discharge with patient and caregiver   Arrange for needed discharge resources and transportation as appropriate   Refer to discharge planning if patient needs post-hospital services based on physician order or complex needs related to functional status, cognitive ability or social support system   Identify discharge learning needs (meds, wound care, etc)     Problem: Pain  Goal: Verbalizes/displays adequate comfort level or baseline comfort level  Outcome: Progressing  Flowsheets (Taken 5/27/2024 1700)  Verbalizes/displays adequate comfort level or baseline comfort level:   Encourage patient to monitor pain and request assistance   Assess pain using appropriate pain scale   Administer analgesics based on type and severity of pain and evaluate response   Implement non-pharmacological measures as appropriate and evaluate response     Problem: ABCDS Injury Assessment  Goal: Absence of physical injury  Outcome: Progressing  Flowsheets (Taken 5/27/2024 1707)  Absence of Physical Injury: Implement safety measures based on patient assessment     Problem: Skin/Tissue Integrity  Goal: Absence of new skin breakdown  Description: 1.  Monitor for areas of redness and/or skin breakdown  2.  Assess vascular access sites hourly  3.  Every 4-6 hours minimum:  Change oxygen saturation probe site  4.  Every 4-6 hours:  If on nasal continuous positive airway pressure, respiratory therapy assess nares and determine need for appliance change or resting period.  Outcome: Progressing

## 2024-05-28 ENCOUNTER — HOSPITAL ENCOUNTER (INPATIENT)
Dept: VASCULAR LAB | Age: 64
Discharge: HOME | DRG: 559 | End: 2024-05-30
Attending: STUDENT IN AN ORGANIZED HEALTH CARE EDUCATION/TRAINING PROGRAM
Payer: MEDICARE

## 2024-05-28 LAB
ALBUMIN SERPL-MCNC: 2.7 G/DL (ref 3.4–5)
ALBUMIN/GLOB SERPL: 1.2 {RATIO} (ref 1.1–2.2)
ALP SERPL-CCNC: 79 U/L (ref 40–129)
ALT SERPL-CCNC: 8 U/L (ref 10–40)
ANION GAP SERPL CALCULATED.3IONS-SCNC: 7 MMOL/L (ref 3–16)
AST SERPL-CCNC: 20 U/L (ref 15–37)
BILIRUB SERPL-MCNC: <0.2 MG/DL (ref 0–1)
BUN SERPL-MCNC: 25 MG/DL (ref 7–20)
CALCIUM SERPL-MCNC: 8 MG/DL (ref 8.3–10.6)
CHLORIDE SERPL-SCNC: 107 MMOL/L (ref 99–110)
CO2 SERPL-SCNC: 23 MMOL/L (ref 21–32)
CREAT SERPL-MCNC: 1.7 MG/DL (ref 0.6–1.2)
GFR SERPLBLD CREATININE-BSD FMLA CKD-EPI: 33 ML/MIN/{1.73_M2}
GLUCOSE BLD-MCNC: 124 MG/DL (ref 70–99)
GLUCOSE BLD-MCNC: 145 MG/DL (ref 70–99)
GLUCOSE BLD-MCNC: 289 MG/DL (ref 70–99)
GLUCOSE BLD-MCNC: 307 MG/DL (ref 70–99)
GLUCOSE BLD-MCNC: 364 MG/DL (ref 70–99)
GLUCOSE SERPL-MCNC: 135 MG/DL (ref 70–99)
MAGNESIUM SERPL-MCNC: 1.9 MG/DL (ref 1.8–2.4)
PERFORMED ON: ABNORMAL
PHOSPHATE SERPL-MCNC: 1.8 MG/DL (ref 2.5–4.9)
POTASSIUM SERPL-SCNC: 5.5 MMOL/L (ref 3.5–5.1)
POTASSIUM SERPL-SCNC: 5.5 MMOL/L (ref 3.5–5.1)
PROT SERPL-MCNC: 4.9 G/DL (ref 6.4–8.2)
SODIUM SERPL-SCNC: 137 MMOL/L (ref 136–145)

## 2024-05-28 PROCEDURE — 93880 EXTRACRANIAL BILAT STUDY: CPT

## 2024-05-28 PROCEDURE — 6360000002 HC RX W HCPCS: Performed by: STUDENT IN AN ORGANIZED HEALTH CARE EDUCATION/TRAINING PROGRAM

## 2024-05-28 PROCEDURE — 6370000000 HC RX 637 (ALT 250 FOR IP): Performed by: PHYSICAL MEDICINE & REHABILITATION

## 2024-05-28 PROCEDURE — 97535 SELF CARE MNGMENT TRAINING: CPT

## 2024-05-28 PROCEDURE — 97163 PT EVAL HIGH COMPLEX 45 MIN: CPT

## 2024-05-28 PROCEDURE — 36591 DRAW BLOOD OFF VENOUS DEVICE: CPT

## 2024-05-28 PROCEDURE — 97530 THERAPEUTIC ACTIVITIES: CPT

## 2024-05-28 PROCEDURE — 6370000000 HC RX 637 (ALT 250 FOR IP): Performed by: INTERNAL MEDICINE

## 2024-05-28 PROCEDURE — 97116 GAIT TRAINING THERAPY: CPT

## 2024-05-28 PROCEDURE — 2580000003 HC RX 258: Performed by: STUDENT IN AN ORGANIZED HEALTH CARE EDUCATION/TRAINING PROGRAM

## 2024-05-28 PROCEDURE — 94760 N-INVAS EAR/PLS OXIMETRY 1: CPT

## 2024-05-28 PROCEDURE — 97110 THERAPEUTIC EXERCISES: CPT

## 2024-05-28 PROCEDURE — 83735 ASSAY OF MAGNESIUM: CPT

## 2024-05-28 PROCEDURE — 1280000000 HC REHAB R&B

## 2024-05-28 PROCEDURE — 92523 SPEECH SOUND LANG COMPREHEN: CPT

## 2024-05-28 PROCEDURE — 6370000000 HC RX 637 (ALT 250 FOR IP): Performed by: STUDENT IN AN ORGANIZED HEALTH CARE EDUCATION/TRAINING PROGRAM

## 2024-05-28 PROCEDURE — 97166 OT EVAL MOD COMPLEX 45 MIN: CPT

## 2024-05-28 PROCEDURE — 97129 THER IVNTJ 1ST 15 MIN: CPT

## 2024-05-28 PROCEDURE — 80053 COMPREHEN METABOLIC PANEL: CPT

## 2024-05-28 RX ORDER — INSULIN LISPRO 100 [IU]/ML
2 INJECTION, SOLUTION INTRAVENOUS; SUBCUTANEOUS
Status: DISCONTINUED | OUTPATIENT
Start: 2024-05-28 | End: 2024-06-03

## 2024-05-28 RX ORDER — FERROUS SULFATE 324(65)MG
324 TABLET, DELAYED RELEASE (ENTERIC COATED) ORAL
Status: DISCONTINUED | OUTPATIENT
Start: 2024-05-28 | End: 2024-06-14 | Stop reason: HOSPADM

## 2024-05-28 RX ORDER — INSULIN GLARGINE 100 [IU]/ML
8 INJECTION, SOLUTION SUBCUTANEOUS NIGHTLY
Status: DISCONTINUED | OUTPATIENT
Start: 2024-05-28 | End: 2024-05-31

## 2024-05-28 RX ORDER — INSULIN LISPRO 100 [IU]/ML
0-4 INJECTION, SOLUTION INTRAVENOUS; SUBCUTANEOUS NIGHTLY
Status: DISCONTINUED | OUTPATIENT
Start: 2024-05-28 | End: 2024-06-14 | Stop reason: HOSPADM

## 2024-05-28 RX ORDER — HYDRALAZINE HYDROCHLORIDE 10 MG/1
10 TABLET, FILM COATED ORAL EVERY 6 HOURS PRN
Status: DISCONTINUED | OUTPATIENT
Start: 2024-05-28 | End: 2024-06-14 | Stop reason: HOSPADM

## 2024-05-28 RX ORDER — INSULIN LISPRO 100 [IU]/ML
0-4 INJECTION, SOLUTION INTRAVENOUS; SUBCUTANEOUS
Status: DISCONTINUED | OUTPATIENT
Start: 2024-05-28 | End: 2024-06-14 | Stop reason: HOSPADM

## 2024-05-28 RX ADMIN — HEPARIN SODIUM 5000 UNITS: 5000 INJECTION INTRAVENOUS; SUBCUTANEOUS at 14:51

## 2024-05-28 RX ADMIN — Medication 2 CAPSULE: at 16:18

## 2024-05-28 RX ADMIN — HEPARIN SODIUM 5000 UNITS: 5000 INJECTION INTRAVENOUS; SUBCUTANEOUS at 21:16

## 2024-05-28 RX ADMIN — HYDROCORTISONE 10 MG: 10 TABLET ORAL at 07:31

## 2024-05-28 RX ADMIN — INSULIN GLARGINE 8 UNITS: 100 INJECTION, SOLUTION SUBCUTANEOUS at 21:16

## 2024-05-28 RX ADMIN — SODIUM CHLORIDE, PRESERVATIVE FREE 10 ML: 5 INJECTION INTRAVENOUS at 07:32

## 2024-05-28 RX ADMIN — OXYCODONE HYDROCHLORIDE 5 MG: 5 TABLET ORAL at 21:18

## 2024-05-28 RX ADMIN — LEVETIRACETAM 500 MG: 100 INJECTION, SOLUTION INTRAVENOUS at 07:32

## 2024-05-28 RX ADMIN — OXYCODONE HYDROCHLORIDE 5 MG: 5 TABLET ORAL at 07:31

## 2024-05-28 RX ADMIN — INSULIN LISPRO 2 UNITS: 100 INJECTION, SOLUTION INTRAVENOUS; SUBCUTANEOUS at 17:03

## 2024-05-28 RX ADMIN — INSULIN LISPRO 8 UNITS: 100 INJECTION, SOLUTION INTRAVENOUS; SUBCUTANEOUS at 12:24

## 2024-05-28 RX ADMIN — SODIUM CHLORIDE, PRESERVATIVE FREE 10 ML: 5 INJECTION INTRAVENOUS at 21:12

## 2024-05-28 RX ADMIN — ROPINIROLE HYDROCHLORIDE 2 MG: 1 TABLET, FILM COATED ORAL at 21:18

## 2024-05-28 RX ADMIN — OXYCODONE HYDROCHLORIDE 5 MG: 5 TABLET ORAL at 16:18

## 2024-05-28 RX ADMIN — HYDROCORTISONE 5 MG: 5 TABLET ORAL at 21:18

## 2024-05-28 RX ADMIN — ESCITALOPRAM OXALATE 10 MG: 10 TABLET ORAL at 07:31

## 2024-05-28 RX ADMIN — NIFEDIPINE 30 MG: 30 TABLET, EXTENDED RELEASE ORAL at 07:31

## 2024-05-28 RX ADMIN — OXYCODONE HYDROCHLORIDE 5 MG: 5 TABLET ORAL at 02:35

## 2024-05-28 RX ADMIN — LOPERAMIDE HYDROCHLORIDE 2 MG: 2 CAPSULE ORAL at 06:53

## 2024-05-28 RX ADMIN — POTASSIUM & SODIUM PHOSPHATES POWDER PACK 280-160-250 MG 250 MG: 280-160-250 PACK at 14:52

## 2024-05-28 RX ADMIN — OXYCODONE HYDROCHLORIDE 5 MG: 5 TABLET ORAL at 12:11

## 2024-05-28 RX ADMIN — HEPARIN SODIUM 5000 UNITS: 5000 INJECTION INTRAVENOUS; SUBCUTANEOUS at 06:17

## 2024-05-28 RX ADMIN — POTASSIUM & SODIUM PHOSPHATES POWDER PACK 280-160-250 MG 250 MG: 280-160-250 PACK at 17:03

## 2024-05-28 RX ADMIN — LEVETIRACETAM 500 MG: 100 INJECTION, SOLUTION INTRAVENOUS at 21:12

## 2024-05-28 RX ADMIN — FERROUS SULFATE TAB EC 324 MG (65 MG FE EQUIVALENT) 324 MG: 324 (65 FE) TABLET DELAYED RESPONSE at 09:34

## 2024-05-28 RX ADMIN — INSULIN LISPRO 4 UNITS: 100 INJECTION, SOLUTION INTRAVENOUS; SUBCUTANEOUS at 21:16

## 2024-05-28 RX ADMIN — PANTOPRAZOLE SODIUM 40 MG: 40 TABLET, DELAYED RELEASE ORAL at 06:17

## 2024-05-28 RX ADMIN — Medication 2 CAPSULE: at 07:31

## 2024-05-28 RX ADMIN — PANTOPRAZOLE SODIUM 40 MG: 40 TABLET, DELAYED RELEASE ORAL at 14:51

## 2024-05-28 ASSESSMENT — PAIN SCALES - GENERAL
PAINLEVEL_OUTOF10: 2
PAINLEVEL_OUTOF10: 8
PAINLEVEL_OUTOF10: 7
PAINLEVEL_OUTOF10: 10
PAINLEVEL_OUTOF10: 7
PAINLEVEL_OUTOF10: 7
PAINLEVEL_OUTOF10: 0

## 2024-05-28 ASSESSMENT — PAIN DESCRIPTION - ORIENTATION
ORIENTATION: RIGHT

## 2024-05-28 ASSESSMENT — PAIN - FUNCTIONAL ASSESSMENT
PAIN_FUNCTIONAL_ASSESSMENT: ACTIVITIES ARE NOT PREVENTED
PAIN_FUNCTIONAL_ASSESSMENT: PREVENTS OR INTERFERES SOME ACTIVE ACTIVITIES AND ADLS
PAIN_FUNCTIONAL_ASSESSMENT: PREVENTS OR INTERFERES SOME ACTIVE ACTIVITIES AND ADLS

## 2024-05-28 ASSESSMENT — PAIN DESCRIPTION - PAIN TYPE
TYPE: SURGICAL PAIN

## 2024-05-28 ASSESSMENT — PAIN DESCRIPTION - ONSET
ONSET: ON-GOING

## 2024-05-28 ASSESSMENT — PAIN DESCRIPTION - FREQUENCY
FREQUENCY: INTERMITTENT

## 2024-05-28 ASSESSMENT — PAIN DESCRIPTION - DESCRIPTORS
DESCRIPTORS: ACHING
DESCRIPTORS: THROBBING

## 2024-05-28 ASSESSMENT — PAIN DESCRIPTION - LOCATION
LOCATION: LEG;HIP
LOCATION: HIP
LOCATION: LEG
LOCATION: HIP;LEG

## 2024-05-28 NOTE — PROGRESS NOTES
Patient admitted to rehab s/p ORIF to right femur fracture on 5/19 by Dr. Pandey. A/Ox4 with flat affect. Patient has been pleasant and cooperative with care. Transfers with stedy, gait-belt, and moderate assist x 1. Needs assist to move surgical leg in and out of bed. Mobility restrictions: WBAT. On 5 carb, RAÚL, low K diet. Medications taken whole with thin liquids without swallowing difficulty. Patient does need assist with pills to mouth or to hand due to vision difficulty (blurred vision).On SQ Heparin for DVT prophylaxis.  Skin: Intact with exception off surgical incision. Patient also noted with scattered abrasions and bruising from fall at home. Oxygen: RA. LDA: Port access to right chest, flushes easily and has a brisk blood return. Has been continent of bowel and bladder. LBM 5/27, multiple loose stools. Chair/bed alarms in use and call light in reach. Medicated this evening for Nausea (prn IVP Zofran) and pain to surgical site. Patient rates pain 7 at its highest on 1-10 pain scale, stated relief to a 4. Refusing ice packs makes her too cold. Fall precautions in place. Will continue to monitor and assist as needed.

## 2024-05-28 NOTE — PLAN OF CARE
ARU PATIENT TREATMENT PLAN  [facility]   [address] [place]  [address]  [phone]    Elvia Arguelles    : 1960  [id number]  MRN: [id number]   PHYSICIAN:  Delia Martinez MD  Primary Problem    Patient Active Problem List   Diagnosis    Duodenal erosion    Left elbow pain    Type 1 diabetes mellitus (HCC)    Vitamin D deficiency    Family history of 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)    History of melanoma    Hypoglycemia    Metastatic melanoma to pancreas (HCC)    Lactic acidosis    Hypercholesterolemia    Elevated brain natriuretic peptide (BNP) level    Microcytosis    Adrenal insufficiency (HCC)    Type 1 diabetes mellitus with ketoacidosis without coma (HCC)    Hyperkalemia    Septic shock (HCC)    Metabolic encephalopathy    Metastatic melanoma (HCC)    Elevated procalcitonin    Lactic acid acidosis    Neutrophilia    Acute kidney injury superimposed on CKD (HCC)    Diabetic ketoacidosis without coma associated with type 1 diabetes mellitus (HCC)    Poorly controlled type 1 diabetes mellitus with neuropathy (HCC)    Mixed hyperlipidemia    Poorly controlled type 1 diabetes mellitus with retinopathy (HCC)    Primary hypertension    Stage 3 chronic kidney disease (HCC)    DKA, type 1, not at goal (HCC)    Intractable nausea and vomiting    Closed displaced intertrochanteric fracture of right femur (HCC)    Closed right hip fracture, initial encounter ([facility])    Pre-operative examination    Demand ischemia (HCC)  function  Short Term Goal 6: Pt will perform BUE therex to address strength/endurance for functional performance :  Long Term Goals  Time Frame for Long Term Goals : 2 weeks  Long Term Goal 1: Pt will complete functional mobility/txs using LRAD mod I  Long Term Goal 2: Pt will complete toileting mod I  Long Term Goal 3: Pt will complete bathing using AE, as needed, mod I  Long Term Goal 4: Pt will complete dressing using AE, as needed, mod I  Long Term Goal 5: Pt will complete simple IADL task (light meal prep, pet care, etc.) mod I :    These goals were reviewed with this patient at the time of assessment and Elvia Arguelles is in agreement    Plan of Care:  Pt to be seen 90   mins per day for 5 day/week 2 weeks.           SPEECH THERAPY: Goals will be left blank if speech is not following this patient.  Goals:                                                         Goals: Pt goal is to get my vision back; get pain better controled for right leg and move better.  Goal 1: Pt will demonstrate improved visual langauge processing at word/phrase level , using visual strategies, >90%  Goal 2: Pt will demonstrate improved visuo cognition across visuo spatial tasks with set up and useof visual strategies wtih >90%  Goal 3: Pt will demonstrate improved VPS/PS and reasoning for graded PS tasks and numeric reasoning tasks with use of strategies PRN and mild assist  Goal 4: Pt will demonstrate improved recall of daily events; learned safety strategies; and new information with use of memory strategies with set up and <mild assist                These goals were reviewed with this patient at the time of assessment and Elvia Arguelles is in agreement    Plan of Care: Pt to be seen 45   mins per day for 5 day/week 1-2 weeks.          CASE MANAGEMENT:  Goals:   Assist patient/family with discharge planning, patient/family counseling,   and coordination with insurance during ARU stay.      Admission Period/Goal QIM CODES

## 2024-05-28 NOTE — PROGRESS NOTES
Patient admitted to rehab with closed right hip fracture.  A/Ox4. Transfers with stedy x1. Mobility restrictions: WBAT. On regular 5 carb diet, tolerating well. Medications taken whole. On heparin for DVT prophylaxis.  Skin: surgical incision to right hip, old drainage present on dressing. Oxygen: room air. LDA: right chest port a cath accessed. Has been continent of bowel and bladder. LBM 5/28. Chair/bed alarms in use and call light in reach. Will monitor for safety. Electronically signed by Suzi Paige RN on 5/28/24 at 10:37 AM EDT

## 2024-05-28 NOTE — PROGRESS NOTES
Occupational Therapy  Facility/Department: [address] REHAB  Rehabilitation Occupational Therapy Evaluation       Date: 24  Patient Name: Elvia Arguelles       Room: M4L-5694/3272-01  MRN: [id number]  Account: [id number]   : 1960  (63 y.o.) Gender: female     Referring Practitioner: Sumeet Tello DO  Diagnosis: Closed R hip fx s/p IM nail.  Additional Pertinent Hx: Per Dr. Tello H&P : \"Elvia Arguelles is a 63 yrs old female with a PMHx of T2DM, adrenal insufficiency chonic steroids, metastatic melanoma on Opdivo therapy who presented to ED with R hip pain after fall, found to have R femoral fracture s/p IM nail.     Patient originally presented to [facility] ED on  after being found down by family in the living room floor. ED workup showed Rhabdomyolysis, FAVIAN and closed R femoral fracture. Admitted for further care. Orthopedic surgery consulted, performed IM nail  without perioperative complication. Course complicated by AMS, suspected PRES per multiple providers and MRI imaging findings. EEG w/epileptiform activity, started on keppra per neurology consult.     she continued to stabilize medically, was evaluated by therapy services and found to be an appropriate candidate for inpatient rehabilitation for medically supervised 3 hours of therapy 5 days a week.\"    Restrictions  Restrictions/Precautions: Weight Bearing;Fall Risk  Other position/activity restrictions: R femur fx s/p Right femur cephalomedullary nail  Right Lower Extremity Weight Bearing: Weight Bearing As Tolerated  Equipment Used: Wheelchair    Subjective  Subjective: Pt met b/s for OT eval/tx session, agreeable to ADL shower session. Pt report 8/10 pain RLE, states RN recently administered pain medication prior to therapy session. Pt seated in recliner preparing for tx > STEDY with RN for toileting upon this therapist arrival to room.  Comments: OK to see per RN            Objective  Vision - Basic Assessment  Prior  findings. PTA - pt lives at home with , steps to enter, IND ADL/IADL function and mobility/txs without device. Today - pt completed functional txs CGA/Mike, initially dep tx from recliner > bathroom via STEDY. Pt then performed stand pivot and stand step txs from shower chair > w/c and w/c > recliner, respectively, min A with significant cues throughout. Will cont to assess functional mobility as pt able to tolerate. Seated grooming sinkside SBA with inc time. UB bathing & dressing setup SBA. LB bathing mod A overall. LB dressing max A overall. Footwear modA. Toileting min A. Noted pt benefited from intermittent rest breaks during functional activity d/t pain and fatigue. BUE strength WNL. Pt demo good safety awareness/awareness of deficits throughout activity. Pt well below baseline IND function, most limited by pain and impaired strength/balance/activity tolerance, cont skilled therapy services on ARU for ~2 weeks to maximize pt safety and IND function prior to return home. Anticipate return home with initial 24/7 - frequent assist from family and cont therapy (2-3x/week) to ease transition to home. Will cont to assess.  Treatment Diagnosis: impaired strength, balance, activity tolerance, ADL and IADL function, mobility and txs, vision  Prognosis: Good  Decision Making: Medium Complexity  Exam: see above  Treatment Initiated : 5-28-24  Discharge Recommendations: Patient would benefit from continued therapy after discharge;2-3 sessions per week;24 hour supervision or assist;Continue to assess pending progress  Occupational Therapy Plan  Times Per Week: 5-7x  Times Per Day: Twice a day  Days Per Week: 5 Days  Hours Per Day: 1.5 hours  Therapy Duration: 3 Weeks  Current Treatment Recommendations: Strengthening;Balance training;Functional mobility training;Endurance training;Gait training;Safety education & training;Self-Care / ADL;Cognitive reorientation;Equipment evaluation, education, &  procurement;Patient/Caregiver education & training;Positioning;Pain management;Home management training;Modalities       Therapy Time   Individual Concurrent Group Co-treatment   Time In 0815         Time Out 0945         Minutes 90         Timed Code Treatment Minutes: 75 Minutes +15min fuentes Michaud OTR/L [id number]

## 2024-05-28 NOTE — PROGRESS NOTES
Department of Physical Medicine & Rehabilitation  Progress Note    Patient Identification:  Elvia Arguelles  [id number]  : 1960  Admit date: 2024    Chief Complaint: Closed right hip fracture, initial encounter ([facility])    Subjective:   No acute events overnight.   Patient seen this am working with SLP. Therapist noting some left side visual/spatial deficits on reading tasks and clock draw. Patient reports pain in the right hip stable. Feels her cognition and alertness are improving.   Labs reviewed.     ROS: No f/c, n/v, cp     Objective:  Patient Vitals for the past 24 hrs:   BP Temp Temp src Pulse Resp SpO2 Weight   24 1618 -- -- -- -- 18 -- --   24 1241 -- -- -- -- 17 -- --   24 1211 -- -- -- -- 18 -- --   24 0801 -- -- -- -- 17 -- --   24 0800 -- -- -- -- -- 99 % --   24 0713 136/73 98.2 °F (36.8 °C) Oral 69 18 99 % --   24 0630 -- -- -- -- -- -- 60.6 kg (133 lb 9.6 oz)   24 0305 -- -- -- -- 16 -- --   24 0235 -- -- -- -- 16 -- --   24 2215 -- -- -- -- 16 -- --   245 -- -- -- -- 16 -- --   24 (!) 142/78 98 °F (36.7 °C) Oral 68 16 98 % --     Const: Alert. No distress, pleasant.   HEENT: Normocephalic, atraumatic. Normal sclera/conjunctiva. MMM.   CV: Regular rate and rhythm.   Resp: No respiratory distress. Lungs diminished at bases  Abd: Soft, nontender, nondistended, NABS+   Ext:+ post op swelling RLE  MSK: Decreased right hip ROM  Neuro: Alert, oriented, mildly delayed processing. No focal strength deficits aside from pain limited RLE  Psych: Cooperative, appropriate mood and affect    Laboratory data: Available via EMR.   Last 24 hour lab  Recent Results (from the past 24 hour(s))   POCT Glucose    Collection Time: 24  8:56 PM   Result Value Ref Range    POC Glucose 88 70 - 99 mg/dl    Performed on ACCU-CHEK    POCT Glucose    Collection Time: 24  2:30 AM   Result Value Ref Range    POC Glucose 124 (H)

## 2024-05-28 NOTE — PLAN OF CARE
Problem: Safety - Adult  Goal: Free from fall injury  5/28/2024 1034 by Suzi Paige RN  Outcome: Progressing  5/28/2024 0254 by Tonya Tello RN  Note: Patient free from falls this shift. Fall precautions in place at all times. Bed in lowest position with two side rails up and wheels locked. Call light within reach. Patient able and agreeable to contact for safety appropriately. Patient up to bathroom with stedy, gait-belt, and partial assist of one to get to standing position. Patient tolerated fairly well.       Problem: Discharge Planning  Goal: Discharge to home or other facility with appropriate resources  Outcome: Progressing  Flowsheets (Taken 5/27/2024 2110 by Tonya Tello, RN)  Discharge to home or other facility with appropriate resources: Identify barriers to discharge with patient and caregiver     Problem: Pain  Goal: Verbalizes/displays adequate comfort level or baseline comfort level  5/28/2024 1034 by Suzi Paige RN  Outcome: Progressing  5/28/2024 0254 by Tonya Tello RN  Note: Pt able to express presence/absence of pain and rate pain appropriately using numerical scale. Pain/discomfort being managed with PRN analgesics per MD orders (see MAR). Pain assessed every shift and after interventions. Patient rating pain at it worst this evening 7 on 1-10 pain scale, describes as throbbing. Medicated per request and prn Oxycodone 5 mg, with relief to a 4. Patient refusing ice, makes her to cold.       Problem: ABCDS Injury Assessment  Goal: Absence of physical injury  Outcome: Progressing     Problem: Skin/Tissue Integrity  Goal: Absence of new skin breakdown  Description: 1.  Monitor for areas of redness and/or skin breakdown  2.  Assess vascular access sites hourly  3.  Every 4-6 hours minimum:  Change oxygen saturation probe site  4.  Every 4-6 hours:  If on nasal continuous positive airway pressure, respiratory therapy assess nares and determine need for appliance  change or resting period.  5/28/2024 1034 by Suzi Paige, RN  Outcome: Progressing  5/28/2024 0254 by Tonya Tello, RN  Note: Skin assessment performed each shift per protocol.  Patient denies the need to be turned, encouraged to maintain skin integrity. Patient is continent of bowel and bladder.   4 eyes skin assessment completed for admission. Patient noted with only scattered bruising and abrasions from fall and blanchable redness to bottom. Surgical incision to right hip with CD& I dressing.

## 2024-05-28 NOTE — PROGRESS NOTES
Physical Therapy  Facility/Department: [address] REHAB  Rehabilitation Physical Therapy Initial Assessment    NAME: Elvia Arguelles  : 1960 (63 y.o.)  MRN: [id number]  CODE STATUS: Full Code    Date of Service: 24      Past Medical History:   Diagnosis Date    Adrenal insufficiency (HCC)     Cancer (HCC)     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    Closed displaced intertrochanteric fracture of right femur (HCC) 2024    Depression     Diabetes mellitus (HCC)     Type I    Hx of radiation therapy     melanoma right eye    Hypertension     Pt. denies having history of Hypertension    Hyponatremia     Insulin pump in place     Melanoma (HCC) 2023    in stomach, pancreas    Prolonged emergence from general anesthesia     slow to wake up    Restless leg syndrome      Past Surgical History:   Procedure Laterality Date    CARPAL TUNNEL RELEASE Bilateral 2017    CHOLECYSTECTOMY      CT BIOPSY ABDOMEN RETROPERITONEUM  2022    CT BIOPSY ABDOMEN RETROPERITONEUM 2022 [facility] CT SCAN    EYE SURGERY Right     biopsy    HIP SURGERY Right 2024    HIP INTRAMEDULLARY NAIL ROYCE INSERTION performed by Gonsalo White MD at [facility] OR    HYSTERECTOMY (CERVIX STATUS UNKNOWN)      LIPOMA RESECTION      LIVER BIOPSY Right     PORT SURGERY Right 2023    RIGHT POWER PORT PLACEMENT performed by Satish Corley MD at [facility] OR    SHOULDER ARTHROSCOPY Right 2018    RIGHT SHOULDER ARTHROSCOPE, SUBACROMIAL DECOMPRESSION, DISTALCLAVICLE EXCISION, CAPSULAR RELEASE, MANIPULATION UNDER ANESTHESIA, DEBRIDEMENT    SHOULDER SURGERY      UPPER GASTROINTESTINAL ENDOSCOPY  10/22/2014    UPPER GASTROINTESTINAL ENDOSCOPY N/A 2023    EGD W/EUS FNA performed by Yuri Varela MD at [facility] ENDOSCOPY    UPPER GASTROINTESTINAL ENDOSCOPY  2023    EGD BIOPSY performed by Yuri Varela MD at [facility] ENDOSCOPY    UPPER GASTROINTESTINAL ENDOSCOPY N/A 2024    ESOPHAGOGASTRODUODENOSCOPY BIOPSY performed by  ABBEY side entrance of home but have to travel up a hill. Patient reports  can drive her up to that door  Entrance Stairs - Rails: Both  Bathroom Shower/Tub: Tub/Shower unit  Bathroom Toilet: Standard (sink nearby)  Bathroom Equipment: Shower chair  Bathroom Accessibility: Walker accessible  Home Equipment: Walker - Rolling  Has the patient had two or more falls in the past year or any fall with injury in the past year?: Yes  ADL Assistance: Independent  Homemaking Assistance: Independent (laundry, pet care, cleaning, cooking)  Ambulation Assistance: Independent (without a device)  Transfer Assistance: Independent  Active : Yes  Mode of Transportation: [facility]  Occupation: Retired  Leisure & Hobbies: crafts - paint gourds. Likes to walk the dogs but has a fenced in yard      OBJECTIVE  Vision  Vision: Impaired (blurry both eyes, reports she can see figures but not details.)  Vision Exceptions: Wears glasses at all times (pt self reports sudden change in vision \"blurry\")    Hearing  Hearing: Within functional limits         ROM  AROM RLE (degrees)  RLE AROM: WFL  RLE General AROM: WFL except active hip flexion. PROM is WFL  AROM LLE (degrees)  LLE AROM : WFL  AROM RUE (degrees)  RUE AROM : WFL  AROM LUE (degrees)  LUE AROM : WFL    Strength  Strength RLE  Strength RLE: Exception  Comment: limited by pain in right hip at hip and knee  R Hip Flexion: 3-/5  R Hip ABduction: 3+/5  R Hip ADduction: 3+/5  R Knee Flexion: 3+/5  R Knee Extension: 3+/5  R Ankle Dorsiflexion: 4+/5  R Ankle Plantar flexion: 4+/5    Strength LLE  Strength LLE: Exception  L Hip Flexion: 4/5  L Hip ABduction: 3+/5 (causes pain in right hip)  L Hip ADduction: 3+/5 (causes pain in right hip)  L Knee Flexion: 4+/5  L Knee Extension: 4+/5  L Ankle Dorsiflexion: 5/5  L Ankle Plantar Flexion: 5/5        Sensation  Overall Sensation Status: WFL  Light Touch: No apparent deficits  Additional Comments: Patient reports intermittent tingling in  mobility following hip fracture and Posterior reversible encephalopathy syndrome  Therapy Prognosis: Good  Decision Making: High Complexity  History: see above  Exam: see above  Clinical Presentation: evolving  Barriers to Learning: cognition/pain  Treatment Initiated : 5/28/2024  Discharge Recommendations: Continue to assess pending progress;Patient would benefit from continued therapy after discharge  PT D/C Equipment  Other: has wheeled walker at home  PT Equipment Recommendations  Other: has wheeled walker at home    CLINICAL IMPRESSION   Mrs. Ghada Arguelles is a 64 yo female who was admitted to [facility] after a fall at home and being found down by family. She does not recall and does not remember several days following the fall and she was done for approx 12 hours. Patient sustained R femur fx s/p Right femur cephalomedullary nail on 5/19.  MRI from 5-21 indicates Posterior reversible encephalopathy syndrome and EEG on 5-24-24 (+) Abnormal awake and asleep EEG secondary to a focal electrographic seizure in the right hemisphere, which appeared to begin in the right parieto-occipital region with involvement in extension to most of the right hemisphere and also into the left occipital region. At baseline, patient was Independent without an AD though she does own a wheeled walker. She was fully independent and goes out regularly and drives. She currently requires min assist to stand to the walker and requires cueing for sequencing and problem solving.  She was only able to ambulate 14' with wheeled walker with min assist and unable to do stairs at this time due to high levels of pain.  Patient is well below baseline at this time and will benefit from continued skilled therapy for strengthening, gait training, stairs training, and safety training to allow for safe return home.    GOALS  Patient Goals   Patient Goals : \"be able to do everything I could before\" \"be able to take care of myself\"  Short Term

## 2024-05-28 NOTE — PLAN OF CARE
Problem: Safety - Adult  Goal: Free from fall injury  5/28/2024 0254 by Tonya Tello, RN  Note: Patient free from falls this shift. Fall precautions in place at all times. Bed in lowest position with two side rails up and wheels locked. Call light within reach. Patient able and agreeable to contact for safety appropriately. Patient up to bathroom with stedy, gait-belt, and partial assist of one to get to standing position. Patient tolerated fairly well.       Problem: Discharge Planning  Goal: Discharge to home or other facility with appropriate resources  5/27/2024 1746 by Beti Munoz, RN  Outcome: Progressing  Flowsheets (Taken 5/27/2024 1704)  Discharge to home or other facility with appropriate resources:   Identify barriers to discharge with patient and caregiver   Arrange for needed discharge resources and transportation as appropriate   Refer to discharge planning if patient needs post-hospital services based on physician order or complex needs related to functional status, cognitive ability or social support system   Identify discharge learning needs (meds, wound care, etc)     Problem: Pain  Goal: Verbalizes/displays adequate comfort level or baseline comfort level  5/28/2024 0254 by Tonya Tello, RN  Note: Pt able to express presence/absence of pain and rate pain appropriately using numerical scale. Pain/discomfort being managed with PRN analgesics per MD orders (see MAR). Pain assessed every shift and after interventions. Patient rating pain at it worst this evening 7 on 1-10 pain scale, describes as throbbing. Medicated per request and prn Oxycodone 5 mg, with relief to a 4. Patient refusing ice, makes her to cold.       Problem: Skin/Tissue Integrity  Goal: Absence of new skin breakdown  Description: 1.  Monitor for areas of redness and/or skin breakdown  2.  Assess vascular access sites hourly  3.  Every 4-6 hours minimum:  Change oxygen saturation probe site  4.  Every 4-6

## 2024-05-28 NOTE — PROGRESS NOTES
4 Eyes Skin Assessment     NAME:  Elvia Arguelles  YOB: 1960  MEDICAL RECORD NUMBER:  [id number]    The patient is being assessed for  Admission    I agree that at least one RN has performed a thorough Head to Toe Skin Assessment on the patient. ALL assessment sites listed below have been assessed.      Areas assessed by both nurses:    Head, Face, Ears, Shoulders, Back, Chest, Arms, Elbows, Hands, Sacrum. Buttock, Coccyx, Ischium, Legs. Feet and Heels, and Under Medical Devices         Does the Patient have a Wound? No noted wound(s)       Uriah Prevention initiated by RN: No  Wound Care Orders initiated by RN: No    Pressure Injury (Stage 3,4, Unstageable, DTI, NWPT, and Complex wounds) if present, place Wound referral order by RN under : No    New Ostomies, if present place, Ostomy referral order under : No     Nurse 1 eSignature: Electronically signed by Tonya Tello RN on 5/28/24 at 5:30 AM EDT    **SHARE this note so that the co-signing nurse can place an eSignature**    Nurse 2 eSignature: Electronically signed by Annie Rivers RN on 5/28/24 at 6:33 PM EDT

## 2024-05-28 NOTE — PROGRESS NOTES
[facility] Inpatient Nephrology Note        SUBJECTIVE:    CC:FAVIAN on CKD  HPI: BP adequate. Transferred to rehab  Soc Hx:  no family present  ROS: up in chair. No SOB. Has chronic diarrhea    Medications     escitalopram  10 mg Oral Daily    hydrocortisone  10 mg Oral Daily    insulin lispro  0-8 Units SubCUTAneous TID WC    insulin lispro  0-4 Units SubCUTAneous Nightly    pantoprazole  40 mg Oral BID AC    sodium chloride flush  5-40 mL IntraVENous 2 times per day    heparin (porcine)  5,000 Units SubCUTAneous 3 times per day    hydrocortisone  5 mg Oral Nightly    NIFEdipine  30 mg Oral Daily    lactobacillus  2 capsule Oral BID WC    levETIRAcetam  500 mg IntraVENous Q12H    rOPINIRole  2 mg Oral Nightly    insulin glargine  5 Units SubCUTAneous Nightly         OBJECTIVE      Physical    TEMPERATURE:  Current - Temp: 98.2 °F (36.8 °C); Max - Temp  Av.2 °F (36.8 °C)  Min: 98 °F (36.7 °C)  Max: 98.4 °F (36.9 °C)  RESPIRATIONS RANGE: Resp  Av.2  Min: 6  Max: 18  PULSE RANGE: Pulse  Av.3  Min: 68  Max: 69  BLOOD PRESSURE RANGE:  Systolic (24hrs), Av , Min:136 , Max:147   ; Diastolic (24hrs), Av, Min:60, Max:78    PULSE OXIMETRY RANGE: SpO2  Av.5 %  Min: 98 %  Max: 99 %  24HR INTAKE/OUTPUT:    Intake/Output Summary (Last 24 hours) at 2024 0815  Last data filed at 2024 0235  Gross per 24 hour   Intake 400 ml   Output --   Net 400 ml       GEN: no distress  HEENT: no icterus  NECK: Trachea midline  CV: RRR  LUNGS: clear bilaterally, unlabored  ABD: + bowel sounds. No distension. No  tenderness  EXT: no edema.   SKIN: no rash  NEURO: no tremor    Data      Lab Results   Component Value Date    CREATININE 1.7 (H) 2024    BUN 25 (H) 2024     2024    K 5.5 (H) 2024     2024    CO2 23 2024     Lab Results   Component Value Date    WBC 11.6 (H) 2024    HGB 9.0 (L) 2024    HCT 26.5 (L)  05/26/2024    MCV 84.9 05/26/2024     05/26/2024     Lab Results   Component Value Date    IRON 16 (L) 05/21/2024    TIBC 189 (L) 05/21/2024    FERRITIN 495.2 (H) 05/21/2024       ASSESSMENT     Patient Active Problem List   Diagnosis    Duodenal erosion    Left elbow pain    Type 1 diabetes mellitus (HCC)    Vitamin D deficiency    Family history of 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)    History of melanoma    Hypoglycemia    Metastatic melanoma to pancreas (HCC)    Lactic acidosis    Hypercholesterolemia    Elevated brain natriuretic peptide (BNP) level    Microcytosis    Adrenal insufficiency (HCC)    Type 1 diabetes mellitus with ketoacidosis without coma (HCC)    Hyperkalemia    Septic shock (HCC)    Metabolic encephalopathy    Metastatic melanoma (HCC)    Elevated procalcitonin    Lactic acid acidosis    Neutrophilia    Acute kidney injury superimposed on CKD (HCC)    Diabetic ketoacidosis without coma associated with type 1 diabetes mellitus (HCC)    Poorly controlled type 1 diabetes mellitus with neuropathy (HCC)    Mixed hyperlipidemia    Poorly controlled type 1 diabetes mellitus with retinopathy (HCC)    Primary hypertension    Stage 3 chronic kidney disease (HCC)    DKA, type 1, not at goal (HCC)    Intractable nausea and vomiting    Closed displaced intertrochanteric fracture of right femur (HCC)    Closed right hip fracture, initial encounter ([facility])    Pre-operative examination    Demand ischemia ([facility])       ASSESSMENT/PLAN:    # FAVIAN on CKD  - due to relative  hypotension and rhabdo  - resolved    # CKD Stage 3  - follows with Dr. Celis  - serum creatinine at baseline  - UPC 0.6    # HTN  - BP adequate  - add ARB if BP elevates    # DKA  - resolved    # Encephalopathy  - MRI with features of PRES   - not related to HTN  - has metastatic melanoma    # R intertrochanteric femur fx  - per rehab    # Adrenal insuff  - on solu cortef    # CKD MBD  - phos low due to diarrhea   - supplement    # Hyperkalemia  - mild  - monitor without supplement today    # Anemia  - HGB low  - low iron stores  - add po iron (which may help with chronic diarrhea)    Available via [email or web address] secure messaging

## 2024-05-28 NOTE — PROGRESS NOTES
Physical Therapy  Elvia Arguelles  [id number]    PT evaluation initiated but limited by time and patient's pain level.  Will complete mobility assessment this PM and full write up to follow.       Therapy Time     Individual Co-treatment   Time In 1000     Time Out 1045     Minutes 45          Teodora Steele, CNS [id number]

## 2024-05-28 NOTE — PROGRESS NOTES
[facility]  Hyperglycemia Event      NAME: Elvia Arguelles  MEDICAL RECORD NUMBER:  [id number]  AGE: 63 y.o.   GENDER: female  : 1960  EPISODE DATE:  2024     Data     Recent Labs     24  1244 24  1656 24  2056 24  0230 24  0701 24  1215   POCGLU 439* 134* 88 124* 145* 364*       HgBA1c:    Lab Results   Component Value Date/Time    LABA1C 7.2 2024 10:17 PM       BUN/Creatinine:    Lab Results   Component Value Date/Time    BUN 25 2024 06:20 AM    CREATININE 1.7 2024 06:20 AM       Medications  Scheduled Medications:   potassium & sodium phosphates  1 packet Oral 4x Daily    ferrous sulfate  324 mg Oral Daily with breakfast    escitalopram  10 mg Oral Daily    hydrocortisone  10 mg Oral Daily    insulin lispro  0-8 Units SubCUTAneous TID WC    insulin lispro  0-4 Units SubCUTAneous Nightly    pantoprazole  40 mg Oral BID AC    sodium chloride flush  5-40 mL IntraVENous 2 times per day    heparin (porcine)  5,000 Units SubCUTAneous 3 times per day    hydrocortisone  5 mg Oral Nightly    NIFEdipine  30 mg Oral Daily    lactobacillus  2 capsule Oral BID WC    levETIRAcetam  500 mg IntraVENous Q12H    rOPINIRole  2 mg Oral Nightly    insulin glargine  5 Units SubCUTAneous Nightly       Diet  Current diet/supplement order: ADULT DIET; Regular; 5 carb choices (75 gm/meal); No Added Salt (3-4 gm); Low Potassium (Less than 3000 mg/day)     Recorded PO: PO Meals Eaten (%): 76 - 100% (food from home) last meal in flowsheets      Action     Recommend BG targets 140 - 180.      Recommend reducing correction to low.    Recommend Humalog 2 units with meals.    Recommend Lantus 8 - 9 units with meals.      Physician Notified of event: Yes   Delia Martinez MD    POCT added at 0200.      Responce     Medication plan updated: Yes        Electronically signed by Marianna Lawson RN on 2024 at 1:04 PM

## 2024-05-28 NOTE — PROGRESS NOTES
SLP ALL NOTES  Facility/Department: [address] IP REHAB  Initial Speech/Language/Cognitive Assessment    NAME: Elvia Arguelles  : 1960   MRN: [id number]  ADMISSION DATE: 2024  ADMITTING DIAGNOSIS:   has Duodenal erosion; Left elbow pain; Type 1 diabetes mellitus (HCC); Vitamin D deficiency; Family history of thyroid disease; Diabetic nephropathy (HCC); Thyroid enlargement; Gastroesophageal reflux disease without esophagitis; Cataract, left eye; Insulin pump status; Lateral epicondylitis of right elbow; Lumbar disc herniation with radiculopathy; Malignant melanoma of choroid (HCC); Malignant neoplasm metastatic to liver (HCC); Pancreatic mass; Post-cholecystectomy syndrome; Restless legs syndrome (RLS); 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); History of melanoma; Hypoglycemia; Metastatic melanoma to pancreas (HCC); Lactic acidosis; Hypercholesterolemia; Elevated brain natriuretic peptide (BNP) level; Microcytosis; Adrenal insufficiency (HCC); Type 1 diabetes mellitus with ketoacidosis without coma (HCC); Hyperkalemia; Septic shock (HCC); Metabolic encephalopathy; Metastatic melanoma (HCC); Elevated procalcitonin; Lactic acid acidosis; Neutrophilia; Acute kidney injury superimposed on CKD (HCC); Diabetic ketoacidosis without coma associated with type 1 diabetes mellitus (HCC); Poorly controlled type 1 diabetes mellitus with neuropathy (HCC); Mixed hyperlipidemia; Poorly controlled type 1 diabetes mellitus with retinopathy (HCC); Primary hypertension; Stage 3 chronic kidney disease (HCC); DKA, type 1, not at goal (HCC); Intractable nausea and vomiting; Closed displaced intertrochanteric fracture of right femur (HCC); Closed right hip fracture, initial encounter (HCC); Pre-operative examination; and Demand ischemia (HCC) on their problem list.   has a past medical history of Adrenal insufficiency (HCC), Cancer (HCC) (), Closed displaced intertrochanteric fracture of right femur (HCC) (2024),  week.    RECENT RESULTS  MRI: 5/21/2024  IMPRESSION:  Multiple T2 hyperintense signal within the bilateral occipital lobes, right  posterior parietal lobe and right posterior temporal lobe with some areas of  associated restricted diffusion. Multiple additional small foci of restricted  diffusion in the bilateral centrum semiovale, corona radiata and left  temporal lobe.  Findings are suspicious for PRES.  Superimposed areas of  ischemia are not excluded.    Repeat MRA Brain; MRA Neck; MRV Head;  ordered/completed reportedly 5/24/2024; findings pending    Primary Complaint:   MD orders for cognitive evaluation  Pt and spouse deny speech problems but does endorse visual problems impacting since fall      SLP Assessment Impressions:  1. Cognitive Diagnosis: Pt demonstrated minimal to moderate deficits in domains of time orientation; higher level attention; complex VPS and abstract reasoning; working memory and STM. Pt with visual deficits and right lower extremity pain which is further exacerbating. Visual deficits during paper tasks revealed reduced attention left; slight sloping upwar left to right when writing; spatial orientation when putting numbers on a clock. Will continue therapy working on visuo cognition / visuo spatial orientation and visual language remediation.     2. Speech Diagnosis: Motor speech audible and intelligible at multiple word utterance level and during discourse. Pt achieve good verbal diadochokinetic rating. Voice quality with minimal claudio /strained quality.    3. Communication Diagnosis: Pt demonstrating concrete to mild complex functional auditory language processing and verbal expressive language skills. Visual language processing at word / phrase level with mild deficits in inattention left of midline (did utilized large print with high color contrast). Pt able to express full name via written expression but minimal perseveration of letters ans slight sloping upward left to right when

## 2024-05-28 NOTE — H&P
Department of Physical Medicine & Rehabilitation  History & Physical      Patient Identification:  Elvia Arguelles  : 1960  Admit date: 2024   Attending provider: Delia Martinez MD        Primary care provider: Meg Ellis APRN - DRE     Chief Complaint: R hip pain    History of Present Illness/Hospital Course:  Elvia Arguelles is a 63 yrs old female with a PMHx of T2DM, adrenal insufficiency chonic steroids, metastatic melanoma on Opdivo therapy who presented to ED with R hip pain after fall, found to have R femoral fracture s/p IM nail.     Patient originally presented to [facility] ED on  after being found down by family in the living room floor. ED workup showed Rhabdomyolysis, FAVIAN and closed R femoral fracture. Admitted for further care. Orthopedic surgery consulted, performed IM nail  without perioperative complication. Course complicated by AMS, suspected PRES per multiple providers and MRI imaging findings. EEG w/epileptiform activity, started on keppra per neurology consult.    she continued to stabilize medically, was evaluated by therapy services and found to be an appropriate candidate for inpatient rehabilitation for medically supervised 3 hours of therapy 5 days a week.    Prior Level of Function:  Independent for mobility, ADLs, and IADLs    Current Level of Function:  Per therapy reports  ModA bed mobility, CGA sitting     Pertinent Social History:  Support: lives with spouse  Home set-up: 1 SH, 4+6 ABBEY, Bed and bath on main level,     Past Medical History:   Diagnosis Date    Adrenal insufficiency (HCC)     Cancer (HCC)     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    Closed displaced intertrochanteric fracture of right femur (HCC) 2024    Depression     Diabetes mellitus (HCC)     Type I    Hx of radiation therapy     melanoma right eye    Hypertension     Pt. denies having history of Hypertension    Hyponatremia     Insulin pump in place     Melanoma (HCC)  01/13/2023    in stomach, pancreas    Prolonged emergence from general anesthesia     slow to wake up    Restless leg syndrome        Past Surgical History:   Procedure Laterality Date    CARPAL TUNNEL RELEASE Bilateral 12/2017    CHOLECYSTECTOMY      CT BIOPSY ABDOMEN RETROPERITONEUM  12/27/2022    CT BIOPSY ABDOMEN RETROPERITONEUM 12/27/2022 [facility] CT SCAN    EYE SURGERY Right     biopsy    HIP SURGERY Right 5/19/2024    HIP INTRAMEDULLARY NAIL ROYCE INSERTION performed by Gonsalo White MD at [facility] OR    HYSTERECTOMY (CERVIX STATUS UNKNOWN)      LIPOMA RESECTION      LIVER BIOPSY Right     PORT SURGERY Right 01/18/2023    RIGHT POWER PORT PLACEMENT performed by Satish Corley MD at [facility] OR    SHOULDER ARTHROSCOPY Right 08/31/2018    RIGHT SHOULDER ARTHROSCOPE, SUBACROMIAL DECOMPRESSION, DISTALCLAVICLE EXCISION, CAPSULAR RELEASE, MANIPULATION UNDER ANESTHESIA, DEBRIDEMENT    SHOULDER SURGERY      UPPER GASTROINTESTINAL ENDOSCOPY  10/22/2014    UPPER GASTROINTESTINAL ENDOSCOPY N/A 01/13/2023    EGD W/EUS FNA performed by Yuri Varela MD at [facility] ENDOSCOPY    UPPER GASTROINTESTINAL ENDOSCOPY  01/13/2023    EGD BIOPSY performed by Yuri Varela MD at [facility] ENDOSCOPY    UPPER GASTROINTESTINAL ENDOSCOPY N/A 4/5/2024    ESOPHAGOGASTRODUODENOSCOPY BIOPSY performed by Tylor Staley MD at [facility] ENDOSCOPY    US GUIDED LIVER BIOPSY PERCUTANEOUS  12/16/2022    US GUIDED LIVER BIOPSY PERCUTANEOUS 12/16/2022 [facility] ULTRASOUND       Family History   Problem Relation Age of Onset    High Blood Pressure Mother     Breast Cancer Mother         breast w mets to bone    Diabetes Father     Stroke Father     Other Cancer Father         bladder       Social History     Socioeconomic History    Marital status:      Spouse name: Shant Arguelles    Number of children: 2   Tobacco Use    Smoking status: Former     Current packs/day: 0.00     Average packs/day: 1 pack/day for 10.0 years (10.0 ttl pk-yrs)     Types:  IntraVENous PRN Sumeet Tello, DO        0.9 % sodium chloride infusion   IntraVENous PRN Sumeet Tello,         heparin (porcine) injection 5,000 Units  5,000 Units SubCUTAneous 3 times per day Sumeet Tello DO   5,000 Units at 05/27/24 2110    ondansetron (ZOFRAN-ODT) disintegrating tablet 4 mg  4 mg Oral Q8H PRN Sumeet Tello,         Or    ondansetron (ZOFRAN) injection 4 mg  4 mg IntraVENous Q6H PRN Sumeet Tello, DO        hydrocortisone (CORTEF) tablet 5 mg  5 mg Oral Nightly Sumeet Tello,         glucose chewable tablet 16 g  4 tablet Oral PRN Sumeet Tello, DO        dextrose bolus 10% 125 mL  125 mL IntraVENous PRN Sumeet Tello,         Or    dextrose bolus 10% 250 mL  250 mL IntraVENous PRN Sumeet Tello,         glucagon injection 1 mg  1 mg SubCUTAneous PRN Sumeet Tello,         dextrose 10 % infusion   IntraVENous Continuous PRN Sumeet Tello DO        HYDROmorphone (DILAUDID) injection 0.25 mg  0.25 mg IntraVENous Q3H PRN Sumeet Tello DO        loperamide (IMODIUM) capsule 2 mg  2 mg Oral TID PRN Sumeet Tello, DO        [START ON 5/28/2024] NIFEdipine (PROCARDIA XL) extended release tablet 30 mg  30 mg Oral Daily Sumeet Tello DO        hydrALAZINE (APRESOLINE) injection 5 mg  5 mg IntraVENous Q6H PRN Sumeet Tello,         0.9 % sodium chloride infusion   IntraVENous PRN Sumeet Tello DO        lactobacillus (CULTURELLE) capsule 2 capsule  2 capsule Oral BID WC Sumeet Tello DO   2 capsule at 05/27/24 1759    levETIRAcetam (KEPPRA) injection 500 mg  500 mg IntraVENous Q12H Sumeet Tello DO        oxyCODONE (ROXICODONE) immediate release tablet 5 mg  5 mg Oral Q4H PRN Sumeet Tello DO   5 mg at 05/27/24 2115    rOPINIRole (REQUIP) tablet 2 mg  2 mg Oral Nightly Sumeet Tello,    2 mg at 05/27/24 2110    insulin glargine (LANTUS) injection vial 5 Units  5 Units SubCUTAneous Nightly Sumeet Tello DO   5 Units at 05/27/24 2110    acetaminophen (TYLENOL) tablet 650 mg  650 mg

## 2024-05-29 LAB
ALBUMIN SERPL-MCNC: 2.7 G/DL (ref 3.4–5)
ANION GAP SERPL CALCULATED.3IONS-SCNC: 9 MMOL/L (ref 3–16)
ANISOCYTOSIS BLD QL SMEAR: ABNORMAL
BASOPHILS # BLD: 0.1 K/UL (ref 0–0.2)
BASOPHILS NFR BLD: 1 %
BUN SERPL-MCNC: 25 MG/DL (ref 7–20)
CALCIUM SERPL-MCNC: 8.2 MG/DL (ref 8.3–10.6)
CHLORIDE SERPL-SCNC: 106 MMOL/L (ref 99–110)
CO2 SERPL-SCNC: 23 MMOL/L (ref 21–32)
CREAT SERPL-MCNC: 1.8 MG/DL (ref 0.6–1.2)
DEPRECATED RDW RBC AUTO: 16.5 % (ref 12.4–15.4)
ECHO BSA: 1.58 M2
EOSINOPHIL # BLD: 0.1 K/UL (ref 0–0.6)
EOSINOPHIL NFR BLD: 1 %
GFR SERPLBLD CREATININE-BSD FMLA CKD-EPI: 31 ML/MIN/{1.73_M2}
GLUCOSE BLD-MCNC: 133 MG/DL (ref 70–99)
GLUCOSE BLD-MCNC: 196 MG/DL (ref 70–99)
GLUCOSE BLD-MCNC: 276 MG/DL (ref 70–99)
GLUCOSE BLD-MCNC: 97 MG/DL (ref 70–99)
GLUCOSE SERPL-MCNC: 141 MG/DL (ref 70–99)
HCT VFR BLD AUTO: 25 % (ref 36–48)
HGB BLD-MCNC: 8.5 G/DL (ref 12–16)
LYMPHOCYTES # BLD: 2 K/UL (ref 1–5.1)
LYMPHOCYTES NFR BLD: 22 %
MAGNESIUM SERPL-MCNC: 1.5 MG/DL (ref 1.8–2.4)
MCH RBC QN AUTO: 29.7 PG (ref 26–34)
MCHC RBC AUTO-ENTMCNC: 33.8 G/DL (ref 31–36)
MCV RBC AUTO: 87.8 FL (ref 80–100)
METAMYELOCYTES NFR BLD MANUAL: 3 %
MICROCYTES BLD QL SMEAR: ABNORMAL
MONOCYTES # BLD: 0.4 K/UL (ref 0–1.3)
MONOCYTES NFR BLD: 4 %
MYELOCYTES NFR BLD MANUAL: 1 %
NEUTROPHILS # BLD: 6.6 K/UL (ref 1.7–7.7)
NEUTROPHILS NFR BLD: 67 %
NEUTS BAND NFR BLD MANUAL: 1 % (ref 0–7)
OVALOCYTES BLD QL SMEAR: ABNORMAL
PERFORMED ON: ABNORMAL
PERFORMED ON: NORMAL
PHOSPHATE SERPL-MCNC: 2.6 MG/DL (ref 2.5–4.9)
PLATELET # BLD AUTO: 254 K/UL (ref 135–450)
PMV BLD AUTO: 8.4 FL (ref 5–10.5)
POIKILOCYTOSIS BLD QL SMEAR: ABNORMAL
POTASSIUM SERPL-SCNC: 4.9 MMOL/L (ref 3.5–5.1)
RBC # BLD AUTO: 2.85 M/UL (ref 4–5.2)
SODIUM SERPL-SCNC: 138 MMOL/L (ref 136–145)
VAS LEFT CCA DIST EDV: 23 CM/S
VAS LEFT CCA DIST PSV: 103.3 CM/S
VAS LEFT CCA MID EDV: 21.2 CM/S
VAS LEFT CCA MID PSV: 110.6 CM/S
VAS LEFT CCA PROX EDV: 21.2 CM/S
VAS LEFT CCA PROX PSV: 127.1 CM/S
VAS LEFT ECA EDV: 8.4 CM/S
VAS LEFT ECA PSV: 99.6 CM/S
VAS LEFT ICA DIST EDV: 29.5 CM/S
VAS LEFT ICA DIST PSV: 102.9 CM/S
VAS LEFT ICA MID EDV: 30.2 CM/S
VAS LEFT ICA MID PSV: 108.8 CM/S
VAS LEFT ICA PROX EDV: 21.6 CM/S
VAS LEFT ICA PROX PSV: 67.1 CM/S
VAS LEFT ICA/CCA PSV: 1.1 NO UNITS
VAS LEFT VERTEBRAL EDV: 17.6 CM/S
VAS LEFT VERTEBRAL PSV: 76.5 CM/S
VAS RIGHT CCA DIST EDV: 15.1 CM/S
VAS RIGHT CCA DIST PSV: 80.9 CM/S
VAS RIGHT CCA MID EDV: 26.1 CM/S
VAS RIGHT CCA MID PSV: 142.4 CM/S
VAS RIGHT CCA PROX EDV: 21.7 CM/S
VAS RIGHT CCA PROX PSV: 129.3 CM/S
VAS RIGHT ECA EDV: 8.6 CM/S
VAS RIGHT ECA PSV: 140.1 CM/S
VAS RIGHT ICA DIST EDV: 25.5 CM/S
VAS RIGHT ICA DIST PSV: 86.9 CM/S
VAS RIGHT ICA MID EDV: 34.5 CM/S
VAS RIGHT ICA MID PSV: 122.1 CM/S
VAS RIGHT ICA PROX EDV: 18.1 CM/S
VAS RIGHT ICA PROX PSV: 76.5 CM/S
VAS RIGHT ICA/CCA PSV: 1.5 NO UNITS
VAS RIGHT VERTEBRAL EDV: 14.2 CM/S
VAS RIGHT VERTEBRAL PSV: 66.9 CM/S
WBC # BLD AUTO: 9.1 K/UL (ref 4–11)

## 2024-05-29 PROCEDURE — 6370000000 HC RX 637 (ALT 250 FOR IP): Performed by: INTERNAL MEDICINE

## 2024-05-29 PROCEDURE — 83735 ASSAY OF MAGNESIUM: CPT

## 2024-05-29 PROCEDURE — 97129 THER IVNTJ 1ST 15 MIN: CPT

## 2024-05-29 PROCEDURE — 2580000003 HC RX 258: Performed by: STUDENT IN AN ORGANIZED HEALTH CARE EDUCATION/TRAINING PROGRAM

## 2024-05-29 PROCEDURE — 1280000000 HC REHAB R&B

## 2024-05-29 PROCEDURE — 97130 THER IVNTJ EA ADDL 15 MIN: CPT

## 2024-05-29 PROCEDURE — 97535 SELF CARE MNGMENT TRAINING: CPT

## 2024-05-29 PROCEDURE — 80069 RENAL FUNCTION PANEL: CPT

## 2024-05-29 PROCEDURE — 85025 COMPLETE CBC W/AUTO DIFF WBC: CPT

## 2024-05-29 PROCEDURE — 6370000000 HC RX 637 (ALT 250 FOR IP): Performed by: PHYSICAL MEDICINE & REHABILITATION

## 2024-05-29 PROCEDURE — 97110 THERAPEUTIC EXERCISES: CPT

## 2024-05-29 PROCEDURE — 97530 THERAPEUTIC ACTIVITIES: CPT

## 2024-05-29 PROCEDURE — 97116 GAIT TRAINING THERAPY: CPT

## 2024-05-29 PROCEDURE — 6370000000 HC RX 637 (ALT 250 FOR IP): Performed by: STUDENT IN AN ORGANIZED HEALTH CARE EDUCATION/TRAINING PROGRAM

## 2024-05-29 PROCEDURE — 36591 DRAW BLOOD OFF VENOUS DEVICE: CPT

## 2024-05-29 PROCEDURE — 93880 EXTRACRANIAL BILAT STUDY: CPT | Performed by: SURGERY

## 2024-05-29 PROCEDURE — 6360000002 HC RX W HCPCS: Performed by: STUDENT IN AN ORGANIZED HEALTH CARE EDUCATION/TRAINING PROGRAM

## 2024-05-29 PROCEDURE — 94760 N-INVAS EAR/PLS OXIMETRY 1: CPT

## 2024-05-29 RX ADMIN — INSULIN LISPRO 2 UNITS: 100 INJECTION, SOLUTION INTRAVENOUS; SUBCUTANEOUS at 07:49

## 2024-05-29 RX ADMIN — LOPERAMIDE HYDROCHLORIDE 2 MG: 2 CAPSULE ORAL at 11:19

## 2024-05-29 RX ADMIN — OXYCODONE HYDROCHLORIDE 5 MG: 5 TABLET ORAL at 08:46

## 2024-05-29 RX ADMIN — OXYCODONE HYDROCHLORIDE 5 MG: 5 TABLET ORAL at 04:22

## 2024-05-29 RX ADMIN — OXYCODONE HYDROCHLORIDE 5 MG: 5 TABLET ORAL at 18:10

## 2024-05-29 RX ADMIN — OXYCODONE HYDROCHLORIDE 5 MG: 5 TABLET ORAL at 13:29

## 2024-05-29 RX ADMIN — NIFEDIPINE 30 MG: 30 TABLET, EXTENDED RELEASE ORAL at 07:49

## 2024-05-29 RX ADMIN — SODIUM CHLORIDE, PRESERVATIVE FREE 10 ML: 5 INJECTION INTRAVENOUS at 20:43

## 2024-05-29 RX ADMIN — FERROUS SULFATE TAB EC 324 MG (65 MG FE EQUIVALENT) 324 MG: 324 (65 FE) TABLET DELAYED RESPONSE at 07:48

## 2024-05-29 RX ADMIN — OXYCODONE HYDROCHLORIDE 5 MG: 5 TABLET ORAL at 23:47

## 2024-05-29 RX ADMIN — Medication 2 CAPSULE: at 07:48

## 2024-05-29 RX ADMIN — SODIUM CHLORIDE, PRESERVATIVE FREE 10 ML: 5 INJECTION INTRAVENOUS at 07:53

## 2024-05-29 RX ADMIN — HEPARIN SODIUM 5000 UNITS: 5000 INJECTION INTRAVENOUS; SUBCUTANEOUS at 20:42

## 2024-05-29 RX ADMIN — PANTOPRAZOLE SODIUM 40 MG: 40 TABLET, DELAYED RELEASE ORAL at 16:04

## 2024-05-29 RX ADMIN — Medication 2 CAPSULE: at 16:22

## 2024-05-29 RX ADMIN — HEPARIN SODIUM 5000 UNITS: 5000 INJECTION INTRAVENOUS; SUBCUTANEOUS at 16:04

## 2024-05-29 RX ADMIN — ESCITALOPRAM OXALATE 10 MG: 10 TABLET ORAL at 07:49

## 2024-05-29 RX ADMIN — PANTOPRAZOLE SODIUM 40 MG: 40 TABLET, DELAYED RELEASE ORAL at 06:02

## 2024-05-29 RX ADMIN — HYDROCORTISONE 5 MG: 5 TABLET ORAL at 20:42

## 2024-05-29 RX ADMIN — INSULIN GLARGINE 8 UNITS: 100 INJECTION, SOLUTION SUBCUTANEOUS at 20:42

## 2024-05-29 RX ADMIN — LEVETIRACETAM 500 MG: 100 INJECTION, SOLUTION INTRAVENOUS at 07:53

## 2024-05-29 RX ADMIN — ROPINIROLE HYDROCHLORIDE 2 MG: 1 TABLET, FILM COATED ORAL at 20:42

## 2024-05-29 RX ADMIN — HYDROCORTISONE 10 MG: 10 TABLET ORAL at 07:48

## 2024-05-29 RX ADMIN — HEPARIN SODIUM 5000 UNITS: 5000 INJECTION INTRAVENOUS; SUBCUTANEOUS at 06:02

## 2024-05-29 RX ADMIN — LEVETIRACETAM 500 MG: 100 INJECTION, SOLUTION INTRAVENOUS at 20:42

## 2024-05-29 ASSESSMENT — PAIN DESCRIPTION - ONSET
ONSET: ON-GOING

## 2024-05-29 ASSESSMENT — PAIN DESCRIPTION - FREQUENCY
FREQUENCY: INTERMITTENT

## 2024-05-29 ASSESSMENT — PAIN - FUNCTIONAL ASSESSMENT
PAIN_FUNCTIONAL_ASSESSMENT: ACTIVITIES ARE NOT PREVENTED
PAIN_FUNCTIONAL_ASSESSMENT: ACTIVITIES ARE NOT PREVENTED
PAIN_FUNCTIONAL_ASSESSMENT: PREVENTS OR INTERFERES SOME ACTIVE ACTIVITIES AND ADLS
PAIN_FUNCTIONAL_ASSESSMENT: ACTIVITIES ARE NOT PREVENTED

## 2024-05-29 ASSESSMENT — PAIN SCALES - GENERAL
PAINLEVEL_OUTOF10: 9
PAINLEVEL_OUTOF10: 4
PAINLEVEL_OUTOF10: 7
PAINLEVEL_OUTOF10: 0
PAINLEVEL_OUTOF10: 0
PAINLEVEL_OUTOF10: 9
PAINLEVEL_OUTOF10: 5
PAINLEVEL_OUTOF10: 5
PAINLEVEL_OUTOF10: 8
PAINLEVEL_OUTOF10: 7

## 2024-05-29 ASSESSMENT — PAIN DESCRIPTION - DESCRIPTORS
DESCRIPTORS: ACHING
DESCRIPTORS: THROBBING
DESCRIPTORS: ACHING

## 2024-05-29 ASSESSMENT — PAIN DESCRIPTION - PAIN TYPE
TYPE: SURGICAL PAIN
TYPE: SURGICAL PAIN
TYPE: ACUTE PAIN;SURGICAL PAIN
TYPE: SURGICAL PAIN
TYPE: SURGICAL PAIN

## 2024-05-29 ASSESSMENT — PAIN DESCRIPTION - LOCATION
LOCATION: LEG
LOCATION: LEG
LOCATION: HIP;LEG
LOCATION: LEG
LOCATION: LEG

## 2024-05-29 ASSESSMENT — PAIN DESCRIPTION - ORIENTATION
ORIENTATION: RIGHT

## 2024-05-29 NOTE — PROGRESS NOTES
Physical Therapy  Facility/Department: [address] REHAB  Rehabilitation Physical Therapy Treatment Note    NAME: Elvia Arguelles  : 1960 (63 y.o.)  MRN: [id number]  CODE STATUS: Full Code    Date of Service: 24       Restrictions:  Restrictions/Precautions: Weight Bearing, Fall Risk  Lower Extremity Weight Bearing Restrictions  Right Lower Extremity Weight Bearing: Weight Bearing As Tolerated  Position Activity Restriction  Other position/activity restrictions: R femur fx s/p Right femur cephalomedullary nail; Diet: 5 carb choices (75 gm/meal); No Added Salt (3-4 gm); Low Potassium (Less than 3000 mg/day)     Pertinent medical information:  Additional Pertinent Hx: per Dr. Tello's H&P, \"63 yrs old female with a PMHx of T2DM, adrenal insufficiency chonic steroids, metastatic melanoma on Opdivo therapy who presented to ED with R hip pain after fall, found to have R femoral fracture s/p IM nail. originally presented to [facility] ED on  after being found down by family in the living room floor. ED workup showed Rhabdomyolysis, FAVIAN and closed R femoral fracture. Admitted for further care. Orthopedic surgery consulted, performed IM nail  without perioperative complication. Course complicated by AMS, suspected PRES per multiple providers and MRI imaging findings. EEG w/epileptiform activity, started on keppra per neurology consult\"    SUBJECTIVE  Subjective  Subjective: Patient arrived in wheelchair and reports she slept well. She states she got her pain medicine and pain is under control  Pain: 5/10 right thigh       Social/Functional History  Lives With: Spouse (, Shant, in good health and retired)  Type of Home: House  Home Layout: Able to Live on Main level with bedroom/bathroom, One level, Laundry in basement (with basement)  Home Access: Stairs to enter with rails  Entrance Stairs - Number of Steps: 4 with danielle handrail + 6 with danielle handrails (unsure if able to hold both at same time) --  Comments: antalgic, step to pattern. Straight arms to unweight LE, takes large step at times with right LE and needs cues to ensure toes do not pass the front of the walker.  Additional Factors: Verbal cues;Increased time to complete                    ASSESSMENT/PROGRESS TOWARDS GOALS       Assessment  Assessment: Mrs. Ghada Arguelles is very limited by pain.  She was encouraged to use ice on her hip as she does not recall using it last night. PT placed ice pack on right hip at end of session.  Sit<>stand improved to CGA with verbal cues for hand placement, but she does require  min assist for car transfer.  She is very limited with distance ambulating and could go no further than 16' with wheeled walker. Patient continues to be well below baseline at this time and will benefit from continued skilled therapy for strengthening, gait training, stairs training, and safety training to allow for safe return home.  Activity Tolerance: Patient limited by pain;Patient limited by endurance  Discharge Recommendations: Continue to assess pending progress;Patient would benefit from continued therapy after discharge  PT Equipment Recommendations  Other: has wheeled walker at home    Goals  Patient Goals   Patient Goals : \"be able to do everything I could before\" \"be able to take care of myself\"  Short Term Goals  Time Frame for Short Term Goals: one week from 5/8/2024  Short Term Goal 1: bed mobility with min assist  Short Term Goal 2: Transfers with CGA  Short Term Goal 3: Ambulate 50' with wheeled walker with CGA  Short Term Goal 4: Ascend/descend curb step with wheeled walker with CGA  Long Term Goals  Time Frame for Long Term Goals : upon discharge - approx 10-14 days from 5/28/2024  Long Term Goal 1: bed mobility with modified independence  Long Term Goal 2: Transfers with modified independence  Long Term Goal 3: Ambulate 150' with wheeled wlaker with modified independence  Long Term Goal 4: Ascend/descend curb step with  resting in that position for hip to relax in this position.    Supine>sit with min assist for right LE.   Sit>stand with CGA.   Ambulated 26' with wheeled walker with CGA.  Improved step length with second trial, but still very painful.    Patient with improving mobility and reports she recognizes it is getting easier to move.      Safety Device - Type of devices:  [x]  All fall risk precautions in place [] Bed alarm in place  [] Call light within reach [] Chair alarm in place [] Positioning belt [x] Gait belt [] Patient at risk for falls [] Left in bed [x] Left in chair (in wheelchair to return to room with transportation) [] Telesitter in use [] Sitter present [] Nurse notified []  None          Therapy Time   Individual Concurrent Group Co-treatment   Time In 0900         Time Out 0945         Minutes 45              Second Session Therapy Time     Individual Co-treatment   Time In 1445     Time Out 1530     Minutes 45               TOMAS Johnston [id number], 05/29/24 at 9:49 AM

## 2024-05-29 NOTE — PROGRESS NOTES
Patient admitted to rehab with closed right hip fracture.  A/Ox4. Transfers with stedy x1. Mobility restrictions: WBAT. On Regular 5 CCC diet, tolerating well. Medications taken whole with thin liquids. On Heparin for DVT prophylaxis.  Skin: scattered brusing and abrasions, blanchable redness to coccyx, right elbow scab TREVOR. Oxygen: RA. LDA: right chest accessed port. Has been continent of bowel and bladder. LBM 5/29/2024. Chair/bed alarms in use and call light in reach. Will monitor for safety. Electronically signed by Ayanna Hill RN on 5/29/2024 at 5:32 PM

## 2024-05-29 NOTE — PLAN OF CARE
Problem: Safety - Adult  Goal: Free from fall injury  Outcome: Progressing     Problem: Discharge Planning  Goal: Discharge to home or other facility with appropriate resources  Outcome: Progressing     Problem: Pain  Goal: Verbalizes/displays adequate comfort level or baseline comfort level  Outcome: Progressing     Problem: ABCDS Injury Assessment  Goal: Absence of physical injury  Outcome: Progressing     Problem: Skin/Tissue Integrity  Goal: Absence of new skin breakdown  Description: 1.  Monitor for areas of redness and/or skin breakdown  2.  Assess vascular access sites hourly  3.  Every 4-6 hours minimum:  Change oxygen saturation probe site  4.  Every 4-6 hours:  If on nasal continuous positive airway pressure, respiratory therapy assess nares and determine need for appliance change or resting period.  Outcome: Progressing

## 2024-05-29 NOTE — PROGRESS NOTES
[facility] Inpatient Nephrology Note        SUBJECTIVE:    CC:FAVIAN on CKD  HPI: BP adequate. Serum creatinine stable.   Soc Hx:  discussed with family present  ROS: serum K less. Up in chair.     Medications     ferrous sulfate  324 mg Oral Daily with breakfast    insulin glargine  8 Units SubCUTAneous Nightly    insulin lispro  2 Units SubCUTAneous TID WC    insulin lispro  0-4 Units SubCUTAneous TID WC    insulin lispro  0-4 Units SubCUTAneous Nightly    escitalopram  10 mg Oral Daily    hydrocortisone  10 mg Oral Daily    pantoprazole  40 mg Oral BID AC    sodium chloride flush  5-40 mL IntraVENous 2 times per day    heparin (porcine)  5,000 Units SubCUTAneous 3 times per day    hydrocortisone  5 mg Oral Nightly    NIFEdipine  30 mg Oral Daily    lactobacillus  2 capsule Oral BID WC    levETIRAcetam  500 mg IntraVENous Q12H    rOPINIRole  2 mg Oral Nightly         OBJECTIVE      Physical    TEMPERATURE:  Current - Temp: 99.1 °F (37.3 °C); Max - Temp  Av.1 °F (37.3 °C)  Min: 99.1 °F (37.3 °C)  Max: 99.1 °F (37.3 °C)  RESPIRATIONS RANGE: Resp  Av.3  Min: 16  Max: 18  PULSE RANGE: Pulse  Av  Min: 79  Max: 79  BLOOD PRESSURE RANGE:  Systolic (24hrs), Av , Min:157 , Max:157   ; Diastolic (24hrs), Av, Min:71, Max:71    PULSE OXIMETRY RANGE: SpO2  Av %  Min: 97 %  Max: 99 %  24HR INTAKE/OUTPUT:    Intake/Output Summary (Last 24 hours) at 2024 0812  Last data filed at 2024 0753  Gross per 24 hour   Intake 296 ml   Output --   Net 296 ml         GEN: no distress  HEENT: no icterus  NECK: Trachea midline  CV: RRR  LUNGS: clear bilaterally, unlabored  ABD: + bowel sounds. No distension. No  tenderness  EXT: no edema.   SKIN: no rash  NEURO: no tremor    Data      Lab Results   Component Value Date    CREATININE 1.8 (H) 2024    BUN 25 (H) 2024     2024    K 4.9 2024     2024    CO2 23 2024     Lab  Demand ischemia (HCC)       ASSESSMENT/PLAN:    # FAVIAN on CKD  - due to relative hypotension and rhabdo  - resolved    # CKD Stage 3  - follows with Dr. Celis  - serum creatinine at baseline  - UPC 0.6    # HTN  - BP adequate  - add ARB if BP elevates    # DKA  - resolved    # Encephalopathy  - MRI with features of PRES   - not related to HTN  - has metastatic melanoma    # R intertrochanteric femur fx  - per rehab    # Adrenal insuff  - on solu cortef    # CKD MBD  - phos low due to diarrhea   -improved after supplement    # Hyperkalemia  - improving  - has been low at times    # Anemia  - HGB low  - low iron stores  - added po iron (which may help with chronic diarrhea)    Available via [employer]" secure messaging

## 2024-05-29 NOTE — PATIENT CARE CONFERENCE
[facility]  Inpatient Rehabilitation  Weekly Team Conference Note      Date: 2024  Patient Name:  Elvia Arguelles    MRN: [id number]  : 1960  Gender: Female  Physician: Dr. Juan CANADA  Diagnosis: Closed right hip fracture, initial encounter ([facility]) [S72.001A]    CASE MANAGEMENT  Assessment: Goal is home with spouse, totally independent prior to admit.       PHYSICAL THERAPY    Bed Mobility:  Overall Assistance Level: Maximum Assistance  Additional Factors: Head of bed flat, Without handrails  Sit>supine:  Assistance Level: Maximum assistance  Supine>sit:  Assistance Level: Moderate assistance    Transfers:  Surface: Wheelchair  Additional Factors: Verbal cues, Hand placement cues  Device: Walker  Sit>stand:  Assistance Level: Contact guard assist  Stand>sit:  Assistance Level: Contact guard assist  Skilled Clinical Factors: required verbal cues for hand placement  Bed<>chair  Technique: Stand step  Assistance Level: Contact guard assist  Skilled Clinical Factors: with wheeled walker    Car transfer:  Assistance Level: Minimal assistance  Skilled Clinical Factors: Patient was CGA to approach and sit back into mock car with use of wheeled walker. Verbal cues to ensure she was in line with the seat. min assist to lift right LE in and out of the car.  She required min assist to stand from the car seat.    Ambulation:  Surface: Level surface  Device: Rolling walker  Distance: 16', 10'  Activity: Within Room  Activity Comments: antalgic, step to pattern. Straight arms to unweight LE, takes large step at times with right LE and needs cues to ensure toes do not pass dina front of the walker.  Additional Factors: Verbal cues, Increased time to complete    Stairs:   Unable at this time    Curb:         Assessment:  Assessment: Mrs. Ghada Arguelles is very limited by pain.  She was encouraged to use ice on her hip as she does not recall using it last night. PT placed ice pack on right hip at end  and closed R femoral fracture. Admitted for further care. Orthopedic surgery consulted, performed IM nail 5/19 without perioperative complication. Course complicated by AMS, suspected PRES per multiple providers and MRI imaging findings. PTA - pt lives at home with , steps to enter, IND ADL/IADL function and mobility/txs without device. Today - pt completed functional txs CGA/Mike, initially dep tx from recliner > bathroom via STEDY. Pt then performed stand pivot and stand step txs from shower chair > w/c and w/c > recliner, respectively, min A with significant cues throughout. Will cont to assess functional mobility as pt able to tolerate. Seated grooming sinkside SBA with inc time. UB bathing & dressing setup SBA. LB bathing mod A overall. LB dressing max A overall. Footwear modA. Toileting min A. Noted pt benefited from intermittent rest breaks during functional activity d/t pain and fatigue. BUE strength WNL. Pt demo good safety awareness/awareness of deficits throughout activity. Pt well below baseline IND function, most limited by pain and impaired strength/balance/activity tolerance, cont skilled therapy services on ARU for ~2 weeks to maximize pt safety and IND function prior to return home. Anticipate return home with initial 24/7 - frequent assist from family and cont therapy (2-3x/week) to ease transition to home. Will cont to assess.            NUTRITION  Most recent weightWeight - Scale: 60.2 kg (132 lb 12.8 oz)  BSA (Calculated - sq m): 1.58 sq meters  BMI (Calculated): 26.9  Diet Order: ADULT DIET; Regular; 5 carb choices (75 gm/meal); No Added Salt (3-4 gm); Low Potassium (Less than 3000 mg/day)  PO Meals Eaten (%): 76 - 100%           NURSING  Pt is continent of bowel and bladder, last BM 5/30, fall risk, monitor blood sugar, surgical incision right hip, maintain skin integrity.  Family Education: Patient Education: medications, CHF, diet, diabetic needs, safety and fall prevention, skin care  to leading the interdisciplinary team meeting, required attendees are present as listed above. I approve the established interdisciplinary plan of care as documented within the medical record of Elvia Arguelles.    MD: Delia Martinez MD 5/30/2024, 5:19 PM

## 2024-05-29 NOTE — PROGRESS NOTES
Resting quietly in bed, respirations even/easy. Admitted s/p a fall at home, sustaining a right hip fracture. Dressing intact with old drainage seen. Patient transferring with a stedy x1. Lungs clear but diminished. HR regular. Abdomen non tender with active bowel sounds. Patient with loose stool today, none so far this shift. Has been continent of bladder. C/o surgical site pain and medicated per PRN orders. Medication effective. Encouraged to call for all needs. Call light remains in reach at all times. Bed alarm active. Annie Rivers RN

## 2024-05-29 NOTE — PROGRESS NOTES
Department of Physical Medicine & Rehabilitation  Progress Note    Patient Identification:  Elvia Arguelles  [id number]  : 1960  Admit date: 2024    Chief Complaint: Closed right hip fracture, initial encounter ([facility])    Subjective:   No acute events overnight.   Patient seen this am sitting up in room. Reports right hip pain with therapies but finding rehab helpful.    Labs reviewed.     ROS: No f/c, n/v, cp     Objective:  Patient Vitals for the past 24 hrs:   BP Temp Temp src Pulse Resp SpO2 Weight   24 0846 -- -- -- -- 18 -- --   24 0757 -- -- -- -- -- 97 % --   24 0452 -- -- -- -- 16 -- --   24 0405 -- -- -- -- -- -- 60.2 kg (132 lb 12.8 oz)   24 2148 -- -- -- -- 16 -- --   24 2041 (!) 157/71 99.1 °F (37.3 °C) Oral 79 18 99 % --   24 1648 -- -- -- -- 18 -- --   24 1618 -- -- -- -- 18 -- --   24 1241 -- -- -- -- 17 -- --   24 1211 -- -- -- -- 18 -- --     Const: Alert. No distress, pleasant.   HEENT: Normocephalic, atraumatic. Normal sclera/conjunctiva. MMM.   CV: Regular rate and rhythm.   Resp: No respiratory distress. Lungs diminished at bases  Abd: Soft, nontender, nondistended, NABS+   Ext:+ post op swelling RLE  MSK: Decreased right hip ROM  Neuro: Alert, oriented, mildly delayed processing. No focal strength deficits aside from pain limited RLE  Psych: Cooperative, appropriate mood and affect    Laboratory data: Available via EMR.   Last 24 hour lab  Recent Results (from the past 24 hour(s))   Vascular duplex carotid bilateral    Collection Time: 24 11:32 AM   Result Value Ref Range    Right CCA prox .3 cm/s    Right CCA prox EDV 21.7 cm/s    Right CCA mid .40 cm/s    Right CCA mid EDV 26.10 cm/s    Right cca dist PSV 80.9 cm/s    Right CCA dist EDV 15.1 cm/s    Right ECA .1 cm/s    Right ECA EDV 8.60 cm/s    Right ICA prox PSV 76.5 cm/s    Right ICA prox EDV 18.1 cm/s    Right ICA mid .1 cm/s     Right ICA mid EDV 34.5 cm/s    Right ICA dist PSV 86.9 cm/s    Right ICA dist EDV 25.5 cm/s    Right vertebral PSV 66.9 cm/s    Right vertebral EDV 14.20 cm/s    Right ICA/CCA PSV 1.5 no units    Left CCA prox .1 cm/s    Left CCA prox EDV 21.2 cm/s    Left CCA mid .60 cm/s    Left CCA mid EDV 21.20 cm/s    Left CCA dist .3 cm/s    Left CCA dist EDV 23.0 cm/s    Left ECA PSV 99.6 cm/s    Left ECA EDV 8.40 cm/s    Left ICA prox PSV 67.1 cm/s    Left ICA prox EDV 21.6 cm/s    Left ICA mid .8 cm/s    Left ICA mid EDV 30.2 cm/s    Left ICA dist .9 cm/s    Left ICA dist EDV 29.5 cm/s    Left vertebral PSV 76.5 cm/s    Left vertebral EDV 17.60 cm/s    Left ICA/CCA PSV 1.10 no units    Body Surface Area 1.58 m2   POCT Glucose    Collection Time: 05/28/24 12:15 PM   Result Value Ref Range    POC Glucose 364 (H) 70 - 99 mg/dl    Performed on ACCU-CHEK    POCT Glucose    Collection Time: 05/28/24  4:03 PM   Result Value Ref Range    POC Glucose 289 (H) 70 - 99 mg/dl    Performed on ACCU-CHEK    POCT Glucose    Collection Time: 05/28/24  9:06 PM   Result Value Ref Range    POC Glucose 307 (H) 70 - 99 mg/dl    Performed on ACCU-CHEK    Renal Function Panel    Collection Time: 05/29/24  6:10 AM   Result Value Ref Range    Sodium 138 136 - 145 mmol/L    Potassium 4.9 3.5 - 5.1 mmol/L    Chloride 106 99 - 110 mmol/L    CO2 23 21 - 32 mmol/L    Anion Gap 9 3 - 16    Glucose 141 (H) 70 - 99 mg/dL    BUN 25 (H) 7 - 20 mg/dL    Creatinine 1.8 (H) 0.6 - 1.2 mg/dL    Est, Glom Filt Rate 31 (A) >60    Calcium 8.2 (L) 8.3 - 10.6 mg/dL    Phosphorus 2.6 2.5 - 4.9 mg/dL    Albumin 2.7 (L) 3.4 - 5.0 g/dL   Magnesium    Collection Time: 05/29/24  6:10 AM   Result Value Ref Range    Magnesium 1.50 (L) 1.80 - 2.40 mg/dL   CBC with Auto Differential    Collection Time: 05/29/24  6:10 AM   Result Value Ref Range    WBC 9.1 4.0 - 11.0 K/uL    RBC 2.85 (L) 4.00 - 5.20 M/uL    Hemoglobin 8.5 (L) 12.0 - 16.0 g/dL     surface  Device: Rolling walker  Distance: 16', 10'  Activity: Within Room  Activity Comments: antalgic, step to pattern. Straight arms to unweight LE, takes large step at times with right LE and needs cues to ensure toes do not pass dina front of the walker.  Additional Factors: Verbal cues, Increased time to complete  Stairs:     Curb:     Wheelchair:       Occupational therapy:   Feeding     Grooming/Oral Hygiene     UE Bathing     LE Bathing  Skilled Clinical Factors: simulated using long sponge to wash R foot & issued for use in room.  UE Dressing     LE Dressing  Assistance Level: Verbal cues, Increased time to complete, Set-up  Skilled Clinical Factors: Pt ed in use of reacher, donned hospital pants over feet with max A & cues limited by impaired vision. Can't see the end of the reacher.  Putting On/Taking Off Footwear  Equipment Provided: Sock aid, Long-handle shoe horn  Assistance Level: Moderate assistance, Verbal cues, Increased time to complete, Set-up  Skilled Clinical Factors: Pt ed in use of AE: pt doffed showe after OT untied by kicking off, doffed L fooite crossing & R footie w/ long shoe horn after ed in tech., donned socks w/ sock aid min A & shoes w/ long shoe horn for R mod A( max A for L & SBA for R crossed to tie) Pt reported her  may order her the step in  for easier shoe donning. OT encouraged this. OT loaned al AE for use in room.  Toileting       Speech therapy:    ADULT DIET; Regular; 5 carb choices (75 gm/meal); No Added Salt (3-4 gm); Low Potassium (Less than 3000 mg/day)        Body mass index is 26.82 kg/m².    Rehabilitation Diagnosis:   Orthopedic, 8.11, Unilateral Hip Fracture        Assessment and Plan:     Impairments: decreased right hip ROM, balance, left neglect, balance, endurance, cognition    Right intertrochanteric femur fracture   -s/p  ORIF with cephalomedullary nail (5/19 with Dr. White)  -Wound care per Ortho  -WBAT RLE  -PT/OT    Posterior reversible

## 2024-05-29 NOTE — PROGRESS NOTES
Occupational Therapy  Facility/Department: [address] [facility]  Rehabilitation Occupational Therapy Daily Treatment Note  AM and PM Sessions:     Date: 24  Patient Name: Elvia Arguelles       Room: N3W-1535/3272-01  MRN: [id number]  Account: [id number]   : 1960  (63 y.o.) Gender: female                    Past Medical History:  has a past medical history of Adrenal insufficiency (HCC), Cancer (HCC), Closed displaced intertrochanteric fracture of right femur (HCC), Depression, Diabetes mellitus (HCC), Hx of radiation therapy, Hypertension, Hyponatremia, Insulin pump in place, Melanoma (HCC), Prolonged emergence from general anesthesia, and Restless leg syndrome.  Past Surgical History:   has a past surgical history that includes Hysterectomy; Cholecystectomy; shoulder surgery; Upper gastrointestinal endoscopy (10/22/2014); Carpal tunnel release (Bilateral, 2017); lipoma resection; Eye surgery (Right); Shoulder arthroscopy (Right, 2018); liver biopsy (Right); US BIOPSY LIVER PERCUTANEOUS (2022); CT BIOPSY ABDOMEN RETROPERITONEUM (2022); Upper gastrointestinal endoscopy (N/A, 2023); Upper gastrointestinal endoscopy (2023); Port Surgery (Right, 2023); Upper gastrointestinal endoscopy (N/A, 2024); and hip surgery (Right, 2024).    Restrictions  Restrictions/Precautions: Weight Bearing, Fall Risk  Other position/activity restrictions: R femur fx s/p Right femur cephalomedullary nail; Diet: 5 carb choices (75 gm/meal); No Added Salt (3-4 gm); Low Potassium (Less than 3000 mg/day)  Right Lower Extremity Weight Bearing: Weight Bearing As Tolerated  Equipment Used: Wheelchair    Subjective  Subjective: Pt met in dept seated in w/c after PT session w/ ice bag on R hip. Pt c/o 6/10 pain in R hip. Spouse Shant with pt but then left during session though OT encouraged him to stay & observe  Restrictions/Precautions: Weight Bearing;Fall Risk             Objective      Cognition  Problem Solving: Assistance required to implement solutions  Sequencing: Requires cues for some  Orientation  Overall Orientation Status: Within Functional Limits  Orientation Level: Oriented X4         ADL  Lower Extremity Bathing  Skilled Clinical Factors: simulated using long sponge to wash R foot & issued for use in room.  Lower Extremity Dressing  Assistance Level: Verbal cues;Increased time to complete;Set-up  Skilled Clinical Factors: Pt ed in use of reacher, donned hospital pants over feet with max A & cues limited by impaired vision. Can't see the end of the reacher.  Putting On/Taking Off Footwear  Equipment Provided: Sock aid;Long-handle shoe horn  Assistance Level: Moderate assistance;Verbal cues;Increased time to complete;Set-up  Skilled Clinical Factors: Pt ed in use of AE: pt doffed showe after OT untied by kicking off, doffed L fooite crossing & R footie w/ long shoe horn after ed in tech., donned socks w/ sock aid min A & shoes w/ long shoe horn for R mod A( max A for L & SBA for R crossed to tie) Pt reported her  may order her the step in  for easier shoe donning. OT encouraged this. OT loaned all AE for use in room.        Second Session:     Pt met in dept rated her R hip pain at 6/10. She reported she recently had pain meds. Pt agreeable to tx.     Pt shown TTB & TSF. Pt amb w/ RW CGA into/out of bathroom for ADL transfers ~ 12' x2 CGA. Pt transferred RW> TTB in DRY tub CGA, was able to swing RLE into tub after OT removed shoe but slow, difficult & painful. Pt required mod A to swing RLE back out of tub d/t pain/weakness. OT reviewed how to cut flap into shower curtain liner to keep water inside of tub when using TTB. Discussed rec for TTB/TSF for home use. Pt verbalized understanding. Pt transferred RW >< comfort ht toilet with TSF CGA.    Vision screened on [facility]EN eye chart with glasses on was 20/200, considered low vision.     Pt was ed in green T-band HEP  for 7

## 2024-05-29 NOTE — PROGRESS NOTES
2 units prandial held at lunch, lunch was 1-1/2 hour late, BS 97, MD aware and pt in total agreement, requests not to take. Electronically signed by Ayanna Hill RN on 5/29/2024 at 1:46 PM

## 2024-05-29 NOTE — CARE COORDINATION
Chart Reviewed.  Met with patient and spouse in room to introduce  role, initiate discussion regarding DC planning and to inform of weekly Team Conferences on Thursdays.                                  SOCIAL WORK ASSESSMENT      GOAL:   to be stronger and more independent in order to go home.       HOME SITUATION:  Pt and spouse live in a house with two small dogs with 3 steps with right rail from driveway to sidewalk and then a short walk way and then 6 steps with wide bilateral rails to porch.  It is a one story house with laundry in the basement.   Prior to admit, she was totally independent and able to drive.   She likes to pain crafts.   She reports her eyesight has changed and she thinks it is due to \"press\" from the fall.   She had been alone in the house for approximately 12 hours as she had a headache that day they were going to their camp sight in [place].   She sent the spouse and dogs to the camp and would drive there later that day.  Unfortunately, she had fallen and does not remember anything about the fall, did not answer the phone when he was calling her and he sent a dgtr to find her on the floor.     She is interested in AN EMergency Response button .  Did inform them of COA and she is agreeable to this for her discharge.         Pcp:    Dr Dr Ellis     Pharmacy:   [facility]        PRIOR LEVEL OF FUNCTIONING:       PERSONAL CARE:    indp                                                                       DRIVES:  yes                                                                     FINANCES:  shared                                                                     MEALS:   shared                                                                                                GROCERY SHOPS:  shared      DME CURRENTLY AT HOME:  wh walker, shower chair, HH Shower mount.       CURRENT HOME CARE/SERVICES:   Informed them of possible post acute services such as home care or out

## 2024-05-29 NOTE — PROGRESS NOTES
[facility]  Glycemic Control       NAME: Elvia Arguelles  MEDICAL RECORD NUMBER:  [id number]  AGE: 63 y.o.   GENDER: female  : 1960  EPISODE DATE:  2024     Data     Recent Labs     24  0701 24  1215 24  1603 24  2106 24  0706 24  1113   POCGLU 145* 364* 289* 307* 133* 97       HgBA1c:    Lab Results   Component Value Date/Time    LABA1C 7.2 2024 10:17 PM       BUN/Creatinine:    Lab Results   Component Value Date/Time    BUN 25 2024 06:10 AM    CREATININE 1.8 2024 06:10 AM       Medications  Scheduled Medications:   ferrous sulfate  324 mg Oral Daily with breakfast    insulin glargine  8 Units SubCUTAneous Nightly    insulin lispro  2 Units SubCUTAneous TID WC    insulin lispro  0-4 Units SubCUTAneous TID WC    insulin lispro  0-4 Units SubCUTAneous Nightly    escitalopram  10 mg Oral Daily    hydrocortisone  10 mg Oral Daily    pantoprazole  40 mg Oral BID AC    sodium chloride flush  5-40 mL IntraVENous 2 times per day    heparin (porcine)  5,000 Units SubCUTAneous 3 times per day    hydrocortisone  5 mg Oral Nightly    NIFEdipine  30 mg Oral Daily    lactobacillus  2 capsule Oral BID WC    levETIRAcetam  500 mg IntraVENous Q12H    rOPINIRole  2 mg Oral Nightly       Diet  Current diet/supplement order: ADULT DIET; Regular; 5 carb choices (75 gm/meal); No Added Salt (3-4 gm); Low Potassium (Less than 3000 mg/day)     Recorded PO: PO Meals Eaten (%): 26 - 50% last meal in flowsheets      Action       Recommend BG targets 140 - 180.     Recommend dosing meal time Humalog mid to post meal when intake is uncertain.  Ghada is not interested in any meal substitutes.  Discussed with SILKE Hill RN.      Recommend continuing with current plan.      Physician Notified of event: Yes   Delia Martinez MD    Electronically signed by Marianna Lawson RN on 2024 at 12:29 PM

## 2024-05-29 NOTE — PLAN OF CARE
Problem: Safety - Adult  Goal: Free from fall injury  5/28/2024 2318 by Annie Rivers, RN  Outcome: Progressing  Flowsheets (Taken 5/28/2024 2318)  Free From Fall Injury:   Instruct family/caregiver on patient safety   Based on caregiver fall risk screen, instruct family/caregiver to ask for assistance with transferring infant if caregiver noted to have fall risk factors  Note: Pt educated on falls precautions and safety. Call light and personal belongings within reach at all times. Non skid socks in use when up. Hourly rounding and alarms active.      Problem: Pain  Goal: Verbalizes/displays adequate comfort level or baseline comfort level  5/28/2024 2318 by Annie Rivers, RN  Outcome: Progressing  Flowsheets (Taken 5/28/2024 2318)  Verbalizes/displays adequate comfort level or baseline comfort level:   Encourage patient to monitor pain and request assistance   Administer analgesics based on type and severity of pain and evaluate response   Consider cultural and social influences on pain and pain management  Note: Able to rate pain using a 1-10 scale, medicated per prn orders, see MAR. Able to verbalize a reduction in pain and/or able to fall asleep and remain asleep without any s/s of pain      Problem: Skin/Tissue Integrity  Goal: Absence of new skin breakdown  Description: 1.  Monitor for areas of redness and/or skin breakdown  2.  Assess vascular access sites hourly  3.  Every 4-6 hours minimum:  Change oxygen saturation probe site  4.  Every 4-6 hours:  If on nasal continuous positive airway pressure, respiratory therapy assess nares and determine need for appliance change or resting period.  5/28/2024 2318 by Annie Rivers, RN  Outcome: Progressing  Note: Able to change positions in bed without assist, no evidence of skin breakdown noted. Waffle cushion in place to chair.

## 2024-05-29 NOTE — PROGRESS NOTES
ACUTE REHAB UNIT  SPEECH/LANGUAGE PATHOLOGY      [x] Daily  [] Weekly Care Conference Note  [] Discharge    Patient:Elvia Arguelles      :1960  MRN:[id number]  Rehab Dx/Hx: Closed right hip fracture, initial encounter ([facility]) [S72.001A]    Precautions: falls, seizures, and visual spatial deficits  Home situation: from home with spouse  ST Dx: [] Aphasia  [] Dysarthria  [] Apraxia   [] Oropharyngeal dysphagia [x] Cognitive   Impairment  [] Other:   Initial Speech Therapy Assessment Diagnosis:   Speech Therapy Diagnosis  Cognitive Diagnosis: Pt demonstrated minimal to moderate deficits in domains of time orientation; higher level attention; complex VPS and abstract reasoning; working memory and STM. Pt with visual deficits and right lower extremity pain which is further exacerbating. Visual deficits during paper tasks revealed reduced attention left; slight sloping upwar left to right when writing; spatial orientation when putting numbers on a clock. Will continue therapy working on visuo cognition / visuo spatial orientation and visual language remediation  Speech Diagnosis: Motor speech audible and intelligible at multiple word utterance level and during discourse. Pt achieve good verbal diadochokinetic rating. Voice quality wtih minimal claudio /strained quality.  Communication Diagnosis: Pt demonstrating concrete to mild complex funtional auditory language processing and verbal expressive language skills. Visual langauge processing at word / phrase level wtih mild deficits in inattention left of midling (did utilized large print with high color contrast). Pt able to express full name via written expression but minimal perseveration of letters ans slight sloping upward left to right when writing. Ongoing therapy     Date of Admit: 2024  Room #: P2F-2565/3272-01  Date: 2024       Current functional status (updated daily):         Current Diet Order:ADULT DIET; Regular; 5 carb choices (75 gm/meal); No  Pt goal is to get my vision back; get pain better controled for right leg and move better.     Short Term Goals  Goal 1: Pt will demonstrate improved visual langauge processing at word/phrase level , using visual strategies, >90%        With glasses:  -reading 1 inch print words 75% w mod prompts for strategies  -reading 1/4 inch print words with max prompts; much difficulty    Goal 2: Pt will demonstrate improved visuo cognition across visuo spatial tasks with set up and useof visual strategies wtih >90%    Track and match words in R and L column: reduced proprioception and tracking, very difficult    Goal 3: Pt will demonstrate improved VPS/PS and reasoning for graded PS tasks and numeric reasoning tasks with use of strategies PRN and mild assist      Sequencing/safety with transfers from bed to stedy and w/c with mild verbal prompts.  Pain impacts.      Goal 4: Pt will demonstrate improved recall of daily events; learned safety strategies; and new information with use of memory strategies with set up and <mild assist Oriented to location, reason, LOS, month/year/ADALBERTO IND.    Min prompts for date, elapsed time     Recall of 2/3 therapies IND and 3/3 w min prompt             Other areas targeted:     Education:   Ongoign re: POC, goals, strategies for vision and memory    Safety Devices: [] Call light within reach  [] Chair alarm activated  [] Bed alarm activated  [x] Other:to PT gym [] Call light within reach  [] Chair alarm activated  [] Bed alarm activated  [] Other:    Progress Assessment: 5/29/24: Continued visual language impairment at word level requiring max prompts   Plan: Continue as per plan of care while on ARU and if recommended at next level of care.   Continued Tx Upon Discharge: ?    [] Yes [] No [x] TBD based on progress while on ARU [] Vital Stim indicated [] Other:   Estimated discharge date: 1-2 weeks   Discharge recommendations:   [] Home independently  [] Home with assistance []  24 hour

## 2024-05-29 NOTE — PROGRESS NOTES
Pt states she did not eat enough to receive 2U insulin, pt refused prandial. Electronically signed by Ayanna Hill RN on 5/29/2024 at 7:00 PM

## 2024-05-30 LAB
ALBUMIN SERPL-MCNC: 2.8 G/DL (ref 3.4–5)
ANION GAP SERPL CALCULATED.3IONS-SCNC: 7 MMOL/L (ref 3–16)
BUN SERPL-MCNC: 22 MG/DL (ref 7–20)
CALCIUM SERPL-MCNC: 8.3 MG/DL (ref 8.3–10.6)
CHLORIDE SERPL-SCNC: 102 MMOL/L (ref 99–110)
CO2 SERPL-SCNC: 25 MMOL/L (ref 21–32)
CREAT SERPL-MCNC: 1.8 MG/DL (ref 0.6–1.2)
GFR SERPLBLD CREATININE-BSD FMLA CKD-EPI: 31 ML/MIN/{1.73_M2}
GLUCOSE BLD-MCNC: 225 MG/DL (ref 70–99)
GLUCOSE BLD-MCNC: 255 MG/DL (ref 70–99)
GLUCOSE BLD-MCNC: 332 MG/DL (ref 70–99)
GLUCOSE BLD-MCNC: 336 MG/DL (ref 70–99)
GLUCOSE SERPL-MCNC: 324 MG/DL (ref 70–99)
PERFORMED ON: ABNORMAL
PHOSPHATE SERPL-MCNC: 2.6 MG/DL (ref 2.5–4.9)
POTASSIUM SERPL-SCNC: 4.7 MMOL/L (ref 3.5–5.1)
SODIUM SERPL-SCNC: 134 MMOL/L (ref 136–145)

## 2024-05-30 PROCEDURE — 97110 THERAPEUTIC EXERCISES: CPT | Performed by: PHYSICAL THERAPIST

## 2024-05-30 PROCEDURE — 97530 THERAPEUTIC ACTIVITIES: CPT | Performed by: PHYSICAL THERAPIST

## 2024-05-30 PROCEDURE — 97112 NEUROMUSCULAR REEDUCATION: CPT

## 2024-05-30 PROCEDURE — 6370000000 HC RX 637 (ALT 250 FOR IP): Performed by: PHYSICAL MEDICINE & REHABILITATION

## 2024-05-30 PROCEDURE — 97116 GAIT TRAINING THERAPY: CPT | Performed by: PHYSICAL THERAPIST

## 2024-05-30 PROCEDURE — 36591 DRAW BLOOD OFF VENOUS DEVICE: CPT

## 2024-05-30 PROCEDURE — 80069 RENAL FUNCTION PANEL: CPT

## 2024-05-30 PROCEDURE — 94760 N-INVAS EAR/PLS OXIMETRY 1: CPT

## 2024-05-30 PROCEDURE — 6370000000 HC RX 637 (ALT 250 FOR IP): Performed by: STUDENT IN AN ORGANIZED HEALTH CARE EDUCATION/TRAINING PROGRAM

## 2024-05-30 PROCEDURE — 6370000000 HC RX 637 (ALT 250 FOR IP): Performed by: INTERNAL MEDICINE

## 2024-05-30 PROCEDURE — 97535 SELF CARE MNGMENT TRAINING: CPT

## 2024-05-30 PROCEDURE — 2580000003 HC RX 258: Performed by: STUDENT IN AN ORGANIZED HEALTH CARE EDUCATION/TRAINING PROGRAM

## 2024-05-30 PROCEDURE — 6360000002 HC RX W HCPCS: Performed by: STUDENT IN AN ORGANIZED HEALTH CARE EDUCATION/TRAINING PROGRAM

## 2024-05-30 PROCEDURE — 97129 THER IVNTJ 1ST 15 MIN: CPT

## 2024-05-30 PROCEDURE — 97130 THER IVNTJ EA ADDL 15 MIN: CPT

## 2024-05-30 PROCEDURE — 1280000000 HC REHAB R&B

## 2024-05-30 RX ORDER — LEVETIRACETAM 500 MG/1
500 TABLET ORAL 2 TIMES DAILY
Status: DISCONTINUED | OUTPATIENT
Start: 2024-05-30 | End: 2024-06-14 | Stop reason: HOSPADM

## 2024-05-30 RX ORDER — OXYCODONE HYDROCHLORIDE 5 MG/1
5 TABLET ORAL EVERY 4 HOURS PRN
Status: DISCONTINUED | OUTPATIENT
Start: 2024-05-30 | End: 2024-05-31

## 2024-05-30 RX ORDER — LOSARTAN POTASSIUM 25 MG/1
25 TABLET ORAL DAILY
Status: DISCONTINUED | OUTPATIENT
Start: 2024-05-30 | End: 2024-06-11

## 2024-05-30 RX ORDER — OXYCODONE HYDROCHLORIDE 10 MG/1
10 TABLET ORAL EVERY 4 HOURS PRN
Status: DISCONTINUED | OUTPATIENT
Start: 2024-05-30 | End: 2024-05-31

## 2024-05-30 RX ADMIN — ESCITALOPRAM OXALATE 10 MG: 10 TABLET ORAL at 08:17

## 2024-05-30 RX ADMIN — INSULIN LISPRO 2 UNITS: 100 INJECTION, SOLUTION INTRAVENOUS; SUBCUTANEOUS at 08:15

## 2024-05-30 RX ADMIN — INSULIN LISPRO 3 UNITS: 100 INJECTION, SOLUTION INTRAVENOUS; SUBCUTANEOUS at 08:15

## 2024-05-30 RX ADMIN — OXYCODONE HYDROCHLORIDE 10 MG: 10 TABLET ORAL at 21:52

## 2024-05-30 RX ADMIN — HYDROCORTISONE 5 MG: 5 TABLET ORAL at 20:52

## 2024-05-30 RX ADMIN — ROPINIROLE HYDROCHLORIDE 2 MG: 1 TABLET, FILM COATED ORAL at 20:48

## 2024-05-30 RX ADMIN — PANTOPRAZOLE SODIUM 40 MG: 40 TABLET, DELAYED RELEASE ORAL at 05:30

## 2024-05-30 RX ADMIN — Medication 2 CAPSULE: at 16:55

## 2024-05-30 RX ADMIN — OXYCODONE HYDROCHLORIDE 10 MG: 10 TABLET ORAL at 17:38

## 2024-05-30 RX ADMIN — FERROUS SULFATE TAB EC 324 MG (65 MG FE EQUIVALENT) 324 MG: 324 (65 FE) TABLET DELAYED RESPONSE at 08:17

## 2024-05-30 RX ADMIN — LOPERAMIDE HYDROCHLORIDE 2 MG: 2 CAPSULE ORAL at 08:22

## 2024-05-30 RX ADMIN — NIFEDIPINE 30 MG: 30 TABLET, EXTENDED RELEASE ORAL at 08:17

## 2024-05-30 RX ADMIN — Medication 10 ML: at 08:45

## 2024-05-30 RX ADMIN — HEPARIN SODIUM 5000 UNITS: 5000 INJECTION INTRAVENOUS; SUBCUTANEOUS at 13:34

## 2024-05-30 RX ADMIN — HYDROCORTISONE 10 MG: 10 TABLET ORAL at 08:22

## 2024-05-30 RX ADMIN — Medication 2 CAPSULE: at 08:17

## 2024-05-30 RX ADMIN — OXYCODONE HYDROCHLORIDE 5 MG: 5 TABLET ORAL at 13:32

## 2024-05-30 RX ADMIN — LOPERAMIDE HYDROCHLORIDE 2 MG: 2 CAPSULE ORAL at 20:48

## 2024-05-30 RX ADMIN — LEVETIRACETAM 500 MG: 100 INJECTION, SOLUTION INTRAVENOUS at 08:27

## 2024-05-30 RX ADMIN — PANTOPRAZOLE SODIUM 40 MG: 40 TABLET, DELAYED RELEASE ORAL at 16:49

## 2024-05-30 RX ADMIN — INSULIN LISPRO 1 UNITS: 100 INJECTION, SOLUTION INTRAVENOUS; SUBCUTANEOUS at 11:36

## 2024-05-30 RX ADMIN — LEVETIRACETAM 500 MG: 500 TABLET, FILM COATED ORAL at 20:48

## 2024-05-30 RX ADMIN — SODIUM CHLORIDE, PRESERVATIVE FREE 10 ML: 5 INJECTION INTRAVENOUS at 08:23

## 2024-05-30 RX ADMIN — LOSARTAN POTASSIUM 25 MG: 25 TABLET, FILM COATED ORAL at 10:58

## 2024-05-30 RX ADMIN — INSULIN LISPRO 2 UNITS: 100 INJECTION, SOLUTION INTRAVENOUS; SUBCUTANEOUS at 16:55

## 2024-05-30 RX ADMIN — HEPARIN SODIUM 5000 UNITS: 5000 INJECTION INTRAVENOUS; SUBCUTANEOUS at 20:48

## 2024-05-30 RX ADMIN — OXYCODONE HYDROCHLORIDE 5 MG: 5 TABLET ORAL at 05:30

## 2024-05-30 RX ADMIN — SODIUM CHLORIDE, PRESERVATIVE FREE 10 ML: 5 INJECTION INTRAVENOUS at 20:49

## 2024-05-30 RX ADMIN — HEPARIN SODIUM 5000 UNITS: 5000 INJECTION INTRAVENOUS; SUBCUTANEOUS at 05:31

## 2024-05-30 RX ADMIN — INSULIN LISPRO 2 UNITS: 100 INJECTION, SOLUTION INTRAVENOUS; SUBCUTANEOUS at 16:54

## 2024-05-30 RX ADMIN — INSULIN LISPRO 2 UNITS: 100 INJECTION, SOLUTION INTRAVENOUS; SUBCUTANEOUS at 11:35

## 2024-05-30 RX ADMIN — INSULIN GLARGINE 8 UNITS: 100 INJECTION, SOLUTION SUBCUTANEOUS at 20:48

## 2024-05-30 RX ADMIN — OXYCODONE HYDROCHLORIDE 5 MG: 5 TABLET ORAL at 09:38

## 2024-05-30 ASSESSMENT — PAIN DESCRIPTION - ORIENTATION
ORIENTATION: RIGHT

## 2024-05-30 ASSESSMENT — PAIN - FUNCTIONAL ASSESSMENT
PAIN_FUNCTIONAL_ASSESSMENT: PREVENTS OR INTERFERES SOME ACTIVE ACTIVITIES AND ADLS

## 2024-05-30 ASSESSMENT — PAIN DESCRIPTION - PAIN TYPE
TYPE: ACUTE PAIN;SURGICAL PAIN

## 2024-05-30 ASSESSMENT — PAIN DESCRIPTION - LOCATION
LOCATION: HIP;LEG
LOCATION: HIP
LOCATION: HIP;LEG
LOCATION: HIP;LEG
LOCATION: LEG;HIP

## 2024-05-30 ASSESSMENT — PAIN DESCRIPTION - DESCRIPTORS
DESCRIPTORS: ACHING
DESCRIPTORS: THROBBING
DESCRIPTORS: THROBBING;SHOOTING
DESCRIPTORS: ACHING
DESCRIPTORS: THROBBING

## 2024-05-30 ASSESSMENT — PAIN SCALES - GENERAL
PAINLEVEL_OUTOF10: 9
PAINLEVEL_OUTOF10: 7
PAINLEVEL_OUTOF10: 7
PAINLEVEL_OUTOF10: 0
PAINLEVEL_OUTOF10: 9
PAINLEVEL_OUTOF10: 4
PAINLEVEL_OUTOF10: 3
PAINLEVEL_OUTOF10: 4
PAINLEVEL_OUTOF10: 3
PAINLEVEL_OUTOF10: 8

## 2024-05-30 ASSESSMENT — PAIN DESCRIPTION - ONSET
ONSET: ON-GOING

## 2024-05-30 ASSESSMENT — PAIN DESCRIPTION - FREQUENCY
FREQUENCY: INTERMITTENT

## 2024-05-30 NOTE — PROGRESS NOTES
Patient admitted to rehab s/p ORIF to right femur fracture on 5/19 by Dr. Pandey. A/Ox4. Patient has been pleasant and cooperative with care. Transfers with stedy, gait-belt, and moderate assist x 1. Needs assist to move surgical leg in and out of bed. Mobility restrictions: WBAT. On 5 carb, RAÚL, low K diet. Medications taken whole with thin liquids without swallowing difficulty. Patient does need assist with pills to mouth or to hand due to vision difficulty (blurred vision).On SQ Heparin for DVT prophylaxis.  Skin: Intact with exception off surgical incision. Patient also noted with scattered abrasions and bruising from fall at home. Oxygen: RA. LDA: Port access to right chest, flushes easily and has a brisk blood return. Has been continent of bowel and bladder. LBM 5/30. Chair/bed alarms in use and call light in reach. Medicated this evening pain to surgical site. Patient rates pain 9 at its highest on 1-10 pain scale. Upon checking back on patient for pain relief, she was sleeping and in no apparent distress. Fall precautions in place. Will continue to monitor and assist as needed.

## 2024-05-30 NOTE — PROGRESS NOTES
ACUTE REHAB UNIT  SPEECH/LANGUAGE PATHOLOGY      [x] Daily  [x] Weekly Care Conference Note  [] Discharge    Patient:Elvia Arguelles      :1960  MRN:[id number]  Rehab Dx/Hx: Closed right hip fracture, initial encounter ([facility]) [S72.001A]    Precautions: falls, seizures, and visual spatial deficits  Home situation: from home with spouse  ST Dx: [] Aphasia  [] Dysarthria  [] Apraxia   [] Oropharyngeal dysphagia [x] Cognitive   Impairment  [] Other:   Initial Speech Therapy Assessment Diagnosis:   1. Cognitive Diagnosis: Pt demonstrated minimal to moderate deficits in domains of time orientation; higher level attention; complex VPS and abstract reasoning; working memory and STM. Pt with visual deficits and right lower extremity pain which is further exacerbating. Visual deficits during paper tasks revealed reduced attention left; slight sloping upwar left to right when writing; spatial orientation when putting numbers on a clock. Will continue therapy working on visuo cognition / visuo spatial orientation and visual language remediation  2. Speech Diagnosis: Motor speech audible and intelligible at multiple word utterance level and during discourse. Pt achieve good verbal diadochokinetic rating. Voice quality wtih minimal claudio /strained quality.  3. Communication Diagnosis: Pt demonstrating concrete to mild complex funtional auditory language processing and verbal expressive language skills. Visual langauge processing at word / phrase level wtih mild deficits in inattention left of midling (did utilized large print with high color contrast). Pt able to express full name via written expression but minimal perseveration of letters ans slight sloping upward left to right when writing. Ongoing therapy     Date of Admit: 2024  Room #: Z6L-2894/3272-01  Date: 2024       Current functional status (updated daily):         Current Diet Order:ADULT DIET; Regular; 5 carb choices (75 gm/meal); No Added Salt (3-4  syllable words and compound words targeting left to right scannin%   oral reading/processing 1/3 to 1/2 inch inch print at 2 to 5 word phrases targeting left to right scanning and at 1 to 2 line level: >75%; improved to to 100% with external assist as increased to 2 line level   N/a   Goal 2: Pt will demonstrate improved visuo cognition across visuo spatial tasks with set up and useof visual strategies wtih >90%    Use of table top for finding objects on the table using left to right visual scanning (>15 items): 100%    Money exchanges (left to right for printed dollar amounts for adding/subtracting and occasional use of multiplication/division concepts: mild assist in addition to left to right scanning N/a   Goal 3: Pt will demonstrate improved VPS/PS and reasoning for graded PS tasks and numeric reasoning tasks with use of strategies PRN and mild assist      Emphasized left to right compensatory strategy across all table top and PS tasks    cause/effect: >90%  Money exchanges: mild assist (incorporated left to right scanning): mild assist  Convergent/divergent thinking for generalization of current restrictions/precautions for d/c planning: external cue dependent N/a   Goal 4: Pt will demonstrate improved recall of daily events; learned safety strategies; and new information with use of memory strategies with set up and <mild assist Without cues:   Oriented to place, month/date/year/day and 33/ daily therapies by label  Re-oriented pt to room number on rehab unit    Working memory for incorporating left to right visual scanning:   Setup  Conditioning to task  Mild cues within the task  Max cues task to task carryover     N/a   Other areas targeted:     Education:   Ongoign re: POC, tx ex and strategies    Safety Devices: [] Call light within reach  [] Chair alarm activated  [] Bed alarm activated  [x] Other: transport returned pt to room [] Call light within reach  [] Chair alarm activated  [] Bed alarm  activated  [] Other:    Progress Assessment: 5/29/24: Continued visual language impairment at word level requiring max prompts   Plan: Continue as per plan of care while on ARU and if recommended at next level of care.   Continued Tx Upon Discharge: ?    [] Yes [] No [x] TBD based on progress while on ARU [] Vital Stim indicated [] Other:   Estimated discharge date: 1-2 weeks   Discharge recommendations:   [] Home independently  [] Home with assistance []  24 hour supervision  [] ECF [] Referral for ENT at d/c; [] Referral for Neuro Visual Opthalmologic at d/c; [] Referral for Neuro Psych  at d/c; [x] Other: TBD     Additional information:             Electronically Signed by    Cherelle Hart,MS,CCC,SLP [id number]  Speech and Language Pathologist

## 2024-05-30 NOTE — PROGRESS NOTES
Department of Physical Medicine & Rehabilitation  Progress Note    Patient Identification:  Elvia Arguelles  [id number]  : 1960  Admit date: 2024    Chief Complaint: Closed right hip fracture, initial encounter ([facility])    Subjective:   No acute events overnight.   Patient seen this am sitting up in room. She reports pain has been a barrier in therapies. She normally takes oxycodone at home for chronic pain related to her cancer. Will increase dose to 5 or 10 mg.   Daughter updated at the bedside.   Labs reviewed.     ROS: No f/c, n/v, cp     Objective:  Patient Vitals for the past 24 hrs:   BP Temp Temp src Pulse Resp SpO2 Weight   24 0758 -- -- -- -- -- 97 % --   24 0701 (!) 155/69 98.5 °F (36.9 °C) Oral 83 17 97 % --   24 0551 -- -- -- -- 16 -- 58.4 kg (128 lb 12 oz)   24 0530 -- -- -- -- 18 -- --   24 0017 -- -- -- -- 16 -- --   24 2347 -- -- -- -- 18 -- --   24 2030 (!) 148/70 99 °F (37.2 °C) Oral 75 16 98 % --   24 1840 -- -- -- -- 18 -- --   24 1810 -- -- -- -- 18 -- --   24 1359 -- -- -- -- 16 -- --   24 1329 132/69 97.8 °F (36.6 °C) Oral 78 18 98 % --     Const: Alert. No distress, pleasant.   HEENT: Normocephalic, atraumatic. Normal sclera/conjunctiva. MMM.   CV: Regular rate and rhythm.   Resp: No respiratory distress. Lungs diminished at bases  Abd: Soft, nontender, nondistended, NABS+   Ext:+ post op swelling RLE  MSK: Decreased right hip ROM  Neuro: Alert, oriented, mildly delayed processing. No focal strength deficits aside from pain limited RLE  Psych: Cooperative, appropriate mood and affect    Laboratory data: Available via EMR.   Last 24 hour lab  Recent Results (from the past 24 hour(s))   POCT Glucose    Collection Time: 24 11:13 AM   Result Value Ref Range    POC Glucose 97 70 - 99 mg/dl    Performed on ACCU-CHEK    POCT Glucose    Collection Time: 24  4:24 PM   Result Value Ref Range    POC Glucose 196  (H) 70 - 99 mg/dl    Performed on ACCU-CHEK    POCT Glucose    Collection Time: 05/29/24  8:32 PM   Result Value Ref Range    POC Glucose 276 (H) 70 - 99 mg/dl    Performed on ACCU-CHEK    Renal Function Panel    Collection Time: 05/30/24  5:40 AM   Result Value Ref Range    Sodium 134 (L) 136 - 145 mmol/L    Potassium 4.7 3.5 - 5.1 mmol/L    Chloride 102 99 - 110 mmol/L    CO2 25 21 - 32 mmol/L    Anion Gap 7 3 - 16    Glucose 324 (H) 70 - 99 mg/dL    BUN 22 (H) 7 - 20 mg/dL    Creatinine 1.8 (H) 0.6 - 1.2 mg/dL    Est, Glom Filt Rate 31 (A) >60    Calcium 8.3 8.3 - 10.6 mg/dL    Phosphorus 2.6 2.5 - 4.9 mg/dL    Albumin 2.8 (L) 3.4 - 5.0 g/dL   POCT Glucose    Collection Time: 05/30/24  6:50 AM   Result Value Ref Range    POC Glucose 332 (H) 70 - 99 mg/dl    Performed on ACCU-CHEK        Therapy progress:  Physical therapy:  Bed Mobility:  Overall Assistance Level: Maximum Assistance  Additional Factors: Head of bed flat, Without handrails  Sit>supine:  Assistance Level: Maximum assistance  Supine>sit:  Assistance Level: Moderate assistance  Transfers:  Surface: Wheelchair, Standard toilet  Additional Factors: Verbal cues, Hand placement cues  Device: Walker  Sit>stand:  Assistance Level: Contact guard assist  Stand>sit:  Assistance Level: Contact guard assist  Skilled Clinical Factors: required verbal cues for hand placement  Bed<>chair  Technique: Stand step  Assistance Level: Contact guard assist  Skilled Clinical Factors: with wheeled walker  Stand Pivot:     Lateral transfer:     Car transfer:  Assistance Level: Minimal assistance  Skilled Clinical Factors: Patient was CGA to approach and sit back into mock car with use of wheeled walker. Verbal cues to ensure she was in line with the seat. min assist to lift right LE in and out of the car.  She required min assist to stand from the car seat.  Ambulation:  Surface: Level surface  Device: Rolling walker  Distance: 10' when patient requested to use toilet for

## 2024-05-30 NOTE — PLAN OF CARE
Problem: Discharge Planning  Goal: Discharge to home or other facility with appropriate resources  5/30/2024 1242 by Tonya Norton RN  Outcome: Not Progressing  Flowsheets  Taken 5/30/2024 1242  Discharge to home or other facility with appropriate resources:   Identify barriers to discharge with patient and caregiver   Identify discharge learning needs (meds, wound care, etc)  Taken 5/30/2024 0654  Discharge to home or other facility with appropriate resources: Identify barriers to discharge with patient and caregiver  Note: Reminded  about blood sugar issue found him giving patient candy.  5/30/2024 0316 by Tonya Tello, RN  Outcome: Progressing  Flowsheets (Taken 5/29/2024 2042)  Discharge to home or other facility with appropriate resources: Identify barriers to discharge with patient and caregiver     Problem: Pain  Goal: Verbalizes/displays adequate comfort level or baseline comfort level  5/30/2024 1242 by Tonya Norton RN  Outcome: Not Progressing  Flowsheets  Taken 5/30/2024 1242  Verbalizes/displays adequate comfort level or baseline comfort level:   Encourage patient to monitor pain and request assistance   Assess pain using appropriate pain scale   Administer analgesics based on type and severity of pain and evaluate response   Implement non-pharmacological measures as appropriate and evaluate response  Taken 5/30/2024 0701  Verbalizes/displays adequate comfort level or baseline comfort level:   Encourage patient to monitor pain and request assistance   Assess pain using appropriate pain scale   Administer analgesics based on type and severity of pain and evaluate response   Implement non-pharmacological measures as appropriate and evaluate response  Note: MD aware.  5/30/2024 0316 by Tonya Tello RN  Outcome: Progressing     Problem: Safety - Adult  Goal: Free from fall injury  5/30/2024 1242 by Tonya Norton RN  Outcome: Progressing  Flowsheets  learning needs (meds, wound care, etc)  Taken 5/30/2024 0654  Discharge to home or other facility with appropriate resources: Identify barriers to discharge with patient and caregiver  Note: Reminded  about blood sugar issue found him giving patient candy.  5/30/2024 0316 by Tonya Tello, RN  Outcome: Progressing  Flowsheets (Taken 5/29/2024 2042)  Discharge to home or other facility with appropriate resources: Identify barriers to discharge with patient and caregiver     Problem: Pain  Goal: Verbalizes/displays adequate comfort level or baseline comfort level  5/30/2024 1242 by Tonya Norton RN  Outcome: Not Progressing  Flowsheets  Taken 5/30/2024 1242  Verbalizes/displays adequate comfort level or baseline comfort level:   Encourage patient to monitor pain and request assistance   Assess pain using appropriate pain scale   Administer analgesics based on type and severity of pain and evaluate response   Implement non-pharmacological measures as appropriate and evaluate response  Taken 5/30/2024 0701  Verbalizes/displays adequate comfort level or baseline comfort level:   Encourage patient to monitor pain and request assistance   Assess pain using appropriate pain scale   Administer analgesics based on type and severity of pain and evaluate response   Implement non-pharmacological measures as appropriate and evaluate response  Note: MD aware.  5/30/2024 0316 by Tonya Tello RN  Outcome: Progressing

## 2024-05-30 NOTE — CARE COORDINATION
Team Conference held today.  Team reviewed barriers:  pain, vision.  Team recommends continued stay on Rehab to further her progress in personal care and ambulation and speech needs.  Tentative DC date is Tuesday, 6-.  Met with patient and dgtr in room to review.  Pt reports she is agreeable to continue her stay but not happy with the length of stay.  Suggested she take it week by week.  Next Conference will be Thursday, 6-6-2024.  Call placed for Spouse as requested if he were not present for this update.  Left HIPAA compliant message asking for returned call.  JEOVANNY Caraballo     Case Management   [phone]    5/30/2024  4:43 PM

## 2024-05-30 NOTE — PROGRESS NOTES
Comprehensive Nutrition Assessment    Type and Reason for Visit:  Initial    Nutrition Recommendations/Plan:   Continue current diet.  Diet education before follow up.      Malnutrition Assessment:  Malnutrition Status:  No malnutrition (05/30/24 1327)    Context:  Acute Illness       Nutrition Assessment:    Initial RD assessment. Pt. admitted for Rt. hip Fx. Currently receiving a 5 carb/RAÚL/ low potassium diet. Diet acceptance is somewhat poor, seems to be improving. Pt. and family were educated by nurse about candy intake d/t Pt. eating candy with a blood sugar >300 mg/dL. Will plan for diet education during ARU stay. No new recommendations at this time. Will continue to monitor nutritional adequacy and diet acceptance.    Nutrition Related Findings:    Na 134, BUN 22, Cr 1.8, GFR 31, -332. LBM 5/30. Wound Type: Diabetic Ulcer, Surgical Incision       Current Nutrition Intake & Therapies:    Average Meal Intake: 26-50%, 51-75%, %  Average Supplements Intake: None Ordered  ADULT DIET; Regular; 5 carb choices (75 gm/meal); No Added Salt (3-4 gm); Low Potassium (Less than 3000 mg/day)    Anthropometric Measures:  Height: 149.9 cm (4' 11.02\")  Ideal Body Weight (IBW): 95 lbs (43 kg)       Current Body Weight: 58.4 kg (128 lb 12 oz), 135.5 % IBW.    Current BMI (kg/m2): 26                          BMI Categories: Overweight (BMI 25.0-29.9)    Estimated Daily Nutrient Needs:  Energy Requirements Based On: Kcal/kg  Weight Used for Energy Requirements: Current  Energy (kcal/day): 1168-1460kcal (20-25 kcal/kg)  Weight Used for Protein Requirements: Current  Protein (g/day): 58-76 g (1-1.3g/kg)  Method Used for Fluid Requirements: 1 ml/kcal  Fluid (ml/day): 120-920    Nutrition Diagnosis:   Inadequate energy intake related to inadequate protein-energy intake as evidenced by intake 26-50%, intake 51-75%    Nutrition Interventions:   Food and/or Nutrient Delivery: Continue Current Diet  Nutrition

## 2024-05-30 NOTE — PROGRESS NOTES
Physical Therapy  Facility/Department: [address] REHAB  Rehabilitation Physical Therapy Treatment Note  - AM and PM Sessions      NAME: Elvia Arguelles  : 1960 (63 y.o.)  MRN: [id number]  CODE STATUS: Full Code    Date of Service: 24     Pertinent medical information:  Additional Pertinent Hx: per Dr. Tello's H&P, \"63 yrs old female with a PMHx of T2DM, adrenal insufficiency chonic steroids, metastatic melanoma on Opdivo therapy who presented to ED with R hip pain after fall, found to have R femoral fracture s/p IM nail. originally presented to [facility] ED on  after being found down by family in the living room floor. ED workup showed Rhabdomyolysis, FAVIAN and closed R femoral fracture. Admitted for further care. Orthopedic surgery consulted, performed IM nail  without perioperative complication. Course complicated by AMS, suspected PRES per multiple providers and MRI imaging findings. EEG w/epileptiform activity, started on keppra per neurology consult\"    Restrictions:  Restrictions/Precautions: Weight Bearing, Fall Risk  Lower Extremity Weight Bearing Restrictions  Right Lower Extremity Weight Bearing: Weight Bearing As Tolerated  Position Activity Restriction  Other position/activity restrictions: R femur fx s/p Right femur cephalomedullary nail; Diet: 5 carb choices (75 gm/meal); No Added Salt (3-4 gm); Low Potassium (Less than 3000 mg/day)     Social/Functional History  Lives With: Spouse (, Shant, in good health and retired)  Type of Home: House  Home Layout: Able to Live on Main level with bedroom/bathroom, One level, Laundry in basement (with basement)  Home Access: Stairs to enter with rails  Entrance Stairs - Number of Steps: 4 with danielle handrail + 6 with danielle handrails (unsure if able to hold both at same time) -- front entrance.  1 ABBEY side entrance of home but have to travel up a hill. Patient reports  can drive her up to that door  Entrance Stairs - Rails:  understanding        Therapy Time   Individual Concurrent Group Co-treatment   Time In 0900         Time Out 0945         Minutes 45           Timed Code Treatment Minutes: 45 Minutes       EDIN MORALES, PT, #[id number] 05/30/24 at 9:46 AM       Second Session    Patient to therapy department via  with daughter.  Patient reports pain at 2-3/10 in R hip/thigh.  Doesn't remember when she took pain medication last, has ice pack on R hip.  Pleased to report that MD will be making changes to her pain medication to improve pain control.     Sit to stand with CGA.  Ambulated 20' to mat with  walker and CGA, less antalgic gait and more effective use of UEs for protective weightbearing on RLE, continues to step past front cross bar of walker with L foot.     Sit to supine to mat with pillows on R while seated with min A for R LE but extremely guarded with RLE movement, however able to lift LLE onto mat and ease trunk back with bilateral hips and knees flexed. Guarded with straightening R hip/knee but with ample time able to lay R LE flat on mat.     Supine mat exercises: ankle pumps, glut sets, quad sets, hip add sets - cues to count aloud with isometrics to avoid tendency to hold breath - heel slides with min A and slow guarded movement with RLE, hip abd with mod assist and plastic film for decreased frictin for RLE, TKEs with CG/min A for RLE, and SLRs with mod/max A even with cues for quad set prior to lift x 10 reps each LE. Cues for breathing throughout, especially to exhale during exertional activities.     Supine to sit with CGA.  Ambulated 20' with  walker back to WC, increased pain with turning and backing when started to pivot rather than lift feet with small steps to turn and back up. Stand to sit with CGA. PT demonstrated and reviewed safe technique with turning and backing, picking up feet during turning and avoid any twisting/pivoting.   Patient demonstrated improved functional mobility with improved pain

## 2024-05-30 NOTE — PROGRESS NOTES
[facility]  Glycemic Control      NAME: Elvia Arguelles  MEDICAL RECORD NUMBER:  [id number]  AGE: 63 y.o.   GENDER: female  : 1960  EPISODE DATE:  2024     Data     Recent Labs     24  0706 24  1113 24  1624 24  2032 24  0650 24  1101   POCGLU 133* 97 196* 276* 332* 225*       HgBA1c:    Lab Results   Component Value Date/Time    LABA1C 7.2 2024 10:17 PM       BUN/Creatinine:    Lab Results   Component Value Date/Time    BUN 22 2024 05:40 AM    CREATININE 1.8 2024 05:40 AM       Medications  Scheduled Medications:   losartan  25 mg Oral Daily    ferrous sulfate  324 mg Oral Daily with breakfast    insulin glargine  8 Units SubCUTAneous Nightly    insulin lispro  2 Units SubCUTAneous TID WC    insulin lispro  0-4 Units SubCUTAneous TID WC    insulin lispro  0-4 Units SubCUTAneous Nightly    escitalopram  10 mg Oral Daily    hydrocortisone  10 mg Oral Daily    pantoprazole  40 mg Oral BID AC    sodium chloride flush  5-40 mL IntraVENous 2 times per day    heparin (porcine)  5,000 Units SubCUTAneous 3 times per day    hydrocortisone  5 mg Oral Nightly    NIFEdipine  30 mg Oral Daily    lactobacillus  2 capsule Oral BID WC    levETIRAcetam  500 mg IntraVENous Q12H    rOPINIRole  2 mg Oral Nightly       Diet  Current diet/supplement order: ADULT DIET; Regular; 5 carb choices (75 gm/meal); No Added Salt (3-4 gm); Low Potassium (Less than 3000 mg/day)     Recorded PO: PO Meals Eaten (%): 51 - 75% (plus was eating candy earlier) last meal in flowsheets      Action      Glucose Target: 140-180, avoid hypoglycemia    Recommend: Monitor intake and glucoses. Continue current regimen.    Electronically signed by Dora Mac RN on 2024 at 2:58 PM

## 2024-05-30 NOTE — DISCHARGE INSTRUCTIONS
American Diabetes Association:     About Diabetes: https://diabetes.org/about-diabetes     Life with Diabetes: https://diabetes.org/living-with-diabetes     Health & Wellness: https://diabetes.org/health-wellness     Food & Nutrition: https://diabetes.org/food-nutrition     Tools & Resources: https://diabetes.org/tools-resources

## 2024-05-30 NOTE — PLAN OF CARE
Problem: Safety - Adult  Goal: Free from fall injury  Note: Patient free from falls this shift. Fall precautions in place at all times. Bed in lowest position with two side rails up and wheels locked. Call light within reach. Patient able and agreeable to contact for safety appropriately. Patient up to bathroom with stedy, gait-belt, and partial assist of one to get to standing position. Patient tolerated fairly well.       Problem: Discharge Planning  Goal: Discharge to home or other facility with appropriate resources  Outcome: Progressing  Discharge to home or other facility with appropriate resources:   Identify barriers to discharge with patient and caregiver   Arrange for needed discharge resources and transportation as appropriate   Refer to discharge planning if patient needs post-hospital services based on physician order or complex needs related to functional status, cognitive ability or social support system   Identify discharge learning needs (meds, wound care, etc)     Problem: Pain  Goal: Verbalizes/displays adequate comfort level or baseline comfort level  Note: Pt able to express presence/absence of pain and rate pain appropriately using numerical scale. Pain/discomfort being managed with PRN analgesics per MD orders (see MAR). Pain assessed every shift and after interventions. Patient rating pain at it worst this evening 7 on 1-10 pain scale, describes as throbbing. Medicated per request and prn Oxycodone 5 mg, with relief to a 4. Patient refusing ice, makes her to cold.       Problem: Skin/Tissue Integrity  Goal: Absence of new skin breakdown  Description: 1.  Monitor for areas of redness and/or skin breakdown  2.  Assess vascular access sites hourly  3.  Every 4-6 hours minimum:  Change oxygen saturation probe site  4.  Every 4-6 hours:  If on nasal continuous positive airway pressure, respiratory therapy assess nares and determine need for appliance change or resting period.  5/28/2024 0254 by  Tonya Tello, RN  Note: Skin assessment performed each shift per protocol.  Patient denies the need to be turned, encouraged to maintain skin integrity. Patient is continent of bowel and bladder. Patient noted with only scattered bruising and abrasions from fall and blanchable redness to bottom. Surgical incision to right hip with CD& I dressing.

## 2024-05-30 NOTE — PROGRESS NOTES
C/o loose bowels, states apple juice is \"going thru me\" suggest not to order apple juice or peaches.

## 2024-05-30 NOTE — PROGRESS NOTES
Patient admitted to rehab with right hip fx.  A/Ox4. Transfers with stedy. On 5 carb, romain, low k diet, tolerating well. Medications taken whole. On Lovenox for DVT prophylaxis.  Skin: monitor incision. LDA: port to right chest. Has been continent/incontinent of bowel and continent of bladder. LBM today. Chair/bed alarms in use and call light in reach. Will monitor for safety.

## 2024-05-30 NOTE — PROGRESS NOTES
Occupational Therapy  Facility/Department: [address] [facility]  Rehabilitation Occupational Therapy Daily Treatment Note  AM and PM Sessions:     Date: 24  Patient Name: Elvia Arguelles       Room: E9R-3974/3272-01  MRN: [id number]  Account: [id number]   : 1960  (63 y.o.) Gender: female                    Past Medical History:  has a past medical history of Adrenal insufficiency (HCC), Cancer (HCC), Closed displaced intertrochanteric fracture of right femur (HCC), Depression, Diabetes mellitus (HCC), Hx of radiation therapy, Hypertension, Hyponatremia, Insulin pump in place, Melanoma (HCC), Prolonged emergence from general anesthesia, and Restless leg syndrome.  Past Surgical History:   has a past surgical history that includes Hysterectomy; Cholecystectomy; shoulder surgery; Upper gastrointestinal endoscopy (10/22/2014); Carpal tunnel release (Bilateral, 2017); lipoma resection; Eye surgery (Right); Shoulder arthroscopy (Right, 2018); liver biopsy (Right); US BIOPSY LIVER PERCUTANEOUS (2022); CT BIOPSY ABDOMEN RETROPERITONEUM (2022); Upper gastrointestinal endoscopy (N/A, 2023); Upper gastrointestinal endoscopy (2023); Port Surgery (Right, 2023); Upper gastrointestinal endoscopy (N/A, 2024); and hip surgery (Right, 2024).    Restrictions  Restrictions/Precautions: Weight Bearing, Fall Risk  Other position/activity restrictions: R femur fx s/p Right femur cephalomedullary nail; Diet: 5 carb choices (75 gm/meal); No Added Salt (3-4 gm); Low Potassium (Less than 3000 mg/day)  Right Lower Extremity Weight Bearing: Weight Bearing As Tolerated  Equipment Used: Wheelchair    Subjective  Subjective: Pt met in room, in bed agreeable to shower ADL session. Pt's 2 nurses present giving report. Nurse informed OT pt's blood sugar is high at 300+ but OK for shower. Pt rated her pain 7-8/10 in R hip. She had pain meds at 5:30 AM.  Restrictions/Precautions: Weight  Bearing;Fall Risk             Objective     Cognition  Problem Solving: Assistance required to implement solutions  Sequencing: Requires cues for some  Orientation  Overall Orientation Status: Within Functional Limits  Orientation Level: Oriented X4         ADL  Feeding  Assistance Level: Set-up  Skilled Clinical Factors: set-up d/t low vision @ end of session  Grooming/Oral Hygiene  Assistance Level: Minimal assistance;Verbal cues  Skilled Clinical Factors: Pt required assist to open toothpaste & to aim it onto toothbrush D/T low vision then brushed teeth, finger combed hair seated at sink in w/c  Upper Extremity Bathing  Assistance Level: Minimal assistance  Skilled Clinical Factors: OT covered PORT w/ shower guard & was kept dry. Pt seated on shower chair in stall. Pt required assist to find & pour soap onto cloth, to manage hand held shower D/T low vision, OT ed pt in use of hand held shower, min A over all.  Lower Extremity Bathing  Equipment Provided: Long-handled sponge  Assistance Level: Moderate assistance;Verbal cues;Increased time to complete  Skilled Clinical Factors: Pt bathed all parts w/ use of long sponge to wash R foot w/ cues, but OT dried R foot, max A to bath buttocks(she bathed front jhon area) in stance at grab bar; started having loose BM when stood up to clean buttocks. OT rinsed pt, turned off water, assisted to dry a bit, then pt amb w/ RW from shower stall to C over toilet min A + cues. Limited by significant pain.  Upper Extremity Dressing  Assistance Level: Set-up  Skilled Clinical Factors: doffed/donned pull over shirt set-up, declined to wear bra  Lower Extremity Dressing  Equipment Provided: Reachers  Assistance Level: Verbal cues;Increased time to complete;Set-up;Maximum assistance;Moderate assistance  Skilled Clinical Factors: Pt ed in use of reacher, donned underapnts/pants over feet with mod A & cues limited by impaired vision. Can't see the end of the reacher. max A to pull  bed;Wheelchair;Bedside commode  Additional Factors: With handrails  Device: Walker (RW)  Bed To/From Chair  Technique:  (RW)  Assistance Level: Contact guard assist  Skilled Clinical Factors: CGA to stand to RW from bed & to sit in w/c at sink w/ RW + cues for hand placement & to line up     Second Session:     Pt met in dept agreeable to tx, rated her pain in R hip 10.     Pt stood from w/c at table CGA. Pt stood to work on sequencing task of retrieving large print days of week written on index card & placing them in order on vertical card board with min cues D/T did not appear to see velcro squares to L where she was to start the placement of card & to work L> R. She completed placing 12 index card for months of the year w/ increased time and min cues to see L side of board, where to start row. OT instructed pt to feel w/ her hand where the L side of the board was. Pt standing for ~15 minutes for tasks. Once cued was able to correct error.    OT re-screened distance vision on SNELLEN eye chart & remained 20/200 considered low vision w/ glasses on. Vision Disc peripheral screenino L & 30o on R, both impaired below 70o considered WFL.    Requested toileting in a hurry. Pt amb CGA from table > bathroom w/ RW>comfort ht toilet w/ TSF.  Pt voided/loose BM, max A  as required min A for clothes down, max A for hygiene and dep for clothes up(OT placed pad in panties as min soiled w/ loose stool(wiped out 1st). Pt amb > sink CGA w/ RW to wash hands in stance SBA w/ set-up for soap.    Pt cont to be far below her baseline, limited by hip pain and impaired vision.  Plan to cont tx per POC.     Safety Device - Type of devices:Left w/ transporter to go back to room  []  All fall risk precautions in place [] Bed alarm in place  [] Call light within reach [] Chair alarm in place [] Positioning belt [x] Gait belt [x] Patient at risk for falls [] Left in bed [x] Left in chair [] Telesitter in use [] Sitter present [] Nurse  evaluation, education, & procurement;Patient/Caregiver education & training;Positioning;Pain management;Home management training;Modalities    Goals  Patient Goals   Patient goals : \"To go back home and be normal again\"  Short Term Goals  Time Frame for Short Term Goals: 1 week  Short Term Goal 1: Pt will complete functional mobility/txs using LRAD SBA  Short Term Goal 2: Pt will complete toileting SBA  Short Term Goal 3: Pt will complete bathing using AE, as needed, SBA  Short Term Goal 4: Pt will complete dressing using AE, as needed, SBA  Short Term Goal 5: Pt will maintain stance throughout functional task for atleast 5min SBA to address strength/activity tolerance for ADL/IADL function  Short Term Goal 6: Pt will perform BUE therex to address strength/endurance for functional performance  Long Term Goals  Time Frame for Long Term Goals : 2 weeks  Long Term Goal 1: Pt will complete functional mobility/txs using LRAD mod I  Long Term Goal 2: Pt will complete toileting mod I  Long Term Goal 3: Pt will complete bathing using AE, as needed, mod I  Long Term Goal 4: Pt will complete dressing using AE, as needed, mod I  Long Term Goal 5: Pt will complete simple IADL task (light meal prep, pet care, etc.) mod I                   Therapy Time   Individual Concurrent Group Co-treatment   Time In 0715         Time Out 0815         Minutes 60         Timed Code Treatment Minutes: 60 Minutes  Therapy Time     Individual Co-treatment   Time In 1245     Time Out 1315     Minutes 30              Jocelyn Velasco OT  Electronically signed by MAURA Cardenas/OSMAR  License # [id number]

## 2024-05-30 NOTE — PLAN OF CARE
Problem: Safety - Adult  Goal: Free from fall injury  5/30/2024 1246 by Tonya Norton RN  Outcome: Progressing  5/30/2024 1242 by Tonya Norton RN  Outcome: Progressing  Flowsheets (Taken 5/30/2024 1144)  Free From Fall Injury: Instruct family/caregiver on patient safety  5/30/2024 0316 by Tonya Tello RN  Outcome: Progressing     Problem: ABCDS Injury Assessment  Goal: Absence of physical injury  5/30/2024 1246 by Tonya Norton RN  Outcome: Progressing  5/30/2024 1242 by Tonya Norton RN  Outcome: Progressing  Flowsheets (Taken 5/30/2024 1144)  Absence of Physical Injury: Implement safety measures based on patient assessment  5/30/2024 0316 by Tonya Tello RN  Outcome: Progressing  Flowsheets (Taken 5/30/2024 0312)  Absence of Physical Injury: Implement safety measures based on patient assessment     Problem: Skin/Tissue Integrity  Goal: Absence of new skin breakdown  Description: 1.  Monitor for areas of redness and/or skin breakdown  2.  Assess vascular access sites hourly  3.  Every 4-6 hours minimum:  Change oxygen saturation probe site  4.  Every 4-6 hours:  If on nasal continuous positive airway pressure, respiratory therapy assess nares and determine need for appliance change or resting period.  5/30/2024 1246 by Tonya Norton RN  Outcome: Progressing  5/30/2024 1242 by Tonya Norton RN  Outcome: Progressing  5/30/2024 0316 by Tonya Tello RN  Outcome: Progressing     Problem: Discharge Planning  Goal: Discharge to home or other facility with appropriate resources  5/30/2024 1242 by Tonya Norton RN  Outcome: Not Progressing  Flowsheets  Taken 5/30/2024 1242  Discharge to home or other facility with appropriate resources:   Identify barriers to discharge with patient and caregiver   Identify discharge learning needs (meds, wound care, etc)  Taken 5/30/2024 0654  Discharge to home or other facility with

## 2024-05-30 NOTE — PROGRESS NOTES
[facility] Inpatient Nephrology Note        SUBJECTIVE:    CC:FAVIAN on CKD  HPI: BP mildly elevated. Serum creatinine stable.   Soc Hx: no family present  ROS: up in chair. No SOB    Medications     ferrous sulfate  324 mg Oral Daily with breakfast    insulin glargine  8 Units SubCUTAneous Nightly    insulin lispro  2 Units SubCUTAneous TID WC    insulin lispro  0-4 Units SubCUTAneous TID WC    insulin lispro  0-4 Units SubCUTAneous Nightly    escitalopram  10 mg Oral Daily    hydrocortisone  10 mg Oral Daily    pantoprazole  40 mg Oral BID AC    sodium chloride flush  5-40 mL IntraVENous 2 times per day    heparin (porcine)  5,000 Units SubCUTAneous 3 times per day    hydrocortisone  5 mg Oral Nightly    NIFEdipine  30 mg Oral Daily    lactobacillus  2 capsule Oral BID WC    levETIRAcetam  500 mg IntraVENous Q12H    rOPINIRole  2 mg Oral Nightly         OBJECTIVE      Physical    TEMPERATURE:  Current - Temp: 98.5 °F (36.9 °C); Max - Temp  Av.4 °F (36.9 °C)  Min: 97.8 °F (36.6 °C)  Max: 99 °F (37.2 °C)  RESPIRATIONS RANGE: Resp  Av.3  Min: 16  Max: 18  PULSE RANGE: Pulse  Av.7  Min: 75  Max: 83  BLOOD PRESSURE RANGE:  Systolic (24hrs), Av , Min:132 , Max:155   ; Diastolic (24hrs), Av, Min:69, Max:70    PULSE OXIMETRY RANGE: SpO2  Av.5 %  Min: 97 %  Max: 98 %  24HR INTAKE/OUTPUT:    Intake/Output Summary (Last 24 hours) at 2024 0815  Last data filed at 2024  Gross per 24 hour   Intake 740 ml   Output --   Net 740 ml         GEN: no distress  HEENT: no icterus  NECK: Trachea midline  CV: RRR  LUNGS: clear bilaterally, unlabored  ABD: + bowel sounds. No distension. No  tenderness  EXT: no edema.   SKIN: no rash  NEURO: no tremor    Data      Lab Results   Component Value Date    CREATININE 1.8 (H) 2024    BUN 22 (H) 2024     (L) 2024    K 4.7 2024     2024    CO2 25 2024     Lab Results

## 2024-05-31 LAB
ALBUMIN SERPL-MCNC: 2.9 G/DL (ref 3.4–5)
ANION GAP SERPL CALCULATED.3IONS-SCNC: 8 MMOL/L (ref 3–16)
ANION GAP SERPL CALCULATED.3IONS-SCNC: 9 MMOL/L (ref 3–16)
BASOPHILS # BLD: 0.1 K/UL (ref 0–0.2)
BASOPHILS NFR BLD: 0.8 %
BUN SERPL-MCNC: 18 MG/DL (ref 7–20)
BUN SERPL-MCNC: 20 MG/DL (ref 7–20)
CALCIUM SERPL-MCNC: 8.4 MG/DL (ref 8.3–10.6)
CALCIUM SERPL-MCNC: 8.5 MG/DL (ref 8.3–10.6)
CHLORIDE SERPL-SCNC: 102 MMOL/L (ref 99–110)
CHLORIDE SERPL-SCNC: 94 MMOL/L (ref 99–110)
CO2 SERPL-SCNC: 23 MMOL/L (ref 21–32)
CO2 SERPL-SCNC: 26 MMOL/L (ref 21–32)
CREAT SERPL-MCNC: 1.6 MG/DL (ref 0.6–1.2)
CREAT SERPL-MCNC: 1.9 MG/DL (ref 0.6–1.2)
DEPRECATED RDW RBC AUTO: 17.1 % (ref 12.4–15.4)
EOSINOPHIL # BLD: 0.2 K/UL (ref 0–0.6)
EOSINOPHIL NFR BLD: 2.6 %
GFR SERPLBLD CREATININE-BSD FMLA CKD-EPI: 29 ML/MIN/{1.73_M2}
GFR SERPLBLD CREATININE-BSD FMLA CKD-EPI: 35 ML/MIN/{1.73_M2}
GLUCOSE BLD-MCNC: 134 MG/DL (ref 70–99)
GLUCOSE BLD-MCNC: 193 MG/DL (ref 70–99)
GLUCOSE BLD-MCNC: 501 MG/DL (ref 70–99)
GLUCOSE BLD-MCNC: >600 MG/DL (ref 70–99)
GLUCOSE SERPL-MCNC: 186 MG/DL (ref 70–99)
GLUCOSE SERPL-MCNC: 686 MG/DL (ref 70–99)
HCT VFR BLD AUTO: 25.1 % (ref 36–48)
HGB BLD-MCNC: 8.7 G/DL (ref 12–16)
LYMPHOCYTES # BLD: 1.6 K/UL (ref 1–5.1)
LYMPHOCYTES NFR BLD: 21.7 %
MCH RBC QN AUTO: 30.2 PG (ref 26–34)
MCHC RBC AUTO-ENTMCNC: 34.8 G/DL (ref 31–36)
MCV RBC AUTO: 86.9 FL (ref 80–100)
MONOCYTES # BLD: 0.7 K/UL (ref 0–1.3)
MONOCYTES NFR BLD: 9.7 %
NEUTROPHILS # BLD: 4.7 K/UL (ref 1.7–7.7)
NEUTROPHILS NFR BLD: 65.2 %
PERFORMED ON: ABNORMAL
PHOSPHATE SERPL-MCNC: 2.6 MG/DL (ref 2.5–4.9)
PLATELET # BLD AUTO: 227 K/UL (ref 135–450)
PMV BLD AUTO: 8.8 FL (ref 5–10.5)
POTASSIUM SERPL-SCNC: 4.1 MMOL/L (ref 3.5–5.1)
POTASSIUM SERPL-SCNC: 4.6 MMOL/L (ref 3.5–5.1)
RBC # BLD AUTO: 2.89 M/UL (ref 4–5.2)
SODIUM SERPL-SCNC: 126 MMOL/L (ref 136–145)
SODIUM SERPL-SCNC: 136 MMOL/L (ref 136–145)
WBC # BLD AUTO: 7.2 K/UL (ref 4–11)

## 2024-05-31 PROCEDURE — 85025 COMPLETE CBC W/AUTO DIFF WBC: CPT

## 2024-05-31 PROCEDURE — 94760 N-INVAS EAR/PLS OXIMETRY 1: CPT

## 2024-05-31 PROCEDURE — 97130 THER IVNTJ EA ADDL 15 MIN: CPT

## 2024-05-31 PROCEDURE — 6360000002 HC RX W HCPCS: Performed by: STUDENT IN AN ORGANIZED HEALTH CARE EDUCATION/TRAINING PROGRAM

## 2024-05-31 PROCEDURE — 1280000000 HC REHAB R&B

## 2024-05-31 PROCEDURE — 6370000000 HC RX 637 (ALT 250 FOR IP): Performed by: STUDENT IN AN ORGANIZED HEALTH CARE EDUCATION/TRAINING PROGRAM

## 2024-05-31 PROCEDURE — 97110 THERAPEUTIC EXERCISES: CPT

## 2024-05-31 PROCEDURE — 6370000000 HC RX 637 (ALT 250 FOR IP): Performed by: PHYSICAL MEDICINE & REHABILITATION

## 2024-05-31 PROCEDURE — 97530 THERAPEUTIC ACTIVITIES: CPT

## 2024-05-31 PROCEDURE — 97116 GAIT TRAINING THERAPY: CPT

## 2024-05-31 PROCEDURE — 97535 SELF CARE MNGMENT TRAINING: CPT

## 2024-05-31 PROCEDURE — 97129 THER IVNTJ 1ST 15 MIN: CPT

## 2024-05-31 PROCEDURE — 80069 RENAL FUNCTION PANEL: CPT

## 2024-05-31 PROCEDURE — 36415 COLL VENOUS BLD VENIPUNCTURE: CPT

## 2024-05-31 PROCEDURE — 6370000000 HC RX 637 (ALT 250 FOR IP): Performed by: INTERNAL MEDICINE

## 2024-05-31 RX ORDER — INSULIN GLARGINE 100 [IU]/ML
9 INJECTION, SOLUTION SUBCUTANEOUS NIGHTLY
Status: DISCONTINUED | OUTPATIENT
Start: 2024-05-31 | End: 2024-06-03

## 2024-05-31 RX ORDER — INSULIN LISPRO 100 [IU]/ML
8 INJECTION, SOLUTION INTRAVENOUS; SUBCUTANEOUS ONCE
Status: COMPLETED | OUTPATIENT
Start: 2024-05-31 | End: 2024-05-31

## 2024-05-31 RX ORDER — OXYCODONE HYDROCHLORIDE 5 MG/1
2.5 TABLET ORAL EVERY 4 HOURS PRN
Status: DISCONTINUED | OUTPATIENT
Start: 2024-05-31 | End: 2024-06-14 | Stop reason: HOSPADM

## 2024-05-31 RX ORDER — METHOCARBAMOL 750 MG/1
750 TABLET, FILM COATED ORAL EVERY 6 HOURS PRN
Status: DISCONTINUED | OUTPATIENT
Start: 2024-05-31 | End: 2024-06-14 | Stop reason: HOSPADM

## 2024-05-31 RX ORDER — OXYCODONE HYDROCHLORIDE 5 MG/1
5 TABLET ORAL EVERY 4 HOURS PRN
Status: DISCONTINUED | OUTPATIENT
Start: 2024-05-31 | End: 2024-06-03

## 2024-05-31 RX ADMIN — INSULIN LISPRO 8 UNITS: 100 INJECTION, SOLUTION INTRAVENOUS; SUBCUTANEOUS at 19:18

## 2024-05-31 RX ADMIN — METHOCARBAMOL TABLETS 750 MG: 750 TABLET, COATED ORAL at 22:25

## 2024-05-31 RX ADMIN — HEPARIN SODIUM 5000 UNITS: 5000 INJECTION INTRAVENOUS; SUBCUTANEOUS at 21:01

## 2024-05-31 RX ADMIN — HYDROCORTISONE 5 MG: 5 TABLET ORAL at 21:01

## 2024-05-31 RX ADMIN — HEPARIN SODIUM 5000 UNITS: 5000 INJECTION INTRAVENOUS; SUBCUTANEOUS at 05:57

## 2024-05-31 RX ADMIN — LEVETIRACETAM 500 MG: 500 TABLET, FILM COATED ORAL at 07:53

## 2024-05-31 RX ADMIN — LEVETIRACETAM 500 MG: 500 TABLET, FILM COATED ORAL at 21:00

## 2024-05-31 RX ADMIN — Medication 2 CAPSULE: at 19:05

## 2024-05-31 RX ADMIN — INSULIN LISPRO 8 UNITS: 100 INJECTION, SOLUTION INTRAVENOUS; SUBCUTANEOUS at 23:06

## 2024-05-31 RX ADMIN — NIFEDIPINE 30 MG: 30 TABLET, EXTENDED RELEASE ORAL at 07:53

## 2024-05-31 RX ADMIN — METHOCARBAMOL TABLETS 750 MG: 750 TABLET, COATED ORAL at 15:25

## 2024-05-31 RX ADMIN — ESCITALOPRAM OXALATE 10 MG: 10 TABLET ORAL at 07:53

## 2024-05-31 RX ADMIN — INSULIN LISPRO 4 UNITS: 100 INJECTION, SOLUTION INTRAVENOUS; SUBCUTANEOUS at 22:28

## 2024-05-31 RX ADMIN — FERROUS SULFATE TAB EC 324 MG (65 MG FE EQUIVALENT) 324 MG: 324 (65 FE) TABLET DELAYED RESPONSE at 07:52

## 2024-05-31 RX ADMIN — LOSARTAN POTASSIUM 25 MG: 25 TABLET, FILM COATED ORAL at 07:53

## 2024-05-31 RX ADMIN — Medication 2 CAPSULE: at 07:53

## 2024-05-31 RX ADMIN — OXYCODONE 5 MG: 5 TABLET ORAL at 19:17

## 2024-05-31 RX ADMIN — HYDROCORTISONE 10 MG: 10 TABLET ORAL at 08:10

## 2024-05-31 RX ADMIN — PANTOPRAZOLE SODIUM 40 MG: 40 TABLET, DELAYED RELEASE ORAL at 05:57

## 2024-05-31 RX ADMIN — INSULIN GLARGINE 9 UNITS: 100 INJECTION, SOLUTION SUBCUTANEOUS at 21:00

## 2024-05-31 RX ADMIN — ROPINIROLE HYDROCHLORIDE 2 MG: 1 TABLET, FILM COATED ORAL at 21:00

## 2024-05-31 RX ADMIN — PANTOPRAZOLE SODIUM 40 MG: 40 TABLET, DELAYED RELEASE ORAL at 15:07

## 2024-05-31 RX ADMIN — INSULIN LISPRO 2 UNITS: 100 INJECTION, SOLUTION INTRAVENOUS; SUBCUTANEOUS at 08:10

## 2024-05-31 RX ADMIN — OXYCODONE HYDROCHLORIDE 10 MG: 10 TABLET ORAL at 01:50

## 2024-05-31 RX ADMIN — HEPARIN SODIUM 5000 UNITS: 5000 INJECTION INTRAVENOUS; SUBCUTANEOUS at 15:06

## 2024-05-31 RX ADMIN — OXYCODONE HYDROCHLORIDE 10 MG: 10 TABLET ORAL at 05:54

## 2024-05-31 RX ADMIN — ONDANSETRON 4 MG: 4 TABLET, ORALLY DISINTEGRATING ORAL at 11:10

## 2024-05-31 RX ADMIN — OXYCODONE 5 MG: 5 TABLET ORAL at 12:43

## 2024-05-31 ASSESSMENT — PAIN SCALES - GENERAL
PAINLEVEL_OUTOF10: 8
PAINLEVEL_OUTOF10: 3
PAINLEVEL_OUTOF10: 7
PAINLEVEL_OUTOF10: 6
PAINLEVEL_OUTOF10: 8
PAINLEVEL_OUTOF10: 4
PAINLEVEL_OUTOF10: 8
PAINLEVEL_OUTOF10: 0

## 2024-05-31 ASSESSMENT — PAIN DESCRIPTION - ORIENTATION
ORIENTATION: RIGHT;UPPER
ORIENTATION: RIGHT
ORIENTATION: RIGHT;UPPER
ORIENTATION: RIGHT
ORIENTATION: RIGHT

## 2024-05-31 ASSESSMENT — PAIN DESCRIPTION - DESCRIPTORS
DESCRIPTORS: ACHING;THROBBING
DESCRIPTORS: THROBBING
DESCRIPTORS: THROBBING
DESCRIPTORS: ACHING;THROBBING
DESCRIPTORS: ACHING;THROBBING

## 2024-05-31 ASSESSMENT — PAIN DESCRIPTION - FREQUENCY
FREQUENCY: INTERMITTENT

## 2024-05-31 ASSESSMENT — PAIN DESCRIPTION - LOCATION
LOCATION: HIP;LEG

## 2024-05-31 ASSESSMENT — PAIN - FUNCTIONAL ASSESSMENT
PAIN_FUNCTIONAL_ASSESSMENT: PREVENTS OR INTERFERES SOME ACTIVE ACTIVITIES AND ADLS
PAIN_FUNCTIONAL_ASSESSMENT: ACTIVITIES ARE NOT PREVENTED
PAIN_FUNCTIONAL_ASSESSMENT: PREVENTS OR INTERFERES SOME ACTIVE ACTIVITIES AND ADLS
PAIN_FUNCTIONAL_ASSESSMENT: ACTIVITIES ARE NOT PREVENTED
PAIN_FUNCTIONAL_ASSESSMENT: ACTIVITIES ARE NOT PREVENTED

## 2024-05-31 ASSESSMENT — PAIN DESCRIPTION - PAIN TYPE
TYPE: ACUTE PAIN;SURGICAL PAIN

## 2024-05-31 ASSESSMENT — PAIN DESCRIPTION - ONSET
ONSET: ON-GOING

## 2024-05-31 NOTE — PROGRESS NOTES
Pt BS so high it is unable to be calculated by our glucometer, call in to MD as urgent needing orders.  Call in to  and charge RN is aware. Electronically signed by Ayanna Hill RN on 5/31/2024 at 4:46 PM    Call in from Lab, ,   This RN performed a POCT HI = >600    Perfect Serve to Dr. Martinez with results of both test. Awaiting orders. Pt tray was held.  PT A&O x4, states his is normal and happens a lot. Gummy Bears have been removed from the room and daughter at bedside. Electronically signed by Ayanna Hill RN on 5/31/2024 at 6:37 PM    Call in from Dr. Martinez, give pt 8 units once, have BS checked in about a couple hours, anticipating it to still be high. Electronically signed by Ayanna Hill RN on 5/31/2024 at 8:36 PM

## 2024-05-31 NOTE — PROGRESS NOTES
Department of Physical Medicine & Rehabilitation  Progress Note    Patient Identification:  Elvia Arguelles  [id number]  : 1960  Admit date: 2024    Chief Complaint: Closed right hip fracture, initial encounter ([facility])    Subjective:   No acute events overnight.   Patient seen this afternoon sitting up in gym. PT reports pain better controlled with increased oxycodone dose but patient had cognitive side effects. Patient agrees with this assessment.   Labs reviewed.     ROS: No f/c, n/v, cp     Objective:  Patient Vitals for the past 24 hrs:   BP Temp Temp src Pulse Resp SpO2 Height Weight   24 0905 -- -- -- -- -- 95 % -- --   24 0753 136/66 97.7 °F (36.5 °C) Oral 71 16 95 % -- --   24 0624 -- -- -- -- 16 -- -- --   24 0600 -- -- -- -- -- -- -- 58.2 kg (128 lb 4.9 oz)   24 0554 -- -- -- -- 18 -- -- --   24 0242 -- -- -- -- 16 -- -- --   24 0220 -- -- -- -- 16 -- -- --   24 2222 -- -- -- -- 16 -- -- --   24 2152 -- -- -- -- 16 -- -- --   24 (!) 154/71 98.1 °F (36.7 °C) Oral 72 16 99 % -- --   24 1738 -- -- -- -- 16 -- -- --   24 1332 -- -- -- -- 16 -- -- --   24 1323 -- -- -- -- -- -- 1.499 m (4' 11.02\") --     Const: Alert. No distress, pleasant.   HEENT: Normocephalic, atraumatic. Normal sclera/conjunctiva. MMM.   CV: Regular rate and rhythm.   Resp: No respiratory distress. Lungs diminished at bases  Abd: Soft, nontender, nondistended, NABS+   Ext:+ post op swelling RLE  MSK: Decreased right hip ROM  Neuro: Alert, oriented, mildly delayed processing. No focal strength deficits aside from pain limited RLE  Psych: Cooperative, appropriate mood and affect    Laboratory data: Available via EMR.   Last 24 hour lab  Recent Results (from the past 24 hour(s))   POCT Glucose    Collection Time: 24  4:06 PM   Result Value Ref Range    POC Glucose 255 (H) 70 - 99 mg/dl    Performed on ACCU-CHEK    POCT Glucose    Collection  use of long sponge to wash R foot w/ cues, but OT dried R foot, max A to bath buttocks(she bathed front jhon area) in stance at grab bar; started having loose BM when stood up to clean buttocks. OT rinsed pt, turned off water, assisted to dry a bit, then pt amb w/ RW from shower stall to BSC over toilet min A + cues. Limited by significant pain.  UE Dressing  Assistance Level: Set-up  Skilled Clinical Factors: doffed/donned pull over shirt set-up, declined to wear bra  LE Dressing  Equipment Provided: Reachers  Assistance Level: Verbal cues, Increased time to complete, Set-up, Maximum assistance, Moderate assistance  Skilled Clinical Factors: Pt ed in use of reacher, donned underapnts/pants over feet with mod A & cues limited by impaired vision. Can't see the end of the reacher. max A to pull clothes up in stance at RW.  Putting On/Taking Off Footwear  Equipment Provided: Sock aid, Long-handle shoe horn  Assistance Level: Moderate assistance, Verbal cues, Increased time to complete, Set-up  Skilled Clinical Factors: Pt dep to doff tread socks limited by pain after amb> bathroom, so did not have pt attempt socks off. Pt donned own socks after re-ed in use of socks aid mod A. Pt donned shoes mod A w/ long shoe horn. Says spouse going to order [employer] Step In shoes.(SBA L shoes, max A R shoe, OT tied)  Toileting  Assistance Level: Moderate assistance  Skilled Clinical Factors: Seated to void, noted small BM. In standing able to complete hygiene with Mod A for throughoness, CGA for balance during clothing management.    Speech therapy:    ADULT DIET; Regular; 5 carb choices (75 gm/meal); No Added Salt (3-4 gm); Low Potassium (Less than 3000 mg/day)        Body mass index is 25.9 kg/m².    Rehabilitation Diagnosis:   Orthopedic, 8.11, Unilateral Hip Fracture        Assessment and Plan:     Impairments: decreased right hip ROM, balance, left neglect, balance, endurance, cognition    Right intertrochanteric femur  fracture   -s/p  ORIF with cephalomedullary nail (5/19 with Dr. White)  -Wound care per Ortho  -WBAT RLE  -PT/OT    Posterior reversible encephalopathy syndrome  -Etiology possibly uncontrolled HTN (Nephrology feels unlikely) vs Opdivo  -Difficult to exclude superimposed ischemia per Neurology  -Will need repeat MRI in few weeks  -BP control as below  -Regulate sleep/wake. Emphasis on routine.   -PT/OT/SLP.     Focal seizure on EEG  -continue Keppra per Neurology    Metastatic melanoma  -On maintenance Opdivo (last 5/10)    FAVIAN on CKD III due to Rhabdomyolysis  -Nephrology following, appreciate input  -Avoid nephrotoxins, renally dose meds  -Monitor renal function  -F/u Dr. Cleis     Acute on chronic anemia   -Due to post-op blood loss and renal dysfunction  -Monitor Hgb, transfuse prn <7.   -continue iron supplement     Adrenal Insufficiency  -completed stress does steroids on acute floor  -continue home hydrocortisone    Elevated troponin, demand ischemia + FAVIAN  -No intervention recommended per Cardiology    HTN, uncontrolled  -continue nifedipine -- monitor trend, improving     Chronic HFpEF  -Monitor fluid balance s/p aggressive IVF for rhabdo  -daily wt    DM2 with hypo and hyperglycemia  -Changed lantus to 8 qhs, started prandial 2 TID, decreased ISS to low dose -- monitor response, improved today. Intermittent elevations due to dietary choices (e.g. gummy bears)  -note has insulin pump (currently turned off while inpatient due to cognitive deficits)     RLS  -continue Ropinirole      Mood disorder  -continue escitalopram      Bladder   -High risk retention   -Monitor PVRs, SC prn >300cc     Bowel   -High risk constipation   -PRN miralax, bisacodyl supp.     Safety   -fall, aspiration, seizure precautions     Pain control  -continue oxycodone prn (decrease back to 5 mg prn due to cognitive side effects)  -trial prn methocarbamol     PPx  -DVT: Heparin  -GI: pantoprazole       Rehab Progress: Making progress.

## 2024-05-31 NOTE — PROGRESS NOTES
ACUTE REHAB UNIT  SPEECH/LANGUAGE PATHOLOGY      [x] Daily  [] Weekly Care Conference Note  [] Discharge    Patient:Elvai Arguelles      :1960  MRN:[id number]  Rehab Dx/Hx: Closed right hip fracture, initial encounter ([facility]) [S72.001A]    Precautions: falls, seizures, and visual spatial deficits  Home situation: from home with spouse  ST Dx: [] Aphasia  [] Dysarthria  [] Apraxia   [] Oropharyngeal dysphagia [x] Cognitive   Impairment  [] Other:   Initial Speech Therapy Assessment Diagnosis:   1. Cognitive Diagnosis: Pt demonstrated minimal to moderate deficits in domains of time orientation; higher level attention; complex VPS and abstract reasoning; working memory and STM. Pt with visual deficits and right lower extremity pain which is further exacerbating. Visual deficits during paper tasks revealed reduced attention left; slight sloping upwar left to right when writing; spatial orientation when putting numbers on a clock. Will continue therapy working on visuo cognition / visuo spatial orientation and visual language remediation  2. Speech Diagnosis: Motor speech audible and intelligible at multiple word utterance level and during discourse. Pt achieve good verbal diadochokinetic rating. Voice quality wtih minimal claudio /strained quality.  3. Communication Diagnosis: Pt demonstrating concrete to mild complex funtional auditory language processing and verbal expressive language skills. Visual langauge processing at word / phrase level wtih mild deficits in inattention left of midling (did utilized large print with high color contrast). Pt able to express full name via written expression but minimal perseveration of letters ans slight sloping upward left to right when writing. Ongoing therapy     Date of Admit: 2024  Room #: U5S-0746/3272-01  Date: 2024       Current functional status (updated daily):         Current Diet Order:ADULT DIET; Regular; 5 carb choices (75 gm/meal); No Added Salt (3-4

## 2024-05-31 NOTE — PROGRESS NOTES
Patient Port was de-accessed per protocol. New port needle was placed per hospital policy and protocol using sterile measures. Catheter would not flush, attempted to pull back needle slightly and was then able to flush. While flushing noted needle was no longer in port and flush was infusing to skin above port. Immediately stopped flushing and removed needle. Patient denied any pain or discomfort. Did not attempt new stick at this time so flush could dissipate.  Patient is no longer on any IV medications.  Day nurse will check with MD to see if re-accessing port is necessary. Patient informed to let RN know if any pain or discomfort develops at site. Applied dressing from central line kit immediately after removing needle.

## 2024-05-31 NOTE — PROGRESS NOTES
[facility]  Glucose Control      NAME: Elvia Arguelles  MEDICAL RECORD NUMBER:  [id number]  AGE: 63 y.o.   GENDER: female  : 1960  EPISODE DATE:  2024     Data     Recent Labs     24  0650 24  1101 24  1606 24  2017 24  0703 24  1119   POCGLU 332* 225* 255* 336* 193* 134*       HgBA1c:    Lab Results   Component Value Date/Time    LABA1C 7.2 2024 10:17 PM       BUN/Creatinine:    Lab Results   Component Value Date/Time    BUN 18 2024 06:58 AM    CREATININE 1.6 2024 06:58 AM       Medications  Scheduled Medications:   losartan  25 mg Oral Daily    levETIRAcetam  500 mg Oral BID    ferrous sulfate  324 mg Oral Daily with breakfast    insulin glargine  8 Units SubCUTAneous Nightly    insulin lispro  2 Units SubCUTAneous TID WC    insulin lispro  0-4 Units SubCUTAneous TID WC    insulin lispro  0-4 Units SubCUTAneous Nightly    escitalopram  10 mg Oral Daily    hydrocortisone  10 mg Oral Daily    pantoprazole  40 mg Oral BID AC    sodium chloride flush  5-40 mL IntraVENous 2 times per day    heparin (porcine)  5,000 Units SubCUTAneous 3 times per day    hydrocortisone  5 mg Oral Nightly    NIFEdipine  30 mg Oral Daily    lactobacillus  2 capsule Oral BID WC    rOPINIRole  2 mg Oral Nightly       Diet  Current diet/supplement order: ADULT DIET; Regular; 5 carb choices (75 gm/meal); No Added Salt (3-4 gm); Low Potassium (Less than 3000 mg/day)     Recorded PO: PO Meals Eaten (%): 76 - 100% last meal in flowsheets      Action      Glucose Target: 140-180, avoid hypoglycemia    Met with pt. Pt aware of recommendation to increase Lantus- pt agreeable to change I provider changes dose. Offered encouragement not to eat fruit snacks or try an alternative snack. Pt stated her spouse brought them in and she likes them and is not willing to stop something she enjoys.     Recommendation: Monitor intake and glucoses, increase Lantus 9 units  nightly, continue low dose SSI and prandial. Physician Notified of event: Yes, Delia Martinez MD     Medication plan updated: No: provider to review. Spoke with staff nurse about recommendation.       Electronically signed by Dora Mac RN on 5/31/2024 at 1:54 PM

## 2024-05-31 NOTE — PLAN OF CARE
Problem: Safety - Adult  Goal: Free from fall injury  5/31/2024 1047 by Ayanna Hill RN  Outcome: Progressing     Problem: Discharge Planning  Goal: Discharge to home or other facility with appropriate resources  5/31/2024 1047 by Ayanna Hill RN  Outcome: Progressing     Problem: Pain  Goal: Verbalizes/displays adequate comfort level or baseline comfort level  5/31/2024 1047 by Ayanna Hill RN  Outcome: Progressing     Problem: ABCDS Injury Assessment  Goal: Absence of physical injury  5/31/2024 1047 by Ayanna Hill RN  Outcome: Progressing     Problem: Skin/Tissue Integrity  Goal: Absence of new skin breakdown  Description: 1.  Monitor for areas of redness and/or skin breakdown  2.  Assess vascular access sites hourly  3.  Every 4-6 hours minimum:  Change oxygen saturation probe site  4.  Every 4-6 hours:  If on nasal continuous positive airway pressure, respiratory therapy assess nares and determine need for appliance change or resting period.  5/31/2024 1047 by Ayanna Hill RN  Outcome: Progressing     Problem: Nutrition Deficit:  Goal: Optimize nutritional status  5/31/2024 1047 by Ayanna Hill RN  Outcome: Progressing

## 2024-05-31 NOTE — PROGRESS NOTES
Physical Therapy  Facility/Department: [address] REHAB  Rehabilitation Physical Therapy Treatment Note    NAME: Elvia Arguelles  : 1960 (63 y.o.)  MRN: [id number]  CODE STATUS: Full Code    Date of Service: 24       Restrictions:  Restrictions/Precautions: Weight Bearing, Fall Risk  Lower Extremity Weight Bearing Restrictions  Right Lower Extremity Weight Bearing: Weight Bearing As Tolerated  Position Activity Restriction  Other position/activity restrictions: R femur fx s/p Right femur cephalomedullary nail; Diet: 5 carb choices (75 gm/meal); No Added Salt (3-4 gm); Low Potassium (Less than 3000 mg/day)     Pertinent medical information:  Additional Pertinent Hx: per Dr. Tello's H&P, \"63 yrs old female with a PMHx of T2DM, adrenal insufficiency chonic steroids, metastatic melanoma on Opdivo therapy who presented to ED with R hip pain after fall, found to have R femoral fracture s/p IM nail. originally presented to [facility] ED on  after being found down by family in the living room floor. ED workup showed Rhabdomyolysis, FAVIAN and closed R femoral fracture. Admitted for further care. Orthopedic surgery consulted, performed IM nail  without perioperative complication. Course complicated by AMS, suspected PRES per multiple providers and MRI imaging findings. EEG w/epileptiform activity, started on keppra per neurology consult\"    SUBJECTIVE  Subjective  Subjective: Patient was seated in wheelchair with family, Esther, present and patient reports she is \"feeling weird.\" Esther reports patient got her higher dose of pain medication this AM for only the second time.  Pain: 8-9/10 right thigh with movement, at rest 1/10    Social/Functional History  Lives With: Spouse (, Shant, in good health and retired)  Type of Home: House  Home Layout: Able to Live on Main level with bedroom/bathroom, One level, Laundry in basement (with basement)  Home Access: Stairs to enter with rails  Entrance    Ambulated 10' to wheelchair with wheeled walker with CGA-SBA.  PT adjusted walker as she has been improving with ambulation and standing more upright.   Wheelchair>NuStep with wheeled walker with CGA. PT assisted with set up on NuStep.   Patient completed 10 minutes on NuStep with resistance at 2, seat at 4, bilateral UE at 7.  Goal to control movement and allow for improving ROM at hip.    Sit>stand with CGA  Ambulated 30' to wheelchair with wheeled walker using a very slow pace and antalgic gait pattern.  Patient having difficulty advancing right LE due to pain.    Stand>sit with CGA and verbal cues to ensure alignment with chair.  Patient tolerated session fair, and is having high levels of pain.  Patient left seated in wheelchair with care transitioned to Chiki PALAFOX.       Therapy Time   Individual Concurrent Group Co-treatment   Time In 0915         Time Out 1000         Minutes 45              Second Session Therapy Time     Individual Co-treatment   Time In 1230     Time Out 1315     Minutes 45               TOMAS Johnston [id number], 05/31/24 at 9:58 AM

## 2024-05-31 NOTE — PROGRESS NOTES
[facility] Inpatient Nephrology Note        SUBJECTIVE:    CC:FAVIAN on CKD  HPI: BP controlled. Serum creatinine stable   Soc Hx: no family present  ROS: no SOB. Seen in PT gym.     Medications     losartan  25 mg Oral Daily    levETIRAcetam  500 mg Oral BID    ferrous sulfate  324 mg Oral Daily with breakfast    insulin glargine  8 Units SubCUTAneous Nightly    insulin lispro  2 Units SubCUTAneous TID WC    insulin lispro  0-4 Units SubCUTAneous TID WC    insulin lispro  0-4 Units SubCUTAneous Nightly    escitalopram  10 mg Oral Daily    hydrocortisone  10 mg Oral Daily    pantoprazole  40 mg Oral BID AC    sodium chloride flush  5-40 mL IntraVENous 2 times per day    heparin (porcine)  5,000 Units SubCUTAneous 3 times per day    hydrocortisone  5 mg Oral Nightly    NIFEdipine  30 mg Oral Daily    lactobacillus  2 capsule Oral BID WC    rOPINIRole  2 mg Oral Nightly         OBJECTIVE      Physical    TEMPERATURE:  Current - Temp: 97.7 °F (36.5 °C); Max - Temp  Av.9 °F (36.6 °C)  Min: 97.7 °F (36.5 °C)  Max: 98.1 °F (36.7 °C)  RESPIRATIONS RANGE: Resp  Av.2  Min: 16  Max: 18  PULSE RANGE: Pulse  Av.7  Min: 71  Max: 72  BLOOD PRESSURE RANGE:  Systolic (24hrs), Av , Min:136 , Max:164   ; Diastolic (24hrs), Av, Min:66, Max:76    PULSE OXIMETRY RANGE: SpO2  Av %  Min: 95 %  Max: 99 %  24HR INTAKE/OUTPUT:    Intake/Output Summary (Last 24 hours) at 2024 0800  Last data filed at 2024 0752  Gross per 24 hour   Intake 1180 ml   Output --   Net 1180 ml         GEN: no distress  HEENT: no icterus  NECK: Trachea midline  CV: RRR  LUNGS: clear bilaterally, unlabored  ABD: + bowel sounds. No distension. No  tenderness  EXT: no edema.   SKIN: no rash  NEURO: no tremor    Data      Lab Results   Component Value Date    CREATININE 1.8 (H) 2024    BUN 22 (H) 2024     (L) 2024    K 4.7 2024     2024    CO2 25

## 2024-05-31 NOTE — PLAN OF CARE
Problem: Safety - Adult  Goal: Free from fall injury  Note: Patient free from falls this shift. Fall precautions in place at all times. Bed in lowest position with two side rails up and wheels locked. Call light within reach. Patient able and agreeable to contact for safety appropriately. Patient up to bathroom with stedy, gait-belt, and partial assist of one to get to standing position. Patient tolerated fairly well.       Problem: Discharge Planning  Goal: Discharge to home or other facility with appropriate resources  Outcome: Progressing  Discharge to home or other facility with appropriate resources:   Identify barriers to discharge with patient and caregiver   Arrange for needed discharge resources and transportation as appropriate   Refer to discharge planning if patient needs post-hospital services based on physician order or complex needs related to functional status, cognitive ability or social support system   Identify discharge learning needs (meds, wound care, etc)     Problem: Pain  Goal: Verbalizes/displays adequate comfort level or baseline comfort level  Note: Pt able to express presence/absence of pain and rate pain appropriately using numerical scale. Pain/discomfort being managed with PRN analgesics per MD orders (see MAR). Pain assessed every shift and after interventions. Patient rating pain at it worst this evening 8 to 9 on 1-10 pain scale, describes as throbbing. Medicated per request and prn Oxycodone 10 mg q4 hours, with good relief. Patient able to sleep comfortably.       Problem: Skin/Tissue Integrity  Goal: Absence of new skin breakdown  Description: 1.  Monitor for areas of redness and/or skin breakdown  2.  Assess vascular access sites hourly  3.  Every 4-6 hours minimum:  Change oxygen saturation probe site  4.  Every 4-6 hours:  If on nasal continuous positive airway pressure, respiratory therapy assess nares and determine need for appliance change or resting period.  5/28/2024  0254 by Tonya Tello, RN  Note: Skin assessment performed each shift per protocol.  Patient denies the need to be turned, encouraged to maintain skin integrity. Patient is continent of bowel and bladder. Patient noted with only scattered bruising and abrasions from fall and blanchable redness to bottom. Surgical incision to right hip with CD& I dressing.

## 2024-05-31 NOTE — PROGRESS NOTES
OCCUPATIONAL THERAPY  Progress Note   Second Session    Patient Name: Elvia Arguelles  Medical Record Number: [id number]    Treatment Diagnosis: impaired functional mobility following hip fracture and Posterior reversible encephalopathy syndrome    General  Chart Reviewed: Yes, Orders, Progress Notes  Patient assessed for rehabilitation services?: Yes  Additional Pertinent Hx: Per Dr. Tello H&P 5/27: \"Elvia Arguelles is a 63 yrs old female with a PMHx of T2DM, adrenal insufficiency chonic steroids, metastatic melanoma on Opdivo therapy who presented to ED with R hip pain after fall, found to have R femoral fracture s/p IM nail.     Patient originally presented to [facility] ED on 5/27 after being found down by family in the living room floor. ED workup showed Rhabdomyolysis, FAVIAN and closed R femoral fracture. Admitted for further care. Orthopedic surgery consulted, performed IM nail 5/19 without perioperative complication. Course complicated by AMS, suspected PRES per multiple providers and MRI imaging findings. EEG w/epileptiform activity, started on keppra per neurology consult.     she continued to stabilize medically, was evaluated by therapy services and found to be an appropriate candidate for inpatient rehabilitation for medically supervised 3 hours of therapy 5 days a week.\"  Family / Caregiver Present: Yes (pt )  Referring Practitioner: Sumeet Tello DO  Diagnosis: Closed R hip fx s/p IM nail.     Restrictions/Precautions  Restrictions/Precautions: Weight Bearing, Fall Risk  Lower Extremity Weight Bearing Restrictions  Right Lower Extremity Weight Bearing: Weight Bearing As Tolerated     Position Activity Restriction  Other position/activity restrictions: R femur fx s/p Right femur cephalomedullary nail; Diet: 5 carb choices (75 gm/meal); No Added Salt (3-4 gm); Low Potassium (Less than 3000 mg/day)    Subjective: Pt seen in the department.  Pt c/o 8/10 pain in right leg.  Pt agreed to seated  activities this date due to increased pain and just received pain medication.    Objective: Pt completed dressing tasks seated in a wheelchair.  She was limited due to decreased vision which was reported since her fall.  She was able to lift her left LE to manage socks and shoes.  She doffed her right shoe using the shoehorn and required cues for positioning of the shoehorn.  PT with some difficulty due to vision.  She doffed her right sock using the shoehorn with assist to position the shoehorn.  She donned pants over her right foot using a reacher and was able to lift her left foot and abdullahi the pant leg.  Pt donned her sock using the sock aid.  She slipped her shoe on her left foot without equipment.  She used the shoehorn with cues to abdullahi her right shoe.  Pt required extra time to push her right foot in her shoe due to increased pain.  She placed colored balls on a dowel.  The balls were placed on her left and she required min cues to locate items.  Pt especially had difficulty with contrast as she had difficulty finding the yellow ball on the table.  She required increased time to place the balls on the emelyn due vision.    Assessment:  Pt having increased pain this date after PT session so completed seated activities.  She has difficulty with dressing tasks using equipment due to decreased vision.  Pt reports her vision is blurry and also has difficulty locating items to the left.      Safety Device - Type of devices:  []  All fall risk precautions in place [] Bed alarm in place  [] Call light within reach [] Chair alarm in place [] Positioning belt [x] Gait belt [] Patient at risk for falls [] Left in bed [] Left in chair [] Telesitter in use [] Sitter present [] Nurse notified []  None      Therapy Time   Individual Co-treatment   Time In 1315     Time Out 1400     Minutes 45       Electronically signed by OMAR Hansen [id number]   on 5/31/2024 at 3:43 PM

## 2024-05-31 NOTE — PROGRESS NOTES
Patient admitted to rehab s/p ORIF to right femur fracture on 5/19 by Dr. Pandey. A/Ox4. Patient has been pleasant and cooperative with care. Transfers with stedy, gait-belt, and moderate assist x 1. Patient moving surgical leg in and out of bed herself. Mobility restrictions: WBAT. On 5 carb, RAÚL, low K diet. Medications taken whole with thin liquids without swallowing difficulty. Patient does need assist with pills to mouth or to hand due to vision difficulty (blurred vision).On SQ Heparin for DVT prophylaxis.  Skin: Intact with exception off surgical incision. Patient also noted with scattered abrasions and bruising from fall at home. Oxygen: RA. LDA: Port access to right chest, flushes easily and has a brisk blood return. Has been continent of bowel and bladder. LBM 5/30. Chair/bed alarms in use and call light in reach. Medicated this evening due to pain to surgical site. Patient rates pain 8 to 9 at its highest on 1-10 pain scale. Upon checking back on patient for pain relief, she was sleeping and in no apparent distress. Fall precautions in place. Will continue to monitor and assist as needed.

## 2024-05-31 NOTE — PROGRESS NOTES
Occupational Therapy  Facility/Department: [address] REHAB  Rehabilitation Occupational Therapy Daily Treatment Note    Date: 24  Patient Name: Elvia Arguelles       Room: N9P-5244/3272-01  MRN: [id number]  Account: [id number]   : 1960  (63 y.o.) Gender: female         Past Medical History:  has a past medical history of Adrenal insufficiency (HCC), Cancer (HCC), Closed displaced intertrochanteric fracture of right femur (HCC), Depression, Diabetes mellitus (HCC), Hx of radiation therapy, Hypertension, Hyponatremia, Insulin pump in place, Melanoma (HCC), Prolonged emergence from general anesthesia, and Restless leg syndrome.  Past Surgical History:   has a past surgical history that includes Hysterectomy; Cholecystectomy; shoulder surgery; Upper gastrointestinal endoscopy (10/22/2014); Carpal tunnel release (Bilateral, 2017); lipoma resection; Eye surgery (Right); Shoulder arthroscopy (Right, 2018); liver biopsy (Right); US BIOPSY LIVER PERCUTANEOUS (2022); CT BIOPSY ABDOMEN RETROPERITONEUM (2022); Upper gastrointestinal endoscopy (N/A, 2023); Upper gastrointestinal endoscopy (2023); Port Surgery (Right, 2023); Upper gastrointestinal endoscopy (N/A, 2024); and hip surgery (Right, 2024).    Restrictions  Restrictions/Precautions: Weight Bearing, Fall Risk  Other position/activity restrictions: R femur fx s/p Right femur cephalomedullary nail; Diet: 5 carb choices (75 gm/meal); No Added Salt (3-4 gm); Low Potassium (Less than 3000 mg/day)  Right Lower Extremity Weight Bearing: Weight Bearing As Tolerated  Equipment Used: Wheelchair    Subjective  Subjective: Patient met in therapy department. Agreeable to OT.  Restrictions/Precautions: Weight Bearing;Fall Risk             Objective     Cognition  Overall Cognitive Status: WFL  Orientation  Overall Orientation Status: Within Functional Limits         ADL  Grooming/Oral Hygiene  Assistance Level: Contact

## 2024-06-01 LAB
ANION GAP SERPL CALCULATED.3IONS-SCNC: 9 MMOL/L (ref 3–16)
BUN SERPL-MCNC: 20 MG/DL (ref 7–20)
CALCIUM SERPL-MCNC: 8.8 MG/DL (ref 8.3–10.6)
CHLORIDE SERPL-SCNC: 102 MMOL/L (ref 99–110)
CO2 SERPL-SCNC: 24 MMOL/L (ref 21–32)
CREAT SERPL-MCNC: 1.9 MG/DL (ref 0.6–1.2)
GFR SERPLBLD CREATININE-BSD FMLA CKD-EPI: 29 ML/MIN/{1.73_M2}
GLUCOSE BLD-MCNC: 139 MG/DL (ref 70–99)
GLUCOSE BLD-MCNC: 179 MG/DL (ref 70–99)
GLUCOSE BLD-MCNC: 206 MG/DL (ref 70–99)
GLUCOSE BLD-MCNC: 349 MG/DL (ref 70–99)
GLUCOSE BLD-MCNC: 71 MG/DL (ref 70–99)
GLUCOSE SERPL-MCNC: 148 MG/DL (ref 70–99)
PERFORMED ON: ABNORMAL
PERFORMED ON: NORMAL
POTASSIUM SERPL-SCNC: 4.2 MMOL/L (ref 3.5–5.1)
SODIUM SERPL-SCNC: 135 MMOL/L (ref 136–145)

## 2024-06-01 PROCEDURE — 97110 THERAPEUTIC EXERCISES: CPT

## 2024-06-01 PROCEDURE — 6370000000 HC RX 637 (ALT 250 FOR IP): Performed by: PHYSICAL MEDICINE & REHABILITATION

## 2024-06-01 PROCEDURE — 36415 COLL VENOUS BLD VENIPUNCTURE: CPT

## 2024-06-01 PROCEDURE — 2580000003 HC RX 258: Performed by: INTERNAL MEDICINE

## 2024-06-01 PROCEDURE — 97535 SELF CARE MNGMENT TRAINING: CPT

## 2024-06-01 PROCEDURE — 97530 THERAPEUTIC ACTIVITIES: CPT

## 2024-06-01 PROCEDURE — 6360000002 HC RX W HCPCS: Performed by: STUDENT IN AN ORGANIZED HEALTH CARE EDUCATION/TRAINING PROGRAM

## 2024-06-01 PROCEDURE — 1280000000 HC REHAB R&B

## 2024-06-01 PROCEDURE — 80048 BASIC METABOLIC PNL TOTAL CA: CPT

## 2024-06-01 PROCEDURE — 6370000000 HC RX 637 (ALT 250 FOR IP): Performed by: STUDENT IN AN ORGANIZED HEALTH CARE EDUCATION/TRAINING PROGRAM

## 2024-06-01 PROCEDURE — 97116 GAIT TRAINING THERAPY: CPT

## 2024-06-01 PROCEDURE — 6370000000 HC RX 637 (ALT 250 FOR IP): Performed by: INTERNAL MEDICINE

## 2024-06-01 PROCEDURE — 94760 N-INVAS EAR/PLS OXIMETRY 1: CPT

## 2024-06-01 RX ORDER — SODIUM CHLORIDE 0.9 % (FLUSH) 0.9 %
5-40 SYRINGE (ML) INJECTION PRN
Status: DISCONTINUED | OUTPATIENT
Start: 2024-06-01 | End: 2024-06-13

## 2024-06-01 RX ORDER — SODIUM CHLORIDE 9 MG/ML
INJECTION, SOLUTION INTRAVENOUS PRN
Status: DISCONTINUED | OUTPATIENT
Start: 2024-06-01 | End: 2024-06-13

## 2024-06-01 RX ORDER — SODIUM CHLORIDE 0.9 % (FLUSH) 0.9 %
5-40 SYRINGE (ML) INJECTION EVERY 12 HOURS SCHEDULED
Status: DISCONTINUED | OUTPATIENT
Start: 2024-06-01 | End: 2024-06-10

## 2024-06-01 RX ADMIN — INSULIN LISPRO 1 UNITS: 100 INJECTION, SOLUTION INTRAVENOUS; SUBCUTANEOUS at 08:39

## 2024-06-01 RX ADMIN — HYDROCORTISONE 10 MG: 10 TABLET ORAL at 08:55

## 2024-06-01 RX ADMIN — FERROUS SULFATE TAB EC 324 MG (65 MG FE EQUIVALENT) 324 MG: 324 (65 FE) TABLET DELAYED RESPONSE at 08:44

## 2024-06-01 RX ADMIN — OXYCODONE 5 MG: 5 TABLET ORAL at 11:44

## 2024-06-01 RX ADMIN — OXYCODONE 5 MG: 5 TABLET ORAL at 21:52

## 2024-06-01 RX ADMIN — LOSARTAN POTASSIUM 25 MG: 25 TABLET, FILM COATED ORAL at 08:44

## 2024-06-01 RX ADMIN — OXYCODONE 5 MG: 5 TABLET ORAL at 16:36

## 2024-06-01 RX ADMIN — INSULIN GLARGINE 9 UNITS: 100 INJECTION, SOLUTION SUBCUTANEOUS at 21:51

## 2024-06-01 RX ADMIN — ROPINIROLE HYDROCHLORIDE 2 MG: 1 TABLET, FILM COATED ORAL at 21:51

## 2024-06-01 RX ADMIN — HEPARIN SODIUM 5000 UNITS: 5000 INJECTION INTRAVENOUS; SUBCUTANEOUS at 13:31

## 2024-06-01 RX ADMIN — LEVETIRACETAM 500 MG: 500 TABLET, FILM COATED ORAL at 08:44

## 2024-06-01 RX ADMIN — SODIUM CHLORIDE, PRESERVATIVE FREE 8 ML: 5 INJECTION INTRAVENOUS at 21:55

## 2024-06-01 RX ADMIN — HYDROCORTISONE 5 MG: 5 TABLET ORAL at 22:29

## 2024-06-01 RX ADMIN — LOPERAMIDE HYDROCHLORIDE 2 MG: 2 CAPSULE ORAL at 16:23

## 2024-06-01 RX ADMIN — INSULIN LISPRO 2 UNITS: 100 INJECTION, SOLUTION INTRAVENOUS; SUBCUTANEOUS at 16:34

## 2024-06-01 RX ADMIN — OXYCODONE 5 MG: 5 TABLET ORAL at 02:21

## 2024-06-01 RX ADMIN — HEPARIN SODIUM 5000 UNITS: 5000 INJECTION INTRAVENOUS; SUBCUTANEOUS at 21:50

## 2024-06-01 RX ADMIN — METHOCARBAMOL TABLETS 750 MG: 750 TABLET, COATED ORAL at 08:39

## 2024-06-01 RX ADMIN — PANTOPRAZOLE SODIUM 40 MG: 40 TABLET, DELAYED RELEASE ORAL at 15:33

## 2024-06-01 RX ADMIN — Medication 2 CAPSULE: at 08:45

## 2024-06-01 RX ADMIN — INSULIN LISPRO 2 UNITS: 100 INJECTION, SOLUTION INTRAVENOUS; SUBCUTANEOUS at 11:43

## 2024-06-01 RX ADMIN — INSULIN LISPRO 2 UNITS: 100 INJECTION, SOLUTION INTRAVENOUS; SUBCUTANEOUS at 08:38

## 2024-06-01 RX ADMIN — LEVETIRACETAM 500 MG: 500 TABLET, FILM COATED ORAL at 21:51

## 2024-06-01 RX ADMIN — NIFEDIPINE 30 MG: 30 TABLET, EXTENDED RELEASE ORAL at 08:45

## 2024-06-01 RX ADMIN — HEPARIN SODIUM 5000 UNITS: 5000 INJECTION INTRAVENOUS; SUBCUTANEOUS at 06:15

## 2024-06-01 RX ADMIN — ESCITALOPRAM OXALATE 10 MG: 10 TABLET ORAL at 08:45

## 2024-06-01 RX ADMIN — LOPERAMIDE HYDROCHLORIDE 2 MG: 2 CAPSULE ORAL at 08:39

## 2024-06-01 RX ADMIN — PANTOPRAZOLE SODIUM 40 MG: 40 TABLET, DELAYED RELEASE ORAL at 06:15

## 2024-06-01 RX ADMIN — INSULIN LISPRO 4 UNITS: 100 INJECTION, SOLUTION INTRAVENOUS; SUBCUTANEOUS at 21:51

## 2024-06-01 RX ADMIN — OXYCODONE 5 MG: 5 TABLET ORAL at 07:37

## 2024-06-01 RX ADMIN — METHOCARBAMOL TABLETS 750 MG: 750 TABLET, COATED ORAL at 21:52

## 2024-06-01 RX ADMIN — METHOCARBAMOL TABLETS 750 MG: 750 TABLET, COATED ORAL at 14:39

## 2024-06-01 RX ADMIN — Medication 2 CAPSULE: at 16:36

## 2024-06-01 ASSESSMENT — PAIN SCALES - GENERAL
PAINLEVEL_OUTOF10: 3
PAINLEVEL_OUTOF10: 7
PAINLEVEL_OUTOF10: 2
PAINLEVEL_OUTOF10: 8
PAINLEVEL_OUTOF10: 3
PAINLEVEL_OUTOF10: 9
PAINLEVEL_OUTOF10: 8
PAINLEVEL_OUTOF10: 7
PAINLEVEL_OUTOF10: 5
PAINLEVEL_OUTOF10: 3
PAINLEVEL_OUTOF10: 9
PAINLEVEL_OUTOF10: 5
PAINLEVEL_OUTOF10: 8
PAINLEVEL_OUTOF10: 3

## 2024-06-01 ASSESSMENT — PAIN SCALES - WONG BAKER: WONGBAKER_NUMERICALRESPONSE: HURTS A LITTLE BIT

## 2024-06-01 ASSESSMENT — PAIN DESCRIPTION - LOCATION
LOCATION: HIP
LOCATION: HIP
LOCATION: HIP;LEG
LOCATION: HIP
LOCATION: HIP;LEG
LOCATION: LEG
LOCATION: HIP

## 2024-06-01 ASSESSMENT — PAIN DESCRIPTION - FREQUENCY: FREQUENCY: INTERMITTENT

## 2024-06-01 ASSESSMENT — PAIN DESCRIPTION - ORIENTATION
ORIENTATION: RIGHT

## 2024-06-01 ASSESSMENT — PAIN DESCRIPTION - DESCRIPTORS
DESCRIPTORS: ACHING;DISCOMFORT
DESCRIPTORS: ACHING;SPASM
DESCRIPTORS: THROBBING
DESCRIPTORS: ACHING
DESCRIPTORS: ACHING;DISCOMFORT
DESCRIPTORS: THROBBING
DESCRIPTORS: ACHING;DISCOMFORT;SPASM

## 2024-06-01 ASSESSMENT — PAIN DESCRIPTION - PAIN TYPE
TYPE: ACUTE PAIN;SURGICAL PAIN

## 2024-06-01 ASSESSMENT — PAIN DESCRIPTION - ONSET: ONSET: ON-GOING

## 2024-06-01 NOTE — PROGRESS NOTES
Physical Therapy  Facility/Department: [address] REHAB  Rehabilitation Physical Therapy Treatment Note    NAME: Elvia Arguelles  : 1960 (63 y.o.)  MRN: [id number]  CODE STATUS: Full Code    Date of Service: 24       Restrictions:  Restrictions/Precautions: Weight Bearing, Fall Risk  Lower Extremity Weight Bearing Restrictions  Right Lower Extremity Weight Bearing: Weight Bearing As Tolerated  Position Activity Restriction  Other position/activity restrictions: R femur fx s/p Right femur cephalomedullary nail; Diet: 5 carb choices (75 gm/meal); No Added Salt (3-4 gm); Low Potassium (Less than 3000 mg/day)     SUBJECTIVE  Subjective  Subjective: Pt sitting up in recliner chair. States she had a lot of pain with OT work this morining. Pt willing to participate in therapy w/o encouragement. Continues to c/o visual deficit since fall/hip fracture (blurred vision)  Pain: 1/10 at rest; 9/10 with movement.        Post Treatment Pain Screening  6/10; notified nurse aide that patient is requesting pain medication.      OBJECTIVE  Cognition  Overall Cognitive Status: WFL  Orientation  Overall Orientation Status: Within Functional Limits    Functional Mobility  Bed Mobility  Overall Assistance Level: Minimal Assistance  Additional Factors: Head of bed flat;Without handrails  Bridging  Assistance Level: Contact guard assist  Roll Left  Assistance Level: Minimal assistance  Sit to Supine  Assistance Level: Minimal assistance (for right leg)  Supine to Sit  Assistance Level: Minimal assistance (for R leg)  Scooting  Assistance Level: Contact guard assist  Skilled Clinical Factors: verbal cues for technique; HOB flat, no rail.  Balance  Sitting Balance: Supervision  Standing Balance: Contact guard assistance (with wh walker)  Transfers  Surface: Wheelchair;Standard toilet  Additional Factors: Verbal cues;Hand placement cues  Device: Walker  Sit to Stand  Assistance Level: Contact guard assist  Skilled Clinical    Minutes 90           Timed Code Treatment Minutes: 90 Minutes       Tapan Brooke, PT, 06/01/24 at 11:15 AM

## 2024-06-01 NOTE — PROGRESS NOTES
This is a 63 y.o.  female admitted on 5/27/2024 with Closed right hip fracture, initial encounter ([facility]) [S72.001A]. A&O X4.  Reported  pain 7/10 to right leg, pain medication given as ordered, see MAR.  Blood sugar was 686 at dinner, 8 units of lispro given, after about 3 hrs' blood sugar went down to 501. Dr Martinez was called and new orders were put in. Blood sugar check at 2 AM was 71, a cup of apple juice given, will check again later. Active bowel sounds. Last BM 6/1/2024. She is continent of bowel & bladder. Skin:surgical incision right hip. She is on a regular, 5 carb, RAÚL, low K diet. Medications taken whole with thins.Transfers with stedy x 1.  HS medication given. Tolerated well. Call light and bedside table within reach. Patient instructed to call if she needs anything.

## 2024-06-01 NOTE — PROGRESS NOTES
Medicated for muscle spasms and loose bowels. Did ask patient about  blood sugar over 600, states was eating candy again, did again review diabetic concerns.

## 2024-06-01 NOTE — PROGRESS NOTES
Patient admitted to rehab with closed right hip fracture.  A/Ox4. Transfers with stedy x1. Mobility restrictions: WBAT. On Regular 5 CCC diet, tolerating well. Medications taken whole with thin liquids. On Heparin for DVT prophylaxis.  Skin: scattered brusing and abrasions, blanchable redness to coccyx, right elbow scab TREVOR. Oxygen: RA. LDA: right chest accessed port de-accessed per night shift RN. Has been continent of bowel and bladder. LBM 5/31/2024. Chair/bed alarms in use and call light in reach. Will monitor for safety. Electronically signed by Ayanna Hill RN on 5/31/2024 at 8:32 PM

## 2024-06-01 NOTE — PLAN OF CARE
Problem: Pain  Goal: Verbalizes/displays adequate comfort level or baseline comfort level  6/1/2024 1211 by Tonya Norton RN  Outcome: Not Progressing  Flowsheets (Taken 6/1/2024 1211)  Verbalizes/displays adequate comfort level or baseline comfort level:   Encourage patient to monitor pain and request assistance   Assess pain using appropriate pain scale   Administer analgesics based on type and severity of pain and evaluate response   Implement non-pharmacological measures as appropriate and evaluate response  Note: Was on 10 mg for pain back to 5 mg, continue to monitor.  6/1/2024 0108 by Edyta Gastelum RN  Outcome: Progressing     Problem: Safety - Adult  Goal: Free from fall injury  6/1/2024 1211 by Tonya Norton RN  Outcome: Progressing  Flowsheets (Taken 6/1/2024 1211)  Free From Fall Injury: Instruct family/caregiver on patient safety  Note: Calls for needs.  6/1/2024 0108 by Edyta Gastelum RN  Outcome: Progressing     Problem: Discharge Planning  Goal: Discharge to home or other facility with appropriate resources  6/1/2024 1211 by Tonya Norton RN  Outcome: Progressing  Flowsheets (Taken 6/1/2024 0741)  Discharge to home or other facility with appropriate resources:   Identify barriers to discharge with patient and caregiver   Arrange for needed discharge resources and transportation as appropriate   Identify discharge learning needs (meds, wound care, etc)   Refer to discharge planning if patient needs post-hospital services based on physician order or complex needs related to functional status, cognitive ability or social support system  6/1/2024 0108 by Edyta Gastelum RN  Outcome: Progressing     Problem: ABCDS Injury Assessment  Goal: Absence of physical injury  Outcome: Progressing  Flowsheets (Taken 6/1/2024 1011)  Absence of Physical Injury: Implement safety measures based on patient assessment     Problem: Skin/Tissue Integrity  Goal: Absence of new skin  breakdown  Description: 1.  Monitor for areas of redness and/or skin breakdown  2.  Assess vascular access sites hourly  3.  Every 4-6 hours minimum:  Change oxygen saturation probe site  4.  Every 4-6 hours:  If on nasal continuous positive airway pressure, respiratory therapy assess nares and determine need for appliance change or resting period.  6/1/2024 1211 by Tonya Norton, RN  Outcome: Progressing  6/1/2024 0108 by Edyta Gastelum, RN  Outcome: Progressing     Problem: Nutrition Deficit:  Goal: Optimize nutritional status  Outcome: Progressing     Problem: Pain  Goal: Verbalizes/displays adequate comfort level or baseline comfort level  6/1/2024 1211 by Tonya Norton, RN  Outcome: Not Progressing  Flowsheets (Taken 6/1/2024 1211)  Verbalizes/displays adequate comfort level or baseline comfort level:   Encourage patient to monitor pain and request assistance   Assess pain using appropriate pain scale   Administer analgesics based on type and severity of pain and evaluate response   Implement non-pharmacological measures as appropriate and evaluate response  Note: Was on 10 mg for pain back to 5 mg, continue to monitor.  6/1/2024 0108 by Edyta Gastelum, RN  Outcome: Progressing

## 2024-06-01 NOTE — PROGRESS NOTES
Occupational Therapy  Facility/Department: [address] REHAB  Rehabilitation Occupational Therapy Daily Treatment Note    Date: 24  Patient Name: Elvia Arguelles       Room: M6P-0209/3272-01  MRN: [id number]  Account: [id number]   : 1960  (63 y.o.) Gender: female         Past Medical History:  has a past medical history of Adrenal insufficiency (HCC), Cancer (HCC), Closed displaced intertrochanteric fracture of right femur (HCC), Depression, Diabetes mellitus (HCC), Hx of radiation therapy, Hypertension, Hyponatremia, Insulin pump in place, Melanoma (HCC), Prolonged emergence from general anesthesia, and Restless leg syndrome.  Past Surgical History:   has a past surgical history that includes Hysterectomy; Cholecystectomy; shoulder surgery; Upper gastrointestinal endoscopy (10/22/2014); Carpal tunnel release (Bilateral, 2017); lipoma resection; Eye surgery (Right); Shoulder arthroscopy (Right, 2018); liver biopsy (Right); US BIOPSY LIVER PERCUTANEOUS (2022); CT BIOPSY ABDOMEN RETROPERITONEUM (2022); Upper gastrointestinal endoscopy (N/A, 2023); Upper gastrointestinal endoscopy (2023); Port Surgery (Right, 2023); Upper gastrointestinal endoscopy (N/A, 2024); and hip surgery (Right, 2024).    Restrictions  Restrictions/Precautions: Weight Bearing, Fall Risk  Other position/activity restrictions: R femur fx s/p Right femur cephalomedullary nail; Diet: 5 carb choices (75 gm/meal); No Added Salt (3-4 gm); Low Potassium (Less than 3000 mg/day)  Right Lower Extremity Weight Bearing: Weight Bearing As Tolerated  Equipment Used: Wheelchair    Subjective  Subjective: Pt met BS, in bed.Pt agreeable to OT (for sponge bath only). Pt c/o 9/10, R hip pain. RN notified. Pt's pain to 7/10 later, after pain med given. Pt requesting muscle relaxor at end of tx.(RN notified)  Restrictions/Precautions: Weight Bearing;Fall Risk             Objective     Cognition  Overall  Cognitive Status: WFL  Orientation  Overall Orientation Status: Within Functional Limits         ADL  Grooming/Oral Hygiene  Assistance Level: Set-up  Skilled Clinical Factors: seated from w/c at sink to brush teeth  Upper Extremity Bathing  Assistance Level: Set-up  Skilled Clinical Factors: Pt sponge bathed from chair at sink. Pt assisted to locate soap, due to low vision, and then able to pour onto wash cloth.  Lower Extremity Bathing  Equipment Provided: Long-handled sponge  Assistance Level: Minimal assistance;Verbal cues;Increased time to complete  Skilled Clinical Factors: Pt used Long handled sponge for feet. Pt stood at sink with CGA to wash buttocks, jhon areas. Assist to dry feet.  Upper Extremity Dressing  Assistance Level: Set-up  Skilled Clinical Factors: doffed shirt and donned new one. Able to decipher front vs back by feeling for label on shirt. Declined wearing a bra.  Lower Extremity Dressing  Equipment Provided: Reachers  Assistance Level: Moderate assistance;Verbal cues;Increased time to complete  Skilled Clinical Factors: Pt used reacher, w/hand over hand assist, due to low vision, difficulty placing reacher in proper place for RLE to be threaded. Pt able to use her L hand to then hold left side of brief, bring LLE up and thread leg thru. Pt assisted very minimally in stance to manage over hips.  Putting On/Taking Off Footwear  Equipment Provided: Sock aid;Long-handle shoe horn  Assistance Level: Moderate assistance;Verbal cues;Increased time to complete  Skilled Clinical Factors: Pt able to cross LLE to don sock and cues for orientation to shoe on floor, then slipped L foot into  slip in type shoe. Pt applied R sock to sock aid, w/assist to straighten. Pt placed foot into sock aid, assist to find opening due to low vision, and having difficulty feeling wfoot to locate. Pt assisted to don R shoe, w/shoe horn.  Toileting  Assistance Level: Moderate assistance;Verbal cues;Increased time  continued therapy after discharge;24 hour supervision or assist;Continue to assess pending progress;S Level 1;Home with Home health OT  OT Equipment Recommendations  Other: TSF, RW, TTB, hip kit, walker basket/bag, cont to assess.  Safety Devices  Safety Devices in place: Yes  Type of devices: Call light within reach;Left in chair;Gait belt;Chair alarm in place    Patient Education  Education  Education Given To: Patient  Education Provided: Plan of Care;ADL Function;Equipment;Safety;Transfer Training;Mobility Training;Fall Prevention Strategies  Education Provided Comments: use of AE for LB bathing/dressing  Education Method: Demonstration;Verbal;Teach Back  Barriers to Learning: Vision;Other (Comment) (vision impairment)  Education Outcome: Verbalized understanding;Continued education needed  Skilled Clinical Factors: mod cues w/ LB dressing tasks d/t impaired visual acuity    Plan  Occupational Therapy Plan  Times Per Week: 5-6x  Times Per Day: Twice a day  Days Per Week: 5 Days  Hours Per Day: 1.5 hours  Current Treatment Recommendations: Strengthening;Balance training;Functional mobility training;Endurance training;Gait training;Safety education & training;Self-Care / ADL;Cognitive reorientation;Equipment evaluation, education, & procurement;Patient/Caregiver education & training;Positioning;Pain management;Home management training;Modalities    Goals  Patient Goals   Patient goals : \"To go back home and be normal again\"  Short Term Goals  Time Frame for Short Term Goals: 1 week  Short Term Goal 1: Pt will complete functional mobility/txs using LRAD SBA  Short Term Goal 2: Pt will complete toileting SBA  Short Term Goal 3: Pt will complete bathing using AE, as needed, SBA  Short Term Goal 5: Pt will maintain stance throughout functional task for atleast 5min SBA to address strength/activity tolerance for ADL/IADL function  Short Term Goal 6: Pt will perform BUE therex to address strength/endurance for

## 2024-06-01 NOTE — PROGRESS NOTES
[facility] Inpatient Nephrology Note        SUBJECTIVE:    CC:FAVIAN on CKD  HPI: BP controlled. BS was > 600 yesterday Repeat labs showed CR 1.9 mg Na+ 126 meq yesterday  Soc Hx:  family present  ROS: no SOB. Not taking much PO       Medications     insulin glargine  9 Units SubCUTAneous Nightly    losartan  25 mg Oral Daily    levETIRAcetam  500 mg Oral BID    ferrous sulfate  324 mg Oral Daily with breakfast    insulin lispro  2 Units SubCUTAneous TID WC    insulin lispro  0-4 Units SubCUTAneous TID WC    insulin lispro  0-4 Units SubCUTAneous Nightly    escitalopram  10 mg Oral Daily    hydrocortisone  10 mg Oral Daily    pantoprazole  40 mg Oral BID AC    heparin (porcine)  5,000 Units SubCUTAneous 3 times per day    hydrocortisone  5 mg Oral Nightly    NIFEdipine  30 mg Oral Daily    lactobacillus  2 capsule Oral BID WC    rOPINIRole  2 mg Oral Nightly         OBJECTIVE      Physical    TEMPERATURE:  Current - Temp: 98.9 °F (37.2 °C); Max - Temp  Av.7 °F (37.1 °C)  Min: 98.5 °F (36.9 °C)  Max: 98.9 °F (37.2 °C)  RESPIRATIONS RANGE: Resp  Av.8  Min: 16  Max: 18  PULSE RANGE: Pulse  Av  Min: 64  Max: 90  BLOOD PRESSURE RANGE:  Systolic (24hrs), Av , Min:118 , Max:156   ; Diastolic (24hrs), Av, Min:62, Max:75    PULSE OXIMETRY RANGE: SpO2  Av %  Min: 95 %  Max: 97 %  24HR INTAKE/OUTPUT:    Intake/Output Summary (Last 24 hours) at 2024 1422  Last data filed at 2024 1208  Gross per 24 hour   Intake 720 ml   Output --   Net 720 ml       GEN: no distress  HEENT: no icterus  NECK: Trachea midline  CV: RRR + edema RLE   LUNGS: clear bilaterally, unlabored  ABD: + bowel sounds. No distension. No  tenderness  EXT: no edema.   SKIN: no rash  NEURO: no tremor    Data      Lab Results   Component Value Date    CREATININE 1.9 (H) 2024    BUN 20 2024     (L) 2024    K 4.6 2024    CL 94 (L) 2024    CO2 23 2024      Lab Results   Component Value Date    WBC 7.2 05/31/2024    HGB 8.7 (L) 05/31/2024    HCT 25.1 (L) 05/31/2024    MCV 86.9 05/31/2024     05/31/2024     Lab Results   Component Value Date    IRON 16 (L) 05/21/2024    TIBC 189 (L) 05/21/2024    FERRITIN 495.2 (H) 05/21/2024       ASSESSMENT     Patient Active Problem List   Diagnosis    Duodenal erosion    Left elbow pain    Type 1 diabetes mellitus (HCC)    Vitamin D deficiency    Family history of 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)    History of melanoma    Hypoglycemia    Metastatic melanoma to pancreas (HCC)    Lactic acidosis    Hypercholesterolemia    Elevated brain natriuretic peptide (BNP) level    Microcytosis    Adrenal insufficiency (HCC)    Type 1 diabetes mellitus with ketoacidosis without coma (HCC)    Hyperkalemia    Septic shock (HCC)    Metabolic encephalopathy    Metastatic melanoma (HCC)    Elevated procalcitonin    Lactic acid acidosis    Neutrophilia    Acute kidney injury superimposed on CKD (HCC)    Diabetic ketoacidosis without coma associated with type 1 diabetes mellitus (HCC)    Poorly controlled type 1 diabetes mellitus with neuropathy (HCC)    Mixed hyperlipidemia    Poorly controlled type 1 diabetes mellitus with retinopathy (HCC)    Primary hypertension    Stage 3 chronic kidney disease (HCC)    DKA, type 1, not at goal ([facility])    Intractable nausea and vomiting    Closed displaced intertrochanteric fracture of right femur ([facility])    Closed right hip fracture, initial encounter ([facility])    Pre-operative

## 2024-06-01 NOTE — PROGRESS NOTES
Patient admitted to rehab with right hip fx.  A/Ox4. Transfers with stedy or walker. Mobility restrictions: hip safety. On 5 carb, RAÚL, low k diet, tolerating fair. Medications taken whole. On Heparin for DVT prophylaxis.  Skin: monitor incision. LDA: port re accessed. Has  been incontinent and continent of bowel and continent of bladder. LBMs today.  Chair/bed alarms in use and call light in reach. Will monitor for safety.

## 2024-06-02 VITALS
DIASTOLIC BLOOD PRESSURE: 69 MMHG | HEART RATE: 86 BPM | SYSTOLIC BLOOD PRESSURE: 137 MMHG | BODY MASS INDEX: 25.64 KG/M2 | HEIGHT: 59 IN | RESPIRATION RATE: 16 BRPM | TEMPERATURE: 98.2 F | WEIGHT: 127.21 LBS | OXYGEN SATURATION: 99 %

## 2024-06-02 LAB
ALBUMIN SERPL-MCNC: 2.6 G/DL (ref 3.4–5)
ANION GAP SERPL CALCULATED.3IONS-SCNC: 7 MMOL/L (ref 3–16)
BUN SERPL-MCNC: 21 MG/DL (ref 7–20)
CALCIUM SERPL-MCNC: 9.1 MG/DL (ref 8.3–10.6)
CHLORIDE SERPL-SCNC: 103 MMOL/L (ref 99–110)
CO2 SERPL-SCNC: 26 MMOL/L (ref 21–32)
CREAT SERPL-MCNC: 1.8 MG/DL (ref 0.6–1.2)
GFR SERPLBLD CREATININE-BSD FMLA CKD-EPI: 31 ML/MIN/{1.73_M2}
GLUCOSE BLD-MCNC: 132 MG/DL (ref 70–99)
GLUCOSE BLD-MCNC: 215 MG/DL (ref 70–99)
GLUCOSE BLD-MCNC: 229 MG/DL (ref 70–99)
GLUCOSE BLD-MCNC: 264 MG/DL (ref 70–99)
GLUCOSE SERPL-MCNC: 203 MG/DL (ref 70–99)
PERFORMED ON: ABNORMAL
PHOSPHATE SERPL-MCNC: 3.1 MG/DL (ref 2.5–4.9)
POTASSIUM SERPL-SCNC: 4.5 MMOL/L (ref 3.5–5.1)
SODIUM SERPL-SCNC: 136 MMOL/L (ref 136–145)

## 2024-06-02 PROCEDURE — 80069 RENAL FUNCTION PANEL: CPT

## 2024-06-02 PROCEDURE — 36415 COLL VENOUS BLD VENIPUNCTURE: CPT

## 2024-06-02 PROCEDURE — 6370000000 HC RX 637 (ALT 250 FOR IP): Performed by: STUDENT IN AN ORGANIZED HEALTH CARE EDUCATION/TRAINING PROGRAM

## 2024-06-02 PROCEDURE — 6370000000 HC RX 637 (ALT 250 FOR IP): Performed by: INTERNAL MEDICINE

## 2024-06-02 PROCEDURE — 6370000000 HC RX 637 (ALT 250 FOR IP): Performed by: PHYSICAL MEDICINE & REHABILITATION

## 2024-06-02 PROCEDURE — 94760 N-INVAS EAR/PLS OXIMETRY 1: CPT

## 2024-06-02 PROCEDURE — 2580000003 HC RX 258: Performed by: INTERNAL MEDICINE

## 2024-06-02 PROCEDURE — 6360000002 HC RX W HCPCS: Performed by: STUDENT IN AN ORGANIZED HEALTH CARE EDUCATION/TRAINING PROGRAM

## 2024-06-02 PROCEDURE — 1280000000 HC REHAB R&B

## 2024-06-02 RX ADMIN — PANTOPRAZOLE SODIUM 40 MG: 40 TABLET, DELAYED RELEASE ORAL at 15:18

## 2024-06-02 RX ADMIN — ACETAMINOPHEN 325MG 650 MG: 325 TABLET ORAL at 12:29

## 2024-06-02 RX ADMIN — ONDANSETRON 4 MG: 4 TABLET, ORALLY DISINTEGRATING ORAL at 03:47

## 2024-06-02 RX ADMIN — FERROUS SULFATE TAB EC 324 MG (65 MG FE EQUIVALENT) 324 MG: 324 (65 FE) TABLET DELAYED RESPONSE at 11:30

## 2024-06-02 RX ADMIN — HYDROCORTISONE 10 MG: 10 TABLET ORAL at 08:33

## 2024-06-02 RX ADMIN — METHOCARBAMOL TABLETS 750 MG: 750 TABLET, COATED ORAL at 21:18

## 2024-06-02 RX ADMIN — Medication 2 CAPSULE: at 08:23

## 2024-06-02 RX ADMIN — HEPARIN SODIUM 5000 UNITS: 5000 INJECTION INTRAVENOUS; SUBCUTANEOUS at 13:45

## 2024-06-02 RX ADMIN — NIFEDIPINE 30 MG: 30 TABLET, EXTENDED RELEASE ORAL at 08:33

## 2024-06-02 RX ADMIN — HEPARIN SODIUM 5000 UNITS: 5000 INJECTION INTRAVENOUS; SUBCUTANEOUS at 05:45

## 2024-06-02 RX ADMIN — OXYCODONE 5 MG: 5 TABLET ORAL at 23:44

## 2024-06-02 RX ADMIN — LEVETIRACETAM 500 MG: 500 TABLET, FILM COATED ORAL at 21:18

## 2024-06-02 RX ADMIN — OXYCODONE 5 MG: 5 TABLET ORAL at 13:48

## 2024-06-02 RX ADMIN — METHOCARBAMOL TABLETS 750 MG: 750 TABLET, COATED ORAL at 05:45

## 2024-06-02 RX ADMIN — INSULIN LISPRO 2 UNITS: 100 INJECTION, SOLUTION INTRAVENOUS; SUBCUTANEOUS at 16:33

## 2024-06-02 RX ADMIN — METHOCARBAMOL TABLETS 750 MG: 750 TABLET, COATED ORAL at 11:30

## 2024-06-02 RX ADMIN — INSULIN LISPRO 2 UNITS: 100 INJECTION, SOLUTION INTRAVENOUS; SUBCUTANEOUS at 11:29

## 2024-06-02 RX ADMIN — HEPARIN SODIUM 5000 UNITS: 5000 INJECTION INTRAVENOUS; SUBCUTANEOUS at 21:19

## 2024-06-02 RX ADMIN — PANTOPRAZOLE SODIUM 40 MG: 40 TABLET, DELAYED RELEASE ORAL at 05:45

## 2024-06-02 RX ADMIN — OXYCODONE 5 MG: 5 TABLET ORAL at 18:58

## 2024-06-02 RX ADMIN — OXYCODONE 5 MG: 5 TABLET ORAL at 04:07

## 2024-06-02 RX ADMIN — SODIUM CHLORIDE, PRESERVATIVE FREE 8 ML: 5 INJECTION INTRAVENOUS at 21:19

## 2024-06-02 RX ADMIN — INSULIN GLARGINE 9 UNITS: 100 INJECTION, SOLUTION SUBCUTANEOUS at 21:19

## 2024-06-02 RX ADMIN — INSULIN LISPRO 2 UNITS: 100 INJECTION, SOLUTION INTRAVENOUS; SUBCUTANEOUS at 08:20

## 2024-06-02 RX ADMIN — INSULIN LISPRO 1 UNITS: 100 INJECTION, SOLUTION INTRAVENOUS; SUBCUTANEOUS at 16:34

## 2024-06-02 RX ADMIN — LOSARTAN POTASSIUM 25 MG: 25 TABLET, FILM COATED ORAL at 08:33

## 2024-06-02 RX ADMIN — LOPERAMIDE HYDROCHLORIDE 2 MG: 2 CAPSULE ORAL at 11:23

## 2024-06-02 RX ADMIN — ESCITALOPRAM OXALATE 10 MG: 10 TABLET ORAL at 08:33

## 2024-06-02 RX ADMIN — HYDROCORTISONE 5 MG: 5 TABLET ORAL at 21:18

## 2024-06-02 RX ADMIN — LEVETIRACETAM 500 MG: 500 TABLET, FILM COATED ORAL at 08:32

## 2024-06-02 RX ADMIN — SODIUM CHLORIDE, PRESERVATIVE FREE 10 ML: 5 INJECTION INTRAVENOUS at 08:26

## 2024-06-02 RX ADMIN — OXYCODONE 5 MG: 5 TABLET ORAL at 08:44

## 2024-06-02 RX ADMIN — Medication 2 CAPSULE: at 16:34

## 2024-06-02 RX ADMIN — ROPINIROLE HYDROCHLORIDE 2 MG: 1 TABLET, FILM COATED ORAL at 21:18

## 2024-06-02 RX ADMIN — INSULIN LISPRO 1 UNITS: 100 INJECTION, SOLUTION INTRAVENOUS; SUBCUTANEOUS at 08:21

## 2024-06-02 ASSESSMENT — PAIN SCALES - GENERAL
PAINLEVEL_OUTOF10: 3
PAINLEVEL_OUTOF10: 5
PAINLEVEL_OUTOF10: 5
PAINLEVEL_OUTOF10: 6
PAINLEVEL_OUTOF10: 8
PAINLEVEL_OUTOF10: 8
PAINLEVEL_OUTOF10: 6
PAINLEVEL_OUTOF10: 3
PAINLEVEL_OUTOF10: 6
PAINLEVEL_OUTOF10: 7
PAINLEVEL_OUTOF10: 7
PAINLEVEL_OUTOF10: 8
PAINLEVEL_OUTOF10: 7
PAINLEVEL_OUTOF10: 7
PAINLEVEL_OUTOF10: 3
PAINLEVEL_OUTOF10: 3

## 2024-06-02 ASSESSMENT — PAIN DESCRIPTION - ONSET: ONSET: ON-GOING

## 2024-06-02 ASSESSMENT — PAIN DESCRIPTION - DESCRIPTORS
DESCRIPTORS: THROBBING
DESCRIPTORS: ACHING;DISCOMFORT;SPASM
DESCRIPTORS: THROBBING
DESCRIPTORS: ACHING;DISCOMFORT
DESCRIPTORS: ACHING
DESCRIPTORS: THROBBING
DESCRIPTORS: ACHING;DISCOMFORT;DULL
DESCRIPTORS: THROBBING
DESCRIPTORS: THROBBING

## 2024-06-02 ASSESSMENT — PAIN DESCRIPTION - PAIN TYPE
TYPE: ACUTE PAIN;SURGICAL PAIN
TYPE: ACUTE PAIN

## 2024-06-02 ASSESSMENT — PAIN DESCRIPTION - LOCATION
LOCATION: HIP
LOCATION: HEAD
LOCATION: HIP
LOCATION: HIP;LEG

## 2024-06-02 ASSESSMENT — PAIN - FUNCTIONAL ASSESSMENT
PAIN_FUNCTIONAL_ASSESSMENT: ACTIVITIES ARE NOT PREVENTED

## 2024-06-02 ASSESSMENT — PAIN DESCRIPTION - ORIENTATION
ORIENTATION: RIGHT
ORIENTATION: RIGHT;LEFT
ORIENTATION: RIGHT

## 2024-06-02 ASSESSMENT — PAIN DESCRIPTION - FREQUENCY
FREQUENCY: INTERMITTENT
FREQUENCY: INTERMITTENT

## 2024-06-02 NOTE — PROGRESS NOTES
[facility] Inpatient Nephrology Note        SUBJECTIVE:    CC:FAVIAN on CKD  HPI: BP controlled. BS better this AM    Na+ 126 meq yesterday rechecked was 135 meq   Soc Hx:  no family present  ROS: no SOB.       Medications     sodium chloride flush  5-40 mL IntraVENous 2 times per day    insulin glargine  9 Units SubCUTAneous Nightly    losartan  25 mg Oral Daily    levETIRAcetam  500 mg Oral BID    ferrous sulfate  324 mg Oral Daily with breakfast    insulin lispro  2 Units SubCUTAneous TID WC    insulin lispro  0-4 Units SubCUTAneous TID WC    insulin lispro  0-4 Units SubCUTAneous Nightly    escitalopram  10 mg Oral Daily    hydrocortisone  10 mg Oral Daily    pantoprazole  40 mg Oral BID AC    heparin (porcine)  5,000 Units SubCUTAneous 3 times per day    hydrocortisone  5 mg Oral Nightly    NIFEdipine  30 mg Oral Daily    lactobacillus  2 capsule Oral BID WC    rOPINIRole  2 mg Oral Nightly         OBJECTIVE      Physical    TEMPERATURE:  Current - Temp: 98.2 °F (36.8 °C); Max - Temp  Av.2 °F (36.8 °C)  Min: 98.1 °F (36.7 °C)  Max: 98.2 °F (36.8 °C)  RESPIRATIONS RANGE: Resp  Av.9  Min: 16  Max: 17  PULSE RANGE: Pulse  Av  Min: 70  Max: 86  BLOOD PRESSURE RANGE:  Systolic (24hrs), Av , Min:120 , Max:137   ; Diastolic (24hrs), Av, Min:64, Max:69    PULSE OXIMETRY RANGE: SpO2  Av.5 %  Min: 99 %  Max: 100 %  24HR INTAKE/OUTPUT:    Intake/Output Summary (Last 24 hours) at 2024 1452  Last data filed at 2024 1230  Gross per 24 hour   Intake 480 ml   Output --   Net 480 ml       GEN: no distress  HEENT: no icterus  NECK: Trachea midline  CV: RRR + edema RLE   LUNGS: clear bilaterally, unlabored  ABD: + bowel sounds. No distension. No  tenderness  EXT: no edema.   SKIN: no rash  NEURO: no tremor    Data      Lab Results   Component Value Date    CREATININE 1.8 (H) 2024    BUN 21 (H) 2024     2024    K 4.5 2024

## 2024-06-02 NOTE — PLAN OF CARE
Problem: Safety - Adult  Goal: Free from fall injury  6/2/2024 0150 by Martha Marmolejo RN  Outcome: Progressing  Note: Fall risk band on patient. Orange light on outside of room. Non skid footwear in place. Alarms used appropriately. Patient instructed to call and wait for staff before getting up. Rounding done to anticipate needs. Appropriate safety devices used for transfers.

## 2024-06-02 NOTE — PROGRESS NOTES
No further c/o nausea, needed to use stedy due to pain issue this am, pain medication and muscle relaxant not due.

## 2024-06-02 NOTE — PROGRESS NOTES
Patient admitted to rehab with right hip fx repair.  A/Ox4. Transfers with stedy, has declined walker thus far today. On 5 carb, RAÚL, low k diet diet, tolerating fair to good, no further c/o nausea. Medications taken whole. On Heparin for DVT prophylaxis.  Skin: monitor incision. LDA: port to right chest. Has been incontinent and continent of bowel and continent of bladder. LBM today, did take prn Imodium, states takes at home. Chair/bed alarms in use and call light in reach. Will monitor for safety.

## 2024-06-02 NOTE — PLAN OF CARE
Problem: Safety - Adult  Goal: Free from fall injury  6/2/2024 1011 by Tonya Norton RN  Outcome: Progressing  Flowsheets (Taken 6/2/2024 0829)  Free From Fall Injury: Instruct family/caregiver on patient safety  6/2/2024 0150 by Martha Marmolejo RN  Outcome: Progressing  Note: Fall risk band on patient. Orange light on outside of room. Non skid footwear in place. Alarms used appropriately. Patient instructed to call and wait for staff before getting up. Rounding done to anticipate needs. Appropriate safety devices used for transfers.     Problem: Discharge Planning  Goal: Discharge to home or other facility with appropriate resources  6/2/2024 1011 by Tonya Norton RN  Outcome: Progressing  Flowsheets (Taken 6/2/2024 0744)  Discharge to home or other facility with appropriate resources:   Identify barriers to discharge with patient and caregiver   Arrange for needed discharge resources and transportation as appropriate   Identify discharge learning needs (meds, wound care, etc)   Refer to discharge planning if patient needs post-hospital services based on physician order or complex needs related to functional status, cognitive ability or social support system  6/2/2024 0150 by Martha Marmolejo RN  Outcome: Progressing  Flowsheets (Taken 6/1/2024 1900)  Discharge to home or other facility with appropriate resources: Identify barriers to discharge with patient and caregiver     Problem: Pain  Goal: Verbalizes/displays adequate comfort level or baseline comfort level  6/2/2024 1011 by Tonya Norton RN  Outcome: Progressing  6/2/2024 0150 by Martha Marmolejo RN  Outcome: Progressing     Problem: ABCDS Injury Assessment  Goal: Absence of physical injury  6/2/2024 1011 by Tonya Norton RN  Outcome: Progressing  Flowsheets (Taken 6/2/2024 0829)  Absence of Physical Injury: Implement safety measures based on patient assessment  6/2/2024 0150 by Martha Marmolejo RN  Outcome: Progressing     Problem:  Skin/Tissue Integrity  Goal: Absence of new skin breakdown  Description: 1.  Monitor for areas of redness and/or skin breakdown  2.  Assess vascular access sites hourly  3.  Every 4-6 hours minimum:  Change oxygen saturation probe site  4.  Every 4-6 hours:  If on nasal continuous positive airway pressure, respiratory therapy assess nares and determine need for appliance change or resting period.  6/2/2024 1011 by Tonya Norton, RN  Outcome: Progressing  6/2/2024 0150 by Martha Marmolejo RN  Outcome: Progressing     Problem: Nutrition Deficit:  Goal: Optimize nutritional status  6/2/2024 1011 by Tonya Norton RN  Outcome: Progressing  6/2/2024 0150 by Martha Marmolejo RN  Outcome: Progressing

## 2024-06-03 ENCOUNTER — APPOINTMENT (OUTPATIENT)
Dept: GENERAL RADIOLOGY | Age: 64
DRG: 559 | End: 2024-06-03
Attending: STUDENT IN AN ORGANIZED HEALTH CARE EDUCATION/TRAINING PROGRAM
Payer: MEDICARE

## 2024-06-03 LAB
ALBUMIN SERPL-MCNC: 2.8 G/DL (ref 3.4–5)
ANION GAP SERPL CALCULATED.3IONS-SCNC: 9 MMOL/L (ref 3–16)
BASOPHILS # BLD: 0.1 K/UL (ref 0–0.2)
BASOPHILS NFR BLD: 1.7 %
BUN SERPL-MCNC: 21 MG/DL (ref 7–20)
CALCIUM SERPL-MCNC: 8.9 MG/DL (ref 8.3–10.6)
CHLORIDE SERPL-SCNC: 104 MMOL/L (ref 99–110)
CO2 SERPL-SCNC: 25 MMOL/L (ref 21–32)
CREAT SERPL-MCNC: 1.9 MG/DL (ref 0.6–1.2)
DEPRECATED RDW RBC AUTO: 17.5 % (ref 12.4–15.4)
EOSINOPHIL # BLD: 0.2 K/UL (ref 0–0.6)
EOSINOPHIL NFR BLD: 2.5 %
GFR SERPLBLD CREATININE-BSD FMLA CKD-EPI: 29 ML/MIN/{1.73_M2}
GLUCOSE BLD-MCNC: 112 MG/DL (ref 70–99)
GLUCOSE BLD-MCNC: 137 MG/DL (ref 70–99)
GLUCOSE BLD-MCNC: 174 MG/DL (ref 70–99)
GLUCOSE BLD-MCNC: 247 MG/DL (ref 70–99)
GLUCOSE SERPL-MCNC: 268 MG/DL (ref 70–99)
HCT VFR BLD AUTO: 23.2 % (ref 36–48)
HGB BLD-MCNC: 7.8 G/DL (ref 12–16)
LYMPHOCYTES # BLD: 1.3 K/UL (ref 1–5.1)
LYMPHOCYTES NFR BLD: 20.9 %
MCH RBC QN AUTO: 29.5 PG (ref 26–34)
MCHC RBC AUTO-ENTMCNC: 33.7 G/DL (ref 31–36)
MCV RBC AUTO: 87.6 FL (ref 80–100)
MONOCYTES # BLD: 0.6 K/UL (ref 0–1.3)
MONOCYTES NFR BLD: 9.9 %
NEUTROPHILS # BLD: 4 K/UL (ref 1.7–7.7)
NEUTROPHILS NFR BLD: 65 %
PERFORMED ON: ABNORMAL
PHOSPHATE SERPL-MCNC: 3.1 MG/DL (ref 2.5–4.9)
PLATELET # BLD AUTO: 207 K/UL (ref 135–450)
PMV BLD AUTO: 8.3 FL (ref 5–10.5)
POTASSIUM SERPL-SCNC: 4.1 MMOL/L (ref 3.5–5.1)
RBC # BLD AUTO: 2.64 M/UL (ref 4–5.2)
SODIUM SERPL-SCNC: 138 MMOL/L (ref 136–145)
WBC # BLD AUTO: 6.1 K/UL (ref 4–11)

## 2024-06-03 PROCEDURE — 97129 THER IVNTJ 1ST 15 MIN: CPT

## 2024-06-03 PROCEDURE — 97130 THER IVNTJ EA ADDL 15 MIN: CPT

## 2024-06-03 PROCEDURE — 2580000003 HC RX 258: Performed by: INTERNAL MEDICINE

## 2024-06-03 PROCEDURE — 80069 RENAL FUNCTION PANEL: CPT

## 2024-06-03 PROCEDURE — 97110 THERAPEUTIC EXERCISES: CPT

## 2024-06-03 PROCEDURE — 97530 THERAPEUTIC ACTIVITIES: CPT

## 2024-06-03 PROCEDURE — 85025 COMPLETE CBC W/AUTO DIFF WBC: CPT

## 2024-06-03 PROCEDURE — 6360000002 HC RX W HCPCS: Performed by: STUDENT IN AN ORGANIZED HEALTH CARE EDUCATION/TRAINING PROGRAM

## 2024-06-03 PROCEDURE — 97116 GAIT TRAINING THERAPY: CPT

## 2024-06-03 PROCEDURE — 6370000000 HC RX 637 (ALT 250 FOR IP): Performed by: STUDENT IN AN ORGANIZED HEALTH CARE EDUCATION/TRAINING PROGRAM

## 2024-06-03 PROCEDURE — 6370000000 HC RX 637 (ALT 250 FOR IP): Performed by: NURSE PRACTITIONER

## 2024-06-03 PROCEDURE — 1280000000 HC REHAB R&B

## 2024-06-03 PROCEDURE — 73502 X-RAY EXAM HIP UNI 2-3 VIEWS: CPT

## 2024-06-03 PROCEDURE — 6370000000 HC RX 637 (ALT 250 FOR IP): Performed by: PHYSICAL MEDICINE & REHABILITATION

## 2024-06-03 PROCEDURE — 94760 N-INVAS EAR/PLS OXIMETRY 1: CPT

## 2024-06-03 PROCEDURE — 6370000000 HC RX 637 (ALT 250 FOR IP): Performed by: INTERNAL MEDICINE

## 2024-06-03 RX ORDER — FENTANYL 12.5 UG/1
1 PATCH TRANSDERMAL
Status: DISCONTINUED | OUTPATIENT
Start: 2024-06-03 | End: 2024-06-14 | Stop reason: HOSPADM

## 2024-06-03 RX ORDER — INSULIN LISPRO 100 [IU]/ML
3 INJECTION, SOLUTION INTRAVENOUS; SUBCUTANEOUS
Status: DISCONTINUED | OUTPATIENT
Start: 2024-06-03 | End: 2024-06-06

## 2024-06-03 RX ORDER — INSULIN GLARGINE 100 [IU]/ML
11 INJECTION, SOLUTION SUBCUTANEOUS NIGHTLY
Status: DISCONTINUED | OUTPATIENT
Start: 2024-06-03 | End: 2024-06-05

## 2024-06-03 RX ADMIN — METHOCARBAMOL TABLETS 750 MG: 750 TABLET, COATED ORAL at 04:41

## 2024-06-03 RX ADMIN — PANTOPRAZOLE SODIUM 40 MG: 40 TABLET, DELAYED RELEASE ORAL at 05:19

## 2024-06-03 RX ADMIN — LEVETIRACETAM 500 MG: 500 TABLET, FILM COATED ORAL at 07:33

## 2024-06-03 RX ADMIN — INSULIN LISPRO 3 UNITS: 100 INJECTION, SOLUTION INTRAVENOUS; SUBCUTANEOUS at 12:29

## 2024-06-03 RX ADMIN — OXYCODONE 5 MG: 5 TABLET ORAL at 06:30

## 2024-06-03 RX ADMIN — OXYCODONE 5 MG: 5 TABLET ORAL at 10:27

## 2024-06-03 RX ADMIN — LOPERAMIDE HYDROCHLORIDE 2 MG: 2 CAPSULE ORAL at 20:45

## 2024-06-03 RX ADMIN — INSULIN LISPRO 1 UNITS: 100 INJECTION, SOLUTION INTRAVENOUS; SUBCUTANEOUS at 07:34

## 2024-06-03 RX ADMIN — LOSARTAN POTASSIUM 25 MG: 25 TABLET, FILM COATED ORAL at 07:39

## 2024-06-03 RX ADMIN — SODIUM CHLORIDE, PRESERVATIVE FREE 10 ML: 5 INJECTION INTRAVENOUS at 20:45

## 2024-06-03 RX ADMIN — LEVETIRACETAM 500 MG: 500 TABLET, FILM COATED ORAL at 20:45

## 2024-06-03 RX ADMIN — SODIUM CHLORIDE, PRESERVATIVE FREE 10 ML: 5 INJECTION INTRAVENOUS at 07:35

## 2024-06-03 RX ADMIN — HEPARIN SODIUM 5000 UNITS: 5000 INJECTION INTRAVENOUS; SUBCUTANEOUS at 13:20

## 2024-06-03 RX ADMIN — FERROUS SULFATE TAB EC 324 MG (65 MG FE EQUIVALENT) 324 MG: 324 (65 FE) TABLET DELAYED RESPONSE at 07:33

## 2024-06-03 RX ADMIN — HYDROCORTISONE 5 MG: 5 TABLET ORAL at 20:46

## 2024-06-03 RX ADMIN — ROPINIROLE HYDROCHLORIDE 2 MG: 1 TABLET, FILM COATED ORAL at 20:45

## 2024-06-03 RX ADMIN — INSULIN LISPRO 3 UNITS: 100 INJECTION, SOLUTION INTRAVENOUS; SUBCUTANEOUS at 17:13

## 2024-06-03 RX ADMIN — Medication 2 CAPSULE: at 16:23

## 2024-06-03 RX ADMIN — METHOCARBAMOL TABLETS 750 MG: 750 TABLET, COATED ORAL at 11:05

## 2024-06-03 RX ADMIN — INSULIN GLARGINE 11 UNITS: 100 INJECTION, SOLUTION SUBCUTANEOUS at 20:45

## 2024-06-03 RX ADMIN — HEPARIN SODIUM 5000 UNITS: 5000 INJECTION INTRAVENOUS; SUBCUTANEOUS at 04:40

## 2024-06-03 RX ADMIN — ESCITALOPRAM OXALATE 10 MG: 10 TABLET ORAL at 07:34

## 2024-06-03 RX ADMIN — INSULIN LISPRO 2 UNITS: 100 INJECTION, SOLUTION INTRAVENOUS; SUBCUTANEOUS at 07:34

## 2024-06-03 RX ADMIN — HEPARIN SODIUM 5000 UNITS: 5000 INJECTION INTRAVENOUS; SUBCUTANEOUS at 20:45

## 2024-06-03 RX ADMIN — Medication 2 CAPSULE: at 07:33

## 2024-06-03 RX ADMIN — PANTOPRAZOLE SODIUM 40 MG: 40 TABLET, DELAYED RELEASE ORAL at 16:23

## 2024-06-03 RX ADMIN — HYDROCORTISONE 10 MG: 10 TABLET ORAL at 07:56

## 2024-06-03 ASSESSMENT — PAIN DESCRIPTION - PAIN TYPE
TYPE: ACUTE PAIN
TYPE: ACUTE PAIN;SURGICAL PAIN
TYPE: ACUTE PAIN;SURGICAL PAIN
TYPE: ACUTE PAIN

## 2024-06-03 ASSESSMENT — PAIN DESCRIPTION - ONSET
ONSET: ON-GOING

## 2024-06-03 ASSESSMENT — PAIN SCALES - GENERAL
PAINLEVEL_OUTOF10: 6
PAINLEVEL_OUTOF10: 8
PAINLEVEL_OUTOF10: 5
PAINLEVEL_OUTOF10: 9
PAINLEVEL_OUTOF10: 5
PAINLEVEL_OUTOF10: 7
PAINLEVEL_OUTOF10: 5
PAINLEVEL_OUTOF10: 0
PAINLEVEL_OUTOF10: 6
PAINLEVEL_OUTOF10: 6
PAINLEVEL_OUTOF10: 8
PAINLEVEL_OUTOF10: 7

## 2024-06-03 ASSESSMENT — PAIN DESCRIPTION - ORIENTATION
ORIENTATION: RIGHT

## 2024-06-03 ASSESSMENT — PAIN DESCRIPTION - LOCATION
LOCATION: HIP
LOCATION: HIP;LEG
LOCATION: HIP
LOCATION: HIP;LEG
LOCATION: HIP;LEG

## 2024-06-03 ASSESSMENT — PAIN - FUNCTIONAL ASSESSMENT
PAIN_FUNCTIONAL_ASSESSMENT: ACTIVITIES ARE NOT PREVENTED

## 2024-06-03 ASSESSMENT — PAIN DESCRIPTION - DESCRIPTORS
DESCRIPTORS: THROBBING

## 2024-06-03 ASSESSMENT — PAIN DESCRIPTION - FREQUENCY
FREQUENCY: INTERMITTENT

## 2024-06-03 NOTE — PROGRESS NOTES
Occupational Therapy  Facility/Department: [address] REHAB  Rehabilitation Occupational Therapy Daily Treatment Note    Date: 6/3/24  Patient Name: Elvia Arguelles       Room: Z1Z-1541/3272-01  MRN: [id number]  Account: [id number]   : 1960  (63 y.o.) Gender: female         Past Medical History:  has a past medical history of Adrenal insufficiency (HCC), Cancer (HCC), Closed displaced intertrochanteric fracture of right femur (HCC), Depression, Diabetes mellitus (HCC), Hx of radiation therapy, Hypertension, Hyponatremia, Insulin pump in place, Melanoma (HCC), Prolonged emergence from general anesthesia, and Restless leg syndrome.  Past Surgical History:   has a past surgical history that includes Hysterectomy; Cholecystectomy; shoulder surgery; Upper gastrointestinal endoscopy (10/22/2014); Carpal tunnel release (Bilateral, 2017); lipoma resection; Eye surgery (Right); Shoulder arthroscopy (Right, 2018); liver biopsy (Right); US BIOPSY LIVER PERCUTANEOUS (2022); CT BIOPSY ABDOMEN RETROPERITONEUM (2022); Upper gastrointestinal endoscopy (N/A, 2023); Upper gastrointestinal endoscopy (2023); Port Surgery (Right, 2023); Upper gastrointestinal endoscopy (N/A, 2024); and hip surgery (Right, 2024).    Restrictions  Restrictions/Precautions: Weight Bearing, Fall Risk  Other position/activity restrictions: R femur fx s/p Right femur cephalomedullary nail; Diet: 5 carb choices (75 gm/meal); No Added Salt (3-4 gm); Low Potassium (Less than 3000 mg/day)  Right Lower Extremity Weight Bearing: Weight Bearing As Tolerated  Equipment Used: Wheelchair    Subjective  Subjective: Patient met in therapy department. Holding left leg. Returne to room for therapy session due to pain  Restrictions/Precautions: Weight Bearing;Fall Risk             Objective     Cognition  Overall Cognitive Status: WFL  Orientation  Overall Orientation Status: Within Functional Limits

## 2024-06-03 NOTE — PROGRESS NOTES
[facility] [place] Inpatient Nephrology Note        CC:FAVIAN on CKD  HPI: BP controlled.   Soc Hx:  no family present  ROS: no SOB.       Medications     insulin glargine  11 Units SubCUTAneous Nightly    insulin lispro  3 Units SubCUTAneous TID WC    fentaNYL  1 patch TransDERmal Q72H    sodium chloride flush  5-40 mL IntraVENous 2 times per day    losartan  25 mg Oral Daily    levETIRAcetam  500 mg Oral BID    ferrous sulfate  324 mg Oral Daily with breakfast    insulin lispro  0-4 Units SubCUTAneous TID WC    insulin lispro  0-4 Units SubCUTAneous Nightly    escitalopram  10 mg Oral Daily    hydrocortisone  10 mg Oral Daily    pantoprazole  40 mg Oral BID AC    heparin (porcine)  5,000 Units SubCUTAneous 3 times per day    hydrocortisone  5 mg Oral Nightly    NIFEdipine  30 mg Oral Daily    lactobacillus  2 capsule Oral BID WC    rOPINIRole  2 mg Oral Nightly         OBJECTIVE      Physical    TEMPERATURE:  Current - Temp: 98.2 °F (36.8 °C); Max - Temp  Av °F (36.7 °C)  Min: 97.8 °F (36.6 °C)  Max: 98.2 °F (36.8 °C)  RESPIRATIONS RANGE: Resp  Av.5  Min: 16  Max: 18  PULSE RANGE: Pulse  Av  Min: 68  Max: 68  BLOOD PRESSURE RANGE:  Systolic (24hrs), Av , Min:114 , Max:158   ; Diastolic (24hrs), Av, Min:65, Max:98    PULSE OXIMETRY RANGE: SpO2  Av.3 %  Min: 95 %  Max: 99 %  24HR INTAKE/OUTPUT:    Intake/Output Summary (Last 24 hours) at 6/3/2024 1731  Last data filed at 6/3/2024 0827  Gross per 24 hour   Intake 248 ml   Output --   Net 248 ml         GEN: no distress  HEENT: normocephalic, atraumatic  CV: RRR  LUNGS: clear bilaterally, unlabored  ABD: + bowel sounds. No distension. No tenderness  EXT: no edema.   SKIN: no rash  NEURO: no tremor    Medications   insulin glargine  11 Units SubCUTAneous Nightly    insulin lispro  3 Units SubCUTAneous TID WC    fentaNYL  1 patch TransDERmal Q72H    sodium chloride flush  5-40 mL IntraVENous 2 times per day     losartan  25 mg Oral Daily    levETIRAcetam  500 mg Oral BID    ferrous sulfate  324 mg Oral Daily with breakfast    insulin lispro  0-4 Units SubCUTAneous TID WC    insulin lispro  0-4 Units SubCUTAneous Nightly    escitalopram  10 mg Oral Daily    hydrocortisone  10 mg Oral Daily    pantoprazole  40 mg Oral BID AC    heparin (porcine)  5,000 Units SubCUTAneous 3 times per day    hydrocortisone  5 mg Oral Nightly    NIFEdipine  30 mg Oral Daily    lactobacillus  2 capsule Oral BID WC    rOPINIRole  2 mg Oral Nightly       Data      Lab Results   Component Value Date    CREATININE 1.9 (H) 06/03/2024    BUN 21 (H) 06/03/2024     06/03/2024    K 4.1 06/03/2024     06/03/2024    CO2 25 06/03/2024     Lab Results   Component Value Date    WBC 6.1 06/03/2024    HGB 7.8 (L) 06/03/2024    HCT 23.2 (L) 06/03/2024    MCV 87.6 06/03/2024     06/03/2024     Lab Results   Component Value Date    IRON 16 (L) 05/21/2024    TIBC 189 (L) 05/21/2024    FERRITIN 495.2 (H) 05/21/2024     Lab Results   Component Value Date    CALCIUM 8.9 06/03/2024    PHOS 3.1 06/03/2024         ASSESSMENT/PLAN:    1. FAVIAN on CKD  - due to relative hypotension and rhabdo  - Resolved    2. CKD Stage 3  - serum creatinine at baseline ~ 2  - UPC 0.6  - follow up with Dr. Wood    3. HTN  - BP controlled  - continue ARB, however if Cr increases further will stop    4. DKA  - resolved     5. Encephalopathy  - MRI with features of PRES   - not related to HTN  - has metastatic melanoma    6. R intertrochanteric femur fx  - per rehab    7. Adrenal insuff  - on Hydrocortisone    8. CKD MBD  - phos at target    9. Hyperkalemia  - Resolved    10. Anemia  - Hemoglobin low  - low iron stores  - added po iron (which may help with chronic diarrhea)

## 2024-06-03 NOTE — PROGRESS NOTES
Patient admitted to rehab with closed right hip fracture.  A/Ox4. Transfers with stedy x1. Mobility restrictions: WBAT. On Regular 5 CCC diet, tolerating well. Medications taken whole with thin liquids. On Heparin for DVT prophylaxis.  Skin: scattered brusing and abrasions, blanchable redness to coccyx, right elbow scab Mepilex intact. Oxygen: RA. LDA: right chest accessed port accessed. Has been continent of bowel and bladder. LBM 6/3/2024. Chair/bed alarms in use and call light in reach. Will monitor for safety. Electronically signed by Ayanna Hill RN on 6/3/2024 at 6:14 PM

## 2024-06-03 NOTE — PROGRESS NOTES
ACUTE REHAB UNIT  SPEECH/LANGUAGE PATHOLOGY      [x] Daily  [x] Weekly Care Conference Note  [] Discharge    Patient:Elvia Arguelles      :1960  MRN:[id number]  Rehab Dx/Hx: Closed right hip fracture, initial encounter ([facility]) [S72.001A]    Precautions: falls, seizures, and visual spatial deficits  Home situation: from home with spouse  ST Dx: [] Aphasia  [] Dysarthria  [] Apraxia   [] Oropharyngeal dysphagia [x] Cognitive   Impairment  [] Other:   Initial Speech Therapy Assessment Diagnosis:   1. Cognitive Diagnosis: Pt demonstrated minimal to moderate deficits in domains of time orientation; higher level attention; complex VPS and abstract reasoning; working memory and STM. Pt with visual deficits and right lower extremity pain which is further exacerbating. Visual deficits during paper tasks revealed reduced attention left; slight sloping upwar left to right when writing; spatial orientation when putting numbers on a clock. Will continue therapy working on visuo cognition / visuo spatial orientation and visual language remediation  2. Speech Diagnosis: Motor speech audible and intelligible at multiple word utterance level and during discourse. Pt achieve good verbal diadochokinetic rating. Voice quality wtih minimal claudio /strained quality.  3. Communication Diagnosis: Pt demonstrating concrete to mild complex funtional auditory language processing and verbal expressive language skills. Visual langauge processing at word / phrase level wtih mild deficits in inattention left of midling (did utilized large print with high color contrast). Pt able to express full name via written expression but minimal perseveration of letters ans slight sloping upward left to right when writing. Ongoing therapy     Date of Admit: 2024  Room #: M7S-8744/3272-01  Date: 6/3/2024       Current functional status (updated daily):         Current Diet Order:ADULT DIET; Regular; 5 carb choices (75 gm/meal); No Added Salt (3-4   With glasses and use of left to right scanning of room:  Limited to clock in room: IND N/a   Goal 2: Pt will demonstrate improved visuo cognition across visuo spatial tasks with set up and use of visual strategies wtih >90%    Use of table top for finding items on table tray in room: IND    Use of food tray for finding items: 0 to mild assist    Use of environment to find items in room ; points of origin in room: >90% IND      Written expression:   Last targeted 5/31/2024      Money exchanges (left to right for printed dollar amounts for adding/subtracting and occasional use of multiplication/division concepts: last targeted 5/30/2024 N/a   Goal 3: Pt will demonstrate improved VPS/PS and reasoning for graded PS tasks and numeric reasoning tasks with use of strategies PRN and mild assist      Emphasized left to right compensatory strategy across all table top and PS tasks    cause/effect:   R/l body scheme: IND concrete  Money exchanges: mild assist (incorporated left to right scanning): last targeted 5/30/2024  Convergent/divergent thinking for generalization of compensatory strategies for situations in room: set up; conditioning to task and reminders to use  N/a   Goal 4: Pt will demonstrate improved recall of daily events; learned safety strategies; and new information with use of memory strategies with set up and <mild assist Without cues:   Oriented to place, month/date/year/day   Partially oriented to therapies on unit    Working memory for incorporating left to right visual scanning:   Setup  Conditioning to task  O to Mild cues within the task after setup   Max cues task to task carryover     N/a   Other areas targeted:     Education:   Ongoign re: POC, tx ex and strategies    Safety Devices: [x] Call light within reach  [] Chair alarm activated  [x] Bed alarm activated  [] Other:  [] Call light within reach  [] Chair alarm activated  [] Bed alarm activated  [] Other:    Progress Assessment: 5/29/24:  Continued visual language impairment at word level requiring max prompts  5/30/2024 : pt /dtr ed in current strategies for visual scanning and visual attention and PS/reasoning.   6/3/2024: Pt with lots of pain today; has XR right hip pending. Ongoing ed with pt and her other dtr this date/time. Will continue poc   Plan: Continue as per plan of care while on ARU and if recommended at next level of care.   Continued Tx Upon Discharge: ?    [] Yes [] No [x] TBD based on progress while on ARU [] Vital Stim indicated [] Other:   Estimated discharge date: 1-2 weeks   Discharge recommendations:   [] Home independently  [] Home with assistance []  24 hour supervision  [] ECF [] Referral for ENT at d/c; [] Referral for Neuro Visual Opthalmologic at d/c; [] Referral for Neuro Psych  at d/c; [x] Other: TBD     Additional information:             Electronically Signed by    Cherelle Hart,MS,CCC,SLP [id number]  Speech and Language Pathologist

## 2024-06-03 NOTE — PROGRESS NOTES
Stable right hip postop xray today. Pain uncontrolled per therapies. Pt was on high dose Norco at home prior to this injury. Will trial Fentanyl patch with Oxycodone 2.5mg prn as needed. Discussed with Dr White by phone who reviewed xray as well, agrees with my plan of care.

## 2024-06-03 NOTE — PROGRESS NOTES
Department of Physical Medicine & Rehabilitation  Progress Note    Patient Identification:  Elvia Arguelles  [id number]  : 1960  Admit date: 2024    Chief Complaint: Closed right hip fracture, initial encounter ([facility])    Subjective:   No acute events overnight.   Patient seen this am resting in bed. Having increased pain in the right hip today compared to prior days. Reports felt well over weekend and did her bed/chair exercises. Denies any clear inciting event this morning.   Labs reviewed.     ROS: No f/c, n/v, cp     Objective:  Patient Vitals for the past 24 hrs:   BP Temp Temp src Pulse Resp SpO2 Weight   24 1027 -- -- -- -- 18 -- --   24 0739 114/65 98.2 °F (36.8 °C) Oral 68 16 95 % --   24 0730 -- -- -- -- 16 95 % --   24 0700 -- -- -- -- 18 -- --   24 0500 -- -- -- -- -- -- 57.9 kg (127 lb 10.3 oz)   24 2344 -- -- -- -- 16 -- --   24 2100 (!) 158/98 97.8 °F (36.6 °C) Oral 68 16 99 % --   24 1858 -- -- -- -- 16 -- --   24 1348 -- -- -- -- 17 -- --     Const: Alert. No distress, pleasant.   HEENT: Normocephalic, atraumatic. Normal sclera/conjunctiva. MMM.   CV: Regular rate and rhythm.   Resp: No respiratory distress. Lungs diminished at bases  Abd: Soft, nontender, nondistended, NABS+   Ext:+ post op swelling RLE  MSK: Decreased right hip ROM  Neuro: Alert, oriented, mildly delayed processing. No focal strength deficits aside from pain limited RLE  Psych: Cooperative, appropriate mood and affect    Laboratory data: Available via EMR.   Last 24 hour lab  Recent Results (from the past 24 hour(s))   POCT Glucose    Collection Time: 24 11:06 AM   Result Value Ref Range    POC Glucose 132 (H) 70 - 99 mg/dl    Performed on ACCU-CHEK    POCT Glucose    Collection Time: 24  3:36 PM   Result Value Ref Range    POC Glucose 229 (H) 70 - 99 mg/dl    Performed on ACCU-CHEK    POCT Glucose    Collection Time: 24  8:37 PM   Result Value  decipher front vs back by feeling for label on shirt. Declined wearing a bra.  LE Dressing  Equipment Provided: Reachers  Assistance Level: Moderate assistance, Verbal cues, Increased time to complete  Skilled Clinical Factors: Pt used reacher, w/hand over hand assist, due to low vision, difficulty placing reacher in proper place for RLE to be threaded. Pt able to use her L hand to then hold left side of brief, bring LLE up and thread leg thru. Pt assisted very minimally in stance to manage over hips.  Putting On/Taking Off Footwear  Equipment Provided: Sock aid, Long-handle shoe horn  Assistance Level: Moderate assistance, Verbal cues, Increased time to complete  Skilled Clinical Factors: Pt able to cross LLE to don sock and cues for orientation to shoe on floor, then slipped L foot into  slip in type shoe. Pt applied R sock to sock aid, w/assist to straighten. Pt placed foot into sock aid, assist to find opening due to low vision, and having difficulty feeling wfoot to locate. Pt assisted to don R shoe, w/shoe horn.  Toileting  Assistance Level: Moderate assistance, Verbal cues, Increased time to complete  Skilled Clinical Factors: Pt voided urine and BM on commode. Pt assisted for thorough hygiene. Pt managed clothes, increased time, CGA for balance.    Speech therapy:    ADULT DIET; Regular; 5 carb choices (75 gm/meal); No Added Salt (3-4 gm); Low Potassium (Less than 3000 mg/day)        Body mass index is 25.77 kg/m².    Rehabilitation Diagnosis:   Orthopedic, 8.11, Unilateral Hip Fracture        Assessment and Plan:     Impairments: decreased right hip ROM, balance, left neglect, balance, endurance, cognition    Right intertrochanteric femur fracture   -s/p  ORIF with cephalomedullary nail (5/19 with Dr. White)  -F/u X-ray (6/3): Stable per Ortho and Radiology  -Wound care per Ortho  -WBAT RLE  -PT/OT    Posterior reversible encephalopathy syndrome  -Etiology possibly uncontrolled HTN (Nephrology feels  home with spouse  Services:  PT, OT, SLP, RN  DME: ARIADNE  ELOS: ~6/18      Delia Martinez MD 6/3/2024, 10:47 AM

## 2024-06-03 NOTE — PLAN OF CARE
Problem: Safety - Adult  Goal: Free from fall injury  6/3/2024 1057 by Ayanna Hill RN  Outcome: Progressing     Problem: Discharge Planning  Goal: Discharge to home or other facility with appropriate resources  6/3/2024 1057 by Ayanna Hill RN  Outcome: Progressing     Problem: Pain  Goal: Verbalizes/displays adequate comfort level or baseline comfort level  6/3/2024 1057 by Ayanna Hill RN  Outcome: Progressing     Problem: ABCDS Injury Assessment  Goal: Absence of physical injury  6/3/2024 1057 by Ayanna Hill RN  Outcome: Progressing     Problem: Skin/Tissue Integrity  Goal: Absence of new skin breakdown  Description: 1.  Monitor for areas of redness and/or skin breakdown  2.  Assess vascular access sites hourly  3.  Every 4-6 hours minimum:  Change oxygen saturation probe site  4.  Every 4-6 hours:  If on nasal continuous positive airway pressure, respiratory therapy assess nares and determine need for appliance change or resting period.  6/3/2024 1057 by Ayanna Hill RN  Outcome: Progressing     Problem: Nutrition Deficit:  Goal: Optimize nutritional status  6/3/2024 1057 by Ayanna Hill RN  Outcome: Progressing

## 2024-06-03 NOTE — PROGRESS NOTES
Physical Therapy  Facility/Department: [address] REHAB  Rehabilitation Physical Therapy Treatment Note    NAME: Elvia Arguelles  : 1960 (63 y.o.)  MRN: [id number]  CODE STATUS: Full Code    Date of Service: 6/3/24       Restrictions:  Restrictions/Precautions: Weight Bearing, Fall Risk  Lower Extremity Weight Bearing Restrictions  Right Lower Extremity Weight Bearing: Weight Bearing As Tolerated  Position Activity Restriction  Other position/activity restrictions: R femur fx s/p Right femur cephalomedullary nail; Diet: 5 carb choices (75 gm/meal); No Added Salt (3-4 gm); Low Potassium (Less than 3000 mg/day)     Pertinent medical information:  Additional Pertinent Hx: per Dr. Tello's H&P, \"63 yrs old female with a PMHx of T2DM, adrenal insufficiency chonic steroids, metastatic melanoma on Opdivo therapy who presented to ED with R hip pain after fall, found to have R femoral fracture s/p IM nail. originally presented to [facility] ED on  after being found down by family in the living room floor. ED workup showed Rhabdomyolysis, FAVIAN and closed R femoral fracture. Admitted for further care. Orthopedic surgery consulted, performed IM nail  without perioperative complication. Course complicated by AMS, suspected PRES per multiple providers and MRI imaging findings. EEG w/epileptiform activity, started on keppra per neurology consult\"    SUBJECTIVE  Subjective  Subjective: Patient started bedside as patient needed to go to the bathroom before going to therapy. patient reporting she had a great day yesterday, but is hurting really bad this morning.  Pain: Patient rating pain at a 9/10 in right hip down to knee.  She has had her muscle relaxant and her pain medication.    Social/Functional History  Lives With: Spouse (, Shant, in good health and retired)  Type of Home: House  Home Layout: Able to Live on Main level with bedroom/bathroom, One level, Laundry in basement (with basement)  Home Access:  supine in bed visiting with daughter, Yas. Patient reports pain at 4/10 in right hip at rest, but increases to 8/10 with mobility.   Supine>sit with supervision. She had to assist right LE with bilateral UE.   Sit>Stand with CGA but very painful.   Ambulated to wheelchair, 5' with wheeled walker with SBA for balance and intermittent min assist to advance right LE.    PT provided fluid reabsorption massage over hard swelling on lateral thigh and anterior thigh. PT able to achieve softer density to right thigh.   PT provided PROM to right hip in sitting.  Patient tolerated well, also provided PROM at right knee and patient tolerated much more than this AM.    Patient tolerated session well, but minimal mobility was completed.  Massage to hard swelling improved pain.  Patient left seated in wheelchair with daughter present and needs in reach.       Therapy Time   Individual Concurrent Group Co-treatment   Time In 0815         Time Out 0900         Minutes 45              Second Session Therapy Time     Individual Co-treatment   Time In 1230     Time Out 1315     Minutes 45               TERI Johnston CNS [id number], 06/03/24 at 9:07 AM

## 2024-06-03 NOTE — PROGRESS NOTES
Patient admitted to rehab with closed right hip fracture.  A/Ox4. Transfers with walker x 1 or stedy x 1. Mobility restrictions: WBAT. On regular, 5 carb, RÚAL, low K diet, tolerating well. Medications taken whole with thin liquids. On Heparin for DVT prophylaxis.  Skin: surgical incision to R hip, scattered bruising and abrasions, blanchable redness to buttocks, right elbow scabbed over.. Oxygen: RA. LDA: Port, right chest accessed 6/1. Has been continent of bowel and bladder. LBM 6/2, imodium given. Chair/bed alarms in use and call light in reach.

## 2024-06-03 NOTE — PROGRESS NOTES
[facility]  Glycemic Control      NAME: Elvia Arguelles  MEDICAL RECORD NUMBER:  [id number]  AGE: 63 y.o.   GENDER: female  : 1960  EPISODE DATE:  6/3/2024     Data     Recent Labs     24  0658 24  1106 24  1536 24  2037 24  0702 24  1103   POCGLU 215* 132* 229* 264* 247* 137*       HgBA1c:    Lab Results   Component Value Date/Time    LABA1C 7.2 2024 10:17 PM       BUN/Creatinine:    Lab Results   Component Value Date/Time    BUN 21 2024 04:56 AM    CREATININE 1.9 2024 04:56 AM       Medications  Scheduled Medications:   insulin glargine  11 Units SubCUTAneous Nightly    insulin lispro  3 Units SubCUTAneous TID WC    sodium chloride flush  5-40 mL IntraVENous 2 times per day    losartan  25 mg Oral Daily    levETIRAcetam  500 mg Oral BID    ferrous sulfate  324 mg Oral Daily with breakfast    insulin lispro  0-4 Units SubCUTAneous TID WC    insulin lispro  0-4 Units SubCUTAneous Nightly    escitalopram  10 mg Oral Daily    hydrocortisone  10 mg Oral Daily    pantoprazole  40 mg Oral BID AC    heparin (porcine)  5,000 Units SubCUTAneous 3 times per day    hydrocortisone  5 mg Oral Nightly    NIFEdipine  30 mg Oral Daily    lactobacillus  2 capsule Oral BID WC    rOPINIRole  2 mg Oral Nightly       Diet  Current diet/supplement order: ADULT DIET; Regular; 5 carb choices (75 gm/meal); No Added Salt (3-4 gm); Low Potassium (Less than 3000 mg/day)     Recorded PO: PO Meals Eaten (%): 76 - 100% last meal in flowsheets      Action      Glucose Target: 140-180, avoid hypoglycemia    Recommendation: Continue current regimen. Monitor intake and glucoses.     - Lantus increased from 9 units to 11 units at bedtime (to start tonight) Prandial increased from 2 units TID with meals to 3 units TID with meals (started with lunch today).     Pt aware of medication adjustments. Pt voiced her main issues at this time is her pain on her R hip. Per pt and

## 2024-06-04 LAB
GLUCOSE BLD-MCNC: 169 MG/DL (ref 70–99)
GLUCOSE BLD-MCNC: 200 MG/DL (ref 70–99)
GLUCOSE BLD-MCNC: 212 MG/DL (ref 70–99)
GLUCOSE BLD-MCNC: 368 MG/DL (ref 70–99)
PERFORMED ON: ABNORMAL

## 2024-06-04 PROCEDURE — 1280000000 HC REHAB R&B

## 2024-06-04 PROCEDURE — 6370000000 HC RX 637 (ALT 250 FOR IP): Performed by: STUDENT IN AN ORGANIZED HEALTH CARE EDUCATION/TRAINING PROGRAM

## 2024-06-04 PROCEDURE — 97530 THERAPEUTIC ACTIVITIES: CPT

## 2024-06-04 PROCEDURE — 2580000003 HC RX 258: Performed by: INTERNAL MEDICINE

## 2024-06-04 PROCEDURE — 97110 THERAPEUTIC EXERCISES: CPT

## 2024-06-04 PROCEDURE — 6370000000 HC RX 637 (ALT 250 FOR IP): Performed by: PHYSICAL MEDICINE & REHABILITATION

## 2024-06-04 PROCEDURE — 97535 SELF CARE MNGMENT TRAINING: CPT

## 2024-06-04 PROCEDURE — 94760 N-INVAS EAR/PLS OXIMETRY 1: CPT

## 2024-06-04 PROCEDURE — 6370000000 HC RX 637 (ALT 250 FOR IP): Performed by: INTERNAL MEDICINE

## 2024-06-04 PROCEDURE — 97129 THER IVNTJ 1ST 15 MIN: CPT

## 2024-06-04 PROCEDURE — 97116 GAIT TRAINING THERAPY: CPT

## 2024-06-04 PROCEDURE — 6360000002 HC RX W HCPCS: Performed by: STUDENT IN AN ORGANIZED HEALTH CARE EDUCATION/TRAINING PROGRAM

## 2024-06-04 PROCEDURE — 97130 THER IVNTJ EA ADDL 15 MIN: CPT

## 2024-06-04 RX ADMIN — INSULIN GLARGINE 11 UNITS: 100 INJECTION, SOLUTION SUBCUTANEOUS at 21:34

## 2024-06-04 RX ADMIN — METHOCARBAMOL TABLETS 750 MG: 750 TABLET, COATED ORAL at 14:57

## 2024-06-04 RX ADMIN — INSULIN LISPRO 3 UNITS: 100 INJECTION, SOLUTION INTRAVENOUS; SUBCUTANEOUS at 11:36

## 2024-06-04 RX ADMIN — METHOCARBAMOL TABLETS 750 MG: 750 TABLET, COATED ORAL at 08:01

## 2024-06-04 RX ADMIN — INSULIN LISPRO 1 UNITS: 100 INJECTION, SOLUTION INTRAVENOUS; SUBCUTANEOUS at 07:59

## 2024-06-04 RX ADMIN — ACETAMINOPHEN 325MG 650 MG: 325 TABLET ORAL at 21:33

## 2024-06-04 RX ADMIN — ACETAMINOPHEN 325MG 650 MG: 325 TABLET ORAL at 06:08

## 2024-06-04 RX ADMIN — LOSARTAN POTASSIUM 25 MG: 25 TABLET, FILM COATED ORAL at 08:00

## 2024-06-04 RX ADMIN — ESCITALOPRAM OXALATE 10 MG: 10 TABLET ORAL at 08:00

## 2024-06-04 RX ADMIN — SODIUM CHLORIDE, PRESERVATIVE FREE 10 ML: 5 INJECTION INTRAVENOUS at 21:38

## 2024-06-04 RX ADMIN — INSULIN LISPRO 3 UNITS: 100 INJECTION, SOLUTION INTRAVENOUS; SUBCUTANEOUS at 16:58

## 2024-06-04 RX ADMIN — Medication 2 CAPSULE: at 08:00

## 2024-06-04 RX ADMIN — HEPARIN SODIUM 5000 UNITS: 5000 INJECTION INTRAVENOUS; SUBCUTANEOUS at 06:08

## 2024-06-04 RX ADMIN — INSULIN LISPRO 3 UNITS: 100 INJECTION, SOLUTION INTRAVENOUS; SUBCUTANEOUS at 07:59

## 2024-06-04 RX ADMIN — OXYCODONE 2.5 MG: 5 TABLET ORAL at 12:12

## 2024-06-04 RX ADMIN — HEPARIN SODIUM 5000 UNITS: 5000 INJECTION INTRAVENOUS; SUBCUTANEOUS at 21:32

## 2024-06-04 RX ADMIN — PANTOPRAZOLE SODIUM 40 MG: 40 TABLET, DELAYED RELEASE ORAL at 16:16

## 2024-06-04 RX ADMIN — Medication 2 CAPSULE: at 16:59

## 2024-06-04 RX ADMIN — INSULIN LISPRO 4 UNITS: 100 INJECTION, SOLUTION INTRAVENOUS; SUBCUTANEOUS at 16:57

## 2024-06-04 RX ADMIN — HYDROCORTISONE 10 MG: 10 TABLET ORAL at 08:01

## 2024-06-04 RX ADMIN — LEVETIRACETAM 500 MG: 500 TABLET, FILM COATED ORAL at 08:00

## 2024-06-04 RX ADMIN — ROPINIROLE HYDROCHLORIDE 2 MG: 1 TABLET, FILM COATED ORAL at 21:33

## 2024-06-04 RX ADMIN — FERROUS SULFATE TAB EC 324 MG (65 MG FE EQUIVALENT) 324 MG: 324 (65 FE) TABLET DELAYED RESPONSE at 08:00

## 2024-06-04 RX ADMIN — OXYCODONE 2.5 MG: 5 TABLET ORAL at 21:32

## 2024-06-04 RX ADMIN — HEPARIN SODIUM 5000 UNITS: 5000 INJECTION INTRAVENOUS; SUBCUTANEOUS at 14:57

## 2024-06-04 RX ADMIN — LEVETIRACETAM 500 MG: 500 TABLET, FILM COATED ORAL at 21:34

## 2024-06-04 RX ADMIN — SODIUM CHLORIDE, PRESERVATIVE FREE 10 ML: 5 INJECTION INTRAVENOUS at 10:17

## 2024-06-04 RX ADMIN — HYDROCORTISONE 5 MG: 5 TABLET ORAL at 21:33

## 2024-06-04 RX ADMIN — LOPERAMIDE HYDROCHLORIDE 2 MG: 2 CAPSULE ORAL at 11:36

## 2024-06-04 RX ADMIN — PANTOPRAZOLE SODIUM 40 MG: 40 TABLET, DELAYED RELEASE ORAL at 06:08

## 2024-06-04 RX ADMIN — NIFEDIPINE 30 MG: 30 TABLET, EXTENDED RELEASE ORAL at 08:00

## 2024-06-04 ASSESSMENT — PAIN DESCRIPTION - LOCATION
LOCATION: HIP
LOCATION: HIP
LOCATION: HEAD
LOCATION: HIP
LOCATION: HEAD

## 2024-06-04 ASSESSMENT — PAIN SCALES - GENERAL
PAINLEVEL_OUTOF10: 4
PAINLEVEL_OUTOF10: 3
PAINLEVEL_OUTOF10: 2
PAINLEVEL_OUTOF10: 3
PAINLEVEL_OUTOF10: 2
PAINLEVEL_OUTOF10: 6
PAINLEVEL_OUTOF10: 6
PAINLEVEL_OUTOF10: 3
PAINLEVEL_OUTOF10: 4
PAINLEVEL_OUTOF10: 8
PAINLEVEL_OUTOF10: 2

## 2024-06-04 ASSESSMENT — PAIN DESCRIPTION - DESCRIPTORS
DESCRIPTORS: THROBBING
DESCRIPTORS: ACHING
DESCRIPTORS: THROBBING
DESCRIPTORS: THROBBING
DESCRIPTORS: ACHING

## 2024-06-04 ASSESSMENT — PAIN DESCRIPTION - PAIN TYPE
TYPE: ACUTE PAIN

## 2024-06-04 ASSESSMENT — PAIN DESCRIPTION - FREQUENCY
FREQUENCY: INTERMITTENT

## 2024-06-04 ASSESSMENT — PAIN DESCRIPTION - ONSET
ONSET: ON-GOING

## 2024-06-04 ASSESSMENT — PAIN - FUNCTIONAL ASSESSMENT
PAIN_FUNCTIONAL_ASSESSMENT: ACTIVITIES ARE NOT PREVENTED

## 2024-06-04 ASSESSMENT — PAIN DESCRIPTION - ORIENTATION
ORIENTATION: RIGHT
ORIENTATION: MID
ORIENTATION: RIGHT
ORIENTATION: RIGHT

## 2024-06-04 NOTE — PROGRESS NOTES
21 staples removed from right hip and steri strips applied per order. Patient tolerated well. Annie Rivers RN

## 2024-06-04 NOTE — PROGRESS NOTES
Physical Therapy  Facility/Department: [address] REHAB  Rehabilitation Physical Therapy Treatment Note    NAME: Elvia Arguelles  : 1960 (63 y.o.)  MRN: [id number]  CODE STATUS: Full Code    Date of Service: 24       Restrictions:  Restrictions/Precautions: Weight Bearing, Fall Risk  Lower Extremity Weight Bearing Restrictions  Right Lower Extremity Weight Bearing: Weight Bearing As Tolerated  Position Activity Restriction  Other position/activity restrictions: R femur fx s/p Right femur cephalomedullary nail; Diet: 5 carb choices (75 gm/meal); No Added Salt (3-4 gm); Low Potassium (Less than 3000 mg/day), Port     Pertinent medical information:  Additional Pertinent Hx: per Dr. Tello's H&P, \"63 yrs old female with a PMHx of T2DM, adrenal insufficiency chonic steroids, metastatic melanoma on Opdivo therapy who presented to ED with R hip pain after fall, found to have R femoral fracture s/p IM nail. originally presented to [facility] ED on  after being found down by family in the living room floor. ED workup showed Rhabdomyolysis, FAVIAN and closed R femoral fracture. Admitted for further care. Orthopedic surgery consulted, performed IM nail  without perioperative complication. Course complicated by AMS, suspected PRES per multiple providers and MRI imaging findings. EEG w/epileptiform activity, started on keppra per neurology consult\"    SUBJECTIVE  Subjective  Subjective: Patient arrived in wheelchair and reports her pain is greatly improved with her new patch.  Pain: Pain at a 3/10 in right thigh (anterior and lateral)    Social/Functional History  Lives With: Spouse (, Shant, in good health and retired)  Type of Home: House  Home Layout: Able to Live on Main level with bedroom/bathroom, One level, Laundry in basement (with basement)  Home Access: Stairs to enter with rails  Entrance Stairs - Number of Steps: 4 with danielle handrail + 6 with danielle handrails (unsure if able to hold both at same  time) -- front entrance.  1 ABBEY side entrance of home but have to travel up a hill. Patient reports  can drive her up to that door  Entrance Stairs - Rails: Both  Bathroom Shower/Tub: Tub/Shower unit  Bathroom Toilet: Standard (sink nearby)  Bathroom Equipment: Shower chair  Bathroom Accessibility: Walker accessible  Home Equipment: Walker - Rolling  Has the patient had two or more falls in the past year or any fall with injury in the past year?: Yes  ADL Assistance: Independent  Homemaking Assistance: Independent (laundry, pet care, cleaning, cooking)  Ambulation Assistance: Independent (without a device)  Transfer Assistance: Independent  Active : Yes  Mode of Transportation: [facility]  Education: HS  Occupation: Retired  Type of Occupation: Registrar for [employer]  Leisure & Hobbies: crafts - paint gourds. Likes to walk the dogs but has a fenced in yard  IADL Comments: Pt reportedlycopleted med and appointment organization    OBJECTIVE  Cognition  Overall Cognitive Status: WFL  Orientation  Overall Orientation Status: Within Functional Limits    Functional Mobility  Balance  Sitting Balance: Supervision  Standing Balance: Stand by assistance  Transfers  Surface: Wheelchair  Additional Factors: Set-up;Increased time to complete  Device: Walker  Sit to Stand  Assistance Level: Contact guard assist;Stand by assist  Stand to Sit  Assistance Level: Contact guard assist;Stand by assist  Bed To/From Chair  Technique: Stand step  Assistance Level: Contact guard assist;Stand by assist      Environmental Mobility  Ambulation  Surface: Level surface  Device: Rolling walker  Distance: 58' with two turns  Activity: Within Room  Activity Comments: antalgic, step to pattern. Straight arms to unweight LE, takes large step at times with right LE and needs cues to ensure toes do not pass the front of the walker. Patient with initial difficulty advancing right LE, but improved with distance, until she began  step with wheeled walker with supervision  Long Term Goal 5: Ascend/descend 4 steps with bilateral rails with SBA    PLAN OF CARE/SAFETY  Physical Therapy Plan  General Plan:  minutes of therapy at least 5 out of 7 days a week  Days Per Week: 5 Days  Therapy Duration: 2 Weeks  Current Treatment Recommendations: Transfer training;Functional mobility training;Balance training;Strengthening;Therapeutic activities;Co-Treatment;Gait training;Endurance training;Stair training;Pain management;Safety education & training  Safety Devices  Type of Devices: All fall risk precautions in place;Gait belt;Left in chair (in wheelchair to return to room with transportation)    EDUCATION  Education  Education Given To: Patient;Family  Education Provided: Plan of Care;Safety;Transfer Training;Mobility Training;Equipment;Energy Conservation;Fall Prevention Strategies  Education Provided Comments: improvements in swelling in right thigh and encouragement to complete \"Cs and Js\" herself  Education Method: Verbal  Education Outcome: Continued education needed;Verbalized understanding;Demonstrated understanding      PM Session  PT started bedside for PM session. She was seated in recliner and reports pain continues ot improve. She rates right hip and thigh pain as a 2/10 at rest and increases to 7/10 with mobility.   Sit>stand with supervision-SBA  Ambulated 15' to wheelchair with wheeled walker with SBA  Taken to gym in therapy gym in wheelchair.   Sit<>stand with supervision-SBA  Ambulated 20' to therapy mat with wheeled walker with SBA  PT educated patient on use of leg  and patient was able to complete sit>supine with set up with use of leg  with CGA.   Patient was very uncomfortable flat and needed bolster under knees initially.   Patient performed 15 reps bilateral LE ther ex while supine on therapy mat: APs,  GS, SAQ (min assist for right LE), hip add sets, QS, heel slides with mod assist for right LE, hip

## 2024-06-04 NOTE — PLAN OF CARE
Problem: Safety - Adult  Goal: Free from fall injury  6/4/2024 0918 by Opal Bran, RN  Outcome: Progressing  Flowsheets (Taken 6/4/2024 0918)  Free From Fall Injury: Instruct family/caregiver on patient safety     Problem: Discharge Planning  Goal: Discharge to home or other facility with appropriate resources  6/4/2024 0918 by Opal Bran RN  Outcome: Progressing  Flowsheets (Taken 6/4/2024 0918)  Discharge to home or other facility with appropriate resources: Identify barriers to discharge with patient and caregiver     Problem: Pain  Goal: Verbalizes/displays adequate comfort level or baseline comfort level  6/4/2024 0918 by Opal Bran, RN  Outcome: Progressing  Flowsheets (Taken 6/4/2024 0918)  Verbalizes/displays adequate comfort level or baseline comfort level:   Encourage patient to monitor pain and request assistance   Assess pain using appropriate pain scale   Administer analgesics based on type and severity of pain and evaluate response   Implement non-pharmacological measures as appropriate and evaluate response   Consider cultural and social influences on pain and pain management   Notify Licensed Independent Practitioner if interventions unsuccessful or patient reports new pain     Problem: ABCDS Injury Assessment  Goal: Absence of physical injury  6/4/2024 0918 by Opal Bran RN  Outcome: Progressing  Flowsheets (Taken 6/4/2024 0918)  Absence of Physical Injury: Implement safety measures based on patient assessment     Problem: Skin/Tissue Integrity  Goal: Absence of new skin breakdown  Description: 1.  Monitor for areas of redness and/or skin breakdown  2.  Assess vascular access sites hourly  3.  Every 4-6 hours minimum:  Change oxygen saturation probe site  4.  Every 4-6 hours:  If on nasal continuous positive airway pressure, respiratory therapy assess nares and determine need for appliance change or resting period.  6/4/2024 0918 by Opal Bran, ITZ  Outcome:

## 2024-06-04 NOTE — PROGRESS NOTES
[facility] Inpatient Nephrology Note        CC:FAVIAN on CKD  HPI: BP controlled.   Soc Hx:  no family present  ROS: no SOB.       Medications     insulin glargine  11 Units SubCUTAneous Nightly    insulin lispro  3 Units SubCUTAneous TID WC    fentaNYL  1 patch TransDERmal Q72H    sodium chloride flush  5-40 mL IntraVENous 2 times per day    losartan  25 mg Oral Daily    levETIRAcetam  500 mg Oral BID    ferrous sulfate  324 mg Oral Daily with breakfast    insulin lispro  0-4 Units SubCUTAneous TID WC    insulin lispro  0-4 Units SubCUTAneous Nightly    escitalopram  10 mg Oral Daily    hydrocortisone  10 mg Oral Daily    pantoprazole  40 mg Oral BID AC    heparin (porcine)  5,000 Units SubCUTAneous 3 times per day    hydrocortisone  5 mg Oral Nightly    NIFEdipine  30 mg Oral Daily    lactobacillus  2 capsule Oral BID WC    rOPINIRole  2 mg Oral Nightly         OBJECTIVE      Physical    TEMPERATURE:  Current - Temp: 98.4 °F (36.9 °C); Max - Temp  Av.3 °F (36.8 °C)  Min: 98.2 °F (36.8 °C)  Max: 98.4 °F (36.9 °C)  RESPIRATIONS RANGE: Resp  Av.8  Min: 16  Max: 18  PULSE RANGE: Pulse  Av  Min: 82  Max: 86  BLOOD PRESSURE RANGE:  Systolic (24hrs), Av , Min:134 , Max:158   ; Diastolic (24hrs), Av, Min:72, Max:89    PULSE OXIMETRY RANGE: SpO2  Av.5 %  Min: 97 %  Max: 98 %  24HR INTAKE/OUTPUT:    Intake/Output Summary (Last 24 hours) at 2024 1313  Last data filed at 2024 1223  Gross per 24 hour   Intake 1118 ml   Output --   Net 1118 ml         GEN: no distress  HEENT: normocephalic, atraumatic  CV: RRR  LUNGS: clear bilaterally, unlabored  ABD: + bowel sounds. No distension. No tenderness  EXT: Trace edema RLE, no edema LLE  SKIN: no rash  NEURO: no tremor    Medications   insulin glargine  11 Units SubCUTAneous Nightly    insulin lispro  3 Units SubCUTAneous TID WC    fentaNYL  1 patch TransDERmal Q72H    sodium chloride flush  5-40 mL  IntraVENous 2 times per day    losartan  25 mg Oral Daily    levETIRAcetam  500 mg Oral BID    ferrous sulfate  324 mg Oral Daily with breakfast    insulin lispro  0-4 Units SubCUTAneous TID WC    insulin lispro  0-4 Units SubCUTAneous Nightly    escitalopram  10 mg Oral Daily    hydrocortisone  10 mg Oral Daily    pantoprazole  40 mg Oral BID AC    heparin (porcine)  5,000 Units SubCUTAneous 3 times per day    hydrocortisone  5 mg Oral Nightly    NIFEdipine  30 mg Oral Daily    lactobacillus  2 capsule Oral BID WC    rOPINIRole  2 mg Oral Nightly       Data      Lab Results   Component Value Date    CREATININE 1.9 (H) 06/03/2024    BUN 21 (H) 06/03/2024     06/03/2024    K 4.1 06/03/2024     06/03/2024    CO2 25 06/03/2024     Lab Results   Component Value Date    WBC 6.1 06/03/2024    HGB 7.8 (L) 06/03/2024    HCT 23.2 (L) 06/03/2024    MCV 87.6 06/03/2024     06/03/2024     Lab Results   Component Value Date    IRON 16 (L) 05/21/2024    TIBC 189 (L) 05/21/2024    FERRITIN 495.2 (H) 05/21/2024     Lab Results   Component Value Date    CALCIUM 8.9 06/03/2024    PHOS 3.1 06/03/2024         ASSESSMENT/PLAN:    1. FAVIAN on CKD  - due to relative hypotension and rhabdo  - Resolved    2. CKD Stage 3  - serum creatinine at baseline ~ 2  - UPC 0.6  - follow up with Dr. Wood    3. HTN  - BP controlled  - continue ARB, however if Cr increases further will stop    4. DKA  - resolved     5. Encephalopathy  - MRI with features of PRES   - not related to HTN  - has metastatic melanoma    6. R intertrochanteric femur fx  - per rehab    7. Adrenal insuff  - on Hydrocortisone    8. CKD MBD  - phos at target    9. Hyperkalemia  - Resolved    10. Anemia  - Hemoglobin low  - low iron stores  - added po iron (which may help with chronic diarrhea)

## 2024-06-04 NOTE — PROGRESS NOTES
Resting quietly in bed, respirations even/easy. Patient admitted with a right femur fracture. Surgical incision TREVOR with steri strips in place. Patient transferred with a stedy of 1 last evening. Encouraged the walker but patient declined. Lungs CTA, HR regular. Abdomen non tender with active bowel sounds. Has been continent of urine. Denies pain so far this shift. Encouraged to call for all needs, call light remains in reach at all times. Bed alarm active. Annie Rivers RN

## 2024-06-04 NOTE — PROGRESS NOTES
6/4/2024 at 1:36 PM    OTR was consulted with this patients treatment/intervention plan.   Therapy Time     Individual Co-treatment   Time In 1315     Time Out 1345     Minutes 30             Assessment  Assessment  Assessment: Patient tolerated session better this am with less reports of pain. Patient completed pivot transfers with CGA. CGA for sit<>stand. Completed shower with CGA for balance, cues to use long sponge and location of items in shower due to decresaed vision. Dressed upper body with set up, lower body with Mod A. Patient is making progress towards goals and will benefit from continued skilled OT to maximize IND. Barriers to discharge are poor vision which affect AE use and pain.  Activity Tolerance: Patient limited by pain;Patient limited by endurance  Discharge Recommendations: Patient would benefit from continued therapy after discharge;24 hour supervision or assist;Continue to assess pending progress;S Level 1;Home with Home health OT  OT Equipment Recommendations  Other: Family purchased TSF and TTB. Patient will need RW, and walker basket. Due to decresaed vision unsure about need for hip kit  Safety Devices  Safety Devices in place: Yes  Type of devices: Call light within reach;Chair alarm in place;Gait belt;Left in chair    Patient Education       Plan  Occupational Therapy Plan  Times Per Week: 5-6x  Times Per Day: Twice a day    Goals  Patient Goals   Patient goals : \"To go back home and be normal again\"  Short Term Goals  Time Frame for Short Term Goals: 1 week  Short Term Goal 1: Pt will complete functional mobility/txs using LRAD SBA  Short Term Goal 2: Pt will complete toileting SBA  Short Term Goal 3: Pt will complete bathing using AE, as needed, SBA  Short Term Goal 4: Pt will complete dressing using AE, as needed, SBA  Short Term Goal 5: Pt will maintain stance throughout functional task for atleast 5min SBA to address strength/activity tolerance for ADL/IADL function  Short Term Goal  6: Pt will perform BUE therex to address strength/endurance for functional performance  Long Term Goals  Time Frame for Long Term Goals : 2 weeks  Long Term Goal 1: Pt will complete functional mobility/txs using LRAD mod I  Long Term Goal 2: Pt will complete toileting mod I  Long Term Goal 3: Pt will complete bathing using AE, as needed, mod I  Long Term Goal 4: Pt will complete dressing using AE, as needed, mod I  Long Term Goal 5: Pt will complete simple IADL task (light meal prep, pet care, etc.) mod I        Therapy Time   Individual Concurrent Group Co-treatment   Time In 1315         Time Out 1345         Minutes 30                 Electronically signed by Hetal Raman, [id number] on 6/4/2024 at 1:36 PM    OTR was consulted with this patients treatment/intervention plan.

## 2024-06-04 NOTE — PROGRESS NOTES
Patient admitted to rehab with closed right hip fracture.  A/Ox4. Transfers with one assist with gait belt and use of stedy. Mobility restrictions: WBAT. On regular RAÚL low potassium diet, tolerating well. Medications taken whole with fluids. On heparin for DVT prophylaxis.  Skin: scattered bruising and abrasions.  Right hip incisions with steri strips intact. Oxygen: room air. LDA: port accessed in right upper chest. Has been continent of bowel and  of bladder. LBM 6/4. Chair/bed alarms in use and call light in reach. Will monitor for safety.

## 2024-06-04 NOTE — PROGRESS NOTES
ACUTE REHAB UNIT  SPEECH/LANGUAGE PATHOLOGY      [x] Daily  [] Weekly Care Conference Note  [] Discharge    Patient:Elvia Arguelles      :1960  MRN:[id number]  Rehab Dx/Hx: Closed right hip fracture, initial encounter ([facility]) [S72.001A]    Precautions: falls, seizures, and visual spatial deficits  Home situation: from home with spouse  ST Dx: [] Aphasia  [] Dysarthria  [] Apraxia   [] Oropharyngeal dysphagia [x] Cognitive   Impairment  [] Other:   Initial Speech Therapy Assessment Diagnosis:   1. Cognitive Diagnosis: Pt demonstrated minimal to moderate deficits in domains of time orientation; higher level attention; complex VPS and abstract reasoning; working memory and STM. Pt with visual deficits and right lower extremity pain which is further exacerbating. Visual deficits during paper tasks revealed reduced attention left; slight sloping upwar left to right when writing; spatial orientation when putting numbers on a clock. Will continue therapy working on visuo cognition / visuo spatial orientation and visual language remediation  2. Speech Diagnosis: Motor speech audible and intelligible at multiple word utterance level and during discourse. Pt achieve good verbal diadochokinetic rating. Voice quality wtih minimal claudio /strained quality.  3. Communication Diagnosis: Pt demonstrating concrete to mild complex funtional auditory language processing and verbal expressive language skills. Visual langauge processing at word / phrase level wtih mild deficits in inattention left of midling (did utilized large print with high color contrast). Pt able to express full name via written expression but minimal perseveration of letters ans slight sloping upward left to right when writing. Ongoing therapy     Date of Admit: 2024  Room #: H9B-8434/3272-01  Date: 2024       Current functional status (updated daily):         Current Diet Order:ADULT DIET; Regular; 5 carb choices (75 gm/meal); No Added Salt (3-4  [] Referral for Neuro Visual Opthalmologic at d/c; [] Referral for Neuro Psych  at d/c; [x] Other: TBD     Additional information:             Electronically Signed by    Cherelle HartMS,CCC,SLP [id number]  Speech and Language Pathologist

## 2024-06-04 NOTE — PROGRESS NOTES
[facility]  Glycemic Control      NAME: Elvia Arguelles  MEDICAL RECORD NUMBER:  [id number]  AGE: 63 y.o.   GENDER: female  : 1960  EPISODE DATE:  2024     Data     Recent Labs     24  0702 24  1103 24  1620 24  2016 24  0658 24  1110   POCGLU 247* 137* 112* 174* 200* 169*       HgBA1c:    Lab Results   Component Value Date/Time    LABA1C 7.2 2024 10:17 PM       BUN/Creatinine:    Lab Results   Component Value Date/Time    BUN 21 2024 04:56 AM    CREATININE 1.9 2024 04:56 AM       Medications  Scheduled Medications:   insulin glargine  11 Units SubCUTAneous Nightly    insulin lispro  3 Units SubCUTAneous TID WC    fentaNYL  1 patch TransDERmal Q72H    sodium chloride flush  5-40 mL IntraVENous 2 times per day    losartan  25 mg Oral Daily    levETIRAcetam  500 mg Oral BID    ferrous sulfate  324 mg Oral Daily with breakfast    insulin lispro  0-4 Units SubCUTAneous TID WC    insulin lispro  0-4 Units SubCUTAneous Nightly    escitalopram  10 mg Oral Daily    hydrocortisone  10 mg Oral Daily    pantoprazole  40 mg Oral BID AC    heparin (porcine)  5,000 Units SubCUTAneous 3 times per day    hydrocortisone  5 mg Oral Nightly    NIFEdipine  30 mg Oral Daily    lactobacillus  2 capsule Oral BID WC    rOPINIRole  2 mg Oral Nightly       Diet  Current diet/supplement order: ADULT DIET; Regular; 5 carb choices (75 gm/meal); No Added Salt (3-4 gm); Low Potassium (Less than 3000 mg/day)     Recorded PO: PO Meals Eaten (%): 76 - 100% last meal in flowsheets      Action      Glucose Target: 140-180, avoid hypoglycemia    Recommend: Continue current regimen, Basal and Prandial increased previous day (6/3/2024).    Electronically signed by Dora Mac RN on 2024 at 1:05 PM

## 2024-06-04 NOTE — PROGRESS NOTES
Comprehensive Nutrition Assessment    Type and Reason for Visit:  Reassess    Nutrition Recommendations/Plan:   Continue current diet.      Malnutrition Assessment:  Malnutrition Status:  No malnutrition (06/04/24 1018)    Context:  Acute Illness       Nutrition Assessment:    FU - Pt. continues on a 5 carb/RAÚL/low potassium diet. Diet acceptance appears adequate. Pt. denies hunger between meals. No concerns to address at the time of visit. Pt. had a visitor so we will defer diet education to a later time prior to discharge. Glycemic control is very out of target. Pt. has a known Hx. of CHF. will monitor need for diet review regarding heart failure. Will continue to monitor nutritional adequacy and diet acceptance.    Nutrition Related Findings:    BUN 21, Cr 1.9, GFR 29, . LBM 6/4. 6/2 Emesis. Skin w/ area to Rt. hip. Wound Type: Surgical Incision       Current Nutrition Intake & Therapies:    Average Meal Intake: 26-50%, 51-75%, %  Average Supplements Intake: None Ordered  ADULT DIET; Regular; 5 carb choices (75 gm/meal); No Added Salt (3-4 gm); Low Potassium (Less than 3000 mg/day)    Anthropometric Measures:  Height: 149.9 cm (4' 11.02\")  Ideal Body Weight (IBW): 95 lbs (43 kg)       Current Body Weight: 56.8 kg (125 lb 3.5 oz), 135.5 % IBW.    Current BMI (kg/m2): 25.3                          BMI Categories: Overweight (BMI 25.0-29.9)    Estimated Daily Nutrient Needs:  Energy Requirements Based On: Kcal/kg  Weight Used for Energy Requirements: Usual  Energy (kcal/day): 1168-1460kcal (20-25 kcal/kg)  Weight Used for Protein Requirements: Usual  Protein (g/day): 58-76 g (1-1.3g/kg)  Method Used for Fluid Requirements: 1 ml/kcal  Fluid (ml/day): 120-940    Nutrition Diagnosis:   Inadequate energy intake related to inadequate protein-energy intake as evidenced by intake 26-50%, intake 51-75%    Nutrition Interventions:   Food and/or Nutrient Delivery: Continue Current Diet  Nutrition

## 2024-06-04 NOTE — PLAN OF CARE
Problem: Safety - Adult  Goal: Free from fall injury  Outcome: Progressing  Flowsheets (Taken 6/4/2024 0526)  Free From Fall Injury:   Instruct family/caregiver on patient safety   Based on caregiver fall risk screen, instruct family/caregiver to ask for assistance with transferring infant if caregiver noted to have fall risk factors  Note: Pt educated on falls precautions and safety. Call light and personal belongings within reach at all times. Non skid socks in use when up. Hourly rounding and alarms active.      Problem: Pain  Goal: Verbalizes/displays adequate comfort level or baseline comfort level  Outcome: Progressing  Flowsheets (Taken 6/4/2024 0526)  Verbalizes/displays adequate comfort level or baseline comfort level:   Encourage patient to monitor pain and request assistance   Administer analgesics based on type and severity of pain and evaluate response   Consider cultural and social influences on pain and pain management  Note: Able to rate pain using a 1-10 scale, medicated per prn orders, see MAR. Able to verbalize a reduction in pain and/or able to fall asleep and remain asleep without any s/s of pain

## 2024-06-05 LAB
ALBUMIN SERPL-MCNC: 3.1 G/DL (ref 3.4–5)
ANION GAP SERPL CALCULATED.3IONS-SCNC: 12 MMOL/L (ref 3–16)
BASOPHILS # BLD: 0 K/UL (ref 0–0.2)
BASOPHILS NFR BLD: 0.7 %
BUN SERPL-MCNC: 18 MG/DL (ref 7–20)
CALCIUM SERPL-MCNC: 9.2 MG/DL (ref 8.3–10.6)
CHLORIDE SERPL-SCNC: 103 MMOL/L (ref 99–110)
CO2 SERPL-SCNC: 24 MMOL/L (ref 21–32)
CREAT SERPL-MCNC: 1.6 MG/DL (ref 0.6–1.2)
DEPRECATED RDW RBC AUTO: 17.1 % (ref 12.4–15.4)
EOSINOPHIL # BLD: 0.2 K/UL (ref 0–0.6)
EOSINOPHIL NFR BLD: 2.4 %
GFR SERPLBLD CREATININE-BSD FMLA CKD-EPI: 35 ML/MIN/{1.73_M2}
GLUCOSE BLD-MCNC: 103 MG/DL (ref 70–99)
GLUCOSE BLD-MCNC: 126 MG/DL (ref 70–99)
GLUCOSE BLD-MCNC: 185 MG/DL (ref 70–99)
GLUCOSE BLD-MCNC: 87 MG/DL (ref 70–99)
GLUCOSE SERPL-MCNC: 100 MG/DL (ref 70–99)
HCT VFR BLD AUTO: 22.6 % (ref 36–48)
HGB BLD-MCNC: 7.8 G/DL (ref 12–16)
LYMPHOCYTES # BLD: 1.3 K/UL (ref 1–5.1)
LYMPHOCYTES NFR BLD: 19.8 %
MCH RBC QN AUTO: 30.2 PG (ref 26–34)
MCHC RBC AUTO-ENTMCNC: 34.4 G/DL (ref 31–36)
MCV RBC AUTO: 87.9 FL (ref 80–100)
MONOCYTES # BLD: 0.7 K/UL (ref 0–1.3)
MONOCYTES NFR BLD: 10.5 %
NEUTROPHILS # BLD: 4.3 K/UL (ref 1.7–7.7)
NEUTROPHILS NFR BLD: 66.6 %
PERFORMED ON: ABNORMAL
PERFORMED ON: NORMAL
PHOSPHATE SERPL-MCNC: 3.3 MG/DL (ref 2.5–4.9)
PLATELET # BLD AUTO: 176 K/UL (ref 135–450)
PMV BLD AUTO: 8.2 FL (ref 5–10.5)
POTASSIUM SERPL-SCNC: 3.9 MMOL/L (ref 3.5–5.1)
RBC # BLD AUTO: 2.58 M/UL (ref 4–5.2)
SODIUM SERPL-SCNC: 139 MMOL/L (ref 136–145)
WBC # BLD AUTO: 6.4 K/UL (ref 4–11)

## 2024-06-05 PROCEDURE — 6370000000 HC RX 637 (ALT 250 FOR IP): Performed by: PHYSICAL MEDICINE & REHABILITATION

## 2024-06-05 PROCEDURE — 85025 COMPLETE CBC W/AUTO DIFF WBC: CPT

## 2024-06-05 PROCEDURE — 97530 THERAPEUTIC ACTIVITIES: CPT

## 2024-06-05 PROCEDURE — 97130 THER IVNTJ EA ADDL 15 MIN: CPT

## 2024-06-05 PROCEDURE — 97129 THER IVNTJ 1ST 15 MIN: CPT

## 2024-06-05 PROCEDURE — 97110 THERAPEUTIC EXERCISES: CPT

## 2024-06-05 PROCEDURE — 6370000000 HC RX 637 (ALT 250 FOR IP): Performed by: INTERNAL MEDICINE

## 2024-06-05 PROCEDURE — 2580000003 HC RX 258: Performed by: INTERNAL MEDICINE

## 2024-06-05 PROCEDURE — 94760 N-INVAS EAR/PLS OXIMETRY 1: CPT

## 2024-06-05 PROCEDURE — 97116 GAIT TRAINING THERAPY: CPT

## 2024-06-05 PROCEDURE — 6360000002 HC RX W HCPCS: Performed by: STUDENT IN AN ORGANIZED HEALTH CARE EDUCATION/TRAINING PROGRAM

## 2024-06-05 PROCEDURE — 6370000000 HC RX 637 (ALT 250 FOR IP): Performed by: STUDENT IN AN ORGANIZED HEALTH CARE EDUCATION/TRAINING PROGRAM

## 2024-06-05 PROCEDURE — 97535 SELF CARE MNGMENT TRAINING: CPT

## 2024-06-05 PROCEDURE — 80069 RENAL FUNCTION PANEL: CPT

## 2024-06-05 PROCEDURE — 1280000000 HC REHAB R&B

## 2024-06-05 RX ORDER — INSULIN GLARGINE 100 [IU]/ML
10 INJECTION, SOLUTION SUBCUTANEOUS NIGHTLY
Status: DISCONTINUED | OUTPATIENT
Start: 2024-06-05 | End: 2024-06-06

## 2024-06-05 RX ADMIN — METHOCARBAMOL TABLETS 750 MG: 750 TABLET, COATED ORAL at 18:43

## 2024-06-05 RX ADMIN — NIFEDIPINE 30 MG: 30 TABLET, EXTENDED RELEASE ORAL at 08:04

## 2024-06-05 RX ADMIN — HEPARIN SODIUM 5000 UNITS: 5000 INJECTION INTRAVENOUS; SUBCUTANEOUS at 04:37

## 2024-06-05 RX ADMIN — OXYCODONE 2.5 MG: 5 TABLET ORAL at 08:05

## 2024-06-05 RX ADMIN — METHOCARBAMOL TABLETS 750 MG: 750 TABLET, COATED ORAL at 11:41

## 2024-06-05 RX ADMIN — ESCITALOPRAM OXALATE 10 MG: 10 TABLET ORAL at 08:05

## 2024-06-05 RX ADMIN — SODIUM CHLORIDE, PRESERVATIVE FREE 10 ML: 5 INJECTION INTRAVENOUS at 15:05

## 2024-06-05 RX ADMIN — HYDROCORTISONE 10 MG: 10 TABLET ORAL at 08:04

## 2024-06-05 RX ADMIN — LOSARTAN POTASSIUM 25 MG: 25 TABLET, FILM COATED ORAL at 08:04

## 2024-06-05 RX ADMIN — Medication 2 CAPSULE: at 08:05

## 2024-06-05 RX ADMIN — Medication 2 CAPSULE: at 17:09

## 2024-06-05 RX ADMIN — LEVETIRACETAM 500 MG: 500 TABLET, FILM COATED ORAL at 21:26

## 2024-06-05 RX ADMIN — ACETAMINOPHEN 325MG 650 MG: 325 TABLET ORAL at 21:27

## 2024-06-05 RX ADMIN — INSULIN LISPRO 3 UNITS: 100 INJECTION, SOLUTION INTRAVENOUS; SUBCUTANEOUS at 11:42

## 2024-06-05 RX ADMIN — INSULIN LISPRO 3 UNITS: 100 INJECTION, SOLUTION INTRAVENOUS; SUBCUTANEOUS at 17:10

## 2024-06-05 RX ADMIN — LEVETIRACETAM 500 MG: 500 TABLET, FILM COATED ORAL at 08:05

## 2024-06-05 RX ADMIN — PANTOPRAZOLE SODIUM 40 MG: 40 TABLET, DELAYED RELEASE ORAL at 07:27

## 2024-06-05 RX ADMIN — OXYCODONE 2.5 MG: 5 TABLET ORAL at 13:49

## 2024-06-05 RX ADMIN — HEPARIN SODIUM 5000 UNITS: 5000 INJECTION INTRAVENOUS; SUBCUTANEOUS at 15:04

## 2024-06-05 RX ADMIN — METHOCARBAMOL TABLETS 750 MG: 750 TABLET, COATED ORAL at 04:37

## 2024-06-05 RX ADMIN — HEPARIN SODIUM 5000 UNITS: 5000 INJECTION INTRAVENOUS; SUBCUTANEOUS at 21:25

## 2024-06-05 RX ADMIN — ROPINIROLE HYDROCHLORIDE 2 MG: 1 TABLET, FILM COATED ORAL at 21:26

## 2024-06-05 RX ADMIN — LOPERAMIDE HYDROCHLORIDE 2 MG: 2 CAPSULE ORAL at 08:04

## 2024-06-05 RX ADMIN — HYDROCORTISONE 5 MG: 5 TABLET ORAL at 21:27

## 2024-06-05 RX ADMIN — SODIUM CHLORIDE, PRESERVATIVE FREE 10 ML: 5 INJECTION INTRAVENOUS at 21:22

## 2024-06-05 RX ADMIN — PANTOPRAZOLE SODIUM 40 MG: 40 TABLET, DELAYED RELEASE ORAL at 16:00

## 2024-06-05 RX ADMIN — FERROUS SULFATE TAB EC 324 MG (65 MG FE EQUIVALENT) 324 MG: 324 (65 FE) TABLET DELAYED RESPONSE at 08:04

## 2024-06-05 RX ADMIN — INSULIN GLARGINE 10 UNITS: 100 INJECTION, SOLUTION SUBCUTANEOUS at 21:25

## 2024-06-05 ASSESSMENT — PAIN SCALES - GENERAL
PAINLEVEL_OUTOF10: 3
PAINLEVEL_OUTOF10: 8
PAINLEVEL_OUTOF10: 6
PAINLEVEL_OUTOF10: 3
PAINLEVEL_OUTOF10: 8
PAINLEVEL_OUTOF10: 4
PAINLEVEL_OUTOF10: 7
PAINLEVEL_OUTOF10: 4

## 2024-06-05 ASSESSMENT — PAIN DESCRIPTION - PAIN TYPE
TYPE: ACUTE PAIN

## 2024-06-05 ASSESSMENT — PAIN DESCRIPTION - LOCATION
LOCATION: HIP
LOCATION: LEG
LOCATION: HEAD

## 2024-06-05 ASSESSMENT — PAIN DESCRIPTION - DESCRIPTORS
DESCRIPTORS: ACHING;TIGHTNESS
DESCRIPTORS: THROBBING
DESCRIPTORS: SPASM
DESCRIPTORS: ACHING
DESCRIPTORS: ACHING;TIGHTNESS

## 2024-06-05 ASSESSMENT — PAIN DESCRIPTION - FREQUENCY
FREQUENCY: INTERMITTENT

## 2024-06-05 ASSESSMENT — PAIN DESCRIPTION - ORIENTATION
ORIENTATION: RIGHT
ORIENTATION: MID
ORIENTATION: RIGHT

## 2024-06-05 ASSESSMENT — PAIN - FUNCTIONAL ASSESSMENT
PAIN_FUNCTIONAL_ASSESSMENT: ACTIVITIES ARE NOT PREVENTED

## 2024-06-05 ASSESSMENT — PAIN DESCRIPTION - ONSET
ONSET: ON-GOING
ONSET: ON-GOING
ONSET: GRADUAL
ONSET: ON-GOING
ONSET: ON-GOING

## 2024-06-05 NOTE — PROGRESS NOTES
ACUTE REHAB UNIT  SPEECH/LANGUAGE PATHOLOGY      [x] Daily  [] Weekly Care Conference Note  [] Discharge    Patient:Elvia Arguelles      :1960  MRN:[id number]  Rehab Dx/Hx: Closed right hip fracture, initial encounter ([facility]) [S72.001A]    Precautions: falls, seizures, and visual spatial deficits  Home situation: from home with spouse  ST Dx: [] Aphasia  [] Dysarthria  [] Apraxia   [] Oropharyngeal dysphagia [x] Cognitive   Impairment  [] Other:   Initial Speech Therapy Assessment Diagnosis:   1. Cognitive Diagnosis: Pt demonstrated minimal to moderate deficits in domains of time orientation; higher level attention; complex VPS and abstract reasoning; working memory and STM. Pt with visual deficits and right lower extremity pain which is further exacerbating. Visual deficits during paper tasks revealed reduced attention left; slight sloping upward left to right when writing; spatial orientation when putting numbers on a clock. Will continue therapy working on visuo cognition / visuo spatial orientation and visual language remediation  2. Speech Diagnosis: Motor speech audible and intelligible at multiple word utterance level and during discourse. Pt achieve good verbal diadochokinetic rating. Voice quality with minimal claudio /strained quality.  3. Communication Diagnosis: Pt demonstrating concrete to mild complex functional auditory language processing and verbal expressive language skills. Visual language processing at word / phrase level with mild deficits in inattention left of midline (did utilized large print with high color contrast). Pt able to express full name via written expression but minimal perseveration of letters ans slight sloping upward left to right when writing. Ongoing therapy     Date of Admit: 2024  Room #: U1C-6474/3272-01  Date: 2024       Current functional status (updated daily):         Current Diet Order:ADULT DIET; Regular; 5 carb choices (75 gm/meal); No Added Salt (3-4  returned pt to room [] Call light within reach  [] Chair alarm activated  [] Bed alarm activated  [] Other:    Progress Assessment: 5/29/24: Continued visual language impairment at word level requiring max prompts  5/30/2024 : pt /dtr ed in current strategies for visual scanning and visual attention and PS/reasoning.   6/3/2024: Pt with lots of pain today; has XR right hip pending. Ongoing ed with pt and her other dtr this date/time. Will continue poc  6/4/2024: Hip XR 6/3/2024 noted:hardware intact;no new acute fracture or dislocation. Good effort this session with less pain complaints;    Plan: Continue as per plan of care while on ARU and if recommended at next level of care.   Continued Tx Upon Discharge: ?    [] Yes [] No [x] TBD based on progress while on ARU [] Vital Stim indicated [] Other:   Estimated discharge date: 1-2 weeks   Discharge recommendations:   [] Home independently  [] Home with assistance []  24 hour supervision  [] ECF [] Referral for ENT at d/c; [] Referral for Neuro Visual Opthalmologic at d/c; [] Referral for Neuro Psych  at d/c; [x] Other: TBD     Additional information:             Electronically Signed by    Rosita Mckeon MA, CCC-SLP, #[id number]  Speech-Language Pathologist

## 2024-06-05 NOTE — PROGRESS NOTES
Occupational Therapy  Facility/Department: [address] REHAB  Rehabilitation Occupational Therapy Daily Treatment Note    AM and PM Sessions:     Date: 24  Patient Name: Elvia Arguelles       Room: A9N-7610/3272-01  MRN: [id number]  Account: [id number]   : 1960  (63 y.o.) Gender: female                    Past Medical History:  has a past medical history of Adrenal insufficiency (HCC), Cancer (HCC), Closed displaced intertrochanteric fracture of right femur (HCC), Depression, Diabetes mellitus (HCC), Hx of radiation therapy, Hypertension, Hyponatremia, Insulin pump in place, Melanoma (HCC), Prolonged emergence from general anesthesia, and Restless leg syndrome.  Past Surgical History:   has a past surgical history that includes Hysterectomy; Cholecystectomy; shoulder surgery; Upper gastrointestinal endoscopy (10/22/2014); Carpal tunnel release (Bilateral, 2017); lipoma resection; Eye surgery (Right); Shoulder arthroscopy (Right, 2018); liver biopsy (Right); US BIOPSY LIVER PERCUTANEOUS (2022); CT BIOPSY ABDOMEN RETROPERITONEUM (2022); Upper gastrointestinal endoscopy (N/A, 2023); Upper gastrointestinal endoscopy (2023); Port Surgery (Right, 2023); Upper gastrointestinal endoscopy (N/A, 2024); and hip surgery (Right, 2024).    Restrictions  Restrictions/Precautions: Weight Bearing, Fall Risk  Other position/activity restrictions: R femur fx s/p Right femur cephalomedullary nail; Diet: 5 carb choices (75 gm/meal); No Added Salt (3-4 gm); Low Potassium (Less than 3000 mg/day), Port  Right Lower Extremity Weight Bearing: Weight Bearing As Tolerated  Equipment Used: Wheelchair    Subjective  Subjective: Patient met in dept, seated in w/c. Patient agreeable to tx in dept. Pt rated her pain 6/10 in R hip which increased instance  Restrictions/Precautions: Weight Bearing;Fall Risk             Objective     Orientation  Overall Orientation Status: Within  need assist for all IADL tasks D/T visual impairment  Education Method: Demonstration;Verbal  Barriers to Learning: Vision;Other (Comment) (vision impairment)  Education Outcome: Verbalized understanding;Continued education needed  Skilled Clinical Factors: Mod cues for most tasks d/t visual impairment    Plan  Occupational Therapy Plan  Times Per Week: 5-6x  Times Per Day: Twice a day  Days Per Week: 5 Days  Hours Per Day: 1.5 hours  Current Treatment Recommendations: Strengthening;Balance training;Functional mobility training;Endurance training;Gait training;Safety education & training;Self-Care / ADL;Cognitive reorientation;Equipment evaluation, education, & procurement;Patient/Caregiver education & training;Positioning;Pain management;Home management training;Modalities    Goals  Patient Goals   Patient goals : \"To go back home and be normal again\"  Short Term Goals  Time Frame for Short Term Goals: 1 week  Short Term Goal 1: Pt will complete functional mobility/txs using LRAD SBA  Short Term Goal 2: Pt will complete toileting SBA  Short Term Goal 3: Pt will complete bathing using AE, as needed, SBA  Short Term Goal 4: Pt will complete dressing using AE, as needed, SBA  Short Term Goal 5: Pt will maintain stance throughout functional task for atleast 5min SBA to address strength/activity tolerance for ADL/IADL function  Short Term Goal 6: Pt will perform BUE therex to address strength/endurance for functional performance  Long Term Goals  Time Frame for Long Term Goals : 2 weeks  Long Term Goal 1: Pt will complete functional mobility/txs using LRAD mod I  Long Term Goal 2: Pt will complete toileting mod I  Long Term Goal 3: Pt will complete bathing using AE, as needed, mod I  Long Term Goal 4: Pt will complete dressing using AE, as needed, mod I  Long Term Goal 5: Pt will complete simple IADL task (light meal prep, pet care, etc.) mod I                   Therapy Time   Individual Concurrent Group Co-treatment    Time In 1005         Time Out 1045         Minutes 40         Timed Code Treatment Minutes: 40 Minutes  Therapy Time     Individual Co-treatment   Time In 1400     Time Out 1445     Minutes 45              Jocelyn Velasco OT  Electronically signed by Jocelyn Velasco OTR/L  License # [id number]

## 2024-06-05 NOTE — PROGRESS NOTES
Physical Therapy  Facility/Department: [address] REHAB  Rehabilitation Physical Therapy Treatment Note    NAME: Elvia Arguelles  : 1960 (63 y.o.)  MRN: [id number]  CODE STATUS: Full Code    Date of Service: 24       Restrictions:  Restrictions/Precautions: Weight Bearing, Fall Risk  Lower Extremity Weight Bearing Restrictions  Right Lower Extremity Weight Bearing: Weight Bearing As Tolerated  Position Activity Restriction  Other position/activity restrictions: R femur fx s/p Right femur cephalomedullary nail; Diet: 5 carb choices (75 gm/meal); No Added Salt (3-4 gm); Low Potassium (Less than 3000 mg/day), Port     Pertinent medical information:  Additional Pertinent Hx: per Dr. Tello's H&P, \"63 yrs old female with a PMHx of T2DM, adrenal insufficiency chonic steroids, metastatic melanoma on Opdivo therapy who presented to ED with R hip pain after fall, found to have R femoral fracture s/p IM nail. originally presented to [facility] ED on  after being found down by family in the living room floor. ED workup showed Rhabdomyolysis, FAVIAN and closed R femoral fracture. Admitted for further care. Orthopedic surgery consulted, performed IM nail  without perioperative complication. Course complicated by AMS, suspected PRES per multiple providers and MRI imaging findings. EEG w/epileptiform activity, started on keppra per neurology consult\"    SUBJECTIVE  Subjective  Subjective: Patient reports pain is more elevated this AM since she woke, but she just got pain medication from RN.  Pain: Pain rated at a 7/10 in thigh and hip this AM    Social/Functional History  Lives With: Spouse (, Shant, in good health and retired)  Type of Home: House  Home Layout: Able to Live on Main level with bedroom/bathroom, One level, Laundry in basement (with basement)  Home Access: Stairs to enter with rails  Entrance Stairs - Number of Steps: 4 with danielle handrail + 6 with danielle handrails (unsure if able to hold both at  for strengthening, gait training, stairs training, and safety training to allow for safe return home.  Activity Tolerance: Patient limited by pain;Patient limited by endurance  Discharge Recommendations: Continue to assess pending progress;Patient would benefit from continued therapy after discharge  PT Equipment Recommendations  Other: has wheeled walker at home    Goals  Patient Goals   Patient Goals : \"be able to do everything I could before\" \"be able to take care of myself\"  Short Term Goals  Time Frame for Short Term Goals: one week from 5/8/2024  Short Term Goal 1: bed mobility with min assist -met 6/4/2024  Short Term Goal 2: Transfers with CGA - met - 5/30/2024  Short Term Goal 3: Ambulate 50' with wheeled walker with CGA - met 6/4/2024  Short Term Goal 4: Ascend/descend curb step with wheeled walker with CGA  Long Term Goals  Time Frame for Long Term Goals : upon discharge - approx 10-14 days from 5/28/2024  Long Term Goal 1: bed mobility with modified independence  Long Term Goal 2: Transfers with modified independence  Long Term Goal 3: Ambulate 150' with wheeled wlaker with modified independence  Long Term Goal 4: Ascend/descend curb step with wheeled walker with supervision  Long Term Goal 5: Ascend/descend 4 steps with bilateral rails with SBA    PLAN OF CARE/SAFETY  Physical Therapy Plan  General Plan:  minutes of therapy at least 5 out of 7 days a week  Days Per Week: 5 Days  Therapy Duration: 2 Weeks  Current Treatment Recommendations: Transfer training;Functional mobility training;Balance training;Strengthening;Therapeutic activities;Co-Treatment;Gait training;Endurance training;Stair training;Pain management;Safety education & training  Safety Devices  Type of Devices: All fall risk precautions in place;Gait belt;Left in chair (in wheelchair to return to room with transportation)    EDUCATION  Education  Education Given To: Patient;Family  Education Provided: Plan of Care;Safety;Transfer

## 2024-06-05 NOTE — PROGRESS NOTES
[facility] Inpatient Nephrology Note        CC:FAVIAN on CKD  HPI: BP controlled.   Soc Hx:  no family present  ROS: no SOB.       Medications     insulin glargine  10 Units SubCUTAneous Nightly    insulin lispro  3 Units SubCUTAneous TID WC    fentaNYL  1 patch TransDERmal Q72H    sodium chloride flush  5-40 mL IntraVENous 2 times per day    losartan  25 mg Oral Daily    levETIRAcetam  500 mg Oral BID    ferrous sulfate  324 mg Oral Daily with breakfast    insulin lispro  0-4 Units SubCUTAneous TID WC    insulin lispro  0-4 Units SubCUTAneous Nightly    escitalopram  10 mg Oral Daily    hydrocortisone  10 mg Oral Daily    pantoprazole  40 mg Oral BID AC    heparin (porcine)  5,000 Units SubCUTAneous 3 times per day    hydrocortisone  5 mg Oral Nightly    NIFEdipine  30 mg Oral Daily    lactobacillus  2 capsule Oral BID WC    rOPINIRole  2 mg Oral Nightly         OBJECTIVE      Physical    TEMPERATURE:  Current - Temp: 98.2 °F (36.8 °C); Max - Temp  Av.4 °F (36.9 °C)  Min: 98.2 °F (36.8 °C)  Max: 98.6 °F (37 °C)  RESPIRATIONS RANGE: Resp  Av.7  Min: 16  Max: 18  PULSE RANGE: Pulse  Av  Min: 74  Max: 80  BLOOD PRESSURE RANGE:  Systolic (24hrs), Av , Min:139 , Max:172   ; Diastolic (24hrs), Av, Min:69, Max:80    PULSE OXIMETRY RANGE: SpO2  Av.8 %  Min: 97 %  Max: 99 %  24HR INTAKE/OUTPUT:    Intake/Output Summary (Last 24 hours) at 2024 1444  Last data filed at 2024 1826  Gross per 24 hour   Intake 240 ml   Output --   Net 240 ml         GEN: no distress  HEENT: normocephalic, atraumatic  CV: RRR  LUNGS: clear bilaterally, unlabored  ABD: + bowel sounds. No distension. No tenderness  EXT: Trace edema RLE, no edema LLE  SKIN: no rash  NEURO: no tremor    Medications   insulin glargine  10 Units SubCUTAneous Nightly    insulin lispro  3 Units SubCUTAneous TID WC    fentaNYL  1 patch TransDERmal Q72H    sodium chloride flush  5-40 mL IntraVENous

## 2024-06-05 NOTE — PROGRESS NOTES
Ambulated with walker to BR with minimal assist and assisted into bed.  States she does have tightness in her right leg.  Robaxin to be given

## 2024-06-05 NOTE — PROGRESS NOTES
Had taken patient to bathroom, on way back leaned left on helper bar.  States she had sharp pain.  2 other nurses helped assist her to w/c to bedside chair.  States it was her right leg was sharp pain.  No external rotation noted.  Ice applied to right hip.  Right hip incision well approximated.  Legs elevated in chair.   Dr Martinez aware and said to continue to monitor.

## 2024-06-05 NOTE — PATIENT CARE CONFERENCE
line leve; visual language processing for written expression tasks; visual language processing for money amounts and time; and visual strategies for use of environment/table top and visual spatial organization.  Bombardment of orientation to task or visual field to be utilized. Bombardment of use of left to right visual scanning to oriented and use of visual aids to assist in orientation for left to right reading at multiple line level or when completing a biographical form. Bombarding of strategies to assist re-orientation when completing a visuo spatial org task. 2 Family members (dtrs ) present for ed. Ongoing ed recommended prior to d/c. Pt making continued progress-continue while pt is on ARU and at d/c emphasizing cognitive-linguistic intervention and visual cognition intervention for varied visual language processing and visual spatial org tasks; incorporating recall of learned strategies; reasoning for generalizing learned strategies task to task.   Cherelle Hart,MS,CCC,SLP [id number]  Speech and Language Pathologist      OCCUPATIONAL THERAPY  ADLs:  Feeding  Assistance Level: Set-up  Skilled Clinical Factors: set-up d/t low vision @ end of session  Grooming/Oral Hygiene  Assistance Level: Set-up  Skilled Clinical Factors: oral care completed prior to shower, set up to brush hair  UE Bathing  Assistance Level: Set-up  Skilled Clinical Factors: Seated on shower chair, port covered, cues to locate items due to vision  LE Bathing  Equipment Provided: Long-handled sponge  Assistance Level: Stand by assist, Contact guard assist  Skilled Clinical Factors: Stood with use of grab bars to wash jhon area and buttocks, cues to use sponge for right LE, assist to dry right foot  UE Dressing  Assistance Level: Set-up  Skilled Clinical Factors: doffed shirt and donned new one. Able to decipher front vs back by feeling for label on shirt. Declined wearing a bra.  LE Dressing  Equipment Provided: Reachers  Assistance Level: Moderate  prep/cean up (IADL tasks). Pt standing for 10 minutes for task.       UE function: WFL Has green T-band HEP to do with supervision/cues as she cannot see hand out      Assessment:  Assessment: Pt was ed in walker safety & had difficultycompleting simple IADL task to gather items for cereal w/ milk D/T visual impairment(min A + mod cues), difficulty motor planning & with visul memory for location of items. Pt did stand for 10 minutes during task, sat to rest then stood 6 more minutes for table top task. Pt expressed frustration w/ visual table top tasks as unable to complete simple placing tasks presented. Pt far below her baseline. Plan to cont tx per POC. Will need 24 hr assist/supervision at D/C & cont'd OT.  Activity Tolerance: Patient limited by pain, Patient limited by endurance  Discharge Recommendations: Patient would benefit from continued therapy after discharge, 24 hour supervision or assist, Continue to assess pending progress, S Level 1, Home with Home health OT     NUTRITION  Most recent weightWeight - Scale: 56.9 kg (125 lb 7.1 oz)  BSA (Calculated - sq m): 1.58 sq meters  BMI (Calculated): 25.4  Diet Order: ADULT DIET; Regular; 5 carb choices (75 gm/meal); No Added Salt (3-4 gm); Low Potassium (Less than 3000 mg/day)  PO Meals Eaten (%): 76 - 100%  Malnutrition Status: No malnutrition  Nutrition Education/Counseling: Education needed     NURSING  Pt can be incontinent at times of bowel and bladder, last BM 6/6, fall risk, surgical incision right hip, monitor blood sugar, diet, maintain skin integrity.  Family Education: Patient Education: medications, CHF, diet, diabetic needs, safety and fall prevention, skin care and prevention.    MEDICAL      TEAM SUMMARY AND DISCHARGE PLAN  Estimated Length of Stay:6/18/2024  Destination: home health  Anticipated Services at Discharge:    [x] OT  [x] PT   [] SLP    [x] RN   [] Home Health aide [] SW  Community Resources: _______________________________  Equipment  interdisciplinary plan of care as documented within the medical record of Elvia Arguelles.    MD: Delia Martinez MD 6/6/2024, 4:52 PM

## 2024-06-05 NOTE — PROGRESS NOTES
Department of Physical Medicine & Rehabilitation  Progress Note    Patient Identification:  Elvia Arguelles  [id number]  : 1960  Admit date: 2024    Chief Complaint: Closed right hip fracture, initial encounter ([facility])    Subjective:   No acute events overnight.   Patient seen this am working with OT in the gym. She is standing at table and working on placing clips on pegs. Having difficulty due to vision impairment but standing tolerance is improving. Spouse in the gym expresses anger/frustration about her ongoing vision deficits. I provided education on this. Suspected related to PRES. No further treatment or testing available at this time but will need f/u with Neuro-ophthalmology.   Labs reviewed.     ROS: No f/c, n/v, cp     Objective:  Patient Vitals for the past 24 hrs:   BP Temp Temp src Pulse Resp SpO2 Weight   24 1349 -- -- -- -- 18 -- --   24 0836 -- -- -- -- -- 97 % --   24 0804 139/69 98.2 °F (36.8 °C) Oral 74 -- 99 % --   24 0600 -- -- -- -- -- -- 56.9 kg (125 lb 7.1 oz)   24 2203 -- -- -- -- 16 -- --   24 2202 -- -- -- -- 16 -- --   24 2132 -- -- -- -- 16 -- --   24 (!) 172/80 98.6 °F (37 °C) Oral 80 16 98 % --   24 1906 -- -- -- -- 18 97 % --     Const: Alert. No distress, pleasant.   HEENT: Normocephalic, atraumatic. Normal sclera/conjunctiva. MMM.   CV: Regular rate and rhythm.   Resp: No respiratory distress. Lungs diminished at bases  Abd: Soft, nontender, nondistended, NABS+   Ext:+ post op swelling RLE  MSK: Decreased right hip ROM  Neuro: Alert, oriented, mildly delayed processing. No focal strength deficits aside from pain limited RLE  Psych: Cooperative, appropriate mood and affect    Laboratory data: Available via EMR.   Last 24 hour lab  Recent Results (from the past 24 hour(s))   POCT Glucose    Collection Time: 24  4:14 PM   Result Value Ref Range    POC Glucose 368 (H) 70 - 99 mg/dl    Performed on ACCU-CHEK     RLS  -continue Ropinirole      Mood disorder  -continue escitalopram      Bladder   -High risk retention   -Monitor PVRs, SC prn >300cc     Bowel   -High risk constipation   -PRN miralax, bisacodyl supp.     Safety   -fall, aspiration, seizure precautions     Pain control  -continue oxycodone prn (decreased back to 5 mg prn due to cognitive side effects)  -started prn methocarbamol   -continue Fentanyl patch per Ortho. Monitor cog with this.        PPx  -DVT: Heparin  -GI: pantoprazole       Rehab Progress: Making progress. Overall SBA for mobility, Set up -modA for ADLs. Barriers include: right hip pain, weakness, vision deficits, left neglect/Visual-spatial deficits, cognition.   Anticipated Dispo: home with spouse  Services:  PT, OT, SLP, RN  DME: ARIADNE  ELOS: ~6/18      Delia Martinez MD 6/5/2024, 1:59 PM

## 2024-06-05 NOTE — PROGRESS NOTES
Department of Physical Medicine & Rehabilitation  Progress Note    Patient Identification:  Elvia Arguelles  [id number]  : 1960  Admit date: 2024    Chief Complaint: Closed right hip fracture, initial encounter ([facility])    Subjective:   No acute events overnight.   Patient seen this am ambulating with PT. Her pain is much improved today. She is able to ambulate with step-to pattern in wheeled walker.    Labs reviewed.     ROS: No f/c, n/v, cp     Objective:  Patient Vitals for the past 24 hrs:   BP Temp Temp src Pulse Resp SpO2 Weight   24 2132 -- -- -- -- 16 -- --   24 1954 (!) 172/80 98.6 °F (37 °C) Oral 80 16 98 % --   24 1906 -- -- -- -- 18 97 % --   24 1242 -- -- -- -- 18 -- --   24 1212 -- -- -- -- 18 -- --   24 0800 (!) 158/72 98.4 °F (36.9 °C) Oral 86 16 97 % --   24 0458 -- -- -- -- -- -- 56.8 kg (125 lb 3.5 oz)     Const: Alert. No distress, pleasant.   HEENT: Normocephalic, atraumatic. Normal sclera/conjunctiva. MMM.   CV: Regular rate and rhythm.   Resp: No respiratory distress. Lungs diminished at bases  Abd: Soft, nontender, nondistended, NABS+   Ext:+ post op swelling RLE  MSK: Decreased right hip ROM  Neuro: Alert, oriented, mildly delayed processing. No focal strength deficits aside from pain limited RLE  Psych: Cooperative, appropriate mood and affect    Laboratory data: Available via EMR.   Last 24 hour lab  Recent Results (from the past 24 hour(s))   POCT Glucose    Collection Time: 24  6:58 AM   Result Value Ref Range    POC Glucose 200 (H) 70 - 99 mg/dl    Performed on ACCU-CHEK    POCT Glucose    Collection Time: 24 11:10 AM   Result Value Ref Range    POC Glucose 169 (H) 70 - 99 mg/dl    Performed on ACCU-CHEK    POCT Glucose    Collection Time: 24  4:14 PM   Result Value Ref Range    POC Glucose 368 (H) 70 - 99 mg/dl    Performed on ACCU-CHEK    POCT Glucose    Collection Time: 24  7:59 PM   Result Value Ref Range  prn methocarbamol   -Fentanyl patch started by Ortho. Monitor cog with this.        PPx  -DVT: Heparin  -GI: pantoprazole       Rehab Progress: Making progress. Overall SBA-CGA for mobility, Set up -modA for ADLs. Barriers include: right hip pain, weakness, vision deficits, left neglect/Visual-spatial deficits, cognition.   Anticipated Dispo: home with spouse  Services:  PT, OT, SLP, RN  DME: ARIADNE  ELOS: ~6/18      Delia Martinez MD 6/4/2024, 11:18 PM

## 2024-06-05 NOTE — PROGRESS NOTES
Patient admitted to rehab s/p ORIF to right femur fracture on 5/19 by Dr. Pandey. A/Ox4. Patient has been pleasant and cooperative with care. Transfers with front wheeled walker, gait-belt, and CGA x 1; patient tolerated fairly well (slide on ball of foot on surgical leg). Patient moving surgical leg in and out of bed herself. Mobility restrictions: WBAT. On 5 carb, RAÚL, low K diet. Medications taken whole with thin liquids without swallowing difficulty. Patient does need assist with pills to mouth or to hand due to vision difficulty (blurred vision persists).On SQ Heparin for DVT prophylaxis.  Skin: Intact with exception off surgical incision, steri-strips intact, site is well approximated and without s/s of infection. Patient also noted with scattered abrasions and bruising from fall at home. Oxygen: RA. LDA: Port access to right chest, flushes easily and has a brisk blood return. Has been continent of bowel and bladder. LBM 6/4. Chair/bed alarms in use and call light in reach. Medicated this evening due to pain to surgical site and also complaints of a headache. Patient rates pain 6 to 7 at its highest on 1-10 pain scale. Upon checking back on patient for pain relief, she was sleeping and in no apparent distress. Fall precautions in place. Will continue to monitor and assist as needed.

## 2024-06-05 NOTE — PROGRESS NOTES
Patient admitted to rehab with closed right hip fracture.  A/Ox4. Transfers with one assist with gait belt and front wheeled walker for ambulation. Mobility restrictions: WBAT. On regular RAÚL low potassium diet, tolerating well. Medications taken whole with fluids. On heparin for DVT prophylaxis.  Skin: scattered bruising and abrasions.  Right hip incisions with steri strips intact. Oxygen: room air. LDA: port accessed in right upper chest. Has been continent of bowel and  of bladder. LBM 6/4. Chair/bed alarms in use and call light in reach. Will monitor for safety.

## 2024-06-06 LAB
GLUCOSE BLD-MCNC: 102 MG/DL (ref 70–99)
GLUCOSE BLD-MCNC: 191 MG/DL (ref 70–99)
GLUCOSE BLD-MCNC: 263 MG/DL (ref 70–99)
GLUCOSE BLD-MCNC: 48 MG/DL (ref 70–99)
GLUCOSE BLD-MCNC: 57 MG/DL (ref 70–99)
GLUCOSE BLD-MCNC: 73 MG/DL (ref 70–99)
PERFORMED ON: ABNORMAL
PERFORMED ON: NORMAL

## 2024-06-06 PROCEDURE — 97116 GAIT TRAINING THERAPY: CPT | Performed by: PHYSICAL THERAPIST

## 2024-06-06 PROCEDURE — 1280000000 HC REHAB R&B

## 2024-06-06 PROCEDURE — 97535 SELF CARE MNGMENT TRAINING: CPT

## 2024-06-06 PROCEDURE — 97530 THERAPEUTIC ACTIVITIES: CPT | Performed by: PHYSICAL THERAPIST

## 2024-06-06 PROCEDURE — 6370000000 HC RX 637 (ALT 250 FOR IP): Performed by: PHYSICAL MEDICINE & REHABILITATION

## 2024-06-06 PROCEDURE — 6370000000 HC RX 637 (ALT 250 FOR IP): Performed by: INTERNAL MEDICINE

## 2024-06-06 PROCEDURE — 6370000000 HC RX 637 (ALT 250 FOR IP): Performed by: STUDENT IN AN ORGANIZED HEALTH CARE EDUCATION/TRAINING PROGRAM

## 2024-06-06 PROCEDURE — 97129 THER IVNTJ 1ST 15 MIN: CPT

## 2024-06-06 PROCEDURE — 6370000000 HC RX 637 (ALT 250 FOR IP): Performed by: NURSE PRACTITIONER

## 2024-06-06 PROCEDURE — 97110 THERAPEUTIC EXERCISES: CPT | Performed by: PHYSICAL THERAPIST

## 2024-06-06 PROCEDURE — 97130 THER IVNTJ EA ADDL 15 MIN: CPT

## 2024-06-06 PROCEDURE — 94760 N-INVAS EAR/PLS OXIMETRY 1: CPT

## 2024-06-06 PROCEDURE — 97112 NEUROMUSCULAR REEDUCATION: CPT

## 2024-06-06 PROCEDURE — 2580000003 HC RX 258: Performed by: INTERNAL MEDICINE

## 2024-06-06 PROCEDURE — 6360000002 HC RX W HCPCS: Performed by: STUDENT IN AN ORGANIZED HEALTH CARE EDUCATION/TRAINING PROGRAM

## 2024-06-06 RX ORDER — INSULIN LISPRO 100 [IU]/ML
2 INJECTION, SOLUTION INTRAVENOUS; SUBCUTANEOUS
Status: DISCONTINUED | OUTPATIENT
Start: 2024-06-06 | End: 2024-06-11

## 2024-06-06 RX ORDER — INSULIN GLARGINE 100 [IU]/ML
5 INJECTION, SOLUTION SUBCUTANEOUS NIGHTLY
Status: DISCONTINUED | OUTPATIENT
Start: 2024-06-06 | End: 2024-06-14 | Stop reason: HOSPADM

## 2024-06-06 RX ADMIN — OXYCODONE 2.5 MG: 5 TABLET ORAL at 10:11

## 2024-06-06 RX ADMIN — PANTOPRAZOLE SODIUM 40 MG: 40 TABLET, DELAYED RELEASE ORAL at 05:43

## 2024-06-06 RX ADMIN — LEVETIRACETAM 500 MG: 500 TABLET, FILM COATED ORAL at 08:11

## 2024-06-06 RX ADMIN — OXYCODONE 2.5 MG: 5 TABLET ORAL at 15:03

## 2024-06-06 RX ADMIN — LEVETIRACETAM 500 MG: 500 TABLET, FILM COATED ORAL at 20:51

## 2024-06-06 RX ADMIN — OXYCODONE 2.5 MG: 5 TABLET ORAL at 23:50

## 2024-06-06 RX ADMIN — HYDROCORTISONE 10 MG: 10 TABLET ORAL at 08:14

## 2024-06-06 RX ADMIN — Medication 2 CAPSULE: at 08:10

## 2024-06-06 RX ADMIN — HEPARIN SODIUM 5000 UNITS: 5000 INJECTION INTRAVENOUS; SUBCUTANEOUS at 20:52

## 2024-06-06 RX ADMIN — INSULIN LISPRO 2 UNITS: 100 INJECTION, SOLUTION INTRAVENOUS; SUBCUTANEOUS at 17:49

## 2024-06-06 RX ADMIN — HYDROCORTISONE 5 MG: 5 TABLET ORAL at 20:52

## 2024-06-06 RX ADMIN — INSULIN LISPRO 3 UNITS: 100 INJECTION, SOLUTION INTRAVENOUS; SUBCUTANEOUS at 08:14

## 2024-06-06 RX ADMIN — FERROUS SULFATE TAB EC 324 MG (65 MG FE EQUIVALENT) 324 MG: 324 (65 FE) TABLET DELAYED RESPONSE at 08:10

## 2024-06-06 RX ADMIN — INSULIN GLARGINE 5 UNITS: 100 INJECTION, SOLUTION SUBCUTANEOUS at 20:51

## 2024-06-06 RX ADMIN — METHOCARBAMOL TABLETS 750 MG: 750 TABLET, COATED ORAL at 18:13

## 2024-06-06 RX ADMIN — NIFEDIPINE 30 MG: 30 TABLET, EXTENDED RELEASE ORAL at 08:11

## 2024-06-06 RX ADMIN — METHOCARBAMOL TABLETS 750 MG: 750 TABLET, COATED ORAL at 06:03

## 2024-06-06 RX ADMIN — SODIUM CHLORIDE, PRESERVATIVE FREE 10 ML: 5 INJECTION INTRAVENOUS at 20:48

## 2024-06-06 RX ADMIN — HEPARIN SODIUM 5000 UNITS: 5000 INJECTION INTRAVENOUS; SUBCUTANEOUS at 13:31

## 2024-06-06 RX ADMIN — HEPARIN SODIUM 5000 UNITS: 5000 INJECTION INTRAVENOUS; SUBCUTANEOUS at 05:43

## 2024-06-06 RX ADMIN — LOPERAMIDE HYDROCHLORIDE 2 MG: 2 CAPSULE ORAL at 12:30

## 2024-06-06 RX ADMIN — SODIUM CHLORIDE, PRESERVATIVE FREE 10 ML: 5 INJECTION INTRAVENOUS at 08:15

## 2024-06-06 RX ADMIN — LOPERAMIDE HYDROCHLORIDE 2 MG: 2 CAPSULE ORAL at 06:03

## 2024-06-06 RX ADMIN — LOSARTAN POTASSIUM 25 MG: 25 TABLET, FILM COATED ORAL at 08:12

## 2024-06-06 RX ADMIN — ESCITALOPRAM OXALATE 10 MG: 10 TABLET ORAL at 08:10

## 2024-06-06 RX ADMIN — PANTOPRAZOLE SODIUM 40 MG: 40 TABLET, DELAYED RELEASE ORAL at 15:53

## 2024-06-06 RX ADMIN — METHOCARBAMOL TABLETS 750 MG: 750 TABLET, COATED ORAL at 12:30

## 2024-06-06 RX ADMIN — OXYCODONE 2.5 MG: 5 TABLET ORAL at 06:03

## 2024-06-06 RX ADMIN — ROPINIROLE HYDROCHLORIDE 2 MG: 1 TABLET, FILM COATED ORAL at 20:51

## 2024-06-06 RX ADMIN — INSULIN LISPRO 2 UNITS: 100 INJECTION, SOLUTION INTRAVENOUS; SUBCUTANEOUS at 17:48

## 2024-06-06 RX ADMIN — Medication 2 CAPSULE: at 17:49

## 2024-06-06 ASSESSMENT — PAIN DESCRIPTION - PAIN TYPE
TYPE: ACUTE PAIN
TYPE: ACUTE PAIN;SURGICAL PAIN

## 2024-06-06 ASSESSMENT — PAIN DESCRIPTION - DESCRIPTORS
DESCRIPTORS: THROBBING
DESCRIPTORS: ACHING;DISCOMFORT
DESCRIPTORS: THROBBING
DESCRIPTORS: THROBBING
DESCRIPTORS: ACHING;DISCOMFORT
DESCRIPTORS: ACHING;DISCOMFORT;SPASM
DESCRIPTORS: ACHING;DISCOMFORT
DESCRIPTORS: ACHING;DISCOMFORT;DULL
DESCRIPTORS: ACHING;THROBBING

## 2024-06-06 ASSESSMENT — PAIN DESCRIPTION - ORIENTATION
ORIENTATION: RIGHT

## 2024-06-06 ASSESSMENT — PAIN SCALES - GENERAL
PAINLEVEL_OUTOF10: 7
PAINLEVEL_OUTOF10: 2
PAINLEVEL_OUTOF10: 6
PAINLEVEL_OUTOF10: 7
PAINLEVEL_OUTOF10: 1
PAINLEVEL_OUTOF10: 6
PAINLEVEL_OUTOF10: 4
PAINLEVEL_OUTOF10: 7
PAINLEVEL_OUTOF10: 7
PAINLEVEL_OUTOF10: 6
PAINLEVEL_OUTOF10: 9
PAINLEVEL_OUTOF10: 4
PAINLEVEL_OUTOF10: 5
PAINLEVEL_OUTOF10: 2
PAINLEVEL_OUTOF10: 9
PAINLEVEL_OUTOF10: 1
PAINLEVEL_OUTOF10: 4
PAINLEVEL_OUTOF10: 7

## 2024-06-06 ASSESSMENT — PAIN DESCRIPTION - LOCATION
LOCATION: HIP
LOCATION: HIP;LEG
LOCATION: HIP
LOCATION: HIP;LEG
LOCATION: HIP

## 2024-06-06 ASSESSMENT — PAIN DESCRIPTION - ONSET
ONSET: GRADUAL
ONSET: GRADUAL

## 2024-06-06 ASSESSMENT — PAIN DESCRIPTION - FREQUENCY
FREQUENCY: INTERMITTENT
FREQUENCY: INTERMITTENT

## 2024-06-06 ASSESSMENT — PAIN - FUNCTIONAL ASSESSMENT
PAIN_FUNCTIONAL_ASSESSMENT: ACTIVITIES ARE NOT PREVENTED

## 2024-06-06 NOTE — PROGRESS NOTES
Called nurse \" I think my blood sugar is low\" 48, taking apple juice and applesauce, does not like peanut butter or milk.

## 2024-06-06 NOTE — PROGRESS NOTES
Department of Physical Medicine & Rehabilitation  Progress Note    Patient Identification:  Elvia Arguelles  [id number]  : 1960  Admit date: 2024    Chief Complaint: Closed right hip fracture, initial encounter ([facility])    Subjective:   No acute events overnight.   Patient hypoglycemic this morning. Seen sitting up in room and is asymptomatic now that sugar is improved. Pain remains better controlled with Fentanyl patch. We discussed her rehab progress. I also updated spouse about rehab progress and discharge planning.   Labs reviewed.     ROS: No f/c, n/v, cp     Objective:  Patient Vitals for the past 24 hrs:   BP Temp Temp src Pulse Resp SpO2 Weight   24 1503 -- -- -- -- 17 -- --   24 1334 -- -- -- -- 17 -- --   24 1333 -- -- -- -- 17 -- --   24 1220 130/65 -- -- 70 -- -- --   24 1011 -- -- -- -- 16 -- --   24 1009 -- -- -- -- -- 96 % --   24 0800 128/72 97.7 °F (36.5 °C) Oral 68 17 98 % --   24 0633 -- -- -- -- 16 -- --   24 0603 -- -- -- -- 16 -- --   24 0550 -- -- -- -- -- -- 57.4 kg (126 lb 8.7 oz)   24 2157 -- -- -- -- 16 -- --   24 2118 133/68 98.1 °F (36.7 °C) Oral 73 18 100 % --   24 1913 -- -- -- -- 16 -- --     Const: Alert. No distress, pleasant.   HEENT: Normocephalic, atraumatic. Normal sclera/conjunctiva. MMM.   CV: Regular rate and rhythm.   Resp: No respiratory distress. Lungs diminished at bases  Abd: Soft, nontender, nondistended, NABS+   Ext:+ post op swelling RLE  MSK: Decreased right hip ROM  Neuro: Alert, oriented. Vision impairment. No focal strength deficits aside from pain limited RLE  Psych: Cooperative, appropriate mood and affect    Laboratory data: Available via EMR.   Last 24 hour lab  Recent Results (from the past 24 hour(s))   POCT Glucose    Collection Time: 24  9:20 PM   Result Value Ref Range    POC Glucose 185 (H) 70 - 99 mg/dl    Performed on ACCU-CHEK    POCT Glucose    Collection  RLE  -PT/OT    Posterior reversible encephalopathy syndrome  -Etiology suspected Opdivo vs possibly uncontrolled HTN (Nephrology feels unlikely)   -Difficult to exclude superimposed ischemia per Neurology  -Will need repeat MRI in few weeks  -BP control as below  -Regulate sleep/wake. Emphasis on routine.   -PT/OT/SLP.     Focal seizure on EEG  -continue Keppra per Neurology    Metastatic melanoma  -On maintenance Opdivo (last 5/10).   -F/u with Heme/Onc for further treatment plan given PRES    FAVIAN on CKD III due to Rhabdomyolysis  -Nephrology following, appreciate input  -Avoid nephrotoxins, renally dose meds  -Monitor renal function  -F/u Dr. Celis     Acute on chronic anemia   -Due to post-op blood loss and renal dysfunction  -Monitor Hgb, transfuse prn <7.   -continue iron supplement     Adrenal Insufficiency  -completed stress does steroids on acute floor  -continue home hydrocortisone    Elevated troponin, demand ischemia + FAVIAN  -No intervention recommended per Cardiology    HTN, uncontrolled  -continue nifedipine -- monitor trend, improving overall      Chronic HFpEF  -Monitor fluid balance s/p aggressive IVF for rhabdo  -daily wt    DM2 with hypo and hyperglycemia  -Decrease lantus to 5 qhs -- was hypoglycemic this morinng  -Decrease prandial to 2 TID  -continue ISS  ---monitor response  -Have counseled patient on avoiding candy/sweets as these are causing intermittent severe hyperglycemia.   -note has insulin pump (currently turned off while inpatient due to cognitive deficits)     RLS  -continue Ropinirole      Mood disorder  -continue escitalopram      Bladder   -High risk retention   -Monitor PVRs, SC prn >300cc     Bowel   -High risk constipation   -PRN miralax, bisacodyl supp.     Safety   -fall, aspiration, seizure precautions     Pain control  -continue oxycodone prn (decreased back to 5 mg prn due to cognitive side effects)  -started prn methocarbamol   -continue Fentanyl patch per Ortho.

## 2024-06-06 NOTE — PLAN OF CARE
Problem: Safety - Adult  Goal: Free from fall injury  6/6/2024 1147 by Tonya Norton RN  Outcome: Progressing  Flowsheets  Taken 6/6/2024 1147  Free From Fall Injury: Instruct family/caregiver on patient safety  Taken 6/6/2024 0843  Free From Fall Injury: Instruct family/caregiver on patient safety  Note: Continue education for walker use and hip safety.  6/6/2024 0517 by Tonya Tello RN  Outcome: Progressing     Problem: Discharge Planning  Goal: Discharge to home or other facility with appropriate resources  6/6/2024 1147 by Tonya Norton RN  Outcome: Progressing  6/6/2024 0517 by Tonya Tello RN  Outcome: Progressing  Flowsheets (Taken 6/5/2024 2130)  Discharge to home or other facility with appropriate resources: Identify barriers to discharge with patient and caregiver     Problem: Pain  Goal: Verbalizes/displays adequate comfort level or baseline comfort level  6/6/2024 1147 by Tonya Norton RN  Outcome: Progressing  Flowsheets  Taken 6/6/2024 1147  Verbalizes/displays adequate comfort level or baseline comfort level:   Encourage patient to monitor pain and request assistance   Assess pain using appropriate pain scale   Administer analgesics based on type and severity of pain and evaluate response   Implement non-pharmacological measures as appropriate and evaluate response  Taken 6/6/2024 0800  Verbalizes/displays adequate comfort level or baseline comfort level: Encourage patient to monitor pain and request assistance  Note: Pain still an issue with therapy.  6/6/2024 0517 by Tonya Tello RN  Outcome: Progressing     Problem: ABCDS Injury Assessment  Goal: Absence of physical injury  6/6/2024 1147 by Tonya Norton RN  Outcome: Progressing  Flowsheets (Taken 6/6/2024 0843)  Absence of Physical Injury: Implement safety measures based on patient assessment  6/6/2024 0517 by Tonya Tello RN  Outcome: Progressing     Problem:

## 2024-06-06 NOTE — PROGRESS NOTES
Weeks  Current Treatment Recommendations: Transfer training;Functional mobility training;Balance training;Strengthening;Therapeutic activities;Co-Treatment;Gait training;Endurance training;Stair training;Pain management;Safety education & training  Safety Devices  Type of Devices: All fall risk precautions in place;Gait belt;Left in chair (in wheelchair to return to room with transportation)    EDUCATION  Education  Education Given To: Patient;Family  Education Provided: Plan of Care;Safety;Transfer Training;Mobility Training;Equipment;Energy Conservation;Fall Prevention Strategies  Education Provided Comments: safety and sequencing on steps  Education Method: Verbal  Education Outcome: Continued education needed;Verbalized understanding;Demonstrated understanding      Therapy Time   Individual Concurrent Group Co-treatment   Time In 0900         Time Out 0930         Minutes 30                 EDIN MORALES, PT, #[id number]  06/06/24 at 1:06 PM       Second Session     Patient to PT via WC after short break following OT session. Patient reports \"little bit of pain\" - first rated at 6/10 but PT questioned if this was \"a little bit\" so modified and rated pain at 2/10.  Just change pain patch behind R ear about 2 PM. Last took oral pain meds about 10AM, so agreed that she wished for PT to request oral pain medication from RN. Called  ITZ Solano, who brought pain meds to patient after ambulation.     Sit to stand with SBA/supervision.  Ambulated 95' with two turns and over threshold to exit/enter department with SBA/supervision with intermittent cues to allow for ample knee flexion prior to advancing R LE and avoiding shuffling/sliding foot forward. Took about 8-10 minutes due to slow elan and guarded movements.     Sit to supine with pillows to R while seated with SBA, used leg  to assist R LE, extra time and effort required with cues to lie back and readjust using bridging prior to straightening R LE down.   Took R shoe off once patient managed to lift R leg onto mat to improve ability to move with less friction.     Supine mat exercises for BLEs: ankle pumps, glut sets, quad sets, hip add sets, heel slides with orange Maxi Tube and min A to initiate on R but able to perform last several reps with CGA/SBA, TKEs with min A to initiate with R LE then CGA to SBA with last several reps, hip abd/add with min A to initiate first several reps with R LE and CG/min A for remainder of reps, and SLRs with mod A to initiate then mod/min A with R LE x 10 reps each - no assist required for LLE.     Supine to long sit to short sit at edge of bed with SBA using UEs to assist R LE over edge of mat.     Transfer mat to  with wh walker and SBA, slow and guarded movements, took extra time and effort.     Returned to room via  with transportation.     Patient continues to have guarded and slow movement with R LE, pain appears slightly better controlled compared to last week but functional mobility and exercises continue to be limited by pain.     Second Session Therapy Time   Individual Co-treatment   Time In 1445     Time Out 1530     Minutes 45        Electronically signed by EDIN MORALES, PT #[id number] on 6/6/2024 at 3:15 PM

## 2024-06-06 NOTE — PROGRESS NOTES
Occupational Therapy  Facility/Department: [address] REHAB  Rehabilitation Occupational Therapy Daily Treatment Note  AM and PM Sessions:    Date: 24  Patient Name: Elvia Arguelles       Room: A3A-8551/3272-01  MRN: [id number]  Account: [id number]   : 1960  (63 y.o.) Gender: female                    Past Medical History:  has a past medical history of Adrenal insufficiency (HCC), Cancer (HCC), Closed displaced intertrochanteric fracture of right femur (HCC), Depression, Diabetes mellitus (HCC), Hx of radiation therapy, Hypertension, Hyponatremia, Insulin pump in place, Melanoma (HCC), Prolonged emergence from general anesthesia, and Restless leg syndrome.  Past Surgical History:   has a past surgical history that includes Hysterectomy; Cholecystectomy; shoulder surgery; Upper gastrointestinal endoscopy (10/22/2014); Carpal tunnel release (Bilateral, 2017); lipoma resection; Eye surgery (Right); Shoulder arthroscopy (Right, 2018); liver biopsy (Right); US BIOPSY LIVER PERCUTANEOUS (2022); CT BIOPSY ABDOMEN RETROPERITONEUM (2022); Upper gastrointestinal endoscopy (N/A, 2023); Upper gastrointestinal endoscopy (2023); Port Surgery (Right, 2023); Upper gastrointestinal endoscopy (N/A, 2024); and hip surgery (Right, 2024).    Restrictions  Restrictions/Precautions: Weight Bearing, Fall Risk  Other position/activity restrictions: R femur fx s/p Right femur cephalomedullary nail; Diet: 5 carb choices (75 gm/meal); No Added Salt (3-4 gm); Low Potassium (Less than 3000 mg/day), Port  Right Lower Extremity Weight Bearing: Weight Bearing As Tolerated  Equipment Used: Wheelchair    Subjective  Subjective: Patient seated in recliner, reclined upon arrival to room. Patient agreeable to shower ADL session. Rated her R hip pain at 4/10 seated, increased to 7-8/10 w/ activity/amb; she had her pain meds already. She reported her BS was low this AM but is OK now, as she  will complete bathing using AE, as needed, mod I  Long Term Goal 4: Pt will complete dressing using AE, as needed, mod I  Long Term Goal 5: Pt will complete simple IADL task (light meal prep, pet care, etc.) mod I                   Therapy Time   Individual Concurrent Group Co-treatment   Time In 0715         Time Out 0815         Minutes 60         Timed Code Treatment Minutes: 60 Minutes  Therapy Time     Individual Co-treatment   Time In 1400     Time Out 1435     Minutes 35              Jocelyn Velasco OT  Electronically signed by Jocelyn Velasco OTR/L  License # [id number]

## 2024-06-06 NOTE — PROGRESS NOTES
Patient admitted to rehab s/p ORIF to right femur fracture on 5/19 by Dr. Pandey. A/Ox4. Patient has been pleasant and cooperative with care. Transfers with front wheeled walker, gait-belt, and CGA x 1; patient tolerated well. Patient moving surgical leg in and out of bed herself. Mobility restrictions: WBAT. On 5 carb, RAÚL, low K diet. Medications taken whole with thin liquids without swallowing difficulty. Patient does need assist with pills to mouth or to hand due to vision difficulty (blurred vision persists).On SQ Heparin for DVT prophylaxis.  Skin: Intact with exception off surgical incision, steri-strips intact, site is well approximated and without s/s of infection. Patient also noted with scattered a bruising from fall at home. Oxygen: RA. LDA: Port access to right chest, flushes easily and has a brisk blood return. Has been continent of bowel and bladder. LBM 6/4. Chair/bed alarms in use and call light in reach. Medicated this evening due to complaints of a headache. Patient rates pain 4 on 1-10 pain scale. Upon checking back on patient for pain relief, she was sleeping and in no apparent distress. Fall precautions in place. Will continue to monitor and assist as needed.

## 2024-06-06 NOTE — PLAN OF CARE
Problem: Safety - Adult  Goal: Free from fall injury  Note: Patient free from falls this shift. Fall precautions in place at all times. Bed in lowest position with two side rails up and wheels locked. Call light within reach. Patient able and agreeable to contact for safety appropriately. Patient up to bathroom with stedy, gait-belt, and partial assist of one to get to standing position. Patient tolerated fairly well.       Problem: Discharge Planning  Goal: Discharge to home or other facility with appropriate resources  Outcome: Progressing  Discharge to home or other facility with appropriate resources:   Identify barriers to discharge with patient and caregiver   Arrange for needed discharge resources and transportation as appropriate   Refer to discharge planning if patient needs post-hospital services based on physician order or complex needs related to functional status, cognitive ability or social support system   Identify discharge learning needs (meds, wound care, etc)     Problem: Pain  Goal: Verbalizes/displays adequate comfort level or baseline comfort level  Note: Pt able to express presence/absence of pain and rate pain appropriately using numerical scale. Pain/discomfort being managed with PRN analgesics per MD orders (see MAR). Pain assessed every shift and after interventions. Patient rating pain at it worst this evening 8 to 9 on 1-10 pain scale, describes as throbbing. Medicated per request and prn Oxycodone 10 mg q4 hours, with good relief. Patient able to sleep comfortably.       Problem: Skin/Tissue Integrity  Goal: Absence of new skin breakdown  Description: 1.  Monitor for areas of redness and/or skin breakdown  2.  Assess vascular access sites hourly  3.  Every 4-6 hours minimum:  Change oxygen saturation probe site  4.  Every 4-6 hours:  If on nasal continuous positive airway pressure, respiratory therapy assess nares and determine need for appliance change or resting period.  5/28/2024

## 2024-06-06 NOTE — PROGRESS NOTES
[facility]  Hypoglycemia Event and Prevention Plan      NAME: Elvia rAguelles  MEDICAL RECORD NUMBER:  [id number]  AGE: 63 y.o.   GENDER: female  : 1960  EPISODE DATE:  2024     Data     Recent Labs     24  0715 24  1131 24  1558 24  2120 24  0639 24  0700   POCGLU 87 103* 126* 185* 57* 102*       HgBA1c:    Lab Results   Component Value Date/Time    LABA1C 7.2 2024 10:17 PM       BUN/Creatinine:    Lab Results   Component Value Date/Time    BUN 18 2024 05:02 AM    CREATININE 1.6 2024 05:02 AM       Medications  Scheduled Medications:   insulin glargine  5 Units SubCUTAneous Nightly    insulin lispro  2 Units SubCUTAneous TID WC    fentaNYL  1 patch TransDERmal Q72H    sodium chloride flush  5-40 mL IntraVENous 2 times per day    losartan  25 mg Oral Daily    levETIRAcetam  500 mg Oral BID    ferrous sulfate  324 mg Oral Daily with breakfast    insulin lispro  0-4 Units SubCUTAneous TID WC    insulin lispro  0-4 Units SubCUTAneous Nightly    escitalopram  10 mg Oral Daily    hydrocortisone  10 mg Oral Daily    pantoprazole  40 mg Oral BID AC    heparin (porcine)  5,000 Units SubCUTAneous 3 times per day    hydrocortisone  5 mg Oral Nightly    NIFEdipine  30 mg Oral Daily    lactobacillus  2 capsule Oral BID WC    rOPINIRole  2 mg Oral Nightly       Diet  Current diet/supplement order: ADULT DIET; Regular; 5 carb choices (75 gm/meal); No Added Salt (3-4 gm); Low Potassium (Less than 3000 mg/day)     Recorded PO: PO Meals Eaten (%): 76 - 100% last meal in flowsheets      Action      Lantus dose already reduced.      POCT added at 0200.    Electronically signed by Marianna Lawson RN on 2024 at 10:09 AM

## 2024-06-06 NOTE — PROGRESS NOTES
Patient admitted to rehab with hip fx repair.  A/Ox4. Transfers with walker, slow and stedy with cues. Mobility restrictions: hip safety, not to twist. On 5 carb, RAÚL. Low k diet, tolerating well, eats small meals. Whole. Medications taken whole. On Heparin for DVT prophylaxis.  Skin: incision no issues. LDA: port to right chest. Has been continent of bowel and of bladder. LBM today, did take Imodium. Chair/bed alarms in use and call light in reach. Will monitor for safety.

## 2024-06-06 NOTE — PROGRESS NOTES
ACUTE REHAB UNIT  SPEECH/LANGUAGE PATHOLOGY      [x] Daily  [x] Weekly Care Conference Note  [] Discharge    Patient:Elvia Arguelles      :1960  MRN:[id number]  Rehab Dx/Hx: Closed right hip fracture, initial encounter ([facility]) [S72.001A]    Precautions: falls, seizures, and visual spatial deficits  Home situation: from home with spouse  ST Dx: [] Aphasia  [] Dysarthria  [] Apraxia   [] Oropharyngeal dysphagia [x] Cognitive   Impairment  [] Other:   Initial Speech Therapy Assessment Diagnosis:   1. Cognitive Diagnosis: Pt demonstrated minimal to moderate deficits in domains of time orientation; higher level attention; complex VPS and abstract reasoning; working memory and STM. Pt with visual deficits and right lower extremity pain which is further exacerbating. Visual deficits during paper tasks revealed reduced attention left; slight sloping upward left to right when writing; spatial orientation when putting numbers on a clock. Will continue therapy working on visuo cognition / visuo spatial orientation and visual language remediation  2. Speech Diagnosis: Motor speech audible and intelligible at multiple word utterance level and during discourse. Pt achieve good verbal diadochokinetic rating. Voice quality with minimal claudio /strained quality.  3. Communication Diagnosis: Pt demonstrating concrete to mild complex functional auditory language processing and verbal expressive language skills. Visual language processing at word / phrase level with mild deficits in inattention left of midline (did utilized large print with high color contrast). Pt able to express full name via written expression but minimal perseveration of letters ans slight sloping upward left to right when writing. Ongoing therapy     Date of Admit: 2024  Room #: B5N-4338/3272-01  Date: 2024       Current functional status (updated daily):         Current Diet Order:ADULT DIET; Regular; 5 carb choices (75 gm/meal); No Added Salt (3-4  processing digital clocks: >90% with extra time    Processing analog close using strategy: extra time; verbal coaching of strategy: 85%    Processing colors presented individually: 100% with breakdown with variation of close pastel shades or dark color shades.    Processing colors in a top to bottom stack: 100%  100%-no problems when pastel shades interspersed; <10% when dark shades of colors interspersed     Processing colors when presented left to right using strategies: 100%-no problems when pastel shades interspersed; <10% when dark shades of colors interspersed     Sentence reading (>30 words): independent with extra time using line and margin guide     N/a   Goal 2: Pt will demonstrate improved visuo cognition across visuo spatial tasks with set up and use of visual strategies with >90%    Use of environment to find items in room ; points of origin in room: >90% IND    Use of table top; food tray top for locating items : 100% with intermittent cue for use of strategy    Visuo spatial organizaiton for written expression for form completion: ongoing review of set up strategies; line guide strategies; visual left to right strategies    Visuo spatial organization:   Incorporating visual scanning compensatory strategies for assist in visual orientation with each task trial:   Most optimal with small sample size   Most optimal with use of compensatory strategy  Without use of strategy; problems in sustained visual spatial orientation   N/a   Goal 3: Pt will demonstrate improved VPS/PS and reasoning for graded PS tasks and numeric reasoning tasks with use of strategies PRN and mild assist      Emphasized left to right compensatory strategy across all table top and PS tasks  cause/effect:   R/l body scheme: IND concrete  Continued incorporation of use of visual scanning/orientation strategy for task  Continued incorporation of LTM of task trial characteristics to assist in visual orientaiton/visual spatial  for ENT at d/c; [] Referral for Neuro Visual Opthalmologic at d/c; [] Referral for Neuro Psych  at d/c; [x] Other: TBD     Additional information:             Electronically Signed by    Cherelle Hart,MS,CCC,SLP [id number]  Speech and Language Pathologist

## 2024-06-06 NOTE — PROGRESS NOTES
Duragesic patch wasted per witness of Suzi Paige RN.  Electronically signed by Suzi Paige RN on 6/6/24 at 1:45 PM EDT

## 2024-06-06 NOTE — PROGRESS NOTES
[facility] Inpatient Nephrology Note        CC:FAVIAN on CKD  HPI: BP controlled.   Soc Hx:  no family present  ROS: no SOB.       Medications     insulin glargine  5 Units SubCUTAneous Nightly    insulin lispro  2 Units SubCUTAneous TID WC    fentaNYL  1 patch TransDERmal Q72H    sodium chloride flush  5-40 mL IntraVENous 2 times per day    losartan  25 mg Oral Daily    levETIRAcetam  500 mg Oral BID    ferrous sulfate  324 mg Oral Daily with breakfast    insulin lispro  0-4 Units SubCUTAneous TID WC    insulin lispro  0-4 Units SubCUTAneous Nightly    escitalopram  10 mg Oral Daily    hydrocortisone  10 mg Oral Daily    pantoprazole  40 mg Oral BID AC    heparin (porcine)  5,000 Units SubCUTAneous 3 times per day    hydrocortisone  5 mg Oral Nightly    NIFEdipine  30 mg Oral Daily    lactobacillus  2 capsule Oral BID WC    rOPINIRole  2 mg Oral Nightly         OBJECTIVE      Physical    TEMPERATURE:  Current - Temp: 97.7 °F (36.5 °C); Max - Temp  Av.9 °F (36.6 °C)  Min: 97.7 °F (36.5 °C)  Max: 98.1 °F (36.7 °C)  RESPIRATIONS RANGE: Resp  Av.6  Min: 16  Max: 18  PULSE RANGE: Pulse  Av.3  Min: 68  Max: 73  BLOOD PRESSURE RANGE:  Systolic (24hrs), Av , Min:128 , Max:133   ; Diastolic (24hrs), Av, Min:65, Max:72    PULSE OXIMETRY RANGE: SpO2  Av %  Min: 96 %  Max: 100 %  24HR INTAKE/OUTPUT:    Intake/Output Summary (Last 24 hours) at 2024 1541  Last data filed at 2024 1226  Gross per 24 hour   Intake 728 ml   Output --   Net 728 ml         GEN: no distress  HEENT: normocephalic, atraumatic  CV: RRR  LUNGS: clear bilaterally, unlabored  ABD: + bowel sounds. No distension. No tenderness  EXT: Trace edema RLE, no edema LLE  SKIN: no rash  NEURO: no tremor    Medications   insulin glargine  5 Units SubCUTAneous Nightly    insulin lispro  2 Units SubCUTAneous TID WC    fentaNYL  1 patch TransDERmal Q72H    sodium chloride flush  5-40 mL

## 2024-06-07 LAB
ALBUMIN SERPL-MCNC: 3 G/DL (ref 3.4–5)
ANION GAP SERPL CALCULATED.3IONS-SCNC: 8 MMOL/L (ref 3–16)
BASOPHILS # BLD: 0.1 K/UL (ref 0–0.2)
BASOPHILS NFR BLD: 1.1 %
BUN SERPL-MCNC: 19 MG/DL (ref 7–20)
CALCIUM SERPL-MCNC: 8.8 MG/DL (ref 8.3–10.6)
CHLORIDE SERPL-SCNC: 107 MMOL/L (ref 99–110)
CO2 SERPL-SCNC: 23 MMOL/L (ref 21–32)
CREAT SERPL-MCNC: 1.5 MG/DL (ref 0.6–1.2)
DEPRECATED RDW RBC AUTO: 17.7 % (ref 12.4–15.4)
EOSINOPHIL # BLD: 0.2 K/UL (ref 0–0.6)
EOSINOPHIL NFR BLD: 3.3 %
GFR SERPLBLD CREATININE-BSD FMLA CKD-EPI: 38 ML/MIN/{1.73_M2}
GLUCOSE BLD-MCNC: 160 MG/DL (ref 70–99)
GLUCOSE BLD-MCNC: 184 MG/DL (ref 70–99)
GLUCOSE BLD-MCNC: 240 MG/DL (ref 70–99)
GLUCOSE BLD-MCNC: 243 MG/DL (ref 70–99)
GLUCOSE BLD-MCNC: 244 MG/DL (ref 70–99)
GLUCOSE BLD-MCNC: 246 MG/DL (ref 70–99)
GLUCOSE SERPL-MCNC: 242 MG/DL (ref 70–99)
HCT VFR BLD AUTO: 23.3 % (ref 36–48)
HGB BLD-MCNC: 8.1 G/DL (ref 12–16)
LYMPHOCYTES # BLD: 1.2 K/UL (ref 1–5.1)
LYMPHOCYTES NFR BLD: 24.9 %
MCH RBC QN AUTO: 30.4 PG (ref 26–34)
MCHC RBC AUTO-ENTMCNC: 34.7 G/DL (ref 31–36)
MCV RBC AUTO: 87.7 FL (ref 80–100)
MONOCYTES # BLD: 0.5 K/UL (ref 0–1.3)
MONOCYTES NFR BLD: 9.9 %
NEUTROPHILS # BLD: 2.9 K/UL (ref 1.7–7.7)
NEUTROPHILS NFR BLD: 60.8 %
PERFORMED ON: ABNORMAL
PHOSPHATE SERPL-MCNC: 3.1 MG/DL (ref 2.5–4.9)
PLATELET # BLD AUTO: 155 K/UL (ref 135–450)
PMV BLD AUTO: 8.7 FL (ref 5–10.5)
POTASSIUM SERPL-SCNC: 4.2 MMOL/L (ref 3.5–5.1)
RBC # BLD AUTO: 2.66 M/UL (ref 4–5.2)
SODIUM SERPL-SCNC: 138 MMOL/L (ref 136–145)
WBC # BLD AUTO: 4.8 K/UL (ref 4–11)

## 2024-06-07 PROCEDURE — 97535 SELF CARE MNGMENT TRAINING: CPT

## 2024-06-07 PROCEDURE — 97130 THER IVNTJ EA ADDL 15 MIN: CPT

## 2024-06-07 PROCEDURE — 97530 THERAPEUTIC ACTIVITIES: CPT

## 2024-06-07 PROCEDURE — 97116 GAIT TRAINING THERAPY: CPT

## 2024-06-07 PROCEDURE — 94760 N-INVAS EAR/PLS OXIMETRY 1: CPT

## 2024-06-07 PROCEDURE — 6360000002 HC RX W HCPCS: Performed by: STUDENT IN AN ORGANIZED HEALTH CARE EDUCATION/TRAINING PROGRAM

## 2024-06-07 PROCEDURE — 6370000000 HC RX 637 (ALT 250 FOR IP): Performed by: INTERNAL MEDICINE

## 2024-06-07 PROCEDURE — 80069 RENAL FUNCTION PANEL: CPT

## 2024-06-07 PROCEDURE — 97129 THER IVNTJ 1ST 15 MIN: CPT

## 2024-06-07 PROCEDURE — 6370000000 HC RX 637 (ALT 250 FOR IP): Performed by: STUDENT IN AN ORGANIZED HEALTH CARE EDUCATION/TRAINING PROGRAM

## 2024-06-07 PROCEDURE — 2580000003 HC RX 258: Performed by: INTERNAL MEDICINE

## 2024-06-07 PROCEDURE — 97110 THERAPEUTIC EXERCISES: CPT

## 2024-06-07 PROCEDURE — 1280000000 HC REHAB R&B

## 2024-06-07 PROCEDURE — 6370000000 HC RX 637 (ALT 250 FOR IP): Performed by: PHYSICAL MEDICINE & REHABILITATION

## 2024-06-07 PROCEDURE — 85025 COMPLETE CBC W/AUTO DIFF WBC: CPT

## 2024-06-07 PROCEDURE — 97112 NEUROMUSCULAR REEDUCATION: CPT

## 2024-06-07 RX ADMIN — OXYCODONE 2.5 MG: 5 TABLET ORAL at 06:02

## 2024-06-07 RX ADMIN — LEVETIRACETAM 500 MG: 500 TABLET, FILM COATED ORAL at 07:48

## 2024-06-07 RX ADMIN — INSULIN LISPRO 2 UNITS: 100 INJECTION, SOLUTION INTRAVENOUS; SUBCUTANEOUS at 07:48

## 2024-06-07 RX ADMIN — SODIUM CHLORIDE, PRESERVATIVE FREE 10 ML: 5 INJECTION INTRAVENOUS at 07:47

## 2024-06-07 RX ADMIN — HEPARIN SODIUM 5000 UNITS: 5000 INJECTION INTRAVENOUS; SUBCUTANEOUS at 14:58

## 2024-06-07 RX ADMIN — HEPARIN SODIUM 5000 UNITS: 5000 INJECTION INTRAVENOUS; SUBCUTANEOUS at 06:02

## 2024-06-07 RX ADMIN — LOSARTAN POTASSIUM 25 MG: 25 TABLET, FILM COATED ORAL at 07:48

## 2024-06-07 RX ADMIN — NIFEDIPINE 30 MG: 30 TABLET, EXTENDED RELEASE ORAL at 07:48

## 2024-06-07 RX ADMIN — FERROUS SULFATE TAB EC 324 MG (65 MG FE EQUIVALENT) 324 MG: 324 (65 FE) TABLET DELAYED RESPONSE at 07:48

## 2024-06-07 RX ADMIN — LEVETIRACETAM 500 MG: 500 TABLET, FILM COATED ORAL at 21:47

## 2024-06-07 RX ADMIN — METHOCARBAMOL TABLETS 750 MG: 750 TABLET, COATED ORAL at 08:04

## 2024-06-07 RX ADMIN — HYDROCORTISONE 10 MG: 10 TABLET ORAL at 07:48

## 2024-06-07 RX ADMIN — HYDROCORTISONE 5 MG: 5 TABLET ORAL at 21:47

## 2024-06-07 RX ADMIN — Medication 2 CAPSULE: at 07:48

## 2024-06-07 RX ADMIN — INSULIN GLARGINE 5 UNITS: 100 INJECTION, SOLUTION SUBCUTANEOUS at 21:46

## 2024-06-07 RX ADMIN — Medication 2 CAPSULE: at 17:20

## 2024-06-07 RX ADMIN — SODIUM CHLORIDE, PRESERVATIVE FREE 10 ML: 5 INJECTION INTRAVENOUS at 21:43

## 2024-06-07 RX ADMIN — ESCITALOPRAM OXALATE 10 MG: 10 TABLET ORAL at 07:48

## 2024-06-07 RX ADMIN — PANTOPRAZOLE SODIUM 40 MG: 40 TABLET, DELAYED RELEASE ORAL at 05:56

## 2024-06-07 RX ADMIN — INSULIN LISPRO 2 UNITS: 100 INJECTION, SOLUTION INTRAVENOUS; SUBCUTANEOUS at 11:51

## 2024-06-07 RX ADMIN — INSULIN LISPRO 2 UNITS: 100 INJECTION, SOLUTION INTRAVENOUS; SUBCUTANEOUS at 07:49

## 2024-06-07 RX ADMIN — OXYCODONE 2.5 MG: 5 TABLET ORAL at 11:51

## 2024-06-07 RX ADMIN — INSULIN LISPRO 2 UNITS: 100 INJECTION, SOLUTION INTRAVENOUS; SUBCUTANEOUS at 17:19

## 2024-06-07 RX ADMIN — PANTOPRAZOLE SODIUM 40 MG: 40 TABLET, DELAYED RELEASE ORAL at 15:25

## 2024-06-07 RX ADMIN — ROPINIROLE HYDROCHLORIDE 2 MG: 1 TABLET, FILM COATED ORAL at 21:47

## 2024-06-07 RX ADMIN — HEPARIN SODIUM 5000 UNITS: 5000 INJECTION INTRAVENOUS; SUBCUTANEOUS at 21:46

## 2024-06-07 ASSESSMENT — PAIN SCALES - GENERAL
PAINLEVEL_OUTOF10: 4
PAINLEVEL_OUTOF10: 6
PAINLEVEL_OUTOF10: 6
PAINLEVEL_OUTOF10: 5
PAINLEVEL_OUTOF10: 2
PAINLEVEL_OUTOF10: 8
PAINLEVEL_OUTOF10: 2
PAINLEVEL_OUTOF10: 3
PAINLEVEL_OUTOF10: 4

## 2024-06-07 ASSESSMENT — PAIN DESCRIPTION - ORIENTATION
ORIENTATION: RIGHT

## 2024-06-07 ASSESSMENT — PAIN DESCRIPTION - LOCATION
LOCATION: HIP
LOCATION: HIP;LEG
LOCATION: LEG;HIP

## 2024-06-07 ASSESSMENT — PAIN DESCRIPTION - ONSET
ONSET: ON-GOING
ONSET: GRADUAL

## 2024-06-07 ASSESSMENT — PAIN DESCRIPTION - DESCRIPTORS
DESCRIPTORS: THROBBING;SORE
DESCRIPTORS: DISCOMFORT;SPASM;TIGHTNESS
DESCRIPTORS: TIGHTNESS;THROBBING

## 2024-06-07 ASSESSMENT — PAIN DESCRIPTION - FREQUENCY
FREQUENCY: INTERMITTENT
FREQUENCY: INTERMITTENT

## 2024-06-07 ASSESSMENT — PAIN DESCRIPTION - PAIN TYPE
TYPE: ACUTE PAIN;SURGICAL PAIN
TYPE: ACUTE PAIN;SURGICAL PAIN

## 2024-06-07 ASSESSMENT — PAIN - FUNCTIONAL ASSESSMENT
PAIN_FUNCTIONAL_ASSESSMENT: ACTIVITIES ARE NOT PREVENTED

## 2024-06-07 NOTE — PROGRESS NOTES
[facility] Inpatient Nephrology Note        CC:FAVIAN on CKD  HPI: BP controlled.   Soc Hx:  no family present  ROS: no SOB.       Medications     insulin glargine  5 Units SubCUTAneous Nightly    insulin lispro  2 Units SubCUTAneous TID WC    fentaNYL  1 patch TransDERmal Q72H    sodium chloride flush  5-40 mL IntraVENous 2 times per day    losartan  25 mg Oral Daily    levETIRAcetam  500 mg Oral BID    ferrous sulfate  324 mg Oral Daily with breakfast    insulin lispro  0-4 Units SubCUTAneous TID WC    insulin lispro  0-4 Units SubCUTAneous Nightly    escitalopram  10 mg Oral Daily    hydrocortisone  10 mg Oral Daily    pantoprazole  40 mg Oral BID AC    heparin (porcine)  5,000 Units SubCUTAneous 3 times per day    hydrocortisone  5 mg Oral Nightly    NIFEdipine  30 mg Oral Daily    lactobacillus  2 capsule Oral BID WC    rOPINIRole  2 mg Oral Nightly         OBJECTIVE      Physical    TEMPERATURE:  Current - Temp: 98.4 °F (36.9 °C); Max - Temp  Av.4 °F (36.9 °C)  Min: 98.3 °F (36.8 °C)  Max: 98.4 °F (36.9 °C)  RESPIRATIONS RANGE: Resp  Av.2  Min: 16  Max: 18  PULSE RANGE: Pulse  Av.5  Min: 75  Max: 76  BLOOD PRESSURE RANGE:  Systolic (24hrs), Av , Min:137 , Max:161   ; Diastolic (24hrs), Av, Min:64, Max:75    PULSE OXIMETRY RANGE: SpO2  Av %  Min: 96 %  Max: 98 %  24HR INTAKE/OUTPUT:    Intake/Output Summary (Last 24 hours) at 2024 1458  Last data filed at 2024 1242  Gross per 24 hour   Intake 860 ml   Output --   Net 860 ml         GEN: no distress  HEENT: normocephalic, atraumatic  CV: RRR  LUNGS: clear bilaterally, unlabored  ABD: + bowel sounds. No distension. No tenderness  EXT: Trace edema RLE, no edema LLE  SKIN: no rash  NEURO: no tremor    Medications   insulin glargine  5 Units SubCUTAneous Nightly    insulin lispro  2 Units SubCUTAneous TID WC    fentaNYL  1 patch TransDERmal Q72H    sodium chloride flush  5-40 mL IntraVENous

## 2024-06-07 NOTE — PROGRESS NOTES
ACUTE REHAB UNIT  SPEECH/LANGUAGE PATHOLOGY      [x] Daily  [] Weekly Care Conference Note  [] Discharge    Patient:Elvia Arguelles      :1960  MRN:[id number]  Rehab Dx/Hx: Closed right hip fracture, initial encounter ([facility]) [S72.001A]    Precautions: falls, seizures, and visual spatial deficits  Home situation: from home with spouse  ST Dx: [] Aphasia  [] Dysarthria  [] Apraxia   [] Oropharyngeal dysphagia [x] Cognitive   Impairment  [] Other:   Initial Speech Therapy Assessment Diagnosis:   1. Cognitive Diagnosis: Pt demonstrated minimal to moderate deficits in domains of time orientation; higher level attention; complex VPS and abstract reasoning; working memory and STM. Pt with visual deficits and right lower extremity pain which is further exacerbating. Visual deficits during paper tasks revealed reduced attention left; slight sloping upward left to right when writing; spatial orientation when putting numbers on a clock. Will continue therapy working on visuo cognition / visuo spatial orientation and visual language remediation  2. Speech Diagnosis: Motor speech audible and intelligible at multiple word utterance level and during discourse. Pt achieve good verbal diadochokinetic rating. Voice quality with minimal claudio /strained quality.  3. Communication Diagnosis: Pt demonstrating concrete to mild complex functional auditory language processing and verbal expressive language skills. Visual language processing at word / phrase level with mild deficits in inattention left of midline (did utilized large print with high color contrast). Pt able to express full name via written expression but minimal perseveration of letters ans slight sloping upward left to right when writing. Ongoing therapy     Date of Admit: 2024  Room #: B6C-4333/3272-01  Date: 2024       Current functional status (updated daily):         Current Diet Order:ADULT DIET; Regular; 5 carb choices (75 gm/meal); No Added Salt (3-4  calendars; table of contents; bills:   Emphasized practice in use of compensatory strategies   Print size 1/3 inch    Improving pt active participation in left to right scanning to orient to page; re-directing self etc     N/a   Goal 2: Pt will demonstrate improved visuo cognition across visuo spatial tasks with set up and use of visual strategies with >90%    Use of environment to find items in room ; points of origin in room: >90% IND    Use of table top/use of book with left page/right page    Use of book for shifting between pictured and printed stimuli    Visuo spatial organizaiton for written expression for form completion: external assist for most optimal performance    Visuo spatial organization:   Incorporating visual scanning compensatory strategies for assist in visual orientation with each task trial:   Most optimal with use of compensatory strategy  Without use of strategy; problems in sustained visual spatial orientation   N/a   Goal 3: Pt will demonstrate improved VPS/PS and reasoning for graded PS tasks and numeric reasoning tasks with use of strategies PRN and mild assist      Emphasized left to right compensatory strategy across all table top and PS tasks  cause/effect:   Continued incorporation of use of visual scanning/orientation strategy for task  Continued incorporation of LTM of task trial characteristics to assist in visual orientaiton/visual spatial   Convergent/divergent thinking for generalization of compensatory strategies for d/c planning across room/environment; visual language processing task and written expression for form completion:  Self monitoring N/a   Goal 4: Pt will demonstrate improved recall of daily events; learned safety strategies; and new information with use of memory strategies with set up and <mild assist Without cues:   Oriented to place, month/date/year/day   oriented to 3/3 daily therapies on unit either by label; or activity or by therapist name    Working memory

## 2024-06-07 NOTE — PROGRESS NOTES
[facility]  Glycemic Control       NAME: Elvia Arguelles  MEDICAL RECORD NUMBER:  [id number]  AGE: 63 y.o.   GENDER: female  : 1960  EPISODE DATE:  2024     Data     Recent Labs     24  1600 24  2044 24  0244 24  0245 24  0707 24  1133   POCGLU 263* 191* 244* 240* 246* 160*       HgBA1c:    Lab Results   Component Value Date/Time    LABA1C 7.2 2024 10:17 PM       BUN/Creatinine:    Lab Results   Component Value Date/Time    BUN 19 2024 05:30 AM    CREATININE 1.5 2024 05:30 AM       Medications  Scheduled Medications:   insulin glargine  5 Units SubCUTAneous Nightly    insulin lispro  2 Units SubCUTAneous TID WC    fentaNYL  1 patch TransDERmal Q72H    sodium chloride flush  5-40 mL IntraVENous 2 times per day    losartan  25 mg Oral Daily    levETIRAcetam  500 mg Oral BID    ferrous sulfate  324 mg Oral Daily with breakfast    insulin lispro  0-4 Units SubCUTAneous TID WC    insulin lispro  0-4 Units SubCUTAneous Nightly    escitalopram  10 mg Oral Daily    hydrocortisone  10 mg Oral Daily    pantoprazole  40 mg Oral BID AC    heparin (porcine)  5,000 Units SubCUTAneous 3 times per day    hydrocortisone  5 mg Oral Nightly    NIFEdipine  30 mg Oral Daily    lactobacillus  2 capsule Oral BID WC    rOPINIRole  2 mg Oral Nightly       Diet  Current diet/supplement order: ADULT DIET; Regular; 5 carb choices (75 gm/meal); No Added Salt (3-4 gm); Low Potassium (Less than 3000 mg/day)     Recorded PO: PO Meals Eaten (%): 76 - 100% last meal in flowsheets      Action      No further hypoglycemia.  BG at target at present.      Confirmed that her  has the Dexcom G 6 transmitter at home.  Device is not missing.      Electronically signed by Marianna Lawson RN on 2024 at 3:33 PM

## 2024-06-07 NOTE — PROGRESS NOTES
Occupational Therapy  Facility/Department: [address] REHAB  Rehabilitation Occupational Therapy Daily Treatment Note  AM and PM Sessions:     Date: 24  Patient Name: Elvia Arguelles       Room: Z4W-2906/3272-01  MRN: [id number]  Account: [id number]   : 1960  (63 y.o.) Gender: female                    Past Medical History:  has a past medical history of Adrenal insufficiency (HCC), Cancer (HCC), Closed displaced intertrochanteric fracture of right femur (HCC), Depression, Diabetes mellitus (HCC), Hx of radiation therapy, Hypertension, Hyponatremia, Insulin pump in place, Melanoma (HCC), Prolonged emergence from general anesthesia, and Restless leg syndrome.  Past Surgical History:   has a past surgical history that includes Hysterectomy; Cholecystectomy; shoulder surgery; Upper gastrointestinal endoscopy (10/22/2014); Carpal tunnel release (Bilateral, 2017); lipoma resection; Eye surgery (Right); Shoulder arthroscopy (Right, 2018); liver biopsy (Right); US BIOPSY LIVER PERCUTANEOUS (2022); CT BIOPSY ABDOMEN RETROPERITONEUM (2022); Upper gastrointestinal endoscopy (N/A, 2023); Upper gastrointestinal endoscopy (2023); Port Surgery (Right, 2023); Upper gastrointestinal endoscopy (N/A, 2024); and hip surgery (Right, 2024).    Restrictions  Restrictions/Precautions: Weight Bearing, Fall Risk  Other position/activity restrictions: R femur fx s/p Right femur cephalomedullary nail; Diet: 5 carb choices (75 gm/meal); No Added Salt (3-4 gm); Low Potassium (Less than 3000 mg/day), Port  Right Lower Extremity Weight Bearing: Weight Bearing As Tolerated  Equipment Used: Wheelchair    Subjective  Subjective: Patient met in dept, seated in w/c. Patient agreeable to tx in dept. Pt rated her pain seated R hip 2/10 then increased to 8/10-10/10 in R hip during bed mobility & amb.  Restrictions/Precautions: Weight Bearing;Fall Risk             Objective      Cognition  Overall Cognitive Status: WFL (pt demo some difficulty with motor planning related to vision deficits)  Orientation  Overall Orientation Status: Within Functional Limits         ADL  Putting On/Taking Off Footwear  Equipment Provided: Long-handle shoe horn  Assistance Level: Moderate assistance  Skilled Clinical Factors: New nba hose D/T increased RLE edema. OT adjusted nba hose as they are too long for pt & were rolled up at top causing skin indentation. Pt donned L shoe set-up & R shoe mod A(2x) w/ long shoe horn.  Toileting  Skilled Clinical Factors: OT reviewed current toileting status w/ spouse so he will feel comfortable helping her in the room.  Toilet Transfers  Technique:  (amb)  Equipment: Beside commode  Additional Factors: Cues for hand placement  Assistance Level: Contact guard assist  Skilled Clinical Factors: Bed ><BSC  by bed with RW min cues to line up/hand placement CGA(discussed may want to get BSC for home nighttime toileting as gets up a lot at night)  Tub/Shower Transfers  Type: Tub  Transfer From: Rolling walker  Transfer To: Tub transfer bench  Additional Factors: Verbal cues;Cues for hand placement;Increased time to complete  Assistance Level: Moderate assistance;Minimal assistance  Skilled Clinical Factors: RW ><TTB in DRY tub to simulate home set-up min A in w/ leg , mod A out w/ +cues DT low vision. Spouse observed. OT ed pt/spouse on how to cut flap in curtain to avoid water on the floor & they verbalized understanding.  R hip pain increased to severe 10/10 during leg out of the tub using leg  & OT giving support to leg, kept in good position throughout. OT notified nurse Mikayla. Ice bag provided.          Functional Mobility  Device: Rolling walker  Activity: To/From bathroom (in dept)  Assistance Level: Stand by assist  Skilled Clinical Factors: Functional mobility in dept from w/c><std bed, >< bathroom all amb w/ RW CGA/SBA, min cues for navigating turns  Term Goal 1: Pt will complete functional mobility/txs using LRAD mod I  Long Term Goal 2: Pt will complete toileting mod I  Long Term Goal 3: Pt will complete bathing using AE, as needed, mod I  Long Term Goal 4: Pt will complete dressing using AE, as needed, mod I  Long Term Goal 5: Pt will complete simple IADL task (light meal prep, pet care, etc.) mod I                   Therapy Time   Individual Concurrent Group Co-treatment   Time In 1000         Time Out 1045         Minutes 45         Timed Code Treatment Minutes: 45 Minutes  Therapy Time     Individual Co-treatment   Time In 1400     Time Out 1445     Minutes 45              Jocelyn Velasco OT  Electronically signed by Jocelyn Velasco OTR/L  License # [id number]

## 2024-06-07 NOTE — PLAN OF CARE
Problem: Safety - Adult  Goal: Free from fall injury  6/7/2024 0942 by Opal Bran, RN  Outcome: Progressing  Flowsheets (Taken 6/7/2024 0942)  Free From Fall Injury: Instruct family/caregiver on patient safety     Problem: Discharge Planning  Goal: Discharge to home or other facility with appropriate resources  6/7/2024 0942 by Opal Bran RN  Outcome: Progressing  Flowsheets (Taken 6/7/2024 0942)  Discharge to home or other facility with appropriate resources: Identify barriers to discharge with patient and caregiver     Problem: Pain  Goal: Verbalizes/displays adequate comfort level or baseline comfort level  6/7/2024 0942 by Opal Bran, RN  Outcome: Progressing  Flowsheets (Taken 6/7/2024 0942)  Verbalizes/displays adequate comfort level or baseline comfort level:   Encourage patient to monitor pain and request assistance   Assess pain using appropriate pain scale   Administer analgesics based on type and severity of pain and evaluate response   Implement non-pharmacological measures as appropriate and evaluate response   Consider cultural and social influences on pain and pain management   Notify Licensed Independent Practitioner if interventions unsuccessful or patient reports new pain     Problem: ABCDS Injury Assessment  Goal: Absence of physical injury  6/7/2024 0942 by Opal Bran RN  Outcome: Progressing  Flowsheets (Taken 6/7/2024 0942)  Absence of Physical Injury: Implement safety measures based on patient assessment     Problem: Skin/Tissue Integrity  Goal: Absence of new skin breakdown  Description: 1.  Monitor for areas of redness and/or skin breakdown  2.  Assess vascular access sites hourly  3.  Every 4-6 hours minimum:  Change oxygen saturation probe site  4.  Every 4-6 hours:  If on nasal continuous positive airway pressure, respiratory therapy assess nares and determine need for appliance change or resting period.  6/7/2024 0942 by Opal Bran, ITZ  Outcome:

## 2024-06-07 NOTE — PROGRESS NOTES
Department of Physical Medicine & Rehabilitation  Progress Note    Patient Identification:  Elvia Arguelles  [id number]  : 1960  Admit date: 2024    Chief Complaint: Closed right hip fracture, initial encounter ([facility])    Subjective:   Seen in her room this morning.  No new complaints overnight.  She is hopeful to be able to go home before the scheduled discharge date of 2024.  She continues to have pain in her right hip but is working with therapy well.  She feels like she is improving with her functional mobility and ADLs.    ROS: No f/c, n/v, cp     Objective:  Patient Vitals for the past 24 hrs:   BP Temp Temp src Pulse Resp SpO2 Weight   24 1221 -- -- -- -- 16 -- --   24 1151 -- -- -- -- 18 -- --   24 0857 -- -- -- -- -- 96 % --   24 0747 (!) 161/75 98.4 °F (36.9 °C) Oral 75 18 -- --   24 0632 -- -- -- -- 18 -- --   24 0602 -- -- -- -- 17 -- 56.9 kg (125 lb 7.1 oz)   24 0020 -- -- -- -- 17 -- --   24 2350 -- -- -- -- 17 -- --   24 2030 137/64 98.3 °F (36.8 °C) Oral 76 17 98 % --   24 1503 -- -- -- -- 17 -- --       Const: Alert. No distress, pleasant.   HEENT: Normocephalic, atraumatic. Normal sclera/conjunctiva. MMM.   CV: Regular rate and rhythm.   Resp: No respiratory distress. Lungs diminished at bases  Abd: Soft, nontender, nondistended, NABS+   Ext:+ post op swelling RLE  MSK: Decreased right hip ROM  Neuro: Alert, oriented. Vision impairment. No focal strength deficits aside from pain limited RLE  Psych: Cooperative, appropriate mood and affect    Laboratory data: Available via EMR.   Last 24 hour lab  Recent Results (from the past 24 hour(s))   POCT Glucose    Collection Time: 24  4:00 PM   Result Value Ref Range    POC Glucose 263 (H) 70 - 99 mg/dl    Performed on ACCU-CHEK    POCT Glucose    Collection Time: 24  8:44 PM   Result Value Ref Range    POC Glucose 191 (H) 70 - 99 mg/dl    Performed on ACCU-CHEK

## 2024-06-07 NOTE — PLAN OF CARE
Problem: Safety - Adult  Goal: Free from fall injury  6/7/2024 0050 by Shakira Plummer RN  Outcome: Progressing  Note: Fall risk band on patient. Orange light on outside of room. Non skid footwear in place. Alarms used appropriately. Patient instructed to call and wait for staff before getting up. Rounding done to anticipate needs. Appropriate safety devices used for transfers.       Problem: Discharge Planning  Goal: Discharge to home or other facility with appropriate resources  6/7/2024 0050 by Shakira Plummer RN  Outcome: Progressing  Flowsheets (Taken 6/7/2024 0050)  Discharge to home or other facility with appropriate resources:   Identify barriers to discharge with patient and caregiver   Identify discharge learning needs (meds, wound care, etc)     Problem: Pain  Goal: Verbalizes/displays adequate comfort level or baseline comfort level  6/7/2024 0050 by Shakira Plummer RN  Outcome: Progressing  Flowsheets (Taken 6/7/2024 0050)  Verbalizes/displays adequate comfort level or baseline comfort level:   Encourage patient to monitor pain and request assistance   Administer analgesics based on type and severity of pain and evaluate response   Assess pain using appropriate pain scale   Implement non-pharmacological measures as appropriate and evaluate response     Problem: ABCDS Injury Assessment  Goal: Absence of physical injury  6/7/2024 0050 by Shakira Plummer RN  Outcome: Progressing  Flowsheets (Taken 6/7/2024 0050)  Absence of Physical Injury: Implement safety measures based on patient assessment     Problem: Skin/Tissue Integrity  Goal: Absence of new skin breakdown  Description: 1.  Monitor for areas of redness and/or skin breakdown  2.  Assess vascular access sites hourly  3.  Every 4-6 hours minimum:  Change oxygen saturation probe site  4.  Every 4-6 hours:  If on nasal continuous positive airway pressure, respiratory therapy assess nares and determine need for appliance change or resting  period.  6/7/2024 0050 by Shakira Plummer, RN  Outcome: Progressing  Note: Skin assessment completed on admission and every shift. Barrier wipes used in the event of incontinence. Pressure relief techniques used as needed while in chair and in bed. Position changes encouraged at least every two hours while in bed.

## 2024-06-07 NOTE — PROGRESS NOTES
Patient admitted to rehab with closed right hip fracture.  A/Ox4. Transfers with one assist with gait belt and front wheeled walker for ambulation. Mobility restrictions: WBAT. On regular RAÚL low potassium diet, tolerating well. Medications taken whole with fluids. On heparin for DVT prophylaxis.  Skin: scattered bruising and abrasions.  Right hip incisions with steri strips intact. Oxygen: room air. LDA: port accessed in right upper chest. Has been continent of bowel and  of bladder. LBM 6/6. Chair/bed alarms in use and call light in reach. Will monitor for safety.

## 2024-06-07 NOTE — PROGRESS NOTES
Physical Therapy  Facility/Department: [address] REHAB  Rehabilitation Physical Therapy Treatment Note    NAME: Elvia Arguelles  : 1960 (63 y.o.)  MRN: [id number]  CODE STATUS: Full Code    Date of Service: 24       Restrictions:  Restrictions/Precautions: Weight Bearing, Fall Risk  Lower Extremity Weight Bearing Restrictions  Right Lower Extremity Weight Bearing: Weight Bearing As Tolerated  Position Activity Restriction  Other position/activity restrictions: R femur fx s/p Right femur cephalomedullary nail; Diet: 5 carb choices (75 gm/meal); No Added Salt (3-4 gm); Low Potassium (Less than 3000 mg/day), Port     Pertinent medical information:  Additional Pertinent Hx: per Dr. Tello's H&P, \"63 yrs old female with a PMHx of T2DM, adrenal insufficiency chonic steroids, metastatic melanoma on Opdivo therapy who presented to ED with R hip pain after fall, found to have R femoral fracture s/p IM nail. originally presented to [facility] ED on  after being found down by family in the living room floor. ED workup showed Rhabdomyolysis, FAVIAN and closed R femoral fracture. Admitted for further care. Orthopedic surgery consulted, performed IM nail  without perioperative complication. Course complicated by AMS, suspected PRES per multiple providers and MRI imaging findings. EEG w/epileptiform activity, started on keppra per neurology consult\"    SUBJECTIVE  Subjective  Subjective: Patient arrived in wheelchair and reports she is feeling tired.  Pain: Pain rated at a 5/10 in thigh and hip    Social/Functional History  Lives With: Spouse (, Shant, in good health and retired)  Type of Home: House  Home Layout: Able to Live on Main level with bedroom/bathroom, One level, Laundry in basement (with basement)  Home Access: Stairs to enter with rails  Entrance Stairs - Number of Steps: 4 with danielle handrail + 6 with danielle handrails (unsure if able to hold both at same time) -- front entrance.  1 ABBEY side  myself\"  Short Term Goals  Time Frame for Short Term Goals: one week from 5/8/2024  Short Term Goal 1: bed mobility with min assist -met 6/4/2024  Short Term Goal 2: Transfers with CGA - met - 5/30/2024  Short Term Goal 3: Ambulate 50' with wheeled walker with CGA - met 6/4/2024  Short Term Goal 4: Ascend/descend curb step with wheeled walker with CGA  Long Term Goals  Time Frame for Long Term Goals : upon discharge - approx 10-14 days from 5/28/2024  Long Term Goal 1: bed mobility with modified independence  Long Term Goal 2: Transfers with modified independence  Long Term Goal 3: Ambulate 150' with wheeled wlaker with modified independence  Long Term Goal 4: Ascend/descend curb step with wheeled walker with supervision  Long Term Goal 5: Ascend/descend 4 steps with bilateral rails with SBA    PLAN OF CARE/SAFETY  Physical Therapy Plan  General Plan:  minutes of therapy at least 5 out of 7 days a week  Days Per Week: 5 Days  Therapy Duration: 2 Weeks  Current Treatment Recommendations: Transfer training;Functional mobility training;Balance training;Strengthening;Therapeutic activities;Co-Treatment;Gait training;Endurance training;Stair training;Pain management;Safety education & training  Safety Devices  Type of Devices: All fall risk precautions in place;Gait belt;Left in chair (in wheelchair awaiting start of OT session with Jocelyn. Staff present.)    EDUCATION  Education  Education Given To: Patient;Family  Education Provided: Plan of Care;Safety;Transfer Training;Mobility Training;Equipment;Energy Conservation;Fall Prevention Strategies  Education Provided Comments: safety and sequencing on steps  Education Method: Verbal  Education Outcome: Continued education needed;Verbalized understanding;Demonstrated understanding      PM Session  Patient was seated in wheelchair upon arrival of PT. She reports her pain was aggravated in OT session this AM and continues to be higher, but she did receive pain medication

## 2024-06-08 LAB
GLUCOSE BLD-MCNC: 156 MG/DL (ref 70–99)
GLUCOSE BLD-MCNC: 169 MG/DL (ref 70–99)
GLUCOSE BLD-MCNC: 290 MG/DL (ref 70–99)
GLUCOSE BLD-MCNC: 478 MG/DL (ref 70–99)
PERFORMED ON: ABNORMAL

## 2024-06-08 PROCEDURE — 6370000000 HC RX 637 (ALT 250 FOR IP): Performed by: INTERNAL MEDICINE

## 2024-06-08 PROCEDURE — 6370000000 HC RX 637 (ALT 250 FOR IP): Performed by: STUDENT IN AN ORGANIZED HEALTH CARE EDUCATION/TRAINING PROGRAM

## 2024-06-08 PROCEDURE — 1280000000 HC REHAB R&B

## 2024-06-08 PROCEDURE — 6360000002 HC RX W HCPCS: Performed by: STUDENT IN AN ORGANIZED HEALTH CARE EDUCATION/TRAINING PROGRAM

## 2024-06-08 PROCEDURE — 6370000000 HC RX 637 (ALT 250 FOR IP): Performed by: PHYSICAL MEDICINE & REHABILITATION

## 2024-06-08 PROCEDURE — 2580000003 HC RX 258: Performed by: INTERNAL MEDICINE

## 2024-06-08 RX ORDER — INSULIN LISPRO 100 [IU]/ML
2 INJECTION, SOLUTION INTRAVENOUS; SUBCUTANEOUS ONCE
Status: COMPLETED | OUTPATIENT
Start: 2024-06-08 | End: 2024-06-08

## 2024-06-08 RX ADMIN — LEVETIRACETAM 500 MG: 500 TABLET, FILM COATED ORAL at 21:13

## 2024-06-08 RX ADMIN — SODIUM CHLORIDE, PRESERVATIVE FREE 10 ML: 5 INJECTION INTRAVENOUS at 08:39

## 2024-06-08 RX ADMIN — METHOCARBAMOL TABLETS 750 MG: 750 TABLET, COATED ORAL at 07:32

## 2024-06-08 RX ADMIN — METHOCARBAMOL TABLETS 750 MG: 750 TABLET, COATED ORAL at 01:09

## 2024-06-08 RX ADMIN — HEPARIN SODIUM 5000 UNITS: 5000 INJECTION INTRAVENOUS; SUBCUTANEOUS at 21:13

## 2024-06-08 RX ADMIN — SODIUM CHLORIDE, PRESERVATIVE FREE 10 ML: 5 INJECTION INTRAVENOUS at 20:55

## 2024-06-08 RX ADMIN — LEVETIRACETAM 500 MG: 500 TABLET, FILM COATED ORAL at 08:35

## 2024-06-08 RX ADMIN — NIFEDIPINE 30 MG: 30 TABLET, EXTENDED RELEASE ORAL at 08:35

## 2024-06-08 RX ADMIN — OXYCODONE 2.5 MG: 5 TABLET ORAL at 23:38

## 2024-06-08 RX ADMIN — HEPARIN SODIUM 5000 UNITS: 5000 INJECTION INTRAVENOUS; SUBCUTANEOUS at 06:14

## 2024-06-08 RX ADMIN — ESCITALOPRAM OXALATE 10 MG: 10 TABLET ORAL at 08:35

## 2024-06-08 RX ADMIN — INSULIN LISPRO 2 UNITS: 100 INJECTION, SOLUTION INTRAVENOUS; SUBCUTANEOUS at 07:41

## 2024-06-08 RX ADMIN — Medication 2 CAPSULE: at 16:55

## 2024-06-08 RX ADMIN — LOPERAMIDE HYDROCHLORIDE 2 MG: 2 CAPSULE ORAL at 13:42

## 2024-06-08 RX ADMIN — INSULIN LISPRO 4 UNITS: 100 INJECTION, SOLUTION INTRAVENOUS; SUBCUTANEOUS at 16:56

## 2024-06-08 RX ADMIN — HYDROCORTISONE 10 MG: 10 TABLET ORAL at 08:34

## 2024-06-08 RX ADMIN — PANTOPRAZOLE SODIUM 40 MG: 40 TABLET, DELAYED RELEASE ORAL at 15:45

## 2024-06-08 RX ADMIN — METHOCARBAMOL TABLETS 750 MG: 750 TABLET, COATED ORAL at 21:12

## 2024-06-08 RX ADMIN — LOSARTAN POTASSIUM 25 MG: 25 TABLET, FILM COATED ORAL at 08:35

## 2024-06-08 RX ADMIN — INSULIN LISPRO 2 UNITS: 100 INJECTION, SOLUTION INTRAVENOUS; SUBCUTANEOUS at 16:57

## 2024-06-08 RX ADMIN — INSULIN LISPRO 2 UNITS: 100 INJECTION, SOLUTION INTRAVENOUS; SUBCUTANEOUS at 16:23

## 2024-06-08 RX ADMIN — OXYCODONE 2.5 MG: 5 TABLET ORAL at 11:21

## 2024-06-08 RX ADMIN — METHOCARBAMOL TABLETS 750 MG: 750 TABLET, COATED ORAL at 13:18

## 2024-06-08 RX ADMIN — INSULIN LISPRO 2 UNITS: 100 INJECTION, SOLUTION INTRAVENOUS; SUBCUTANEOUS at 07:33

## 2024-06-08 RX ADMIN — OXYCODONE 2.5 MG: 5 TABLET ORAL at 06:13

## 2024-06-08 RX ADMIN — OXYCODONE 2.5 MG: 5 TABLET ORAL at 17:02

## 2024-06-08 RX ADMIN — PANTOPRAZOLE SODIUM 40 MG: 40 TABLET, DELAYED RELEASE ORAL at 06:14

## 2024-06-08 RX ADMIN — ROPINIROLE HYDROCHLORIDE 2 MG: 1 TABLET, FILM COATED ORAL at 21:12

## 2024-06-08 RX ADMIN — INSULIN LISPRO 2 UNITS: 100 INJECTION, SOLUTION INTRAVENOUS; SUBCUTANEOUS at 11:19

## 2024-06-08 RX ADMIN — Medication 2 CAPSULE: at 07:42

## 2024-06-08 RX ADMIN — INSULIN GLARGINE 5 UNITS: 100 INJECTION, SOLUTION SUBCUTANEOUS at 21:12

## 2024-06-08 RX ADMIN — HYDROCORTISONE 5 MG: 5 TABLET ORAL at 21:14

## 2024-06-08 RX ADMIN — HEPARIN SODIUM 5000 UNITS: 5000 INJECTION INTRAVENOUS; SUBCUTANEOUS at 13:26

## 2024-06-08 RX ADMIN — FERROUS SULFATE TAB EC 324 MG (65 MG FE EQUIVALENT) 324 MG: 324 (65 FE) TABLET DELAYED RESPONSE at 07:32

## 2024-06-08 ASSESSMENT — PAIN DESCRIPTION - ORIENTATION
ORIENTATION: RIGHT

## 2024-06-08 ASSESSMENT — PAIN SCALES - GENERAL
PAINLEVEL_OUTOF10: 4
PAINLEVEL_OUTOF10: 3
PAINLEVEL_OUTOF10: 7
PAINLEVEL_OUTOF10: 7
PAINLEVEL_OUTOF10: 0
PAINLEVEL_OUTOF10: 6
PAINLEVEL_OUTOF10: 7
PAINLEVEL_OUTOF10: 7
PAINLEVEL_OUTOF10: 9
PAINLEVEL_OUTOF10: 3
PAINLEVEL_OUTOF10: 7
PAINLEVEL_OUTOF10: 4
PAINLEVEL_OUTOF10: 8
PAINLEVEL_OUTOF10: 4
PAINLEVEL_OUTOF10: 3

## 2024-06-08 ASSESSMENT — PAIN DESCRIPTION - DESCRIPTORS
DESCRIPTORS: ACHING;DISCOMFORT;SPASM
DESCRIPTORS: THROBBING;ACHING
DESCRIPTORS: THROBBING
DESCRIPTORS: ACHING;SPASM
DESCRIPTORS: ACHING
DESCRIPTORS: ACHING;DISCOMFORT
DESCRIPTORS: THROBBING
DESCRIPTORS: THROBBING

## 2024-06-08 ASSESSMENT — PAIN DESCRIPTION - PAIN TYPE
TYPE: ACUTE PAIN;SURGICAL PAIN

## 2024-06-08 ASSESSMENT — PAIN DESCRIPTION - LOCATION
LOCATION: HIP

## 2024-06-08 NOTE — PROGRESS NOTES
[facility] Inpatient Nephrology Note        CC:FAVIAN on CKD  HPI: BP controlled.   Soc Hx:  Visitor present  ROS: no SOB.       Medications     insulin glargine  5 Units SubCUTAneous Nightly    insulin lispro  2 Units SubCUTAneous TID WC    fentaNYL  1 patch TransDERmal Q72H    sodium chloride flush  5-40 mL IntraVENous 2 times per day    losartan  25 mg Oral Daily    levETIRAcetam  500 mg Oral BID    ferrous sulfate  324 mg Oral Daily with breakfast    insulin lispro  0-4 Units SubCUTAneous TID WC    insulin lispro  0-4 Units SubCUTAneous Nightly    escitalopram  10 mg Oral Daily    hydrocortisone  10 mg Oral Daily    pantoprazole  40 mg Oral BID AC    heparin (porcine)  5,000 Units SubCUTAneous 3 times per day    hydrocortisone  5 mg Oral Nightly    NIFEdipine  30 mg Oral Daily    lactobacillus  2 capsule Oral BID WC    rOPINIRole  2 mg Oral Nightly         OBJECTIVE      Physical    TEMPERATURE:  Current - Temp: 98.4 °F (36.9 °C); Max - Temp  Av.4 °F (36.9 °C)  Min: 98.4 °F (36.9 °C)  Max: 98.4 °F (36.9 °C)  RESPIRATIONS RANGE: Resp  Av.2  Min: 17  Max: 18  PULSE RANGE: Pulse  Av.5  Min: 68  Max: 73  BLOOD PRESSURE RANGE:  Systolic (24hrs), Av , Min:135 , Max:136   ; Diastolic (24hrs), Av, Min:75, Max:76    PULSE OXIMETRY RANGE: SpO2  Av %  Min: 97 %  Max: 99 %  24HR INTAKE/OUTPUT:    Intake/Output Summary (Last 24 hours) at 2024 1652  Last data filed at 2024 1339  Gross per 24 hour   Intake 820 ml   Output --   Net 820 ml         GEN: no distress  HEENT: normocephalic, atraumatic  CV: RRR  LUNGS: clear bilaterally, unlabored  ABD: + bowel sounds. No distension. No tenderness  EXT: Trace edema RLE, no edema LLE  SKIN: no rash  NEURO: no tremor    Medications   insulin glargine  5 Units SubCUTAneous Nightly    insulin lispro  2 Units SubCUTAneous TID WC    fentaNYL  1 patch TransDERmal Q72H    sodium chloride flush  5-40 mL IntraVENous 2  times per day    losartan  25 mg Oral Daily    levETIRAcetam  500 mg Oral BID    ferrous sulfate  324 mg Oral Daily with breakfast    insulin lispro  0-4 Units SubCUTAneous TID WC    insulin lispro  0-4 Units SubCUTAneous Nightly    escitalopram  10 mg Oral Daily    hydrocortisone  10 mg Oral Daily    pantoprazole  40 mg Oral BID AC    heparin (porcine)  5,000 Units SubCUTAneous 3 times per day    hydrocortisone  5 mg Oral Nightly    NIFEdipine  30 mg Oral Daily    lactobacillus  2 capsule Oral BID WC    rOPINIRole  2 mg Oral Nightly       Data      Lab Results   Component Value Date    CREATININE 1.5 (H) 06/07/2024    BUN 19 06/07/2024     06/07/2024    K 4.2 06/07/2024     06/07/2024    CO2 23 06/07/2024     Lab Results   Component Value Date    WBC 4.8 06/07/2024    HGB 8.1 (L) 06/07/2024    HCT 23.3 (L) 06/07/2024    MCV 87.7 06/07/2024     06/07/2024     Lab Results   Component Value Date    IRON 16 (L) 05/21/2024    TIBC 189 (L) 05/21/2024    FERRITIN 495.2 (H) 05/21/2024     Lab Results   Component Value Date    CALCIUM 8.8 06/07/2024    PHOS 3.1 06/07/2024         ASSESSMENT/PLAN:    1. FAVIAN on CKD  - due to relative hypotension and rhabdo  - Resolved    2. CKD Stage 3   - serum creatinine at baseline ~ 2  - UPC 0.6  - follow up with Dr. Wood  - Creatinine below baseline    3. HTN  - BP controlled  - continue ARB, however if Cr increases further will stop    4. DKA  - resolved     5. Encephalopathy - Resolved  - MRI with features of PRES   - not related to HTN  - has metastatic melanoma    6. R intertrochanteric femur fx  - per rehab    7. Adrenal insuff  - on Hydrocortisone    8. CKD MBD  - phos at target    9. Hyperkalemia  - Resolved    10. Anemia  - Hemoglobin low  - low iron stores  - added po iron (which may help with chronic diarrhea)    Will re-evaluate Monday morning; till then call with questions

## 2024-06-08 NOTE — PROGRESS NOTES
Patient admitted to rehab with right hip fx repair.  A/Ox4. Transfers with walker, slow but stedy, some cues needed. On 5 carb, RAÚL, low k diet, tolerating well, eats small meals. Medications taken whole. On Heparin for DVT prophylaxis.  Skin: steri strips to hip.  LDA: port to right chest. Has been continet of bowel and of bladder. LBM today. Chair/bed alarms in use and call light in reach. Will monitor for safety.

## 2024-06-08 NOTE — PLAN OF CARE
Problem: Safety - Adult  Goal: Free from fall injury  6/8/2024 1045 by Tonya Norton RN  Outcome: Progressing  Flowsheets (Taken 6/8/2024 1029)  Free From Fall Injury: Instruct family/caregiver on patient safety  6/8/2024 0140 by Shakira Plummer RN  Outcome: Progressing  Flowsheets (Taken 6/8/2024 0140)  Free From Fall Injury: Instruct family/caregiver on patient safety     Problem: Discharge Planning  Goal: Discharge to home or other facility with appropriate resources  6/8/2024 1045 by Tonya Norton RN  Outcome: Progressing  Flowsheets (Taken 6/8/2024 0723)  Discharge to home or other facility with appropriate resources:   Identify barriers to discharge with patient and caregiver   Arrange for needed discharge resources and transportation as appropriate   Identify discharge learning needs (meds, wound care, etc)   Refer to discharge planning if patient needs post-hospital services based on physician order or complex needs related to functional status, cognitive ability or social support system  6/8/2024 0140 by Shakira Plummer RN  Outcome: Progressing  Flowsheets (Taken 6/8/2024 0140)  Discharge to home or other facility with appropriate resources:   Identify discharge learning needs (meds, wound care, etc)   Identify barriers to discharge with patient and caregiver     Problem: Pain  Goal: Verbalizes/displays adequate comfort level or baseline comfort level  6/8/2024 1045 by Tonya Norton RN  Outcome: Progressing  Flowsheets  Taken 6/8/2024 1045  Verbalizes/displays adequate comfort level or baseline comfort level:   Encourage patient to monitor pain and request assistance   Assess pain using appropriate pain scale   Administer analgesics based on type and severity of pain and evaluate response   Implement non-pharmacological measures as appropriate and evaluate response  Taken 6/8/2024 0833  Verbalizes/displays adequate comfort level or baseline comfort level:   Encourage  patient to monitor pain and request assistance   Assess pain using appropriate pain scale   Administer analgesics based on type and severity of pain and evaluate response   Implement non-pharmacological measures as appropriate and evaluate response  Note: Reviewed not to wait too long between pain doses.  6/8/2024 0140 by Shakira Plummer RN  Outcome: Progressing  Flowsheets (Taken 6/8/2024 0140)  Verbalizes/displays adequate comfort level or baseline comfort level:   Encourage patient to monitor pain and request assistance   Administer analgesics based on type and severity of pain and evaluate response   Implement non-pharmacological measures as appropriate and evaluate response   Assess pain using appropriate pain scale     Problem: ABCDS Injury Assessment  Goal: Absence of physical injury  6/8/2024 1045 by Tonya Norton RN  Outcome: Progressing  Flowsheets (Taken 6/8/2024 1029)  Absence of Physical Injury: Implement safety measures based on patient assessment  6/8/2024 0140 by Shakira Plummer RN  Outcome: Progressing  Flowsheets (Taken 6/8/2024 0140)  Absence of Physical Injury: Implement safety measures based on patient assessment     Problem: Skin/Tissue Integrity  Goal: Absence of new skin breakdown  Description: 1.  Monitor for areas of redness and/or skin breakdown  2.  Assess vascular access sites hourly  3.  Every 4-6 hours minimum:  Change oxygen saturation probe site  4.  Every 4-6 hours:  If on nasal continuous positive airway pressure, respiratory therapy assess nares and determine need for appliance change or resting period.  6/8/2024 1045 by Tonya oNrton RN  Outcome: Progressing  Note: Incision healing steri strips in place.  6/8/2024 0140 by Shakira Plummer RN  Outcome: Progressing  Note: Skin assessment completed on admission and every shift. Barrier wipes used in the event of incontinence. Pressure relief techniques used as needed while in chair and in bed. Position changes

## 2024-06-08 NOTE — PROGRESS NOTES
Department of Physical Medicine & Rehabilitation  Progress Note    Patient Identification:  Elvia Arguelles  [id number]  : 1960  Admit date: 2024    Chief Complaint: Closed right hip fracture, initial encounter ([facility])    Subjective:   Seen in her room this morning with family.  She continues to have pain overnight which is limiting her sleep.  She feels like she is progressing through therapy and this is an understandable pain which she is going to be able to tolerate.  She continues to be motivated and agreeable to therapy.    ROS: No f/c, n/v, cp     Objective:  Patient Vitals for the past 24 hrs:   BP Temp Temp src Pulse Resp SpO2 Weight   24 1121 -- -- -- -- 17 -- --   24 0833 136/75 98.4 °F (36.9 °C) Oral 73 17 99 % --   24 0643 -- -- -- -- 17 -- --   24 0613 -- -- -- -- 17 -- --   24 0416 -- -- -- -- -- -- 57.2 kg (126 lb 1.7 oz)   24 1945 135/76 98.4 °F (36.9 °C) Oral 68 18 97 % --       Const: Alert. No distress, pleasant.   HEENT: Normocephalic, atraumatic. Normal sclera/conjunctiva. MMM.   CV: Regular rate and rhythm.   Resp: No respiratory distress. Lungs diminished at bases  Abd: Soft, nontender, nondistended, NABS+   Ext:+ post op swelling RLE  MSK: Decreased right hip ROM  Neuro: Alert, oriented. Vision impairment. No focal strength deficits aside from pain limited RLE  Psych: Cooperative, appropriate mood and affect    Laboratory data: Available via EMR.   Last 24 hour lab  Recent Results (from the past 24 hour(s))   POCT Glucose    Collection Time: 24  3:45 PM   Result Value Ref Range    POC Glucose 184 (H) 70 - 99 mg/dl    Performed on ACCU-CHEK    POCT Glucose    Collection Time: 24  7:47 PM   Result Value Ref Range    POC Glucose 243 (H) 70 - 99 mg/dl    Performed on ACCU-CHEK    POCT Glucose    Collection Time: 24  6:54 AM   Result Value Ref Range    POC Glucose 290 (H) 70 - 99 mg/dl    Performed on ACCU-CHEK    POCT Glucose       -PT/OT/SLP.     Focal seizure on EEG  -continue Keppra per Neurology    Metastatic melanoma  -On maintenance Opdivo (last 5/10).   -F/u with Heme/Onc for further treatment plan given PRES    FAVIAN on CKD III due to Rhabdomyolysis  -Nephrology following, appreciate input  -Avoid nephrotoxins, renally dose meds  -Monitor renal function  -F/u Dr. Celis     Acute on chronic anemia   -Due to post-op blood loss and renal dysfunction  -Monitor Hgb, transfuse prn <7.   -continue iron supplement     Adrenal Insufficiency  -completed stress does steroids on acute floor  -continue home hydrocortisone    Elevated troponin, demand ischemia + FAVIAN  -No intervention recommended per Cardiology    HTN, uncontrolled  -continue nifedipine -- monitor trend, improving overall      Chronic HFpEF  -Monitor fluid balance s/p aggressive IVF for rhabdo  -daily wt    DM2 with hypo and hyperglycemia  -Decrease lantus to 5 qhs -- was hypoglycemic this morinng  -Decrease prandial to 2 TID  -continue ISS  ---monitor response  -Have counseled patient on avoiding candy/sweets as these are causing intermittent severe hyperglycemia.   -note has insulin pump (currently turned off while inpatient due to cognitive deficits)     RLS  -continue Ropinirole      Mood disorder  -continue escitalopram      Bladder   -High risk retention   -Monitor PVRs, SC prn >300cc     Bowel   -High risk constipation   -PRN miralax, bisacodyl supp.     Safety   -fall, aspiration, seizure precautions     Pain control  -continue oxycodone prn (decreased back to 5 mg prn due to cognitive side effects)  -started prn methocarbamol   -continue Fentanyl patch per Ortho.        PPx  -DVT: Heparin  -GI: pantoprazole       Rehab Progress: improving  Anticipated Dispo: home with spouse  Services: [facility] PT, OT, SLP, RN  DME: TTLAVERNE, TSRAYSHAWN, walker basket/bag  ELOS: 6/18      Krzysztof Faulkner D.O. M.P.H  PM&R  6/8/2024  12:44 PM

## 2024-06-08 NOTE — PLAN OF CARE
Problem: Safety - Adult  Goal: Free from fall injury  Outcome: Progressing  Flowsheets (Taken 6/8/2024 0140)  Free From Fall Injury: Instruct family/caregiver on patient safety     Problem: Discharge Planning  Goal: Discharge to home or other facility with appropriate resources  Outcome: Progressing  Flowsheets (Taken 6/8/2024 0140)  Discharge to home or other facility with appropriate resources:   Identify discharge learning needs (meds, wound care, etc)   Identify barriers to discharge with patient and caregiver     Problem: Pain  Goal: Verbalizes/displays adequate comfort level or baseline comfort level  Outcome: Progressing  Flowsheets (Taken 6/8/2024 0140)  Verbalizes/displays adequate comfort level or baseline comfort level:   Encourage patient to monitor pain and request assistance   Administer analgesics based on type and severity of pain and evaluate response   Implement non-pharmacological measures as appropriate and evaluate response   Assess pain using appropriate pain scale     Problem: ABCDS Injury Assessment  Goal: Absence of physical injury  Outcome: Progressing  Flowsheets (Taken 6/8/2024 0140)  Absence of Physical Injury: Implement safety measures based on patient assessment     Problem: Skin/Tissue Integrity  Goal: Absence of new skin breakdown  Description: 1.  Monitor for areas of redness and/or skin breakdown  2.  Assess vascular access sites hourly  3.  Every 4-6 hours minimum:  Change oxygen saturation probe site  4.  Every 4-6 hours:  If on nasal continuous positive airway pressure, respiratory therapy assess nares and determine need for appliance change or resting period.  Outcome: Progressing  Note: Skin assessment completed on admission and every shift. Barrier wipes used in the event of incontinence. Pressure relief techniques used as needed while in chair and in bed. Position changes encouraged at least every two hours while in bed.

## 2024-06-09 VITALS
HEIGHT: 59 IN | BODY MASS INDEX: 25.42 KG/M2 | OXYGEN SATURATION: 98 % | RESPIRATION RATE: 18 BRPM | SYSTOLIC BLOOD PRESSURE: 131 MMHG | TEMPERATURE: 98.2 F | DIASTOLIC BLOOD PRESSURE: 76 MMHG | WEIGHT: 126.1 LBS | HEART RATE: 71 BPM

## 2024-06-09 LAB
GLUCOSE BLD-MCNC: 141 MG/DL (ref 70–99)
GLUCOSE BLD-MCNC: 187 MG/DL (ref 70–99)
GLUCOSE BLD-MCNC: 305 MG/DL (ref 70–99)
GLUCOSE BLD-MCNC: 333 MG/DL (ref 70–99)
GLUCOSE BLD-MCNC: 362 MG/DL (ref 70–99)
GLUCOSE BLD-MCNC: 372 MG/DL (ref 70–99)
GLUCOSE BLD-MCNC: 382 MG/DL (ref 70–99)
PERFORMED ON: ABNORMAL

## 2024-06-09 PROCEDURE — 6370000000 HC RX 637 (ALT 250 FOR IP): Performed by: INTERNAL MEDICINE

## 2024-06-09 PROCEDURE — 1280000000 HC REHAB R&B

## 2024-06-09 PROCEDURE — 94760 N-INVAS EAR/PLS OXIMETRY 1: CPT

## 2024-06-09 PROCEDURE — 6370000000 HC RX 637 (ALT 250 FOR IP): Performed by: STUDENT IN AN ORGANIZED HEALTH CARE EDUCATION/TRAINING PROGRAM

## 2024-06-09 PROCEDURE — 2580000003 HC RX 258: Performed by: INTERNAL MEDICINE

## 2024-06-09 PROCEDURE — 6370000000 HC RX 637 (ALT 250 FOR IP): Performed by: NURSE PRACTITIONER

## 2024-06-09 PROCEDURE — 6370000000 HC RX 637 (ALT 250 FOR IP): Performed by: PHYSICAL MEDICINE & REHABILITATION

## 2024-06-09 PROCEDURE — 6360000002 HC RX W HCPCS: Performed by: STUDENT IN AN ORGANIZED HEALTH CARE EDUCATION/TRAINING PROGRAM

## 2024-06-09 RX ORDER — INSULIN LISPRO 100 [IU]/ML
2 INJECTION, SOLUTION INTRAVENOUS; SUBCUTANEOUS ONCE
Status: COMPLETED | OUTPATIENT
Start: 2024-06-09 | End: 2024-06-09

## 2024-06-09 RX ADMIN — Medication 2 CAPSULE: at 07:35

## 2024-06-09 RX ADMIN — SODIUM CHLORIDE, PRESERVATIVE FREE 10 ML: 5 INJECTION INTRAVENOUS at 21:34

## 2024-06-09 RX ADMIN — SODIUM CHLORIDE, PRESERVATIVE FREE 10 ML: 5 INJECTION INTRAVENOUS at 08:56

## 2024-06-09 RX ADMIN — LOSARTAN POTASSIUM 25 MG: 25 TABLET, FILM COATED ORAL at 08:54

## 2024-06-09 RX ADMIN — HYDROCORTISONE 5 MG: 5 TABLET ORAL at 21:25

## 2024-06-09 RX ADMIN — OXYCODONE 2.5 MG: 5 TABLET ORAL at 04:43

## 2024-06-09 RX ADMIN — METHOCARBAMOL TABLETS 750 MG: 750 TABLET, COATED ORAL at 04:43

## 2024-06-09 RX ADMIN — NIFEDIPINE 30 MG: 30 TABLET, EXTENDED RELEASE ORAL at 08:54

## 2024-06-09 RX ADMIN — LOPERAMIDE HYDROCHLORIDE 2 MG: 2 CAPSULE ORAL at 10:47

## 2024-06-09 RX ADMIN — INSULIN LISPRO 3 UNITS: 100 INJECTION, SOLUTION INTRAVENOUS; SUBCUTANEOUS at 07:37

## 2024-06-09 RX ADMIN — OXYCODONE 2.5 MG: 5 TABLET ORAL at 08:50

## 2024-06-09 RX ADMIN — INSULIN LISPRO 2 UNITS: 100 INJECTION, SOLUTION INTRAVENOUS; SUBCUTANEOUS at 07:38

## 2024-06-09 RX ADMIN — OXYCODONE 2.5 MG: 5 TABLET ORAL at 14:07

## 2024-06-09 RX ADMIN — HEPARIN SODIUM 5000 UNITS: 5000 INJECTION INTRAVENOUS; SUBCUTANEOUS at 21:26

## 2024-06-09 RX ADMIN — HEPARIN SODIUM 5000 UNITS: 5000 INJECTION INTRAVENOUS; SUBCUTANEOUS at 13:42

## 2024-06-09 RX ADMIN — METHOCARBAMOL TABLETS 750 MG: 750 TABLET, COATED ORAL at 10:47

## 2024-06-09 RX ADMIN — Medication 2 CAPSULE: at 16:51

## 2024-06-09 RX ADMIN — ESCITALOPRAM OXALATE 10 MG: 10 TABLET ORAL at 08:54

## 2024-06-09 RX ADMIN — ACETAMINOPHEN 325MG 650 MG: 325 TABLET ORAL at 14:55

## 2024-06-09 RX ADMIN — PANTOPRAZOLE SODIUM 40 MG: 40 TABLET, DELAYED RELEASE ORAL at 15:44

## 2024-06-09 RX ADMIN — HEPARIN SODIUM 5000 UNITS: 5000 INJECTION INTRAVENOUS; SUBCUTANEOUS at 06:05

## 2024-06-09 RX ADMIN — HYDROCORTISONE 10 MG: 10 TABLET ORAL at 09:02

## 2024-06-09 RX ADMIN — INSULIN LISPRO 2 UNITS: 100 INJECTION, SOLUTION INTRAVENOUS; SUBCUTANEOUS at 16:50

## 2024-06-09 RX ADMIN — INSULIN GLARGINE 5 UNITS: 100 INJECTION, SOLUTION SUBCUTANEOUS at 21:27

## 2024-06-09 RX ADMIN — INSULIN LISPRO 2 UNITS: 100 INJECTION, SOLUTION INTRAVENOUS; SUBCUTANEOUS at 23:25

## 2024-06-09 RX ADMIN — ROPINIROLE HYDROCHLORIDE 2 MG: 1 TABLET, FILM COATED ORAL at 21:26

## 2024-06-09 RX ADMIN — LEVETIRACETAM 500 MG: 500 TABLET, FILM COATED ORAL at 08:53

## 2024-06-09 RX ADMIN — INSULIN LISPRO 4 UNITS: 100 INJECTION, SOLUTION INTRAVENOUS; SUBCUTANEOUS at 21:27

## 2024-06-09 RX ADMIN — LEVETIRACETAM 500 MG: 500 TABLET, FILM COATED ORAL at 21:26

## 2024-06-09 RX ADMIN — FERROUS SULFATE TAB EC 324 MG (65 MG FE EQUIVALENT) 324 MG: 324 (65 FE) TABLET DELAYED RESPONSE at 07:35

## 2024-06-09 RX ADMIN — INSULIN LISPRO 2 UNITS: 100 INJECTION, SOLUTION INTRAVENOUS; SUBCUTANEOUS at 11:31

## 2024-06-09 RX ADMIN — OXYCODONE 2.5 MG: 5 TABLET ORAL at 21:26

## 2024-06-09 RX ADMIN — PANTOPRAZOLE SODIUM 40 MG: 40 TABLET, DELAYED RELEASE ORAL at 06:05

## 2024-06-09 RX ADMIN — METHOCARBAMOL TABLETS 750 MG: 750 TABLET, COATED ORAL at 21:26

## 2024-06-09 ASSESSMENT — PAIN DESCRIPTION - LOCATION
LOCATION: HIP
LOCATION: LEG;HIP
LOCATION: LEG;HIP
LOCATION: HEAD
LOCATION: HIP
LOCATION: HIP
LOCATION: LEG;HIP
LOCATION: HIP

## 2024-06-09 ASSESSMENT — PAIN SCALES - GENERAL
PAINLEVEL_OUTOF10: 3
PAINLEVEL_OUTOF10: 8
PAINLEVEL_OUTOF10: 7
PAINLEVEL_OUTOF10: 4
PAINLEVEL_OUTOF10: 7
PAINLEVEL_OUTOF10: 7
PAINLEVEL_OUTOF10: 4
PAINLEVEL_OUTOF10: 8
PAINLEVEL_OUTOF10: 7
PAINLEVEL_OUTOF10: 7
PAINLEVEL_OUTOF10: 3
PAINLEVEL_OUTOF10: 3
PAINLEVEL_OUTOF10: 0
PAINLEVEL_OUTOF10: 1

## 2024-06-09 ASSESSMENT — PAIN DESCRIPTION - PAIN TYPE
TYPE: ACUTE PAIN;SURGICAL PAIN

## 2024-06-09 ASSESSMENT — PAIN DESCRIPTION - DESCRIPTORS
DESCRIPTORS: ACHING;SPASM;DISCOMFORT
DESCRIPTORS: THROBBING
DESCRIPTORS: ACHING;DULL
DESCRIPTORS: THROBBING
DESCRIPTORS: ACHING
DESCRIPTORS: ACHING;DISCOMFORT

## 2024-06-09 ASSESSMENT — PAIN DESCRIPTION - ORIENTATION
ORIENTATION: RIGHT
ORIENTATION: ANTERIOR;POSTERIOR
ORIENTATION: RIGHT

## 2024-06-09 NOTE — PLAN OF CARE
Problem: Safety - Adult  Goal: Free from fall injury  Outcome: Progressing  Note: Fall risk band on patient. Orange light on outside of room. Non skid footwear in place. Alarms used appropriately. Patient instructed to call and wait for staff before getting up. Rounding done to anticipate needs. Appropriate safety devices used for transfers.       Problem: Discharge Planning  Goal: Discharge to home or other facility with appropriate resources  Outcome: Progressing  Flowsheets (Taken 6/9/2024 0156)  Discharge to home or other facility with appropriate resources:   Identify barriers to discharge with patient and caregiver   Identify discharge learning needs (meds, wound care, etc)     Problem: Pain  Goal: Verbalizes/displays adequate comfort level or baseline comfort level  Outcome: Progressing  Flowsheets (Taken 6/9/2024 0156)  Verbalizes/displays adequate comfort level or baseline comfort level:   Encourage patient to monitor pain and request assistance   Administer analgesics based on type and severity of pain and evaluate response   Assess pain using appropriate pain scale   Implement non-pharmacological measures as appropriate and evaluate response     Problem: ABCDS Injury Assessment  Goal: Absence of physical injury  Outcome: Progressing  Flowsheets (Taken 6/9/2024 0156)  Absence of Physical Injury: Implement safety measures based on patient assessment     Problem: Skin/Tissue Integrity  Goal: Absence of new skin breakdown  Description: 1.  Monitor for areas of redness and/or skin breakdown  2.  Assess vascular access sites hourly  3.  Every 4-6 hours minimum:  Change oxygen saturation probe site  4.  Every 4-6 hours:  If on nasal continuous positive airway pressure, respiratory therapy assess nares and determine need for appliance change or resting period.  Outcome: Progressing  Note: Skin assessment completed on admission and every shift. Barrier wipes used in the event of incontinence. Pressure relief  techniques used as needed while in chair and in bed. Position changes encouraged at least every two hours while in bed.

## 2024-06-09 NOTE — PLAN OF CARE
Problem: Safety - Adult  Goal: Free from fall injury  6/9/2024 1206 by Tonya Norton RN  Outcome: Progressing  Flowsheets  Taken 6/9/2024 1206  Free From Fall Injury:   Instruct family/caregiver on patient safety   Based on caregiver fall risk screen, instruct family/caregiver to ask for assistance with transferring infant if caregiver noted to have fall risk factors  Taken 6/9/2024 1204  Free From Fall Injury: Instruct family/caregiver on patient safety  6/9/2024 0156 by Shakira Plummer, RN  Outcome: Progressing  Note: Fall risk band on patient. Orange light on outside of room. Non skid footwear in place. Alarms used appropriately. Patient instructed to call and wait for staff before getting up. Rounding done to anticipate needs. Appropriate safety devices used for transfers.       Problem: Discharge Planning  Goal: Discharge to home or other facility with appropriate resources  6/9/2024 1206 by Tonya Norton RN  Outcome: Progressing  Flowsheets (Taken 6/9/2024 0743)  Discharge to home or other facility with appropriate resources:   Identify barriers to discharge with patient and caregiver   Arrange for needed discharge resources and transportation as appropriate   Identify discharge learning needs (meds, wound care, etc)  6/9/2024 0156 by Shakira Plummer RN  Outcome: Progressing  Flowsheets (Taken 6/9/2024 0156)  Discharge to home or other facility with appropriate resources:   Identify barriers to discharge with patient and caregiver   Identify discharge learning needs (meds, wound care, etc)     Problem: Pain  Goal: Verbalizes/displays adequate comfort level or baseline comfort level  6/9/2024 1206 by Tonya Norton RN  Outcome: Progressing  Flowsheets (Taken 6/9/2024 1206)  Verbalizes/displays adequate comfort level or baseline comfort level:   Encourage patient to monitor pain and request assistance   Assess pain using appropriate pain scale   Administer analgesics based on type

## 2024-06-09 NOTE — PROGRESS NOTES
Patient admitted to rehab with closed right hip fracture.  A/Ox4. Transfers with walker. Mobility restrictions: WBAT. On regular RAÚL low potassium diet, tolerating well. Medications taken whole with fluids. On heparin for DVT prophylaxis.  Skin: scattered bruising and abrasions. Right hip incisions with steri strips intact. Oxygen: room air. LDA: port accessed in right upper chest. Has been continent of bowel and of bladder. LBM 6/8.     Pt received Robaxin and Oxy 2.5 thus far this shift (see MAR). Pt resting quietly in bed at this time. Respirations easy and even. Bed alarm on and call light in reach.

## 2024-06-09 NOTE — PROGRESS NOTES
Patient admitted to rehab with right hip fx repair.  A/Ox4. Transfers with walker, cues, slow but stedy. Mobility restrictions: hip safety reviewed. On 5 carb,  RAÚL, low K diet, tolerating well, reviewed with  blood sugar issue after eating  pie yesterday. Medications taken whole. On Heparin for DVT prophylaxis.  Skin: incision healing. LDA: port right chest no issues. Has been incontinent/continent of bowel and continent of bladder. LBM today. Chair/bed alarms in use and call light in reach. Will monitor for safety.

## 2024-06-10 LAB
ALBUMIN SERPL-MCNC: 3.2 G/DL (ref 3.4–5)
ANION GAP SERPL CALCULATED.3IONS-SCNC: 11 MMOL/L (ref 3–16)
BASOPHILS # BLD: 0 K/UL (ref 0–0.2)
BASOPHILS NFR BLD: 1.1 %
BUN SERPL-MCNC: 18 MG/DL (ref 7–20)
CALCIUM SERPL-MCNC: 9.3 MG/DL (ref 8.3–10.6)
CHLORIDE SERPL-SCNC: 104 MMOL/L (ref 99–110)
CO2 SERPL-SCNC: 23 MMOL/L (ref 21–32)
CREAT SERPL-MCNC: 1.5 MG/DL (ref 0.6–1.2)
DEPRECATED RDW RBC AUTO: 17.1 % (ref 12.4–15.4)
EOSINOPHIL # BLD: 0.2 K/UL (ref 0–0.6)
EOSINOPHIL NFR BLD: 4 %
GFR SERPLBLD CREATININE-BSD FMLA CKD-EPI: 38 ML/MIN/{1.73_M2}
GLUCOSE BLD-MCNC: 156 MG/DL (ref 70–99)
GLUCOSE BLD-MCNC: 162 MG/DL (ref 70–99)
GLUCOSE BLD-MCNC: 187 MG/DL (ref 70–99)
GLUCOSE BLD-MCNC: 221 MG/DL (ref 70–99)
GLUCOSE BLD-MCNC: 319 MG/DL (ref 70–99)
GLUCOSE SERPL-MCNC: 187 MG/DL (ref 70–99)
HCT VFR BLD AUTO: 22.2 % (ref 36–48)
HGB BLD-MCNC: 7.8 G/DL (ref 12–16)
LYMPHOCYTES # BLD: 1 K/UL (ref 1–5.1)
LYMPHOCYTES NFR BLD: 25.1 %
MCH RBC QN AUTO: 30.7 PG (ref 26–34)
MCHC RBC AUTO-ENTMCNC: 35.1 G/DL (ref 31–36)
MCV RBC AUTO: 87.3 FL (ref 80–100)
MONOCYTES # BLD: 0.4 K/UL (ref 0–1.3)
MONOCYTES NFR BLD: 9.5 %
NEUTROPHILS # BLD: 2.4 K/UL (ref 1.7–7.7)
NEUTROPHILS NFR BLD: 60.3 %
PERFORMED ON: ABNORMAL
PHOSPHATE SERPL-MCNC: 3.4 MG/DL (ref 2.5–4.9)
PLATELET # BLD AUTO: 151 K/UL (ref 135–450)
PMV BLD AUTO: 8.2 FL (ref 5–10.5)
POTASSIUM SERPL-SCNC: 3.5 MMOL/L (ref 3.5–5.1)
RBC # BLD AUTO: 2.55 M/UL (ref 4–5.2)
SODIUM SERPL-SCNC: 138 MMOL/L (ref 136–145)
WBC # BLD AUTO: 3.9 K/UL (ref 4–11)

## 2024-06-10 PROCEDURE — 97530 THERAPEUTIC ACTIVITIES: CPT

## 2024-06-10 PROCEDURE — 97116 GAIT TRAINING THERAPY: CPT

## 2024-06-10 PROCEDURE — 85025 COMPLETE CBC W/AUTO DIFF WBC: CPT

## 2024-06-10 PROCEDURE — 6370000000 HC RX 637 (ALT 250 FOR IP): Performed by: STUDENT IN AN ORGANIZED HEALTH CARE EDUCATION/TRAINING PROGRAM

## 2024-06-10 PROCEDURE — 1280000000 HC REHAB R&B

## 2024-06-10 PROCEDURE — 97110 THERAPEUTIC EXERCISES: CPT

## 2024-06-10 PROCEDURE — 97130 THER IVNTJ EA ADDL 15 MIN: CPT

## 2024-06-10 PROCEDURE — 6370000000 HC RX 637 (ALT 250 FOR IP): Performed by: INTERNAL MEDICINE

## 2024-06-10 PROCEDURE — 97129 THER IVNTJ 1ST 15 MIN: CPT

## 2024-06-10 PROCEDURE — 6370000000 HC RX 637 (ALT 250 FOR IP): Performed by: PHYSICAL MEDICINE & REHABILITATION

## 2024-06-10 PROCEDURE — 6360000002 HC RX W HCPCS: Performed by: STUDENT IN AN ORGANIZED HEALTH CARE EDUCATION/TRAINING PROGRAM

## 2024-06-10 PROCEDURE — 97535 SELF CARE MNGMENT TRAINING: CPT

## 2024-06-10 PROCEDURE — 94760 N-INVAS EAR/PLS OXIMETRY 1: CPT

## 2024-06-10 PROCEDURE — 80069 RENAL FUNCTION PANEL: CPT

## 2024-06-10 RX ORDER — POTASSIUM CHLORIDE 20 MEQ/1
20 TABLET, EXTENDED RELEASE ORAL ONCE
Status: COMPLETED | OUTPATIENT
Start: 2024-06-10 | End: 2024-06-10

## 2024-06-10 RX ADMIN — METHOCARBAMOL TABLETS 750 MG: 750 TABLET, COATED ORAL at 16:18

## 2024-06-10 RX ADMIN — OXYCODONE 2.5 MG: 5 TABLET ORAL at 16:18

## 2024-06-10 RX ADMIN — Medication 2 CAPSULE: at 08:09

## 2024-06-10 RX ADMIN — LOSARTAN POTASSIUM 25 MG: 25 TABLET, FILM COATED ORAL at 08:09

## 2024-06-10 RX ADMIN — ROPINIROLE HYDROCHLORIDE 2 MG: 1 TABLET, FILM COATED ORAL at 20:02

## 2024-06-10 RX ADMIN — METHOCARBAMOL TABLETS 750 MG: 750 TABLET, COATED ORAL at 08:21

## 2024-06-10 RX ADMIN — INSULIN LISPRO 2 UNITS: 100 INJECTION, SOLUTION INTRAVENOUS; SUBCUTANEOUS at 08:10

## 2024-06-10 RX ADMIN — HEPARIN SODIUM 5000 UNITS: 5000 INJECTION INTRAVENOUS; SUBCUTANEOUS at 05:57

## 2024-06-10 RX ADMIN — INSULIN LISPRO 2 UNITS: 100 INJECTION, SOLUTION INTRAVENOUS; SUBCUTANEOUS at 17:21

## 2024-06-10 RX ADMIN — FERROUS SULFATE TAB EC 324 MG (65 MG FE EQUIVALENT) 324 MG: 324 (65 FE) TABLET DELAYED RESPONSE at 08:09

## 2024-06-10 RX ADMIN — OXYCODONE 2.5 MG: 5 TABLET ORAL at 20:11

## 2024-06-10 RX ADMIN — Medication 2 CAPSULE: at 16:18

## 2024-06-10 RX ADMIN — OXYCODONE 2.5 MG: 5 TABLET ORAL at 12:39

## 2024-06-10 RX ADMIN — LEVETIRACETAM 500 MG: 500 TABLET, FILM COATED ORAL at 20:03

## 2024-06-10 RX ADMIN — LOPERAMIDE HYDROCHLORIDE 2 MG: 2 CAPSULE ORAL at 20:03

## 2024-06-10 RX ADMIN — INSULIN LISPRO 1 UNITS: 100 INJECTION, SOLUTION INTRAVENOUS; SUBCUTANEOUS at 08:12

## 2024-06-10 RX ADMIN — POTASSIUM CHLORIDE 20 MEQ: 1500 TABLET, EXTENDED RELEASE ORAL at 11:37

## 2024-06-10 RX ADMIN — HEPARIN SODIUM 5000 UNITS: 5000 INJECTION INTRAVENOUS; SUBCUTANEOUS at 22:44

## 2024-06-10 RX ADMIN — INSULIN GLARGINE 5 UNITS: 100 INJECTION, SOLUTION SUBCUTANEOUS at 20:08

## 2024-06-10 RX ADMIN — OXYCODONE 2.5 MG: 5 TABLET ORAL at 08:09

## 2024-06-10 RX ADMIN — HYDROCORTISONE 5 MG: 5 TABLET ORAL at 20:03

## 2024-06-10 RX ADMIN — ESCITALOPRAM OXALATE 10 MG: 10 TABLET ORAL at 08:09

## 2024-06-10 RX ADMIN — HYDROXYZINE PAMOATE 25 MG: 25 CAPSULE ORAL at 02:03

## 2024-06-10 RX ADMIN — NIFEDIPINE 30 MG: 30 TABLET, EXTENDED RELEASE ORAL at 08:09

## 2024-06-10 RX ADMIN — HEPARIN SODIUM 5000 UNITS: 5000 INJECTION INTRAVENOUS; SUBCUTANEOUS at 12:23

## 2024-06-10 RX ADMIN — PANTOPRAZOLE SODIUM 40 MG: 40 TABLET, DELAYED RELEASE ORAL at 05:57

## 2024-06-10 RX ADMIN — PANTOPRAZOLE SODIUM 40 MG: 40 TABLET, DELAYED RELEASE ORAL at 16:19

## 2024-06-10 RX ADMIN — INSULIN LISPRO 2 UNITS: 100 INJECTION, SOLUTION INTRAVENOUS; SUBCUTANEOUS at 12:22

## 2024-06-10 RX ADMIN — LEVETIRACETAM 500 MG: 500 TABLET, FILM COATED ORAL at 08:09

## 2024-06-10 RX ADMIN — HYDROCORTISONE 10 MG: 10 TABLET ORAL at 08:22

## 2024-06-10 ASSESSMENT — PAIN DESCRIPTION - ORIENTATION
ORIENTATION: RIGHT

## 2024-06-10 ASSESSMENT — PAIN DESCRIPTION - FREQUENCY
FREQUENCY: INTERMITTENT
FREQUENCY: CONTINUOUS
FREQUENCY: CONTINUOUS

## 2024-06-10 ASSESSMENT — PAIN - FUNCTIONAL ASSESSMENT
PAIN_FUNCTIONAL_ASSESSMENT: ACTIVITIES ARE NOT PREVENTED

## 2024-06-10 ASSESSMENT — PAIN DESCRIPTION - DESCRIPTORS
DESCRIPTORS: ACHING;DISCOMFORT
DESCRIPTORS: ACHING;DISCOMFORT
DESCRIPTORS: THROBBING
DESCRIPTORS: ACHING;DISCOMFORT

## 2024-06-10 ASSESSMENT — PAIN SCALES - WONG BAKER
WONGBAKER_NUMERICALRESPONSE: HURTS A LITTLE BIT
WONGBAKER_NUMERICALRESPONSE: NO HURT
WONGBAKER_NUMERICALRESPONSE: NO HURT

## 2024-06-10 ASSESSMENT — PAIN DESCRIPTION - PAIN TYPE
TYPE: ACUTE PAIN;SURGICAL PAIN

## 2024-06-10 ASSESSMENT — PAIN DESCRIPTION - ONSET
ONSET: ON-GOING

## 2024-06-10 ASSESSMENT — PAIN SCALES - GENERAL
PAINLEVEL_OUTOF10: 6
PAINLEVEL_OUTOF10: 2
PAINLEVEL_OUTOF10: 6
PAINLEVEL_OUTOF10: 6
PAINLEVEL_OUTOF10: 9

## 2024-06-10 ASSESSMENT — PAIN DESCRIPTION - LOCATION
LOCATION: HIP;LEG
LOCATION: HIP
LOCATION: HIP

## 2024-06-10 NOTE — PROGRESS NOTES
Patient admitted to rehab with closed right hip fracture.  A/Ox4. Transfers with one assist with gait belt and use of stedy. Mobility restrictions: WBAT. On regular RAÚL low potassium diet, tolerating well. Medications taken whole with fluids. On Heparin for DVT prophylaxis.  Skin: scattered bruising and abrasions.  Right hip incisions with steri strips intact. Oxygen: room air. LDA: None. Has been continent of bowel and  of bladder. LBM 6/8. Chair/bed alarms in use and call light in reach. Care on going.

## 2024-06-10 NOTE — PROGRESS NOTES
Occupational Therapy  Facility/Department: [address] [facility]  Rehabilitation Occupational Therapy Daily Treatment Note    Date: 6/10/24  Patient Name: Elvia Arguelles       Room: Z2Q-8049/3272-01  MRN: [id number]  Account: [id number]   : 1960  (63 y.o.) Gender: female                    Past Medical History:  has a past medical history of Adrenal insufficiency (HCC), Cancer (HCC), Closed displaced intertrochanteric fracture of right femur (HCC), Depression, Diabetes mellitus (HCC), Hx of radiation therapy, Hypertension, Hyponatremia, Insulin pump in place, Melanoma (HCC), Prolonged emergence from general anesthesia, and Restless leg syndrome.  Past Surgical History:   has a past surgical history that includes Hysterectomy; Cholecystectomy; shoulder surgery; Upper gastrointestinal endoscopy (10/22/2014); Carpal tunnel release (Bilateral, 2017); lipoma resection; Eye surgery (Right); Shoulder arthroscopy (Right, 2018); liver biopsy (Right); US BIOPSY LIVER PERCUTANEOUS (2022); CT BIOPSY ABDOMEN RETROPERITONEUM (2022); Upper gastrointestinal endoscopy (N/A, 2023); Upper gastrointestinal endoscopy (2023); Port Surgery (Right, 2023); Upper gastrointestinal endoscopy (N/A, 2024); and hip surgery (Right, 2024).    Restrictions  Restrictions/Precautions: Weight Bearing, Fall Risk  Other position/activity restrictions: R femur fx s/p Right femur cephalomedullary nail; Diet: 5 carb choices (75 gm/meal); No Added Salt (3-4 gm); Low Potassium (Less than 3000 mg/day), Port  Right Lower Extremity Weight Bearing: Weight Bearing As Tolerated  Equipment Used: Wheelchair    Subjective  Subjective: Pt met in therapy dept, seated in w/c and agreeable to tx session. Pt report pain RLE with movement, did not rate. Pt  present.  Restrictions/Precautions: Weight Bearing;Fall Risk             Objective     Cognition  Overall Cognitive Status: WFL (pt demo some difficulty

## 2024-06-10 NOTE — PROGRESS NOTES
[facility]  Glycemic Control      NAME: Elvia Arguelles  MEDICAL RECORD NUMBER:  [id number]  AGE: 63 y.o.   GENDER: female  : 1960  EPISODE DATE:  6/10/2024     Data     Recent Labs     24  21124  2120 24  2255 06/10/24  0200 06/10/24  0713 06/10/24  1138   POCGLU 382* 372* 305* 156* 221* 162*       HgBA1c:    Lab Results   Component Value Date/Time    LABA1C 7.2 2024 10:17 PM       BUN/Creatinine:    Lab Results   Component Value Date/Time    BUN 18 06/10/2024 06:00 AM    CREATININE 1.5 06/10/2024 06:00 AM       Medications  Scheduled Medications:   insulin glargine  5 Units SubCUTAneous Nightly    insulin lispro  2 Units SubCUTAneous TID WC    fentaNYL  1 patch TransDERmal Q72H    sodium chloride flush  5-40 mL IntraVENous 2 times per day    losartan  25 mg Oral Daily    levETIRAcetam  500 mg Oral BID    ferrous sulfate  324 mg Oral Daily with breakfast    insulin lispro  0-4 Units SubCUTAneous TID WC    insulin lispro  0-4 Units SubCUTAneous Nightly    escitalopram  10 mg Oral Daily    hydrocortisone  10 mg Oral Daily    pantoprazole  40 mg Oral BID AC    heparin (porcine)  5,000 Units SubCUTAneous 3 times per day    hydrocortisone  5 mg Oral Nightly    NIFEdipine  30 mg Oral Daily    lactobacillus  2 capsule Oral BID WC    rOPINIRole  2 mg Oral Nightly       Diet  Current diet/supplement order: ADULT DIET; Regular; 5 carb choices (75 gm/meal); No Added Salt (3-4 gm); Low Potassium (Less than 3000 mg/day)     Recorded PO: PO Meals Eaten (%): 76 - 100% last meal in flowsheets      Action      Glucose Target: 140-180, avoid hypoglycemia    Total Daily Insulin:   6/10/2024: 5 units as of 1309 pm  2024: 20 units  2024: 19 units    Recommendations: Monitor intake and glucoses. Continue current basal/bolus regimen    Physician Notified of event: Yes, spoke Delia Martinez MD    Met with pt's spouse. Per pt's spouse he is unfamiliar with pt's Omnipod and

## 2024-06-10 NOTE — PROGRESS NOTES
Occupational Therapy  OCCUPATIONAL THERAPY  Progress Note   Second Session    Patient Name: Elvia Arguelles  Medical Record Number: [id number]    Treatment Diagnosis: impaired functional mobility following hip fracture and Posterior reversible encephalopathy syndrome    General  Chart Reviewed: Yes, Orders, Progress Notes  Patient assessed for rehabilitation services?: Yes  Additional Pertinent Hx: Per Dr. Tello H&P 5/27: \"Elvia Arguelles is a 63 yrs old female with a PMHx of T2DM, adrenal insufficiency chonic steroids, metastatic melanoma on Opdivo therapy who presented to ED with R hip pain after fall, found to have R femoral fracture s/p IM nail.     Patient originally presented to [facility] ED on 5/27 after being found down by family in the living room floor. ED workup showed Rhabdomyolysis, FAVIAN and closed R femoral fracture. Admitted for further care. Orthopedic surgery consulted, performed IM nail 5/19 without perioperative complication. Course complicated by AMS, suspected PRES per multiple providers and MRI imaging findings. EEG w/epileptiform activity, started on keppra per neurology consult.     she continued to stabilize medically, was evaluated by therapy services and found to be an appropriate candidate for inpatient rehabilitation for medically supervised 3 hours of therapy 5 days a week.\"  Family / Caregiver Present: Yes (pt )  Referring Practitioner: Sumeet Tello DO  Diagnosis: Closed R hip fx s/p IM nail.     Restrictions/Precautions  Restrictions/Precautions: Weight Bearing, Fall Risk  Lower Extremity Weight Bearing Restrictions  Right Lower Extremity Weight Bearing: Weight Bearing As Tolerated     Position Activity Restriction  Other position/activity restrictions: R femur fx s/p Right femur cephalomedullary nail; Diet: 5 carb choices (75 gm/meal); No Added Salt (3-4 gm); Low Potassium (Less than 3000 mg/day), Port    Subjective: Patient arrived in wheelchair and is in good

## 2024-06-10 NOTE — PROGRESS NOTES
ACUTE REHAB UNIT  SPEECH/LANGUAGE PATHOLOGY      [x] Daily  [] Weekly Care Conference Note  [] Discharge    Patient:Elvia Arguelles      :1960  MRN:[id number]  Rehab Dx/Hx: Closed right hip fracture, initial encounter ([facility]) [S72.001A]    Precautions: falls, seizures, and visual spatial deficits  Home situation: from home with spouse  ST Dx: [] Aphasia  [] Dysarthria  [] Apraxia   [] Oropharyngeal dysphagia [x] Cognitive   Impairment  [] Other:   Initial Speech Therapy Assessment Diagnosis:   1. Cognitive Diagnosis: Pt demonstrated minimal to moderate deficits in domains of time orientation; higher level attention; complex VPS and abstract reasoning; working memory and STM. Pt with visual deficits and right lower extremity pain which is further exacerbating. Visual deficits during paper tasks revealed reduced attention left; slight sloping upward left to right when writing; spatial orientation when putting numbers on a clock. Will continue therapy working on visuo cognition / visuo spatial orientation and visual language remediation  2. Speech Diagnosis: Motor speech audible and intelligible at multiple word utterance level and during discourse. Pt achieve good verbal diadochokinetic rating. Voice quality with minimal claudio /strained quality.  3. Communication Diagnosis: Pt demonstrating concrete to mild complex functional auditory language processing and verbal expressive language skills. Visual language processing at word / phrase level with mild deficits in inattention left of midline (did utilized large print with high color contrast). Pt able to express full name via written expression but minimal perseveration of letters ans slight sloping upward left to right when writing. Ongoing therapy     Date of Admit: 2024  Room #: J7T-7825/3272-01  Date: 6/10/2024       Current functional status (updated daily):         Current Diet Order:ADULT DIET; Regular; 5 carb choices (75 gm/meal); No Added Salt (3-4  paragraph level using line guide1/3 inch and 1/2 inch size letters : set up; extra time and cues for sustained use of line guide  using calendars; table of contents; numbered lists : set up; extra time; use of scanning/orientation strategies; and cues for sustained use of strategies  Using playing cards for placing missing cards in playing card sequence at 1-2 row level: set up; IND with occasional spacing (ie playing partially on another card-typically left side errors)     N/a   Goal 2: Pt will demonstrate improved visuo cognition across visuo spatial tasks with set up and use of visual strategies with >90%    Use of table top/use of book with left page/right page    Use of table top for finding category picture 3x5 and 4x6 cards: use of scanning strategy ; 100%    Visuo spatial organizaiton for written expression for form completion: last targeted 6/7/2024    Visuo spatial organization: left versus right task  Incorporating visual scanning compensatory strategies for assist in visual orientation with each task trial:   Most optimal with use of compensatory strategy  Without use of strategy; problems in sustained visual spatial orientation; random scanning and increase time   N/a   Goal 3: Pt will demonstrate improved VPS/PS and reasoning for graded PS tasks and numeric reasoning tasks with use of strategies PRN and mild assist      Emphasized left to right compensatory strategy across all table top and PS tasks  cause/effect:   Continued incorporation of use of visual scanning/orientation strategy for task  Continued incorporation of LTM of task trial characteristics to assist in visual orientaiton/visual spatial   Convergent/divergent thinking for generalization of compensatory strategies ;learned strategies for navigating table top; environment for d/c planning: ongoing brainstorming   N/a   Goal 4: Pt will demonstrate improved recall of daily events; learned safety strategies; and new information with use of

## 2024-06-10 NOTE — PROGRESS NOTES
[facility] Inpatient Nephrology Note        CC:FAVIAN on CKD  HPI: BP noted.   Labs reviewed .   Soc Hx:  no Visitor present  ROS: no SOB.       Medications     potassium chloride  20 mEq Oral Once    insulin glargine  5 Units SubCUTAneous Nightly    insulin lispro  2 Units SubCUTAneous TID WC    fentaNYL  1 patch TransDERmal Q72H    sodium chloride flush  5-40 mL IntraVENous 2 times per day    losartan  25 mg Oral Daily    levETIRAcetam  500 mg Oral BID    ferrous sulfate  324 mg Oral Daily with breakfast    insulin lispro  0-4 Units SubCUTAneous TID WC    insulin lispro  0-4 Units SubCUTAneous Nightly    escitalopram  10 mg Oral Daily    hydrocortisone  10 mg Oral Daily    pantoprazole  40 mg Oral BID AC    heparin (porcine)  5,000 Units SubCUTAneous 3 times per day    hydrocortisone  5 mg Oral Nightly    NIFEdipine  30 mg Oral Daily    lactobacillus  2 capsule Oral BID WC    rOPINIRole  2 mg Oral Nightly         OBJECTIVE      Physical    TEMPERATURE:  Current - Temp: 98.2 °F (36.8 °C); Max - Temp  Av.2 °F (36.8 °C)  Min: 98.2 °F (36.8 °C)  Max: 98.2 °F (36.8 °C)  RESPIRATIONS RANGE: Resp  Av.1  Min: 16  Max: 18  PULSE RANGE: Pulse  Av  Min: 71  Max: 71  BLOOD PRESSURE RANGE:  Systolic (24hrs), Av , Min:131 , Max:160   ; Diastolic (24hrs), Av, Min:71, Max:76    PULSE OXIMETRY RANGE: SpO2  Av.7 %  Min: 95 %  Max: 98 %  24HR INTAKE/OUTPUT:    Intake/Output Summary (Last 24 hours) at 6/10/2024 1022  Last data filed at 6/10/2024 0940  Gross per 24 hour   Intake 800 ml   Output --   Net 800 ml         GEN: no distress  HEENT: normocephalic, atraumatic  CV: RRR  LUNGS: clear bilaterally, unlabored  ABD: + bowel sounds. No distension. No tenderness  EXT: Trace edema RLE, no edema LLE  SKIN: no rash  NEURO: no tremor    Medications   potassium chloride  20 mEq Oral Once    insulin glargine  5 Units SubCUTAneous Nightly    insulin lispro  2 Units

## 2024-06-10 NOTE — PROGRESS NOTES
Physical Therapy  Facility/Department: [address] REHAB  Rehabilitation Physical Therapy Treatment Note    NAME: Elvia Arguelles  : 1960 (63 y.o.)  MRN: [id number]  CODE STATUS: Full Code    Date of Service: 6/10/24       Restrictions:  Restrictions/Precautions: Weight Bearing, Fall Risk  Lower Extremity Weight Bearing Restrictions  Right Lower Extremity Weight Bearing: Weight Bearing As Tolerated  Position Activity Restriction  Other position/activity restrictions: R femur fx s/p Right femur cephalomedullary nail; Diet: 5 carb choices (75 gm/meal); No Added Salt (3-4 gm); Low Potassium (Less than 3000 mg/day), Port     Pertinent medical information:  Additional Pertinent Hx: per Dr. Tello's H&P, \"63 yrs old female with a PMHx of T2DM, adrenal insufficiency chonic steroids, metastatic melanoma on Opdivo therapy who presented to ED with R hip pain after fall, found to have R femoral fracture s/p IM nail. originally presented to [facility] ED on  after being found down by family in the living room floor. ED workup showed Rhabdomyolysis, FAVIAN and closed R femoral fracture. Admitted for further care. Orthopedic surgery consulted, performed IM nail  without perioperative complication. Course complicated by AMS, suspected PRES per multiple providers and MRI imaging findings. EEG w/epileptiform activity, started on keppra per neurology consult\"    SUBJECTIVE  Subjective  Subjective: Patient arrived in wheelchair and reports she is doing well. She states she did some exercises this weekend and did some ambulation in the room with staff.  Pain: Pain rated at a 3/10 in thigh and hip    Social/Functional History  Lives With: Spouse (, Shant, in good health and retired)  Type of Home: House  Home Layout: Able to Live on Main level with bedroom/bathroom, One level, Laundry in basement (with basement)  Home Access: Stairs to enter with rails  Entrance Stairs - Number of Steps: 4 with danielle handrail + 6 with

## 2024-06-11 LAB
ALBUMIN SERPL-MCNC: 3.4 G/DL (ref 3.4–5)
ANION GAP SERPL CALCULATED.3IONS-SCNC: 10 MMOL/L (ref 3–16)
BACTERIA URNS QL MICRO: ABNORMAL /HPF
BILIRUB UR QL STRIP.AUTO: NEGATIVE
BUN SERPL-MCNC: 23 MG/DL (ref 7–20)
CALCIUM SERPL-MCNC: 9.2 MG/DL (ref 8.3–10.6)
CHLORIDE SERPL-SCNC: 104 MMOL/L (ref 99–110)
CHLORIDE UR-SCNC: 54 MMOL/L
CLARITY UR: CLEAR
CO2 SERPL-SCNC: 23 MMOL/L (ref 21–32)
COLOR UR: YELLOW
CREAT SERPL-MCNC: 1.7 MG/DL (ref 0.6–1.2)
EPI CELLS #/AREA URNS AUTO: 2 /HPF (ref 0–5)
GFR SERPLBLD CREATININE-BSD FMLA CKD-EPI: 33 ML/MIN/{1.73_M2}
GLUCOSE BLD-MCNC: 183 MG/DL (ref 70–99)
GLUCOSE BLD-MCNC: 307 MG/DL (ref 70–99)
GLUCOSE BLD-MCNC: 393 MG/DL (ref 70–99)
GLUCOSE BLD-MCNC: 400 MG/DL (ref 70–99)
GLUCOSE SERPL-MCNC: 408 MG/DL (ref 70–99)
GLUCOSE UR STRIP.AUTO-MCNC: >=1000 MG/DL
HGB UR QL STRIP.AUTO: NEGATIVE
HYALINE CASTS #/AREA URNS AUTO: 3 /LPF (ref 0–8)
KETONES UR STRIP.AUTO-MCNC: NEGATIVE MG/DL
LEUKOCYTE ESTERASE UR QL STRIP.AUTO: ABNORMAL
MAGNESIUM SERPL-MCNC: 1.4 MG/DL (ref 1.8–2.4)
NITRITE UR QL STRIP.AUTO: NEGATIVE
PERFORMED ON: ABNORMAL
PH UR STRIP.AUTO: 5.5 [PH] (ref 5–8)
PHOSPHATE SERPL-MCNC: 3.3 MG/DL (ref 2.5–4.9)
POTASSIUM SERPL-SCNC: 4 MMOL/L (ref 3.5–5.1)
POTASSIUM UR-SCNC: 22.6 MMOL/L
PROT UR STRIP.AUTO-MCNC: 100 MG/DL
RBC CLUMPS #/AREA URNS AUTO: 0 /HPF (ref 0–4)
SODIUM SERPL-SCNC: 137 MMOL/L (ref 136–145)
SODIUM UR-SCNC: 62 MMOL/L
SP GR UR STRIP.AUTO: 1.02 (ref 1–1.03)
UA DIPSTICK W REFLEX MICRO PNL UR: YES
URN SPEC COLLECT METH UR: ABNORMAL
UROBILINOGEN UR STRIP-ACNC: 0.2 E.U./DL
WBC #/AREA URNS AUTO: 19 /HPF (ref 0–5)

## 2024-06-11 PROCEDURE — 36415 COLL VENOUS BLD VENIPUNCTURE: CPT

## 2024-06-11 PROCEDURE — 97116 GAIT TRAINING THERAPY: CPT

## 2024-06-11 PROCEDURE — 97129 THER IVNTJ 1ST 15 MIN: CPT

## 2024-06-11 PROCEDURE — 84300 ASSAY OF URINE SODIUM: CPT

## 2024-06-11 PROCEDURE — 1280000000 HC REHAB R&B

## 2024-06-11 PROCEDURE — 97530 THERAPEUTIC ACTIVITIES: CPT

## 2024-06-11 PROCEDURE — 83735 ASSAY OF MAGNESIUM: CPT

## 2024-06-11 PROCEDURE — 6370000000 HC RX 637 (ALT 250 FOR IP): Performed by: INTERNAL MEDICINE

## 2024-06-11 PROCEDURE — 80069 RENAL FUNCTION PANEL: CPT

## 2024-06-11 PROCEDURE — 82436 ASSAY OF URINE CHLORIDE: CPT

## 2024-06-11 PROCEDURE — 84133 ASSAY OF URINE POTASSIUM: CPT

## 2024-06-11 PROCEDURE — 94760 N-INVAS EAR/PLS OXIMETRY 1: CPT

## 2024-06-11 PROCEDURE — 6370000000 HC RX 637 (ALT 250 FOR IP): Performed by: STUDENT IN AN ORGANIZED HEALTH CARE EDUCATION/TRAINING PROGRAM

## 2024-06-11 PROCEDURE — 6370000000 HC RX 637 (ALT 250 FOR IP): Performed by: PHYSICAL MEDICINE & REHABILITATION

## 2024-06-11 PROCEDURE — 81001 URINALYSIS AUTO W/SCOPE: CPT

## 2024-06-11 PROCEDURE — 97130 THER IVNTJ EA ADDL 15 MIN: CPT

## 2024-06-11 PROCEDURE — 97110 THERAPEUTIC EXERCISES: CPT

## 2024-06-11 PROCEDURE — 6360000002 HC RX W HCPCS: Performed by: STUDENT IN AN ORGANIZED HEALTH CARE EDUCATION/TRAINING PROGRAM

## 2024-06-11 PROCEDURE — 97535 SELF CARE MNGMENT TRAINING: CPT

## 2024-06-11 RX ORDER — INSULIN LISPRO 100 [IU]/ML
2-3 INJECTION, SOLUTION INTRAVENOUS; SUBCUTANEOUS
Status: DISCONTINUED | OUTPATIENT
Start: 2024-06-11 | End: 2024-06-14 | Stop reason: HOSPADM

## 2024-06-11 RX ADMIN — PANTOPRAZOLE SODIUM 40 MG: 40 TABLET, DELAYED RELEASE ORAL at 14:33

## 2024-06-11 RX ADMIN — INSULIN LISPRO 3 UNITS: 100 INJECTION, SOLUTION INTRAVENOUS; SUBCUTANEOUS at 14:33

## 2024-06-11 RX ADMIN — INSULIN GLARGINE 5 UNITS: 100 INJECTION, SOLUTION SUBCUTANEOUS at 21:46

## 2024-06-11 RX ADMIN — INSULIN LISPRO 4 UNITS: 100 INJECTION, SOLUTION INTRAVENOUS; SUBCUTANEOUS at 17:15

## 2024-06-11 RX ADMIN — LOSARTAN POTASSIUM 25 MG: 25 TABLET, FILM COATED ORAL at 08:35

## 2024-06-11 RX ADMIN — ROPINIROLE HYDROCHLORIDE 2 MG: 1 TABLET, FILM COATED ORAL at 21:46

## 2024-06-11 RX ADMIN — INSULIN LISPRO 3 UNITS: 100 INJECTION, SOLUTION INTRAVENOUS; SUBCUTANEOUS at 17:16

## 2024-06-11 RX ADMIN — METHOCARBAMOL TABLETS 750 MG: 750 TABLET, COATED ORAL at 04:28

## 2024-06-11 RX ADMIN — INSULIN LISPRO 4 UNITS: 100 INJECTION, SOLUTION INTRAVENOUS; SUBCUTANEOUS at 08:36

## 2024-06-11 RX ADMIN — PANTOPRAZOLE SODIUM 40 MG: 40 TABLET, DELAYED RELEASE ORAL at 06:12

## 2024-06-11 RX ADMIN — OXYCODONE 2.5 MG: 5 TABLET ORAL at 06:12

## 2024-06-11 RX ADMIN — HEPARIN SODIUM 5000 UNITS: 5000 INJECTION INTRAVENOUS; SUBCUTANEOUS at 06:12

## 2024-06-11 RX ADMIN — FERROUS SULFATE TAB EC 324 MG (65 MG FE EQUIVALENT) 324 MG: 324 (65 FE) TABLET DELAYED RESPONSE at 08:34

## 2024-06-11 RX ADMIN — OXYCODONE 2.5 MG: 5 TABLET ORAL at 21:46

## 2024-06-11 RX ADMIN — HEPARIN SODIUM 5000 UNITS: 5000 INJECTION INTRAVENOUS; SUBCUTANEOUS at 21:47

## 2024-06-11 RX ADMIN — INSULIN LISPRO 2 UNITS: 100 INJECTION, SOLUTION INTRAVENOUS; SUBCUTANEOUS at 08:34

## 2024-06-11 RX ADMIN — LEVETIRACETAM 500 MG: 500 TABLET, FILM COATED ORAL at 21:46

## 2024-06-11 RX ADMIN — OXYCODONE 2.5 MG: 5 TABLET ORAL at 11:30

## 2024-06-11 RX ADMIN — HYDROCORTISONE 10 MG: 10 TABLET ORAL at 08:35

## 2024-06-11 RX ADMIN — HEPARIN SODIUM 5000 UNITS: 5000 INJECTION INTRAVENOUS; SUBCUTANEOUS at 14:33

## 2024-06-11 RX ADMIN — ESCITALOPRAM OXALATE 10 MG: 10 TABLET ORAL at 08:35

## 2024-06-11 RX ADMIN — METHOCARBAMOL TABLETS 750 MG: 750 TABLET, COATED ORAL at 21:46

## 2024-06-11 RX ADMIN — METHOCARBAMOL TABLETS 750 MG: 750 TABLET, COATED ORAL at 14:33

## 2024-06-11 RX ADMIN — HYDROCORTISONE 5 MG: 5 TABLET ORAL at 21:46

## 2024-06-11 RX ADMIN — LEVETIRACETAM 500 MG: 500 TABLET, FILM COATED ORAL at 08:35

## 2024-06-11 RX ADMIN — NIFEDIPINE 30 MG: 30 TABLET, EXTENDED RELEASE ORAL at 08:35

## 2024-06-11 RX ADMIN — METHOCARBAMOL TABLETS 750 MG: 750 TABLET, COATED ORAL at 10:03

## 2024-06-11 RX ADMIN — Medication 2 CAPSULE: at 17:15

## 2024-06-11 RX ADMIN — Medication 2 CAPSULE: at 08:35

## 2024-06-11 ASSESSMENT — PAIN DESCRIPTION - ORIENTATION
ORIENTATION: RIGHT

## 2024-06-11 ASSESSMENT — PAIN DESCRIPTION - ONSET
ONSET: ON-GOING

## 2024-06-11 ASSESSMENT — PAIN DESCRIPTION - DESCRIPTORS
DESCRIPTORS: THROBBING
DESCRIPTORS: THROBBING
DESCRIPTORS: ACHING;DISCOMFORT
DESCRIPTORS: ACHING;DISCOMFORT;THROBBING

## 2024-06-11 ASSESSMENT — PAIN SCALES - GENERAL
PAINLEVEL_OUTOF10: 6
PAINLEVEL_OUTOF10: 9
PAINLEVEL_OUTOF10: 7
PAINLEVEL_OUTOF10: 0
PAINLEVEL_OUTOF10: 7
PAINLEVEL_OUTOF10: 2

## 2024-06-11 ASSESSMENT — PAIN DESCRIPTION - LOCATION
LOCATION: HIP;LEG
LOCATION: LEG

## 2024-06-11 ASSESSMENT — PAIN DESCRIPTION - FREQUENCY
FREQUENCY: CONTINUOUS
FREQUENCY: CONTINUOUS
FREQUENCY: INTERMITTENT

## 2024-06-11 ASSESSMENT — PAIN DESCRIPTION - PAIN TYPE
TYPE: ACUTE PAIN;SURGICAL PAIN

## 2024-06-11 NOTE — CARE COORDINATION
Team Conference held today.  Team is recommending DC to home on Friday, 6- to home with RN/PT/OT services.  Team desires to confirm she has a wh walker at home but will need on if she does not currently have one.  Family is asking for handicapped placard.    DC to home on Friday 6-.  JEOVANNY Caraballo     Case Management   [phone]    6/11/2024  11:34 AM

## 2024-06-11 NOTE — PROGRESS NOTES
Patient admitted to rehab with closed right hip fracture.  A/Ox4. Transfers with one assist with gait belt and use of stedy. Mobility restrictions: WBAT. On regular RAÚL low potassium diet, tolerating well. Medications taken whole with fluids. On Heparin for DVT prophylaxis.  Skin: scattered bruising and abrasions.  Right hip incisions with steri strips intact. Oxygen: room air. LDA: None. Has been continent of bowel and  of bladder. LBM 6/10. Chair/bed alarms in use and call light in reach. Care on going.

## 2024-06-11 NOTE — PROGRESS NOTES
Patient admitted to rehab s/p ORIF to right femur fracture on 5/19 by Dr. Pandey. A/Ox4. Patient has been pleasant and cooperative with care. Transfers with front wheeled walker, gait-belt, and CGA x 1; patient tolerates fairly well. Patient moving surgical leg out of bed herself, but ask for partial assist to get lower ext. Back in bed. Mobility restrictions: WBAT. On 5 carb, RAÚL, low K diet. Medications taken whole with thin liquids without swallowing difficulty. Patient does need assist with pills to mouth or to hand due to vision difficulty (blurred vision persists).On SQ Heparin for DVT prophylaxis.  Skin: Intact with surgical incision healed, steri-strips intact, site is well approximated and without s/s of infection. Patient also noted with scattered a bruising from fall at home. Oxygen: RA. LDA: Port to right chest is currently de-accessed. Has been continent of bowel and bladder. LBM 6/10. Chair/bed alarms in use and call light in reach. Medicated this evening due to complaints of a headache. Patient rates pain 9 on 1-10 pain scale. Upon checking back on patient for pain relief, she was sleeping and in no apparent distress. Fall precautions in place. Will continue to monitor and assist as needed.

## 2024-06-11 NOTE — PLAN OF CARE
Problem: Safety - Adult  Goal: Free from fall injury  6/11/2024 1156 by Rocco Santillan, RN  Outcome: Progressing  Flowsheets (Taken 6/11/2024 1012)  Free From Fall Injury: Instruct family/caregiver on patient safety     Problem: Pain  Goal: Verbalizes/displays adequate comfort level or baseline comfort level  6/11/2024 1156 by Rocco Santillan, RN  Outcome: Progressing  Flowsheets (Taken 6/9/2024 1206 by Tonya Norton RN)  Verbalizes/displays adequate comfort level or baseline comfort level:   Encourage patient to monitor pain and request assistance   Assess pain using appropriate pain scale   Administer analgesics based on type and severity of pain and evaluate response   Implement non-pharmacological measures as appropriate and evaluate response

## 2024-06-11 NOTE — PROGRESS NOTES
performed 20 reps bilateral LE ther ex while supine on bed: APs, GS, hip add sets, and SAQ with min assist for right LE.    Supine>sit with supervision with difficulty brining right LE over the edge of the bed and used bilateral UE to assist the leg.    Sit>stand with supervision.   Ambulated 35' with wheeled walker with supervision.    Ascended/descended 4 steps with CGA using non-reciprocal pattern. She had increased pain, but able to complete without physical assistance.    Patient tolerated session well despite pain increased with stairs.  Plan is now to discharge home on Friday, June 14th, with home PT. Patient left seated in wheelchair with care transitioned to Jocelyn CAMARA        Therapy Time   Individual Concurrent Group Co-treatment   Time In 0945         Time Out 1030         Minutes 45              Second Session Therapy Time     Individual Co-treatment   Time In 1315     Time Out 1400     Minutes 45               TERI Johnston CNS [phone], 06/11/24 at 10:37 AM

## 2024-06-11 NOTE — PATIENT CARE CONFERENCE
[facility]  Inpatient Rehabilitation  Weekly Team Conference Note      Date: 2024  Patient Name:  Elvia Arguelles    MRN: [id number]  : 1960  Gender: Female  Physician: Dr. Juan CANADA  Diagnosis: Closed right hip fracture, initial encounter ([facility]) [S72.001A]    CASE MANAGEMENT  Assessment: Goal is home, agreeable to home care services.       PHYSICAL THERAPY    Bed Mobility:  Overall Assistance Level: Stand By Assist  Additional Factors: Head of bed flat, Without handrails (therapy mat)  Sit>supine:  Assistance Level: Stand by assist  Skilled Clinical Factors: with use of leg .  she needed set up assist for the leg  to be placed on right foot. She was using the light grey leg  and may benefit from the darker blue due to visual disturbances and needing more contrast at this time.  Supine>sit:  Assistance Level: Stand by assist    Transfers:  Surface: Wheelchair  Additional Factors: Set-up, Increased time to complete  Device: Walker  Sit>stand:  Assistance Level: Supervision  Skilled Clinical Factors: recalled hand placement  Stand>sit:  Assistance Level: Supervision  Skilled Clinical Factors: recalled hand placement  Bed<>chair  Technique: Stand step  Assistance Level: Supervision  Skilled Clinical Factors: with wheeled walker with increasd time to complete  Stand Pivot:     Lateral transfer:     Car transfer:  Assistance Level: Minimal assistance  Skilled Clinical Factors: Patient was CGA to approach and sit back into mock car with use of wheeled walker. Verbal cues to ensure she was in line with the seat. min assist to lift right LE in and out of the car.  She required min assist to stand from the car seat.    Ambulation:  Surface: Level surface  Device: Rolling walker  Distance: 155' with two turs  Activity: Within Unit  Activity Comments: antalgic, step to pattern. Straight arms to unweight LE, takes large step at times with right LE and needs cues to ensure  endurance  Discharge Recommendations: Patient would benefit from continued therapy after discharge, 24 hour supervision or assist, S Level 1, Home with Home health OT  Factors Affecting Discharge: Rec 24 hr supervision/assist but spouse is retired/available     NUTRITION  Most recent weightWeight - Scale: 56.9 kg (125 lb 7.1 oz)  BSA (Calculated - sq m): 1.58 sq meters  BMI (Calculated): 25.4  Diet Order: ADULT DIET; Regular; 5 carb choices (75 gm/meal); No Added Salt (3-4 gm); Low Potassium (Less than 3000 mg/day)  PO Meals Eaten (%): 76 - 100%  Malnutrition Status: No malnutrition  Nutrition Education/Counseling: Counseling completed (Using Nutrition Labels: Carbohydrate (2024).)    NURSING  Pt is continent of bowel and bladder, last bm 6/10, fall risk, pain, monitor blood sugar, diet, maintain skin integrity, surgical incision right hip.  Family Education: completed w/ , medications, diet, diabetic needs, -insulin pump, CHF, skin care and prevention, safety and fall prevention.    MEDICAL      TEAM SUMMARY AND DISCHARGE PLAN  Estimated Length of Stay:6/14/2024  Destination: home health  Anticipated Services at Discharge:    [x] OT  [x] PT   [] SLP    [x] RN   [] Home Health aide []   Community Resources: _______________________________  Equipment recommendations:  [] Hospital bed [] Tub bench  [] Shower chair [] Hand held shower  [] Raised toilet seat [] Toilet safety frame [] Bedside commode   [] W/C: _____  [] Rolling Walker [] Standard walker [] Gait belt [] cane: _________  [] Sliding board [] Alternate seating/furniture [] O2 [] Hip Kit: _______  [] Life Line [] Other: _______  Factors facilitating achievement of predicted outcomes: Family support  Barriers to the achievement of predicted outcomes/Interventions: pain, blood sugar        Interdisciplinary Individualized Plan of Care Review:    Continue Current Plan of Care: Yes    Modifications:_____________________________    Special Needs in

## 2024-06-11 NOTE — PROGRESS NOTES
[facility] Inpatient Nephrology Note        CC:FAVIAN on CKD  HPI: BP noted.   Labs reviewed .   Soc Hx:  no Visitor present  ROS: no SOB. /ODE  No urine symptoms       Medications     insulin glargine  5 Units SubCUTAneous Nightly    insulin lispro  2 Units SubCUTAneous TID WC    fentaNYL  1 patch TransDERmal Q72H    losartan  25 mg Oral Daily    levETIRAcetam  500 mg Oral BID    ferrous sulfate  324 mg Oral Daily with breakfast    insulin lispro  0-4 Units SubCUTAneous TID WC    insulin lispro  0-4 Units SubCUTAneous Nightly    escitalopram  10 mg Oral Daily    hydrocortisone  10 mg Oral Daily    pantoprazole  40 mg Oral BID AC    heparin (porcine)  5,000 Units SubCUTAneous 3 times per day    hydrocortisone  5 mg Oral Nightly    NIFEdipine  30 mg Oral Daily    lactobacillus  2 capsule Oral BID WC    rOPINIRole  2 mg Oral Nightly         OBJECTIVE      Physical    TEMPERATURE:  Current - Temp: 98.8 °F (37.1 °C); Max - Temp  Av.4 °F (36.9 °C)  Min: 98 °F (36.7 °C)  Max: 98.8 °F (37.1 °C)  RESPIRATIONS RANGE: Resp  Av  Min: 16  Max: 18  PULSE RANGE: Pulse  Av.5  Min: 71  Max: 72  BLOOD PRESSURE RANGE:  Systolic (24hrs), Av , Min:153 , Max:155   ; Diastolic (24hrs), Av, Min:63, Max:74    PULSE OXIMETRY RANGE: SpO2  Av %  Min: 97 %  Max: 97 %  24HR INTAKE/OUTPUT:    Intake/Output Summary (Last 24 hours) at 2024 1003  Last data filed at 2024 0936  Gross per 24 hour   Intake 840 ml   Output --   Net 840 ml         GEN: no distress  HEENT: normocephalic, atraumatic  CV: RRR  LUNGS: clear bilaterally, unlabored  ABD: + bowel sounds. No distension. No tenderness  EXT: Trace edema RLE, no edema LLE  SKIN: no rash  NEURO: no tremor    Medications   insulin glargine  5 Units SubCUTAneous Nightly    insulin lispro  2 Units SubCUTAneous TID WC    fentaNYL  1 patch TransDERmal Q72H    losartan  25 mg Oral Daily    levETIRAcetam  500 mg Oral BID

## 2024-06-11 NOTE — PROGRESS NOTES
[facility]  Glycemic Control      NAME: Elvia Arguelles  MEDICAL RECORD NUMBER:  [id number]  AGE: 63 y.o.   GENDER: female  : 1960  EPISODE DATE:  2024     Data     Recent Labs     06/10/24  0713 06/10/24  1138 06/10/24  1632 06/10/24  2008 06/11/24  0653 24  1108   POCGLU 221* 162* 187* 319* 393* 307*       HgBA1c:    Lab Results   Component Value Date/Time    LABA1C 7.2 2024 10:17 PM       BUN/Creatinine:    Lab Results   Component Value Date/Time    BUN 23 2024 06:07 AM    CREATININE 1.7 2024 06:07 AM       Medications  Scheduled Medications:   insulin glargine  5 Units SubCUTAneous Nightly    insulin lispro  2 Units SubCUTAneous TID WC    fentaNYL  1 patch TransDERmal Q72H    levETIRAcetam  500 mg Oral BID    ferrous sulfate  324 mg Oral Daily with breakfast    insulin lispro  0-4 Units SubCUTAneous TID WC    insulin lispro  0-4 Units SubCUTAneous Nightly    escitalopram  10 mg Oral Daily    hydrocortisone  10 mg Oral Daily    pantoprazole  40 mg Oral BID AC    heparin (porcine)  5,000 Units SubCUTAneous 3 times per day    hydrocortisone  5 mg Oral Nightly    NIFEdipine  30 mg Oral Daily    lactobacillus  2 capsule Oral BID WC    rOPINIRole  2 mg Oral Nightly       Diet  Current diet/supplement order: ADULT DIET; Regular; 5 carb choices (75 gm/meal); No Added Salt (3-4 gm); Low Potassium (Less than 3000 mg/day)     Recorded PO: PO Meals Eaten (%): 76 - 100% last meal in flowsheets      Action      Met with pt and pt's spouse, Shant. Pt has papers that were printed for him to review. He has not had time to review them, due to just seeing them today. Discussed pt's inability to safely manage her Omnipod due to her vision issues/concerns \"blurred vision, unable to see words\" per pt.     Pt stated her glucoses have been elevated due to not getting enough insulin.     - Pt and pt's spouse aware appropriate training would be recommended by the Omnipod  at

## 2024-06-11 NOTE — PROGRESS NOTES
Comprehensive Nutrition Assessment    Type and Reason for Visit:  Reassess    Nutrition Recommendations/Plan:   Continue current diet.     Malnutrition Assessment:  Malnutrition Status:  No malnutrition (06/10/24 1008)    Context:  Acute Illness       Nutrition Assessment:    FU - Pt. continues on a 5 carb/ RAÚL/ low K+ diet. Meal intakes appear adequate. No need for supplements at this time. Pt. reports not receiving formal  diet education in the past, denies need for education now. Reviewed handout, Using Nutrition Labels: Carbohydrate. Provided Pt. with copy including contact info. No new recommendations at this time. Will continue to monitor nutritional adequacy and diet acceptance.    Nutrition Related Findings:    Cr. 1.5, GFR 38, -382. LBM 6/9. Skin w/ area to Rt. hip. Wound Type: Surgical Incision       Current Nutrition Intake & Therapies:    Average Meal Intake: 51-75%, %  Average Supplements Intake: None Ordered  ADULT DIET; Regular; 5 carb choices (75 gm/meal); No Added Salt (3-4 gm); Low Potassium (Less than 3000 mg/day)    Anthropometric Measures:  Height: 149.9 cm (4' 11.02\")  Ideal Body Weight (IBW): 95 lbs (43 kg)       Current Body Weight: 57.4 kg (126 lb 8.7 oz), 133.2 % IBW.    Current BMI (kg/m2): 25.5                          BMI Categories: Overweight (BMI 25.0-29.9)    Estimated Daily Nutrient Needs:  Energy Requirements Based On: Kcal/kg  Weight Used for Energy Requirements: Usual  Energy (kcal/day): 1168-1460kcal (20-25 kcal/kg)  Weight Used for Protein Requirements: Usual  Protein (g/day): 58-76 g (1-1.3g/kg)  Method Used for Fluid Requirements: 1 ml/kcal  Fluid (ml/day): 300-940    Nutrition Diagnosis:   Increased nutrient needs related to increase demand for energy/nutrients as evidenced by other (comment) (IP rehab)    Nutrition Interventions:   Food and/or Nutrient Delivery: Continue Current Diet  Nutrition Education/Counseling: Counseling completed (Using Nutrition Labels:

## 2024-06-11 NOTE — PROGRESS NOTES
Department of Physical Medicine & Rehabilitation  Progress Note    Patient Identification:  Elvia Arguelles  [id number]  : 1960  Admit date: 2024    Chief Complaint: Closed right hip fracture, initial encounter ([facility])    Subjective:   No acute events overnight.   Blood sugar very elevated last night and this morning after eating dinner from outside hospital last night.   Patient seen this afternoon working with PT in the gym. Continues to have pain with ambulation/weight bearing but overall controlled. We discussed moving her discharge date up based on her functional progress. She is in agreement with this.    Labs reviewed.     ROS: No f/c, n/v, cp     Objective:  Patient Vitals for the past 24 hrs:   BP Temp Temp src Pulse Resp SpO2 Weight   24 1200 -- -- -- -- 18 -- --   24 0831 (!) 153/63 98.8 °F (37.1 °C) Oral 72 18 97 % --   24 0612 -- -- -- -- 16 -- --   24 0601 -- -- -- -- -- -- 56.9 kg (125 lb 7.1 oz)   24 0458 -- -- -- -- 16 -- --   06/10/24 2041 -- -- -- -- 18 -- --   06/10/24 2011 -- -- -- -- 18 -- --   06/10/24 194 (!) 155/74 98 °F (36.7 °C) Oral 71 18 97 % --   06/10/24 1648 -- -- -- -- 16 -- --     Const: Alert. No distress, pleasant.   HEENT: Normocephalic, atraumatic. Normal sclera/conjunctiva. MMM.   CV: Regular rate and rhythm.   Resp: No respiratory distress. Lungs diminished at bases  Abd: Soft, nontender, nondistended, NABS+   Ext:+ post op swelling RLE  MSK: Decreased right hip ROM  Neuro: Alert, oriented. Vision impairment. No focal strength deficits aside from pain limited RLE  Psych: Cooperative, appropriate mood and affect    Laboratory data: Available via EMR.   Last 24 hour lab  Recent Results (from the past 24 hour(s))   POCT Glucose    Collection Time: 06/10/24  4:32 PM   Result Value Ref Range    POC Glucose 187 (H) 70 - 99 mg/dl    Performed on ACCU-CHEK    POCT Glucose    Collection Time: 06/10/24  8:08 PM   Result Value Ref Range    POC

## 2024-06-11 NOTE — PROGRESS NOTES
Germaine Carmona, Pump  returned call to state she can meet with pt's spouse at [facility]. Attempted to reach pt's  Shant, no answer.     With permission, Germaine Carmona's [phone] ([employer], [facility] [place]) number left at bedside for communication to schedule insulin pump training.       Dora CELISN, RN Diabetes Educator   [phone]

## 2024-06-11 NOTE — PLAN OF CARE
Problem: Safety - Adult  Goal: Free from fall injury  Note: Patient free from falls this shift. Fall precautions in place at all times. Bed in lowest position with two side rails up and wheels locked. Call light within reach. Patient able and agreeable to contact for safety appropriately. Patient up to bathroom with front wheeled walker, gait-belt, and partial assist of one to get lower ext back in bed. Patient tolerated fairly well.       Problem: Discharge Planning  Goal: Discharge to home or other facility with appropriate resources  Outcome: Progressing  Discharge to home or other facility with appropriate resources:   Identify barriers to discharge with patient and caregiver   Arrange for needed discharge resources and transportation as appropriate   Refer to discharge planning if patient needs post-hospital services based on physician order or complex needs related to functional status, cognitive ability or social support system   Identify discharge learning needs (meds, wound care, etc)     Problem: Pain  Goal: Verbalizes/displays adequate comfort level or baseline comfort level  Note: Pt able to express presence/absence of pain and rate pain appropriately using numerical scale. Pain/discomfort being managed with PRN analgesics per MD orders (see MAR). Pain assessed every shift and after interventions. Patient rating pain at it worst this evening 8 to 9 on 1-10 pain scale, describes as throbbing. Medicated per request and prn Oxycodone 2.5 mg q4 hours, with good relief. Patient able to sleep comfortably.       Problem: Skin/Tissue Integrity  Goal: Absence of new skin breakdown  Description: 1.  Monitor for areas of redness and/or skin breakdown  2.  Assess vascular access sites hourly  3.  Every 4-6 hours minimum:  Change oxygen saturation probe site  4.  Every 4-6 hours:  If on nasal continuous positive airway pressure, respiratory therapy assess nares and determine need for appliance change or resting

## 2024-06-11 NOTE — PROGRESS NOTES
Occupational Therapy  Facility/Department: [address] REHAB  Rehabilitation Occupational Therapy Daily Treatment Note  AM and PM Sessions:     Date: 24  Patient Name: Elvia Arguelles       Room: X9U-1310/3272-01  MRN: [id number]  Account: [id number]   : 1960  (63 y.o.) Gender: female                    Past Medical History:  has a past medical history of Adrenal insufficiency (HCC), Cancer (HCC), Closed displaced intertrochanteric fracture of right femur (HCC), Depression, Diabetes mellitus (HCC), Hx of radiation therapy, Hypertension, Hyponatremia, Insulin pump in place, Melanoma (HCC), Prolonged emergence from general anesthesia, and Restless leg syndrome.  Past Surgical History:   has a past surgical history that includes Hysterectomy; Cholecystectomy; shoulder surgery; Upper gastrointestinal endoscopy (10/22/2014); Carpal tunnel release (Bilateral, 2017); lipoma resection; Eye surgery (Right); Shoulder arthroscopy (Right, 2018); liver biopsy (Right); US BIOPSY LIVER PERCUTANEOUS (2022); CT BIOPSY ABDOMEN RETROPERITONEUM (2022); Upper gastrointestinal endoscopy (N/A, 2023); Upper gastrointestinal endoscopy (2023); Port Surgery (Right, 2023); Upper gastrointestinal endoscopy (N/A, 2024); and hip surgery (Right, 2024).    Restrictions  Restrictions/Precautions: Weight Bearing, Fall Risk  Other position/activity restrictions: R femur fx s/p Right femur cephalomedullary nail; Diet: 5 carb choices (75 gm/meal); No Added Salt (3-4 gm); Low Potassium (Less than 3000 mg/day), Port  Right Lower Extremity Weight Bearing: Weight Bearing As Tolerated  Equipment Used: Wheelchair    Subjective  Subjective: Patient in room, in bed, agreeable to shower ADL session. Rated her R hip pain at 6/10.  Restrictions/Precautions: Weight Bearing;Fall Risk             Objective     Cognition  Overall Cognitive Status: WFL (pt demo some difficulty with motor planning related  Goals  Time Frame for Long Term Goals : 2 weeks   NO LTGs met @ D/C d/t low vision, significant visual impairement thought to be caused by \"PRES\", & R hip pain  Long Term Goal 1: Pt will complete functional mobility/txs using LRAD mod I  Long Term Goal 2: Pt will complete toileting mod I  Long Term Goal 3: Pt will complete bathing using AE, as needed, mod I  Long Term Goal 4: Pt will complete dressing using AE, as needed, mod I  Long Term Goal 5: Pt will complete simple IADL task (light meal prep, pet care, etc.) mod I                   Therapy Time   Individual Concurrent Group Co-treatment   Time In 0715         Time Out 0815         Minutes 60         Timed Code Treatment Minutes: 60 Minutes  Therapy Time     Individual Co-treatment   Time In 1400     Time Out 1430     Minutes 30              Jocelyn Velasco OT  Electronically signed by Jocelyn Velasco OTR/L  License # [id number]

## 2024-06-11 NOTE — PROGRESS NOTES
ACUTE REHAB UNIT  SPEECH/LANGUAGE PATHOLOGY      [x] Daily  [x] Weekly Care Conference Note  [] Discharge    Patient:Elvia Arguelles      :1960  MRN:[id number]  Rehab Dx/Hx: Closed right hip fracture, initial encounter ([facility]) [S72.001A]    Precautions: falls, seizures, and visual spatial deficits  Home situation: from home with spouse  ST Dx: [] Aphasia  [] Dysarthria  [] Apraxia   [] Oropharyngeal dysphagia [x] Cognitive   Impairment  [] Other:   Initial Speech Therapy Assessment Diagnosis:   1. Cognitive Diagnosis: Pt demonstrated minimal to moderate deficits in domains of time orientation; higher level attention; complex VPS and abstract reasoning; working memory and STM. Pt with visual deficits and right lower extremity pain which is further exacerbating. Visual deficits during paper tasks revealed reduced attention left; slight sloping upward left to right when writing; spatial orientation when putting numbers on a clock. Will continue therapy working on visuo cognition / visuo spatial orientation and visual language remediation  2. Speech Diagnosis: Motor speech audible and intelligible at multiple word utterance level and during discourse. Pt achieve good verbal diadochokinetic rating. Voice quality with minimal claudio /strained quality.  3. Communication Diagnosis: Pt demonstrating concrete to mild complex functional auditory language processing and verbal expressive language skills. Visual language processing at word / phrase level with mild deficits in inattention left of midline (did utilized large print with high color contrast). Pt able to express full name via written expression but minimal perseveration of letters ans slight sloping upward left to right when writing. Ongoing therapy     Date of Admit: 2024  Room #: F5N-4873/3272-01  Date: 2024       Current functional status (updated daily):         Current Diet Order:ADULT DIET; Regular; 5 carb choices (75 gm/meal); No Added Salt

## 2024-06-12 LAB
ALBUMIN SERPL-MCNC: 3.2 G/DL (ref 3.4–5)
ANION GAP SERPL CALCULATED.3IONS-SCNC: 10 MMOL/L (ref 3–16)
BASOPHILS # BLD: 0 K/UL (ref 0–0.2)
BASOPHILS NFR BLD: 0.9 %
BUN SERPL-MCNC: 23 MG/DL (ref 7–20)
CALCIUM SERPL-MCNC: 9.2 MG/DL (ref 8.3–10.6)
CHLORIDE SERPL-SCNC: 102 MMOL/L (ref 99–110)
CO2 SERPL-SCNC: 25 MMOL/L (ref 21–32)
CREAT SERPL-MCNC: 1.7 MG/DL (ref 0.6–1.2)
DEPRECATED RDW RBC AUTO: 17.2 % (ref 12.4–15.4)
EOSINOPHIL # BLD: 0.3 K/UL (ref 0–0.6)
EOSINOPHIL NFR BLD: 6.1 %
GFR SERPLBLD CREATININE-BSD FMLA CKD-EPI: 33 ML/MIN/{1.73_M2}
GLUCOSE BLD-MCNC: 163 MG/DL (ref 70–99)
GLUCOSE BLD-MCNC: 265 MG/DL (ref 70–99)
GLUCOSE BLD-MCNC: 270 MG/DL (ref 70–99)
GLUCOSE BLD-MCNC: 289 MG/DL (ref 70–99)
GLUCOSE SERPL-MCNC: 264 MG/DL (ref 70–99)
HCT VFR BLD AUTO: 23.5 % (ref 36–48)
HGB BLD-MCNC: 8.3 G/DL (ref 12–16)
LYMPHOCYTES # BLD: 1.4 K/UL (ref 1–5.1)
LYMPHOCYTES NFR BLD: 27.1 %
MCH RBC QN AUTO: 30.8 PG (ref 26–34)
MCHC RBC AUTO-ENTMCNC: 35.1 G/DL (ref 31–36)
MCV RBC AUTO: 87.6 FL (ref 80–100)
MONOCYTES # BLD: 0.6 K/UL (ref 0–1.3)
MONOCYTES NFR BLD: 11 %
NEUTROPHILS # BLD: 2.8 K/UL (ref 1.7–7.7)
NEUTROPHILS NFR BLD: 54.9 %
PERFORMED ON: ABNORMAL
PHOSPHATE SERPL-MCNC: 3.1 MG/DL (ref 2.5–4.9)
PLATELET # BLD AUTO: 186 K/UL (ref 135–450)
PMV BLD AUTO: 8.2 FL (ref 5–10.5)
POTASSIUM SERPL-SCNC: 3.9 MMOL/L (ref 3.5–5.1)
RBC # BLD AUTO: 2.68 M/UL (ref 4–5.2)
SODIUM SERPL-SCNC: 137 MMOL/L (ref 136–145)
WBC # BLD AUTO: 5 K/UL (ref 4–11)

## 2024-06-12 PROCEDURE — 97130 THER IVNTJ EA ADDL 15 MIN: CPT

## 2024-06-12 PROCEDURE — 97116 GAIT TRAINING THERAPY: CPT

## 2024-06-12 PROCEDURE — 6370000000 HC RX 637 (ALT 250 FOR IP): Performed by: INTERNAL MEDICINE

## 2024-06-12 PROCEDURE — 80069 RENAL FUNCTION PANEL: CPT

## 2024-06-12 PROCEDURE — 94760 N-INVAS EAR/PLS OXIMETRY 1: CPT

## 2024-06-12 PROCEDURE — 97530 THERAPEUTIC ACTIVITIES: CPT

## 2024-06-12 PROCEDURE — 97535 SELF CARE MNGMENT TRAINING: CPT

## 2024-06-12 PROCEDURE — 6370000000 HC RX 637 (ALT 250 FOR IP): Performed by: PHYSICAL MEDICINE & REHABILITATION

## 2024-06-12 PROCEDURE — 1280000000 HC REHAB R&B

## 2024-06-12 PROCEDURE — 85025 COMPLETE CBC W/AUTO DIFF WBC: CPT

## 2024-06-12 PROCEDURE — 97129 THER IVNTJ 1ST 15 MIN: CPT

## 2024-06-12 PROCEDURE — 6360000002 HC RX W HCPCS: Performed by: STUDENT IN AN ORGANIZED HEALTH CARE EDUCATION/TRAINING PROGRAM

## 2024-06-12 PROCEDURE — 36415 COLL VENOUS BLD VENIPUNCTURE: CPT

## 2024-06-12 PROCEDURE — 97112 NEUROMUSCULAR REEDUCATION: CPT

## 2024-06-12 PROCEDURE — 97110 THERAPEUTIC EXERCISES: CPT

## 2024-06-12 PROCEDURE — 6370000000 HC RX 637 (ALT 250 FOR IP): Performed by: NURSE PRACTITIONER

## 2024-06-12 PROCEDURE — 6370000000 HC RX 637 (ALT 250 FOR IP): Performed by: STUDENT IN AN ORGANIZED HEALTH CARE EDUCATION/TRAINING PROGRAM

## 2024-06-12 RX ADMIN — HYDROCORTISONE 5 MG: 5 TABLET ORAL at 20:36

## 2024-06-12 RX ADMIN — OXYCODONE 2.5 MG: 5 TABLET ORAL at 07:56

## 2024-06-12 RX ADMIN — PANTOPRAZOLE SODIUM 40 MG: 40 TABLET, DELAYED RELEASE ORAL at 16:25

## 2024-06-12 RX ADMIN — INSULIN GLARGINE 5 UNITS: 100 INJECTION, SOLUTION SUBCUTANEOUS at 20:46

## 2024-06-12 RX ADMIN — LOPERAMIDE HYDROCHLORIDE 2 MG: 2 CAPSULE ORAL at 13:08

## 2024-06-12 RX ADMIN — LEVETIRACETAM 500 MG: 500 TABLET, FILM COATED ORAL at 20:36

## 2024-06-12 RX ADMIN — FERROUS SULFATE TAB EC 324 MG (65 MG FE EQUIVALENT) 324 MG: 324 (65 FE) TABLET DELAYED RESPONSE at 07:56

## 2024-06-12 RX ADMIN — METHOCARBAMOL TABLETS 750 MG: 750 TABLET, COATED ORAL at 07:56

## 2024-06-12 RX ADMIN — ONDANSETRON 4 MG: 4 TABLET, ORALLY DISINTEGRATING ORAL at 00:44

## 2024-06-12 RX ADMIN — METHOCARBAMOL TABLETS 750 MG: 750 TABLET, COATED ORAL at 22:57

## 2024-06-12 RX ADMIN — ESCITALOPRAM OXALATE 10 MG: 10 TABLET ORAL at 07:56

## 2024-06-12 RX ADMIN — INSULIN LISPRO 2 UNITS: 100 INJECTION, SOLUTION INTRAVENOUS; SUBCUTANEOUS at 12:39

## 2024-06-12 RX ADMIN — HEPARIN SODIUM 5000 UNITS: 5000 INJECTION INTRAVENOUS; SUBCUTANEOUS at 05:35

## 2024-06-12 RX ADMIN — ACETAMINOPHEN 325MG 650 MG: 325 TABLET ORAL at 05:38

## 2024-06-12 RX ADMIN — ROPINIROLE HYDROCHLORIDE 2 MG: 1 TABLET, FILM COATED ORAL at 20:36

## 2024-06-12 RX ADMIN — INSULIN LISPRO 2 UNITS: 100 INJECTION, SOLUTION INTRAVENOUS; SUBCUTANEOUS at 07:58

## 2024-06-12 RX ADMIN — PANTOPRAZOLE SODIUM 40 MG: 40 TABLET, DELAYED RELEASE ORAL at 05:35

## 2024-06-12 RX ADMIN — INSULIN LISPRO 2 UNITS: 100 INJECTION, SOLUTION INTRAVENOUS; SUBCUTANEOUS at 17:39

## 2024-06-12 RX ADMIN — INSULIN LISPRO 2 UNITS: 100 INJECTION, SOLUTION INTRAVENOUS; SUBCUTANEOUS at 07:56

## 2024-06-12 RX ADMIN — OXYCODONE 2.5 MG: 5 TABLET ORAL at 12:46

## 2024-06-12 RX ADMIN — METHOCARBAMOL TABLETS 750 MG: 750 TABLET, COATED ORAL at 05:35

## 2024-06-12 RX ADMIN — Medication 2 CAPSULE: at 07:55

## 2024-06-12 RX ADMIN — LEVETIRACETAM 500 MG: 500 TABLET, FILM COATED ORAL at 07:55

## 2024-06-12 RX ADMIN — HEPARIN SODIUM 5000 UNITS: 5000 INJECTION INTRAVENOUS; SUBCUTANEOUS at 13:11

## 2024-06-12 RX ADMIN — NIFEDIPINE 30 MG: 30 TABLET, EXTENDED RELEASE ORAL at 07:55

## 2024-06-12 RX ADMIN — HEPARIN SODIUM 5000 UNITS: 5000 INJECTION INTRAVENOUS; SUBCUTANEOUS at 20:36

## 2024-06-12 RX ADMIN — HYDROCORTISONE 10 MG: 10 TABLET ORAL at 09:24

## 2024-06-12 RX ADMIN — Medication 2 CAPSULE: at 16:25

## 2024-06-12 RX ADMIN — INSULIN LISPRO 2 UNITS: 100 INJECTION, SOLUTION INTRAVENOUS; SUBCUTANEOUS at 17:38

## 2024-06-12 ASSESSMENT — PAIN DESCRIPTION - DESCRIPTORS
DESCRIPTORS: ACHING;DISCOMFORT;THROBBING
DESCRIPTORS: THROBBING;DISCOMFORT
DESCRIPTORS: ACHING;DISCOMFORT
DESCRIPTORS: THROBBING

## 2024-06-12 ASSESSMENT — PAIN DESCRIPTION - ONSET
ONSET: ON-GOING

## 2024-06-12 ASSESSMENT — PAIN DESCRIPTION - LOCATION
LOCATION: HIP
LOCATION: HIP;LEG
LOCATION: HIP
LOCATION: HIP;LEG

## 2024-06-12 ASSESSMENT — PAIN DESCRIPTION - ORIENTATION
ORIENTATION: RIGHT

## 2024-06-12 ASSESSMENT — PAIN DESCRIPTION - PAIN TYPE
TYPE: ACUTE PAIN;CHRONIC PAIN
TYPE: ACUTE PAIN;SURGICAL PAIN
TYPE: ACUTE PAIN;CHRONIC PAIN
TYPE: ACUTE PAIN;SURGICAL PAIN

## 2024-06-12 ASSESSMENT — PAIN - FUNCTIONAL ASSESSMENT
PAIN_FUNCTIONAL_ASSESSMENT: ACTIVITIES ARE NOT PREVENTED

## 2024-06-12 ASSESSMENT — PAIN DESCRIPTION - FREQUENCY
FREQUENCY: CONTINUOUS

## 2024-06-12 ASSESSMENT — PAIN SCALES - GENERAL
PAINLEVEL_OUTOF10: 5
PAINLEVEL_OUTOF10: 7
PAINLEVEL_OUTOF10: 4
PAINLEVEL_OUTOF10: 7
PAINLEVEL_OUTOF10: 5

## 2024-06-12 ASSESSMENT — PAIN SCALES - WONG BAKER
WONGBAKER_NUMERICALRESPONSE: HURTS A LITTLE BIT

## 2024-06-12 NOTE — PROGRESS NOTES
[facility] Inpatient Nephrology Note        CC:FAVIAN on CKD   Labs reviewed .   Soc Hx:  no Visitor present  ROS: no complaints       Medications     insulin lispro  2-3 Units SubCUTAneous TID WC    insulin glargine  5 Units SubCUTAneous Nightly    fentaNYL  1 patch TransDERmal Q72H    levETIRAcetam  500 mg Oral BID    ferrous sulfate  324 mg Oral Daily with breakfast    insulin lispro  0-4 Units SubCUTAneous TID WC    insulin lispro  0-4 Units SubCUTAneous Nightly    escitalopram  10 mg Oral Daily    hydrocortisone  10 mg Oral Daily    pantoprazole  40 mg Oral BID AC    heparin (porcine)  5,000 Units SubCUTAneous 3 times per day    hydrocortisone  5 mg Oral Nightly    NIFEdipine  30 mg Oral Daily    lactobacillus  2 capsule Oral BID WC    rOPINIRole  2 mg Oral Nightly         OBJECTIVE      Physical    TEMPERATURE:  Current - Temp: 98.4 °F (36.9 °C); Max - Temp  Av.1 °F (36.7 °C)  Min: 97.8 °F (36.6 °C)  Max: 98.4 °F (36.9 °C)  RESPIRATIONS RANGE: Resp  Av.3  Min: 16  Max: 18  PULSE RANGE: Pulse  Av  Min: 75  Max: 77  BLOOD PRESSURE RANGE:  Systolic (24hrs), Av , Min:147 , Max:169   ; Diastolic (24hrs), Av, Min:68, Max:78    PULSE OXIMETRY RANGE: SpO2  Av.5 %  Min: 96 %  Max: 99 %  24HR INTAKE/OUTPUT:    Intake/Output Summary (Last 24 hours) at 2024 1308  Last data filed at 2024 1209  Gross per 24 hour   Intake 670 ml   Output --   Net 670 ml         GEN: no distress  HEENT: normocephalic, atraumatic  CV: RRR  LUNGS: clear bilaterally, unlabored  ABD: + bowel sounds. No distension. No tenderness  EXT: Trace edema RLE, no edema LLE  SKIN: no rash  NEURO: no tremor    Medications   insulin lispro  2-3 Units SubCUTAneous TID WC    insulin glargine  5 Units SubCUTAneous Nightly    fentaNYL  1 patch TransDERmal Q72H    levETIRAcetam  500 mg Oral BID    ferrous sulfate  324 mg Oral Daily with breakfast    insulin lispro  0-4 Units  SubCUTAneous TID WC    insulin lispro  0-4 Units SubCUTAneous Nightly    escitalopram  10 mg Oral Daily    hydrocortisone  10 mg Oral Daily    pantoprazole  40 mg Oral BID AC    heparin (porcine)  5,000 Units SubCUTAneous 3 times per day    hydrocortisone  5 mg Oral Nightly    NIFEdipine  30 mg Oral Daily    lactobacillus  2 capsule Oral BID WC    rOPINIRole  2 mg Oral Nightly       Data      Lab Results   Component Value Date    CREATININE 1.7 (H) 06/12/2024    BUN 23 (H) 06/12/2024     06/12/2024    K 3.9 06/12/2024     06/12/2024    CO2 25 06/12/2024     Lab Results   Component Value Date    WBC 5.0 06/12/2024    HGB 8.3 (L) 06/12/2024    HCT 23.5 (L) 06/12/2024    MCV 87.6 06/12/2024     06/12/2024     Lab Results   Component Value Date    IRON 16 (L) 05/21/2024    TIBC 189 (L) 05/21/2024    FERRITIN 495.2 (H) 05/21/2024     Lab Results   Component Value Date    CALCIUM 9.2 06/12/2024    PHOS 3.1 06/12/2024         ASSESSMENT/PLAN:    1. FAVIAN on CKD  - due to relative hypotension and rhabdo  - Resolved    2. CKD Stage 3   - serum creatinine at baseline ~ 2  - UPC 0.6  - follow up with Dr. Wood  - Creatinine below baseline    3. HTN  - BP notd .   - Off   ARBs     4. DKA  - resolved     5. Encephalopathy - Resolved  - MRI with features of PRES   - not related to HTN  - has metastatic melanoma    6. R intertrochanteric femur fx  - per rehab    7. Adrenal insuff  - on Hydrocortisone    8. CKD MBD  - phos at target    9. Hypokalemia   S/p supp      10. Anemia  - Hemoglobin noted .   - low iron stores  - On  iron (which may help with chronic diarrhea)    Ashley Wood MD,FACP

## 2024-06-12 NOTE — PROGRESS NOTES
Deviations: Slow elan    Stairs  Stair Height: 6''  Device: Bilateral handrails  Number of Stairs: 4  Additional Factors: Non-reciprocal going up;Non-reciprocal going down  Assistance Level: Contact guard assist  Skilled Clinical Factors: Patient with improved abilities with PT facilitating right lateral hip musculature during ascending. She recalled proper sequence and also recalled to keep bilateral hands in front of her on the rails. This promoted improved abilities when descending. She completed with CGA assist and improved speed, though still slower elan.             PT Exercises  Exercise Treatment: Patient performed 20 reps bilateral LE ther ex while seated in wheelchair: APs, GS, hip add sets, LAQ, hip abduction, and marching (patient provided assistance to right LE with bilateral UE)      ASSESSMENT/PROGRESS TOWARDS GOALS       Assessment  Assessment: Mrs. Ghada Arguelles continues with improved pain at least at rest, but still complains of sharp pains at times during therapy sessions. She was able to end the session with a level 6/10 which is the best it has been since therapy began. She continues to be able to transfer with supervision and ambulate community distance with wheeled walker with supervision on level surfaces, but needs extensive time to complete. 4 steps were with CGA and non-reciprocal pattern. Patient continues to be well below baseline at this time and will benefit from continued skilled therapy for strengthening, gait training, stairs training, and safety training to allow for safe return home. Goal is home on Friday with support from family and home PT.  Activity Tolerance: Patient limited by pain;Patient limited by endurance  Discharge Recommendations: Home with assist PRN;Home with Home health PT  PT Equipment Recommendations  Other: has wheeled walker at home    Goals  Patient Goals   Patient Goals : \"be able to do everything I could before\" \"be able to take care of    Sit<>Stand with supervision.   Ambulated 168' with wheeled walker with supervision.  Patient with improved pace though still antalgic.  Improved step through pattern, but steps are not even.  Seated rest break.  Sit<>Stand with supervision.   Ambulated 25' to and from stairs with wheeled walker with supervision.   Ascended/descended 4 steps with bilateral rails with CGA and facilitation to right hip to promote stabilization in SLS.  She was able to ambulate back to wheelchair with wheeled walker with CGA.  Patient reporting pain increased with stairs and required rest break.  She states it is improved with seated rest break.   Sit<>Stand with supervision.   Ambulated back to room with wheeled walker with supervision. Zonia improving and patient able to make it back to room in less than 5 minutes (150').    Patient tolerated session well and pain is becoming more manageable.          Therapy Time   Individual Concurrent Group Co-treatment   Time In 0900         Time Out 0945         Minutes 45              Second Session Therapy Time     Individual Co-treatment   Time In 1445     Time Out 1530     Minutes 45               Teodora Steele [facility] CNS [id number], 06/12/24 at 9:49 AM

## 2024-06-12 NOTE — PROGRESS NOTES
ACUTE REHAB UNIT  SPEECH/LANGUAGE PATHOLOGY      [x] Daily  [] Weekly Care Conference Note  [] Discharge    Patient:Elvia Arguelles      :1960  MRN:[id number]  Rehab Dx/Hx: Closed right hip fracture, initial encounter ([facility]) [S72.001A]    Precautions: falls, seizures, and visual spatial deficits  Home situation: from home with spouse  ST Dx: [] Aphasia  [] Dysarthria  [] Apraxia   [] Oropharyngeal dysphagia [x] Cognitive   Impairment  [] Other:   Initial Speech Therapy Assessment Diagnosis:   1. Cognitive Diagnosis: Pt demonstrated minimal to moderate deficits in domains of time orientation; higher level attention; complex VPS and abstract reasoning; working memory and STM. Pt with visual deficits and right lower extremity pain which is further exacerbating. Visual deficits during paper tasks revealed reduced attention left; slight sloping upward left to right when writing; spatial orientation when putting numbers on a clock. Will continue therapy working on visuo cognition / visuo spatial orientation and visual language remediation  2. Speech Diagnosis: Motor speech audible and intelligible at multiple word utterance level and during discourse. Pt achieve good verbal diadochokinetic rating. Voice quality with minimal claudio /strained quality.  3. Communication Diagnosis: Pt demonstrating concrete to mild complex functional auditory language processing and verbal expressive language skills. Visual language processing at word / phrase level with mild deficits in inattention left of midline (did utilized large print with high color contrast). Pt able to express full name via written expression but minimal perseveration of letters ans slight sloping upward left to right when writing. Ongoing therapy     Date of Admit: 2024  Room #: P2W-5607/3272-01  Date: 2024       Current functional status (updated daily):         Current Diet Order:ADULT DIET; Regular; 5 carb choices (75 gm/meal); No Added Salt (3-4  light within reach  [] Chair alarm activated  [] Bed alarm activated  [x] Other: to therapy gym [] Call light within reach  [] Chair alarm activated  [] Bed alarm activated  [] Other:    Progress Assessment: 5/29/24: Continued visual language impairment at word level requiring max prompts  5/30/2024 : pt /dtr ed in current strategies for visual scanning and visual attention and PS/reasoning.   6/3/2024: Pt with lots of pain today; has XR right hip pending. Ongoing ed with pt and her other dtr this date/time. Will continue poc  6/4/2024: Hip XR 6/3/2024 noted:hardware intact;no new acute fracture or dislocation. Good effort this session with less pain complaints;   6/6/2024: continued improvement. Family ed in previous sessions with 2 dtrs   Plan: Continue as per plan of care while on ARU and if recommended at next level of care.   Continued Tx Upon Discharge: ?    [] Yes [] No [x] TBD based on progress while on ARU [] Vital Stim indicated [] Other:   Estimated discharge date: end of week   Discharge recommendations:   [] Home independently  [] Home with assistance []  24 hour supervision  [] ECF [] Referral for ENT at d/c; [] Referral for Neuro Visual Opthalmologic at d/c; [] Referral for Neuro Psych  at d/c; [x] Other: TBD     Additional information:             Electronically Signed by    Leona Watson MS, CCC-SLP #[id number]  Speech Language Pathologist

## 2024-06-12 NOTE — CARE COORDINATION
Team Conference held Tuesday, 6-.  Team reviewed progress, barriers, DC date.  Team recommends DC to home on Friday, 6- with home care services for SN/PT/OT.  Family is asking for handicapped parking decal.  DME:     TSF, Tub Bench, Hip kit, Bag for wh walker.      Team wants to verify that she has the wh wlker at home.    Met with patient and daughter today to review.  Pt reports she is very happy about DC for Friday.  She is agreeable to home care.  Provided CMS star rated list of agencies and education regarding CMS star rating system.  They chose [facility].    The Plan for Transition of Care is related to the following treatment goals: to continue her progress in personal care and ambulation needs in home setting.     The Patient and/or patient representative farooq Goodman  was provided with a choice of provider and agrees   with the discharge plan. [x] Yes [] No    Freedom of choice list was provided with basic dialogue that supports the patient's individualized plan of care/goals, treatment preferences and shares the quality data associated with the providers. [x] Yes [] No    Provided them with MD order and Application for Parking Decal.  She and dgtr verified she does have a two wh walker at home.    Family will transport with  between 10-12 pm.  They denied having issues with transportation.  They would like to use [facility] for any scripts.  IMM letter presented.    Referral initiated to Rep with [facility].  JEOVANNY Caraballo     Case Management   [phone]    6/12/2024  2:02 PM

## 2024-06-12 NOTE — PROGRESS NOTES
Patient admitted to rehab with closed right hip fracture.  A/Ox4. Transfers with one assist with gait belt and use of stedy. Mobility restrictions: WBAT. On regular RAÚL low potassium diet, tolerating well. Medications taken whole with fluids. On Heparin for DVT prophylaxis.  Skin: scattered bruising and abrasions.  Right hip incisions with steri strips intact. Oxygen: room air. LDA: None. Has been continent of bowel and  of bladder. LBM 6/12. Chair/bed alarms in use and call light in reach. Care on going.

## 2024-06-12 NOTE — PROGRESS NOTES
Occupational Therapy  Facility/Department: [address] REHAB  Rehabilitation Occupational Therapy Daily Treatment Note    AM and PM Sessions:   Date: 24  Patient Name: Elvia Arguelles       Room: N4I-8342/3272-01  MRN: [id number]  Account: [id number]   : 1960  (63 y.o.) Gender: female                    Past Medical History:  has a past medical history of Adrenal insufficiency (HCC), Cancer (HCC), Closed displaced intertrochanteric fracture of right femur (HCC), Depression, Diabetes mellitus (HCC), Hx of radiation therapy, Hypertension, Hyponatremia, Insulin pump in place, Melanoma (HCC), Prolonged emergence from general anesthesia, and Restless leg syndrome.  Past Surgical History:   has a past surgical history that includes Hysterectomy; Cholecystectomy; shoulder surgery; Upper gastrointestinal endoscopy (10/22/2014); Carpal tunnel release (Bilateral, 2017); lipoma resection; Eye surgery (Right); Shoulder arthroscopy (Right, 2018); liver biopsy (Right); US BIOPSY LIVER PERCUTANEOUS (2022); CT BIOPSY ABDOMEN RETROPERITONEUM (2022); Upper gastrointestinal endoscopy (N/A, 2023); Upper gastrointestinal endoscopy (2023); Port Surgery (Right, 2023); Upper gastrointestinal endoscopy (N/A, 2024); and hip surgery (Right, 2024).    Restrictions  Restrictions/Precautions: Weight Bearing, Fall Risk  Other position/activity restrictions: R femur fx s/p Right femur cephalomedullary nail; Diet: 5 carb choices (75 gm/meal); No Added Salt (3-4 gm); Low Potassium (Less than 3000 mg/day), Port  Right Lower Extremity Weight Bearing: Weight Bearing As Tolerated  Equipment Used: Wheelchair    Subjective  Subjective: Pt met in therapy dept, seated in w/c and agreeable to tx session. Pt report pain RLE with movement, 4/10.  Restrictions/Precautions: Weight Bearing;Fall Risk             Objective     Cognition  Overall Cognitive Status: WFL (pt demo some difficulty with  completed simple food prep task to make cold cereal w/ milk & carry to table using walker basket SBA & min cues D/T low vision standing for 10 minutes. Good improvement with this compared to last week at min A level.  Pt was re-ed in walker . during task. Pt completed donning footwear min-mod A for RLE side D/T increased RLE edema(family will assist with donning nba hose). Pt cont to be below baseline & will benefit form cont'd OT tx. D/C planned for Friday 6/14/24 with home therapy & assist by family. Plan to cont tx per POC.  Activity Tolerance: Patient limited by pain;Patient limited by endurance  Discharge Recommendations: Patient would benefit from continued therapy after discharge;24 hour supervision or assist;S Level 1;Home with Home health OT  Factors Affecting Discharge: Rec 24 hr supervision/assist but spouse is retired/available  OT Equipment Recommendations  Other: Family purchased TSF and TTB. Patient will need RW, and walker basket or bag. Suggest hip kit(reacher/long shoe horn/sock aid/leg ); possibly BSC for nighttime toileting  Safety Devices  Safety Devices in place: Yes (left w/ transporter to go back to room)  Type of devices: Gait belt;Patient at risk for falls;Left in chair    Patient Education  Education  Education Given To: Patient  Education Provided: Plan of Care;Fall Prevention Strategies;Transfer Training;Low Vision Education;Safety;ADL Function;Precautions;Mobility Training;Equipment;Home Exercise Program  Education Provided Comments: use of lg shoe horn/sock aid for LB dressing, IADL mob/use of walker basket for carrying, improvments since eval, 2# UB HEP for AROM ex  Education Method: Verbal;Demonstration;Teach Back  Barriers to Learning: Vision  Education Outcome: Verbalized understanding;Continued education needed  Skilled Clinical Factors: min cues for ADL/IADL tasks d/t visual impairment    Plan  Occupational Therapy Plan  Times Per Week: 5-6x  Times Per Day: Twice

## 2024-06-12 NOTE — PLAN OF CARE
Problem: Safety - Adult  Goal: Free from fall injury  6/12/2024 0852 by Rocco Santillan, RN  Outcome: Progressing  Flowsheets (Taken 6/12/2024 0845)  Free From Fall Injury: Instruct family/caregiver on patient safety     Problem: Discharge Planning  Goal: Discharge to home or other facility with appropriate resources  6/12/2024 0852 by Rocco Santillan, RN  Outcome: Progressing  Flowsheets (Taken 6/12/2024 0755)  Discharge to home or other facility with appropriate resources: Identify barriers to discharge with patient and caregiver     Problem: Pain  Goal: Verbalizes/displays adequate comfort level or baseline comfort level  6/12/2024 0852 by Rocco Santillan, RN  Outcome: Progressing   Encourage patient to monitor pain and request assistance   Assess pain using appropriate pain scale   Administer analgesics based on type and severity of pain and evaluate response   Implement non-pharmacological measures as appropriate and evaluate response

## 2024-06-12 NOTE — PLAN OF CARE
Problem: Safety - Adult  Goal: Free from fall injury  Note: Patient free from falls this shift. Fall precautions in place at all times. Bed in lowest position with two side rails up and wheels locked. Call light within reach. Patient able and agreeable to contact for safety appropriately. Patient up to bathroom with front wheeled walker, gait-belt, and partial assist of one to get lower ext back in bed. Patient tolerated fairly well.       Problem: Discharge Planning  Goal: Discharge to home or other facility with appropriate resources  Outcome: Progressing  Discharge to home or other facility with appropriate resources:   Identify barriers to discharge with patient and caregiver   Arrange for needed discharge resources and transportation as appropriate   Refer to discharge planning if patient needs post-hospital services based on physician order or complex needs related to functional status, cognitive ability or social support system   Identify discharge learning needs (meds, wound care, etc)     Problem: Pain  Goal: Verbalizes/displays adequate comfort level or baseline comfort level  Note: Pt able to express presence/absence of pain and rate pain appropriately using numerical scale. Pain/discomfort being managed with PRN analgesics per MD orders (see MAR). Pain assessed every shift and after interventions. Patient rating pain at it worst this evening 7 to 9 on 1-10 pain scale, describes as throbbing. Medicated per request and prn Oxycodone 2.5 mg q4 hours, with good relief. Patient able to sleep comfortably.       Problem: Skin/Tissue Integrity  Goal: Absence of new skin breakdown  Description: 1.  Monitor for areas of redness and/or skin breakdown  2.  Assess vascular access sites hourly  3.  Every 4-6 hours minimum:  Change oxygen saturation probe site  4.  Every 4-6 hours:  If on nasal continuous positive airway pressure, respiratory therapy assess nares and determine need for appliance change or resting

## 2024-06-12 NOTE — PROGRESS NOTES
Patient admitted to rehab s/p ORIF to right femur fracture on 5/19. A/Ox4. Patient has been pleasant and cooperative with care. Transfers with front wheeled walker, gait-belt, and CGA x 1; patient tolerates fairly well. Patient moving surgical leg out of bed herself, but ask for partial assist to get lower ext. Back in bed. Mobility restrictions: WBAT. On 5 carb, RAÚL, low K diet. Medications taken whole with thin liquids without swallowing difficulty. Patient does need assist with pills to mouth or to hand due to vision difficulty (blurred vision persists).On SQ Heparin for DVT prophylaxis.  Skin: Intact with surgical incision healed, steri-strips intact, site is well approximated and without s/s of infection. Patient also noted with scattered bruising. Oxygen: RA. LDA: Port to right chest is currently de-accessed. Has been continent of bowel and bladder. LBM 6/10. Chair/bed alarms in use and call light in reach. Medicated this evening due to complaints of a headache. Fall precautions in place. Will continue to monitor and assist as needed.

## 2024-06-12 NOTE — PROGRESS NOTES
Department of Physical Medicine & Rehabilitation  Progress Note    Patient Identification:  Elvia Arguelles  [id number]  : 1960  Admit date: 2024    Chief Complaint: Closed right hip fracture, initial encounter ([facility])    Subjective:   No acute events overnight.   Patient seen this afternoon sitting up in room, eating pineapple. She reports pain well controlled currently. Daughter at the bedside asks about handicap parking placard and I informed her I wrote rx for this on Monday. They also ask about referral to Neuro-ophthalmology which will be provided at discharge. Per patient, spouse is coming this afternoon for training on insulin pump.   Labs reviewed.     ROS: No f/c, n/v, cp     Objective:  Patient Vitals for the past 24 hrs:   BP Temp Temp src Pulse Resp SpO2 Weight   24 0826 -- -- -- -- 18 -- --   24 0800 (!) 147/68 98.4 °F (36.9 °C) Oral 77 18 96 % --   24 0756 -- -- -- -- 18 -- --   24 0539 -- -- -- -- -- -- 56.1 kg (123 lb 10.9 oz)   24 2216 -- -- -- -- 16 -- --   24 2124 (!) 169/78 97.8 °F (36.6 °C) Oral 75 18 99 % --     Const: Alert. No distress, pleasant.   HEENT: Normocephalic, atraumatic. Normal sclera/conjunctiva. MMM.   CV: Regular rate and rhythm.   Resp: No respiratory distress. Lungs diminished at bases  Abd: Soft, nontender, nondistended, NABS+   Ext:+ post op swelling RLE  MSK: Decreased right hip ROM  Neuro: Alert, oriented. Vision impairment. No focal strength deficits aside from pain limited RLE  Psych: Cooperative, appropriate mood and affect    Laboratory data: Available via EMR.   Last 24 hour lab  Recent Results (from the past 24 hour(s))   POCT Glucose    Collection Time: 24  3:58 PM   Result Value Ref Range    POC Glucose 400 (H) 70 - 99 mg/dl    Performed on ACCU-CHEK    POCT Glucose    Collection Time: 24  9:22 PM   Result Value Ref Range    POC Glucose 183 (H) 70 - 99 mg/dl    Performed on ACCU-CHEK    Renal Function

## 2024-06-12 NOTE — CARE COORDINATION
SOCIAL WORK DISCHARGE SUMMARY        DATE OF DISCHARGE:    Friday, 6-      LOCATION:    Home      Discharging to Facility/ Agency   Name:  [facility]    Address: [address] [place] [address]  Phone: [phone]  Fax: [phone]         TIME:     Family   10-12 noon        PHARMACY:  Retail         DME:    none      Transportation Question  \"NO\"      IMM:   6-    Referrals made:   Home care.      JEOVANNY Caraballo     Case Management   [phone]    6/12/2024  2:05 PM

## 2024-06-12 NOTE — DISCHARGE INSTR - COC
natriuretic peptide (BNP) level R79.89    Microcytosis R71.8    Adrenal insufficiency ([facility]) E27.40    Type 1 diabetes mellitus with ketoacidosis without coma ([facility]) E10.10    Hyperkalemia E87.5    Septic shock ([facility]) A41.9, R65.21    Metabolic encephalopathy G93.41    Metastatic melanoma ([facility]) C43.9    Elevated procalcitonin R79.89    Lactic acid acidosis E87.20    Neutrophilia D72.9    Acute kidney injury superimposed on CKD ([facility]) N17.9, N18.9    Diabetic ketoacidosis without coma associated with type 1 diabetes mellitus ([facility]) E10.10    Poorly controlled type 1 diabetes mellitus with neuropathy ([facility]) E10.40, E10.65    Mixed hyperlipidemia E78.2    Poorly controlled type 1 diabetes mellitus with retinopathy ([facility]) E10.319, E10.65    Primary hypertension I10    Stage 3 chronic kidney disease ([facility]) N18.30    DKA, type 1, not at goal ([facility]) E10.10    Intractable nausea and vomiting R11.2    Closed displaced intertrochanteric fracture of right femur ([facility]) S72.141A    Closed right hip fracture, initial encounter ([facility]) S72.001A    Pre-operative examination Z01.818    Demand ischemia ([facility]) I24.89       Isolation/Infection:   Isolation            No Isolation          Patient Infection Status       None to display                     Nurse Assessment:  Last Vital Signs: BP (!) 147/68   Pulse 77   Temp 98.4 °F (36.9 °C) (Oral)   Resp 16   Ht 1.499 m (4' 11.02\")   Wt 56.1 kg (123 lb 10.9 oz)   LMP  (LMP Unknown) Comment: had a hysterectomy a long time ago  SpO2 96%   BMI 24.97 kg/m²     Last documented pain score (0-10 scale): Pain Level: 4  Last Weight:   Wt Readings from Last 1 Encounters:   06/12/24 56.1 kg (123 lb 10.9 oz)     Mental Status:  oriented, alert, coherent, logical, thought processes intact, and able to concentrate and follow conversation    IV Access:  - De-accessed port    Nursing Mobility/ADLs:  Walking   Assisted  Transfer  Assisted  Bathing  Assisted  Dressing  Assisted  Toileting  Assisted  Feeding   on 6/14/24 at 10:13 AM EDT    CASE MANAGEMENT/SOCIAL WORK SECTION    Inpatient Status Date: 5-    Readmission Risk Assessment Score:  Readmission Risk              Risk of Unplanned Readmission:  52         Discharging to Facility/ Agency   Name:  [facility]    Address: [address] [place] [address]  Phone: [phone]  Fax: [phone]      / signature: Electronically signed by JEOVANNY Diamond on 6/12/24 at 2:03 PM EDT    PHYSICIAN SECTION    Prognosis: Good    Condition at Discharge: Stable    Rehab Potential (if transferring to Rehab): Good    Recommended Labs or Other Treatments After Discharge:   Home care Pt, OT, SLP, RN  Follow-up with PCP, Nephrology, Oncology, Neurology, Ortho, Ophthalmology    Physician Certification: I certify the above information and transfer of Elvia Arguelles  is necessary for the continuing treatment of the diagnosis listed and that she requires Home Care for less 30 days.     Update Admission H&P: No change in H&P    PHYSICIAN SIGNATURE:  Electronically signed by Delia Martinez MD on 6/13/24 at 3:30 PM EDT

## 2024-06-12 NOTE — PROGRESS NOTES
[facility]  Glycemic Control       NAME: Elvia Arguelles  MEDICAL RECORD NUMBER:  [id number]  AGE: 63 y.o.   GENDER: female  : 1960  EPISODE DATE:  2024     Data     Recent Labs     24  0653 24  1108 24  1558 24  2122 24  0707 24  1050   POCGLU 393* 307* 400* 183* 265* 163*       HgBA1c:    Lab Results   Component Value Date/Time    LABA1C 7.2 2024 10:17 PM       BUN/Creatinine:    Lab Results   Component Value Date/Time    BUN 23 2024 05:18 AM    CREATININE 1.7 2024 05:18 AM       Medications  Scheduled Medications:   insulin lispro  2-3 Units SubCUTAneous TID WC    insulin glargine  5 Units SubCUTAneous Nightly    fentaNYL  1 patch TransDERmal Q72H    levETIRAcetam  500 mg Oral BID    ferrous sulfate  324 mg Oral Daily with breakfast    insulin lispro  0-4 Units SubCUTAneous TID WC    insulin lispro  0-4 Units SubCUTAneous Nightly    escitalopram  10 mg Oral Daily    hydrocortisone  10 mg Oral Daily    pantoprazole  40 mg Oral BID AC    heparin (porcine)  5,000 Units SubCUTAneous 3 times per day    hydrocortisone  5 mg Oral Nightly    NIFEdipine  30 mg Oral Daily    lactobacillus  2 capsule Oral BID WC    rOPINIRole  2 mg Oral Nightly       Diet  Current diet/supplement order: ADULT DIET; Regular; 5 carb choices (75 gm/meal); No Added Salt (3-4 gm); Low Potassium (Less than 3000 mg/day)     Recorded PO: PO Meals Eaten (%): 26 - 50% last meal in flowsheets      Action      BG trends improving with patient directed meal dose based on intake.      Mr. Arguelles reports that he completed insulin pump training with [employer] Pump Educator.  He expresses confidence with assisting with insulin pump at home.          Electronically signed by Marianna Lawson RN on 2024 at 3:40 PM

## 2024-06-13 PROBLEM — I67.83 PRES (POSTERIOR REVERSIBLE ENCEPHALOPATHY SYNDROME): Status: ACTIVE | Noted: 2024-06-13

## 2024-06-13 LAB
ANION GAP SERPL CALCULATED.3IONS-SCNC: 8 MMOL/L (ref 3–16)
BACTERIA URNS QL MICRO: ABNORMAL /HPF
BILIRUB UR QL STRIP.AUTO: NEGATIVE
BUN SERPL-MCNC: 21 MG/DL (ref 7–20)
CALCIUM SERPL-MCNC: 9.2 MG/DL (ref 8.3–10.6)
CHLORIDE SERPL-SCNC: 106 MMOL/L (ref 99–110)
CLARITY UR: CLEAR
CO2 SERPL-SCNC: 24 MMOL/L (ref 21–32)
COLOR UR: YELLOW
CREAT SERPL-MCNC: 1.7 MG/DL (ref 0.6–1.2)
EPI CELLS #/AREA URNS AUTO: 1 /HPF (ref 0–5)
GFR SERPLBLD CREATININE-BSD FMLA CKD-EPI: 33 ML/MIN/{1.73_M2}
GLUCOSE BLD-MCNC: 202 MG/DL (ref 70–99)
GLUCOSE BLD-MCNC: 211 MG/DL (ref 70–99)
GLUCOSE BLD-MCNC: 306 MG/DL (ref 70–99)
GLUCOSE BLD-MCNC: 94 MG/DL (ref 70–99)
GLUCOSE SERPL-MCNC: 209 MG/DL (ref 70–99)
GLUCOSE UR STRIP.AUTO-MCNC: 250 MG/DL
HGB UR QL STRIP.AUTO: NEGATIVE
HYALINE CASTS #/AREA URNS AUTO: 0 /LPF (ref 0–8)
KETONES UR STRIP.AUTO-MCNC: NEGATIVE MG/DL
LEUKOCYTE ESTERASE UR QL STRIP.AUTO: ABNORMAL
NITRITE UR QL STRIP.AUTO: NEGATIVE
PERFORMED ON: ABNORMAL
PERFORMED ON: NORMAL
PH UR STRIP.AUTO: 5.5 [PH] (ref 5–8)
POTASSIUM SERPL-SCNC: 3.6 MMOL/L (ref 3.5–5.1)
PROT UR STRIP.AUTO-MCNC: 100 MG/DL
RBC CLUMPS #/AREA URNS AUTO: 0 /HPF (ref 0–4)
SODIUM SERPL-SCNC: 138 MMOL/L (ref 136–145)
SP GR UR STRIP.AUTO: 1.01 (ref 1–1.03)
UA DIPSTICK W REFLEX MICRO PNL UR: YES
URN SPEC COLLECT METH UR: ABNORMAL
UROBILINOGEN UR STRIP-ACNC: 0.2 E.U./DL
WBC #/AREA URNS AUTO: 9 /HPF (ref 0–5)

## 2024-06-13 PROCEDURE — 81001 URINALYSIS AUTO W/SCOPE: CPT

## 2024-06-13 PROCEDURE — 97535 SELF CARE MNGMENT TRAINING: CPT

## 2024-06-13 PROCEDURE — 6370000000 HC RX 637 (ALT 250 FOR IP): Performed by: STUDENT IN AN ORGANIZED HEALTH CARE EDUCATION/TRAINING PROGRAM

## 2024-06-13 PROCEDURE — 97530 THERAPEUTIC ACTIVITIES: CPT | Performed by: PHYSICAL THERAPIST

## 2024-06-13 PROCEDURE — 6370000000 HC RX 637 (ALT 250 FOR IP): Performed by: INTERNAL MEDICINE

## 2024-06-13 PROCEDURE — 97129 THER IVNTJ 1ST 15 MIN: CPT

## 2024-06-13 PROCEDURE — 6370000000 HC RX 637 (ALT 250 FOR IP): Performed by: PHYSICAL MEDICINE & REHABILITATION

## 2024-06-13 PROCEDURE — 1280000000 HC REHAB R&B

## 2024-06-13 PROCEDURE — 97530 THERAPEUTIC ACTIVITIES: CPT

## 2024-06-13 PROCEDURE — 97112 NEUROMUSCULAR REEDUCATION: CPT

## 2024-06-13 PROCEDURE — 6360000002 HC RX W HCPCS: Performed by: STUDENT IN AN ORGANIZED HEALTH CARE EDUCATION/TRAINING PROGRAM

## 2024-06-13 PROCEDURE — 97116 GAIT TRAINING THERAPY: CPT | Performed by: PHYSICAL THERAPIST

## 2024-06-13 PROCEDURE — 36415 COLL VENOUS BLD VENIPUNCTURE: CPT

## 2024-06-13 PROCEDURE — 97130 THER IVNTJ EA ADDL 15 MIN: CPT

## 2024-06-13 PROCEDURE — 80048 BASIC METABOLIC PNL TOTAL CA: CPT

## 2024-06-13 PROCEDURE — 94760 N-INVAS EAR/PLS OXIMETRY 1: CPT

## 2024-06-13 PROCEDURE — 97110 THERAPEUTIC EXERCISES: CPT | Performed by: PHYSICAL THERAPIST

## 2024-06-13 RX ORDER — FERROUS SULFATE 324(65)MG
324 TABLET, DELAYED RELEASE (ENTERIC COATED) ORAL
Qty: 30 TABLET | Refills: 0 | Status: SHIPPED | OUTPATIENT
Start: 2024-06-14

## 2024-06-13 RX ORDER — PANTOPRAZOLE SODIUM 40 MG/1
40 TABLET, DELAYED RELEASE ORAL
Qty: 60 TABLET | Refills: 0 | Status: SHIPPED | OUTPATIENT
Start: 2024-06-13

## 2024-06-13 RX ORDER — OXYCODONE HYDROCHLORIDE 5 MG/1
2.5 TABLET ORAL EVERY 6 HOURS PRN
Qty: 14 TABLET | Refills: 0 | Status: SHIPPED | OUTPATIENT
Start: 2024-06-13 | End: 2024-06-20

## 2024-06-13 RX ORDER — FENTANYL 12.5 UG/1
1 PATCH TRANSDERMAL
Qty: 3 PATCH | Refills: 0 | Status: SHIPPED | OUTPATIENT
Start: 2024-06-15 | End: 2024-06-24

## 2024-06-13 RX ORDER — NIFEDIPINE 30 MG/1
30 TABLET, EXTENDED RELEASE ORAL 2 TIMES DAILY
Status: DISCONTINUED | OUTPATIENT
Start: 2024-06-13 | End: 2024-06-14 | Stop reason: HOSPADM

## 2024-06-13 RX ORDER — METHOCARBAMOL 750 MG/1
750 TABLET, FILM COATED ORAL EVERY 6 HOURS PRN
Qty: 40 TABLET | Refills: 0 | Status: SHIPPED | OUTPATIENT
Start: 2024-06-13 | End: 2024-06-23

## 2024-06-13 RX ORDER — NIFEDIPINE 30 MG/1
30 TABLET, EXTENDED RELEASE ORAL 2 TIMES DAILY
Qty: 60 TABLET | Refills: 0 | Status: SHIPPED | OUTPATIENT
Start: 2024-06-13

## 2024-06-13 RX ORDER — LEVETIRACETAM 500 MG/1
500 TABLET ORAL 2 TIMES DAILY
Qty: 60 TABLET | Refills: 0 | Status: SHIPPED | OUTPATIENT
Start: 2024-06-13

## 2024-06-13 RX ADMIN — Medication 2 CAPSULE: at 16:33

## 2024-06-13 RX ADMIN — INSULIN GLARGINE 5 UNITS: 100 INJECTION, SOLUTION SUBCUTANEOUS at 20:43

## 2024-06-13 RX ADMIN — LEVETIRACETAM 500 MG: 500 TABLET, FILM COATED ORAL at 19:45

## 2024-06-13 RX ADMIN — METHOCARBAMOL TABLETS 750 MG: 750 TABLET, COATED ORAL at 19:45

## 2024-06-13 RX ADMIN — METHOCARBAMOL TABLETS 750 MG: 750 TABLET, COATED ORAL at 06:30

## 2024-06-13 RX ADMIN — HEPARIN SODIUM 5000 UNITS: 5000 INJECTION INTRAVENOUS; SUBCUTANEOUS at 05:48

## 2024-06-13 RX ADMIN — OXYCODONE 2.5 MG: 5 TABLET ORAL at 08:14

## 2024-06-13 RX ADMIN — LOPERAMIDE HYDROCHLORIDE 2 MG: 2 CAPSULE ORAL at 12:26

## 2024-06-13 RX ADMIN — HYDROXYZINE PAMOATE 25 MG: 25 CAPSULE ORAL at 23:20

## 2024-06-13 RX ADMIN — LEVETIRACETAM 500 MG: 500 TABLET, FILM COATED ORAL at 08:10

## 2024-06-13 RX ADMIN — PANTOPRAZOLE SODIUM 40 MG: 40 TABLET, DELAYED RELEASE ORAL at 05:48

## 2024-06-13 RX ADMIN — INSULIN LISPRO 1 UNITS: 100 INJECTION, SOLUTION INTRAVENOUS; SUBCUTANEOUS at 08:06

## 2024-06-13 RX ADMIN — HEPARIN SODIUM 5000 UNITS: 5000 INJECTION INTRAVENOUS; SUBCUTANEOUS at 13:29

## 2024-06-13 RX ADMIN — HYDROCORTISONE 5 MG: 5 TABLET ORAL at 20:43

## 2024-06-13 RX ADMIN — INSULIN LISPRO 2 UNITS: 100 INJECTION, SOLUTION INTRAVENOUS; SUBCUTANEOUS at 08:07

## 2024-06-13 RX ADMIN — NIFEDIPINE 30 MG: 30 TABLET, EXTENDED RELEASE ORAL at 08:09

## 2024-06-13 RX ADMIN — FERROUS SULFATE TAB EC 324 MG (65 MG FE EQUIVALENT) 324 MG: 324 (65 FE) TABLET DELAYED RESPONSE at 08:06

## 2024-06-13 RX ADMIN — Medication 2 CAPSULE: at 08:09

## 2024-06-13 RX ADMIN — ONDANSETRON 4 MG: 4 TABLET, ORALLY DISINTEGRATING ORAL at 20:45

## 2024-06-13 RX ADMIN — HEPARIN SODIUM 5000 UNITS: 5000 INJECTION INTRAVENOUS; SUBCUTANEOUS at 20:43

## 2024-06-13 RX ADMIN — ESCITALOPRAM OXALATE 10 MG: 10 TABLET ORAL at 08:10

## 2024-06-13 RX ADMIN — OXYCODONE 2.5 MG: 5 TABLET ORAL at 03:16

## 2024-06-13 RX ADMIN — NIFEDIPINE 30 MG: 30 TABLET, EXTENDED RELEASE ORAL at 19:45

## 2024-06-13 RX ADMIN — PANTOPRAZOLE SODIUM 40 MG: 40 TABLET, DELAYED RELEASE ORAL at 15:41

## 2024-06-13 RX ADMIN — HYDROCORTISONE 10 MG: 10 TABLET ORAL at 08:10

## 2024-06-13 RX ADMIN — OXYCODONE 2.5 MG: 5 TABLET ORAL at 12:24

## 2024-06-13 RX ADMIN — OXYCODONE 2.5 MG: 5 TABLET ORAL at 23:24

## 2024-06-13 RX ADMIN — INSULIN LISPRO 3 UNITS: 100 INJECTION, SOLUTION INTRAVENOUS; SUBCUTANEOUS at 16:40

## 2024-06-13 RX ADMIN — ROPINIROLE HYDROCHLORIDE 2 MG: 1 TABLET, FILM COATED ORAL at 19:45

## 2024-06-13 RX ADMIN — METHOCARBAMOL TABLETS 750 MG: 750 TABLET, COATED ORAL at 13:32

## 2024-06-13 RX ADMIN — OXYCODONE 2.5 MG: 5 TABLET ORAL at 16:27

## 2024-06-13 RX ADMIN — INSULIN LISPRO 3 UNITS: 100 INJECTION, SOLUTION INTRAVENOUS; SUBCUTANEOUS at 16:41

## 2024-06-13 ASSESSMENT — PAIN DESCRIPTION - LOCATION
LOCATION: HIP

## 2024-06-13 ASSESSMENT — PAIN DESCRIPTION - ONSET
ONSET: ON-GOING

## 2024-06-13 ASSESSMENT — PAIN DESCRIPTION - DESCRIPTORS
DESCRIPTORS: ACHING
DESCRIPTORS: THROBBING
DESCRIPTORS: THROBBING
DESCRIPTORS: ACHING
DESCRIPTORS: ACHING
DESCRIPTORS: DISCOMFORT;SPASM
DESCRIPTORS: ACHING
DESCRIPTORS: ACHING

## 2024-06-13 ASSESSMENT — PAIN DESCRIPTION - PAIN TYPE
TYPE: ACUTE PAIN;CHRONIC PAIN
TYPE: ACUTE PAIN;CHRONIC PAIN
TYPE: ACUTE PAIN;SURGICAL PAIN

## 2024-06-13 ASSESSMENT — PAIN DESCRIPTION - ORIENTATION
ORIENTATION: RIGHT

## 2024-06-13 ASSESSMENT — PAIN SCALES - GENERAL
PAINLEVEL_OUTOF10: 3
PAINLEVEL_OUTOF10: 7
PAINLEVEL_OUTOF10: 4
PAINLEVEL_OUTOF10: 7
PAINLEVEL_OUTOF10: 4
PAINLEVEL_OUTOF10: 7
PAINLEVEL_OUTOF10: 3
PAINLEVEL_OUTOF10: 7
PAINLEVEL_OUTOF10: 4
PAINLEVEL_OUTOF10: 6
PAINLEVEL_OUTOF10: 5
PAINLEVEL_OUTOF10: 0
PAINLEVEL_OUTOF10: 4
PAINLEVEL_OUTOF10: 3
PAINLEVEL_OUTOF10: 6

## 2024-06-13 ASSESSMENT — PAIN DESCRIPTION - FREQUENCY
FREQUENCY: CONTINUOUS

## 2024-06-13 ASSESSMENT — PAIN - FUNCTIONAL ASSESSMENT
PAIN_FUNCTIONAL_ASSESSMENT: ACTIVITIES ARE NOT PREVENTED

## 2024-06-13 NOTE — CARE COORDINATION
MEt with patient and spouse to review DC for tomorrow.  They are still interestd in an Emergency response button.  Referral sent.  JEOVANNY Caraballo     Case Management   [phone]    6/13/2024  5:00 PM

## 2024-06-13 NOTE — PROGRESS NOTES
[facility] Inpatient Nephrology Note        CC:FAVIAN on CKD   Labs reviewed .  Seen on rehab    Soc Hx:   Visitor present  ROS: no complaints       Medications     NIFEdipine  30 mg Oral BID    insulin lispro  2-3 Units SubCUTAneous TID WC    insulin glargine  5 Units SubCUTAneous Nightly    fentaNYL  1 patch TransDERmal Q72H    levETIRAcetam  500 mg Oral BID    ferrous sulfate  324 mg Oral Daily with breakfast    insulin lispro  0-4 Units SubCUTAneous TID WC    insulin lispro  0-4 Units SubCUTAneous Nightly    escitalopram  10 mg Oral Daily    hydrocortisone  10 mg Oral Daily    pantoprazole  40 mg Oral BID AC    heparin (porcine)  5,000 Units SubCUTAneous 3 times per day    hydrocortisone  5 mg Oral Nightly    lactobacillus  2 capsule Oral BID WC    rOPINIRole  2 mg Oral Nightly         OBJECTIVE      Physical    TEMPERATURE:  Current - Temp: 98.4 °F (36.9 °C); Max - Temp  Av.3 °F (36.8 °C)  Min: 98.2 °F (36.8 °C)  Max: 98.4 °F (36.9 °C)  RESPIRATIONS RANGE: Resp  Av.4  Min: 16  Max: 17  PULSE RANGE: Pulse  Av  Min: 71  Max: 71  BLOOD PRESSURE RANGE:  Systolic (24hrs), Av , Min:152 , Max:152   ; Diastolic (24hrs), Av, Min:75, Max:75    PULSE OXIMETRY RANGE: SpO2  Av.5 %  Min: 95 %  Max: 96 %  24HR INTAKE/OUTPUT:    Intake/Output Summary (Last 24 hours) at 2024 1153  Last data filed at 2024 0816  Gross per 24 hour   Intake 540 ml   Output --   Net 540 ml         GEN: no distress  HEENT: normocephalic, atraumatic  CV: RRR  LUNGS: clear bilaterally, unlabored  ABD: + bowel sounds. No distension. No tenderness  EXT: Trace edema RLE, no edema LLE  SKIN: no rash  NEURO: no tremor    Medications   NIFEdipine  30 mg Oral BID    insulin lispro  2-3 Units SubCUTAneous TID WC    insulin glargine  5 Units SubCUTAneous Nightly    fentaNYL  1 patch TransDERmal Q72H    levETIRAcetam  500 mg Oral BID    ferrous sulfate  324 mg Oral Daily with

## 2024-06-13 NOTE — PROGRESS NOTES
Department of Physical Medicine & Rehabilitation  Progress Note    Patient Identification:  Elvia Arguelles  [id number]  : 1960  Admit date: 2024    Chief Complaint: Closed right hip fracture, initial encounter ([facility])    Subjective:   No acute events overnight.   Patient seen this afternoon sitting up in room. She reports training with spouse on insulin pump went well yesterday. Awaiting f/u with Diabetes educator today. She reports feeling well and looking forward to discharge home tomorrow. We discussed plans for home care, medications, and follow-ups. Daughter was present for our conversation.   Labs reviewed.     ROS: No f/c, n/v, cp     Objective:  Patient Vitals for the past 24 hrs:   BP Temp Temp src Pulse Resp SpO2 Weight   24 1224 -- -- -- -- 17 -- --   24 0817 -- -- -- -- -- 95 % --   24 0814 -- -- -- -- 17 -- --   24 0800 (!) 152/75 98.4 °F (36.9 °C) Oral 71 17 96 % --   24 0550 -- -- -- -- -- -- 56 kg (123 lb 7.3 oz)   24 0316 -- -- -- -- 16 -- --   24 1930 -- 98.2 °F (36.8 °C) -- -- -- -- --     Const: Alert. No distress, pleasant.   HEENT: Normocephalic, atraumatic. Normal sclera/conjunctiva. MMM.   CV: Regular rate and rhythm.   Resp: No respiratory distress. Lungs CTAB  Abd: Soft, nontender, nondistended, NABS+   Ext:+ post op swelling RLE improving  MSK: Decreased right hip ROM  Neuro: Alert, oriented. Vision impairment. No focal strength deficits aside from pain limited RLE  Psych: Cooperative, appropriate mood and affect    Laboratory data: Available via EMR.   Last 24 hour lab  Recent Results (from the past 24 hour(s))   POCT Glucose    Collection Time: 24  4:05 PM   Result Value Ref Range    POC Glucose 270 (H) 70 - 99 mg/dl    Performed on ACCU-CHEK    POCT Glucose    Collection Time: 24  8:34 PM   Result Value Ref Range    POC Glucose 289 (H) 70 - 99 mg/dl    Performed on ACCU-CHEK    Basic Metabolic Panel    Collection Time:  room.    Speech therapy:    ADULT DIET; Regular; 5 carb choices (75 gm/meal); No Added Salt (3-4 gm); Low Potassium (Less than 3000 mg/day)        Body mass index is 24.92 kg/m².    Rehabilitation Diagnosis:   Orthopedic, 8.11, Unilateral Hip Fracture        Assessment and Plan:     Impairments: decreased right hip ROM, balance, left neglect, balance, endurance, cognition    Right intertrochanteric femur fracture   -s/p  ORIF with cephalomedullary nail (5/19 with Dr. White)  -F/u X-ray (6/3): Stable per Ortho and Radiology  -Wound care per Ortho  -WBAT RLE  -PT/OT    Posterior reversible encephalopathy syndrome  -Etiology suspected Opdivo vs possibly uncontrolled HTN (Nephrology feels unlikely)   -Difficult to exclude superimposed ischemia per Neurology  -Will need repeat MRI in few weeks  -BP control as below  -Regulate sleep/wake. Emphasis on routine.   -PT/OT/SLP.   ----Her cognition has significantly improved, vision improving more gradually    Focal seizure on EEG  -continue Keppra per Neurology    Metastatic melanoma  -On maintenance Opdivo (last 5/10).   -F/u with Heme/Onc (Dr. Ayala) for further treatment plan given PRES    FAVIAN on CKD III due to Rhabdomyolysis  -Nephrology following, appreciate input  -Avoid nephrotoxins, renally dose meds  -Cr now stable ~1.7  -F/u Dr. Celis     Acute on chronic anemia   -Due to post-op blood loss and renal dysfunction  -Hgb now stable ~8  -continue iron supplement     Adrenal Insufficiency  -completed stress does steroids on acute floor  -continue home hydrocortisone    Elevated troponin, demand ischemia + FAVIAN  -No intervention recommended per Cardiology    HTN, uncontrolled  -continue nifedipine -- trend now overall at goal     Chronic HFpEF  -Monitored fluid balance s/p aggressive IVF for rhabdo. Appears euvolemic.   -daily wt    DM2 with hypo and hyperglycemia  -continue lantus 5 qhs, prandial 2-3 TID, ISS -- monitor trend, improved overall. Intermittent elevations

## 2024-06-13 NOTE — DISCHARGE INSTR - DIET
Good nutrition is important when healing from an illness, injury, or surgery.  Follow any nutrition recommendations given to you during your hospital stay.   If you were given an oral nutrition supplement while in the hospital, continue to take this supplement at home.  You can take it with meals, in-between meals, and/or before bedtime. These supplements can be purchased at most local grocery stores, pharmacies, and chain [employer]-stores.   If you have any questions about your diet or nutrition, call the hospital and ask for the dietitian.  Carb control diet, no added salt, low K

## 2024-06-13 NOTE — PROGRESS NOTES
toilet)  Tub/Shower Transfers  Type: Shower  Transfer From: Rolling walker  Transfer To: Shower chair with back  Additional Factors: With handrails  Assistance Level: Supervision  Skilled Clinical Factors: RW >< shower chair in stall supervision DT low vision, used grab bar; family placing grab bars at home    Instrumental ADL's  Instrumental ADLs: Yes  Meal Prep  Meal Prep Level: Walker  Meal Prep Level of Assistance: Stand by assistance  Meal Preparation: 6/12/24: Pt amb w/ RW in OT kitchen SBA to make cold cereal w/ milk to retrieve items in upper cupboards, drawer, refrigerator, with SBA over all & min cues for body/walker position. Was able to locate items after OT demo & gave verbal instruction of location @ start (last time this task completed 1 week ago unable to locate items). Working on task to address navigation skills, vision, and reviewed walker  during reach, rec for bag or basket for carrying. Pt not safe to attempt w/o assist & pt verbalized understanding. She stated her spouse will do all food prep/cean up (IADL tasks). Pt opened milk carton w/ effort, poured & carried bowl in basket to table to serve, all SBA. Pt standing for 10 minutes for task.     Functional Mobility  Device: Rolling walker  Activity: To/From bathroom  Assistance Level: Supervision;Modified independent  Skilled Clinical Factors: Functional mobility in room with RW from recliner>closet, gathered clothes supervison & placed over RW> bathroom >< shower chair in stall> w/c at sink all amb supervision to modified IND in familiar room. Supervision for navigation/avoid obstacles in unfamiliar environment.  Supine to Sit  Assistance Level: Supervision  Skilled Clinical Factors: increased time HOB elevated, used hand to move RLE  Scooting  Assistance Level: Supervision  Transfers  Surface: Wheelchair;From chair with arms  Device: Walker (RW)  Bed To/From Chair  Technique:  (amb RW)  Assistance Level: Supervision;Modified  using AE, as needed, mod I  NOT MET  Long Term Goal 5: Pt will complete simple IADL task (light meal prep, pet care, etc.) mod I  NOT MET                   Therapy Time   Individual Concurrent Group Co-treatment   Time In 0715         Time Out 0815         Minutes 60         Timed Code Treatment Minutes: 60 Minutes  Therapy Time     Individual Co-treatment   Time In 1400     Time Out 1445     Minutes 45              Jocelyn Velasco OT  Electronically signed by Jocelyn Velasco OTR/L  License # [id number]

## 2024-06-13 NOTE — PLAN OF CARE
Problem: Safety - Adult  Goal: Free from fall injury  6/13/2024 0924 by Tonya Norton, RN  Outcome: Progressing  Flowsheets (Taken 6/13/2024 0924)  Free From Fall Injury: Instruct family/caregiver on patient safety  Note: Continue education for walker use  6/13/2024 0040 by Martha Marmolejo, RN  Outcome: Progressing  Note: Fall risk band on patient. Orange light on outside of room. Non skid footwear in place. Alarms used appropriately. Patient instructed to call and wait for staff before getting up. Rounding done to anticipate needs. Appropriate safety devices used for transfers.     Problem: Discharge Planning  Goal: Discharge to home or other facility with appropriate resources  6/13/2024 0924 by Tonya Norton RN  Outcome: Progressing  Flowsheets (Taken 6/13/2024 0650)  Discharge to home or other facility with appropriate resources:   Identify barriers to discharge with patient and caregiver   Arrange for needed discharge resources and transportation as appropriate   Identify discharge learning needs (meds, wound care, etc)   Refer to discharge planning if patient needs post-hospital services based on physician order or complex needs related to functional status, cognitive ability or social support system  6/13/2024 0040 by Martha Marmolejo RN  Outcome: Progressing     Problem: Pain  Goal: Verbalizes/displays adequate comfort level or baseline comfort level  6/13/2024 0924 by Tonya Norton RN  Outcome: Progressing  Flowsheets (Taken 6/13/2024 0924)  Verbalizes/displays adequate comfort level or baseline comfort level:   Encourage patient to monitor pain and request assistance   Assess pain using appropriate pain scale   Administer analgesics based on type and severity of pain and evaluate response   Implement non-pharmacological measures as appropriate and evaluate response  Note: Reviewed pain management and time of medication.  6/13/2024 0040 by Martha Marmolejo, RN  Outcome: Progressing      Problem: ABCDS Injury Assessment  Goal: Absence of physical injury  6/13/2024 0924 by Tonya Norton RN  Outcome: Progressing  6/13/2024 0040 by Martha Marmolejo RN  Outcome: Progressing     Problem: Skin/Tissue Integrity  Goal: Absence of new skin breakdown  Description: 1.  Monitor for areas of redness and/or skin breakdown  2.  Assess vascular access sites hourly  3.  Every 4-6 hours minimum:  Change oxygen saturation probe site  4.  Every 4-6 hours:  If on nasal continuous positive airway pressure, respiratory therapy assess nares and determine need for appliance change or resting period.  6/13/2024 0924 by Tonya Norton RN  Outcome: Progressing  Note: Incision healing.  6/13/2024 0040 by Martha Marmolejo RN  Outcome: Progressing     Problem: Nutrition Deficit:  Goal: Optimize nutritional status  6/13/2024 0924 by Tonya Norton RN  Outcome: Progressing  6/13/2024 0040 by Martha Marmolejo RN  Outcome: Progressing

## 2024-06-13 NOTE — PROGRESS NOTES
home PT.  Activity Tolerance: Patient limited by pain;Patient limited by endurance  Discharge Recommendations: Home with assist PRN;Home with Home health PT  PT Equipment Recommendations  Other: has wheeled walker at home    Goals  Patient Goals   Patient Goals : \"be able to do everything I could before\" \"be able to take care of myself\"  Short Term Goals  Time Frame for Short Term Goals: one week from 5/8/2024  Short Term Goal 1: bed mobility with min assist -met 6/4/2024  Short Term Goal 2: Transfers with CGA - met - 5/30/2024  Short Term Goal 3: Ambulate 50' with wheeled walker with CGA - met 6/4/2024  Short Term Goal 4: Ascend/descend curb step with wheeled walker with CGA - met - 6/13/2024  Long Term Goals  Time Frame for Long Term Goals : upon discharge - approx 10-14 days from 5/28/2024  Long Term Goal 1: bed mobility with modified independence - met - 6/13/2024  Long Term Goal 2: Transfers with modified independence - met - 6/13/2024  Long Term Goal 3: Ambulate 150' with wheeled wlaker with modified independence - met - 6/13/2024  Long Term Goal 4: Ascend/descend curb step with wheeled walker with supervision - not met - requires CGA/SBA for safety and encouargement/reinforcement due to fear and pain with single limb stance on R while advancing LLE  Long Term Goal 5: Ascend/descend 4 steps with bilateral rails with SBA - met and exceeded - 6/13/2024    PLAN OF CARE/SAFETY  Physical Therapy Plan  General Plan:  minutes of therapy at least 5 out of 7 days a week  Days Per Week: 5 Days  Therapy Duration: 2 Weeks  Current Treatment Recommendations: Transfer training;Functional mobility training;Balance training;Strengthening;Therapeutic activities;Co-Treatment;Gait training;Endurance training;Stair training;Pain management;Safety education & training  Safety Devices  Type of Devices: All fall risk precautions in place;Gait belt;Left in chair (returned to room via WC with  transportation)    EDUCATION  Education  Education Given To: Patient;Family  Education Provided: Plan of Care;Safety;Transfer Training;Mobility Training;Equipment;Energy Conservation;Fall Prevention Strategies  Education Provided Comments: need for second rail at home due to heavy reliance on BUEs for steps - patient reports spouse has already or will install second rail prior to her discharge to home tomorrow  Education Method: Verbal  Education Outcome: Continued education needed;Verbalized understanding;Demonstrated understanding        Therapy Time   Individual Concurrent Group Co-treatment   Time In 1035         Time Out 1120         Minutes 45           Timed Code Treatment Minutes: 45 Minutes       EDIN MORALES, PT, #[id number] 06/13/24 at 11:40 AM         Second Session  Patient to in WC PT after OT session.  present - reports that he plans to drive car around back of house and will have only 1 step to enter.  He plans to construct additional rail for front steps this weekend.   Transfer WC to/from mock car with wh walker and modified independence, able to lift LEs into and out of car on her own using UEs to assist RLE.    Ambulated 15' mock car to curb step with wh walker and modified independence.  Ascended/descended curb step with wh walker and CGA cues for walker placement and getting feet close to the base/edge of curb.  Spouse observing since plans to take patient to back entrance of home with only 1 step to enter.   Ambulated 50' on  carpet around table with several turns with modified independence.   Transfers WC to/from mat with wh walker and mod I.  Sit to supine on mat with mod I using UEs to assist RLE.  Issued and reviewed written and illustrated HEP for supine exercises x 10 reps each- shared with spouse due to patient's impaired vision. Needed min A with R hip abd and TKEs and mod A for R SLRs. Also issued HEP for seated exercises.    Pain increased to 5-6/10 with functional activities

## 2024-06-13 NOTE — PROGRESS NOTES
Patient admitted to rehab with closed right hip fracture.  A/Ox4. Transfers with walker x 1. Mobility restrictions: WBAT. On regular, 5 carb, RAÚL, low K diet, tolerating well. Medications taken whole with thin liquids. On heparin for DVT prophylaxis.  Skin: scattered bruising and abrasions. Oxygen: RA. LDA: de-accessed port. Has been continent of bowel and continent of bladder. LBM 6/12. Chair/bed alarms in use and call light in reach.

## 2024-06-13 NOTE — PROGRESS NOTES
ACUTE REHAB UNIT  SPEECH/LANGUAGE PATHOLOGY      [x] Daily  [] Weekly Care Conference Note  [x] Discharge    Patient:Elvia Arguelles      :1960  MRN:[id number]  Rehab Dx/Hx: Closed right hip fracture, initial encounter ([facility]) [S72.001A]    Precautions: falls, seizures, and visual spatial deficits  Home situation: from home with spouse  ST Dx: [] Aphasia  [] Dysarthria  [] Apraxia   [] Oropharyngeal dysphagia [x] Cognitive   Impairment  [] Other:   Initial Speech Therapy Assessment Diagnosis:   1. Cognitive Diagnosis: Pt demonstrated minimal to moderate deficits in domains of time orientation; higher level attention; complex VPS and abstract reasoning; working memory and STM. Pt with visual deficits and right lower extremity pain which is further exacerbating. Visual deficits during paper tasks revealed reduced attention left; slight sloping upward left to right when writing; spatial orientation when putting numbers on a clock. Will continue therapy working on visuo cognition / visuo spatial orientation and visual language remediation  2. Speech Diagnosis: Motor speech audible and intelligible at multiple word utterance level and during discourse. Pt achieve good verbal diadochokinetic rating. Voice quality with minimal claudio /strained quality.  3. Communication Diagnosis: Pt demonstrating concrete to mild complex functional auditory language processing and verbal expressive language skills. Visual language processing at word / phrase level with mild deficits in inattention left of midline (did utilized large print with high color contrast). Pt able to express full name via written expression but minimal perseveration of letters ans slight sloping upward left to right when writing. Ongoing therapy     Date of Admit: 2024  Room #: L0Q-1023/3272-01  Date: 2024       Current functional status (updated daily):         Current Diet Order:ADULT DIET; Regular; 5 carb choices (75 gm/meal); No Added Salt  (3-4 gm); Low Potassium (Less than 3000 mg/day)   Behavior: [x] Alert  [x] Cooperative  [x]  Pleasant  [] Confused  [] Agitated  [] Uncooperative  [] Distractible [] Motivated  [] Self-Limiting [] Anxious  [] Other:  Endurance:  [x] Adequate for participation in SLP sessions  [] Reduced overall  [] Lethargic  [] Other:  Safety: [] No concerns at this time  [] Reduced insight into deficits  []  Reduced safety awareness [] Not following call light procedures  [] Unable to Assess  [x] Other: cues for self-monitoring; visual spatial concerns  Swallowing Precautions: Sit up for all meals and thereafter for 30 minutes  Interventions used during Rehab Stay: see below:   [] Speech/Language Treatment  [] Instruction in HEP [] Group [] Dysphagia Treatment [x] Cognitive Treatment   [] Other:  Barriers toward progress: Pain, Cognitive deficit, and Impaired vision           Date: 6/13/2024      Tx session 1 Tx session 2   Total Timed Code Min SLP Individual Minutes  Time In: 0830  Time Out: 0900  Minutes: 30  Coded treatment time  30 N/a       Group Treatment Minutes 0 0   Co-Treat Minutes 0 0   Variance/Reason:  N/A    Pain Uncomfortable but tolerable pain in R leg    Pain Intervention [] RN notified  [] Repositioned  [x] Intervention offered and patient declined  [] N/A  [x] Other:  Pt reports RN gave pain med and pt currently icing leg [] RN notified  [] Repositioned  [] Intervention offered and patient declined  [] N/A  [] Other:   Subjective     Pt seen bedside   Pt in recliner   Good effort  Pt is oriented to d/c planned 6/14/2024        Objective:  Goals: Pt goal is to get my vision back; get pain better controled for right leg and move better. Therapy worked toward visual stimulation targeting visuo spatial ; left visual attention; etc    Short Term Goals  Goal 1: Pt will demonstrate improved visual language processing at word/phrase level , using visual strategies, >90%        Continued emphasis on use of visual

## 2024-06-13 NOTE — PROGRESS NOTES
Patient admitted to rehab with right fx repair.  A/Ox4. Transfers with walker and cues. Mobility restrictions: hip safety. On 5 carb, RAÚL, low k diet, tolerating well, eats small meals. Medications taken whole. On heparin for DVT prophylaxis.  Skin: incision healing. Has been incontinent of bowel and continent of bladder. LBM today. Chair/bed alarms in use and call light in reach. Will monitor for safety. Did review CHF and diabetic needs.

## 2024-06-14 VITALS
SYSTOLIC BLOOD PRESSURE: 125 MMHG | RESPIRATION RATE: 16 BRPM | HEART RATE: 67 BPM | OXYGEN SATURATION: 95 % | WEIGHT: 123.68 LBS | DIASTOLIC BLOOD PRESSURE: 62 MMHG | TEMPERATURE: 98.5 F | HEIGHT: 59 IN | BODY MASS INDEX: 24.93 KG/M2

## 2024-06-14 LAB
ALBUMIN SERPL-MCNC: 3.2 G/DL (ref 3.4–5)
ANION GAP SERPL CALCULATED.3IONS-SCNC: 9 MMOL/L (ref 3–16)
BASOPHILS # BLD: 0 K/UL (ref 0–0.2)
BASOPHILS NFR BLD: 0.8 %
BUN SERPL-MCNC: 20 MG/DL (ref 7–20)
CALCIUM SERPL-MCNC: 9.6 MG/DL (ref 8.3–10.6)
CHLORIDE SERPL-SCNC: 105 MMOL/L (ref 99–110)
CO2 SERPL-SCNC: 24 MMOL/L (ref 21–32)
CREAT SERPL-MCNC: 1.7 MG/DL (ref 0.6–1.2)
DEPRECATED RDW RBC AUTO: 17.2 % (ref 12.4–15.4)
EOSINOPHIL # BLD: 0.3 K/UL (ref 0–0.6)
EOSINOPHIL NFR BLD: 6.7 %
GFR SERPLBLD CREATININE-BSD FMLA CKD-EPI: 33 ML/MIN/{1.73_M2}
GLUCOSE BLD-MCNC: 113 MG/DL (ref 70–99)
GLUCOSE SERPL-MCNC: 112 MG/DL (ref 70–99)
HCT VFR BLD AUTO: 24.2 % (ref 36–48)
HGB BLD-MCNC: 8.4 G/DL (ref 12–16)
LYMPHOCYTES # BLD: 1.2 K/UL (ref 1–5.1)
LYMPHOCYTES NFR BLD: 27.8 %
MCH RBC QN AUTO: 30.5 PG (ref 26–34)
MCHC RBC AUTO-ENTMCNC: 34.6 G/DL (ref 31–36)
MCV RBC AUTO: 88 FL (ref 80–100)
MONOCYTES # BLD: 0.5 K/UL (ref 0–1.3)
MONOCYTES NFR BLD: 11.5 %
NEUTROPHILS # BLD: 2.2 K/UL (ref 1.7–7.7)
NEUTROPHILS NFR BLD: 53.2 %
PERFORMED ON: ABNORMAL
PHOSPHATE SERPL-MCNC: 3.4 MG/DL (ref 2.5–4.9)
PLATELET # BLD AUTO: 192 K/UL (ref 135–450)
PMV BLD AUTO: 8.1 FL (ref 5–10.5)
POTASSIUM SERPL-SCNC: 4 MMOL/L (ref 3.5–5.1)
RBC # BLD AUTO: 2.75 M/UL (ref 4–5.2)
SODIUM SERPL-SCNC: 138 MMOL/L (ref 136–145)
WBC # BLD AUTO: 4.2 K/UL (ref 4–11)

## 2024-06-14 PROCEDURE — 6370000000 HC RX 637 (ALT 250 FOR IP): Performed by: INTERNAL MEDICINE

## 2024-06-14 PROCEDURE — 85025 COMPLETE CBC W/AUTO DIFF WBC: CPT

## 2024-06-14 PROCEDURE — 94760 N-INVAS EAR/PLS OXIMETRY 1: CPT

## 2024-06-14 PROCEDURE — 36415 COLL VENOUS BLD VENIPUNCTURE: CPT

## 2024-06-14 PROCEDURE — 6360000002 HC RX W HCPCS: Performed by: STUDENT IN AN ORGANIZED HEALTH CARE EDUCATION/TRAINING PROGRAM

## 2024-06-14 PROCEDURE — 6370000000 HC RX 637 (ALT 250 FOR IP): Performed by: STUDENT IN AN ORGANIZED HEALTH CARE EDUCATION/TRAINING PROGRAM

## 2024-06-14 PROCEDURE — 6370000000 HC RX 637 (ALT 250 FOR IP): Performed by: PHYSICAL MEDICINE & REHABILITATION

## 2024-06-14 PROCEDURE — 80069 RENAL FUNCTION PANEL: CPT

## 2024-06-14 RX ADMIN — NIFEDIPINE 30 MG: 30 TABLET, EXTENDED RELEASE ORAL at 08:28

## 2024-06-14 RX ADMIN — Medication 2 CAPSULE: at 08:28

## 2024-06-14 RX ADMIN — HYDROCORTISONE 10 MG: 10 TABLET ORAL at 08:27

## 2024-06-14 RX ADMIN — FERROUS SULFATE TAB EC 324 MG (65 MG FE EQUIVALENT) 324 MG: 324 (65 FE) TABLET DELAYED RESPONSE at 08:27

## 2024-06-14 RX ADMIN — LOPERAMIDE HYDROCHLORIDE 2 MG: 2 CAPSULE ORAL at 09:31

## 2024-06-14 RX ADMIN — PANTOPRAZOLE SODIUM 40 MG: 40 TABLET, DELAYED RELEASE ORAL at 06:06

## 2024-06-14 RX ADMIN — ESCITALOPRAM OXALATE 10 MG: 10 TABLET ORAL at 08:27

## 2024-06-14 RX ADMIN — HEPARIN SODIUM 5000 UNITS: 5000 INJECTION INTRAVENOUS; SUBCUTANEOUS at 06:06

## 2024-06-14 RX ADMIN — OXYCODONE 2.5 MG: 5 TABLET ORAL at 04:52

## 2024-06-14 RX ADMIN — LEVETIRACETAM 500 MG: 500 TABLET, FILM COATED ORAL at 08:28

## 2024-06-14 ASSESSMENT — PAIN DESCRIPTION - PAIN TYPE: TYPE: ACUTE PAIN;SURGICAL PAIN

## 2024-06-14 ASSESSMENT — PAIN DESCRIPTION - LOCATION: LOCATION: HIP

## 2024-06-14 ASSESSMENT — PAIN DESCRIPTION - ONSET: ONSET: ON-GOING

## 2024-06-14 ASSESSMENT — PAIN - FUNCTIONAL ASSESSMENT: PAIN_FUNCTIONAL_ASSESSMENT: ACTIVITIES ARE NOT PREVENTED

## 2024-06-14 ASSESSMENT — PAIN DESCRIPTION - ORIENTATION: ORIENTATION: LEFT

## 2024-06-14 ASSESSMENT — PAIN DESCRIPTION - DESCRIPTORS: DESCRIPTORS: ACHING

## 2024-06-14 ASSESSMENT — PAIN SCALES - GENERAL
PAINLEVEL_OUTOF10: 4
PAINLEVEL_OUTOF10: 4
PAINLEVEL_OUTOF10: 6
PAINLEVEL_OUTOF10: 0

## 2024-06-14 ASSESSMENT — PAIN DESCRIPTION - FREQUENCY: FREQUENCY: CONTINUOUS

## 2024-06-14 NOTE — PROGRESS NOTES
Patient has been discharged per MD orders. Patient verbalizes understanding of all instructions, medications, and follow up. All belongings have been gathered and packed. Family at bedside to transport patient from hospital.

## 2024-06-14 NOTE — PROGRESS NOTES
Provision of Current Reconciled Medication List to Subsequent Provider at Discharge  []No, current reconciled medication list not provided to the subsequent provider.  [x]Yes, current reconciled medication list provided to the subsequent provider. (**Select route of transmission below**)   [x] Via Electronic Health Record   []Via Health Information Exchange Organization  [] Verbal (e.g. in person, telephone, video conferencing)  []Paper-based (e.g. fax, copies, printouts)   []Other Methods (e.g. texting, email, CDs)    Provision of Current Reconciled Medication List to Patient at Discharge  []No, current reconciled medication list not provided to the patient, family and/or caregiver.   [x]Yes, current reconciled medication list provided to the patient, family and/or caregiver.  (**Select route of transmission below**)   [x] Via Electronic Health Record (e.g., electronic access to patient portal)   [] Via Health Information Exchange Organization  [] Verbal (e.g. in person, telephone, video conferencing)  []Paper-based (e.g. fax, copies, printouts)   []Other Methods (e.g. texting, email, CDs)    High Risk Drug Classes:  Use and Indication    Is taking: Check if the pt is taking any medications by pharmacological classification, not how it is used, in the following classes  Indication noted: If column 1 is checked, check if there is an indication noted for all meds in the drug class Is taking  (check all that apply) Indication noted (check all that apply)   Antipsychotic [] []   Anticoagulant [] []   Antibiotic [] []   Opioid [x] []   Antiplatelet [] []   Hypoglycemic (including insulin) [x] []   None of the above []     Special Treatments, Procedures, and Programs    Check all of the following treatments, procedures, and programs that apply at discharge. At Discharge (check all that apply)   Cancer Treatments   A1. Chemotherapy []           A2. IV []           A3. Oral []           A10. Other []   B1. Radiation []    Respiratory Therapies   C1. Oxygen Therapy []           C2. Continuous []           C3. Intermittent []           C4. High-concentration []   D1. Suctioning []           D2. Scheduled []           D3. As needed []   E1. Tracheostomy Care []   F1. Invasive Mechanical Ventilator (ventilator or respirator) []   G1. Non-invasive Mechanical Ventilator []           G2. BiPAP []           G3. CPAP []   Other   H1. IV Medications []           H2. Vasoactive medications []           H3. Antibiotics []           H4. Anticoagulation []           H10. Other []   I1. Transfusions []   J1. Dialysis []           J2. Hemodialysis []           J3. Peritoneal dialysis []   O1. IV access []           O2. Peripheral []           O3. Midline []           O4. Central (PICC, tunneled, port) [x]      None of the above (select if no Cancer, Respiratory, or Other boxes are checked) []

## 2024-06-14 NOTE — PLAN OF CARE
Problem: Safety - Adult  Goal: Free from fall injury  6/14/2024 0949 by Louise Lovell, RN  Outcome: Progressing  Flowsheets (Taken 6/14/2024 0941)  Free From Fall Injury: Instruct family/caregiver on patient safety     Problem: Discharge Planning  Goal: Discharge to home or other facility with appropriate resources  6/14/2024 0949 by Louise Lovell RN  Outcome: Progressing     Problem: Pain  Goal: Verbalizes/displays adequate comfort level or baseline comfort level  6/14/2024 0949 by Louise Lovell RN  Outcome: Progressing     Problem: ABCDS Injury Assessment  Goal: Absence of physical injury  6/14/2024 0949 by Louise Lovell RN  Outcome: Progressing  Flowsheets (Taken 6/14/2024 0941)  Absence of Physical Injury: Implement safety measures based on patient assessment

## 2024-06-14 NOTE — PLAN OF CARE
Problem: Safety - Adult  Goal: Free from fall injury  Outcome: Progressing  Note: Fall risk band on patient. Orange light on outside of room. Non skid footwear in place. Alarms used appropriately. Patient instructed to call and wait for staff before getting up. Rounding done to anticipate needs. Appropriate safety devices used for transfers.     Problem: Skin/Tissue Integrity  Goal: Absence of new skin breakdown  Description: 1.  Monitor for areas of redness and/or skin breakdown  2.  Assess vascular access sites hourly  3.  Every 4-6 hours minimum:  Change oxygen saturation probe site  4.  Every 4-6 hours:  If on nasal continuous positive airway pressure, respiratory therapy assess nares and determine need for appliance change or resting period.  Outcome: Progressing  Note: Skin assessment completed on admission and every shift. Barrier wipes used in the event of incontinence. Pressure relief techniques used as needed while in chair and in bed. Position changes encouraged at least every two hours while in bed.

## 2024-06-14 NOTE — PROGRESS NOTES
CLINICAL PHARMACY NOTE: MEDS TO BEDS    Total # of Prescriptions Filled: 7   The following medications were delivered to the patient:  Current Discharge Medication List        START taking these medications    Details   fentaNYL (DURAGESIC) 12 MCG/HR Place 1 patch onto the skin every 72 hours for 9 days. Max Daily Amount: 1 patch  Qty: 3 patch, Refills: 0    Comments: Reduce doses taken as pain becomes manageable  Associated Diagnoses: Closed right hip fracture, initial encounter ([facility])      oxyCODONE (ROXICODONE) 5 MG immediate release tablet Take 0.5 tablets by mouth every 6 hours as needed for Pain for up to 7 days. Max Daily Amount: 10 mg  Qty: 14 tablet, Refills: 0    Comments: Reduce doses taken as pain becomes manageable  Associated Diagnoses: Closed right hip fracture, initial encounter ([facility])      ferrous sulfate 324 (65 Fe) MG EC tablet Take 1 tablet by mouth daily (with breakfast)  Qty: 30 tablet, Refills: 0      methocarbamol (ROBAXIN) 750 MG tablet Take 1 tablet by mouth every 6 hours as needed (muscle spasms)  Qty: 40 tablet, Refills: 0         levETIRAcetam 500 MG tablet   NIFEdipine 30 MG extended release tablet   pantoprazole 40 MG tablet     Additional Documentation:

## 2024-06-14 NOTE — DISCHARGE SUMMARY
retired/available    Speech therapy:    Speech Therapy Diagnosis  Cognitive Diagnosis: Pt demonstrated minimal to moderate deficits in domains of time orientation; higher level attention; complex VPS and abstract reasoning; working memory and STM. Pt with visual deficits and right lower extremity pain which is further exacerbating. Visual deficits during paper tasks revealed reduced attention left; slight sloping upwar left to right when writing; spatial orientation when putting numbers on a clock. Will continue therapy working on visuo cognition / visuo spatial orientation and visual language remediation  Speech Diagnosis: Motor speech audible and intelligible at multiple word utterance level and during discourse. Pt achieve good verbal diadochokinetic rating. Voice quality wtih minimal claudio /strained quality.  Communication Diagnosis: Pt demonstrating concrete to mild complex funtional auditory language processing and verbal expressive language skills. Visual langauge processing at word / phrase level wtih mild deficits in inattention left of midling (did utilized large print with high color contrast). Pt able to express full name via written expression but minimal perseveration of letters ans slight sloping upward left to right when writing. Ongoing therapy         Significant Diagnostics:   Lab Results   Component Value Date    CREATININE 1.7 (H) 06/14/2024    BUN 20 06/14/2024     06/14/2024    K 4.0 06/14/2024     06/14/2024    CO2 24 06/14/2024       Lab Results   Component Value Date    WBC 4.2 06/14/2024    HGB 8.4 (L) 06/14/2024    HCT 24.2 (L) 06/14/2024    MCV 88.0 06/14/2024     06/14/2024       Disposition:  Home with spouse  Services:  PT, OT, SLP, RN  DME: TTLAVERNE, TSRAYSHAWN, RW, walker basket/bag      Discharge Condition: Stable    Follow-up:  See after visit summary from hospitalization    Discharge Medications:     Medication List        START taking these medications      fentaNYL 12  MCG/HR  Commonly known as: DURAGESIC  Place 1 patch onto the skin every 72 hours for 9 days. Max Daily Amount: 1 patch  Start taking on: Maria Esther 15, 2024  Notes to patient: Pain patch     ferrous sulfate 324 (65 Fe) MG EC tablet  Take 1 tablet by mouth daily (with breakfast)  Notes to patient: Iron supplement     methocarbamol 750 MG tablet  Commonly known as: ROBAXIN  Take 1 tablet by mouth every 6 hours as needed (muscle spasms)  Notes to patient: Muscle relaxant     oxyCODONE 5 MG immediate release tablet  Commonly known as: ROXICODONE  Take 0.5 tablets by mouth every 6 hours as needed for Pain for up to 7 days. Max Daily Amount: 10 mg  Notes to patient: Pain medication            CHANGE how you take these medications      NIFEdipine 30 MG extended release tablet  Commonly known as: PROCARDIA XL  Take 1 tablet by mouth in the morning and at bedtime  What changed: when to take this  Notes to patient: Blood pressure            CONTINUE taking these medications      Dexcom G6 Sensor Misc  CHANGE SENSOR EVERY 10 DAYS     Dexcom G6 Transmitter Misc  CHANGE TRANSMITTER EVERY 90 DAYS     escitalopram 10 MG tablet  Commonly known as: LEXAPRO  TAKE ONE TABLET BY MOUTH ONCE A DAY  Notes to patient: Mood, anti depressant     * hydrocortisone 10 MG tablet  Commonly known as: CORTEF  Take 1 tablet by mouth daily     * hydrocortisone 5 MG tablet  Commonly known as: CORTEF  Take 1 tablet by mouth daily     insulin lispro 100 UNIT/ML Soln injection vial  Commonly known as: HumaLOG  USE IN INSULIN PUMP; TOTAL DAILY DOSE IS 30 UNITS. VIAL EXPIRES 28 DAYS AFTER OPENING.     levETIRAcetam 500 MG tablet  Commonly known as: Keppra  Take 1 tablet by mouth 2 times daily  Notes to patient: Seizure prevention     medical marijuana     * Omnipod 5 G6 Pods (Gen 5) Misc  CHANGE PODS Q 2 DAYS     * Omnipod 5 G6 Pods (Gen 5) Misc  Change pods q 3 days     ondansetron 4 MG disintegrating tablet  Commonly known as: ZOFRAN-ODT  Take 1 tablet by

## 2024-06-14 NOTE — PROGRESS NOTES
Patient admitted to rehab with closed right hip fracture.  A/Ox4. Transfers with walker x1. Mobility restrictions: WBAT. On regular, 5 carb, NAD, low K diet, tolerating well. Medications taken whole with thins. On Heparin for DVT prophylaxis.  Skin: surgical incision to right hip, scattered bruising and abrasions. Oxygen: RA. LDA: de-accessed port. Has been incontinent at times of bowel and continent of bladder. LBM 6/14. Chair/bed alarms in use and call light in reach. Will monitor for safety. Electronically signed by Louise Lovell RN on 6/14/2024 at 9:51 AM

## 2024-06-14 NOTE — CARE COORDINATION
Reviewed DC for today with bedside RN, Stephy.  No DME is needed.  Retail to deliver scripts.  Home Care arranged.  Family to transport.  Referral sent for [employer] Button to [facility].  JEOVANNY Caraballo     Case Management   [phone]    6/14/2024  9:26 AM

## 2024-06-14 NOTE — PROGRESS NOTES
[facility]  Glycemic Control      NAME: Elvia Arguelles  MEDICAL RECORD NUMBER:  [id number]  AGE: 63 y.o.   GENDER: female  : 1960  EPISODE DATE:  2024     Data     Recent Labs     24  0647 24  1122 24  1545 24  0656   POCGLU 289* 202* 94 306* 211* 113*       HgBA1c:    Lab Results   Component Value Date/Time    LABA1C 7.2 2024 10:17 PM       BUN/Creatinine:    Lab Results   Component Value Date/Time    BUN 20 2024 05:32 AM    CREATININE 1.7 2024 05:32 AM       Medications  Scheduled Medications:   NIFEdipine  30 mg Oral BID    insulin lispro  2-3 Units SubCUTAneous TID WC    insulin glargine  5 Units SubCUTAneous Nightly    fentaNYL  1 patch TransDERmal Q72H    levETIRAcetam  500 mg Oral BID    ferrous sulfate  324 mg Oral Daily with breakfast    insulin lispro  0-4 Units SubCUTAneous TID WC    insulin lispro  0-4 Units SubCUTAneous Nightly    escitalopram  10 mg Oral Daily    hydrocortisone  10 mg Oral Daily    pantoprazole  40 mg Oral BID AC    heparin (porcine)  5,000 Units SubCUTAneous 3 times per day    hydrocortisone  5 mg Oral Nightly    lactobacillus  2 capsule Oral BID WC    rOPINIRole  2 mg Oral Nightly       Diet  Current diet/supplement order: ADULT DIET; Regular; 5 carb choices (75 gm/meal); No Added Salt (3-4 gm); Low Potassium (Less than 3000 mg/day)     Recorded PO: PO Meals Eaten (%): 76 - 100% last meal in flowsheets      Action      Physician Notified of event: Yes, Delia Martinez MD    Glucose Target: 140-180, avoid hypoglycemia    Per Pump Trainer- recommendations to avoid applying insulin pump POD, due to basal being given previous evening. Recommendation to not appy new POC to prevent hypoglycemia. Trainer provided, option to set a temp basal if provider agreeable with plan.     Discussed using temp basal with pt and her spouse, due to not being familiar with that option on Omnipod

## 2024-06-14 NOTE — PROGRESS NOTES
[facility] Inpatient Nephrology Note        CC:FAVIAN on CKD   Labs reviewed .  Seen on rehab    Soc Hx:   Visitor present  ROS: no complaints       Medications     NIFEdipine  30 mg Oral BID    insulin lispro  2-3 Units SubCUTAneous TID WC    insulin glargine  5 Units SubCUTAneous Nightly    fentaNYL  1 patch TransDERmal Q72H    levETIRAcetam  500 mg Oral BID    ferrous sulfate  324 mg Oral Daily with breakfast    insulin lispro  0-4 Units SubCUTAneous TID WC    insulin lispro  0-4 Units SubCUTAneous Nightly    escitalopram  10 mg Oral Daily    hydrocortisone  10 mg Oral Daily    pantoprazole  40 mg Oral BID AC    heparin (porcine)  5,000 Units SubCUTAneous 3 times per day    hydrocortisone  5 mg Oral Nightly    lactobacillus  2 capsule Oral BID WC    rOPINIRole  2 mg Oral Nightly         OBJECTIVE      Physical    TEMPERATURE:  Current - Temp: 98.5 °F (36.9 °C); Max - Temp  Av.3 °F (36.8 °C)  Min: 98.1 °F (36.7 °C)  Max: 98.5 °F (36.9 °C)  RESPIRATIONS RANGE: Resp  Av.4  Min: 16  Max: 17  PULSE RANGE: Pulse  Av  Min: 67  Max: 69  BLOOD PRESSURE RANGE:  Systolic (24hrs), Av , Min:125 , Max:137   ; Diastolic (24hrs), Av, Min:62, Max:65    PULSE OXIMETRY RANGE: SpO2  Av %  Min: 95 %  Max: 99 %  24HR INTAKE/OUTPUT:    Intake/Output Summary (Last 24 hours) at 2024 0945  Last data filed at 2024 2324  Gross per 24 hour   Intake 720 ml   Output --   Net 720 ml         GEN: no distress  HEENT: normocephalic, atraumatic  CV: RRR  LUNGS: clear bilaterally, unlabored  ABD: + bowel sounds. No distension. No tenderness  EXT: Trace edema RLE, no edema LLE  SKIN: no rash  NEURO: no tremor    Medications   NIFEdipine  30 mg Oral BID    insulin lispro  2-3 Units SubCUTAneous TID WC    insulin glargine  5 Units SubCUTAneous Nightly    fentaNYL  1 patch TransDERmal Q72H    levETIRAcetam  500 mg Oral BID    ferrous sulfate  324 mg Oral Daily with breakfast     insulin lispro  0-4 Units SubCUTAneous TID WC    insulin lispro  0-4 Units SubCUTAneous Nightly    escitalopram  10 mg Oral Daily    hydrocortisone  10 mg Oral Daily    pantoprazole  40 mg Oral BID AC    heparin (porcine)  5,000 Units SubCUTAneous 3 times per day    hydrocortisone  5 mg Oral Nightly    lactobacillus  2 capsule Oral BID WC    rOPINIRole  2 mg Oral Nightly       Data      Lab Results   Component Value Date    CREATININE 1.7 (H) 06/14/2024    BUN 20 06/14/2024     06/14/2024    K 4.0 06/14/2024     06/14/2024    CO2 24 06/14/2024     Lab Results   Component Value Date    WBC 4.2 06/14/2024    HGB 8.4 (L) 06/14/2024    HCT 24.2 (L) 06/14/2024    MCV 88.0 06/14/2024     06/14/2024     Lab Results   Component Value Date    IRON 16 (L) 05/21/2024    TIBC 189 (L) 05/21/2024    FERRITIN 495.2 (H) 05/21/2024     Lab Results   Component Value Date    CALCIUM 9.6 06/14/2024    PHOS 3.4 06/14/2024         ASSESSMENT/PLAN:    1. FAVIAN on CKD  - due to relative hypotension and rhabdo  - Resolved    2. CKD Stage 3b    - serum creatinine at baseline ~ 2  - UPC 0.6  - follow up with Dr. Wood  - Creatinine below baseline    3. HTN  - BP controlled   - Off   ARBs     4. DKA  - resolved     5. Encephalopathy - Resolved  - MRI with features of PRES   - not related to HTN  - has metastatic melanoma    6. R intertrochanteric femur fx  - per rehab    7. Adrenal insuff  - on Hydrocortisone    8. CKD MBD  - phos at target    9. Hypokalemia   S/p supp      10. Anemia  - Hemoglobin noted .     F.u with Dr Wood in 3-4 wks     Ashley Wood MD,FACP

## 2024-06-17 ENCOUNTER — FOLLOWUP TELEPHONE ENCOUNTER (OUTPATIENT)
Dept: REHABILITATION | Age: 64
End: 2024-06-17

## 2024-06-17 ENCOUNTER — TELEPHONE (OUTPATIENT)
Dept: ENDOCRINOLOGY | Age: 64
End: 2024-06-17

## 2024-06-17 DIAGNOSIS — E10.65 POORLY CONTROLLED TYPE 1 DIABETES MELLITUS WITH NEUROPATHY (HCC): ICD-10-CM

## 2024-06-17 DIAGNOSIS — E78.2 MIXED HYPERLIPIDEMIA: ICD-10-CM

## 2024-06-17 DIAGNOSIS — E10.65 POORLY CONTROLLED TYPE 1 DIABETES MELLITUS WITH RETINOPATHY (HCC): ICD-10-CM

## 2024-06-17 DIAGNOSIS — Z83.49 FAMILY HISTORY OF THYROID DISEASE: ICD-10-CM

## 2024-06-17 DIAGNOSIS — E55.9 VITAMIN D DEFICIENCY: ICD-10-CM

## 2024-06-17 DIAGNOSIS — E10.40 POORLY CONTROLLED TYPE 1 DIABETES MELLITUS WITH NEUROPATHY (HCC): ICD-10-CM

## 2024-06-17 DIAGNOSIS — E10.319 POORLY CONTROLLED TYPE 1 DIABETES MELLITUS WITH RETINOPATHY (HCC): ICD-10-CM

## 2024-06-17 DIAGNOSIS — I10 PRIMARY HYPERTENSION: ICD-10-CM

## 2024-06-17 DIAGNOSIS — E10.21 DIABETIC NEPHROPATHY ASSOCIATED WITH TYPE 1 DIABETES MELLITUS (HCC): ICD-10-CM

## 2024-06-17 RX ORDER — INSULIN PMP CART,AUT,G6/7,CNTR
EACH SUBCUTANEOUS
Qty: 45 EACH | Refills: 1 | Status: SHIPPED | OUTPATIENT
Start: 2024-06-17

## 2024-06-17 NOTE — TELEPHONE ENCOUNTER
Spoke w/ pt. [facility] as pt had PA on file through 5/24/24    Pharmacist stated that they didn't had correct rx on file. Resent rx in. Laura also stated that they saw the PA approval on file, they were waiting on pt consent to process. Pt aware of status and will call them.

## 2024-06-17 NOTE — TELEPHONE ENCOUNTER
Call from pt stating that she has been having trouble getting a refill on her Omnipod 5 G6     Pt stated that [employer] will not cover the omnipod pods     Pt is requesting to speak with Chiquita     Please advise   CB# [phone]

## 2024-06-18 ENCOUNTER — COMMUNITY OUTREACH (OUTPATIENT)
Dept: FAMILY MEDICINE CLINIC | Age: 64
End: 2024-06-18

## 2024-06-18 PROBLEM — Z01.818 PRE-OPERATIVE EXAMINATION: Status: RESOLVED | Noted: 2024-05-19 | Resolved: 2024-06-18

## 2024-06-18 NOTE — TELEPHONE ENCOUNTER
Call from pt stating that her insurance will not cover the omnipod pump     Pt is wanting to know if another brand for the pump could be sent in?    Pt is requesting a call back from Chiquita     Please advise   CB# [phone]

## 2024-06-18 NOTE — TELEPHONE ENCOUNTER
Spoke w/ pt. Pt on dexcom g6. Insurance covers the other pumps, just not OP5 at a lower tier. Sending referral to tandem. Pt aware belén will contact her.

## 2024-06-20 DIAGNOSIS — E10.65 POORLY CONTROLLED TYPE 1 DIABETES MELLITUS WITH NEUROPATHY (HCC): ICD-10-CM

## 2024-06-20 DIAGNOSIS — E55.9 VITAMIN D DEFICIENCY: ICD-10-CM

## 2024-06-20 DIAGNOSIS — E10.65 POORLY CONTROLLED TYPE 1 DIABETES MELLITUS WITH RETINOPATHY (HCC): ICD-10-CM

## 2024-06-20 DIAGNOSIS — E78.2 MIXED HYPERLIPIDEMIA: ICD-10-CM

## 2024-06-20 DIAGNOSIS — E10.40 POORLY CONTROLLED TYPE 1 DIABETES MELLITUS WITH NEUROPATHY (HCC): ICD-10-CM

## 2024-06-20 DIAGNOSIS — E10.319 POORLY CONTROLLED TYPE 1 DIABETES MELLITUS WITH RETINOPATHY (HCC): ICD-10-CM

## 2024-06-20 DIAGNOSIS — I10 PRIMARY HYPERTENSION: ICD-10-CM

## 2024-06-20 DIAGNOSIS — Z83.49 FAMILY HISTORY OF THYROID DISEASE: ICD-10-CM

## 2024-06-20 DIAGNOSIS — E10.21 DIABETIC NEPHROPATHY ASSOCIATED WITH TYPE 1 DIABETES MELLITUS (HCC): ICD-10-CM

## 2024-06-21 RX ORDER — PROCHLORPERAZINE 25 MG/1
SUPPOSITORY RECTAL
Qty: 6 EACH | Refills: 1 | Status: SHIPPED | OUTPATIENT
Start: 2024-06-21

## 2024-06-22 ENCOUNTER — PATIENT MESSAGE (OUTPATIENT)
Dept: ENDOCRINOLOGY | Age: 64
End: 2024-06-22

## 2024-06-22 DIAGNOSIS — I10 PRIMARY HYPERTENSION: ICD-10-CM

## 2024-06-22 DIAGNOSIS — E10.40 POORLY CONTROLLED TYPE 1 DIABETES MELLITUS WITH NEUROPATHY (HCC): ICD-10-CM

## 2024-06-22 DIAGNOSIS — E10.21 DIABETIC NEPHROPATHY ASSOCIATED WITH TYPE 1 DIABETES MELLITUS (HCC): ICD-10-CM

## 2024-06-22 DIAGNOSIS — E10.319 POORLY CONTROLLED TYPE 1 DIABETES MELLITUS WITH RETINOPATHY (HCC): ICD-10-CM

## 2024-06-22 DIAGNOSIS — E10.65 POORLY CONTROLLED TYPE 1 DIABETES MELLITUS WITH NEUROPATHY (HCC): ICD-10-CM

## 2024-06-22 DIAGNOSIS — Z83.49 FAMILY HISTORY OF THYROID DISEASE: ICD-10-CM

## 2024-06-22 DIAGNOSIS — E78.2 MIXED HYPERLIPIDEMIA: ICD-10-CM

## 2024-06-22 DIAGNOSIS — E55.9 VITAMIN D DEFICIENCY: ICD-10-CM

## 2024-06-22 DIAGNOSIS — E10.65 POORLY CONTROLLED TYPE 1 DIABETES MELLITUS WITH RETINOPATHY (HCC): ICD-10-CM

## 2024-06-24 RX ORDER — PROCHLORPERAZINE 25 MG/1
SUPPOSITORY RECTAL
Qty: 1 EACH | Refills: 1 | Status: SHIPPED | OUTPATIENT
Start: 2024-06-24 | End: 2024-06-25 | Stop reason: SDUPTHER

## 2024-06-24 RX ORDER — PROCHLORPERAZINE 25 MG/1
SUPPOSITORY RECTAL
Qty: 6 EACH | Refills: 1 | Status: SHIPPED | OUTPATIENT
Start: 2024-06-24 | End: 2024-06-25 | Stop reason: SDUPTHER

## 2024-06-24 NOTE — TELEPHONE ENCOUNTER
From: Elvia Arguelles  To: Dr. Bebe Latif  Sent: 6/22/2024 2:11 PM EDT  Subject: Dexcom refill     I tried to refill my Dexcom prescription through [facility] but they canceled it. They said the prescriber said it would be fillled with something else. Can you please resend the prescription? I only have enough for 2 weeks.

## 2024-06-25 ENCOUNTER — TELEPHONE (OUTPATIENT)
Dept: PHARMACY | Facility: CLINIC | Age: 64
End: 2024-06-25

## 2024-06-25 RX ORDER — PROCHLORPERAZINE 25 MG/1
SUPPOSITORY RECTAL
Qty: 6 EACH | Refills: 1 | Status: SHIPPED | OUTPATIENT
Start: 2024-06-25

## 2024-06-25 RX ORDER — PROCHLORPERAZINE 25 MG/1
SUPPOSITORY RECTAL
Qty: 1 EACH | Refills: 1 | Status: SHIPPED | OUTPATIENT
Start: 2024-06-25

## 2024-06-25 NOTE — TELEPHONE ENCOUNTER
2nd Quarterly Reminder made to patient for the DM Program.     Called patient with outstanding requirements for the Be Well With Diabetes Program.    Patient stated she retired and no longer has [facility] benefits. Will route to supervisor to review and dis enroll patient from program.       Ashu Mckinley [facility]  Clinical Pharmacy   Phone: [phone], Option #3       For Pharmacy Admin Tracking Only    Program: [employer]  CPA in place:  No  Gap Closed?: Yes   Time Spent (min): 5

## 2024-06-26 ENCOUNTER — CLINICAL DOCUMENTATION (OUTPATIENT)
Dept: PHARMACY | Facility: CLINIC | Age: 64
End: 2024-06-26

## 2024-06-26 NOTE — TELEPHONE ENCOUNTER
Noted. Patient will be discharged from the DM Program.    Isela Serrano [facility]   Population Health Clinical   [facility] Clinical Pharmacy  Department, toll free: [phone], option 3

## 2024-06-26 NOTE — PROGRESS NOTES
Pharmacy Pop Care Documentation:     Elvai Arguelles is being removed from the diabetes management program for the following reason(s):  6/25/24: Per Patient she retired and no longer has [facility] benefits    Isela Serrano    For Pharmacy Admin Tracking Only    Program: Pop Health  CPA in place:  No  Gap Closed?: Yes   Time Spent (min): 5

## 2024-07-01 ENCOUNTER — TELEPHONE (OUTPATIENT)
Dept: ORTHOPEDIC SURGERY | Age: 64
End: 2024-07-01

## 2024-07-01 ENCOUNTER — OFFICE VISIT (OUTPATIENT)
Dept: ORTHOPEDIC SURGERY | Age: 64
End: 2024-07-01

## 2024-07-01 VITALS — BODY MASS INDEX: 24.8 KG/M2 | HEIGHT: 59 IN | WEIGHT: 123 LBS | RESPIRATION RATE: 16 BRPM

## 2024-07-01 DIAGNOSIS — S72.001S CLOSED FRACTURE OF RIGHT HIP, SEQUELA: Primary | ICD-10-CM

## 2024-07-01 PROCEDURE — 99024 POSTOP FOLLOW-UP VISIT: CPT | Performed by: ORTHOPAEDIC SURGERY

## 2024-07-01 NOTE — PROGRESS NOTES
[facility]  Office Visit    Chief Complaint: Follow-up for right intertrochanteric femur fracture    HPI:  Elvia Arguelles is a 63 y.o. who is here in follow-up of right intra-articular femur fracture for which he underwent cephalomedullary nail on May 19, 2024.  She is currently working comfortable therapy and walking with use of walker.  She reports that pain continues to improve.  She is on oxycodone for chronic pain.    Exam:  Appearance: sitting in exam room chair, appears to be in no acute distress, awake and alert  Resp: unlabored breathing on room air  Skin: warm, dry and intact with out erythema or significant increased temperature  Neuro: grossly intact both lower extremities. Intact sensation to light touch. Motor exam 4+ to 5/5 in all major motor groups.  RLE: Examination demonstrates negative logroll and negative Stinchfield.  There is brisk capillary refill.  She demonstrates active hip flexion.    Imaging:  AP pelvis, AP and frog-leg lateral views of the right hip were performed and interpreted.  There is an intertrochanteric femur fracture spanned by cephalomedullary nail.    Assessment:  Right intertrochanteric femur fracture status post cephalomedullary nail    Plan:  She is recovering well postoperatively.  She will continue walking with use of walker.  She will transition to outpatient physical therapy with home therapy is completed.  She will follow-up in 6 weeks for repeat assessment and radiographs.    This dictation was done with [employer] dictation and may contain mechanical errors related to translation.

## 2024-07-01 NOTE — TELEPHONE ENCOUNTER
Medical Facility Question     Facility Name: [facility]  Contact Name: VONNIE  Contact Number: [phone]  Request or Information: REQUESTING A REFERRAL PT. Pt DISCONTINUE NEXT WEEK. REFERRAL TO [facility]

## 2024-07-05 ENCOUNTER — PATIENT MESSAGE (OUTPATIENT)
Dept: ENDOCRINOLOGY | Age: 64
End: 2024-07-05

## 2024-07-05 DIAGNOSIS — E78.2 MIXED HYPERLIPIDEMIA: ICD-10-CM

## 2024-07-05 DIAGNOSIS — I10 PRIMARY HYPERTENSION: ICD-10-CM

## 2024-07-05 DIAGNOSIS — E10.21 DIABETIC NEPHROPATHY ASSOCIATED WITH TYPE 1 DIABETES MELLITUS (HCC): ICD-10-CM

## 2024-07-05 DIAGNOSIS — E10.65 POORLY CONTROLLED TYPE 1 DIABETES MELLITUS WITH NEUROPATHY (HCC): ICD-10-CM

## 2024-07-05 DIAGNOSIS — Z83.49 FAMILY HISTORY OF THYROID DISEASE: ICD-10-CM

## 2024-07-05 DIAGNOSIS — E10.65 POORLY CONTROLLED TYPE 1 DIABETES MELLITUS WITH RETINOPATHY (HCC): ICD-10-CM

## 2024-07-05 DIAGNOSIS — E55.9 VITAMIN D DEFICIENCY: ICD-10-CM

## 2024-07-05 DIAGNOSIS — E10.40 POORLY CONTROLLED TYPE 1 DIABETES MELLITUS WITH NEUROPATHY (HCC): ICD-10-CM

## 2024-07-05 DIAGNOSIS — E10.319 POORLY CONTROLLED TYPE 1 DIABETES MELLITUS WITH RETINOPATHY (HCC): ICD-10-CM

## 2024-07-05 RX ORDER — PROCHLORPERAZINE 25 MG/1
SUPPOSITORY RECTAL
Qty: 6 EACH | Refills: 1 | Status: SHIPPED | OUTPATIENT
Start: 2024-07-05

## 2024-07-05 NOTE — TELEPHONE ENCOUNTER
From: Elvia Arguelles  To: Dr. Bebe Latif  Sent: 7/5/2024 1:10 PM EDT  Subject: Sensors     I just call to check on my sensors at [facility] and they say that they do not have a prescription on file. If you could please send that prescription over to them so that I can get my sensors that would be wonderful. I am using my last one right now also care is trying to get prescriptions for them to send medication‘s to my home. If you can send them new prescriptions, I would appreciate it. Thank you. I’m

## 2024-07-08 ENCOUNTER — HOSPITAL ENCOUNTER (OUTPATIENT)
Age: 64
Discharge: HOME | End: 2024-07-08
Payer: MEDICARE

## 2024-07-08 ENCOUNTER — HOSPITAL ENCOUNTER (OUTPATIENT)
Dept: PHYSICAL THERAPY | Age: 64
Setting detail: THERAPIES SERIES
End: 2024-07-08
Attending: ORTHOPAEDIC SURGERY
Payer: MEDICARE

## 2024-07-08 DIAGNOSIS — C43.9 MALIGNANT MELANOMA, UNSPECIFIED SITE (HCC): ICD-10-CM

## 2024-07-08 LAB
EGFR, POC: 33 ML/MIN/1.73M2
EGFR, POC: >90 ML/MIN/1.73M2
POC CREATININE: 0.7 MG/DL (ref 0.6–1.2)
POC CREATININE: 1.7 MG/DL (ref 0.6–1.2)

## 2024-07-08 PROCEDURE — 82565 ASSAY OF CREATININE: CPT

## 2024-07-08 PROCEDURE — 6360000004 HC RX CONTRAST MEDICATION: Performed by: INTERNAL MEDICINE

## 2024-07-08 PROCEDURE — 70553 MRI BRAIN STEM W/O & W/DYE: CPT

## 2024-07-08 PROCEDURE — A9579 GAD-BASE MR CONTRAST NOS,1ML: HCPCS | Performed by: INTERNAL MEDICINE

## 2024-07-08 RX ADMIN — GADOTERIDOL 10 ML: 279.3 INJECTION, SOLUTION INTRAVENOUS at 15:41

## 2024-07-09 ENCOUNTER — TELEPHONE (OUTPATIENT)
Dept: ENDOCRINOLOGY | Age: 64
End: 2024-07-09

## 2024-07-09 NOTE — TELEPHONE ENCOUNTER
Fax from [facility] sending letter to request additional information  Scanned in media

## 2024-07-09 NOTE — TELEPHONE ENCOUNTER
Medication:   Requested Prescriptions     Pending Prescriptions Disp Refills    NIFEdipine (PROCARDIA XL) 30 MG extended release tablet 180 tablet 0     Sig: Take 1 tablet by mouth in the morning and at bedtime    ferrous sulfate 324 (65 Fe) MG EC tablet 90 tablet 0     Sig: Take 1 tablet by mouth daily (with breakfast)    pantoprazole (PROTONIX) 40 MG tablet 180 tablet 0     Sig: Take 1 tablet by mouth 2 times daily (before meals)        Last Filled:  6/13/24    Patient Phone Number: [phone] (home)     Last appt: 2/29/2024   Next appt: Visit date not found    Last OARRS:        No data to display

## 2024-07-10 ENCOUNTER — HOSPITAL ENCOUNTER (OUTPATIENT)
Dept: PHYSICAL THERAPY | Age: 64
Setting detail: THERAPIES SERIES
Discharge: HOME | End: 2024-07-10
Attending: ORTHOPAEDIC SURGERY
Payer: MEDICARE

## 2024-07-10 DIAGNOSIS — M25.551 PAIN OF RIGHT HIP: ICD-10-CM

## 2024-07-10 DIAGNOSIS — R52 PAIN AGGRAVATED BY STANDING: ICD-10-CM

## 2024-07-10 DIAGNOSIS — R26.89 DECREASED FUNCTIONAL MOBILITY: Primary | ICD-10-CM

## 2024-07-10 DIAGNOSIS — R68.89 DECREASED ACTIVITY TOLERANCE: ICD-10-CM

## 2024-07-10 DIAGNOSIS — Z78.9 NEED FOR HOME EXERCISE PROGRAM: ICD-10-CM

## 2024-07-10 PROCEDURE — 97112 NEUROMUSCULAR REEDUCATION: CPT

## 2024-07-10 PROCEDURE — 97161 PT EVAL LOW COMPLEX 20 MIN: CPT

## 2024-07-11 ENCOUNTER — TELEPHONE (OUTPATIENT)
Dept: ENDOCRINOLOGY | Age: 64
End: 2024-07-11

## 2024-07-11 RX ORDER — PANTOPRAZOLE SODIUM 40 MG/1
40 TABLET, DELAYED RELEASE ORAL
Qty: 180 TABLET | Refills: 0 | Status: SHIPPED | OUTPATIENT
Start: 2024-07-11

## 2024-07-11 RX ORDER — FERROUS SULFATE 324(65)MG
324 TABLET, DELAYED RELEASE (ENTERIC COATED) ORAL
Qty: 90 TABLET | Refills: 0 | Status: SHIPPED | OUTPATIENT
Start: 2024-07-11

## 2024-07-11 RX ORDER — NIFEDIPINE 30 MG/1
30 TABLET, EXTENDED RELEASE ORAL 2 TIMES DAILY
Qty: 180 TABLET | Refills: 0 | Status: SHIPPED | OUTPATIENT
Start: 2024-07-11

## 2024-07-15 ENCOUNTER — HOSPITAL ENCOUNTER (OUTPATIENT)
Dept: PHYSICAL THERAPY | Age: 64
Setting detail: THERAPIES SERIES
Discharge: HOME | End: 2024-07-15
Attending: ORTHOPAEDIC SURGERY
Payer: MEDICARE

## 2024-07-15 ENCOUNTER — OFFICE VISIT (OUTPATIENT)
Dept: ENDOCRINOLOGY | Age: 64
End: 2024-07-15
Payer: MEDICARE

## 2024-07-15 ENCOUNTER — TELEPHONE (OUTPATIENT)
Dept: ENDOCRINOLOGY | Age: 64
End: 2024-07-15

## 2024-07-15 VITALS
TEMPERATURE: 98 F | OXYGEN SATURATION: 98 % | DIASTOLIC BLOOD PRESSURE: 63 MMHG | BODY MASS INDEX: 23.79 KG/M2 | HEIGHT: 59 IN | RESPIRATION RATE: 14 BRPM | HEART RATE: 89 BPM | SYSTOLIC BLOOD PRESSURE: 141 MMHG | WEIGHT: 118 LBS

## 2024-07-15 DIAGNOSIS — E55.9 VITAMIN D DEFICIENCY: ICD-10-CM

## 2024-07-15 DIAGNOSIS — E10.21 DIABETIC NEPHROPATHY ASSOCIATED WITH TYPE 1 DIABETES MELLITUS (HCC): ICD-10-CM

## 2024-07-15 DIAGNOSIS — E10.65 POORLY CONTROLLED TYPE 1 DIABETES MELLITUS WITH RETINOPATHY (HCC): ICD-10-CM

## 2024-07-15 DIAGNOSIS — E04.9 THYROID ENLARGEMENT: ICD-10-CM

## 2024-07-15 DIAGNOSIS — E10.40 POORLY CONTROLLED TYPE 1 DIABETES MELLITUS WITH NEUROPATHY (HCC): Primary | ICD-10-CM

## 2024-07-15 DIAGNOSIS — Z83.49 FAMILY HISTORY OF THYROID DISEASE: ICD-10-CM

## 2024-07-15 DIAGNOSIS — E10.65 POORLY CONTROLLED TYPE 1 DIABETES MELLITUS WITH NEUROPATHY (HCC): Primary | ICD-10-CM

## 2024-07-15 DIAGNOSIS — C43.9 MALIGNANT MELANOMA, UNSPECIFIED SITE (HCC): ICD-10-CM

## 2024-07-15 DIAGNOSIS — M81.0 AGE RELATED OSTEOPOROSIS, UNSPECIFIED PATHOLOGICAL FRACTURE PRESENCE: ICD-10-CM

## 2024-07-15 DIAGNOSIS — E78.2 MIXED HYPERLIPIDEMIA: ICD-10-CM

## 2024-07-15 DIAGNOSIS — E10.319 POORLY CONTROLLED TYPE 1 DIABETES MELLITUS WITH RETINOPATHY (HCC): ICD-10-CM

## 2024-07-15 DIAGNOSIS — N18.30 STAGE 3 CHRONIC KIDNEY DISEASE, UNSPECIFIED WHETHER STAGE 3A OR 3B CKD (HCC): ICD-10-CM

## 2024-07-15 DIAGNOSIS — I10 PRIMARY HYPERTENSION: ICD-10-CM

## 2024-07-15 PROBLEM — R56.9 UNSPECIFIED CONVULSIONS (HCC): Status: ACTIVE | Noted: 2024-07-15

## 2024-07-15 PROBLEM — G40.89 OTHER SEIZURES (HCC): Status: ACTIVE | Noted: 2024-07-15

## 2024-07-15 PROCEDURE — 1123F ACP DISCUSS/DSCN MKR DOCD: CPT | Performed by: INTERNAL MEDICINE

## 2024-07-15 PROCEDURE — G8420 CALC BMI NORM PARAMETERS: HCPCS | Performed by: INTERNAL MEDICINE

## 2024-07-15 PROCEDURE — 99214 OFFICE O/P EST MOD 30 MIN: CPT | Performed by: INTERNAL MEDICINE

## 2024-07-15 PROCEDURE — 1090F PRES/ABSN URINE INCON ASSESS: CPT | Performed by: INTERNAL MEDICINE

## 2024-07-15 PROCEDURE — 3077F SYST BP >= 140 MM HG: CPT | Performed by: INTERNAL MEDICINE

## 2024-07-15 PROCEDURE — 95251 CONT GLUC MNTR ANALYSIS I&R: CPT | Performed by: INTERNAL MEDICINE

## 2024-07-15 PROCEDURE — 97110 THERAPEUTIC EXERCISES: CPT

## 2024-07-15 PROCEDURE — G8427 DOCREV CUR MEDS BY ELIG CLIN: HCPCS | Performed by: INTERNAL MEDICINE

## 2024-07-15 PROCEDURE — G8400 PT W/DXA NO RESULTS DOC: HCPCS | Performed by: INTERNAL MEDICINE

## 2024-07-15 PROCEDURE — 1036F TOBACCO NON-USER: CPT | Performed by: INTERNAL MEDICINE

## 2024-07-15 PROCEDURE — 3051F HG A1C>EQUAL 7.0%<8.0%: CPT | Performed by: INTERNAL MEDICINE

## 2024-07-15 PROCEDURE — 97112 NEUROMUSCULAR REEDUCATION: CPT

## 2024-07-15 PROCEDURE — 3078F DIAST BP <80 MM HG: CPT | Performed by: INTERNAL MEDICINE

## 2024-07-15 PROCEDURE — 2022F DILAT RTA XM EVC RTNOPTHY: CPT | Performed by: INTERNAL MEDICINE

## 2024-07-15 PROCEDURE — 3017F COLORECTAL CA SCREEN DOC REV: CPT | Performed by: INTERNAL MEDICINE

## 2024-07-15 RX ORDER — HYDROCORTISONE 10 MG/1
10 TABLET ORAL DAILY
Qty: 90 TABLET | Refills: 1 | Status: SHIPPED | OUTPATIENT
Start: 2024-07-15

## 2024-07-15 RX ORDER — ERGOCALCIFEROL 1.25 MG/1
50000 CAPSULE ORAL WEEKLY
Qty: 12 CAPSULE | Refills: 1 | Status: SHIPPED | OUTPATIENT
Start: 2024-07-15

## 2024-07-15 RX ORDER — INSULIN LISPRO 100 [IU]/ML
INJECTION, SOLUTION INTRAVENOUS; SUBCUTANEOUS
Qty: 40 ML | Refills: 1 | Status: SHIPPED | OUTPATIENT
Start: 2024-07-15

## 2024-07-15 RX ORDER — INSULIN DEGLUDEC 100 U/ML
10 INJECTION, SOLUTION SUBCUTANEOUS
Qty: 1 ADJUSTABLE DOSE PRE-FILLED PEN SYRINGE | Refills: 0
Start: 2024-07-15

## 2024-07-15 RX ORDER — HYDROCORTISONE 5 MG/1
5 TABLET ORAL DAILY
Qty: 90 TABLET | Refills: 1 | Status: SHIPPED | OUTPATIENT
Start: 2024-07-15

## 2024-07-15 NOTE — FLOWSHEET NOTE
listed.  [] Progression has been slowed due to co-morbidities.  [x] Plan just implemented, too soon (<30days) to assess goals progression   [] Goals require adjustment due to lack of progress  [] Patient is not progressing as expected and requires additional follow up with physician  [] Other:     TREATMENT PLAN     Frequency/Duration: 1-2x/week for  6-8  weeks for the following treatment interventions:    Interventions:  Therapeutic Exercise (67411) including: strength training, ROM, and functional mobility  Therapeutic Activities (79759) including: functional mobility training and education.  Neuromuscular Re-education (14974) activation and proprioception, including postural re-education.    Manual Therapy (33583) as indicated to include: Passive Range of Motion, Soft Tissue Mobilization, and Trigger Point Release  Modalities as needed that may include: Cryotherapy  Patient education on joint protection, postural re-education, activity modification, and progression of HEP    Plan: Cont POC- Continue emphasis/focus on exercise progression, improving proper muscle recruitment and activation/motor control patterns, improving soft tissue extensibility, and static and dynamic balance. Next visit plan to progress weights and add new exercises       Electronically Signed by Catie Mccabe PT, DPT    Date: 07/15/2024     Note: Portions of this note have been templated and/or copied from initial evaluation, reassessments and prior notes for documentation efficiency.    Note: If patient does not return for scheduled/recommended follow up visits, this note will serve as a discharge from care along with the most recent update on progress.

## 2024-07-15 NOTE — PROGRESS NOTES
FLEXTOUCH) 100 UNIT/ML SOPN Inject 10 Units into the skin every morning (before breakfast) 1 Adjustable Dose Pre-filled Pen Syringe 0    NIFEdipine (PROCARDIA XL) 30 MG extended release tablet Take 1 tablet by mouth in the morning and at bedtime 180 tablet 0    ferrous sulfate 324 (65 Fe) MG EC tablet Take 1 tablet by mouth daily (with breakfast) 90 tablet 0    pantoprazole (PROTONIX) 40 MG tablet Take 1 tablet by mouth 2 times daily (before meals) 180 tablet 0    Continuous Glucose Sensor (DEXCOM G6 SENSOR) MISC CHANGE Q 10 DAYS 6 each 1    Continuous Glucose Transmitter (DEXCOM G6 TRANSMITTER) MISC CHANGE TRANSMITTER EVERY 90 DAYS 1 each 1    levETIRAcetam (KEPPRA) 500 MG tablet Take 1 tablet by mouth 2 times daily 60 tablet 0    rOPINIRole (REQUIP) 1 MG tablet Take 1 tablet by mouth nightly 90 tablet 0    escitalopram (LEXAPRO) 10 MG tablet TAKE ONE TABLET BY MOUTH ONCE A DAY 30 tablet 0    ondansetron (ZOFRAN-ODT) 4 MG disintegrating tablet Take 1 tablet by mouth 3 times daily as needed for Nausea or Vomiting 21 tablet 0    Insulin Disposable Pump (OMNIPOD 5 G6 PODS, GEN 5,) MISC CHANGE PODS Q 2 DAYS (Patient not taking: Reported on 7/15/2024) 45 each 1    medical marijuana Miscellaneous Drug     Medical Marijuana Gummies    active (Patient not taking: Reported on 2024)       No current facility-administered medications for this visit.     No Known Allergies    Family Status   Relation Name Status    Mother      Father         Lab Review:    Lab Results   Component Value Date/Time    WBC 4.2 2024 05:32 AM    HGB 8.4 2024 05:32 AM    HCT 24.2 2024 05:32 AM    MCV 88.0 2024 05:32 AM     2024 05:32 AM     Lab Results   Component Value Date/Time     2024 05:32 AM    K 4.0 2024 05:32 AM    K 5.5 2024 06:20 AM     2024 05:32 AM    CO2 24 2024 05:32 AM    BUN 20 2024 05:32 AM    CREATININE 1.7 2024 02:55 PM

## 2024-07-16 NOTE — TELEPHONE ENCOUNTER
Please check with tandem team Jamel/Earlene where this patient's education scheduling stands and if she needs anything else to start tandem/Dexcom ASAP.

## 2024-07-17 ENCOUNTER — HOSPITAL ENCOUNTER (OUTPATIENT)
Dept: PHYSICAL THERAPY | Age: 64
Setting detail: THERAPIES SERIES
End: 2024-07-17
Attending: ORTHOPAEDIC SURGERY
Payer: MEDICARE

## 2024-07-19 DIAGNOSIS — G25.81 RESTLESS LEG SYNDROME: ICD-10-CM

## 2024-07-19 DIAGNOSIS — F32.2 CURRENT SEVERE EPISODE OF MAJOR DEPRESSIVE DISORDER WITHOUT PSYCHOTIC FEATURES WITHOUT PRIOR EPISODE (HCC): ICD-10-CM

## 2024-07-19 RX ORDER — NIFEDIPINE 30 MG/1
30 TABLET, EXTENDED RELEASE ORAL 2 TIMES DAILY
Qty: 180 TABLET | Refills: 0 | Status: CANCELLED | OUTPATIENT
Start: 2024-07-19

## 2024-07-19 NOTE — TELEPHONE ENCOUNTER
Medication:   Requested Prescriptions     Pending Prescriptions Disp Refills    escitalopram (LEXAPRO) 10 MG tablet 30 tablet 0     Sig: Take 1 tablet by mouth daily    NIFEdipine (PROCARDIA XL) 30 MG extended release tablet 180 tablet 0     Sig: Take 1 tablet by mouth in the morning and at bedtime    pantoprazole (PROTONIX) 40 MG tablet 180 tablet 0     Sig: Take 1 tablet by mouth 2 times daily (before meals)    rOPINIRole (REQUIP) 1 MG tablet 90 tablet 0     Sig: Take 1 tablet by mouth nightly       Last Filled:  4/5/24    Patient Phone Number: [phone] (home)     Last appt: 2/29/2024   Next appt: Visit date not found    Last Labs DM:   Lab Results   Component Value Date/Time    LABA1C 7.2 05/18/2024 10:17 PM     Last Lipid:   Lab Results   Component Value Date/Time    CHOL 209 05/18/2024 10:17 PM    TRIG 403 05/18/2024 10:17 PM    HDL 95 05/18/2024 10:17 PM     12/07/2011 07:50 AM     Last PSA: No results found for: \"PSA\"  Last Thyroid:   Lab Results   Component Value Date/Time    TSH 2.72 11/18/2023 10:34 AM    FT3 2.2 02/20/2023 08:31 AM    T4FREE 1.1 11/18/2023 10:34 AM

## 2024-07-22 ENCOUNTER — APPOINTMENT (OUTPATIENT)
Dept: PHYSICAL THERAPY | Age: 64
End: 2024-07-22
Attending: ORTHOPAEDIC SURGERY
Payer: MEDICARE

## 2024-07-22 RX ORDER — PANTOPRAZOLE SODIUM 40 MG/1
40 TABLET, DELAYED RELEASE ORAL
Qty: 180 TABLET | Refills: 0 | Status: SHIPPED | OUTPATIENT
Start: 2024-07-22

## 2024-07-22 RX ORDER — ESCITALOPRAM OXALATE 10 MG/1
10 TABLET ORAL DAILY
Qty: 30 TABLET | Refills: 0 | Status: SHIPPED | OUTPATIENT
Start: 2024-07-22

## 2024-07-22 RX ORDER — ROPINIROLE 1 MG/1
1 TABLET, FILM COATED ORAL NIGHTLY
Qty: 90 TABLET | Refills: 0 | Status: SHIPPED | OUTPATIENT
Start: 2024-07-22

## 2024-07-24 ENCOUNTER — HOSPITAL ENCOUNTER (OUTPATIENT)
Dept: PHYSICAL THERAPY | Age: 64
Setting detail: THERAPIES SERIES
Discharge: HOME | End: 2024-07-24
Attending: ORTHOPAEDIC SURGERY
Payer: MEDICARE

## 2024-07-24 PROCEDURE — 97112 NEUROMUSCULAR REEDUCATION: CPT

## 2024-07-24 PROCEDURE — 97110 THERAPEUTIC EXERCISES: CPT

## 2024-07-24 NOTE — FLOWSHEET NOTE
[facility]- Outpatient Rehabilitation and Therapy [address], [place] [address] office: [phone] fax: [phone]       Physical Therapy: TREATMENT/PROGRESS NOTE   Patient: Elvia Arguelles (63 y.o. female)   Examination Date: 2024   :  1960 MRN: [id number]   Visit #: 3/MEDICARE  Insurance Allowable Auth Needed   Medicare []Yes    [x]No    Insurance: Payor: MEDICARE / Plan: MEDICARE PART A AND B / Product Type: *No Product type* /   Insurance ID: [id number] - (Medicare)  Secondary Insurance (if applicable): [facility]   Treatment Diagnosis:     ICD-10-CM    1. Decreased functional mobility  R26.89       2. Decreased activity tolerance  R68.89       3. Pain aggravated by standing  R52       4. Pain of right hip  M25.551       5. Need for home exercise program  Z78.9          Medical Diagnosis:  Closed fracture of right hip, sequela [S72.001S]   Referring Physician: Gonsalo White MD  PCP: Meg Ellis APRN - CNP     Plan of care signed (Y/N): YES    Date of Patient follow up with Physician:      Progress Report/POC: NO  Progress note due: 2024 (OR 10 visits /OR AUTH LIMITS, whichever is less)  POC update due: 24                                            Precautions/ Contra-indications:           Latex allergy:  NO  Pacemaker:    NO  Contraindications for Manipulation: NA  Date of Surgery: 24  Other:    Red Flags:  None    C-SSRS Triggered by Intake questionnaire:   Patient answered 'NO' to both behavioral questions on intake.  No further screening warranted    Preferred Language for Healthcare:   [x] English       [] other:    SUBJECTIVE EXAMINATION     Patient stated complaint: Patient reports that on May 19th she fell and broke her hip. She does not remember the fall and did not wake up for 5 days. She is still having some vision difficulties since the fall, has an appointment coming up with neurology to be examined. Patient

## 2024-07-30 DIAGNOSIS — F32.2 CURRENT SEVERE EPISODE OF MAJOR DEPRESSIVE DISORDER WITHOUT PSYCHOTIC FEATURES WITHOUT PRIOR EPISODE (HCC): ICD-10-CM

## 2024-07-31 RX ORDER — ESCITALOPRAM OXALATE 10 MG/1
10 TABLET ORAL DAILY
Qty: 30 TABLET | Refills: 10 | OUTPATIENT
Start: 2024-07-31

## 2024-08-01 ENCOUNTER — HOSPITAL ENCOUNTER (OUTPATIENT)
Dept: PHYSICAL THERAPY | Age: 64
Setting detail: THERAPIES SERIES
Discharge: HOME | End: 2024-08-01
Attending: ORTHOPAEDIC SURGERY
Payer: MEDICARE

## 2024-08-01 ENCOUNTER — PATIENT MESSAGE (OUTPATIENT)
Dept: ENDOCRINOLOGY | Age: 64
End: 2024-08-01

## 2024-08-01 DIAGNOSIS — E27.40 ADRENAL INSUFFICIENCY (HCC): Primary | ICD-10-CM

## 2024-08-01 PROCEDURE — 97110 THERAPEUTIC EXERCISES: CPT

## 2024-08-01 PROCEDURE — 97112 NEUROMUSCULAR REEDUCATION: CPT

## 2024-08-01 RX ORDER — HYDROCORTISONE 10 MG/1
15 TABLET ORAL DAILY
Qty: 12 TABLET | Refills: 0 | Status: SHIPPED | OUTPATIENT
Start: 2024-08-01

## 2024-08-01 NOTE — FLOWSHEET NOTE
Treatment Minutes 41        Charge Justification:  (36573) THERAPEUTIC EXERCISE - Provided verbal/tactile cueing for activities related to strengthening, flexibility, endurance, ROM performed to prevent loss of range of motion, maintain or improve muscular strength or increase flexibility, following either an injury or surgery.   (17259) NEUROMUSCULAR RE-EDUCATION - Therapeutic procedure, 1 or more areas, each 15 minutes; neuromuscular reeducation of movement, balance, coordination, kinesthetic sense, posture, and/or proprioception for sitting and/or standing activities    GOALS     Patient stated goal: walking without cane  [] Progressing: [] Met: [] Not Met: [] Adjusted    Therapist goals for Patient:   Short Term Goals: To be achieved in: 2 weeks  1. Independent in HEP and progression per patient tolerance, in order to prevent re-injury.   [] Progressing: [] Met: [] Not Met: [] Adjusted  2. Patient will demonstrate the ability to maintain SL balance for 10 seconds independently to improve independence and safety with community ambulation and decrease risk of falls.    [] Progressing: [] Met: [] Not Met: [] Adjusted    Long Term Goals: To be achieved in: 6-8 weeks  1. Patient will demonstrate a raw score of 15/96 or less for the WOMAC to facilitate improved tolerance to ADLs and functional activities including ambulation, self-care activities, and community navigation to facilitate full return to prior level of function.  [] Progressing: [] Met: [] Not Met: [] Adjusted  2. Patient will demonstrate an increase in strength to 4+/5 global hip and knee musculature to allow for proper functional mobility and improved tolerance to ADLs including cooking, cleaning, and light housework as indicated by patients Functional Deficits.  [] Progressing: [] Met: [] Not Met: [] Adjusted  3. Patient will report the ability to ambulate 30 minutes independently without increase in symptoms in order to promote improved functional

## 2024-08-01 NOTE — TELEPHONE ENCOUNTER
From: Elvia Arguelles  To: Dr. Bebe Latif  Sent: 8/1/2024 2:06 PM EDT  Subject: Hydrocortisone     I started a new pharmacy delivery about a month ago. I didn’t realize they did not send my hydrocortisone and I ran out last week. So I haven’t had it for a week. They are sending me a new prescription but it will take a few days to get here. Is there any way I can get a weeks worth prescription sent to the [facility] just to get me through until this comes in. I started vomiting last night, I don’t have an appetite, I’m really tired. I’m worried this is from not having my medication for a week.

## 2024-08-05 ENCOUNTER — TELEPHONE (OUTPATIENT)
Dept: ORTHOPEDIC SURGERY | Age: 64
End: 2024-08-05

## 2024-08-05 NOTE — TELEPHONE ENCOUNTER
General Question     Subject: [facility] WILLIAM  Patient and /or Facility Request: Esther Arguelles (Boyd)   Contact Number: [phone]     DAUGHTER [facility] SAID WOULD CLL BK TO KANIKA

## 2024-08-06 ENCOUNTER — APPOINTMENT (OUTPATIENT)
Dept: PHYSICAL THERAPY | Age: 64
End: 2024-08-06
Attending: ORTHOPAEDIC SURGERY
Payer: MEDICARE

## 2024-08-06 ENCOUNTER — OFFICE VISIT (OUTPATIENT)
Dept: FAMILY MEDICINE CLINIC | Age: 64
End: 2024-08-06
Payer: MEDICARE

## 2024-08-06 VITALS
DIASTOLIC BLOOD PRESSURE: 78 MMHG | BODY MASS INDEX: 23.79 KG/M2 | HEIGHT: 59 IN | TEMPERATURE: 98.1 F | SYSTOLIC BLOOD PRESSURE: 120 MMHG | OXYGEN SATURATION: 96 % | HEART RATE: 80 BPM | WEIGHT: 118 LBS

## 2024-08-06 DIAGNOSIS — E10.65 UNCONTROLLED TYPE 1 DIABETES MELLITUS WITH HYPERGLYCEMIA (HCC): ICD-10-CM

## 2024-08-06 DIAGNOSIS — H53.9 VISION CHANGES: ICD-10-CM

## 2024-08-06 DIAGNOSIS — I10 PRIMARY HYPERTENSION: Primary | ICD-10-CM

## 2024-08-06 DIAGNOSIS — Z87.81 HISTORY OF FRACTURE OF RIGHT HIP: ICD-10-CM

## 2024-08-06 DIAGNOSIS — G40.89 OTHER SEIZURES (HCC): ICD-10-CM

## 2024-08-06 DIAGNOSIS — Z91.81 AT HIGH RISK FOR FALLS: ICD-10-CM

## 2024-08-06 DIAGNOSIS — C43.9 MALIGNANT MELANOMA, UNSPECIFIED SITE (HCC): ICD-10-CM

## 2024-08-06 PROCEDURE — G8400 PT W/DXA NO RESULTS DOC: HCPCS | Performed by: NURSE PRACTITIONER

## 2024-08-06 PROCEDURE — G8420 CALC BMI NORM PARAMETERS: HCPCS | Performed by: NURSE PRACTITIONER

## 2024-08-06 PROCEDURE — 99214 OFFICE O/P EST MOD 30 MIN: CPT | Performed by: NURSE PRACTITIONER

## 2024-08-06 PROCEDURE — 3017F COLORECTAL CA SCREEN DOC REV: CPT | Performed by: NURSE PRACTITIONER

## 2024-08-06 PROCEDURE — G8427 DOCREV CUR MEDS BY ELIG CLIN: HCPCS | Performed by: NURSE PRACTITIONER

## 2024-08-06 PROCEDURE — 2022F DILAT RTA XM EVC RTNOPTHY: CPT | Performed by: NURSE PRACTITIONER

## 2024-08-06 PROCEDURE — 1090F PRES/ABSN URINE INCON ASSESS: CPT | Performed by: NURSE PRACTITIONER

## 2024-08-06 PROCEDURE — 1036F TOBACCO NON-USER: CPT | Performed by: NURSE PRACTITIONER

## 2024-08-06 PROCEDURE — 3051F HG A1C>EQUAL 7.0%<8.0%: CPT | Performed by: NURSE PRACTITIONER

## 2024-08-06 PROCEDURE — 3078F DIAST BP <80 MM HG: CPT | Performed by: NURSE PRACTITIONER

## 2024-08-06 PROCEDURE — 3074F SYST BP LT 130 MM HG: CPT | Performed by: NURSE PRACTITIONER

## 2024-08-06 PROCEDURE — 1123F ACP DISCUSS/DSCN MKR DOCD: CPT | Performed by: NURSE PRACTITIONER

## 2024-08-06 RX ORDER — METHOCARBAMOL 750 MG/1
750 TABLET, FILM COATED ORAL 4 TIMES DAILY
COMMUNITY

## 2024-08-06 SDOH — ECONOMIC STABILITY: FOOD INSECURITY: WITHIN THE PAST 12 MONTHS, YOU WORRIED THAT YOUR FOOD WOULD RUN OUT BEFORE YOU GOT MONEY TO BUY MORE.: NEVER TRUE

## 2024-08-06 SDOH — ECONOMIC STABILITY: FOOD INSECURITY: WITHIN THE PAST 12 MONTHS, THE FOOD YOU BOUGHT JUST DIDN'T LAST AND YOU DIDN'T HAVE MONEY TO GET MORE.: NEVER TRUE

## 2024-08-06 SDOH — ECONOMIC STABILITY: HOUSING INSECURITY
IN THE LAST 12 MONTHS, WAS THERE A TIME WHEN YOU DID NOT HAVE A STEADY PLACE TO SLEEP OR SLEPT IN A SHELTER (INCLUDING NOW)?: NO

## 2024-08-06 SDOH — ECONOMIC STABILITY: INCOME INSECURITY: HOW HARD IS IT FOR YOU TO PAY FOR THE VERY BASICS LIKE FOOD, HOUSING, MEDICAL CARE, AND HEATING?: NOT HARD AT ALL

## 2024-08-06 ASSESSMENT — PATIENT HEALTH QUESTIONNAIRE - PHQ9
10. IF YOU CHECKED OFF ANY PROBLEMS, HOW DIFFICULT HAVE THESE PROBLEMS MADE IT FOR YOU TO DO YOUR WORK, TAKE CARE OF THINGS AT HOME, OR GET ALONG WITH OTHER PEOPLE: NOT DIFFICULT AT ALL
3. TROUBLE FALLING OR STAYING ASLEEP: NOT AT ALL
4. FEELING TIRED OR HAVING LITTLE ENERGY: NOT AT ALL
8. MOVING OR SPEAKING SO SLOWLY THAT OTHER PEOPLE COULD HAVE NOTICED. OR THE OPPOSITE, BEING SO FIGETY OR RESTLESS THAT YOU HAVE BEEN MOVING AROUND A LOT MORE THAN USUAL: NOT AT ALL
7. TROUBLE CONCENTRATING ON THINGS, SUCH AS READING THE NEWSPAPER OR WATCHING TELEVISION: NOT AT ALL
5. POOR APPETITE OR OVEREATING: NOT AT ALL
SUM OF ALL RESPONSES TO PHQ QUESTIONS 1-9: 0
SUM OF ALL RESPONSES TO PHQ QUESTIONS 1-9: 0
SUM OF ALL RESPONSES TO PHQ9 QUESTIONS 1 & 2: 0
SUM OF ALL RESPONSES TO PHQ QUESTIONS 1-9: 0
6. FEELING BAD ABOUT YOURSELF - OR THAT YOU ARE A FAILURE OR HAVE LET YOURSELF OR YOUR FAMILY DOWN: NOT AT ALL
SUM OF ALL RESPONSES TO PHQ QUESTIONS 1-9: 0
1. LITTLE INTEREST OR PLEASURE IN DOING THINGS: NOT AT ALL
2. FEELING DOWN, DEPRESSED OR HOPELESS: NOT AT ALL
9. THOUGHTS THAT YOU WOULD BE BETTER OFF DEAD, OR OF HURTING YOURSELF: NOT AT ALL

## 2024-08-06 ASSESSMENT — ANXIETY QUESTIONNAIRES
2. NOT BEING ABLE TO STOP OR CONTROL WORRYING: SEVERAL DAYS
6. BECOMING EASILY ANNOYED OR IRRITABLE: SEVERAL DAYS
3. WORRYING TOO MUCH ABOUT DIFFERENT THINGS: SEVERAL DAYS
4. TROUBLE RELAXING: SEVERAL DAYS
IF YOU CHECKED OFF ANY PROBLEMS ON THIS QUESTIONNAIRE, HOW DIFFICULT HAVE THESE PROBLEMS MADE IT FOR YOU TO DO YOUR WORK, TAKE CARE OF THINGS AT HOME, OR GET ALONG WITH OTHER PEOPLE: NOT DIFFICULT AT ALL
GAD7 TOTAL SCORE: 7
7. FEELING AFRAID AS IF SOMETHING AWFUL MIGHT HAPPEN: SEVERAL DAYS
1. FEELING NERVOUS, ANXIOUS, OR ON EDGE: SEVERAL DAYS
5. BEING SO RESTLESS THAT IT IS HARD TO SIT STILL: SEVERAL DAYS

## 2024-08-06 NOTE — PROGRESS NOTES
Elvia Arguelles (:  1960) is a 63 y.o. female,Established patient, here for evaluation of the following chief complaint(s):  Cholesterol Problem and Gastroesophageal Reflux      Assessment & Plan   1. Primary hypertension  Stable;  BP good at visit.  Continue procardia.  2. Uncontrolled type 1 diabetes mellitus with hyperglycemia (HCC)  Stable;  Patient follows with endocrine.  3. Malignant melanoma, unspecified site (HCC)  Stable;  Patient follows with Dr. Ayala- has an upcoming appointment at the [facility].  4. Other seizures (HCC)  Stable;  Patient sees neurology.  5. Vision changes  Not progressing;  Patient has an upcoming appointment with ophthalmology.  6. History of fracture of right hip  Improved;  S/p repair.  Patient follows with ortho.  7. At high risk for falls  Stable;  Patient continues HEP.    Return in about 6 months (around 2025), or if symptoms worsen or fail to improve.       Subjective   HPI  HYPERTENSION  Seeing nephrology- stage 4 CKD  Procardia XL    Type 1 DM  Sees endocrinology  BG good  Got new pump- working well    Right hip fracture  S/p fall at home  Went to inpatient rehab  Finished outpatient therapy- continues to do stretching at home    Melanoma  Follows with Dr. Ayala  Is currently stable  Stopped cancer treatments- is worried about it  Recommended the [facility]- - [place]    Focal seizure on EEG  No seizure activity  On [facility]  Neurology- Paulette Carmona    Vision difficulties  Cannot get in until September    Review of Systems       Objective   Physical Exam  Vitals reviewed.   Constitutional:       Appearance: Normal appearance.   HENT:      Head: Normocephalic.      Right Ear: External ear normal.      Left Ear: External ear normal.      Nose: Nose normal.   Cardiovascular:      Rate and Rhythm: Normal rate and regular rhythm.      Heart sounds: Normal heart sounds, S1 normal and S2 normal.   Pulmonary:      Effort: Pulmonary effort is normal.      Breath sounds:

## 2024-08-08 ENCOUNTER — APPOINTMENT (OUTPATIENT)
Dept: PHYSICAL THERAPY | Age: 64
End: 2024-08-08
Attending: ORTHOPAEDIC SURGERY
Payer: MEDICARE

## 2024-08-13 DIAGNOSIS — E10.40 POORLY CONTROLLED TYPE 1 DIABETES MELLITUS WITH NEUROPATHY (HCC): ICD-10-CM

## 2024-08-13 DIAGNOSIS — E78.2 MIXED HYPERLIPIDEMIA: ICD-10-CM

## 2024-08-13 DIAGNOSIS — Z83.49 FAMILY HISTORY OF THYROID DISEASE: ICD-10-CM

## 2024-08-13 DIAGNOSIS — I10 PRIMARY HYPERTENSION: ICD-10-CM

## 2024-08-13 DIAGNOSIS — E55.9 VITAMIN D DEFICIENCY: ICD-10-CM

## 2024-08-13 DIAGNOSIS — E10.21 DIABETIC NEPHROPATHY ASSOCIATED WITH TYPE 1 DIABETES MELLITUS (HCC): ICD-10-CM

## 2024-08-13 DIAGNOSIS — E10.319 POORLY CONTROLLED TYPE 1 DIABETES MELLITUS WITH RETINOPATHY (HCC): ICD-10-CM

## 2024-08-13 DIAGNOSIS — E10.65 POORLY CONTROLLED TYPE 1 DIABETES MELLITUS WITH NEUROPATHY (HCC): ICD-10-CM

## 2024-08-13 DIAGNOSIS — E10.65 POORLY CONTROLLED TYPE 1 DIABETES MELLITUS WITH RETINOPATHY (HCC): ICD-10-CM

## 2024-08-13 RX ORDER — METHOCARBAMOL 750 MG/1
750 TABLET, FILM COATED ORAL 4 TIMES DAILY PRN
Qty: 120 TABLET | Refills: 0 | Status: SHIPPED | OUTPATIENT
Start: 2024-08-13

## 2024-08-13 RX ORDER — ERGOCALCIFEROL 1.25 MG/1
50000 CAPSULE ORAL WEEKLY
Qty: 12 CAPSULE | Refills: 1 | Status: SHIPPED | OUTPATIENT
Start: 2024-08-13

## 2024-08-13 RX ORDER — PANTOPRAZOLE SODIUM 40 MG/1
40 TABLET, DELAYED RELEASE ORAL
Qty: 180 TABLET | Refills: 0 | Status: SHIPPED | OUTPATIENT
Start: 2024-08-13

## 2024-08-13 RX ORDER — FERROUS SULFATE 324(65)MG
324 TABLET, DELAYED RELEASE (ENTERIC COATED) ORAL
Qty: 90 TABLET | Refills: 0 | Status: SHIPPED | OUTPATIENT
Start: 2024-08-13

## 2024-08-13 NOTE — TELEPHONE ENCOUNTER
Medication:   Requested Prescriptions     Pending Prescriptions Disp Refills    pantoprazole (PROTONIX) 40 MG tablet 180 tablet 0     Sig: Take 1 tablet by mouth 2 times daily (before meals)    ferrous sulfate 324 (65 Fe) MG EC tablet 90 tablet 0     Sig: Take 1 tablet by mouth daily (with breakfast)        Last Filled:  5/28/24    Patient Phone Number: [phone] (home)     Last appt: 8/6/2024   Next appt: Visit date not found    Last OARRS:        No data to display

## 2024-08-29 DIAGNOSIS — F32.2 CURRENT SEVERE EPISODE OF MAJOR DEPRESSIVE DISORDER WITHOUT PSYCHOTIC FEATURES WITHOUT PRIOR EPISODE (HCC): ICD-10-CM

## 2024-08-29 RX ORDER — ESCITALOPRAM OXALATE 10 MG/1
10 TABLET ORAL DAILY
Qty: 30 TABLET | Refills: 10 | Status: SHIPPED | OUTPATIENT
Start: 2024-08-29

## 2024-08-29 NOTE — TELEPHONE ENCOUNTER
Medication:   Requested Prescriptions     Pending Prescriptions Disp Refills    escitalopram (LEXAPRO) 10 MG tablet [Pharmacy Med Name: ESCITALOPRAM 10MG TABLET 10 Tablet] 30 tablet 10     Sig: TAKE 1 TABLET BY MOUTH DAILY        Last Filled:  7/22/24    Patient Phone Number: [phone] (home)     Last appt: 8/6/2024   Next appt: Visit date not found    Last OARRS:        No data to display

## 2024-08-30 DIAGNOSIS — E10.40 POORLY CONTROLLED TYPE 1 DIABETES MELLITUS WITH NEUROPATHY (HCC): ICD-10-CM

## 2024-08-30 DIAGNOSIS — E04.9 THYROID ENLARGEMENT: ICD-10-CM

## 2024-08-30 DIAGNOSIS — E10.65 POORLY CONTROLLED TYPE 1 DIABETES MELLITUS WITH NEUROPATHY (HCC): ICD-10-CM

## 2024-08-30 DIAGNOSIS — E55.9 VITAMIN D DEFICIENCY: ICD-10-CM

## 2024-08-30 DIAGNOSIS — I10 PRIMARY HYPERTENSION: ICD-10-CM

## 2024-08-30 DIAGNOSIS — E78.2 MIXED HYPERLIPIDEMIA: ICD-10-CM

## 2024-08-30 DIAGNOSIS — M81.0 AGE RELATED OSTEOPOROSIS, UNSPECIFIED PATHOLOGICAL FRACTURE PRESENCE: ICD-10-CM

## 2024-08-30 LAB
25(OH)D3 SERPL-MCNC: 31.9 NG/ML
ALBUMIN SERPL-MCNC: 4.1 G/DL (ref 3.4–5)
ALBUMIN/GLOB SERPL: 1.9 {RATIO} (ref 1.1–2.2)
ALP SERPL-CCNC: 161 U/L (ref 40–129)
ALT SERPL-CCNC: 15 U/L (ref 10–40)
ANION GAP SERPL CALCULATED.3IONS-SCNC: 13 MMOL/L (ref 3–16)
AST SERPL-CCNC: 24 U/L (ref 15–37)
BILIRUB SERPL-MCNC: <0.2 MG/DL (ref 0–1)
BUN SERPL-MCNC: 29 MG/DL (ref 7–20)
CALCIUM SERPL-MCNC: 9.8 MG/DL (ref 8.3–10.6)
CHLORIDE SERPL-SCNC: 106 MMOL/L (ref 99–110)
CHOLEST SERPL-MCNC: 246 MG/DL (ref 0–199)
CO2 SERPL-SCNC: 26 MMOL/L (ref 21–32)
CREAT SERPL-MCNC: 1.7 MG/DL (ref 0.6–1.2)
EST. AVERAGE GLUCOSE BLD GHB EST-MCNC: 139.9 MG/DL
GFR SERPLBLD CREATININE-BSD FMLA CKD-EPI: 33 ML/MIN/{1.73_M2}
GLUCOSE SERPL-MCNC: 161 MG/DL (ref 70–99)
HBA1C MFR BLD: 6.5 %
HDLC SERPL-MCNC: 123 MG/DL (ref 40–60)
LDLC SERPL CALC-MCNC: 87 MG/DL
PHOSPHATE SERPL-MCNC: 3 MG/DL (ref 2.5–4.9)
POTASSIUM SERPL-SCNC: 4 MMOL/L (ref 3.5–5.1)
PTH-INTACT SERPL-MCNC: 46.2 PG/ML (ref 14–72)
SODIUM SERPL-SCNC: 145 MMOL/L (ref 136–145)
T4 FREE SERPL-MCNC: 1.4 NG/DL (ref 0.9–1.8)
TRIGL SERPL-MCNC: 182 MG/DL (ref 0–150)
TSH SERPL DL<=0.005 MIU/L-ACNC: 3 UIU/ML (ref 0.27–4.2)
VLDLC SERPL CALC-MCNC: 36 MG/DL

## 2024-09-02 PROBLEM — E10.40 TYPE 1 DIABETES, CONTROLLED, WITH NEUROPATHY (HCC): Status: ACTIVE | Noted: 2024-09-02

## 2024-09-02 PROBLEM — E10.319 CONTROLLED TYPE 1 DIABETES MELLITUS WITH RETINOPATHY (HCC): Status: ACTIVE | Noted: 2024-09-02

## 2024-09-02 LAB
ALBUMIN SERPL ELPH-MCNC: 3.4 G/DL (ref 3.1–4.9)
ALPHA1 GLOB SERPL ELPH-MCNC: 0.3 G/DL (ref 0.2–0.4)
ALPHA2 GLOB SERPL ELPH-MCNC: 0.8 G/DL (ref 0.4–1.1)
B-GLOBULIN SERPL ELPH-MCNC: 1.2 G/DL (ref 0.9–1.6)
GAMMA GLOB SERPL ELPH-MCNC: 0.6 G/DL (ref 0.6–1.8)
PROT SERPL-MCNC: 6.3 G/DL (ref 6.4–8.2)

## 2024-09-03 ENCOUNTER — OFFICE VISIT (OUTPATIENT)
Dept: ENDOCRINOLOGY | Age: 64
End: 2024-09-03
Payer: MEDICARE

## 2024-09-03 VITALS
RESPIRATION RATE: 14 BRPM | TEMPERATURE: 98 F | WEIGHT: 122 LBS | SYSTOLIC BLOOD PRESSURE: 120 MMHG | HEART RATE: 76 BPM | DIASTOLIC BLOOD PRESSURE: 64 MMHG | OXYGEN SATURATION: 99 % | BODY MASS INDEX: 24.6 KG/M2 | HEIGHT: 59 IN

## 2024-09-03 DIAGNOSIS — E55.9 VITAMIN D DEFICIENCY: ICD-10-CM

## 2024-09-03 DIAGNOSIS — I10 PRIMARY HYPERTENSION: ICD-10-CM

## 2024-09-03 DIAGNOSIS — N18.30 STAGE 3 CHRONIC KIDNEY DISEASE, UNSPECIFIED WHETHER STAGE 3A OR 3B CKD (HCC): ICD-10-CM

## 2024-09-03 DIAGNOSIS — E27.40 ADRENAL INSUFFICIENCY (HCC): ICD-10-CM

## 2024-09-03 DIAGNOSIS — E10.40 TYPE 1 DIABETES, CONTROLLED, WITH NEUROPATHY (HCC): Primary | ICD-10-CM

## 2024-09-03 DIAGNOSIS — Z83.49 FAMILY HISTORY OF THYROID DISEASE: ICD-10-CM

## 2024-09-03 DIAGNOSIS — E10.21 DIABETIC NEPHROPATHY ASSOCIATED WITH TYPE 1 DIABETES MELLITUS (HCC): ICD-10-CM

## 2024-09-03 DIAGNOSIS — C43.9 MALIGNANT MELANOMA, UNSPECIFIED SITE (HCC): ICD-10-CM

## 2024-09-03 DIAGNOSIS — M81.0 AGE RELATED OSTEOPOROSIS, UNSPECIFIED PATHOLOGICAL FRACTURE PRESENCE: ICD-10-CM

## 2024-09-03 DIAGNOSIS — E10.319 CONTROLLED TYPE 1 DIABETES MELLITUS WITH RETINOPATHY, MACULAR EDEMA PRESENCE UNSPECIFIED, UNSPECIFIED LATERALITY, UNSPECIFIED RETINOPATHY SEVERITY (HCC): ICD-10-CM

## 2024-09-03 DIAGNOSIS — E78.2 MIXED HYPERLIPIDEMIA: ICD-10-CM

## 2024-09-03 LAB — SPE/IFE INTERPRETATION: NORMAL

## 2024-09-03 PROCEDURE — 3078F DIAST BP <80 MM HG: CPT | Performed by: INTERNAL MEDICINE

## 2024-09-03 PROCEDURE — G8420 CALC BMI NORM PARAMETERS: HCPCS | Performed by: INTERNAL MEDICINE

## 2024-09-03 PROCEDURE — 1123F ACP DISCUSS/DSCN MKR DOCD: CPT | Performed by: INTERNAL MEDICINE

## 2024-09-03 PROCEDURE — 99214 OFFICE O/P EST MOD 30 MIN: CPT | Performed by: INTERNAL MEDICINE

## 2024-09-03 PROCEDURE — G8427 DOCREV CUR MEDS BY ELIG CLIN: HCPCS | Performed by: INTERNAL MEDICINE

## 2024-09-03 PROCEDURE — 1036F TOBACCO NON-USER: CPT | Performed by: INTERNAL MEDICINE

## 2024-09-03 PROCEDURE — 1090F PRES/ABSN URINE INCON ASSESS: CPT | Performed by: INTERNAL MEDICINE

## 2024-09-03 PROCEDURE — 3044F HG A1C LEVEL LT 7.0%: CPT | Performed by: INTERNAL MEDICINE

## 2024-09-03 PROCEDURE — 95251 CONT GLUC MNTR ANALYSIS I&R: CPT | Performed by: INTERNAL MEDICINE

## 2024-09-03 PROCEDURE — 3074F SYST BP LT 130 MM HG: CPT | Performed by: INTERNAL MEDICINE

## 2024-09-03 PROCEDURE — G8400 PT W/DXA NO RESULTS DOC: HCPCS | Performed by: INTERNAL MEDICINE

## 2024-09-03 PROCEDURE — 2022F DILAT RTA XM EVC RTNOPTHY: CPT | Performed by: INTERNAL MEDICINE

## 2024-09-03 PROCEDURE — 3017F COLORECTAL CA SCREEN DOC REV: CPT | Performed by: INTERNAL MEDICINE

## 2024-09-03 NOTE — PROGRESS NOTES
Elvia Arguelles is a 63 y.o. female who is being evaluated for Type 1 diabetes mellitus.     Current symptoms/problems include  hyperglycemia  and are worsening.     Type 1 diabetes, controlled, with neuropathy (HCC) [E10.40]    Diagnosed with Type 1 diabetes mellitus in 1993.     Comorbid conditions:  hypertension, Neuropathy, Nephropathy, Retinopathy, and Chronic Kidney Disease    Current diabetic medications include: Humalog  On Omnipod5/Dexcom 6       Will start Tandem/Dexcom    Intolerance to diabetes medications: No     Weight trend: stable  Prior visit with dietician: yes  Current diet: on average, 3 meals per day  Current exercise: no     Current monitoring regimen: home blood tests - 4 times daily  Has brought blood glucose log/meter:  Yes  Home blood sugar records: fasting range: 100-120  and postprandial range: 200   Any episodes of hypoglycemia? Yes  Hypoglycemia frequency and time(s):  once in 3 months  Does patient have Glucagon emergency kit? No  Does patient have rapid acting carbohydrate?  Yes  Does patient wear a medic alert bracelet or necklace?  No    2. Diabetic Nephropathy  Has increased urination  Lisinopril caused too low BP    3. Stage 3 chronic kidney disease, unspecified whether stage 3a or 3b CKD (HCC)  No urination problems    4. Type 1 diabetes, poorly controlled, with retinopathy (HCC)  Has blurry vision    5. Vitamin D deficiency  Has fatigue    6. Hyperlipidemia  No muscle pain. Has leg cramps.    7.  Hypertension, primary  Has headaches    8.  Family history of thyroid disease  Mother and sisters had thyroid disease.    9. Thyroid enlargement  No difficulty swallowing, voice change  No radiation to her neck    10.  Melanoma  Has fatigue    11. Osteoporosis  Had right hip fracture in 5/2024.  Had surgery  Relates 4 to immunotherapy.  No family history of hip fractures in mother or father  On steroids  No RA  No smoking  Has GERD, takes medication, not a candidate for oral

## 2024-09-04 DIAGNOSIS — G25.81 RESTLESS LEG SYNDROME: ICD-10-CM

## 2024-09-05 RX ORDER — ROPINIROLE 1 MG/1
1 TABLET, FILM COATED ORAL NIGHTLY
Qty: 30 TABLET | Refills: 10 | Status: SHIPPED | OUTPATIENT
Start: 2024-09-05

## 2024-09-05 NOTE — TELEPHONE ENCOUNTER
Medication:   Requested Prescriptions     Pending Prescriptions Disp Refills    rOPINIRole (REQUIP) 1 MG tablet [Pharmacy Med Name: ROPINIROLE 1MG TAB 1 Tablet] 30 tablet 10     Sig: TAKE 1 TABLET BY MOUTH NIGHTLY       Last Filled:  7/22/24    Patient Phone Number: [phone] (home)     Last appt: 8/6/2024   Next appt: Visit date not found    Last Labs DM:   Lab Results   Component Value Date/Time    LABA1C 6.5 08/30/2024 10:16 AM     Last Lipid:   Lab Results   Component Value Date/Time    CHOL 246 08/30/2024 10:16 AM    TRIG 182 08/30/2024 10:16 AM     08/30/2024 10:16 AM     12/07/2011 07:50 AM     Last PSA: No results found for: \"PSA\"  Last Thyroid:   Lab Results   Component Value Date/Time    TSH 3.00 08/30/2024 10:16 AM    FT3 2.2 02/20/2023 08:31 AM    T4FREE 1.4 08/30/2024 10:16 AM

## 2024-09-18 ENCOUNTER — PATIENT MESSAGE (OUTPATIENT)
Dept: FAMILY MEDICINE CLINIC | Age: 64
End: 2024-09-18

## 2024-10-02 ENCOUNTER — TELEPHONE (OUTPATIENT)
Dept: ENDOCRINOLOGY | Age: 64
End: 2024-10-02

## 2024-10-11 RX ORDER — NIFEDIPINE 30 MG/1
30 TABLET, EXTENDED RELEASE ORAL 2 TIMES DAILY
Qty: 180 TABLET | Refills: 0 | Status: SHIPPED | OUTPATIENT
Start: 2024-10-11

## 2024-10-11 NOTE — TELEPHONE ENCOUNTER
Medication:   Requested Prescriptions     Pending Prescriptions Disp Refills    NIFEdipine (PROCARDIA XL) 30 MG extended release tablet [Pharmacy Med Name: NIFEDIPINE 30MG ER (XL/OSM) TABLETS] 180 tablet 0     Sig: TAKE 1 TABLET BY MOUTH IN THE MORNING AND AT BEDTIME       Last Filled:  7/11/24    Patient Phone Number: [phone] (home)     Last appt: 8/6/2024   Next appt: Visit date not found    Last Labs DM:   Lab Results   Component Value Date/Time    LABA1C 6.5 08/30/2024 10:16 AM     Last Lipid:   Lab Results   Component Value Date/Time    CHOL 246 08/30/2024 10:16 AM    TRIG 182 08/30/2024 10:16 AM     08/30/2024 10:16 AM     12/07/2011 07:50 AM     Last PSA: No results found for: \"PSA\"  Last Thyroid:   Lab Results   Component Value Date/Time    TSH 3.00 08/30/2024 10:16 AM    FT3 2.2 02/20/2023 08:31 AM    T4FREE 1.4 08/30/2024 10:16 AM

## 2024-10-17 RX ORDER — METHOCARBAMOL 750 MG/1
TABLET, FILM COATED ORAL
Qty: 120 TABLET | Refills: 0 | Status: SHIPPED | OUTPATIENT
Start: 2024-10-17

## 2024-10-17 RX ORDER — NIFEDIPINE 30 MG/1
30 TABLET, EXTENDED RELEASE ORAL 2 TIMES DAILY
Qty: 180 TABLET | Refills: 0 | OUTPATIENT
Start: 2024-10-17

## 2024-10-17 NOTE — TELEPHONE ENCOUNTER
Medication:   Requested Prescriptions     Pending Prescriptions Disp Refills    methocarbamol (ROBAXIN) 750 MG tablet [Pharmacy Med Name: METHOCARBAMOL 750MG TABLETS] 120 tablet 0     Sig: TAKE 1 TABLET BY MOUTH FOUR TIMES DAILY AS NEEDED FOR MUSCLE SPASMS        Last Filled:  8/13/24    Patient Phone Number: [phone] (home)     Last appt: 8/6/2024   Next appt: Visit date not found    Last OARRS:        No data to display

## 2024-10-17 NOTE — TELEPHONE ENCOUNTER
Medication:   Requested Prescriptions     Pending Prescriptions Disp Refills    NIFEdipine (PROCARDIA XL) 30 MG extended release tablet [Pharmacy Med Name: NIFEDIPINE 30MG ER (XL/OSM) TABLETS] 180 tablet 0     Sig: TAKE 1 TABLET BY MOUTH IN THE MORNING AND AT BEDTIME       Last Filled:  10/11/24  Duplicate request.    Patient Phone Number: [phone] (home)     Last appt: 8/6/2024   Next appt: Visit date not found    Last Labs DM:   Lab Results   Component Value Date/Time    LABA1C 6.5 08/30/2024 10:16 AM     Last Lipid:   Lab Results   Component Value Date/Time    CHOL 246 08/30/2024 10:16 AM    TRIG 182 08/30/2024 10:16 AM     08/30/2024 10:16 AM     12/07/2011 07:50 AM     Last PSA: No results found for: \"PSA\"  Last Thyroid:   Lab Results   Component Value Date/Time    TSH 3.00 08/30/2024 10:16 AM    FT3 2.2 02/20/2023 08:31 AM    T4FREE 1.4 08/30/2024 10:16 AM

## 2024-10-30 ENCOUNTER — APPOINTMENT (OUTPATIENT)
Dept: CT IMAGING | Age: 64
End: 2024-10-30
Payer: MEDICARE

## 2024-10-30 ENCOUNTER — HOSPITAL ENCOUNTER (INPATIENT)
Age: 64
LOS: 2 days | Discharge: HOME | End: 2024-11-01
Attending: EMERGENCY MEDICINE
Payer: MEDICARE

## 2024-10-30 ENCOUNTER — APPOINTMENT (OUTPATIENT)
Dept: GENERAL RADIOLOGY | Age: 64
End: 2024-10-30
Payer: MEDICARE

## 2024-10-30 DIAGNOSIS — I16.0 HYPERTENSIVE URGENCY: Primary | ICD-10-CM

## 2024-10-30 DIAGNOSIS — N17.9 AKI (ACUTE KIDNEY INJURY) (HCC): ICD-10-CM

## 2024-10-30 LAB
ALBUMIN SERPL-MCNC: 4.2 G/DL (ref 3.4–5)
ALP SERPL-CCNC: 149 U/L (ref 40–129)
ALT SERPL-CCNC: 13 U/L (ref 10–40)
AMPHETAMINES UR QL SCN>1000 NG/ML: ABNORMAL
ANION GAP SERPL CALCULATED.3IONS-SCNC: 11 MMOL/L (ref 3–16)
ANION GAP SERPL CALCULATED.3IONS-SCNC: 11 MMOL/L (ref 3–16)
ANION GAP SERPL CALCULATED.3IONS-SCNC: 13 MMOL/L (ref 3–16)
ANION GAP SERPL CALCULATED.3IONS-SCNC: 15 MMOL/L (ref 3–16)
ANION GAP SERPL CALCULATED.3IONS-SCNC: 17 MMOL/L (ref 3–16)
AST SERPL-CCNC: 26 U/L (ref 15–37)
BACTERIA URNS QL MICRO: NORMAL /HPF
BARBITURATES UR QL SCN>200 NG/ML: ABNORMAL
BASE EXCESS BLDV CALC-SCNC: -2 MMOL/L
BASOPHILS # BLD: 0.1 K/UL (ref 0–0.2)
BASOPHILS NFR BLD: 0.6 %
BENZODIAZ UR QL SCN>200 NG/ML: ABNORMAL
BETA-HYDROXYBUTYRATE: 0.67 MMOL/L (ref 0–0.27)
BILIRUB DIRECT SERPL-MCNC: <0.1 MG/DL (ref 0–0.3)
BILIRUB INDIRECT SERPL-MCNC: ABNORMAL MG/DL (ref 0–1)
BILIRUB SERPL-MCNC: <0.2 MG/DL (ref 0–1)
BILIRUB UR QL STRIP.AUTO: NEGATIVE
BUN SERPL-MCNC: 38 MG/DL (ref 7–20)
BUN SERPL-MCNC: 38 MG/DL (ref 7–20)
BUN SERPL-MCNC: 40 MG/DL (ref 7–20)
BUN SERPL-MCNC: 41 MG/DL (ref 7–20)
BUN SERPL-MCNC: 44 MG/DL (ref 7–20)
CALCIUM SERPL-MCNC: 10 MG/DL (ref 8.3–10.6)
CALCIUM SERPL-MCNC: 9 MG/DL (ref 8.3–10.6)
CALCIUM SERPL-MCNC: 9.2 MG/DL (ref 8.3–10.6)
CALCIUM SERPL-MCNC: 9.3 MG/DL (ref 8.3–10.6)
CALCIUM SERPL-MCNC: 9.6 MG/DL (ref 8.3–10.6)
CANNABINOIDS UR QL SCN>50 NG/ML: ABNORMAL
CHLORIDE SERPL-SCNC: 103 MMOL/L (ref 99–110)
CHLORIDE SERPL-SCNC: 104 MMOL/L (ref 99–110)
CHLORIDE SERPL-SCNC: 104 MMOL/L (ref 99–110)
CHLORIDE SERPL-SCNC: 106 MMOL/L (ref 99–110)
CHLORIDE SERPL-SCNC: 97 MMOL/L (ref 99–110)
CLARITY UR: CLEAR
CO2 BLDV-SCNC: 27 MMOL/L
CO2 SERPL-SCNC: 20 MMOL/L (ref 21–32)
CO2 SERPL-SCNC: 20 MMOL/L (ref 21–32)
CO2 SERPL-SCNC: 22 MMOL/L (ref 21–32)
COCAINE UR QL SCN: ABNORMAL
COHGB MFR BLDV: 1.4 %
COLOR UR: YELLOW
CREAT SERPL-MCNC: 1.8 MG/DL (ref 0.6–1.2)
CREAT SERPL-MCNC: 2.2 MG/DL (ref 0.6–1.2)
DEPRECATED RDW RBC AUTO: 13.7 % (ref 12.4–15.4)
DRUG SCREEN COMMENT UR-IMP: ABNORMAL
EOSINOPHIL # BLD: 0.3 K/UL (ref 0–0.6)
EOSINOPHIL NFR BLD: 2.5 %
EPI CELLS #/AREA URNS AUTO: 0 /HPF (ref 0–5)
EST. AVERAGE GLUCOSE BLD GHB EST-MCNC: 139.9 MG/DL
FENTANYL SCREEN, URINE: ABNORMAL
GFR SERPLBLD CREATININE-BSD FMLA CKD-EPI: 24 ML/MIN/{1.73_M2}
GFR SERPLBLD CREATININE-BSD FMLA CKD-EPI: 31 ML/MIN/{1.73_M2}
GLUCOSE BLD-MCNC: 134 MG/DL (ref 70–99)
GLUCOSE BLD-MCNC: 167 MG/DL (ref 70–99)
GLUCOSE BLD-MCNC: 181 MG/DL (ref 70–99)
GLUCOSE BLD-MCNC: 193 MG/DL (ref 70–99)
GLUCOSE BLD-MCNC: 212 MG/DL (ref 70–99)
GLUCOSE BLD-MCNC: 429 MG/DL (ref 70–99)
GLUCOSE BLD-MCNC: 79 MG/DL (ref 70–99)
GLUCOSE BLD-MCNC: 83 MG/DL (ref 70–99)
GLUCOSE BLD-MCNC: 88 MG/DL (ref 70–99)
GLUCOSE SERPL-MCNC: 103 MG/DL (ref 70–99)
GLUCOSE SERPL-MCNC: 108 MG/DL (ref 70–99)
GLUCOSE SERPL-MCNC: 228 MG/DL (ref 70–99)
GLUCOSE SERPL-MCNC: 426 MG/DL (ref 70–99)
GLUCOSE SERPL-MCNC: 67 MG/DL (ref 70–99)
GLUCOSE UR STRIP.AUTO-MCNC: 500 MG/DL
HBA1C MFR BLD: 6.5 %
HCO3 BLDV-SCNC: 25 MMOL/L (ref 23–29)
HCT VFR BLD AUTO: 37.1 % (ref 36–48)
HGB BLD-MCNC: 12.7 G/DL (ref 12–16)
HGB UR QL STRIP.AUTO: NEGATIVE
HYALINE CASTS #/AREA URNS AUTO: 0 /LPF (ref 0–8)
KETONES UR STRIP.AUTO-MCNC: NEGATIVE MG/DL
LACTATE BLDV-SCNC: 1.2 MMOL/L (ref 0.4–2)
LACTATE BLDV-SCNC: 3.3 MMOL/L (ref 0.4–2)
LEUKOCYTE ESTERASE UR QL STRIP.AUTO: NEGATIVE
LIPASE SERPL-CCNC: 46 U/L (ref 13–60)
LYMPHOCYTES # BLD: 1.7 K/UL (ref 1–5.1)
LYMPHOCYTES NFR BLD: 16 %
MAGNESIUM SERPL-MCNC: 1.73 MG/DL (ref 1.8–2.4)
MAGNESIUM SERPL-MCNC: 2.37 MG/DL (ref 1.8–2.4)
MAGNESIUM SERPL-MCNC: 2.49 MG/DL (ref 1.8–2.4)
MAGNESIUM SERPL-MCNC: 2.58 MG/DL (ref 1.8–2.4)
MCH RBC QN AUTO: 30.6 PG (ref 26–34)
MCHC RBC AUTO-ENTMCNC: 34.3 G/DL (ref 31–36)
MCV RBC AUTO: 89.2 FL (ref 80–100)
METHADONE UR QL SCN>300 NG/ML: ABNORMAL
METHGB MFR BLDV: 0.7 %
MONOCYTES # BLD: 0.6 K/UL (ref 0–1.3)
MONOCYTES NFR BLD: 6 %
NEUTROPHILS # BLD: 7.9 K/UL (ref 1.7–7.7)
NEUTROPHILS NFR BLD: 74.9 %
NITRITE UR QL STRIP.AUTO: NEGATIVE
NT-PROBNP SERPL-MCNC: 1559 PG/ML (ref 0–124)
O2 THERAPY: ABNORMAL
OPIATES UR QL SCN>300 NG/ML: ABNORMAL
OXYCODONE UR QL SCN: POSITIVE
PCO2 BLDV: 53.9 MMHG (ref 40–50)
PCP UR QL SCN>25 NG/ML: ABNORMAL
PERFORMED ON: ABNORMAL
PERFORMED ON: NORMAL
PH BLDV: 7.28 [PH] (ref 7.35–7.45)
PH UR STRIP.AUTO: 6.5 [PH] (ref 5–8)
PH UR STRIP: 6 [PH]
PHOSPHATE SERPL-MCNC: 2.4 MG/DL (ref 2.5–4.9)
PHOSPHATE SERPL-MCNC: 3.4 MG/DL (ref 2.5–4.9)
PHOSPHATE SERPL-MCNC: 4.1 MG/DL (ref 2.5–4.9)
PHOSPHATE SERPL-MCNC: 4.2 MG/DL (ref 2.5–4.9)
PLATELET # BLD AUTO: 229 K/UL (ref 135–450)
PMV BLD AUTO: 7.9 FL (ref 5–10.5)
PO2 BLDV: <30 MMHG
POTASSIUM SERPL-SCNC: 4.2 MMOL/L (ref 3.5–5.1)
POTASSIUM SERPL-SCNC: 4.3 MMOL/L (ref 3.5–5.1)
POTASSIUM SERPL-SCNC: 4.5 MMOL/L (ref 3.5–5.1)
POTASSIUM SERPL-SCNC: 4.8 MMOL/L (ref 3.5–5.1)
POTASSIUM SERPL-SCNC: 4.9 MMOL/L (ref 3.5–5.1)
PROT SERPL-MCNC: 7.3 G/DL (ref 6.4–8.2)
PROT UR STRIP.AUTO-MCNC: 30 MG/DL
RBC # BLD AUTO: 4.16 M/UL (ref 4–5.2)
RBC CLUMPS #/AREA URNS AUTO: 0 /HPF (ref 0–4)
REPORT: NORMAL
RESP PATH DNA+RNA PNL NPH NAA+NON-PROBE: NORMAL
SAO2 % BLDV: 54 %
SODIUM SERPL-SCNC: 134 MMOL/L (ref 136–145)
SODIUM SERPL-SCNC: 136 MMOL/L (ref 136–145)
SODIUM SERPL-SCNC: 137 MMOL/L (ref 136–145)
SODIUM SERPL-SCNC: 137 MMOL/L (ref 136–145)
SODIUM SERPL-SCNC: 143 MMOL/L (ref 136–145)
SP GR UR STRIP.AUTO: 1.01 (ref 1–1.03)
UA COMPLETE W REFLEX CULTURE PNL UR: ABNORMAL
UA DIPSTICK W REFLEX MICRO PNL UR: YES
URN SPEC COLLECT METH UR: ABNORMAL
UROBILINOGEN UR STRIP-ACNC: 0.2 E.U./DL
WBC # BLD AUTO: 10.6 K/UL (ref 4–11)
WBC #/AREA URNS AUTO: 1 /HPF (ref 0–5)

## 2024-10-30 PROCEDURE — 2580000003 HC RX 258

## 2024-10-30 PROCEDURE — 83036 HEMOGLOBIN GLYCOSYLATED A1C: CPT

## 2024-10-30 PROCEDURE — 82803 BLOOD GASES ANY COMBINATION: CPT

## 2024-10-30 PROCEDURE — 6360000002 HC RX W HCPCS: Performed by: EMERGENCY MEDICINE

## 2024-10-30 PROCEDURE — 6370000000 HC RX 637 (ALT 250 FOR IP)

## 2024-10-30 PROCEDURE — 6360000002 HC RX W HCPCS

## 2024-10-30 PROCEDURE — 2500000003 HC RX 250 WO HCPCS: Performed by: EMERGENCY MEDICINE

## 2024-10-30 PROCEDURE — 83880 ASSAY OF NATRIURETIC PEPTIDE: CPT

## 2024-10-30 PROCEDURE — 83735 ASSAY OF MAGNESIUM: CPT

## 2024-10-30 PROCEDURE — 96375 TX/PRO/DX INJ NEW DRUG ADDON: CPT

## 2024-10-30 PROCEDURE — 81001 URINALYSIS AUTO W/SCOPE: CPT

## 2024-10-30 PROCEDURE — 6370000000 HC RX 637 (ALT 250 FOR IP): Performed by: INTERNAL MEDICINE

## 2024-10-30 PROCEDURE — 80048 BASIC METABOLIC PNL TOTAL CA: CPT

## 2024-10-30 PROCEDURE — 2060000000 HC ICU INTERMEDIATE R&B

## 2024-10-30 PROCEDURE — 82010 KETONE BODYS QUAN: CPT

## 2024-10-30 PROCEDURE — 80076 HEPATIC FUNCTION PANEL: CPT

## 2024-10-30 PROCEDURE — 96374 THER/PROPH/DIAG INJ IV PUSH: CPT

## 2024-10-30 PROCEDURE — 80307 DRUG TEST PRSMV CHEM ANLYZR: CPT

## 2024-10-30 PROCEDURE — 85025 COMPLETE CBC W/AUTO DIFF WBC: CPT

## 2024-10-30 PROCEDURE — 36415 COLL VENOUS BLD VENIPUNCTURE: CPT

## 2024-10-30 PROCEDURE — 2580000003 HC RX 258: Performed by: EMERGENCY MEDICINE

## 2024-10-30 PROCEDURE — 71045 X-RAY EXAM CHEST 1 VIEW: CPT

## 2024-10-30 PROCEDURE — 6370000000 HC RX 637 (ALT 250 FOR IP): Performed by: EMERGENCY MEDICINE

## 2024-10-30 PROCEDURE — 99223 1ST HOSP IP/OBS HIGH 75: CPT | Performed by: INTERNAL MEDICINE

## 2024-10-30 PROCEDURE — 83690 ASSAY OF LIPASE: CPT

## 2024-10-30 PROCEDURE — 99285 EMERGENCY DEPT VISIT HI MDM: CPT

## 2024-10-30 PROCEDURE — 99221 1ST HOSP IP/OBS SF/LOW 40: CPT | Performed by: INTERNAL MEDICINE

## 2024-10-30 PROCEDURE — 83605 ASSAY OF LACTIC ACID: CPT

## 2024-10-30 PROCEDURE — 70450 CT HEAD/BRAIN W/O DYE: CPT

## 2024-10-30 PROCEDURE — 0202U NFCT DS 22 TRGT SARS-COV-2: CPT

## 2024-10-30 PROCEDURE — 84100 ASSAY OF PHOSPHORUS: CPT

## 2024-10-30 RX ORDER — SODIUM CHLORIDE 0.9 % (FLUSH) 0.9 %
5-40 SYRINGE (ML) INJECTION EVERY 12 HOURS SCHEDULED
Status: DISCONTINUED | OUTPATIENT
Start: 2024-10-30 | End: 2024-11-01 | Stop reason: HOSPADM

## 2024-10-30 RX ORDER — METOCLOPRAMIDE HYDROCHLORIDE 5 MG/ML
10 INJECTION INTRAMUSCULAR; INTRAVENOUS ONCE
Status: COMPLETED | OUTPATIENT
Start: 2024-10-30 | End: 2024-10-30

## 2024-10-30 RX ORDER — MAGNESIUM SULFATE IN WATER 40 MG/ML
2000 INJECTION, SOLUTION INTRAVENOUS PRN
Status: DISCONTINUED | OUTPATIENT
Start: 2024-10-30 | End: 2024-10-30

## 2024-10-30 RX ORDER — HYDROCORTISONE 5 MG/1
5 TABLET ORAL EVERY EVENING
Status: DISCONTINUED | OUTPATIENT
Start: 2024-10-30 | End: 2024-10-30

## 2024-10-30 RX ORDER — METOPROLOL TARTRATE 1 MG/ML
5 INJECTION, SOLUTION INTRAVENOUS ONCE
Status: COMPLETED | OUTPATIENT
Start: 2024-10-30 | End: 2024-10-30

## 2024-10-30 RX ORDER — ONDANSETRON 2 MG/ML
4 INJECTION INTRAMUSCULAR; INTRAVENOUS ONCE
Status: COMPLETED | OUTPATIENT
Start: 2024-10-30 | End: 2024-10-30

## 2024-10-30 RX ORDER — LABETALOL HYDROCHLORIDE 5 MG/ML
10 INJECTION, SOLUTION INTRAVENOUS EVERY 6 HOURS PRN
Status: DISCONTINUED | OUTPATIENT
Start: 2024-10-30 | End: 2024-11-01 | Stop reason: HOSPADM

## 2024-10-30 RX ORDER — ONDANSETRON 2 MG/ML
4 INJECTION INTRAMUSCULAR; INTRAVENOUS EVERY 6 HOURS PRN
Status: DISCONTINUED | OUTPATIENT
Start: 2024-10-30 | End: 2024-11-01 | Stop reason: HOSPADM

## 2024-10-30 RX ORDER — ROPINIROLE 1 MG/1
1 TABLET, FILM COATED ORAL ONCE
Status: COMPLETED | OUTPATIENT
Start: 2024-10-30 | End: 2024-10-30

## 2024-10-30 RX ORDER — ACETAMINOPHEN 325 MG/1
650 TABLET ORAL EVERY 6 HOURS PRN
Status: DISCONTINUED | OUTPATIENT
Start: 2024-10-30 | End: 2024-11-01 | Stop reason: HOSPADM

## 2024-10-30 RX ORDER — HYDROCORTISONE 10 MG/1
10 TABLET ORAL EVERY MORNING
Status: DISCONTINUED | OUTPATIENT
Start: 2024-10-30 | End: 2024-10-30

## 2024-10-30 RX ORDER — POTASSIUM CHLORIDE 7.45 MG/ML
10 INJECTION INTRAVENOUS PRN
Status: DISCONTINUED | OUTPATIENT
Start: 2024-10-30 | End: 2024-10-30

## 2024-10-30 RX ORDER — SODIUM CHLORIDE 9 MG/ML
INJECTION, SOLUTION INTRAVENOUS CONTINUOUS
Status: DISCONTINUED | OUTPATIENT
Start: 2024-10-30 | End: 2024-10-30

## 2024-10-30 RX ORDER — ERGOCALCIFEROL 1.25 MG/1
50000 CAPSULE, LIQUID FILLED ORAL WEEKLY
Status: DISCONTINUED | OUTPATIENT
Start: 2024-11-03 | End: 2024-11-01 | Stop reason: HOSPADM

## 2024-10-30 RX ORDER — HYDROCORTISONE 5 MG/1
5 TABLET ORAL DAILY
Status: DISCONTINUED | OUTPATIENT
Start: 2024-10-30 | End: 2024-11-01 | Stop reason: HOSPADM

## 2024-10-30 RX ORDER — HYDRALAZINE HYDROCHLORIDE 20 MG/ML
10 INJECTION INTRAMUSCULAR; INTRAVENOUS EVERY 6 HOURS PRN
Status: DISCONTINUED | OUTPATIENT
Start: 2024-10-30 | End: 2024-11-01 | Stop reason: HOSPADM

## 2024-10-30 RX ORDER — ESCITALOPRAM OXALATE 10 MG/1
10 TABLET ORAL DAILY
Status: DISCONTINUED | OUTPATIENT
Start: 2024-10-30 | End: 2024-11-01 | Stop reason: HOSPADM

## 2024-10-30 RX ORDER — OXYCODONE HYDROCHLORIDE 5 MG/1
5 TABLET ORAL EVERY 6 HOURS PRN
Status: DISCONTINUED | OUTPATIENT
Start: 2024-10-30 | End: 2024-10-30

## 2024-10-30 RX ORDER — HYDRALAZINE HYDROCHLORIDE 20 MG/ML
5 INJECTION INTRAMUSCULAR; INTRAVENOUS ONCE
Status: COMPLETED | OUTPATIENT
Start: 2024-10-30 | End: 2024-10-30

## 2024-10-30 RX ORDER — SODIUM CHLORIDE 0.9 % (FLUSH) 0.9 %
5-40 SYRINGE (ML) INJECTION PRN
Status: DISCONTINUED | OUTPATIENT
Start: 2024-10-30 | End: 2024-11-01 | Stop reason: HOSPADM

## 2024-10-30 RX ORDER — OXYCODONE HYDROCHLORIDE 5 MG/1
5 TABLET ORAL EVERY 4 HOURS PRN
Status: DISCONTINUED | OUTPATIENT
Start: 2024-10-30 | End: 2024-11-01 | Stop reason: HOSPADM

## 2024-10-30 RX ORDER — NIFEDIPINE 30 MG/1
30 TABLET, EXTENDED RELEASE ORAL 2 TIMES DAILY
Status: DISCONTINUED | OUTPATIENT
Start: 2024-10-30 | End: 2024-10-30

## 2024-10-30 RX ORDER — DEXTROSE MONOHYDRATE AND SODIUM CHLORIDE 5; .45 G/100ML; G/100ML
INJECTION, SOLUTION INTRAVENOUS CONTINUOUS PRN
Status: DISCONTINUED | OUTPATIENT
Start: 2024-10-30 | End: 2024-11-01 | Stop reason: HOSPADM

## 2024-10-30 RX ORDER — ACETAMINOPHEN 650 MG/1
650 SUPPOSITORY RECTAL EVERY 6 HOURS PRN
Status: DISCONTINUED | OUTPATIENT
Start: 2024-10-30 | End: 2024-11-01 | Stop reason: HOSPADM

## 2024-10-30 RX ORDER — ONDANSETRON 4 MG/1
4 TABLET, ORALLY DISINTEGRATING ORAL EVERY 8 HOURS PRN
Status: DISCONTINUED | OUTPATIENT
Start: 2024-10-30 | End: 2024-11-01 | Stop reason: HOSPADM

## 2024-10-30 RX ORDER — POLYETHYLENE GLYCOL 3350 17 G/17G
17 POWDER, FOR SOLUTION ORAL DAILY PRN
Status: DISCONTINUED | OUTPATIENT
Start: 2024-10-30 | End: 2024-11-01 | Stop reason: HOSPADM

## 2024-10-30 RX ORDER — INSULIN LISPRO 100 [IU]/ML
0-8 INJECTION, SOLUTION INTRAVENOUS; SUBCUTANEOUS
Status: DISCONTINUED | OUTPATIENT
Start: 2024-10-30 | End: 2024-10-30

## 2024-10-30 RX ORDER — DIPHENHYDRAMINE HYDROCHLORIDE 50 MG/ML
12.5 INJECTION INTRAMUSCULAR; INTRAVENOUS ONCE
Status: COMPLETED | OUTPATIENT
Start: 2024-10-30 | End: 2024-10-30

## 2024-10-30 RX ORDER — LABETALOL 200 MG/1
200 TABLET, FILM COATED ORAL EVERY 12 HOURS SCHEDULED
Status: DISCONTINUED | OUTPATIENT
Start: 2024-10-30 | End: 2024-11-01 | Stop reason: HOSPADM

## 2024-10-30 RX ORDER — ENOXAPARIN SODIUM 100 MG/ML
30 INJECTION SUBCUTANEOUS DAILY
Status: DISCONTINUED | OUTPATIENT
Start: 2024-10-30 | End: 2024-11-01 | Stop reason: HOSPADM

## 2024-10-30 RX ORDER — FERROUS SULFATE 324(65)MG
324 TABLET, DELAYED RELEASE (ENTERIC COATED) ORAL
Status: DISCONTINUED | OUTPATIENT
Start: 2024-10-30 | End: 2024-11-01 | Stop reason: HOSPADM

## 2024-10-30 RX ORDER — AMLODIPINE BESYLATE 10 MG/1
10 TABLET ORAL DAILY
Status: DISCONTINUED | OUTPATIENT
Start: 2024-10-30 | End: 2024-10-30

## 2024-10-30 RX ORDER — 0.9 % SODIUM CHLORIDE 0.9 %
1000 INTRAVENOUS SOLUTION INTRAVENOUS ONCE
Status: COMPLETED | OUTPATIENT
Start: 2024-10-30 | End: 2024-10-30

## 2024-10-30 RX ORDER — LEVETIRACETAM 500 MG/1
500 TABLET ORAL 2 TIMES DAILY
Status: DISCONTINUED | OUTPATIENT
Start: 2024-10-30 | End: 2024-11-01 | Stop reason: HOSPADM

## 2024-10-30 RX ORDER — METHOCARBAMOL 750 MG/1
750 TABLET, FILM COATED ORAL 3 TIMES DAILY
Status: DISCONTINUED | OUTPATIENT
Start: 2024-10-30 | End: 2024-11-01 | Stop reason: HOSPADM

## 2024-10-30 RX ORDER — PANTOPRAZOLE SODIUM 40 MG/1
40 TABLET, DELAYED RELEASE ORAL
Status: DISCONTINUED | OUTPATIENT
Start: 2024-10-30 | End: 2024-11-01 | Stop reason: HOSPADM

## 2024-10-30 RX ORDER — MAGNESIUM SULFATE IN WATER 40 MG/ML
2000 INJECTION, SOLUTION INTRAVENOUS ONCE
Status: COMPLETED | OUTPATIENT
Start: 2024-10-30 | End: 2024-10-30

## 2024-10-30 RX ORDER — ROPINIROLE 1 MG/1
1 TABLET, FILM COATED ORAL NIGHTLY
Status: DISCONTINUED | OUTPATIENT
Start: 2024-10-30 | End: 2024-11-01 | Stop reason: HOSPADM

## 2024-10-30 RX ORDER — HYDROCORTISONE 10 MG/1
10 TABLET ORAL DAILY
Status: DISCONTINUED | OUTPATIENT
Start: 2024-10-30 | End: 2024-11-01 | Stop reason: HOSPADM

## 2024-10-30 RX ORDER — NIFEDIPINE 30 MG/1
60 TABLET, EXTENDED RELEASE ORAL 2 TIMES DAILY
Status: DISCONTINUED | OUTPATIENT
Start: 2024-10-30 | End: 2024-11-01 | Stop reason: HOSPADM

## 2024-10-30 RX ORDER — SODIUM CHLORIDE 9 MG/ML
INJECTION, SOLUTION INTRAVENOUS PRN
Status: DISCONTINUED | OUTPATIENT
Start: 2024-10-30 | End: 2024-11-01 | Stop reason: HOSPADM

## 2024-10-30 RX ADMIN — OXYCODONE HYDROCHLORIDE 5 MG: 5 TABLET ORAL at 10:01

## 2024-10-30 RX ADMIN — SODIUM CHLORIDE: 9 INJECTION, SOLUTION INTRAVENOUS at 07:03

## 2024-10-30 RX ADMIN — OXYCODONE 5 MG: 5 TABLET ORAL at 20:30

## 2024-10-30 RX ADMIN — PANTOPRAZOLE SODIUM 40 MG: 40 TABLET, DELAYED RELEASE ORAL at 08:30

## 2024-10-30 RX ADMIN — HYDROCORTISONE 10 MG: 10 TABLET ORAL at 11:55

## 2024-10-30 RX ADMIN — LEVETIRACETAM 500 MG: 500 TABLET, FILM COATED ORAL at 08:30

## 2024-10-30 RX ADMIN — ACETAMINOPHEN 325MG 650 MG: 325 TABLET ORAL at 08:29

## 2024-10-30 RX ADMIN — PANTOPRAZOLE SODIUM 40 MG: 40 TABLET, DELAYED RELEASE ORAL at 15:42

## 2024-10-30 RX ADMIN — ENOXAPARIN SODIUM 30 MG: 100 INJECTION SUBCUTANEOUS at 08:34

## 2024-10-30 RX ADMIN — HYDROCORTISONE 5 MG: 5 TABLET ORAL at 11:55

## 2024-10-30 RX ADMIN — LABETALOL HYDROCHLORIDE 200 MG: 200 TABLET, FILM COATED ORAL at 09:15

## 2024-10-30 RX ADMIN — METHOCARBAMOL 750 MG: 750 TABLET, FILM COATED ORAL at 14:56

## 2024-10-30 RX ADMIN — METOPROLOL TARTRATE 5 MG: 1 INJECTION, SOLUTION INTRAVENOUS at 03:51

## 2024-10-30 RX ADMIN — MAGNESIUM SULFATE HEPTAHYDRATE 2000 MG: 40 INJECTION, SOLUTION INTRAVENOUS at 09:18

## 2024-10-30 RX ADMIN — NIFEDIPINE 30 MG: 30 TABLET, FILM COATED, EXTENDED RELEASE ORAL at 08:30

## 2024-10-30 RX ADMIN — SODIUM CHLORIDE 1000 ML: 9 INJECTION, SOLUTION INTRAVENOUS at 03:25

## 2024-10-30 RX ADMIN — ROPINIROLE HYDROCHLORIDE 1 MG: 1 TABLET, FILM COATED ORAL at 05:22

## 2024-10-30 RX ADMIN — HYDRALAZINE HYDROCHLORIDE 5 MG: 20 INJECTION INTRAMUSCULAR; INTRAVENOUS at 03:52

## 2024-10-30 RX ADMIN — FERROUS SULFATE TAB EC 324 MG (65 MG FE EQUIVALENT) 324 MG: 324 (65 FE) TABLET DELAYED RESPONSE at 08:29

## 2024-10-30 RX ADMIN — METHOCARBAMOL 750 MG: 750 TABLET, FILM COATED ORAL at 08:29

## 2024-10-30 RX ADMIN — NIFEDIPINE 60 MG: 30 TABLET, FILM COATED, EXTENDED RELEASE ORAL at 20:30

## 2024-10-30 RX ADMIN — ACETAMINOPHEN 325MG 650 MG: 325 TABLET ORAL at 14:58

## 2024-10-30 RX ADMIN — DIPHENHYDRAMINE HYDROCHLORIDE 12.5 MG: 50 INJECTION INTRAMUSCULAR; INTRAVENOUS at 03:18

## 2024-10-30 RX ADMIN — ROPINIROLE 1 MG: 1 TABLET, FILM COATED ORAL at 20:30

## 2024-10-30 RX ADMIN — LEVETIRACETAM 500 MG: 500 TABLET, FILM COATED ORAL at 20:30

## 2024-10-30 RX ADMIN — SODIUM CHLORIDE, PRESERVATIVE FREE 10 ML: 5 INJECTION INTRAVENOUS at 08:30

## 2024-10-30 RX ADMIN — NICARDIPINE HYDROCHLORIDE 5 MG/HR: 0.1 INJECTION, SOLUTION INTRAVENTRICULAR at 05:48

## 2024-10-30 RX ADMIN — METHOCARBAMOL 750 MG: 750 TABLET, FILM COATED ORAL at 20:30

## 2024-10-30 RX ADMIN — LABETALOL HYDROCHLORIDE 200 MG: 200 TABLET, FILM COATED ORAL at 20:30

## 2024-10-30 RX ADMIN — SODIUM CHLORIDE, PRESERVATIVE FREE 10 ML: 5 INJECTION INTRAVENOUS at 20:32

## 2024-10-30 RX ADMIN — ESCITALOPRAM OXALATE 10 MG: 10 TABLET ORAL at 08:29

## 2024-10-30 RX ADMIN — ONDANSETRON 4 MG: 2 INJECTION INTRAMUSCULAR; INTRAVENOUS at 03:16

## 2024-10-30 RX ADMIN — OXYCODONE 5 MG: 5 TABLET ORAL at 15:42

## 2024-10-30 RX ADMIN — SODIUM CHLORIDE 1000 ML: 9 INJECTION, SOLUTION INTRAVENOUS at 05:44

## 2024-10-30 RX ADMIN — METOCLOPRAMIDE HYDROCHLORIDE 10 MG: 5 INJECTION, SOLUTION INTRAMUSCULAR; INTRAVENOUS at 03:18

## 2024-10-30 ASSESSMENT — PAIN DESCRIPTION - DESCRIPTORS
DESCRIPTORS: ACHING;SHARP
DESCRIPTORS: TINGLING
DESCRIPTORS: SPASM;ACHING
DESCRIPTORS: TINGLING
DESCRIPTORS: TINGLING
DESCRIPTORS: ACHING
DESCRIPTORS: SHARP
DESCRIPTORS: ACHING;SHARP
DESCRIPTORS: TINGLING
DESCRIPTORS: ACHING

## 2024-10-30 ASSESSMENT — PAIN SCALES - GENERAL
PAINLEVEL_OUTOF10: 7
PAINLEVEL_OUTOF10: 7
PAINLEVEL_OUTOF10: 9
PAINLEVEL_OUTOF10: 0
PAINLEVEL_OUTOF10: 0
PAINLEVEL_OUTOF10: 3
PAINLEVEL_OUTOF10: 2
PAINLEVEL_OUTOF10: 0
PAINLEVEL_OUTOF10: 7
PAINLEVEL_OUTOF10: 7
PAINLEVEL_OUTOF10: 4
PAINLEVEL_OUTOF10: 2
PAINLEVEL_OUTOF10: 3

## 2024-10-30 ASSESSMENT — PAIN - FUNCTIONAL ASSESSMENT
PAIN_FUNCTIONAL_ASSESSMENT: ACTIVITIES ARE NOT PREVENTED
PAIN_FUNCTIONAL_ASSESSMENT: PREVENTS OR INTERFERES SOME ACTIVE ACTIVITIES AND ADLS
PAIN_FUNCTIONAL_ASSESSMENT: 0-10
PAIN_FUNCTIONAL_ASSESSMENT: ACTIVITIES ARE NOT PREVENTED

## 2024-10-30 ASSESSMENT — PAIN DESCRIPTION - FREQUENCY
FREQUENCY: CONTINUOUS

## 2024-10-30 ASSESSMENT — PAIN DESCRIPTION - PAIN TYPE
TYPE: ACUTE PAIN
TYPE: CHRONIC PAIN

## 2024-10-30 ASSESSMENT — PAIN DESCRIPTION - ONSET
ONSET: ON-GOING

## 2024-10-30 ASSESSMENT — PAIN DESCRIPTION - LOCATION
LOCATION: LEG
LOCATION: HEAD
LOCATION: LEG
LOCATION: HEAD
LOCATION: HEAD;LEG;ABDOMEN
LOCATION: LEG;ABDOMEN
LOCATION: HEAD
LOCATION: LEG

## 2024-10-30 ASSESSMENT — PAIN DESCRIPTION - ORIENTATION
ORIENTATION: RIGHT;LEFT
ORIENTATION: RIGHT;LEFT;LOWER
ORIENTATION: RIGHT;LEFT
ORIENTATION: LEFT;RIGHT
ORIENTATION: LEFT;RIGHT
ORIENTATION: RIGHT;LEFT
ORIENTATION: RIGHT;LEFT

## 2024-10-30 NOTE — H&P
09/20/2024 09:44 AM    GLUCOSEU Negative 09/20/2024 09:44 AM    GLUCOSEU >=1000 05/25/2012 09:30 AM    KETUA Negative 09/20/2024 09:44 AM     Urine Cultures:   Lab Results   Component Value Date/Time    LABURIN No growth at 18 to 36 hours 08/09/2023 11:28 PM     Blood Cultures:   Lab Results   Component Value Date/Time    BC No Growth after 4 days of incubation. 05/21/2024 03:47 PM     Lab Results   Component Value Date/Time    BLOODCULT2 No Growth after 4 days of incubation. 05/21/2024 03:51 PM     Organism:   Lab Results   Component Value Date/Time    ORG Diphtheroids 08/09/2023 10:39 PM       Imaging/Diagnostics Last 24 Hours   CT HEAD WO CONTRAST    Result Date: 10/30/2024  EXAMINATION: CT OF THE HEAD WITHOUT CONTRAST  10/30/2024 2:04 am TECHNIQUE: CT of the head was performed without the administration of intravenous contrast. Automated exposure control, iterative reconstruction, and/or weight based adjustment of the mA/kV was utilized to reduce the radiation dose to as low as reasonably achievable. COMPARISON: None. HISTORY: ORDERING SYSTEM PROVIDED HISTORY: Head pain TECHNOLOGIST PROVIDED HISTORY: Has a \"code stroke\" or \"stroke alert\" been called?->No Reason for exam:->Head pain Decision Support Exception - unselect if not a suspected or confirmed emergency medical condition->Emergency Medical Condition (MA) FINDINGS: BRAIN/VENTRICLES: The encephalomalacia in the right and left occipital lobes consistent with old infarctions or old trauma.  Chronic microvascular ischemic changes in the periventricular white matter. There is no acute intracranial hemorrhage, mass effect or midline shift.  No abnormal extra-axial fluid collection.  The gray-white differentiation is maintained without evidence of an acute infarct.  There is no evidence of hydrocephalus. ORBITS: The visualized portion of the orbits demonstrate no acute abnormality. SINUSES: The visualized paranasal sinuses and mastoid air cells demonstrate no  acute abnormality. SOFT TISSUES/SKULL:  No acute abnormality of the visualized skull or soft tissues.     No acute intracranial abnormality.         Electronically signed by Roslyn Pablo MD on 10/30/2024 at 5:06 AM

## 2024-10-30 NOTE — CONSULTS
Pulmonary Consult Note     Patient's name:  Elvia Arguelles  Medical Record Number: [id number]  Patient's account/billing number: [id number]  Patient's YOB: 1960  Age: 64 y.o.  Date of Admission: 10/30/2024  2:52 AM  Date of Consult: 10/30/2024      Primary Care Physician: Meg Ellis APRN - CNP      Code Status: Full Code    Reason for consult: Hypertensive urgency    Assessment and Plan     Hypertensive urgency  Uncontrolled diabetes mellitus type 1  Metastatic melanoma  CKD 3  Secondary adrenal insufficiency  Acute hypercapnic respiratory failure      Plan:  Cardene drip  Resume oral BP meds  Blood sugar control with insulin sliding scale for now, monitor blood sugars closely,   Resume hydrocortisone chronic therapy  Check respiratory viral panel  Minimize sedatives and narcotics.      HISTORY OF PRESENT ILLNESS:   Mr./Ms. Elvia Arguelles is a 64 y.o. lady with past medical history stated below significant for hypertension, diabetes mellitus type 1 with labile blood sugar on insulin pump, history of metastatic melanoma on immunotherapy last received in May 2024 ipilimumab/nivolumab  Presented to the hospital with headache confusion nausea and vomiting    CT head with no acute abnormalities  Patient with hypercapnia  Urine tox positive for oxycodone patient on narcotics at home        Past Medical History:        Diagnosis Date    Adrenal insufficiency (HCC)     Cancer (HCC) 2002    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    Closed displaced intertrochanteric fracture of right femur (HCC) 05/18/2024    Depression     Diabetes mellitus (HCC)     Type I    Hx of radiation therapy     melanoma right eye    Hypertension     Pt. denies having history of Hypertension    Hyponatremia     Insulin pump in place     Melanoma (HCC) 01/13/2023    in stomach, pancreas    Prolonged emergence from general anesthesia     slow to wake up    Restless leg syndrome   Delia Martinez MD   ondansetron (ZOFRAN-ODT) 4 MG disintegrating tablet Take 1 tablet by mouth 3 times daily as needed for Nausea or Vomiting 4/3/24   Jeremy Raymundo MD   medical marijuana Miscellaneous Drug     Medical Marijuana Gummies    active  Patient not taking: Reported on 4/1/2024    Provider, MD Becky       Family History:       Problem Relation Age of Onset    High Blood Pressure Mother     Breast Cancer Mother         breast w mets to bone    Diabetes Father     Stroke Father     Other Cancer Father         bladder         Social History:   TOBACCO:   reports that she quit smoking about 43 years ago. Her smoking use included cigarettes. She started smoking about 53 years ago. She has a 10 pack-year smoking history. She has never used smokeless tobacco.  ETOH:   reports current alcohol use.  DRUGS:  reports no history of drug use.          REVIEW OF SYSTEMS:  Review of Systems -   General ROS: negative  Psychological ROS: negative  Ophthalmic ROS: negative  ENT ROS: negative  Allergy and Immunology ROS: negative  Hematological and Lymphatic ROS: negative  Endocrine ROS: negative  Breast ROS: negative  Respiratory ROS: no cough, shortness of breath, or wheezing  Cardiovascular ROS: no chest pain or dyspnea on exertion  Gastrointestinal ROS:negative  Genito-Urinary ROS: negative  Musculoskeletal ROS: negative  Neurological ROS: Headache   Dermatological ROS: negative        Physical Exam:    Vitals: BP (!) 112/49   Pulse 81   Temp 100.3 °F (37.9 °C) (Oral)   Resp 15   Ht 1.499 m (4' 11\")   Wt 53.9 kg (118 lb 13.3 oz)   LMP  (LMP Unknown) Comment: had a hysterectomy a long time ago  SpO2 93%   BMI 24.00 kg/m²     Last Body weight:   Wt Readings from Last 3 Encounters:   10/30/24 53.9 kg (118 lb 13.3 oz)   09/03/24 55.3 kg (122 lb)   08/06/24 53.5 kg (118 lb)       Body Mass Index : Body mass index is 24 kg/m².      Intake and Output summary:   Intake/Output Summary (Last 24 hours) at

## 2024-10-30 NOTE — PROGRESS NOTES
[facility]  Diabetes Education   Progress Note       NAME:  Elvia Arguelles  MEDICAL RECORD NUMBER:  [id number]  AGE: 64 y.o.   GENDER: female  : 1960  TODAY'S DATE:  10/30/2024    Subjective   Reason for Diabetes Education Evaluation and Assessment: DKA    Visit Type: evaluation      Elvia Arguelles is a 64 y.o. female referred by:  [x] Physician    Chart reviewed. Prior to admission, pt prescribed the following DM Medication(s): Dexcom G6- change every 10 days, CGM Transmitter- change transmitter every 90 days, Omnipod (pump)- change POD every 2 days, Tresiba- inject 10 units into the skin every morning (before breakfast)?, and Humalog- use in insulin pump- total daily dose is 30 units.     Met with pt and pt's spouse. Pt changed her infusion set yesterday prior to coming to ED. Pt has Tandem Mobi  Current settings:  Basal 0.521 units/hr  Last bolus (pump removed control IQ running)  Correction Factor 1:50mg  Carb ratio 1:15g  Target: 110mg  Insulin Duration: 5hrs  Sensor Started: 2024  Glucose on Dexcom G6 204 (range during consult 186-204)    Infusion site removed from pts abdomen. Pts spouse going home to gather insulin, and infusion set and insulin in her reservoir.     PAST MEDICAL HISTORY        Diagnosis Date    Adrenal insufficiency (HCC)     Cancer (HCC)     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    Closed displaced intertrochanteric fracture of right femur (HCC) 2024    Depression     Diabetes mellitus (HCC)     Type I    Hx of radiation therapy     melanoma right eye    Hypertension     Pt. denies having history of Hypertension    Hyponatremia     Insulin pump in place     Melanoma (HCC) 2023    in stomach, pancreas    Prolonged emergence from general anesthesia     slow to wake up    Restless leg syndrome        PAST SURGICAL HISTORY    Past Surgical History:   Procedure Laterality Date    CARPAL TUNNEL RELEASE Bilateral 2017    CHOLECYSTECTOMY      CT BIOPSY  (HCC) E10.10    Poorly controlled type 1 diabetes mellitus with neuropathy ([facility]) E10.40, E10.65    Mixed hyperlipidemia E78.2    Poorly controlled type 1 diabetes mellitus with retinopathy (HCC) E10.319, E10.65    Primary hypertension I10    Stage 3 chronic kidney disease ([facility]) N18.30    DKA, type 1, not at goal (HCC) E10.10    Intractable nausea and vomiting R11.2    Closed displaced intertrochanteric fracture of right femur ([facility]) S72.141A    Closed right hip fracture, initial encounter ([facility]) S72.001A    Demand ischemia ([facility]) I24.89    PRES (posterior reversible encephalopathy syndrome) I67.83    Melanoma ([facility]) C43.9    Unspecified convulsions R56.9    Other seizures G40.89    Age related osteoporosis M81.0    Type 1 diabetes, controlled, with neuropathy ([facility]) E10.40    Controlled type 1 diabetes mellitus with retinopathy ([facility]) E10.319    Hypertensive urgency I16.0        BP (!) 104/59   Pulse 91   Temp 100.3 °F (37.9 °C) (Oral)   Resp 19   Ht 1.499 m (4' 11\")   Wt 53.9 kg (118 lb 13.3 oz)   LMP  (LMP Unknown) Comment: had a hysterectomy a long time ago  SpO2 98%   BMI 24.00 kg/m²     HgBA1c:    Lab Results   Component Value Date/Time    LABA1C 6.5 10/30/2024 06:57 AM       Recent Labs     10/30/24  0255 10/30/24  0645 10/30/24  0758 10/30/24  0921   POCGLU 429* 79 88 134*       BUN/Creatinine:    Lab Results   Component Value Date/Time    BUN 44 10/30/2024 03:10 AM    CREATININE 2.2 10/30/2024 03:10 AM       Assessment        Diabetes Management and Education    Does the patient have a Primary Care Physician? Yes, Meg Ellis APRN - CNP- last appt 8/12/2024  Endocrinology: Dr. Hickman- last appt  9/3/2024    Does the patient require new medication instruction? No    Person responsible for administration of Insulin/Medication:     [x] Self         [x] Spouse        Insulin Instruction:  insulin pump  Injection Site:   [x] location    [x] rotation Uses abdomen and top of legs for infusion sets.

## 2024-10-30 NOTE — DISCHARGE INSTRUCTIONS
Follow up with your PCP and ophthalmologist in 1 week to get checked up for your blurry vision      American Diabetes Association:     About Diabetes: https://diabetes.org/about-diabetes     Life with Diabetes: https://diabetes.org/living-with-diabetes     Health & Wellness: https://diabetes.org/health-wellness     Food & Nutrition: https://diabetes.org/food-nutrition     Tools & Resources: https://diabetes.org/tools-resources

## 2024-10-30 NOTE — ED TRIAGE NOTES
Pt woke up this morning with nausea and vomiting. PT BGL has been high today. Sts she has a headache that started a couple of hours ago. EMS sts pt sugar is over 600. EMS sts pt was having difficulty following commands at the home. Pt is A&O x 4 at present time. EMS sts pt  administered 10 units of insulin at home.

## 2024-10-30 NOTE — CONSULTS
[facility]  Cardiology Consult Note        CC:   Hypertensive urgency            HPI:   This is a 64 y.o. female with malignant melanoma, chronic kidney disease diabetes presenting with severe hypertension.  Blood pressure greater than 200.  She is on Procardia 30 at home.      Past Medical History:   Diagnosis Date    Adrenal insufficiency (HCC)     Cancer (HCC) 2002    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    Closed displaced intertrochanteric fracture of right femur (HCC) 05/18/2024    Depression     Diabetes mellitus (HCC)     Type I    Hx of radiation therapy     melanoma right eye    Hypertension     Pt. denies having history of Hypertension    Hyponatremia     Insulin pump in place     Melanoma (HCC) 01/13/2023    in stomach, pancreas    Prolonged emergence from general anesthesia     slow to wake up    Restless leg syndrome       Past Surgical History:   Procedure Laterality Date    CARPAL TUNNEL RELEASE Bilateral 12/2017    CHOLECYSTECTOMY      CT BIOPSY ABDOMEN RETROPERITONEUM  12/27/2022    CT BIOPSY ABDOMEN RETROPERITONEUM 12/27/2022 [facility] CT SCAN    EYE SURGERY Right     biopsy    HIP SURGERY Right 5/19/2024    HIP INTRAMEDULLARY NAIL ROYCE INSERTION performed by Gonsalo White MD at [facility] OR    HYSTERECTOMY (CERVIX STATUS UNKNOWN)      LIPOMA RESECTION      LIVER BIOPSY Right     PORT SURGERY Right 01/18/2023    RIGHT POWER PORT PLACEMENT performed by Satish Corley MD at [facility] OR    SHOULDER ARTHROSCOPY Right 08/31/2018    RIGHT SHOULDER ARTHROSCOPE, SUBACROMIAL DECOMPRESSION, DISTALCLAVICLE EXCISION, CAPSULAR RELEASE, MANIPULATION UNDER ANESTHESIA, DEBRIDEMENT    SHOULDER SURGERY      UPPER GASTROINTESTINAL ENDOSCOPY  10/22/2014    UPPER GASTROINTESTINAL ENDOSCOPY N/A 01/13/2023    EGD W/EUS FNA performed by Yuri Varela MD at [facility] ENDOSCOPY    UPPER GASTROINTESTINAL ENDOSCOPY  01/13/2023    EGD BIOPSY performed by Yuri Varela MD at [facility] ENDOSCOPY    UPPER GASTROINTESTINAL ENDOSCOPY N/A  or thrills, JVP:  normal  Abdomen: Soft, non-tender, Normal bowel sounds,  No organomegaly  Extremities: No Cyanosis or Clubbing; Edema none  Neurological: Oriented to time, place, and person, Non-anxious  Psychiatric: Normal mood and affect  Skin: Warm and dry,  No rash seen      Current Facility-Administered Medications: escitalopram (LEXAPRO) tablet 10 mg, 10 mg, Oral, Daily  ferrous sulfate EC tablet 324 mg, 324 mg, Oral, Daily with breakfast  levETIRAcetam (KEPPRA) tablet 500 mg, 500 mg, Oral, BID  methocarbamol (ROBAXIN) tablet 750 mg, 750 mg, Oral, TID  NIFEdipine (PROCARDIA XL) extended release tablet 30 mg, 30 mg, Oral, BID  pantoprazole (PROTONIX) tablet 40 mg, 40 mg, Oral, BID AC  rOPINIRole (REQUIP) tablet 1 mg, 1 mg, Oral, Nightly  [START ON 11/3/2024] vitamin D (ERGOCALCIFEROL) capsule 50,000 Units, 50,000 Units, Oral, Weekly  sodium chloride flush 0.9 % injection 5-40 mL, 5-40 mL, IntraVENous, 2 times per day  sodium chloride flush 0.9 % injection 5-40 mL, 5-40 mL, IntraVENous, PRN  0.9 % sodium chloride infusion, , IntraVENous, PRN  enoxaparin Sodium (LOVENOX) injection 30 mg, 30 mg, SubCUTAneous, Daily  ondansetron (ZOFRAN-ODT) disintegrating tablet 4 mg, 4 mg, Oral, Q8H PRN **OR** ondansetron (ZOFRAN) injection 4 mg, 4 mg, IntraVENous, Q6H PRN  polyethylene glycol (GLYCOLAX) packet 17 g, 17 g, Oral, Daily PRN  acetaminophen (TYLENOL) tablet 650 mg, 650 mg, Oral, Q6H PRN **OR** acetaminophen (TYLENOL) suppository 650 mg, 650 mg, Rectal, Q6H PRN  0.9 % sodium chloride infusion, , IntraVENous, Continuous  hydrALAZINE (APRESOLINE) injection 10 mg, 10 mg, IntraVENous, Q6H PRN  niCARdipine (CARDENE) 20 mg in 0.86 % sodium chloride 200 mL infusion, 2.5-15 mg/hr, IntraVENous, Continuous  dextrose bolus 10% 125 mL, 125 mL, IntraVENous, PRN **OR** dextrose bolus 10% 250 mL, 250 mL, IntraVENous, PRN  dextrose 5 % and 0.45 % sodium chloride infusion, , IntraVENous, Continuous PRN  insulin regular (MYXREDLIN)

## 2024-10-30 NOTE — PROGRESS NOTES
NAME:  Elvia Arguelles  YOB: 1960  MEDICAL RECORD NUMBER:  [id number]    Shift Summary: Weaned off cardene gtt; blood pressures stable. Never started on insulin gtt, patient's own insulin pump replaced and patient is controlling own insulin boluses, per agreement. No N/V noted. VSS. Report called to 5N RNJennifer.     Family updated: Yes:  , Shant at bedside.    Rhythm: Normal Sinus Rhythm     Most recent vitals:   Visit Vitals  /61   Pulse 83   Temp 98.6 °F (37 °C) (Oral)   Resp 20   Ht 1.499 m (4' 11\")   Wt 53.9 kg (118 lb 13.3 oz)   SpO2 99%   BMI 24.00 kg/m²        Respiratory support needed (if any):  - RA    Admission weight Weight - Scale: 53.9 kg (118 lb 13.3 oz) (10/30/24 0645)    Today's weight    Wt Readings from Last 1 Encounters:   10/30/24 53.9 kg (118 lb 13.3 oz)        Mcgovern need assessed each shift: N/A - no mcgovern present  UOP >30ml/hr: YES  Last documented BM (in last 48 hrs):  No data found.             Restraints (in use currently or dc'd in last 12 hrs): No    Order current and documentation up to date? N/A    Lines/Drains reviewed @ bedside.  Peripheral IV 10/30/24 Proximal;Right;Anterior Forearm (Active)   Number of days: 0       Peripheral IV 10/30/24 Right;Anterior Forearm (Active)   Number of days: 0       Patient Supplied Insulin Pump 10/30/24 (Active)   Number of days: 0       Continuous Glucose Monitor  10/28/24 (Active)   Number of days: 2         Drip rates at handoff:    sodium chloride      dextrose 5 % and 0.45 % NaCl         Lab Data:   CBC:   Recent Labs     10/30/24  0310   WBC 10.6   HGB 12.7   HCT 37.1   MCV 89.2        BMP:    Recent Labs     10/30/24  0310 10/30/24  0657   * 143   K 4.9 4.2   CO2 22 20*   BUN 44* 38*   CREATININE 2.2* 1.8*     LIVR:   Recent Labs     10/30/24  0310   AST 26   ALT 13     PT/INR: No results for input(s): \"INR\" in the last 72 hours.    Invalid input(s): \"PROT\"  APTT: No results for input(s): \"APTT\" in

## 2024-10-30 NOTE — ED PROVIDER NOTES
Phosphatase 149 (*)     All other components within normal limits   LACTIC ACID - Abnormal; Notable for the following components:    Lactic Acid 3.3 (*)     All other components within normal limits   BRAIN NATRIURETIC PEPTIDE - Abnormal; Notable for the following components:    NT Pro-BNP 1,559 (*)     All other components within normal limits   POCT GLUCOSE - Abnormal; Notable for the following components:    POC Glucose 429 (*)     All other components within normal limits   LIPASE   URINALYSIS WITH REFLEX TO CULTURE   URINE DRUG SCREEN       All other labs were withinnormal range or not returned as of this dictation.    EMERGENCY DEPARTMENT COURSE and DIFFERENTIAL DIAGNOSIS/MDM:     PMH, Surgical Hx, FH, Social Hx reviewed by myself (ETOH usage, Tobacco usage, Drug usage reviewed by myself, no pertinent Hx)- No Pertinent Hx     Old records were reviewed by me     MDM 64-year-old concern for hypertensive urgency.  Antihypertensive medications given.  Blood pressure improved.  Positive for confusion, headache, nausea and vomiting.  Mild dehydration and FAVIAN.  Fluids given.  Supportive care as well.  Patient admitted for hypertensive urgency and FAVIAN.  Full code.  Patient admission.  Hospitalist discussed case with.  Full code.    Orders Placed This Encounter   Medications    sodium chloride 0.9 % bolus 1,000 mL    metoclopramide (REGLAN) injection 10 mg    ondansetron (ZOFRAN) injection 4 mg    diphenhydrAMINE (BENADRYL) injection 12.5 mg    rOPINIRole (REQUIP) tablet 1 mg    metoprolol (LOPRESSOR) injection 5 mg    hydrALAZINE (APRESOLINE) injection 5 mg    DISCONTD: insulin regular (HumuLIN R;NovoLIN R) injection 10 Units    sodium chloride 0.9 % bolus 1,000 mL     Admission     I PERSONALLY SAW THE PATIENT AND PERFORMED A SUBSTANTIVE PORTION OF THE VISIT INCLUDING ALL ASPECTS OF THE MEDICAL DECISION MAKING PROCESS.    The primary clinician of record Maximo Treadwell     CRITICAL CARE TIME   Total Critical Caretime  was 39 minutes, excluding separately reportable procedures.  There was a high probability of clinically significant/life threatening deterioration in the patient's condition which required my urgent intervention.      CRITICAL CARE  I personally saw the patient and independently provided 39 minutes of non-concurrent critical care out of the total shared critical care time provided. This excludes seperately billable procedures. Critical care time was provided for patient as above that required close evaluation and/or intervention with concern for potential patient decompensation.    PROCEDURES:  Unlessotherwise noted below, none    FINAL IMPRESSION      1. Hypertensive urgency    2. FAVIAN (acute kidney injury) ([facility])          DISPOSITION/PLAN   DISPOSITION Decision To Admit 10/30/2024 04:36:23 AM    PATIENT REFERRED TO:  No follow-up provider specified.    DISCHARGE MEDICATIONS:  New Prescriptions    No medications on file          (Please note that portions ofthis note were completed with a voice recognition program.  Efforts were made to edit the dictations but occasionally words are mis-transcribed.)    Maximo Treadwell MD(electronically signed)  Attending Emergency Physician            Maximo Treadwell MD  10/30/24 0205

## 2024-10-30 NOTE — PROGRESS NOTES
Pt got to ICU at around 06:40AM. I got pt settled and hooked up to monitor. BS was 79. Dr. Mcgowan notified about BS of 79 and that we are holding off on ordered insulin drip. Home insulin pump was removed. NS at 125 was started. Pt AxO x4. Report given dayshift RN [place].

## 2024-10-31 ENCOUNTER — APPOINTMENT (OUTPATIENT)
Dept: CT IMAGING | Age: 64
End: 2024-10-31
Payer: MEDICARE

## 2024-10-31 LAB
ALBUMIN SERPL-MCNC: 3.2 G/DL (ref 3.4–5)
ALBUMIN/GLOB SERPL: 1.5 {RATIO} (ref 1.1–2.2)
ALP SERPL-CCNC: 108 U/L (ref 40–129)
ALT SERPL-CCNC: 7 U/L (ref 10–40)
ANION GAP SERPL CALCULATED.3IONS-SCNC: 11 MMOL/L (ref 3–16)
ANION GAP SERPL CALCULATED.3IONS-SCNC: 13 MMOL/L (ref 3–16)
ANION GAP SERPL CALCULATED.3IONS-SCNC: 13 MMOL/L (ref 3–16)
ANION GAP SERPL CALCULATED.3IONS-SCNC: 14 MMOL/L (ref 3–16)
ANION GAP SERPL CALCULATED.3IONS-SCNC: 14 MMOL/L (ref 3–16)
ANION GAP SERPL CALCULATED.3IONS-SCNC: 9 MMOL/L (ref 3–16)
AST SERPL-CCNC: 18 U/L (ref 15–37)
BASOPHILS # BLD: 0.1 K/UL (ref 0–0.2)
BASOPHILS NFR BLD: 0.9 %
BILIRUB SERPL-MCNC: <0.2 MG/DL (ref 0–1)
BUN SERPL-MCNC: 36 MG/DL (ref 7–20)
BUN SERPL-MCNC: 37 MG/DL (ref 7–20)
BUN SERPL-MCNC: 38 MG/DL (ref 7–20)
BUN SERPL-MCNC: 39 MG/DL (ref 7–20)
CALCIUM SERPL-MCNC: 9.2 MG/DL (ref 8.3–10.6)
CALCIUM SERPL-MCNC: 9.2 MG/DL (ref 8.3–10.6)
CALCIUM SERPL-MCNC: 9.3 MG/DL (ref 8.3–10.6)
CALCIUM SERPL-MCNC: 9.3 MG/DL (ref 8.3–10.6)
CALCIUM SERPL-MCNC: 9.4 MG/DL (ref 8.3–10.6)
CALCIUM SERPL-MCNC: 9.8 MG/DL (ref 8.3–10.6)
CHLORIDE SERPL-SCNC: 102 MMOL/L (ref 99–110)
CHLORIDE SERPL-SCNC: 102 MMOL/L (ref 99–110)
CHLORIDE SERPL-SCNC: 103 MMOL/L (ref 99–110)
CHLORIDE SERPL-SCNC: 104 MMOL/L (ref 99–110)
CHLORIDE SERPL-SCNC: 105 MMOL/L (ref 99–110)
CHLORIDE SERPL-SCNC: 105 MMOL/L (ref 99–110)
CO2 SERPL-SCNC: 17 MMOL/L (ref 21–32)
CO2 SERPL-SCNC: 18 MMOL/L (ref 21–32)
CO2 SERPL-SCNC: 20 MMOL/L (ref 21–32)
CO2 SERPL-SCNC: 21 MMOL/L (ref 21–32)
CO2 SERPL-SCNC: 22 MMOL/L (ref 21–32)
CO2 SERPL-SCNC: 22 MMOL/L (ref 21–32)
CREAT SERPL-MCNC: 2 MG/DL (ref 0.6–1.2)
CREAT SERPL-MCNC: 2.2 MG/DL (ref 0.6–1.2)
CREAT SERPL-MCNC: 2.3 MG/DL (ref 0.6–1.2)
CREAT SERPL-MCNC: 2.3 MG/DL (ref 0.6–1.2)
DEPRECATED RDW RBC AUTO: 13.4 % (ref 12.4–15.4)
EOSINOPHIL # BLD: 0.2 K/UL (ref 0–0.6)
EOSINOPHIL NFR BLD: 3.5 %
GFR SERPLBLD CREATININE-BSD FMLA CKD-EPI: 23 ML/MIN/{1.73_M2}
GFR SERPLBLD CREATININE-BSD FMLA CKD-EPI: 23 ML/MIN/{1.73_M2}
GFR SERPLBLD CREATININE-BSD FMLA CKD-EPI: 24 ML/MIN/{1.73_M2}
GFR SERPLBLD CREATININE-BSD FMLA CKD-EPI: 27 ML/MIN/{1.73_M2}
GLUCOSE BLD-MCNC: 110 MG/DL (ref 70–99)
GLUCOSE BLD-MCNC: 128 MG/DL (ref 70–99)
GLUCOSE BLD-MCNC: 129 MG/DL (ref 70–99)
GLUCOSE BLD-MCNC: 151 MG/DL (ref 70–99)
GLUCOSE BLD-MCNC: 173 MG/DL (ref 70–99)
GLUCOSE BLD-MCNC: 204 MG/DL (ref 70–99)
GLUCOSE SERPL-MCNC: 126 MG/DL (ref 70–99)
GLUCOSE SERPL-MCNC: 130 MG/DL (ref 70–99)
GLUCOSE SERPL-MCNC: 135 MG/DL (ref 70–99)
GLUCOSE SERPL-MCNC: 155 MG/DL (ref 70–99)
GLUCOSE SERPL-MCNC: 177 MG/DL (ref 70–99)
GLUCOSE SERPL-MCNC: 75 MG/DL (ref 70–99)
HCT VFR BLD AUTO: 27.2 % (ref 36–48)
HGB BLD-MCNC: 9.5 G/DL (ref 12–16)
LYMPHOCYTES # BLD: 2.2 K/UL (ref 1–5.1)
LYMPHOCYTES NFR BLD: 31.4 %
MAGNESIUM SERPL-MCNC: 2.34 MG/DL (ref 1.8–2.4)
MAGNESIUM SERPL-MCNC: 2.43 MG/DL (ref 1.8–2.4)
MAGNESIUM SERPL-MCNC: 2.46 MG/DL (ref 1.8–2.4)
MAGNESIUM SERPL-MCNC: 2.57 MG/DL (ref 1.8–2.4)
MAGNESIUM SERPL-MCNC: 2.62 MG/DL (ref 1.8–2.4)
MAGNESIUM SERPL-MCNC: 2.62 MG/DL (ref 1.8–2.4)
MCH RBC QN AUTO: 31 PG (ref 26–34)
MCHC RBC AUTO-ENTMCNC: 34.9 G/DL (ref 31–36)
MCV RBC AUTO: 89.1 FL (ref 80–100)
MONOCYTES # BLD: 0.5 K/UL (ref 0–1.3)
MONOCYTES NFR BLD: 7.8 %
NEUTROPHILS # BLD: 3.9 K/UL (ref 1.7–7.7)
NEUTROPHILS NFR BLD: 56.4 %
PERFORMED ON: ABNORMAL
PHOSPHATE SERPL-MCNC: 4.5 MG/DL (ref 2.5–4.9)
PHOSPHATE SERPL-MCNC: 4.5 MG/DL (ref 2.5–4.9)
PHOSPHATE SERPL-MCNC: 4.6 MG/DL (ref 2.5–4.9)
PHOSPHATE SERPL-MCNC: 4.7 MG/DL (ref 2.5–4.9)
PHOSPHATE SERPL-MCNC: 4.9 MG/DL (ref 2.5–4.9)
PHOSPHATE SERPL-MCNC: 5 MG/DL (ref 2.5–4.9)
PLATELET # BLD AUTO: 191 K/UL (ref 135–450)
PMV BLD AUTO: 8.3 FL (ref 5–10.5)
POTASSIUM SERPL-SCNC: 4.1 MMOL/L (ref 3.5–5.1)
POTASSIUM SERPL-SCNC: 4.2 MMOL/L (ref 3.5–5.1)
POTASSIUM SERPL-SCNC: 4.2 MMOL/L (ref 3.5–5.1)
POTASSIUM SERPL-SCNC: 4.5 MMOL/L (ref 3.5–5.1)
POTASSIUM SERPL-SCNC: 5.1 MMOL/L (ref 3.5–5.1)
PROT SERPL-MCNC: 5.4 G/DL (ref 6.4–8.2)
RBC # BLD AUTO: 3.06 M/UL (ref 4–5.2)
SODIUM SERPL-SCNC: 132 MMOL/L (ref 136–145)
SODIUM SERPL-SCNC: 135 MMOL/L (ref 136–145)
SODIUM SERPL-SCNC: 135 MMOL/L (ref 136–145)
SODIUM SERPL-SCNC: 137 MMOL/L (ref 136–145)
SODIUM SERPL-SCNC: 138 MMOL/L (ref 136–145)
SODIUM SERPL-SCNC: 138 MMOL/L (ref 136–145)
WBC # BLD AUTO: 6.9 K/UL (ref 4–11)

## 2024-10-31 PROCEDURE — 85025 COMPLETE CBC W/AUTO DIFF WBC: CPT

## 2024-10-31 PROCEDURE — 84100 ASSAY OF PHOSPHORUS: CPT

## 2024-10-31 PROCEDURE — 83735 ASSAY OF MAGNESIUM: CPT

## 2024-10-31 PROCEDURE — 70498 CT ANGIOGRAPHY NECK: CPT

## 2024-10-31 PROCEDURE — 6370000000 HC RX 637 (ALT 250 FOR IP): Performed by: INTERNAL MEDICINE

## 2024-10-31 PROCEDURE — 2580000003 HC RX 258: Performed by: INTERNAL MEDICINE

## 2024-10-31 PROCEDURE — 2580000003 HC RX 258

## 2024-10-31 PROCEDURE — 70450 CT HEAD/BRAIN W/O DYE: CPT

## 2024-10-31 PROCEDURE — 80053 COMPREHEN METABOLIC PANEL: CPT

## 2024-10-31 PROCEDURE — 2060000000 HC ICU INTERMEDIATE R&B

## 2024-10-31 PROCEDURE — 6370000000 HC RX 637 (ALT 250 FOR IP)

## 2024-10-31 PROCEDURE — 6360000004 HC RX CONTRAST MEDICATION: Performed by: INTERNAL MEDICINE

## 2024-10-31 PROCEDURE — 99232 SBSQ HOSP IP/OBS MODERATE 35: CPT | Performed by: INTERNAL MEDICINE

## 2024-10-31 PROCEDURE — 6360000002 HC RX W HCPCS

## 2024-10-31 PROCEDURE — 36415 COLL VENOUS BLD VENIPUNCTURE: CPT

## 2024-10-31 PROCEDURE — 94760 N-INVAS EAR/PLS OXIMETRY 1: CPT

## 2024-10-31 RX ORDER — GLUCAGON 1 MG/ML
1 KIT INJECTION PRN
Status: DISCONTINUED | OUTPATIENT
Start: 2024-10-31 | End: 2024-11-01 | Stop reason: HOSPADM

## 2024-10-31 RX ORDER — IOPAMIDOL 755 MG/ML
75 INJECTION, SOLUTION INTRAVASCULAR
Status: COMPLETED | OUTPATIENT
Start: 2024-10-31 | End: 2024-10-31

## 2024-10-31 RX ORDER — DEXTROSE MONOHYDRATE 100 MG/ML
INJECTION, SOLUTION INTRAVENOUS CONTINUOUS PRN
Status: DISCONTINUED | OUTPATIENT
Start: 2024-10-31 | End: 2024-11-01 | Stop reason: HOSPADM

## 2024-10-31 RX ORDER — SODIUM CHLORIDE 9 MG/ML
INJECTION, SOLUTION INTRAVENOUS CONTINUOUS
Status: ACTIVE | OUTPATIENT
Start: 2024-10-31 | End: 2024-11-01

## 2024-10-31 RX ORDER — HYDROXYZINE PAMOATE 25 MG/1
25 CAPSULE ORAL 3 TIMES DAILY PRN
COMMUNITY

## 2024-10-31 RX ORDER — OXYCODONE HYDROCHLORIDE 5 MG/1
5 TABLET ORAL
COMMUNITY

## 2024-10-31 RX ADMIN — HYDROCORTISONE 5 MG: 5 TABLET ORAL at 20:34

## 2024-10-31 RX ADMIN — ACETAMINOPHEN 325MG 650 MG: 325 TABLET ORAL at 02:12

## 2024-10-31 RX ADMIN — SODIUM CHLORIDE, PRESERVATIVE FREE 10 ML: 5 INJECTION INTRAVENOUS at 20:27

## 2024-10-31 RX ADMIN — LABETALOL HYDROCHLORIDE 200 MG: 200 TABLET, FILM COATED ORAL at 20:26

## 2024-10-31 RX ADMIN — NIFEDIPINE 60 MG: 30 TABLET, FILM COATED, EXTENDED RELEASE ORAL at 20:26

## 2024-10-31 RX ADMIN — ESCITALOPRAM OXALATE 10 MG: 10 TABLET ORAL at 08:29

## 2024-10-31 RX ADMIN — PANTOPRAZOLE SODIUM 40 MG: 40 TABLET, DELAYED RELEASE ORAL at 15:33

## 2024-10-31 RX ADMIN — METHOCARBAMOL 750 MG: 750 TABLET, FILM COATED ORAL at 20:26

## 2024-10-31 RX ADMIN — SODIUM CHLORIDE: 9 INJECTION, SOLUTION INTRAVENOUS at 11:26

## 2024-10-31 RX ADMIN — IOPAMIDOL 75 ML: 755 INJECTION, SOLUTION INTRAVENOUS at 09:14

## 2024-10-31 RX ADMIN — OXYCODONE 5 MG: 5 TABLET ORAL at 04:34

## 2024-10-31 RX ADMIN — NIFEDIPINE 60 MG: 30 TABLET, FILM COATED, EXTENDED RELEASE ORAL at 08:28

## 2024-10-31 RX ADMIN — OXYCODONE 5 MG: 5 TABLET ORAL at 12:38

## 2024-10-31 RX ADMIN — ROPINIROLE 1 MG: 1 TABLET, FILM COATED ORAL at 20:26

## 2024-10-31 RX ADMIN — LEVETIRACETAM 500 MG: 500 TABLET, FILM COATED ORAL at 20:26

## 2024-10-31 RX ADMIN — SODIUM CHLORIDE: 9 INJECTION, SOLUTION INTRAVENOUS at 21:45

## 2024-10-31 RX ADMIN — LEVETIRACETAM 500 MG: 500 TABLET, FILM COATED ORAL at 08:29

## 2024-10-31 RX ADMIN — ENOXAPARIN SODIUM 30 MG: 100 INJECTION SUBCUTANEOUS at 08:28

## 2024-10-31 RX ADMIN — PANTOPRAZOLE SODIUM 40 MG: 40 TABLET, DELAYED RELEASE ORAL at 06:10

## 2024-10-31 RX ADMIN — SODIUM CHLORIDE, PRESERVATIVE FREE 10 ML: 5 INJECTION INTRAVENOUS at 08:31

## 2024-10-31 RX ADMIN — METHOCARBAMOL 750 MG: 750 TABLET, FILM COATED ORAL at 08:29

## 2024-10-31 RX ADMIN — FERROUS SULFATE TAB EC 324 MG (65 MG FE EQUIVALENT) 324 MG: 324 (65 FE) TABLET DELAYED RESPONSE at 08:28

## 2024-10-31 RX ADMIN — HYDROCORTISONE 10 MG: 10 TABLET ORAL at 12:37

## 2024-10-31 RX ADMIN — OXYCODONE 5 MG: 5 TABLET ORAL at 16:41

## 2024-10-31 ASSESSMENT — PAIN DESCRIPTION - LOCATION
LOCATION: HEAD;LEG
LOCATION: HEAD

## 2024-10-31 ASSESSMENT — PAIN - FUNCTIONAL ASSESSMENT
PAIN_FUNCTIONAL_ASSESSMENT: PREVENTS OR INTERFERES SOME ACTIVE ACTIVITIES AND ADLS
PAIN_FUNCTIONAL_ASSESSMENT: PREVENTS OR INTERFERES SOME ACTIVE ACTIVITIES AND ADLS

## 2024-10-31 ASSESSMENT — PAIN SCALES - GENERAL
PAINLEVEL_OUTOF10: 3
PAINLEVEL_OUTOF10: 7

## 2024-10-31 ASSESSMENT — PAIN DESCRIPTION - DESCRIPTORS
DESCRIPTORS: ACHING;SPASM
DESCRIPTORS: ACHING

## 2024-10-31 ASSESSMENT — PAIN DESCRIPTION - ORIENTATION: ORIENTATION: LEFT;RIGHT

## 2024-10-31 NOTE — PROGRESS NOTES
[facility]  Glycemic Control       NAME: Elvia Arguelles  MEDICAL RECORD NUMBER:  [id number]  AGE: 64 y.o.   GENDER: female  : 1960  EPISODE DATE:  10/31/2024     Data     Recent Labs     10/30/24  1152 10/30/24  1746 10/30/24  2037 10/30/24  2251 10/31/24  0744 10/31/24  0853   POCGLU 193* 212* 181* 83 128* 129*       HgBA1c:    Lab Results   Component Value Date/Time    LABA1C 6.5 10/30/2024 06:57 AM       BUN/Creatinine:    Lab Results   Component Value Date/Time    BUN 36 10/31/2024 10:04 AM    CREATININE 2.0 10/31/2024 10:04 AM       Medications  Scheduled Medications:   escitalopram  10 mg Oral Daily    ferrous sulfate  324 mg Oral Daily with breakfast    levETIRAcetam  500 mg Oral BID    methocarbamol  750 mg Oral TID    pantoprazole  40 mg Oral BID AC    rOPINIRole  1 mg Oral Nightly    [START ON 11/3/2024] vitamin D  50,000 Units Oral Weekly    sodium chloride flush  5-40 mL IntraVENous 2 times per day    enoxaparin  30 mg SubCUTAneous Daily    NIFEdipine  60 mg Oral BID    labetalol  200 mg Oral 2 times per day    Insulin Pump - Bolus Dose   SubCUTAneous 4x Daily AC & HS    Insulin Pump - Basal Dose   SubCUTAneous Daily    hydrocortisone  10 mg Oral Daily    hydrocortisone  5 mg Oral Daily       Diet  Current diet/supplement order: ADULT DIET; Regular; 4 carb choices (60 gm/meal)     Recorded PO:   last meal in flowsheets      Action     Tandem Mob insulin pump settings:    Current basal 0.45 units/hr (Total daily basal 10.65)  Control IQ on  Carb 1:15  Sensitivity 1:50  BG target 110   Insulin duration 5 hours     Discussed having back up supplies in hospital.   agrees to bring with next visit.  (Reports living 5 minutes away if needed sooner.)      Recommend continuing with self insulin pump management.        Electronically signed by Marianna Lawson RN on 10/31/2024 at 12:01 PM

## 2024-10-31 NOTE — PROGRESS NOTES
4 Eyes Skin Assessment     NAME:  Elvia Arguelles  YOB: 1960  MEDICAL RECORD NUMBER:  [id number]    The patient is being assessed for  Admission    I agree that at least one RN has performed a thorough Head to Toe Skin Assessment on the patient. ALL assessment sites listed below have been assessed.      Areas assessed by both nurses:    Head, Face, Ears, Shoulders, Back, Chest, Arms, Elbows, Hands, Sacrum. Buttock, Coccyx, Ischium, Legs. Feet and Heels, and Under Medical Devices         Does the Patient have a Wound? No noted wound(s)       Uriah Prevention initiated by RN: No  Wound Care Orders initiated by RN: No    Pressure Injury (Stage 3,4, Unstageable, DTI, NWPT, and Complex wounds) if present, place Wound referral order by RN under : No    New Ostomies, if present place, Ostomy referral order under : No     Nurse 1 eSignature: Electronically signed by Clementina Bee RN on 10/31/24 at 3:20 AM EDT    **SHARE this note so that the co-signing nurse can place an eSignature**    Nurse 2 eSignature: Electronically signed by Jennifer Cox RN on 10/31/24 at 7:11 AM EDT

## 2024-10-31 NOTE — PROGRESS NOTES
Name:  Elvia Arguelles /Age/Sex: 1960  (64 y.o. female)   MRN & CSN:  [id number] & [id number] Encounter Date/Time: 10/31/2024 2:09 PM EDT   Location:  [id number] PCP: Meg Ellis, APRN - CNP     Attending:Amber Sampson MD       Elvia Arguelles is a 64 y.o. female with  has a past medical history of Adrenal insufficiency (HCC), Cancer (HCC), Closed displaced intertrochanteric fracture of right femur (HCC), Depression, Diabetes mellitus (HCC), Hx of radiation therapy, Hypertension, Hyponatremia, Insulin pump in place, Melanoma (HCC), Prolonged emergence from general anesthesia, and Restless leg syndrome. who presents with Hypertensive urgency    Hospital Day: 2      Interval history:     Seen and examined at bedside. Code stroke called this morning due to double vision. NIH score 2 that time. Stroke team recommended CT and CTA head which showed findings similar to prior study. Plan for MRI today     Review of Systems:      10 point ROS negative except stated above    Objective:   No intake or output data in the 24 hours ending 10/31/24 1409   Vitals:   Vitals:    10/31/24 0504 10/31/24 0845 10/31/24 0902 10/31/24 1115   BP:  139/70 139/70 (!) 96/51   Pulse:   79 63   Resp: 15 17 18 18   Temp:  97.5 °F (36.4 °C)  97.8 °F (36.6 °C)   TempSrc:  Oral  Oral   SpO2:  99% 97% 90%   Weight:       Height:             Physical Exam:        General: NAD  Eyes: Visual field testing showing deficits  ENT: neck supple  Cardiovascular: Regular rate.  Respiratory: Clear to auscultation  Gastrointestinal: Soft, non tender  Genitourinary: no suprapubic tenderness  Musculoskeletal: No edema  Neuro: Alert.  Psych: Mood appropriate.       Assessment and Plan     Hypertensive urgency : Blood pressure better controlled today. On nifedipine and labetalol. Cardiology following.     Uncontrolled type 2 diabetes : Resume home insulin bump. Glucose better controlled. Diabetes educator following. Monitor with

## 2024-10-31 NOTE — PLAN OF CARE
Problem: Chronic Conditions and Co-morbidities  Goal: Patient's chronic conditions and co-morbidity symptoms are monitored and maintained or improved  10/30/2024 2157 by Clementina Bee RN  Outcome: Progressing  10/30/2024 1532 by Jennifer Cox RN  Outcome: Progressing     Problem: Discharge Planning  Goal: Discharge to home or other facility with appropriate resources  10/30/2024 2157 by Clementina Bee RN  Outcome: Progressing  10/30/2024 1532 by Jennifer Cox RN  Outcome: Progressing     Problem: Safety - Adult  Goal: Free from fall injury  10/30/2024 2157 by Clementina Bee RN  Outcome: Progressing  10/30/2024 1532 by Jennifer Cox RN  Outcome: Progressing     Problem: Pain  Goal: Verbalizes/displays adequate comfort level or baseline comfort level  10/30/2024 2157 by Clementina Bee RN  Outcome: Progressing  10/30/2024 1532 by Jennifer Cox RN  Outcome: Progressing

## 2024-10-31 NOTE — CONSULTS
Neurology Consult Note  Reason for Consult: HTN urgency, with vision changes this AM, code stroke called    Chief complaint: headache, vomiting    Amber Morrissey MD asked me to see Elvia Arguelles in consultation for evaluation of HTN urgency, with vision changes this AM, code stroke called    History of Present Illness:  Elvia Arguelles is a 64 y.o. female who presents with headache, vomiting.    I obtained my information via interview w/ the patient, supplemented by chart review.    The patient arrived yesterday complaining of headache and vomiting.  Her  said that she seemed confused and had a weird smile on her face.  Apparently they checked her glucose at home and it read \"high\".      When she arrived here her BP was as high as 226/117.  Temperature as high as 100.7F.  CT head no acute hemorrhage.     She said she felt better this morning.  When her breakfast tray arrived (not sure exactly what time) she says that she started to get some double vision.  Stroke alert was called just piror to 0900.  CT head stable.  CTA head/neck no LVO.   stroke team was involved and did not recommend any acute interventions.     Currently she continues to have double vision.  Objects are side by side.  If she covers either eye she still sees double.  This is new for her.  She also mentioned that sometimes when she looks at the TV she sees a man laying down with a goatee and his mouth open.  She has no other new focal neurologic deficits.     We evaluated this patient in May of this year.  It was suspected that she had PRES +/- ischemia.  Follow up MRI done at [facility] last month did show remote bilateral areas of damage.  EEG done in May showed a focal seizure.  She has been on Keppra.    Medical History:  Past Medical History:   Diagnosis Date    Adrenal insufficiency (HCC)     Cancer (HCC) 2002    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    Closed displaced intertrochanteric fracture of right femur (HCC) 05/18/2024     bruising.  Psychiatric- No depression. No anxiety  Endocrine- No diabetes. No thyroid issues.  Hematologic- No bleeding difficulty. No fatigue  Neurologic- No weakness. No Headache.    Exam  Blood pressure 139/70, pulse 79, temperature 97.5 °F (36.4 °C), temperature source Oral, resp. rate 18, height 1.499 m (4' 11\"), weight 54.8 kg (120 lb 13 oz), SpO2 97%.  Constitutional    Vital signs: BP, HR, and RR reviewed   General alert, no distress  Eyes: unable to visualize the fundi  Psychiatric: cooperative with examination, no psychotic behavior noted.  Neurologic  Mental status:   orientation to person, place, time.     General fund of knowledge grossly intact   Memory grossly intact   Attention intact as able to attend well to the exam     Language fluent in conversation   Comprehension intact; follows simple commands  Cranial nerves:   CN2: bilateral L VF deficit.    CN 3,4,6: extraocular muscles intact.  Pupils are equal, round, reactive bilaterally.    CN5: facial sensation symmetric   CN7: face symmetric without dysarthria  CN8: hearing grossly intact  CN11: trap full strength on shoulder shrug  CN12: tongue midline with protrusion  Strength: good strength in all 4 extremities   Sensory: light touch intact in all 4 extremities.   Cerebellar/coordination: finger nose finger normal without ataxia  Tone: normal in all 4 extremities  Gait: deferred at this time for safety.      Labs  Glucose 129  Na 135  K 4.1  Cl 103  BUN 38  Cr 2.0  Platelets 155    ALT 7  AST 18    WBC 6.9K  Hg 9.5  Platelets 191    Studies  CT head w/o 10/31/24, independently reviewed  IMPRESSION:  1. No acute intracranial abnormality within constraints of CT acquisition.  If clinical concerns persist, can consider further evaluation with MRI if no  contraindications.  2.  Bilateral occipital and right parietal areas of encephalomalacia again seen, sequelae of remote insult.  Remote lacunar infarcts and/or sequelae of prior other  insult within

## 2024-10-31 NOTE — CODE DOCUMENTATION
MD advised to call stroke team.     Patient having decreased sight on right side.   Decreased vision on left side  Left sided numbness lower extremity   Complaints of generalized blurry vision    NIH being performed by RN

## 2024-10-31 NOTE — PROGRESS NOTES
[facility]  Cardiology Consult Note        CC:   Hypertensive urgency            HPI:   This is a 64 y.o. female with malignant melanoma, chronic kidney disease diabetes presenting with severe hypertension.  Blood pressure greater than 200.  She is on Procardia 30 at home.    Interval history  Feels good  No complaints    Past Medical History:   Diagnosis Date    Adrenal insufficiency (HCC)     Cancer (HCC) 2002    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    Closed displaced intertrochanteric fracture of right femur (HCC) 05/18/2024    Depression     Diabetes mellitus (HCC)     Type I    Hx of radiation therapy     melanoma right eye    Hypertension     Pt. denies having history of Hypertension    Hyponatremia     Insulin pump in place     Melanoma (HCC) 01/13/2023    in stomach, pancreas    Prolonged emergence from general anesthesia     slow to wake up    Restless leg syndrome       Past Surgical History:   Procedure Laterality Date    CARPAL TUNNEL RELEASE Bilateral 12/2017    CHOLECYSTECTOMY      CT BIOPSY ABDOMEN RETROPERITONEUM  12/27/2022    CT BIOPSY ABDOMEN RETROPERITONEUM 12/27/2022 [facility] CT SCAN    EYE SURGERY Right     biopsy    HIP SURGERY Right 5/19/2024    HIP INTRAMEDULLARY NAIL ROYCE INSERTION performed by Gonsalo White MD at [facility] OR    HYSTERECTOMY (CERVIX STATUS UNKNOWN)      LIPOMA RESECTION      LIVER BIOPSY Right     PORT SURGERY Right 01/18/2023    RIGHT POWER PORT PLACEMENT performed by Satish Corley MD at [facility] OR    SHOULDER ARTHROSCOPY Right 08/31/2018    RIGHT SHOULDER ARTHROSCOPE, SUBACROMIAL DECOMPRESSION, DISTALCLAVICLE EXCISION, CAPSULAR RELEASE, MANIPULATION UNDER ANESTHESIA, DEBRIDEMENT    SHOULDER SURGERY      UPPER GASTROINTESTINAL ENDOSCOPY  10/22/2014    UPPER GASTROINTESTINAL ENDOSCOPY N/A 01/13/2023    EGD W/EUS FNA performed by Yuri Varela MD at [facility] ENDOSCOPY    UPPER GASTROINTESTINAL ENDOSCOPY  01/13/2023    EGD BIOPSY performed by Yuri Varela MD at [facility] ENDOSCOPY

## 2024-10-31 NOTE — SIGNIFICANT EVENT
Code stroke called 2/2 vision disturbance    Pt admitted last night with HTN urgency, this am after her am meds, pt was sitting to have her breakfast about 25 mins ago, noticed sudden onset vision disturbance b/l and hence code stroke called.     On exam, vitals - /70, HR 70  NIH score 2 only with vision disturbance b/l  Discussed with stroke team, check CT head without contrast and CTA head/neck w/wo contrast and then code stroke team would like to do a tele visit with pt  Plan of care discussed with attending Dr Sampson to f/u  Pt has ARF on CKD - given need for contrast with CTA, will start IVF

## 2024-10-31 NOTE — CONSULTS
Telemedicine Consult Note   Stroke Team                Patient Name: Elvia Arguelles (64 y.o. female)  MRN: [id number]  : 1960  Admission Date: 10/30/2024   Current Date: 10/31/24    STROKE TIMELINE     ED arrival time: ED Arrival 0252          Chief Complaint     Chief Complaint   Patient presents with    Hyperglycemia    Headache     EMS sts BGL is over 600     Visual disturbance    History of Present Illness   This is a 64 y.o., female with a past medical history of HTN, DM, metastatic melanoma, PRES presenting with visual disturbance.  Patient's last know normal was 0840 on 10/31/24.    Patient was admitted yesterday for HTN urgency and FAVIAN on CKD. This morning, the patient states she was at her baseline. Then at 0840, she noticed that she was no longer able to see normally. She describes visual field cut off, but also states she sees a man with a mustache on the tele medicine screen.     On chart review, patient has a history of PRES, diagnosed in spring 2024. She had hemosiderin staining on previous brain MRI. Also noted to have electrographic seizures on EEG and was treated with  mg BID.           Past Medical History:   Diagnosis Date    Adrenal insufficiency (HCC)     Cancer (HCC)     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    Closed displaced intertrochanteric fracture of right femur (HCC) 2024    Depression     Diabetes mellitus (HCC)     Type I    Hx of radiation therapy     melanoma right eye    Hypertension     Pt. denies having history of Hypertension    Hyponatremia     Insulin pump in place     Melanoma (HCC) 2023    in stomach, pancreas    Prolonged emergence from general anesthesia     slow to wake up    Restless leg syndrome         Past Surgical History:   Procedure Laterality Date    CARPAL TUNNEL RELEASE Bilateral 2017    CHOLECYSTECTOMY      CT BIOPSY ABDOMEN RETROPERITONEUM  2022    CT BIOPSY ABDOMEN RETROPERITONEUM 2022 [facility] CT SCAN    EYE SURGERY Right   FLEXTOUCH) 100 UNIT/ML SOPN Inject 10 Units into the skin every morning (before breakfast) 1 Adjustable Dose Pre-filled Pen Syringe 0    Continuous Glucose Sensor (DEXCOM G6 SENSOR) MISC CHANGE Q 10 DAYS 6 each 1    Continuous Glucose Transmitter (DEXCOM G6 TRANSMITTER) MISC CHANGE TRANSMITTER EVERY 90 DAYS 1 each 1    Insulin Infusion Pump CATRACHO by Does not apply route (Patient not taking: Reported on 10/30/2024)      methocarbamol (ROBAXIN) 750 MG tablet TAKE 1 TABLET BY MOUTH FOUR TIMES DAILY AS NEEDED FOR MUSCLE SPASMS 120 tablet 0    NIFEdipine (PROCARDIA XL) 30 MG extended release tablet TAKE 1 TABLET BY MOUTH IN THE MORNING AND AT BEDTIME 180 tablet 0    rOPINIRole (REQUIP) 1 MG tablet TAKE 1 TABLET BY MOUTH NIGHTLY 30 tablet 10    escitalopram (LEXAPRO) 10 MG tablet TAKE 1 TABLET BY MOUTH DAILY 30 tablet 10    pantoprazole (PROTONIX) 40 MG tablet Take 1 tablet by mouth 2 times daily (before meals) 180 tablet 0    ferrous sulfate 324 (65 Fe) MG EC tablet Take 1 tablet by mouth daily (with breakfast) 90 tablet 0    Vitamin D, Ergocalciferol, 61277 units CAPS Take 50,000 Units by mouth once a week 12 capsule 1    hydrocortisone (CORTEF) 10 MG tablet Take 1 tablet by mouth daily 90 tablet 1    hydrocortisone (CORTEF) 5 MG tablet Take 1 tablet by mouth daily 90 tablet 1    Insulin Disposable Pump (OMNIPOD 5 G6 PODS, GEN 5,) MISC CHANGE PODS Q 2 DAYS (Patient not taking: Reported on 10/30/2024) 45 each 1    levETIRAcetam (KEPPRA) 500 MG tablet Take 1 tablet by mouth 2 times daily 60 tablet 0    ondansetron (ZOFRAN-ODT) 4 MG disintegrating tablet Take 1 tablet by mouth 3 times daily as needed for Nausea or Vomiting 21 tablet 0    medical marijuana Miscellaneous Drug     Medical Marijuana Gummies    active (Patient not taking: Reported on 4/1/2024)         Review of Systems         Physical Exam     Vitals:    10/31/24 0902   BP: 139/70   Pulse: 79   Resp: 18   Temp:    SpO2: 97%              NIH Stroke Scale    Time

## 2024-11-01 ENCOUNTER — APPOINTMENT (OUTPATIENT)
Dept: MRI IMAGING | Age: 64
End: 2024-11-01
Payer: MEDICARE

## 2024-11-01 VITALS
WEIGHT: 122.8 LBS | DIASTOLIC BLOOD PRESSURE: 59 MMHG | HEIGHT: 59 IN | SYSTOLIC BLOOD PRESSURE: 134 MMHG | TEMPERATURE: 97.7 F | RESPIRATION RATE: 18 BRPM | HEART RATE: 68 BPM | OXYGEN SATURATION: 99 % | BODY MASS INDEX: 24.76 KG/M2

## 2024-11-01 LAB
ANION GAP SERPL CALCULATED.3IONS-SCNC: 10 MMOL/L (ref 3–16)
ANION GAP SERPL CALCULATED.3IONS-SCNC: 13 MMOL/L (ref 3–16)
ANION GAP SERPL CALCULATED.3IONS-SCNC: 14 MMOL/L (ref 3–16)
ANION GAP SERPL CALCULATED.3IONS-SCNC: 8 MMOL/L (ref 3–16)
BUN SERPL-MCNC: 35 MG/DL (ref 7–20)
BUN SERPL-MCNC: 36 MG/DL (ref 7–20)
BUN SERPL-MCNC: 37 MG/DL (ref 7–20)
BUN SERPL-MCNC: 38 MG/DL (ref 7–20)
CALCIUM SERPL-MCNC: 9 MG/DL (ref 8.3–10.6)
CALCIUM SERPL-MCNC: 9.1 MG/DL (ref 8.3–10.6)
CALCIUM SERPL-MCNC: 9.4 MG/DL (ref 8.3–10.6)
CALCIUM SERPL-MCNC: 9.5 MG/DL (ref 8.3–10.6)
CHLORIDE SERPL-SCNC: 104 MMOL/L (ref 99–110)
CHLORIDE SERPL-SCNC: 106 MMOL/L (ref 99–110)
CHLORIDE SERPL-SCNC: 107 MMOL/L (ref 99–110)
CHLORIDE SERPL-SCNC: 107 MMOL/L (ref 99–110)
CO2 SERPL-SCNC: 16 MMOL/L (ref 21–32)
CO2 SERPL-SCNC: 19 MMOL/L (ref 21–32)
CO2 SERPL-SCNC: 20 MMOL/L (ref 21–32)
CO2 SERPL-SCNC: 20 MMOL/L (ref 21–32)
CREAT SERPL-MCNC: 2.2 MG/DL (ref 0.6–1.2)
CREAT SERPL-MCNC: 2.2 MG/DL (ref 0.6–1.2)
CREAT SERPL-MCNC: 2.3 MG/DL (ref 0.6–1.2)
CREAT SERPL-MCNC: 2.5 MG/DL (ref 0.6–1.2)
GFR SERPLBLD CREATININE-BSD FMLA CKD-EPI: 21 ML/MIN/{1.73_M2}
GFR SERPLBLD CREATININE-BSD FMLA CKD-EPI: 23 ML/MIN/{1.73_M2}
GFR SERPLBLD CREATININE-BSD FMLA CKD-EPI: 24 ML/MIN/{1.73_M2}
GFR SERPLBLD CREATININE-BSD FMLA CKD-EPI: 24 ML/MIN/{1.73_M2}
GLUCOSE BLD-MCNC: 212 MG/DL (ref 70–99)
GLUCOSE BLD-MCNC: 245 MG/DL (ref 70–99)
GLUCOSE BLD-MCNC: 291 MG/DL (ref 70–99)
GLUCOSE BLD-MCNC: 324 MG/DL (ref 70–99)
GLUCOSE SERPL-MCNC: 142 MG/DL (ref 70–99)
GLUCOSE SERPL-MCNC: 195 MG/DL (ref 70–99)
GLUCOSE SERPL-MCNC: 238 MG/DL (ref 70–99)
GLUCOSE SERPL-MCNC: 297 MG/DL (ref 70–99)
MAGNESIUM SERPL-MCNC: 2.38 MG/DL (ref 1.8–2.4)
MAGNESIUM SERPL-MCNC: 2.5 MG/DL (ref 1.8–2.4)
MAGNESIUM SERPL-MCNC: 2.55 MG/DL (ref 1.8–2.4)
MAGNESIUM SERPL-MCNC: 2.55 MG/DL (ref 1.8–2.4)
PERFORMED ON: ABNORMAL
PHOSPHATE SERPL-MCNC: 4.6 MG/DL (ref 2.5–4.9)
PHOSPHATE SERPL-MCNC: 4.7 MG/DL (ref 2.5–4.9)
PHOSPHATE SERPL-MCNC: 4.8 MG/DL (ref 2.5–4.9)
PHOSPHATE SERPL-MCNC: 5.1 MG/DL (ref 2.5–4.9)
POTASSIUM SERPL-SCNC: 4.6 MMOL/L (ref 3.5–5.1)
POTASSIUM SERPL-SCNC: 4.9 MMOL/L (ref 3.5–5.1)
POTASSIUM SERPL-SCNC: 5.1 MMOL/L (ref 3.5–5.1)
POTASSIUM SERPL-SCNC: 5.7 MMOL/L (ref 3.5–5.1)
SODIUM SERPL-SCNC: 135 MMOL/L (ref 136–145)
SODIUM SERPL-SCNC: 136 MMOL/L (ref 136–145)
SODIUM SERPL-SCNC: 136 MMOL/L (ref 136–145)
SODIUM SERPL-SCNC: 137 MMOL/L (ref 136–145)

## 2024-11-01 PROCEDURE — 36415 COLL VENOUS BLD VENIPUNCTURE: CPT

## 2024-11-01 PROCEDURE — 83735 ASSAY OF MAGNESIUM: CPT

## 2024-11-01 PROCEDURE — 6370000000 HC RX 637 (ALT 250 FOR IP)

## 2024-11-01 PROCEDURE — 80048 BASIC METABOLIC PNL TOTAL CA: CPT

## 2024-11-01 PROCEDURE — 70551 MRI BRAIN STEM W/O DYE: CPT

## 2024-11-01 PROCEDURE — 84100 ASSAY OF PHOSPHORUS: CPT

## 2024-11-01 PROCEDURE — 6370000000 HC RX 637 (ALT 250 FOR IP): Performed by: INTERNAL MEDICINE

## 2024-11-01 PROCEDURE — 99233 SBSQ HOSP IP/OBS HIGH 50: CPT | Performed by: INTERNAL MEDICINE

## 2024-11-01 PROCEDURE — 2580000003 HC RX 258

## 2024-11-01 PROCEDURE — 6360000002 HC RX W HCPCS

## 2024-11-01 RX ORDER — NIFEDIPINE 30 MG/1
60 TABLET, EXTENDED RELEASE ORAL 2 TIMES DAILY
Qty: 30 TABLET | Refills: 3 | Status: SHIPPED | OUTPATIENT
Start: 2024-11-01

## 2024-11-01 RX ORDER — LABETALOL 200 MG/1
200 TABLET, FILM COATED ORAL EVERY 12 HOURS SCHEDULED
Qty: 60 TABLET | Refills: 3 | Status: SHIPPED | OUTPATIENT
Start: 2024-11-01

## 2024-11-01 RX ADMIN — OXYCODONE 5 MG: 5 TABLET ORAL at 06:12

## 2024-11-01 RX ADMIN — ENOXAPARIN SODIUM 30 MG: 100 INJECTION SUBCUTANEOUS at 09:27

## 2024-11-01 RX ADMIN — LEVETIRACETAM 500 MG: 500 TABLET, FILM COATED ORAL at 09:28

## 2024-11-01 RX ADMIN — FERROUS SULFATE TAB EC 324 MG (65 MG FE EQUIVALENT) 324 MG: 324 (65 FE) TABLET DELAYED RESPONSE at 09:28

## 2024-11-01 RX ADMIN — ESCITALOPRAM OXALATE 10 MG: 10 TABLET ORAL at 09:28

## 2024-11-01 RX ADMIN — OXYCODONE 5 MG: 5 TABLET ORAL at 12:09

## 2024-11-01 RX ADMIN — NIFEDIPINE 60 MG: 30 TABLET, FILM COATED, EXTENDED RELEASE ORAL at 09:28

## 2024-11-01 RX ADMIN — PANTOPRAZOLE SODIUM 40 MG: 40 TABLET, DELAYED RELEASE ORAL at 16:42

## 2024-11-01 RX ADMIN — OXYCODONE 5 MG: 5 TABLET ORAL at 16:46

## 2024-11-01 RX ADMIN — PANTOPRAZOLE SODIUM 40 MG: 40 TABLET, DELAYED RELEASE ORAL at 06:12

## 2024-11-01 RX ADMIN — HYDROCORTISONE 10 MG: 10 TABLET ORAL at 09:32

## 2024-11-01 RX ADMIN — SODIUM CHLORIDE, PRESERVATIVE FREE 10 ML: 5 INJECTION INTRAVENOUS at 09:27

## 2024-11-01 ASSESSMENT — PAIN DESCRIPTION - ORIENTATION
ORIENTATION: MID
ORIENTATION: LEFT;RIGHT
ORIENTATION: RIGHT;LEFT
ORIENTATION: RIGHT;LEFT;MID

## 2024-11-01 ASSESSMENT — PAIN DESCRIPTION - DESCRIPTORS
DESCRIPTORS: ACHING
DESCRIPTORS: SPASM
DESCRIPTORS: ACHING
DESCRIPTORS: ACHING

## 2024-11-01 ASSESSMENT — PAIN SCALES - GENERAL
PAINLEVEL_OUTOF10: 6
PAINLEVEL_OUTOF10: 3
PAINLEVEL_OUTOF10: 3
PAINLEVEL_OUTOF10: 8
PAINLEVEL_OUTOF10: 2
PAINLEVEL_OUTOF10: 0
PAINLEVEL_OUTOF10: 7

## 2024-11-01 ASSESSMENT — PAIN DESCRIPTION - LOCATION
LOCATION: HEAD
LOCATION: LEG;HEAD
LOCATION: LEG
LOCATION: LEG
LOCATION: HEAD

## 2024-11-01 NOTE — PROGRESS NOTES
Neurology Progress Note    Updates  Not seeing double vision anymore.    Has been seeing odd images of people/things.      Medical History:  Past Medical History:   Diagnosis Date    Adrenal insufficiency (HCC)     Cancer (HCC) 2002    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    Closed displaced intertrochanteric fracture of right femur (HCC) 05/18/2024    Depression     Diabetes mellitus (HCC)     Type I    Hx of radiation therapy     melanoma right eye    Hypertension     Pt. denies having history of Hypertension    Hyponatremia     Insulin pump in place     Melanoma (HCC) 01/13/2023    in stomach, pancreas    Prolonged emergence from general anesthesia     slow to wake up    Restless leg syndrome      Past Surgical History:   Procedure Laterality Date    CARPAL TUNNEL RELEASE Bilateral 12/2017    CHOLECYSTECTOMY      CT BIOPSY ABDOMEN RETROPERITONEUM  12/27/2022    CT BIOPSY ABDOMEN RETROPERITONEUM 12/27/2022 [facility] CT SCAN    EYE SURGERY Right     biopsy    HIP SURGERY Right 5/19/2024    HIP INTRAMEDULLARY NAIL ROYCE INSERTION performed by Gonsalo White MD at [facility] OR    HYSTERECTOMY (CERVIX STATUS UNKNOWN)      LIPOMA RESECTION      LIVER BIOPSY Right     PORT SURGERY Right 01/18/2023    RIGHT POWER PORT PLACEMENT performed by Satish Corley MD at [facility] OR    SHOULDER ARTHROSCOPY Right 08/31/2018    RIGHT SHOULDER ARTHROSCOPE, SUBACROMIAL DECOMPRESSION, DISTALCLAVICLE EXCISION, CAPSULAR RELEASE, MANIPULATION UNDER ANESTHESIA, DEBRIDEMENT    SHOULDER SURGERY      UPPER GASTROINTESTINAL ENDOSCOPY  10/22/2014    UPPER GASTROINTESTINAL ENDOSCOPY N/A 01/13/2023    EGD W/EUS FNA performed by Yuri Varela MD at [facility] ENDOSCOPY    UPPER GASTROINTESTINAL ENDOSCOPY  01/13/2023    EGD BIOPSY performed by Yuri Varela MD at [facility] ENDOSCOPY    UPPER GASTROINTESTINAL ENDOSCOPY N/A 4/5/2024    ESOPHAGOGASTRODUODENOSCOPY BIOPSY performed by Tylor Staley MD at [facility] ENDOSCOPY    US GUIDED LIVER BIOPSY PERCUTANEOUS  12/16/2022     previous areas of restricted  diffusion, similar to prior. There is corresponding encephalomalacia and  gliosis within these areas now.  3. Multifocal, predominately periventricular T2/FLAIR hyperintensities  throughout the deep white matter, nonspecific but most commonly seen in the  setting of chronic small vessel ischemic disease.  Sequelae of prior lacunar  infarcts and/or remote insult also possible.    CT head w/o 10/31/24  IMPRESSION:  1. No acute intracranial abnormality within constraints of CT acquisition.  If clinical concerns persist, can consider further evaluation with MRI if no  contraindications.  2.  Bilateral occipital and right parietal areas of encephalomalacia again seen, sequelae of remote insult.  Remote lacunar infarcts and/or sequelae of prior other  insult within the bifrontal deep white matter.    CTA head/neck 10/31/24  1. No flow limiting stenosis or large vessel occlusion within the head or  neck.    Impression/Recommendations  Diplopia.    Vision changes.    Hypertensive emergency.   Confusion, likely secondary to above.    Hx possible PRES/bilateral ischemia.   Hx focal seizure.      Clinically she is doing OK.    She is no longer seeing double.   Exam remains the same.      Brain MRI is w/out any acute findings.      She has been seeing odd shapes/images, almost like hallucinations.  While rare, occipital lobe seizures is something that could be considered stemming from her previous bilateral injuries.  Will order EEG.      Continue  BID.     Dat Tripathi NP  [facility]    A copy of this note was provided for Amber Morrissey MD

## 2024-11-01 NOTE — ACP (ADVANCE CARE PLANNING)
Advance Care Planning     Advance Care Planning Activator (Inpatient)  Conversation Note      Date of ACP Conversation: 11/1/2024     Conversation Conducted with: Patient with Decision Making Capacity    ACP Activator: Rhonda Leyva RN      Health Care Decision Maker:     Current Designated Health Care Decision Maker:     Primary Decision Maker (Active): Shant Arguelles - Spouse - [phone]    Secondary Decision Maker: Kindra (Hatfield)Esther - Child - [phone]    Supplemental (Other) Decision Maker: HollisYas davila - Child - [phone]    Today we documented Decision Maker(s) consistent with Legal Next of Kin hierarchy.    Care Preferences    Ventilation:  \"If you were in your present state of health and suddenly became very ill and were unable to breathe on your own, what would your preference be about the use of a ventilator (breathing machine) if it were available to you?\"      Would the patient desire the use of ventilator (breathing machine)?: yes    \"If your health worsens and it becomes clear that your chance of recovery is unlikely, what would your preference be about the use of a ventilator (breathing machine) if it were available to you?\"     Would the patient desire the use of ventilator (breathing machine)?: No      Resuscitation  \"CPR works best to restart the heart when there is a sudden event, like a heart attack, in someone who is otherwise healthy. Unfortunately, CPR does not typically restart the heart for people who have serious health conditions or who are very sick.\"    \"In the event your heart stopped as a result of an underlying serious health condition, would you want attempts to be made to restart your heart (answer \"yes\" for attempt to resuscitate) or would you prefer a natural death (answer \"no\" for do not attempt to resuscitate)?\" yes       [] Yes   [x] No   Educated Patient / Decision Maker regarding differences between Advance Directives and portable DNR orders.    Length of ACP

## 2024-11-01 NOTE — PROGRESS NOTES
Discharge medications are ready in inpatient pharmacy for weekend delivery.    11/01/24 3:19 PM

## 2024-11-01 NOTE — PROGRESS NOTES
[facility]  Cardiology Consult Note        CC:   Hypertensive urgency            HPI:   This is a 64 y.o. female with malignant melanoma, chronic kidney disease diabetes presenting with severe hypertension.  Blood pressure greater than 200.  She is on Procardia 30 at home.    Interval history  Double vision yesterday. CT head negative    Past Medical History:   Diagnosis Date    Adrenal insufficiency (HCC)     Cancer (HCC) 2002    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    Closed displaced intertrochanteric fracture of right femur (HCC) 05/18/2024    Depression     Diabetes mellitus (HCC)     Type I    Hx of radiation therapy     melanoma right eye    Hypertension     Pt. denies having history of Hypertension    Hyponatremia     Insulin pump in place     Melanoma (HCC) 01/13/2023    in stomach, pancreas    Prolonged emergence from general anesthesia     slow to wake up    Restless leg syndrome       Past Surgical History:   Procedure Laterality Date    CARPAL TUNNEL RELEASE Bilateral 12/2017    CHOLECYSTECTOMY      CT BIOPSY ABDOMEN RETROPERITONEUM  12/27/2022    CT BIOPSY ABDOMEN RETROPERITONEUM 12/27/2022 [facility] CT SCAN    EYE SURGERY Right     biopsy    HIP SURGERY Right 5/19/2024    HIP INTRAMEDULLARY NAIL ROYCE INSERTION performed by Gonsalo White MD at [facility] OR    HYSTERECTOMY (CERVIX STATUS UNKNOWN)      LIPOMA RESECTION      LIVER BIOPSY Right     PORT SURGERY Right 01/18/2023    RIGHT POWER PORT PLACEMENT performed by Satish Corley MD at [facility] OR    SHOULDER ARTHROSCOPY Right 08/31/2018    RIGHT SHOULDER ARTHROSCOPE, SUBACROMIAL DECOMPRESSION, DISTALCLAVICLE EXCISION, CAPSULAR RELEASE, MANIPULATION UNDER ANESTHESIA, DEBRIDEMENT    SHOULDER SURGERY      UPPER GASTROINTESTINAL ENDOSCOPY  10/22/2014    UPPER GASTROINTESTINAL ENDOSCOPY N/A 01/13/2023    EGD W/EUS FNA performed by Yuri Varela MD at [facility] ENDOSCOPY    UPPER GASTROINTESTINAL ENDOSCOPY  01/13/2023    EGD BIOPSY performed by Yuri Varela MD at  95%   BMI 24.80 kg/m²    HEENT:  Face: Atraumatic, Conjunctiva: Pink; non icteric,  Mucous Memb:  Moist, No thyromegaly or Lymphadenopathy  Respiratory:  Resp Assessment: normal, Resp Auscultation: clear   Cardiovascular:  Auscultation: nl S1 & S2, Palpation:  Nl PMI; No heaves or thrills, JVP:  normal  Abdomen: Soft, non-tender, Normal bowel sounds,  No organomegaly  Extremities: No Cyanosis or Clubbing; Edema none  Neurological: Oriented to time, place, and person, Non-anxious  Psychiatric: Normal mood and affect  Skin: Warm and dry,  No rash seen      Current Facility-Administered Medications: 0.9 % sodium chloride infusion, , IntraVENous, Continuous  glucose chewable tablet 16 g, 4 tablet, Oral, PRN  dextrose bolus 10% 125 mL, 125 mL, IntraVENous, PRN **OR** dextrose bolus 10% 250 mL, 250 mL, IntraVENous, PRN  glucagon injection 1 mg, 1 mg, SubCUTAneous, PRN  dextrose 10 % infusion, , IntraVENous, Continuous PRN  escitalopram (LEXAPRO) tablet 10 mg, 10 mg, Oral, Daily  ferrous sulfate EC tablet 324 mg, 324 mg, Oral, Daily with breakfast  levETIRAcetam (KEPPRA) tablet 500 mg, 500 mg, Oral, BID  methocarbamol (ROBAXIN) tablet 750 mg, 750 mg, Oral, TID  pantoprazole (PROTONIX) tablet 40 mg, 40 mg, Oral, BID AC  rOPINIRole (REQUIP) tablet 1 mg, 1 mg, Oral, Nightly  [START ON 11/3/2024] vitamin D (ERGOCALCIFEROL) capsule 50,000 Units, 50,000 Units, Oral, Weekly  sodium chloride flush 0.9 % injection 5-40 mL, 5-40 mL, IntraVENous, 2 times per day  sodium chloride flush 0.9 % injection 5-40 mL, 5-40 mL, IntraVENous, PRN  0.9 % sodium chloride infusion, , IntraVENous, PRN  enoxaparin Sodium (LOVENOX) injection 30 mg, 30 mg, SubCUTAneous, Daily  ondansetron (ZOFRAN-ODT) disintegrating tablet 4 mg, 4 mg, Oral, Q8H PRN **OR** ondansetron (ZOFRAN) injection 4 mg, 4 mg, IntraVENous, Q6H PRN  polyethylene glycol (GLYCOLAX) packet 17 g, 17 g, Oral, Daily PRN  acetaminophen (TYLENOL) tablet 650 mg, 650 mg, Oral, Q6H PRN **OR**

## 2024-11-01 NOTE — PLAN OF CARE
Problem: Chronic Conditions and Co-morbidities  Goal: Patient's chronic conditions and co-morbidity symptoms are monitored and maintained or improved  11/1/2024 1008 by Michelle Hernandez RN  Outcome: Progressing  Flowsheets (Taken 11/1/2024 0803)  Care Plan - Patient's Chronic Conditions and Co-Morbidity Symptoms are Monitored and Maintained or Improved: Monitor and assess patient's chronic conditions and comorbid symptoms for stability, deterioration, or improvement     Problem: Discharge Planning  Goal: Discharge to home or other facility with appropriate resources  11/1/2024 1008 by Michelle Hernandez RN  Outcome: Progressing  Flowsheets (Taken 11/1/2024 0803)  Discharge to home or other facility with appropriate resources: Arrange for needed discharge resources and transportation as appropriate     Problem: Safety - Adult  Goal: Free from fall injury  11/1/2024 1008 by Michelle Hernandez RN  Outcome: Progressing     Problem: Pain  Goal: Verbalizes/displays adequate comfort level or baseline comfort level  11/1/2024 1008 by Michelle Hernandez RN  Outcome: Progressing

## 2024-11-01 NOTE — DISCHARGE SUMMARY
Discharge orders acknowledged by RN . Discharge teaching completed with pt and family. AVS reviewed and all questions answered. Medication regimen reviewed and pt understands schedule. Patient going home with labetalol and nifedifine. Follow up appointments also reviewed with pt and resources given for discharge. Patient was supposed to get an EEG today but per neurology, patient can follow up with  in 1 week and resources were provided by This RN for scheduling an appointment.  IV removed. Portable monitor removed from pt.  Required core measures completed. Pt vitals WDL. Pt discharged with all belongings to home with daughter. Pt transported off of unit via wheelchair. No complications.     Electronically signed by Michelle Hernandez RN on 11/1/2024 at 6:17 PM

## 2024-11-01 NOTE — FLOWSHEET NOTE
11/01/24 1705   Treatment Team Notification   Reason for Communication   (Pt states neurology stated they will do an EEG, if it can't happen today patient could go gome and follow up as an outpatient)   Name of Team Member Notified    Treatment Team Role Attending Provider   Method of Communication Secure Message   Response Waiting for response   Notification Time 0954  ( responded and will check with neurology)     Patient has a discharge order in.     Electronically signed by Michelle Hernandez RN on 11/1/2024 at 5:17 PM

## 2024-11-01 NOTE — PROGRESS NOTES
01:30 PM    WBCUA 1 10/30/2024 06:45 AM    RBCUA 0 10/30/2024 06:45 AM    BACTERIA None Seen 10/30/2024 06:45 AM    CLARITYU Clear 10/30/2024 06:45 AM    SPECGRAV >=1.030 06/26/2023 12:25 PM    LEUKOCYTESUR Negative 10/30/2024 06:45 AM    UROBILINOGEN 0.2 10/30/2024 06:45 AM    BILIRUBINUR Negative 10/30/2024 06:45 AM    BLOODU Negative 10/30/2024 06:45 AM    GLUCOSEU 500 10/30/2024 06:45 AM    GLUCOSEU >=1000 05/25/2012 09:30 AM    KETUA Negative 10/30/2024 06:45 AM     Urine Cultures:   Lab Results   Component Value Date/Time    LABURIN No growth at 18 to 36 hours 08/09/2023 11:28 PM     Blood Cultures:   Lab Results   Component Value Date/Time    BC No Growth after 4 days of incubation. 05/21/2024 03:47 PM     Lab Results   Component Value Date/Time    BLOODCULT2 No Growth after 4 days of incubation. 05/21/2024 03:51 PM     Organism:   Lab Results   Component Value Date/Time    ORG Diphtheroids 08/09/2023 10:39 PM       Medical decision making:     Hypertensive urgency  placing patient at risk of multiple comorbidities including threat to bodily function    Drugs that require monitoring for toxicity include lovenox and the method of monitoring was H&H for anemia    Discussed management with RN and discharge planning options with Case Management   CtA head as interpreted by me showing no flow limiting stenosis  Personally reviewed lab test results  Personally reviewed studies   Reviewed prior external records in detail  Reviewed nursing notes overnight    Total time spent : 59 min of which >50% of the time was spent counseling/coordination of care and majority of the time involved discussion of test results, diagnostic or treatment recommendations, prognosis, risks and benefits of management options, instructions, education, compliance or risk factor reduction. The time includes time at the bedside, data interpretation, medication management, monitoring for potential decompensation and physician consultations.

## 2024-11-01 NOTE — PLAN OF CARE
Problem: Chronic Conditions and Co-morbidities  Goal: Patient's chronic conditions and co-morbidity symptoms are monitored and maintained or improved  11/1/2024 0031 by Rolando Baker RN  Outcome: Progressing  10/31/2024 1712 by Jennifer Cox RN  Outcome: Progressing     Problem: Discharge Planning  Goal: Discharge to home or other facility with appropriate resources  11/1/2024 0031 by Rolando Baker RN  Outcome: Progressing  10/31/2024 1712 by Jennifer Cox RN  Outcome: Progressing     Problem: Safety - Adult  Goal: Free from fall injury  11/1/2024 0031 by Rolando Baker RN  Outcome: Progressing  10/31/2024 1712 by Jennifer Cox RN  Outcome: Progressing     Problem: Pain  Goal: Verbalizes/displays adequate comfort level or baseline comfort level  11/1/2024 0031 by Rolando Baker RN  Outcome: Progressing  10/31/2024 1712 by Jennifer Cox RN  Outcome: Progressing

## 2024-11-01 NOTE — CARE COORDINATION
Case Management Assessment  Initial Evaluation    Date/Time of Evaluation: 11/1/2024 2:31 PM  Assessment Completed by: Rhonda Leyva RN    If patient is discharged prior to next notation, then this note serves as note for discharge by case management.    Patient Name: Elvia Arguelles                   YOB: 1960  Diagnosis: FAVIAN (acute kidney injury) (HCC) [N17.9]  Hypertensive urgency [I16.0]                   Date / Time: 10/30/2024  2:52 AM    Patient Admission Status: Inpatient   Readmission Risk (Low < 19, Mod (19-27), High > 27): Readmission Risk Score: 20.7    Current PCP: Meg Ellis APRN - CNP  PCP verified by CM? Yes    Chart Reviewed: Yes      History Provided by: Patient  Patient Orientation: Alert and Oriented    Patient Cognition: Alert    Hospitalization in the last 30 days (Readmission):  No    If yes, Readmission Assessment in CM Navigator will be completed.    Advance Directives:      Code Status: Full Code   Patient's Primary Decision Maker is: Legal Next of Kin    Primary Decision Maker (Active): Shant Arguelles - Spouse - [phone]    Secondary Decision Maker: Kindra (Hatfield)Esther - Child - [phone]    Supplemental (Other) Decision Maker: Yas Shafer - Child - [phone]    Discharge Planning:    Patient lives with: Spouse/Significant Other Type of Home: House  Primary Care Giver: Self  Patient Support Systems include: Spouse/Significant Other, Children, Family Members   Current Financial resources: Medicare  Current community resources: None  Current services prior to admission: None            Current DME:  Wheel chair, walker, shower chair            Type of Home Care services:  None    ADLS  Prior functional level: Independent in ADLs/IADLs  Current functional level: Independent in ADLs/IADLs    PT AM-PAC:   /24  OT AM-PAC:   /24    Family can provide assistance at DC: Yes  Would you like Case Management to discuss the discharge plan with any other

## 2024-11-01 NOTE — DISCHARGE INSTR - COC
Continuity of Care Form    Patient Name: Elvia Arguelles   :  1960  MRN:  [id number]    Admit date:  10/30/2024  Discharge date:  ***    Code Status Order: Full Code   Advance Directives:   Advance Care Flowsheet Documentation             Admitting Physician:  Roslyn Pablo MD  PCP: Meg Ellis, APRN - CNP    Discharging Nurse: ***  Discharging Hospital Unit/Room#: A1I-4158/5261-01  Discharging Unit Phone Number: ***    Emergency Contact:   Extended Emergency Contact Information  Primary Emergency Contact: Shant Arguelles  Address: [address] [place] [address]  Home Phone: [phone]  Mobile Phone: [phone]  Relation: Spouse  Secondary Emergency Contact: Yas Shafer   [facility]  Home Phone: [phone]  Relation: Child    Past Surgical History:  Past Surgical History:   Procedure Laterality Date    CARPAL TUNNEL RELEASE Bilateral 2017    CHOLECYSTECTOMY      CT BIOPSY ABDOMEN RETROPERITONEUM  2022    CT BIOPSY ABDOMEN RETROPERITONEUM 2022 [facility] CT SCAN    EYE SURGERY Right     biopsy    HIP SURGERY Right 2024    HIP INTRAMEDULLARY NAIL ROYCE INSERTION performed by Gonsalo White MD at [facility] OR    HYSTERECTOMY (CERVIX STATUS UNKNOWN)      LIPOMA RESECTION      LIVER BIOPSY Right     PORT SURGERY Right 2023    RIGHT POWER PORT PLACEMENT performed by Satish Corley MD at [facility] OR    SHOULDER ARTHROSCOPY Right 2018    RIGHT SHOULDER ARTHROSCOPE, SUBACROMIAL DECOMPRESSION, DISTALCLAVICLE EXCISION, CAPSULAR RELEASE, MANIPULATION UNDER ANESTHESIA, DEBRIDEMENT    SHOULDER SURGERY      UPPER GASTROINTESTINAL ENDOSCOPY  10/22/2014    UPPER GASTROINTESTINAL ENDOSCOPY N/A 2023    EGD W/EUS FNA performed by Yuri Varela MD at [facility] ENDOSCOPY    UPPER GASTROINTESTINAL ENDOSCOPY  2023    EGD BIOPSY performed by Yuri Varela MD at [facility] ENDOSCOPY    UPPER GASTROINTESTINAL ENDOSCOPY  personal belongings (please select all that are sent with patient):  {CHP DME Belongings:[id number]}    RN SIGNATURE:  {Esignature:[id number]}    CASE MANAGEMENT/SOCIAL WORK SECTION    Inpatient Status Date: ***    Readmission Risk Assessment Score:  Readmission Risk              Risk of Unplanned Readmission:  42           Discharging to Facility/ Agency   Name:   Address:  Phone:  Fax:    Dialysis Facility (if applicable)   Name:  Address:  Dialysis Schedule:  Phone:  Fax:    / signature: {Esignature:[id number]}    PHYSICIAN SECTION    Prognosis: Fair    Condition at Discharge: Stable    Rehab Potential (if transferring to Rehab): Fair    Recommended Labs or Other Treatments After Discharge: Follow up with your PCP, neurology and ophthalmologist in 1 week    Update Admission H&P: No change in H&P    PHYSICIAN SIGNATURE:  Electronically signed by Amber Sampson MD on 11/1/24 at 1:38 PM EDT

## 2024-11-01 NOTE — PROGRESS NOTES
[facility]  Hyperglycemia Event      NAME: Elvia Arugelles  MEDICAL RECORD NUMBER:  [id number]  AGE: 64 y.o.   GENDER: female  : 1960  EPISODE DATE:  2024     Data     Recent Labs     10/31/24  1219 10/31/24  1708 10/31/24  2034 10/31/24  2341 24  0810 24  1056   POCGLU 151* 173* 204* 110* 245* 324*       Medications  Scheduled Medications:   escitalopram  10 mg Oral Daily    ferrous sulfate  324 mg Oral Daily with breakfast    levETIRAcetam  500 mg Oral BID    methocarbamol  750 mg Oral TID    pantoprazole  40 mg Oral BID AC    rOPINIRole  1 mg Oral Nightly    [START ON 11/3/2024] vitamin D  50,000 Units Oral Weekly    sodium chloride flush  5-40 mL IntraVENous 2 times per day    enoxaparin  30 mg SubCUTAneous Daily    NIFEdipine  60 mg Oral BID    labetalol  200 mg Oral 2 times per day    Insulin Pump - Bolus Dose   SubCUTAneous 4x Daily AC & HS    Insulin Pump - Basal Dose   SubCUTAneous Daily    hydrocortisone  10 mg Oral Daily    hydrocortisone  5 mg Oral Daily       Diet  Current diet/supplement order: ADULT DIET; Regular; 4 carb choices (60 gm/meal)     Recorded PO:   last meal in flowsheets      Action     Pump temporarily disconnected for MRI  Sensor removed and new sensor reapplied.      Basal dose 0.45 units/hr.    Last bolus 10/31 11:13    Bolus dose given for fingerstick glucose.      Recommend continuing with insulin pump.      Electronically signed by Marianna Lawson RN on 2024 at 11:01 AM

## 2024-11-01 NOTE — PROGRESS NOTES
Pt off to unit for MRI at this time     Electronically signed by Michelle Hernandez RN on 11/1/2024 at 8:35 AM

## 2024-11-01 NOTE — PROGRESS NOTES
Transmitter was changed by pt, not allowing transmitter to javier to sensor- transmitter number changed in Dexcom keri- sensor now in warm up phase after pairing. Will re-evaluate at 1700 today.    Dora Mac MSN, RN Inpatient Clinical Diabetes    [phone]

## 2024-11-01 NOTE — PROGRESS NOTES
Sensor signal lost- possible cause due to placement of sensor in relation to insulin pump. Sensor site changed to RUE.     Went back to pt room to check status of sensor- pt in shower, per pt's spouse sensor began working.    Call received from staff nurse, Pt was in shower and sensor came off arm    Sensor site changed and applied on R lateral aspect of abdomen per pt's request.     Dora Mac MSN, RN Inpatient Clinical Diabetes    [phone]

## 2024-11-04 ENCOUNTER — CARE COORDINATION (OUTPATIENT)
Dept: CASE MANAGEMENT | Age: 64
End: 2024-11-04

## 2024-11-04 NOTE — CARE COORDINATION
Care Transitions Note    Initial Call - Call within 2 business days of discharge: Yes    Attempted to reach patient for transitions of care follow up. Unable to reach patient.    Outreach Attempts:   Unable to leave message.     Patient: Elvia Arguelles    Patient : 1960   MRN: [id number]    Reason for Admission: H/A & elevated BG level  Discharge Date: 24  RURS: Readmission Risk Score: 21    Last Discharge Facility       Date Complaint Diagnosis Description Type Department Provider    10/30/24 Hyperglycemia; Headache Hypertensive urgency ... ED to Hosp-Admission (Discharged) (ADMITTED) [facility] Amber Sampson MD; Maximo Treadwell,...            Was this an external facility discharge? No    Follow Up Appointment:   Patient does not have a follow up appointment scheduled at time of call. Currently there is no HFU appt scheduled with the PCP.  Future Appointments         Provider Specialty Dept Phone    12/10/2024 1:30 PM Bebe Latif MD Endocrinology [phone]            Plan for follow-up on next business day.      Rufina Solano RN BSN  Care Transition Nurse  [phone]

## 2024-11-05 ENCOUNTER — CARE COORDINATION (OUTPATIENT)
Dept: CASE MANAGEMENT | Age: 64
End: 2024-11-05

## 2024-11-05 NOTE — CARE COORDINATION
Care Transitions Note    Initial Call - Call within 2 business days of discharge: Yes    Attempted to reach patient for transitions of care follow up. Unable to reach patient.    Outreach Attempts:   HIPAA compliant voicemail left for patient.     Patient: Elvia Arguelles    Patient : 1960   MRN: [id number]    Reason for Admission: [place] x 2 days -> hypertensive urgency, uncontrolled DM2, FAVIAN on CKD, diplopia, confusion -> home no services, f/up Dr Brown 1 month  Discharge Date: 24  RURS: Readmission Risk Score: 21    Last Discharge Facility       Date Complaint Diagnosis Description Type Department Provider    10/30/24 Hyperglycemia; Headache Hypertensive urgency ... ED to Hosp-Admission (Discharged) (ADMITTED) Amber Lopez MD; Maximo Treadwell,...     Was this an external facility discharge? No    Follow Up Appointment:   Patient does not have a follow up appointment scheduled at time of call.  Routing to PCP pool    Future Appointments         Provider Specialty Dept Phone    12/10/2024 1:30 PM Bebe Latif MD Endocrinology [phone]          Care transition program closed.      Winnie Zamudio RN

## 2024-11-07 NOTE — DISCHARGE SUMMARY
Name:  Elvia Arguelles /Age/Sex: 1960 (64 y.o. female)   Admit Date: 10/30/2024  Discharge Date: 2024    MRN & CSN:  [id number] & [id number] Encounter Date : 2024    Attending:  No att. providers found Discharging Provider: Amber Sampson MD       Hospital Course:      Elvia Arguelles is a 64 y.o. female who presented with     Chief Complaint   Patient presents with    Hyperglycemia    Headache     EMS sts BGL is over 600        The patient was being managed in the hospital for    Hypertensive urgency : Blood pressure better controlled on nifedipine and labetalol. Cardiology was following and plan for outpatient follow up      Uncontrolled type 2 diabetes : Resumed home insulin pump. Glucose better controlled while inpatient. Diabetes educator following. Monitor with fingerstick checks.     FAVIAN on CKD : Cr elevated and was continued on IVF with plan for BMP as outpatient and follow up with PCP     Diplopia. Reports being new onset. Etiology unclear. Code stroke called. CT head and CTA head showing findings similar to prior study. EEG ordered and plan to follow with neurology as outpatient     She was discharged and advised to follow up with ophthal as outpatient for her diplopia    The patient expressed appropriate understanding of, and agreement with the discharge recommendations, medications, and plan.     Consults this admission:  IP CONSULT TO CARDIOLOGY  IP CONSULT TO DIABETES EDUCATOR  IP CONSULT TO CRITICAL CARE  IP CONSULT TO DIABETES EDUCATOR  IP CONSULT TO NEUROLOGY    Discharge Diagnosis:   Hypertensive urgency    Discharge Instruction:   Follow up appointments: Primary care physician: Meg Ellis, APRN - CNP within 1 week, cardiology within 1 week, ophthal within 1 week  Activity: activity as tolerated  Condition on discharge: Stable    Discharge Medications:        Medication List        START taking these medications      labetalol 200 MG tablet  Commonly known as:

## 2024-11-21 ENCOUNTER — HOSPITAL ENCOUNTER (EMERGENCY)
Age: 64
Discharge: HOME | End: 2024-11-21
Attending: EMERGENCY MEDICINE
Payer: MEDICARE

## 2024-11-21 ENCOUNTER — APPOINTMENT (OUTPATIENT)
Dept: GENERAL RADIOLOGY | Age: 64
End: 2024-11-21
Payer: MEDICARE

## 2024-11-21 ENCOUNTER — APPOINTMENT (OUTPATIENT)
Dept: CT IMAGING | Age: 64
End: 2024-11-21
Attending: EMERGENCY MEDICINE
Payer: MEDICARE

## 2024-11-21 VITALS
HEIGHT: 59 IN | WEIGHT: 121.03 LBS | HEART RATE: 75 BPM | SYSTOLIC BLOOD PRESSURE: 157 MMHG | OXYGEN SATURATION: 100 % | BODY MASS INDEX: 24.4 KG/M2 | TEMPERATURE: 98.5 F | DIASTOLIC BLOOD PRESSURE: 87 MMHG | RESPIRATION RATE: 17 BRPM

## 2024-11-21 DIAGNOSIS — R51.9 NONINTRACTABLE HEADACHE, UNSPECIFIED CHRONICITY PATTERN, UNSPECIFIED HEADACHE TYPE: ICD-10-CM

## 2024-11-21 DIAGNOSIS — H53.9 VISUAL DISTURBANCE: ICD-10-CM

## 2024-11-21 DIAGNOSIS — I10 UNCONTROLLED HYPERTENSION: Primary | ICD-10-CM

## 2024-11-21 LAB
ALBUMIN SERPL-MCNC: 4.4 G/DL (ref 3.4–5)
ALBUMIN/GLOB SERPL: 1.4 {RATIO} (ref 1.1–2.2)
ALP SERPL-CCNC: 158 U/L (ref 40–129)
ALT SERPL-CCNC: 13 U/L (ref 10–40)
ANION GAP SERPL CALCULATED.3IONS-SCNC: 13 MMOL/L (ref 3–16)
APAP SERPL-MCNC: <5 UG/ML (ref 10–30)
AST SERPL-CCNC: 28 U/L (ref 15–37)
BACTERIA URNS QL MICRO: NORMAL /HPF
BASOPHILS # BLD: 0.1 K/UL (ref 0–0.2)
BASOPHILS NFR BLD: 0.7 %
BILIRUB SERPL-MCNC: <0.2 MG/DL (ref 0–1)
BILIRUB UR QL STRIP.AUTO: NEGATIVE
BUN SERPL-MCNC: 39 MG/DL (ref 7–20)
CALCIUM SERPL-MCNC: 10.1 MG/DL (ref 8.3–10.6)
CHLORIDE SERPL-SCNC: 106 MMOL/L (ref 99–110)
CLARITY UR: CLEAR
CO2 SERPL-SCNC: 21 MMOL/L (ref 21–32)
COLOR UR: YELLOW
CREAT SERPL-MCNC: 1.7 MG/DL (ref 0.6–1.2)
DEPRECATED RDW RBC AUTO: 13.9 % (ref 12.4–15.4)
EOSINOPHIL # BLD: 0.2 K/UL (ref 0–0.6)
EOSINOPHIL NFR BLD: 2.9 %
EPI CELLS #/AREA URNS AUTO: 0 /HPF (ref 0–5)
GFR SERPLBLD CREATININE-BSD FMLA CKD-EPI: 33 ML/MIN/{1.73_M2}
GLUCOSE SERPL-MCNC: 92 MG/DL (ref 70–99)
GLUCOSE UR STRIP.AUTO-MCNC: NEGATIVE MG/DL
HCT VFR BLD AUTO: 36.1 % (ref 36–48)
HGB BLD-MCNC: 12.4 G/DL (ref 12–16)
HGB UR QL STRIP.AUTO: NEGATIVE
HYALINE CASTS #/AREA URNS AUTO: 0 /LPF (ref 0–8)
KETONES UR STRIP.AUTO-MCNC: NEGATIVE MG/DL
LEUKOCYTE ESTERASE UR QL STRIP.AUTO: NEGATIVE
LYMPHOCYTES # BLD: 1.4 K/UL (ref 1–5.1)
LYMPHOCYTES NFR BLD: 15.8 %
MCH RBC QN AUTO: 30.2 PG (ref 26–34)
MCHC RBC AUTO-ENTMCNC: 34.3 G/DL (ref 31–36)
MCV RBC AUTO: 88.1 FL (ref 80–100)
MONOCYTES # BLD: 0.4 K/UL (ref 0–1.3)
MONOCYTES NFR BLD: 4.9 %
NEUTROPHILS # BLD: 6.5 K/UL (ref 1.7–7.7)
NEUTROPHILS NFR BLD: 75.7 %
NITRITE UR QL STRIP.AUTO: NEGATIVE
PH UR STRIP.AUTO: 5.5 [PH] (ref 5–8)
PLATELET # BLD AUTO: 196 K/UL (ref 135–450)
PMV BLD AUTO: 7.9 FL (ref 5–10.5)
POTASSIUM SERPL-SCNC: 4.4 MMOL/L (ref 3.5–5.1)
PROT SERPL-MCNC: 7.5 G/DL (ref 6.4–8.2)
PROT UR STRIP.AUTO-MCNC: 100 MG/DL
RBC # BLD AUTO: 4.09 M/UL (ref 4–5.2)
RBC CLUMPS #/AREA URNS AUTO: 0 /HPF (ref 0–4)
SODIUM SERPL-SCNC: 140 MMOL/L (ref 136–145)
SP GR UR STRIP.AUTO: 1.01 (ref 1–1.03)
TROPONIN, HIGH SENSITIVITY: 29 NG/L (ref 0–14)
TROPONIN, HIGH SENSITIVITY: 30 NG/L (ref 0–14)
UA COMPLETE W REFLEX CULTURE PNL UR: ABNORMAL
UA DIPSTICK W REFLEX MICRO PNL UR: YES
URN SPEC COLLECT METH UR: ABNORMAL
UROBILINOGEN UR STRIP-ACNC: 0.2 E.U./DL
WBC # BLD AUTO: 8.6 K/UL (ref 4–11)
WBC #/AREA URNS AUTO: 1 /HPF (ref 0–5)

## 2024-11-21 PROCEDURE — 6360000002 HC RX W HCPCS: Performed by: EMERGENCY MEDICINE

## 2024-11-21 PROCEDURE — 85025 COMPLETE CBC W/AUTO DIFF WBC: CPT

## 2024-11-21 PROCEDURE — 96365 THER/PROPH/DIAG IV INF INIT: CPT

## 2024-11-21 PROCEDURE — 81001 URINALYSIS AUTO W/SCOPE: CPT

## 2024-11-21 PROCEDURE — 6370000000 HC RX 637 (ALT 250 FOR IP): Performed by: EMERGENCY MEDICINE

## 2024-11-21 PROCEDURE — 80143 DRUG ASSAY ACETAMINOPHEN: CPT

## 2024-11-21 PROCEDURE — 80053 COMPREHEN METABOLIC PANEL: CPT

## 2024-11-21 PROCEDURE — 71045 X-RAY EXAM CHEST 1 VIEW: CPT

## 2024-11-21 PROCEDURE — 84484 ASSAY OF TROPONIN QUANT: CPT

## 2024-11-21 PROCEDURE — 70450 CT HEAD/BRAIN W/O DYE: CPT

## 2024-11-21 PROCEDURE — 99285 EMERGENCY DEPT VISIT HI MDM: CPT

## 2024-11-21 RX ORDER — LISINOPRIL 5 MG/1
5 TABLET ORAL ONCE
Status: COMPLETED | OUTPATIENT
Start: 2024-11-21 | End: 2024-11-21

## 2024-11-21 RX ORDER — NIFEDIPINE 60 MG/1
60 TABLET, EXTENDED RELEASE ORAL 2 TIMES DAILY
COMMUNITY
Start: 2024-11-01

## 2024-11-21 RX ORDER — NICARDIPINE HYDROCHLORIDE 0.1 MG/ML
2.5-15 INJECTION INTRAVENOUS CONTINUOUS
Status: DISCONTINUED | OUTPATIENT
Start: 2024-11-21 | End: 2024-11-21

## 2024-11-21 RX ORDER — OXYCODONE HYDROCHLORIDE 5 MG/1
5 TABLET ORAL ONCE
Status: COMPLETED | OUTPATIENT
Start: 2024-11-21 | End: 2024-11-21

## 2024-11-21 RX ORDER — LISINOPRIL 5 MG/1
5 TABLET ORAL DAILY
Qty: 30 TABLET | Refills: 0 | Status: SHIPPED | OUTPATIENT
Start: 2024-11-21

## 2024-11-21 RX ADMIN — NICARDIPINE HYDROCHLORIDE 5 MG/HR: 0.1 INJECTION INTRAVENOUS at 13:29

## 2024-11-21 RX ADMIN — NICARDIPINE HYDROCHLORIDE 4 MG/HR: 0.1 INJECTION INTRAVENOUS at 13:51

## 2024-11-21 RX ADMIN — LISINOPRIL 5 MG: 5 TABLET ORAL at 17:20

## 2024-11-21 RX ADMIN — OXYCODONE HYDROCHLORIDE 5 MG: 5 TABLET ORAL at 13:56

## 2024-11-21 ASSESSMENT — PAIN - FUNCTIONAL ASSESSMENT
PAIN_FUNCTIONAL_ASSESSMENT: PREVENTS OR INTERFERES SOME ACTIVE ACTIVITIES AND ADLS
PAIN_FUNCTIONAL_ASSESSMENT: 0-10

## 2024-11-21 ASSESSMENT — PAIN DESCRIPTION - LOCATION: LOCATION: HEAD

## 2024-11-21 ASSESSMENT — PAIN DESCRIPTION - PAIN TYPE: TYPE: ACUTE PAIN

## 2024-11-21 ASSESSMENT — PAIN DESCRIPTION - DESCRIPTORS: DESCRIPTORS: ACHING

## 2024-11-21 ASSESSMENT — PAIN SCALES - GENERAL: PAINLEVEL_OUTOF10: 2

## 2024-11-21 ASSESSMENT — PAIN DESCRIPTION - ORIENTATION: ORIENTATION: RIGHT

## 2024-11-21 NOTE — ED PROVIDER NOTES
[facility] EMERGENCY DEPARTMENT    CHIEF COMPLAINT  Hypertension (201/106 at home Bp at home. Recent hospital admission for bp. Pt states vision is blurry. Small headache today. )       HISTORY OF PRESENT ILLNESS  Elvia Arguelles is a 64 y.o. female with history of adrenal insufficiency on hydrocortisone, type 1 diabetes, hypertension who presents to the ED with headache, blurry vision and hypertension.  Patient states that she was admitted a couple of weeks ago for hypertensive urgency and they started her on labetalol which made her hallucinate.  She had hallucinations inpatient.  States that she told them she was hallucinating and that it continued to happen until she stopped the medication several days ago as an outpatient.  She did not notify her doctor that she stopped this.  States that she is taking her nifedipine.  Took her nifedipine at 9 AM this morning.  Reports that she woke up with pressure in her head and worsening blurred vision and took her blood pressure and it was over 200 systolic.  Blurred vision is her entire visual field especially peripherally.  Both eyes are affected.  States that she has had some issues with her vision since May but that it is worse today.  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      I have reviewed the following from the nursing documentation:    Past Medical History:   Diagnosis Date    Adrenal insufficiency (HCC)     Cancer (HCC) 2002    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    Closed displaced intertrochanteric fracture of right femur (HCC) 05/18/2024    Depression     Diabetes mellitus (HCC)     Type I    Hx of radiation therapy     melanoma right eye    Hypertension     Pt. denies having history of Hypertension    Hyponatremia     Insulin pump in place     Melanoma (HCC) 01/13/2023    in stomach, pancreas    Prolonged emergence from general anesthesia     slow

## 2024-11-21 NOTE — DISCHARGE INSTRUCTIONS
Follow-up with your doctor within 2 days to reassess your blood pressure.  Seek medical attention for worsening headache, vision loss, speech changes, numbness or weakness or severe sudden onset difficulty breathing.  Take lisinopril 5 mg daily.

## 2024-11-21 NOTE — ED TRIAGE NOTES
201/106 at home Bp at home. Recent hospital admission for bp. Pt states vision is blurry. Small headache today.

## 2024-11-25 ENCOUNTER — TELEMEDICINE (OUTPATIENT)
Dept: FAMILY MEDICINE CLINIC | Age: 64
End: 2024-11-25

## 2024-11-25 DIAGNOSIS — C43.9 MALIGNANT MELANOMA, UNSPECIFIED SITE (HCC): ICD-10-CM

## 2024-11-25 DIAGNOSIS — R53.83 FATIGUE, UNSPECIFIED TYPE: ICD-10-CM

## 2024-11-25 DIAGNOSIS — I10 PRIMARY HYPERTENSION: Primary | ICD-10-CM

## 2024-11-25 DIAGNOSIS — F32.2 CURRENT SEVERE EPISODE OF MAJOR DEPRESSIVE DISORDER WITHOUT PSYCHOTIC FEATURES WITHOUT PRIOR EPISODE (HCC): ICD-10-CM

## 2024-11-25 RX ORDER — ESCITALOPRAM OXALATE 10 MG/1
10 TABLET ORAL DAILY
Qty: 45 TABLET | Refills: 2 | Status: SHIPPED | OUTPATIENT
Start: 2024-11-25

## 2024-11-25 NOTE — PROGRESS NOTES
Elvia Arguelles, was evaluated through a synchronous (real-time) audio-video encounter. The patient (or guardian if applicable) is aware that this is a billable service, which includes applicable co-pays. This Virtual Visit was conducted with patient's (and/or legal guardian's) consent. Patient identification was verified, and a caregiver was present when appropriate.   The patient was located at Home: [address] [facility] [address]  Provider was located at Facility (Appt Dept): [address] [place] [address]  Confirm you are appropriately licensed, registered, or certified to deliver care in the state where the patient is located as indicated above. If you are not or unsure, please re-schedule the visit: Yes, I confirm.     Elvia Arguelles (:  1960) is a Established patient, presenting virtually for evaluation of the following:      Below is the assessment and plan developed based on review of pertinent history, physical exam, labs, studies, and medications.     Assessment & Plan  Primary hypertension    Stable;  Continue to monitor BP closely and call BP if > 140/90.         Malignant melanoma, unspecified site (HCC)    Stable;  Patient follows with [facility].         Fatigue, unspecified type    Not progressing;  Discussed symptoms likely related to depression.  Patient in agreement to increase Lexapro to 15 mg.         Current severe episode of major depressive disorder without psychotic features without prior episode (HCC)    Not progressing;  See above.    Orders:    escitalopram (LEXAPRO) 10 MG tablet; Take 1 tablet by mouth daily      No follow-ups on file.       Subjective   HPI  HYPERTENSION  Has only taken 1 lisinopril (/96)  BP ok yesterday and today (101/66)- has been checking qday  A little dizziness/ lightheaded- balance is ok  No chest pain, chest tightening or shortness of breath  Still urinating  No leg swelling    Melanoma  Still has not started back on

## 2024-12-03 ENCOUNTER — HOSPITAL ENCOUNTER (OUTPATIENT)
Dept: CT IMAGING | Age: 64
Discharge: HOME | End: 2024-12-03
Payer: MEDICARE

## 2024-12-03 DIAGNOSIS — E27.40 ADRENAL INSUFFICIENCY (HCC): ICD-10-CM

## 2024-12-03 DIAGNOSIS — E55.9 VITAMIN D DEFICIENCY: ICD-10-CM

## 2024-12-03 DIAGNOSIS — E10.319 CONTROLLED TYPE 1 DIABETES MELLITUS WITH RETINOPATHY, MACULAR EDEMA PRESENCE UNSPECIFIED, UNSPECIFIED LATERALITY, UNSPECIFIED RETINOPATHY SEVERITY (HCC): ICD-10-CM

## 2024-12-03 DIAGNOSIS — E78.2 MIXED HYPERLIPIDEMIA: ICD-10-CM

## 2024-12-03 DIAGNOSIS — C78.7 SECONDARY MALIGNANT NEOPLASM OF LIVER (HCC): ICD-10-CM

## 2024-12-03 DIAGNOSIS — I10 PRIMARY HYPERTENSION: ICD-10-CM

## 2024-12-03 DIAGNOSIS — M81.0 AGE RELATED OSTEOPOROSIS, UNSPECIFIED PATHOLOGICAL FRACTURE PRESENCE: ICD-10-CM

## 2024-12-03 LAB
PERFORMED ON: ABNORMAL
POC CREATININE: 1.8 MG/DL (ref 0.6–1.2)
POC SAMPLE TYPE: ABNORMAL

## 2024-12-03 PROCEDURE — 82565 ASSAY OF CREATININE: CPT

## 2024-12-03 PROCEDURE — 74178 CT ABD&PLV WO CNTR FLWD CNTR: CPT

## 2024-12-03 PROCEDURE — 6360000004 HC RX CONTRAST MEDICATION: Performed by: REGISTERED NURSE

## 2024-12-03 RX ORDER — IOPAMIDOL 755 MG/ML
75 INJECTION, SOLUTION INTRAVASCULAR
Status: COMPLETED | OUTPATIENT
Start: 2024-12-03 | End: 2024-12-03

## 2024-12-03 RX ADMIN — IOPAMIDOL 75 ML: 755 INJECTION, SOLUTION INTRAVENOUS at 12:10

## 2024-12-04 LAB
25(OH)D3 SERPL-MCNC: 35.9 NG/ML
ALBUMIN SERPL-MCNC: 4.1 G/DL (ref 3.4–5)
ALBUMIN/GLOB SERPL: 1.7 {RATIO} (ref 1.1–2.2)
ALP SERPL-CCNC: 139 U/L (ref 40–129)
ALT SERPL-CCNC: 10 U/L (ref 10–40)
ANION GAP SERPL CALCULATED.3IONS-SCNC: 12 MMOL/L (ref 3–16)
AST SERPL-CCNC: 21 U/L (ref 15–37)
BILIRUB SERPL-MCNC: <0.2 MG/DL (ref 0–1)
BUN SERPL-MCNC: 35 MG/DL (ref 7–20)
CALCIUM SERPL-MCNC: 9.8 MG/DL (ref 8.3–10.6)
CHLORIDE SERPL-SCNC: 102 MMOL/L (ref 99–110)
CHOLEST SERPL-MCNC: 199 MG/DL (ref 0–199)
CO2 SERPL-SCNC: 22 MMOL/L (ref 21–32)
CREAT SERPL-MCNC: 1.7 MG/DL (ref 0.6–1.2)
CREAT UR-MCNC: 37.4 MG/DL (ref 28–259)
EST. AVERAGE GLUCOSE BLD GHB EST-MCNC: 134.1 MG/DL
GFR SERPLBLD CREATININE-BSD FMLA CKD-EPI: 33 ML/MIN/{1.73_M2}
GLUCOSE SERPL-MCNC: 104 MG/DL (ref 70–99)
HBA1C MFR BLD: 6.3 %
HDLC SERPL-MCNC: 85 MG/DL (ref 40–60)
LDL CHOLESTEROL: 76 MG/DL
MICROALBUMIN UR DL<=1MG/L-MCNC: 9.14 MG/DL
MICROALBUMIN/CREAT UR: 244.4 MG/G (ref 0–30)
POTASSIUM SERPL-SCNC: 4.9 MMOL/L (ref 3.5–5.1)
PROT SERPL-MCNC: 6.5 G/DL (ref 6.4–8.2)
SODIUM SERPL-SCNC: 136 MMOL/L (ref 136–145)
T4 FREE SERPL-MCNC: 1.3 NG/DL (ref 0.9–1.8)
TRIGL SERPL-MCNC: 190 MG/DL (ref 0–150)
TSH SERPL DL<=0.005 MIU/L-ACNC: 6.25 UIU/ML (ref 0.27–4.2)
VLDLC SERPL CALC-MCNC: 38 MG/DL

## 2024-12-10 ENCOUNTER — OFFICE VISIT (OUTPATIENT)
Dept: ENDOCRINOLOGY | Age: 64
End: 2024-12-10
Payer: MEDICARE

## 2024-12-10 VITALS
TEMPERATURE: 98 F | DIASTOLIC BLOOD PRESSURE: 79 MMHG | OXYGEN SATURATION: 99 % | SYSTOLIC BLOOD PRESSURE: 178 MMHG | BODY MASS INDEX: 23.79 KG/M2 | HEART RATE: 82 BPM | WEIGHT: 118 LBS | HEIGHT: 59 IN | RESPIRATION RATE: 14 BRPM

## 2024-12-10 DIAGNOSIS — E78.2 MIXED HYPERLIPIDEMIA: ICD-10-CM

## 2024-12-10 DIAGNOSIS — E04.9 THYROID ENLARGEMENT: ICD-10-CM

## 2024-12-10 DIAGNOSIS — E10.40 TYPE 1 DIABETES, CONTROLLED, WITH NEUROPATHY (HCC): Primary | ICD-10-CM

## 2024-12-10 DIAGNOSIS — E55.9 VITAMIN D DEFICIENCY: ICD-10-CM

## 2024-12-10 DIAGNOSIS — E10.21 DIABETIC NEPHROPATHY ASSOCIATED WITH TYPE 1 DIABETES MELLITUS (HCC): ICD-10-CM

## 2024-12-10 DIAGNOSIS — N18.30 STAGE 3 CHRONIC KIDNEY DISEASE, UNSPECIFIED WHETHER STAGE 3A OR 3B CKD (HCC): ICD-10-CM

## 2024-12-10 DIAGNOSIS — E27.40 ADRENAL INSUFFICIENCY (HCC): ICD-10-CM

## 2024-12-10 DIAGNOSIS — Z83.49 FAMILY HISTORY OF THYROID DISEASE: ICD-10-CM

## 2024-12-10 DIAGNOSIS — I10 PRIMARY HYPERTENSION: ICD-10-CM

## 2024-12-10 DIAGNOSIS — E10.319 CONTROLLED TYPE 1 DIABETES MELLITUS WITH RETINOPATHY, MACULAR EDEMA PRESENCE UNSPECIFIED, UNSPECIFIED LATERALITY, UNSPECIFIED RETINOPATHY SEVERITY (HCC): ICD-10-CM

## 2024-12-10 DIAGNOSIS — E03.9 ACQUIRED HYPOTHYROIDISM: ICD-10-CM

## 2024-12-10 DIAGNOSIS — C43.9 MALIGNANT MELANOMA, UNSPECIFIED SITE (HCC): ICD-10-CM

## 2024-12-10 DIAGNOSIS — M81.0 AGE RELATED OSTEOPOROSIS, UNSPECIFIED PATHOLOGICAL FRACTURE PRESENCE: ICD-10-CM

## 2024-12-10 PROCEDURE — 3017F COLORECTAL CA SCREEN DOC REV: CPT | Performed by: INTERNAL MEDICINE

## 2024-12-10 PROCEDURE — 95251 CONT GLUC MNTR ANALYSIS I&R: CPT | Performed by: INTERNAL MEDICINE

## 2024-12-10 PROCEDURE — 99214 OFFICE O/P EST MOD 30 MIN: CPT | Performed by: INTERNAL MEDICINE

## 2024-12-10 PROCEDURE — G8484 FLU IMMUNIZE NO ADMIN: HCPCS | Performed by: INTERNAL MEDICINE

## 2024-12-10 PROCEDURE — 1036F TOBACCO NON-USER: CPT | Performed by: INTERNAL MEDICINE

## 2024-12-10 PROCEDURE — G8420 CALC BMI NORM PARAMETERS: HCPCS | Performed by: INTERNAL MEDICINE

## 2024-12-10 PROCEDURE — G8427 DOCREV CUR MEDS BY ELIG CLIN: HCPCS | Performed by: INTERNAL MEDICINE

## 2024-12-10 PROCEDURE — 2022F DILAT RTA XM EVC RTNOPTHY: CPT | Performed by: INTERNAL MEDICINE

## 2024-12-10 PROCEDURE — 1159F MED LIST DOCD IN RCRD: CPT | Performed by: INTERNAL MEDICINE

## 2024-12-10 PROCEDURE — 1090F PRES/ABSN URINE INCON ASSESS: CPT | Performed by: INTERNAL MEDICINE

## 2024-12-10 PROCEDURE — 3044F HG A1C LEVEL LT 7.0%: CPT | Performed by: INTERNAL MEDICINE

## 2024-12-10 PROCEDURE — 3078F DIAST BP <80 MM HG: CPT | Performed by: INTERNAL MEDICINE

## 2024-12-10 PROCEDURE — 1123F ACP DISCUSS/DSCN MKR DOCD: CPT | Performed by: INTERNAL MEDICINE

## 2024-12-10 PROCEDURE — G8400 PT W/DXA NO RESULTS DOC: HCPCS | Performed by: INTERNAL MEDICINE

## 2024-12-10 PROCEDURE — 1160F RVW MEDS BY RX/DR IN RCRD: CPT | Performed by: INTERNAL MEDICINE

## 2024-12-10 PROCEDURE — 3077F SYST BP >= 140 MM HG: CPT | Performed by: INTERNAL MEDICINE

## 2024-12-10 NOTE — PROGRESS NOTES
status: Never Used   Substance and Sexual Activity    Alcohol use: Yes     Comment: social    Drug use: No    Sexual activity: Not on file   Other Topics Concern    Not on file   Social History Narrative    Not on file     Social Determinants of Health     Financial Resource Strain: Low Risk  (8/6/2024)    Overall Financial Resource Strain (CARDIA)     Difficulty of Paying Living Expenses: Not hard at all   Food Insecurity: No Food Insecurity (10/30/2024)    Hunger Vital Sign     Worried About Running Out of Food in the Last Year: Never true     Ran Out of Food in the Last Year: Never true   Transportation Needs: No Transportation Needs (10/30/2024)    PRAPARE - Transportation     Lack of Transportation (Medical): No     Lack of Transportation (Non-Medical): No   Physical Activity: Not on file   Stress: Not on file   Social Connections: Not on file   Intimate Partner Violence: Unknown (1/20/2024)    Received from [facility], [facility]    Interpersonal Safety     Feel physically or emotionally unsafe where currently live: Not on file     Harm by anyone: Not on file     Emotionally Harmed: Not on file   Housing Stability: Low Risk  (10/30/2024)    Housing Stability Vital Sign     Unable to Pay for Housing in the Last Year: No     Number of Times Moved in the Last Year: 0     Homeless in the Last Year: No     Family History   Problem Relation Age of Onset    High Blood Pressure Mother     Breast Cancer Mother         breast w mets to bone    Diabetes Father     Stroke Father     Other Cancer Father         bladder     Current Outpatient Medications   Medication Sig Dispense Refill    escitalopram (LEXAPRO) 10 MG tablet Take 1 tablet by mouth daily 45 tablet 2    NIFEdipine (PROCARDIA XL) 60 MG extended release tablet Take 1 tablet by mouth in the morning and at bedtime      lisinopril (PRINIVIL;ZESTRIL) 5 MG tablet Take 1 tablet by mouth daily 30 tablet 0

## 2025-01-04 DIAGNOSIS — E10.319 POORLY CONTROLLED TYPE 1 DIABETES MELLITUS WITH RETINOPATHY (HCC): ICD-10-CM

## 2025-01-04 DIAGNOSIS — E10.65 POORLY CONTROLLED TYPE 1 DIABETES MELLITUS WITH RETINOPATHY (HCC): ICD-10-CM

## 2025-01-04 DIAGNOSIS — E78.2 MIXED HYPERLIPIDEMIA: ICD-10-CM

## 2025-01-04 DIAGNOSIS — E55.9 VITAMIN D DEFICIENCY: ICD-10-CM

## 2025-01-04 DIAGNOSIS — Z83.49 FAMILY HISTORY OF THYROID DISEASE: ICD-10-CM

## 2025-01-04 DIAGNOSIS — I10 PRIMARY HYPERTENSION: ICD-10-CM

## 2025-01-04 DIAGNOSIS — E10.21 DIABETIC NEPHROPATHY ASSOCIATED WITH TYPE 1 DIABETES MELLITUS (HCC): ICD-10-CM

## 2025-01-04 DIAGNOSIS — F32.2 CURRENT SEVERE EPISODE OF MAJOR DEPRESSIVE DISORDER WITHOUT PSYCHOTIC FEATURES WITHOUT PRIOR EPISODE (HCC): ICD-10-CM

## 2025-01-04 DIAGNOSIS — E10.40 POORLY CONTROLLED TYPE 1 DIABETES MELLITUS WITH NEUROPATHY (HCC): ICD-10-CM

## 2025-01-04 DIAGNOSIS — E10.65 POORLY CONTROLLED TYPE 1 DIABETES MELLITUS WITH NEUROPATHY (HCC): ICD-10-CM

## 2025-01-06 NOTE — TELEPHONE ENCOUNTER
Medication:   Requested Prescriptions     Pending Prescriptions Disp Refills    methocarbamol (ROBAXIN) 750 MG tablet 120 tablet 0    escitalopram (LEXAPRO) 10 MG tablet 45 tablet 2     Sig: Take 1 tablet by mouth daily        Last Filled:      Patient Phone Number: [phone] (home)     Last appt: 11/25/2024   Next appt: Visit date not found    Last OARRS:        No data to display

## 2025-01-07 RX ORDER — ERGOCALCIFEROL 1.25 MG/1
50000 CAPSULE ORAL WEEKLY
Qty: 12 CAPSULE | Refills: 0 | Status: SHIPPED | OUTPATIENT
Start: 2025-01-07

## 2025-01-07 RX ORDER — ESCITALOPRAM OXALATE 10 MG/1
10 TABLET ORAL DAILY
Qty: 45 TABLET | Refills: 2 | Status: SHIPPED | OUTPATIENT
Start: 2025-01-07

## 2025-01-07 RX ORDER — METHOCARBAMOL 750 MG/1
750 TABLET, FILM COATED ORAL 4 TIMES DAILY
Qty: 120 TABLET | Refills: 0 | Status: SHIPPED | OUTPATIENT
Start: 2025-01-07

## 2025-01-14 RX ORDER — FERROUS SULFATE 324(65)MG
324 TABLET, DELAYED RELEASE (ENTERIC COATED) ORAL DAILY
Qty: 90 TABLET | Refills: 0 | Status: SHIPPED | OUTPATIENT
Start: 2025-01-14

## 2025-01-14 NOTE — TELEPHONE ENCOUNTER
Medication:   Requested Prescriptions     Pending Prescriptions Disp Refills    ferrous sulfate 324 (65 Fe) MG EC tablet [Pharmacy Med Name: FERROUS SULFATE 324MG EC TABS RED] 90 tablet 0     Sig: TAKE 1 TABLET BY MOUTH DAILY WITH BREAKFAST        Last Filled:  08/13/24    Patient Phone Number: [phone] (home)     Last appt: 11/25/2024   Next appt: Visit date not found    Last OARRS:        No data to display

## 2025-01-27 ENCOUNTER — TELEPHONE (OUTPATIENT)
Dept: ENDOCRINOLOGY | Age: 65
End: 2025-01-27

## 2025-01-28 DIAGNOSIS — E10.65 POORLY CONTROLLED TYPE 1 DIABETES MELLITUS WITH RETINOPATHY (HCC): ICD-10-CM

## 2025-01-28 DIAGNOSIS — E10.21 DIABETIC NEPHROPATHY ASSOCIATED WITH TYPE 1 DIABETES MELLITUS (HCC): ICD-10-CM

## 2025-01-28 DIAGNOSIS — E78.2 MIXED HYPERLIPIDEMIA: ICD-10-CM

## 2025-01-28 DIAGNOSIS — E55.9 VITAMIN D DEFICIENCY: ICD-10-CM

## 2025-01-28 DIAGNOSIS — E10.40 POORLY CONTROLLED TYPE 1 DIABETES MELLITUS WITH NEUROPATHY (HCC): ICD-10-CM

## 2025-01-28 DIAGNOSIS — E10.319 POORLY CONTROLLED TYPE 1 DIABETES MELLITUS WITH RETINOPATHY (HCC): ICD-10-CM

## 2025-01-28 DIAGNOSIS — I10 PRIMARY HYPERTENSION: ICD-10-CM

## 2025-01-28 DIAGNOSIS — E10.65 POORLY CONTROLLED TYPE 1 DIABETES MELLITUS WITH NEUROPATHY (HCC): ICD-10-CM

## 2025-01-28 DIAGNOSIS — Z83.49 FAMILY HISTORY OF THYROID DISEASE: ICD-10-CM

## 2025-01-28 RX ORDER — HYDROCORTISONE 5 MG/1
5 TABLET ORAL DAILY
Qty: 90 TABLET | Refills: 0 | Status: SHIPPED | OUTPATIENT
Start: 2025-01-28

## 2025-01-28 RX ORDER — HYDROCORTISONE 10 MG/1
10 TABLET ORAL DAILY
Qty: 90 TABLET | Refills: 0 | Status: SHIPPED | OUTPATIENT
Start: 2025-01-28

## 2025-02-04 ENCOUNTER — PATIENT MESSAGE (OUTPATIENT)
Dept: ORTHOPEDIC SURGERY | Age: 65
End: 2025-02-04

## 2025-02-11 DIAGNOSIS — F32.2 CURRENT SEVERE EPISODE OF MAJOR DEPRESSIVE DISORDER WITHOUT PSYCHOTIC FEATURES WITHOUT PRIOR EPISODE (HCC): ICD-10-CM

## 2025-02-12 RX ORDER — ESCITALOPRAM OXALATE 10 MG/1
15 TABLET ORAL DAILY
Qty: 45 TABLET | Refills: 2 | Status: SHIPPED | OUTPATIENT
Start: 2025-02-12 | End: 2025-02-13

## 2025-02-12 NOTE — TELEPHONE ENCOUNTER
Medication:   Requested Prescriptions     Pending Prescriptions Disp Refills    escitalopram (LEXAPRO) 10 MG tablet 45 tablet 2     Sig: Take 1 tablet by mouth daily       Last Filled: 15 mg   1/7/25    Patient Phone Number: [phone] (home)     Last appt: 11/25/2024   Next appt: Visit date not found    Last Labs DM:   Lab Results   Component Value Date/Time    LABA1C 6.3 12/03/2024 12:31 PM     Last Lipid:   Lab Results   Component Value Date/Time    CHOL 246 08/30/2024 10:16 AM    TRIG 182 08/30/2024 10:16 AM    HDL 85 12/03/2024 12:31 PM     12/07/2011 07:50 AM     Last PSA: No results found for: \"PSA\"  Last Thyroid:   Lab Results   Component Value Date/Time    TSH 6.25 12/03/2024 12:31 PM    FT3 2.2 02/20/2023 08:31 AM    T4FREE 1.3 12/03/2024 12:31 PM

## 2025-02-13 ENCOUNTER — TELEMEDICINE (OUTPATIENT)
Dept: FAMILY MEDICINE CLINIC | Age: 65
End: 2025-02-13

## 2025-02-13 DIAGNOSIS — I10 PRIMARY HYPERTENSION: ICD-10-CM

## 2025-02-13 DIAGNOSIS — G25.81 RESTLESS LEG SYNDROME: ICD-10-CM

## 2025-02-13 DIAGNOSIS — C43.9 MALIGNANT MELANOMA, UNSPECIFIED SITE (HCC): ICD-10-CM

## 2025-02-13 DIAGNOSIS — F32.2 CURRENT SEVERE EPISODE OF MAJOR DEPRESSIVE DISORDER WITHOUT PSYCHOTIC FEATURES WITHOUT PRIOR EPISODE (HCC): Primary | ICD-10-CM

## 2025-02-13 RX ORDER — ESCITALOPRAM OXALATE 20 MG/1
20 TABLET ORAL DAILY
Qty: 30 TABLET | Refills: 2 | Status: SHIPPED | OUTPATIENT
Start: 2025-02-13

## 2025-02-13 RX ORDER — NIFEDIPINE 30 MG/1
30 TABLET, EXTENDED RELEASE ORAL 2 TIMES DAILY
Qty: 60 TABLET | Refills: 2 | Status: SHIPPED | OUTPATIENT
Start: 2025-02-13

## 2025-02-13 NOTE — ED NOTES
Pharmacy Medication Reconciliation Note     List of medications patient is currently taking is complete.    Source of information:   EMR  patient    Notes regarding home medications:   Reports taking her morning medications today   Currently wearing an insulin pump - Tresiba as back up   Reported having hallucinations while taking labetalol - when she stopped this, the hallucinations stopped.       Fausto Christian, PharmD  11/21/2024  4:01 PM      
Pt ambulated to bathroom and back to bed, so complaints or issues with gait   
Pt confirmed d/c paperwork has correct name. Discharge and education instructions reviewed with patient. Teach-back successful.  Pt verbalized understanding and denied questions at this time. No acute distress noted. Patient instructed to follow-up as noted - return to emergency department if symptoms worsen. Patient verbalized understanding. Discharged per EDMD with discharge instructions. Pt discharged to private vehicle. Patient stable upon departure. Thanked patient for choosing [facility] for care. Provider aware of patient pain at time of discharge.   
Yes - the patient is able to be screened

## 2025-02-13 NOTE — PROGRESS NOTES
Elvia Arguelles, was evaluated through a synchronous (real-time) audio-video encounter. The patient (or guardian if applicable) is aware that this is a billable service, which includes applicable co-pays. This Virtual Visit was conducted with patient's (and/or legal guardian's) consent. Patient identification was verified, and a caregiver was present when appropriate.   The patient was located at Home: [address] [facility] [address]  Provider was located at Facility (Appt Dept): [address] [place] [address]  Confirm you are appropriately licensed, registered, or certified to deliver care in the state where the patient is located as indicated above. If you are not or unsure, please re-schedule the visit: Yes, I confirm.     Elvia Arguelles (:  1960) is a Established patient, presenting virtually for evaluation of the following:      Below is the assessment and plan developed based on review of pertinent history, physical exam, labs, studies, and medications.     Assessment & Plan  Current severe episode of major depressive disorder without psychotic features without prior episode (HCC)    Not progressing;  Increase Lexapro.    Orders:    escitalopram (LEXAPRO) 20 MG tablet; Take 1 tablet by mouth daily    Primary hypertension    Stable;  Refilled nifedipine- confirmed dose with patient.         Restless leg syndrome    Not progressing;  Labs ordered.  Consider increase in requip.    Orders:    TSH; Future    CBC; Future    Ferritin; Future    Magnesium; Future    Comprehensive Metabolic Panel; Future    Vitamin B12; Future    Malignant melanoma, unspecified site (HCC)    Not progressing;  Encouraged patient to get a second opinion with Dr. Dukes.  Patient not ready to discuss Hospice.           Return in about 4 weeks (around 3/13/2025).       Subjective   HPI  Lexapro 15 mg  Mood is still pretty depressed  Denies SI  Wouldn't hurt to increase to 20 mg per

## 2025-02-13 NOTE — ASSESSMENT & PLAN NOTE
Not progressing;  Encouraged patient to get a second opinion with Dr. Dukes.  Patient not ready to discuss Hospice.

## 2025-03-12 RX ORDER — PANTOPRAZOLE SODIUM 40 MG/1
40 TABLET, DELAYED RELEASE ORAL
Qty: 180 TABLET | Refills: 0 | Status: SHIPPED | OUTPATIENT
Start: 2025-03-12

## 2025-03-12 NOTE — TELEPHONE ENCOUNTER
Medication:   Requested Prescriptions     Pending Prescriptions Disp Refills    pantoprazole (PROTONIX) 40 MG tablet [Pharmacy Med Name: PANTOPRAZOLE 40MG TABLETS] 180 tablet 0     Sig: TAKE 1 TABLET BY MOUTH TWICE DAILY BEFORE MEALS        Last Filled:  8/13/24    Pended to: [id number] [place], [place] - [address] - P [phone] - F [phone]     Patient Phone Number: [phone] (home)     Last appt: 2/13/2025   Next appt: Visit date not found    Last OARRS:        No data to display

## 2025-03-17 RX ORDER — PANTOPRAZOLE SODIUM 40 MG/1
40 TABLET, DELAYED RELEASE ORAL
Qty: 180 TABLET | Refills: 0 | Status: SHIPPED | OUTPATIENT
Start: 2025-03-17

## 2025-03-17 NOTE — TELEPHONE ENCOUNTER
Medication:   Requested Prescriptions     Pending Prescriptions Disp Refills    pantoprazole (PROTONIX) 40 MG tablet [Pharmacy Med Name: PANTOPRAZOLE 40MG TABLETS] 180 tablet 0     Sig: TAKE 1 TABLET BY MOUTH TWICE DAILY BEFORE MEALS       Last Filled:  3/12/25    Patient Phone Number: [phone] (home)     Last appt: 2/13/2025   Next appt: Visit date not found    Last Labs DM:   Lab Results   Component Value Date/Time    LABA1C 6.3 12/03/2024 12:31 PM     Last Lipid:   Lab Results   Component Value Date/Time    CHOL 246 08/30/2024 10:16 AM    TRIG 182 08/30/2024 10:16 AM    HDL 85 12/03/2024 12:31 PM     12/07/2011 07:50 AM     Last PSA: No results found for: \"PSA\"  Last Thyroid:   Lab Results   Component Value Date/Time    TSH 6.25 12/03/2024 12:31 PM    FT3 2.2 02/20/2023 08:31 AM    T4FREE 1.3 12/03/2024 12:31 PM

## 2025-03-18 ENCOUNTER — TELEPHONE (OUTPATIENT)
Dept: ENDOCRINOLOGY | Age: 65
End: 2025-03-18

## 2025-03-18 RX ORDER — METHOCARBAMOL 750 MG/1
750 TABLET, FILM COATED ORAL 4 TIMES DAILY
Qty: 120 TABLET | Refills: 0 | Status: SHIPPED | OUTPATIENT
Start: 2025-03-18

## 2025-03-18 NOTE — TELEPHONE ENCOUNTER
JOSELITO,  Fax from [facility] with recent ov notes

## 2025-03-18 NOTE — TELEPHONE ENCOUNTER
Medication:   Requested Prescriptions     Pending Prescriptions Disp Refills    methocarbamol (ROBAXIN) 750 MG tablet [Pharmacy Med Name: METHOCARBAMOL 750MG TABLETS] 120 tablet 0     Sig: TAKE 1 TABLET BY MOUTH FOUR TIMES DAILY       Last Filled:  1/7/25    Patient Phone Number: [phone] (home)     Last appt: 2/13/2025   Next appt: Visit date not found    Last Labs DM:   Lab Results   Component Value Date/Time    LABA1C 6.3 12/03/2024 12:31 PM     Last Lipid:   Lab Results   Component Value Date/Time    CHOL 246 08/30/2024 10:16 AM    TRIG 182 08/30/2024 10:16 AM    HDL 85 12/03/2024 12:31 PM     12/07/2011 07:50 AM     Last PSA: No results found for: \"PSA\"  Last Thyroid:   Lab Results   Component Value Date/Time    TSH 6.25 12/03/2024 12:31 PM    FT3 2.2 02/20/2023 08:31 AM    T4FREE 1.3 12/03/2024 12:31 PM

## 2025-03-21 ENCOUNTER — HOSPITAL ENCOUNTER (OUTPATIENT)
Age: 65
Discharge: HOME | End: 2025-03-21
Attending: INTERNAL MEDICINE
Payer: MEDICARE

## 2025-03-21 ENCOUNTER — HOSPITAL ENCOUNTER (OUTPATIENT)
Dept: INFUSION THERAPY | Age: 65
Setting detail: INFUSION SERIES
Discharge: HOME | End: 2025-03-21
Payer: MEDICARE

## 2025-03-21 ENCOUNTER — HOSPITAL ENCOUNTER (OUTPATIENT)
Dept: MRI IMAGING | Age: 65
Discharge: HOME | End: 2025-03-21
Attending: INTERNAL MEDICINE
Payer: MEDICARE

## 2025-03-21 DIAGNOSIS — C69.31 MALIGNANT NEOPLASM OF RIGHT CHOROID (HCC): ICD-10-CM

## 2025-03-21 DIAGNOSIS — C78.00 MALIGNANT NEOPLASM METASTATIC TO LUNG, UNSPECIFIED LATERALITY (HCC): ICD-10-CM

## 2025-03-21 DIAGNOSIS — E55.9 VITAMIN D DEFICIENCY: ICD-10-CM

## 2025-03-21 DIAGNOSIS — G25.81 RESTLESS LEG SYNDROME: ICD-10-CM

## 2025-03-21 DIAGNOSIS — E04.9 THYROID ENLARGEMENT: ICD-10-CM

## 2025-03-21 DIAGNOSIS — E03.9 ACQUIRED HYPOTHYROIDISM: ICD-10-CM

## 2025-03-21 DIAGNOSIS — C77.2 SECONDARY AND UNSPECIFIED MALIGNANT NEOPLASM OF INTRA-ABDOMINAL LYMPH NODES (HCC): ICD-10-CM

## 2025-03-21 DIAGNOSIS — E78.2 MIXED HYPERLIPIDEMIA: ICD-10-CM

## 2025-03-21 DIAGNOSIS — E10.21 DIABETIC NEPHROPATHY ASSOCIATED WITH TYPE 1 DIABETES MELLITUS: ICD-10-CM

## 2025-03-21 DIAGNOSIS — I10 PRIMARY HYPERTENSION: ICD-10-CM

## 2025-03-21 DIAGNOSIS — C43.9 MALIGNANT MELANOMA OF SKIN: ICD-10-CM

## 2025-03-21 LAB
25(OH)D3 SERPL-MCNC: 45.6 NG/ML
ALBUMIN SERPL-MCNC: 4 G/DL (ref 3.4–5)
ALBUMIN/GLOB SERPL: 1.5 {RATIO} (ref 1.1–2.2)
ALP SERPL-CCNC: 133 U/L (ref 40–129)
ALT SERPL-CCNC: 16 U/L (ref 10–40)
ANION GAP SERPL CALCULATED.3IONS-SCNC: 11 MMOL/L (ref 3–16)
ANTI-THYROGLOB ABS: <15 IU/ML
AST SERPL-CCNC: 25 U/L (ref 15–37)
BILIRUB SERPL-MCNC: <0.2 MG/DL (ref 0–1)
BUN SERPL-MCNC: 35 MG/DL (ref 7–20)
CALCIUM SERPL-MCNC: 9.4 MG/DL (ref 8.3–10.6)
CHLORIDE SERPL-SCNC: 108 MMOL/L (ref 99–110)
CHOLEST SERPL-MCNC: 224 MG/DL (ref 0–199)
CO2 SERPL-SCNC: 21 MMOL/L (ref 21–32)
CREAT SERPL-MCNC: 1.8 MG/DL (ref 0.6–1.2)
CREAT UR-MCNC: 211 MG/DL (ref 28–259)
DEPRECATED RDW RBC AUTO: 13.4 % (ref 12.4–15.4)
EST. AVERAGE GLUCOSE BLD GHB EST-MCNC: 151.3 MG/DL
FERRITIN SERPL IA-MCNC: 543 NG/ML (ref 15–150)
GFR SERPLBLD CREATININE-BSD FMLA CKD-EPI: 31 ML/MIN/{1.73_M2}
GLUCOSE SERPL-MCNC: 156 MG/DL (ref 70–99)
HBA1C MFR BLD: 6.9 %
HCT VFR BLD AUTO: 34 % (ref 36–48)
HDLC SERPL-MCNC: 97 MG/DL (ref 40–60)
HGB BLD-MCNC: 11.7 G/DL (ref 12–16)
LDLC SERPL CALC-MCNC: 95 MG/DL
MAGNESIUM SERPL-MCNC: 1.7 MG/DL (ref 1.8–2.4)
MCH RBC QN AUTO: 30.4 PG (ref 26–34)
MCHC RBC AUTO-ENTMCNC: 34.3 G/DL (ref 31–36)
MCV RBC AUTO: 88.5 FL (ref 80–100)
MICROALBUMIN UR DL<=1MG/L-MCNC: 24 MG/DL
MICROALBUMIN/CREAT UR: 113.7 MG/G (ref 0–30)
PERFORMED ON: ABNORMAL
PLATELET # BLD AUTO: 192 K/UL (ref 135–450)
PMV BLD AUTO: 8.3 FL (ref 5–10.5)
POC CREATININE: 1.8 MG/DL (ref 0.6–1.2)
POC SAMPLE TYPE: ABNORMAL
POTASSIUM SERPL-SCNC: 4.1 MMOL/L (ref 3.5–5.1)
PROT SERPL-MCNC: 6.6 G/DL (ref 6.4–8.2)
RBC # BLD AUTO: 3.85 M/UL (ref 4–5.2)
SODIUM SERPL-SCNC: 140 MMOL/L (ref 136–145)
T4 FREE SERPL-MCNC: 1.2 NG/DL (ref 0.9–1.8)
THYROPEROXIDASE AB SERPL IA-ACNC: 12 IU/ML
TRIGL SERPL-MCNC: 162 MG/DL (ref 0–150)
TSH SERPL DL<=0.005 MIU/L-ACNC: 4.65 UIU/ML (ref 0.27–4.2)
VIT B12 SERPL-MCNC: 322 PG/ML (ref 211–911)
VLDLC SERPL CALC-MCNC: 32 MG/DL
WBC # BLD AUTO: 6.2 K/UL (ref 4–11)

## 2025-03-21 PROCEDURE — 82607 VITAMIN B-12: CPT

## 2025-03-21 PROCEDURE — 83036 HEMOGLOBIN GLYCOSYLATED A1C: CPT

## 2025-03-21 PROCEDURE — 86376 MICROSOMAL ANTIBODY EACH: CPT

## 2025-03-21 PROCEDURE — A9579 GAD-BASE MR CONTRAST NOS,1ML: HCPCS | Performed by: INTERNAL MEDICINE

## 2025-03-21 PROCEDURE — 85027 COMPLETE CBC AUTOMATED: CPT

## 2025-03-21 PROCEDURE — 82043 UR ALBUMIN QUANTITATIVE: CPT

## 2025-03-21 PROCEDURE — 80061 LIPID PANEL: CPT

## 2025-03-21 PROCEDURE — 84439 ASSAY OF FREE THYROXINE: CPT

## 2025-03-21 PROCEDURE — 80053 COMPREHEN METABOLIC PANEL: CPT

## 2025-03-21 PROCEDURE — 84443 ASSAY THYROID STIM HORMONE: CPT

## 2025-03-21 PROCEDURE — 82570 ASSAY OF URINE CREATININE: CPT

## 2025-03-21 PROCEDURE — 82565 ASSAY OF CREATININE: CPT

## 2025-03-21 PROCEDURE — 82306 VITAMIN D 25 HYDROXY: CPT

## 2025-03-21 PROCEDURE — 70553 MRI BRAIN STEM W/O & W/DYE: CPT

## 2025-03-21 PROCEDURE — 36591 DRAW BLOOD OFF VENOUS DEVICE: CPT

## 2025-03-21 PROCEDURE — 82728 ASSAY OF FERRITIN: CPT

## 2025-03-21 PROCEDURE — 83735 ASSAY OF MAGNESIUM: CPT

## 2025-03-21 PROCEDURE — 86800 THYROGLOBULIN ANTIBODY: CPT

## 2025-03-21 PROCEDURE — 6360000004 HC RX CONTRAST MEDICATION: Performed by: INTERNAL MEDICINE

## 2025-03-21 RX ADMIN — GADOTERIDOL 10 ML: 279.3 INJECTION, SOLUTION INTRAVENOUS at 09:58

## 2025-03-21 NOTE — PROGRESS NOTES
Outpatient Infusion Center   [facility]    Port Access for Labs    NAME:  Elvia Arguelles  YOB: 1960  MEDICAL RECORD NUMBER:  [id number]  DATE:  3/21/2025    Patient arrived to Outpatient Infusion Center   [] per wheelchair   [x] ambulatory     Port Site Location:  right chest    Port Site:  Redness: No  Bruising: No   Edema: No  Pain: No     Port Site cleansed with:  Chloroprep Scrub for 30 seconds and air dried? Yes    Port Accessed with: 20 Gauge 1 inch Power Port needle using sterile technique.  Blood return obtained: Yes    Labs:  Blood wasted: 10 ml  Labs drawn using a Vacutainer/Syringe: Yes  Flushed with 30 ml Normal Saline using push-pause method.  Port flushes without resistance.    Port Deaccessed:  Yes    Response to treatment:  Well tolerated by patient.    Education:    Indicates understanding    Electronically signed by Kristen King RN on 3/21/2025 at 11:28 AM

## 2025-03-29 DIAGNOSIS — E10.319 POORLY CONTROLLED TYPE 1 DIABETES MELLITUS WITH RETINOPATHY (HCC): ICD-10-CM

## 2025-03-29 DIAGNOSIS — E10.65 POORLY CONTROLLED TYPE 1 DIABETES MELLITUS WITH NEUROPATHY (HCC): ICD-10-CM

## 2025-03-29 DIAGNOSIS — I10 PRIMARY HYPERTENSION: ICD-10-CM

## 2025-03-29 DIAGNOSIS — E10.21 DIABETIC NEPHROPATHY ASSOCIATED WITH TYPE 1 DIABETES MELLITUS (HCC): ICD-10-CM

## 2025-03-29 DIAGNOSIS — E10.65 POORLY CONTROLLED TYPE 1 DIABETES MELLITUS WITH RETINOPATHY (HCC): ICD-10-CM

## 2025-03-29 DIAGNOSIS — E78.2 MIXED HYPERLIPIDEMIA: ICD-10-CM

## 2025-03-29 DIAGNOSIS — E10.40 POORLY CONTROLLED TYPE 1 DIABETES MELLITUS WITH NEUROPATHY (HCC): ICD-10-CM

## 2025-03-29 DIAGNOSIS — Z83.49 FAMILY HISTORY OF THYROID DISEASE: ICD-10-CM

## 2025-03-29 DIAGNOSIS — E55.9 VITAMIN D DEFICIENCY: ICD-10-CM

## 2025-03-31 DIAGNOSIS — E10.40 POORLY CONTROLLED TYPE 1 DIABETES MELLITUS WITH NEUROPATHY (HCC): ICD-10-CM

## 2025-03-31 DIAGNOSIS — E78.2 MIXED HYPERLIPIDEMIA: ICD-10-CM

## 2025-03-31 DIAGNOSIS — I10 PRIMARY HYPERTENSION: ICD-10-CM

## 2025-03-31 DIAGNOSIS — Z83.49 FAMILY HISTORY OF THYROID DISEASE: ICD-10-CM

## 2025-03-31 DIAGNOSIS — E10.319 POORLY CONTROLLED TYPE 1 DIABETES MELLITUS WITH RETINOPATHY (HCC): ICD-10-CM

## 2025-03-31 DIAGNOSIS — E10.65 POORLY CONTROLLED TYPE 1 DIABETES MELLITUS WITH NEUROPATHY (HCC): ICD-10-CM

## 2025-03-31 DIAGNOSIS — E10.65 POORLY CONTROLLED TYPE 1 DIABETES MELLITUS WITH RETINOPATHY (HCC): ICD-10-CM

## 2025-03-31 DIAGNOSIS — E10.21 DIABETIC NEPHROPATHY ASSOCIATED WITH TYPE 1 DIABETES MELLITUS (HCC): ICD-10-CM

## 2025-03-31 DIAGNOSIS — E55.9 VITAMIN D DEFICIENCY: ICD-10-CM

## 2025-03-31 RX ORDER — ERGOCALCIFEROL 1.25 MG/1
50000 CAPSULE, LIQUID FILLED ORAL WEEKLY
Qty: 12 CAPSULE | Refills: 0 | OUTPATIENT
Start: 2025-03-31

## 2025-04-01 RX ORDER — ERGOCALCIFEROL 1.25 MG/1
50000 CAPSULE ORAL WEEKLY
Qty: 12 CAPSULE | Refills: 0 | OUTPATIENT
Start: 2025-04-01

## 2025-04-07 DIAGNOSIS — E10.65 POORLY CONTROLLED TYPE 1 DIABETES MELLITUS WITH NEUROPATHY (HCC): ICD-10-CM

## 2025-04-07 DIAGNOSIS — I10 PRIMARY HYPERTENSION: ICD-10-CM

## 2025-04-07 DIAGNOSIS — E55.9 VITAMIN D DEFICIENCY: ICD-10-CM

## 2025-04-07 DIAGNOSIS — E78.2 MIXED HYPERLIPIDEMIA: ICD-10-CM

## 2025-04-07 DIAGNOSIS — E10.21 DIABETIC NEPHROPATHY ASSOCIATED WITH TYPE 1 DIABETES MELLITUS (HCC): ICD-10-CM

## 2025-04-07 DIAGNOSIS — E10.40 POORLY CONTROLLED TYPE 1 DIABETES MELLITUS WITH NEUROPATHY (HCC): ICD-10-CM

## 2025-04-07 DIAGNOSIS — E10.319 POORLY CONTROLLED TYPE 1 DIABETES MELLITUS WITH RETINOPATHY (HCC): ICD-10-CM

## 2025-04-07 DIAGNOSIS — E10.65 POORLY CONTROLLED TYPE 1 DIABETES MELLITUS WITH RETINOPATHY (HCC): ICD-10-CM

## 2025-04-07 DIAGNOSIS — Z83.49 FAMILY HISTORY OF THYROID DISEASE: ICD-10-CM

## 2025-04-08 ENCOUNTER — TELEPHONE (OUTPATIENT)
Dept: ENDOCRINOLOGY | Age: 65
End: 2025-04-08

## 2025-04-08 DIAGNOSIS — E78.2 MIXED HYPERLIPIDEMIA: ICD-10-CM

## 2025-04-08 DIAGNOSIS — E55.9 VITAMIN D DEFICIENCY: ICD-10-CM

## 2025-04-08 DIAGNOSIS — E10.319 POORLY CONTROLLED TYPE 1 DIABETES MELLITUS WITH RETINOPATHY (HCC): ICD-10-CM

## 2025-04-08 DIAGNOSIS — E10.21 DIABETIC NEPHROPATHY ASSOCIATED WITH TYPE 1 DIABETES MELLITUS: ICD-10-CM

## 2025-04-08 DIAGNOSIS — Z83.49 FAMILY HISTORY OF THYROID DISEASE: ICD-10-CM

## 2025-04-08 DIAGNOSIS — I10 PRIMARY HYPERTENSION: ICD-10-CM

## 2025-04-08 DIAGNOSIS — E10.65 POORLY CONTROLLED TYPE 1 DIABETES MELLITUS WITH NEUROPATHY: ICD-10-CM

## 2025-04-08 DIAGNOSIS — E10.40 POORLY CONTROLLED TYPE 1 DIABETES MELLITUS WITH NEUROPATHY: ICD-10-CM

## 2025-04-08 DIAGNOSIS — E10.65 POORLY CONTROLLED TYPE 1 DIABETES MELLITUS WITH RETINOPATHY (HCC): ICD-10-CM

## 2025-04-08 RX ORDER — ERGOCALCIFEROL 1.25 MG/1
50000 CAPSULE ORAL WEEKLY
Qty: 12 CAPSULE | Refills: 0 | OUTPATIENT
Start: 2025-04-08

## 2025-04-08 RX ORDER — ERGOCALCIFEROL 1.25 MG/1
50000 CAPSULE, LIQUID FILLED ORAL WEEKLY
Qty: 12 CAPSULE | Refills: 0 | OUTPATIENT
Start: 2025-04-08

## 2025-04-11 NOTE — TELEPHONE ENCOUNTER
Requested Prescriptions     Refused Prescriptions Disp Refills    Vitamin D, Ergocalciferol, 12135 units CAPS 12 capsule 0     Sig: Take 50,000 Units by mouth once a week     Refused By: LETA GOLDSTEIN     Reason for Refusal: Patient needs an appointment     Patient called requesting a refill     Rx- Vitamin D, Ergocalciferol, 36077 units CAPS  lisinopril (PRINIVIL;ZESTRIL) 5 MG tablet    [facility]  NOV- 4/14/25    Please advise

## 2025-04-12 ENCOUNTER — APPOINTMENT (OUTPATIENT)
Dept: CT IMAGING | Age: 65
DRG: 638 | End: 2025-04-12
Payer: MEDICARE

## 2025-04-12 ENCOUNTER — HOSPITAL ENCOUNTER (INPATIENT)
Age: 65
LOS: 2 days | Discharge: HOME | DRG: 638 | End: 2025-04-14
Attending: STUDENT IN AN ORGANIZED HEALTH CARE EDUCATION/TRAINING PROGRAM | Admitting: HOSPITALIST
Payer: MEDICARE

## 2025-04-12 DIAGNOSIS — E10.10 DIABETIC KETOACIDOSIS WITHOUT COMA ASSOCIATED WITH TYPE 1 DIABETES MELLITUS: Primary | ICD-10-CM

## 2025-04-12 DIAGNOSIS — R79.89 PSEUDOHYPONATREMIA: ICD-10-CM

## 2025-04-12 DIAGNOSIS — K76.9 LIVER LESION: ICD-10-CM

## 2025-04-12 DIAGNOSIS — Z85.820 HISTORY OF MELANOMA: ICD-10-CM

## 2025-04-12 DIAGNOSIS — N17.9 AKI (ACUTE KIDNEY INJURY): ICD-10-CM

## 2025-04-12 LAB
ALBUMIN SERPL-MCNC: 4.1 G/DL (ref 3.4–5)
ALBUMIN/GLOB SERPL: 1.4 {RATIO} (ref 1.1–2.2)
ALP SERPL-CCNC: 162 U/L (ref 40–129)
ALT SERPL-CCNC: 13 U/L (ref 10–40)
ANION GAP SERPL CALCULATED.3IONS-SCNC: 13 MMOL/L (ref 3–16)
ANION GAP SERPL CALCULATED.3IONS-SCNC: 14 MMOL/L (ref 3–16)
ANION GAP SERPL CALCULATED.3IONS-SCNC: 19 MMOL/L (ref 3–16)
AST SERPL-CCNC: 16 U/L (ref 15–37)
BACTERIA URNS QL MICRO: NORMAL /HPF
BASE EXCESS BLDV CALC-SCNC: -8 MMOL/L
BASOPHILS # BLD: 0.1 K/UL (ref 0–0.2)
BASOPHILS NFR BLD: 0.8 %
BETA-HYDROXYBUTYRATE: 1.85 MMOL/L (ref 0–0.27)
BILIRUB SERPL-MCNC: 0.3 MG/DL (ref 0–1)
BILIRUB UR QL STRIP.AUTO: NEGATIVE
BUN SERPL-MCNC: 32 MG/DL (ref 7–20)
BUN SERPL-MCNC: 36 MG/DL (ref 7–20)
BUN SERPL-MCNC: 47 MG/DL (ref 7–20)
CALCIUM SERPL-MCNC: 9.1 MG/DL (ref 8.3–10.6)
CALCIUM SERPL-MCNC: 9.1 MG/DL (ref 8.3–10.6)
CALCIUM SERPL-MCNC: 9.7 MG/DL (ref 8.3–10.6)
CHLORIDE SERPL-SCNC: 104 MMOL/L (ref 99–110)
CHLORIDE SERPL-SCNC: 104 MMOL/L (ref 99–110)
CHLORIDE SERPL-SCNC: 93 MMOL/L (ref 99–110)
CLARITY UR: CLEAR
CO2 BLDV-SCNC: 22 MMOL/L
CO2 SERPL-SCNC: 17 MMOL/L (ref 21–32)
CO2 SERPL-SCNC: 21 MMOL/L (ref 21–32)
CO2 SERPL-SCNC: 21 MMOL/L (ref 21–32)
COHGB MFR BLDV: 1.9 %
COLOR UR: YELLOW
CREAT SERPL-MCNC: 1.7 MG/DL (ref 0.6–1.2)
CREAT SERPL-MCNC: 1.9 MG/DL (ref 0.6–1.2)
CREAT SERPL-MCNC: 2.7 MG/DL (ref 0.6–1.2)
DEPRECATED RDW RBC AUTO: 13 % (ref 12.4–15.4)
EOSINOPHIL # BLD: 0.6 K/UL (ref 0–0.6)
EOSINOPHIL NFR BLD: 6.2 %
EPI CELLS #/AREA URNS AUTO: 3 /HPF (ref 0–5)
EST. AVERAGE GLUCOSE BLD GHB EST-MCNC: 165.7 MG/DL
GFR SERPLBLD CREATININE-BSD FMLA CKD-EPI: 19 ML/MIN/{1.73_M2}
GFR SERPLBLD CREATININE-BSD FMLA CKD-EPI: 29 ML/MIN/{1.73_M2}
GFR SERPLBLD CREATININE-BSD FMLA CKD-EPI: 33 ML/MIN/{1.73_M2}
GLUCOSE BLD-MCNC: 140 MG/DL (ref 70–99)
GLUCOSE BLD-MCNC: 144 MG/DL (ref 70–99)
GLUCOSE BLD-MCNC: 157 MG/DL (ref 70–99)
GLUCOSE BLD-MCNC: 163 MG/DL (ref 70–99)
GLUCOSE BLD-MCNC: 183 MG/DL (ref 70–99)
GLUCOSE BLD-MCNC: 183 MG/DL (ref 70–99)
GLUCOSE BLD-MCNC: 198 MG/DL (ref 70–99)
GLUCOSE BLD-MCNC: 212 MG/DL (ref 70–99)
GLUCOSE BLD-MCNC: 215 MG/DL (ref 70–99)
GLUCOSE BLD-MCNC: 222 MG/DL (ref 70–99)
GLUCOSE BLD-MCNC: 260 MG/DL (ref 70–99)
GLUCOSE BLD-MCNC: 407 MG/DL (ref 70–99)
GLUCOSE BLD-MCNC: 576 MG/DL (ref 70–99)
GLUCOSE BLD-MCNC: >600 MG/DL (ref 70–99)
GLUCOSE SERPL-MCNC: 147 MG/DL (ref 70–99)
GLUCOSE SERPL-MCNC: 216 MG/DL (ref 70–99)
GLUCOSE SERPL-MCNC: 671 MG/DL (ref 70–99)
GLUCOSE UR STRIP.AUTO-MCNC: >=1000 MG/DL
HBA1C MFR BLD: 7.4 %
HCO3 BLDV-SCNC: 20 MMOL/L (ref 23–29)
HCT VFR BLD AUTO: 34 % (ref 36–48)
HGB BLD-MCNC: 11.6 G/DL (ref 12–16)
HGB UR QL STRIP.AUTO: NEGATIVE
HYALINE CASTS #/AREA URNS AUTO: 0 /LPF (ref 0–8)
KETONES UR STRIP.AUTO-MCNC: NEGATIVE MG/DL
LEUKOCYTE ESTERASE UR QL STRIP.AUTO: NEGATIVE
LIPASE SERPL-CCNC: 27 U/L (ref 13–60)
LYMPHOCYTES # BLD: 1.9 K/UL (ref 1–5.1)
LYMPHOCYTES NFR BLD: 20 %
MAGNESIUM SERPL-MCNC: 1.66 MG/DL (ref 1.8–2.4)
MAGNESIUM SERPL-MCNC: 2.02 MG/DL (ref 1.8–2.4)
MAGNESIUM SERPL-MCNC: 2.53 MG/DL (ref 1.8–2.4)
MCH RBC QN AUTO: 30.5 PG (ref 26–34)
MCHC RBC AUTO-ENTMCNC: 34.2 G/DL (ref 31–36)
MCV RBC AUTO: 89 FL (ref 80–100)
METHGB MFR BLDV: 0.7 %
MONOCYTES # BLD: 0.6 K/UL (ref 0–1.3)
MONOCYTES NFR BLD: 6 %
NEUTROPHILS # BLD: 6.3 K/UL (ref 1.7–7.7)
NEUTROPHILS NFR BLD: 67 %
NITRITE UR QL STRIP.AUTO: NEGATIVE
O2 THERAPY: ABNORMAL
PCO2 BLDV: 51.7 MMHG (ref 40–50)
PERFORMED ON: ABNORMAL
PH BLDV: 7.2 [PH] (ref 7.35–7.45)
PH UR STRIP.AUTO: 6 [PH] (ref 5–8)
PHOSPHATE SERPL-MCNC: 2.2 MG/DL (ref 2.5–4.9)
PHOSPHATE SERPL-MCNC: 2.9 MG/DL (ref 2.5–4.9)
PHOSPHATE SERPL-MCNC: 3.7 MG/DL (ref 2.5–4.9)
PLATELET # BLD AUTO: 202 K/UL (ref 135–450)
PMV BLD AUTO: 8.3 FL (ref 5–10.5)
PO2 BLDV: 34 MMHG
POTASSIUM SERPL-SCNC: 2.9 MMOL/L (ref 3.5–5.1)
POTASSIUM SERPL-SCNC: 3.7 MMOL/L (ref 3.5–5.1)
POTASSIUM SERPL-SCNC: 3.8 MMOL/L (ref 3.5–5.1)
PROT SERPL-MCNC: 7.1 G/DL (ref 6.4–8.2)
PROT UR STRIP.AUTO-MCNC: ABNORMAL MG/DL
RBC # BLD AUTO: 3.82 M/UL (ref 4–5.2)
RBC CLUMPS #/AREA URNS AUTO: 0 /HPF (ref 0–4)
REASON FOR REJECTION: NORMAL
REJECTED TEST: NORMAL
SAO2 % BLDV: 62 %
SODIUM SERPL-SCNC: 129 MMOL/L (ref 136–145)
SODIUM SERPL-SCNC: 138 MMOL/L (ref 136–145)
SODIUM SERPL-SCNC: 139 MMOL/L (ref 136–145)
SP GR UR STRIP.AUTO: 1.01 (ref 1–1.03)
UA COMPLETE W REFLEX CULTURE PNL UR: ABNORMAL
UA DIPSTICK W REFLEX MICRO PNL UR: YES
URN SPEC COLLECT METH UR: ABNORMAL
UROBILINOGEN UR STRIP-ACNC: 0.2 E.U./DL
WBC # BLD AUTO: 9.4 K/UL (ref 4–11)
WBC #/AREA URNS AUTO: 0 /HPF (ref 0–5)

## 2025-04-12 PROCEDURE — 36591 DRAW BLOOD OFF VENOUS DEVICE: CPT

## 2025-04-12 PROCEDURE — 82803 BLOOD GASES ANY COMBINATION: CPT

## 2025-04-12 PROCEDURE — 6360000002 HC RX W HCPCS: Performed by: NURSE PRACTITIONER

## 2025-04-12 PROCEDURE — 83036 HEMOGLOBIN GLYCOSYLATED A1C: CPT

## 2025-04-12 PROCEDURE — 96361 HYDRATE IV INFUSION ADD-ON: CPT

## 2025-04-12 PROCEDURE — 74176 CT ABD & PELVIS W/O CONTRAST: CPT

## 2025-04-12 PROCEDURE — 84100 ASSAY OF PHOSPHORUS: CPT

## 2025-04-12 PROCEDURE — 6370000000 HC RX 637 (ALT 250 FOR IP): Performed by: STUDENT IN AN ORGANIZED HEALTH CARE EDUCATION/TRAINING PROGRAM

## 2025-04-12 PROCEDURE — 80053 COMPREHEN METABOLIC PANEL: CPT

## 2025-04-12 PROCEDURE — 83735 ASSAY OF MAGNESIUM: CPT

## 2025-04-12 PROCEDURE — 85025 COMPLETE CBC W/AUTO DIFF WBC: CPT

## 2025-04-12 PROCEDURE — 2500000003 HC RX 250 WO HCPCS: Performed by: STUDENT IN AN ORGANIZED HEALTH CARE EDUCATION/TRAINING PROGRAM

## 2025-04-12 PROCEDURE — 2580000003 HC RX 258: Performed by: HOSPITALIST

## 2025-04-12 PROCEDURE — 6360000002 HC RX W HCPCS: Performed by: STUDENT IN AN ORGANIZED HEALTH CARE EDUCATION/TRAINING PROGRAM

## 2025-04-12 PROCEDURE — 2000000000 HC ICU R&B

## 2025-04-12 PROCEDURE — 82010 KETONE BODYS QUAN: CPT

## 2025-04-12 PROCEDURE — 99285 EMERGENCY DEPT VISIT HI MDM: CPT

## 2025-04-12 PROCEDURE — 2580000003 HC RX 258: Performed by: STUDENT IN AN ORGANIZED HEALTH CARE EDUCATION/TRAINING PROGRAM

## 2025-04-12 PROCEDURE — 96375 TX/PRO/DX INJ NEW DRUG ADDON: CPT

## 2025-04-12 PROCEDURE — 96374 THER/PROPH/DIAG INJ IV PUSH: CPT

## 2025-04-12 PROCEDURE — 6360000002 HC RX W HCPCS: Performed by: HOSPITALIST

## 2025-04-12 PROCEDURE — 81001 URINALYSIS AUTO W/SCOPE: CPT

## 2025-04-12 PROCEDURE — 6370000000 HC RX 637 (ALT 250 FOR IP): Performed by: HOSPITALIST

## 2025-04-12 PROCEDURE — 93005 ELECTROCARDIOGRAM TRACING: CPT | Performed by: STUDENT IN AN ORGANIZED HEALTH CARE EDUCATION/TRAINING PROGRAM

## 2025-04-12 PROCEDURE — 83690 ASSAY OF LIPASE: CPT

## 2025-04-12 RX ORDER — HEPARIN SODIUM 5000 [USP'U]/ML
5000 INJECTION, SOLUTION INTRAVENOUS; SUBCUTANEOUS EVERY 8 HOURS SCHEDULED
Status: DISCONTINUED | OUTPATIENT
Start: 2025-04-12 | End: 2025-04-14 | Stop reason: HOSPADM

## 2025-04-12 RX ORDER — SODIUM CHLORIDE 9 MG/ML
INJECTION, SOLUTION INTRAVENOUS CONTINUOUS
Status: DISCONTINUED | OUTPATIENT
Start: 2025-04-12 | End: 2025-04-12

## 2025-04-12 RX ORDER — HYDROCORTISONE 5 MG/1
5 TABLET ORAL EVERY EVENING
Status: DISCONTINUED | OUTPATIENT
Start: 2025-04-12 | End: 2025-04-14 | Stop reason: HOSPADM

## 2025-04-12 RX ORDER — POLYETHYLENE GLYCOL 3350 17 G/17G
17 POWDER, FOR SOLUTION ORAL DAILY PRN
Status: DISCONTINUED | OUTPATIENT
Start: 2025-04-12 | End: 2025-04-14 | Stop reason: HOSPADM

## 2025-04-12 RX ORDER — POTASSIUM CHLORIDE 7.45 MG/ML
10 INJECTION INTRAVENOUS PRN
Status: DISCONTINUED | OUTPATIENT
Start: 2025-04-12 | End: 2025-04-13

## 2025-04-12 RX ORDER — NIFEDIPINE 30 MG/1
30 TABLET, EXTENDED RELEASE ORAL 2 TIMES DAILY
Status: DISCONTINUED | OUTPATIENT
Start: 2025-04-12 | End: 2025-04-14 | Stop reason: HOSPADM

## 2025-04-12 RX ORDER — MAGNESIUM SULFATE IN WATER 40 MG/ML
2000 INJECTION, SOLUTION INTRAVENOUS PRN
Status: DISCONTINUED | OUTPATIENT
Start: 2025-04-12 | End: 2025-04-13

## 2025-04-12 RX ORDER — ROPINIROLE 1 MG/1
1 TABLET, FILM COATED ORAL NIGHTLY
Status: DISCONTINUED | OUTPATIENT
Start: 2025-04-12 | End: 2025-04-14 | Stop reason: HOSPADM

## 2025-04-12 RX ORDER — IOPAMIDOL 755 MG/ML
75 INJECTION, SOLUTION INTRAVASCULAR
Status: DISCONTINUED | OUTPATIENT
Start: 2025-04-12 | End: 2025-04-14 | Stop reason: HOSPADM

## 2025-04-12 RX ORDER — ONDANSETRON 2 MG/ML
4 INJECTION INTRAMUSCULAR; INTRAVENOUS ONCE
Status: COMPLETED | OUTPATIENT
Start: 2025-04-12 | End: 2025-04-12

## 2025-04-12 RX ORDER — HYDROCORTISONE 10 MG/1
10 TABLET ORAL DAILY
Status: DISCONTINUED | OUTPATIENT
Start: 2025-04-12 | End: 2025-04-14 | Stop reason: HOSPADM

## 2025-04-12 RX ORDER — HYDRALAZINE HYDROCHLORIDE 20 MG/ML
10 INJECTION INTRAMUSCULAR; INTRAVENOUS EVERY 6 HOURS PRN
Status: DISCONTINUED | OUTPATIENT
Start: 2025-04-12 | End: 2025-04-14 | Stop reason: HOSPADM

## 2025-04-12 RX ORDER — MORPHINE SULFATE 2 MG/ML
2 INJECTION, SOLUTION INTRAMUSCULAR; INTRAVENOUS ONCE
Refills: 0 | Status: COMPLETED | OUTPATIENT
Start: 2025-04-12 | End: 2025-04-12

## 2025-04-12 RX ORDER — ENOXAPARIN SODIUM 100 MG/ML
30 INJECTION SUBCUTANEOUS DAILY
Status: DISCONTINUED | OUTPATIENT
Start: 2025-04-12 | End: 2025-04-12

## 2025-04-12 RX ORDER — OXYCODONE HYDROCHLORIDE 5 MG/1
5 TABLET ORAL EVERY 6 HOURS PRN
Refills: 0 | Status: DISCONTINUED | OUTPATIENT
Start: 2025-04-12 | End: 2025-04-13

## 2025-04-12 RX ORDER — PROCHLORPERAZINE EDISYLATE 5 MG/ML
10 INJECTION INTRAMUSCULAR; INTRAVENOUS ONCE
Status: COMPLETED | OUTPATIENT
Start: 2025-04-12 | End: 2025-04-12

## 2025-04-12 RX ORDER — ESCITALOPRAM OXALATE 10 MG/1
20 TABLET ORAL DAILY
Status: DISCONTINUED | OUTPATIENT
Start: 2025-04-12 | End: 2025-04-14 | Stop reason: HOSPADM

## 2025-04-12 RX ORDER — PANTOPRAZOLE SODIUM 40 MG/1
40 TABLET, DELAYED RELEASE ORAL
Status: DISCONTINUED | OUTPATIENT
Start: 2025-04-12 | End: 2025-04-14 | Stop reason: HOSPADM

## 2025-04-12 RX ORDER — 0.9 % SODIUM CHLORIDE 0.9 %
15 INTRAVENOUS SOLUTION INTRAVENOUS ONCE
Status: COMPLETED | OUTPATIENT
Start: 2025-04-12 | End: 2025-04-12

## 2025-04-12 RX ORDER — POTASSIUM CHLORIDE 7.45 MG/ML
10 INJECTION INTRAVENOUS PRN
Status: DISCONTINUED | OUTPATIENT
Start: 2025-04-12 | End: 2025-04-12

## 2025-04-12 RX ORDER — DEXTROSE MONOHYDRATE AND SODIUM CHLORIDE 5; .45 G/100ML; G/100ML
INJECTION, SOLUTION INTRAVENOUS CONTINUOUS PRN
Status: DISCONTINUED | OUTPATIENT
Start: 2025-04-12 | End: 2025-04-14 | Stop reason: HOSPADM

## 2025-04-12 RX ORDER — ONDANSETRON 2 MG/ML
4 INJECTION INTRAMUSCULAR; INTRAVENOUS EVERY 6 HOURS PRN
Status: DISCONTINUED | OUTPATIENT
Start: 2025-04-12 | End: 2025-04-14 | Stop reason: HOSPADM

## 2025-04-12 RX ORDER — DEXTROSE MONOHYDRATE AND SODIUM CHLORIDE 5; .45 G/100ML; G/100ML
INJECTION, SOLUTION INTRAVENOUS CONTINUOUS PRN
Status: DISCONTINUED | OUTPATIENT
Start: 2025-04-12 | End: 2025-04-12

## 2025-04-12 RX ORDER — LEVETIRACETAM 500 MG/1
500 TABLET ORAL 2 TIMES DAILY
Status: DISCONTINUED | OUTPATIENT
Start: 2025-04-12 | End: 2025-04-14 | Stop reason: HOSPADM

## 2025-04-12 RX ORDER — SODIUM CHLORIDE 9 MG/ML
INJECTION, SOLUTION INTRAVENOUS CONTINUOUS
Status: DISCONTINUED | OUTPATIENT
Start: 2025-04-12 | End: 2025-04-14 | Stop reason: HOSPADM

## 2025-04-12 RX ORDER — METHOCARBAMOL 750 MG/1
750 TABLET, FILM COATED ORAL 4 TIMES DAILY PRN
Status: DISCONTINUED | OUTPATIENT
Start: 2025-04-12 | End: 2025-04-14 | Stop reason: HOSPADM

## 2025-04-12 RX ORDER — 0.9 % SODIUM CHLORIDE 0.9 %
1000 INTRAVENOUS SOLUTION INTRAVENOUS ONCE
Status: COMPLETED | OUTPATIENT
Start: 2025-04-12 | End: 2025-04-12

## 2025-04-12 RX ORDER — HYDROXYZINE PAMOATE 25 MG/1
25 CAPSULE ORAL 3 TIMES DAILY PRN
Status: DISCONTINUED | OUTPATIENT
Start: 2025-04-12 | End: 2025-04-14 | Stop reason: HOSPADM

## 2025-04-12 RX ADMIN — POTASSIUM CHLORIDE 10 MEQ: 7.46 INJECTION, SOLUTION INTRAVENOUS at 16:45

## 2025-04-12 RX ADMIN — ESCITALOPRAM OXALATE 20 MG: 10 TABLET ORAL at 13:35

## 2025-04-12 RX ADMIN — DEXTROSE AND SODIUM CHLORIDE: 5; .45 INJECTION, SOLUTION INTRAVENOUS at 14:11

## 2025-04-12 RX ADMIN — POTASSIUM CHLORIDE 10 MEQ: 7.46 INJECTION, SOLUTION INTRAVENOUS at 19:59

## 2025-04-12 RX ADMIN — MORPHINE SULFATE 2 MG: 2 INJECTION, SOLUTION INTRAMUSCULAR; INTRAVENOUS at 09:25

## 2025-04-12 RX ADMIN — OXYCODONE 5 MG: 5 TABLET ORAL at 22:45

## 2025-04-12 RX ADMIN — ROPINIROLE HYDROCHLORIDE 1 MG: 1 TABLET, FILM COATED ORAL at 22:46

## 2025-04-12 RX ADMIN — SODIUM CHLORIDE: 0.9 INJECTION, SOLUTION INTRAVENOUS at 12:57

## 2025-04-12 RX ADMIN — POTASSIUM CHLORIDE 10 MEQ: 7.46 INJECTION, SOLUTION INTRAVENOUS at 22:45

## 2025-04-12 RX ADMIN — LEVETIRACETAM 500 MG: 500 TABLET, FILM COATED ORAL at 13:35

## 2025-04-12 RX ADMIN — DEXTROSE AND SODIUM CHLORIDE: 5; .45 INJECTION, SOLUTION INTRAVENOUS at 21:27

## 2025-04-12 RX ADMIN — INSULIN HUMAN 10.2 UNITS/HR: 1 INJECTION, SOLUTION INTRAVENOUS at 10:34

## 2025-04-12 RX ADMIN — HYDRALAZINE HYDROCHLORIDE 10 MG: 20 INJECTION INTRAMUSCULAR; INTRAVENOUS at 17:07

## 2025-04-12 RX ADMIN — POTASSIUM CHLORIDE 10 MEQ: 7.46 INJECTION, SOLUTION INTRAVENOUS at 18:57

## 2025-04-12 RX ADMIN — METHOCARBAMOL TABLETS 750 MG: 750 TABLET, COATED ORAL at 23:04

## 2025-04-12 RX ADMIN — PROCHLORPERAZINE EDISYLATE 10 MG: 5 INJECTION INTRAMUSCULAR; INTRAVENOUS at 21:04

## 2025-04-12 RX ADMIN — PANTOPRAZOLE SODIUM 40 MG: 40 TABLET, DELAYED RELEASE ORAL at 16:45

## 2025-04-12 RX ADMIN — HYDROCORTISONE 5 MG: 5 TABLET ORAL at 22:46

## 2025-04-12 RX ADMIN — SODIUM CHLORIDE 816 ML: 0.9 INJECTION, SOLUTION INTRAVENOUS at 11:50

## 2025-04-12 RX ADMIN — SODIUM PHOSPHATE, MONOBASIC, MONOHYDRATE AND SODIUM PHOSPHATE, DIBASIC, ANHYDROUS 15 MMOL: 142; 276 INJECTION, SOLUTION INTRAVENOUS at 17:29

## 2025-04-12 RX ADMIN — MAGNESIUM SULFATE HEPTAHYDRATE 2000 MG: 40 INJECTION, SOLUTION INTRAVENOUS at 11:55

## 2025-04-12 RX ADMIN — POTASSIUM CHLORIDE 10 MEQ: 7.46 INJECTION, SOLUTION INTRAVENOUS at 17:50

## 2025-04-12 RX ADMIN — POTASSIUM CHLORIDE 10 MEQ: 7.46 INJECTION, SOLUTION INTRAVENOUS at 23:46

## 2025-04-12 RX ADMIN — LEVETIRACETAM 500 MG: 500 TABLET, FILM COATED ORAL at 22:46

## 2025-04-12 RX ADMIN — NIFEDIPINE 30 MG: 30 TABLET, FILM COATED, EXTENDED RELEASE ORAL at 13:35

## 2025-04-12 RX ADMIN — SODIUM CHLORIDE 1000 ML: 0.9 INJECTION, SOLUTION INTRAVENOUS at 09:25

## 2025-04-12 RX ADMIN — HYDROCORTISONE 10 MG: 10 TABLET ORAL at 15:23

## 2025-04-12 RX ADMIN — ONDANSETRON 4 MG: 2 INJECTION, SOLUTION INTRAMUSCULAR; INTRAVENOUS at 13:31

## 2025-04-12 RX ADMIN — ONDANSETRON 4 MG: 2 INJECTION, SOLUTION INTRAMUSCULAR; INTRAVENOUS at 09:23

## 2025-04-12 RX ADMIN — NIFEDIPINE 30 MG: 30 TABLET, FILM COATED, EXTENDED RELEASE ORAL at 22:46

## 2025-04-12 RX ADMIN — HEPARIN SODIUM 5000 UNITS: 5000 INJECTION INTRAVENOUS; SUBCUTANEOUS at 22:47

## 2025-04-12 RX ADMIN — ONDANSETRON 4 MG: 2 INJECTION, SOLUTION INTRAMUSCULAR; INTRAVENOUS at 19:50

## 2025-04-12 RX ADMIN — HEPARIN SODIUM 5000 UNITS: 5000 INJECTION INTRAVENOUS; SUBCUTANEOUS at 13:36

## 2025-04-12 ASSESSMENT — PAIN SCALES - GENERAL
PAINLEVEL_OUTOF10: 0
PAINLEVEL_OUTOF10: 10
PAINLEVEL_OUTOF10: 7

## 2025-04-12 ASSESSMENT — PAIN - FUNCTIONAL ASSESSMENT
PAIN_FUNCTIONAL_ASSESSMENT: 0-10
PAIN_FUNCTIONAL_ASSESSMENT: PREVENTS OR INTERFERES SOME ACTIVE ACTIVITIES AND ADLS

## 2025-04-12 ASSESSMENT — PAIN DESCRIPTION - ORIENTATION: ORIENTATION: LEFT;RIGHT

## 2025-04-12 ASSESSMENT — PAIN DESCRIPTION - DESCRIPTORS: DESCRIPTORS: TINGLING

## 2025-04-12 ASSESSMENT — PAIN DESCRIPTION - LOCATION: LOCATION: LEG

## 2025-04-12 NOTE — H&P
V2.0  History and Physical      Name:  Elvia Arguelles /Age/Sex: 1960  (64 y.o. female)   MRN & CSN:  [id number] & [id number] Encounter Date/Time: 2025 12:20 PM EDT   Location:  [id number] PCP: Meg Ellis, CABRERA - CNP       Hospital Day: 1  History from:   patient, family member - daughter  History of Present Illness:   Chief Complaint: Nausea and vomiting    Elvia Arguelles is a 64 y.o. female with a past medical history significant for adrenal insufficiency, metastatic melanoma, diabetes mellitus type 1, hypertension, has continuous glucose monitoring and on an insulin pump, states that she has been having nausea and vomiting going on for the past 3 to 4 days, patient received her first immunotherapy approximately week ago.  Patient does change her sensor, may not have been functioning well but she was doing Sticks, noted her sugars to be elevated, her pump has been working according to them, however she has not been able to eat or drink, she has chronic diarrhea, in the ED, patient was noted to be in DKA, sodium was low, creatinine was elevated, magnesium was low, blood sugar was 671, serum ketones were positive, serum pH was noted to be 7.2,Patient had a CT scan of the abdomen and pelvis, that did not reveal any acute process, EKG is negative, patient received IV fluids, was started on insulin drip and thereafter the medicine service was called admit the patient for further management.  Patient was seen and evaluated in the ICU, patient's daughter was at the bedside     Review of Systems:    Pertinent positives and negatives discussed in HPI   Objective:   No intake or output data in the 24 hours ending 25 1220   Vitals:   Vitals:    25 1100 25 1133 25 1135 25 1200   BP: (!) 129/57 (!) 140/78  119/61   Pulse: 68 70 70 69   Resp: 21 19 21 21   Temp:  97.4 °F (36.3 °C)     TempSrc:  Axillary     SpO2: 97% 93%  99%   Weight:       Height:

## 2025-04-12 NOTE — ED PROVIDER NOTES
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    History of melanoma Z85.820    Hypoglycemia E16.2    Metastatic melanoma to pancreas (HCC) C78.89    Lactic acidosis E87.20    Hypercholesterolemia E78.00    Elevated brain natriuretic peptide (BNP) level R79.89    Microcytosis R71.8    Adrenal insufficiency E27.40    Type 1 diabetes mellitus with ketoacidosis without coma ([facility]) E10.10    Hyperkalemia E87.5    Septic shock ([facility]) A41.9, R65.21    Metabolic encephalopathy G93.41    Metastatic melanoma (HCC) C43.9    Elevated procalcitonin R79.89    Lactic acid acidosis E87.20    Neutrophilia D72.828    Acute kidney injury superimposed on CKD N17.9, N18.9    Diabetic ketoacidosis without coma associated with type 1 diabetes mellitus E10.10    Poorly controlled type 1 diabetes mellitus with neuropathy E10.40, E10.65    Mixed hyperlipidemia E78.2    Poorly controlled type 1 diabetes mellitus with retinopathy ([facility]) E10.319, E10.65    Primary hypertension I10    Stage 3 chronic kidney disease (HCC) N18.30    DKA, type 1, not at goal ([facility]) E10.10    Intractable nausea and vomiting R11.2    Closed displaced intertrochanteric fracture of right femur S72.141A    Closed right hip fracture, initial encounter ([facility]) S72.001A    Demand ischemia ([facility]) I24.89    PRES (posterior reversible encephalopathy syndrome) I67.83    Melanoma ([facility]) C43.9    Unspecified convulsions R56.9    Other seizures G40.89

## 2025-04-13 LAB
ANION GAP SERPL CALCULATED.3IONS-SCNC: 10 MMOL/L (ref 3–16)
ANION GAP SERPL CALCULATED.3IONS-SCNC: 11 MMOL/L (ref 3–16)
BASOPHILS # BLD: 0 K/UL (ref 0–0.2)
BASOPHILS NFR BLD: 0.6 %
BUN SERPL-MCNC: 26 MG/DL (ref 7–20)
BUN SERPL-MCNC: 29 MG/DL (ref 7–20)
CALCIUM SERPL-MCNC: 8.3 MG/DL (ref 8.3–10.6)
CALCIUM SERPL-MCNC: 8.6 MG/DL (ref 8.3–10.6)
CHLORIDE SERPL-SCNC: 106 MMOL/L (ref 99–110)
CHLORIDE SERPL-SCNC: 106 MMOL/L (ref 99–110)
CO2 SERPL-SCNC: 18 MMOL/L (ref 21–32)
CO2 SERPL-SCNC: 19 MMOL/L (ref 21–32)
CREAT SERPL-MCNC: 1.7 MG/DL (ref 0.6–1.2)
CREAT SERPL-MCNC: 1.7 MG/DL (ref 0.6–1.2)
DEPRECATED RDW RBC AUTO: 13 % (ref 12.4–15.4)
EKG ATRIAL RATE: 72 BPM
EKG DIAGNOSIS: NORMAL
EKG P AXIS: 54 DEGREES
EKG P-R INTERVAL: 128 MS
EKG Q-T INTERVAL: 420 MS
EKG QRS DURATION: 70 MS
EKG QTC CALCULATION (BAZETT): 459 MS
EKG R AXIS: -3 DEGREES
EKG T AXIS: 22 DEGREES
EKG VENTRICULAR RATE: 72 BPM
EOSINOPHIL # BLD: 0.5 K/UL (ref 0–0.6)
EOSINOPHIL NFR BLD: 5.8 %
EST. AVERAGE GLUCOSE BLD GHB EST-MCNC: 180 MG/DL
GFR SERPLBLD CREATININE-BSD FMLA CKD-EPI: 33 ML/MIN/{1.73_M2}
GFR SERPLBLD CREATININE-BSD FMLA CKD-EPI: 33 ML/MIN/{1.73_M2}
GLUCOSE BLD-MCNC: 128 MG/DL (ref 70–99)
GLUCOSE BLD-MCNC: 132 MG/DL (ref 70–99)
GLUCOSE BLD-MCNC: 150 MG/DL (ref 70–99)
GLUCOSE BLD-MCNC: 168 MG/DL (ref 70–99)
GLUCOSE BLD-MCNC: 172 MG/DL (ref 70–99)
GLUCOSE BLD-MCNC: 182 MG/DL (ref 70–99)
GLUCOSE BLD-MCNC: 183 MG/DL (ref 70–99)
GLUCOSE BLD-MCNC: 186 MG/DL (ref 70–99)
GLUCOSE BLD-MCNC: 206 MG/DL (ref 70–99)
GLUCOSE BLD-MCNC: 207 MG/DL (ref 70–99)
GLUCOSE BLD-MCNC: 292 MG/DL (ref 70–99)
GLUCOSE SERPL-MCNC: 178 MG/DL (ref 70–99)
GLUCOSE SERPL-MCNC: 180 MG/DL (ref 70–99)
HBA1C MFR BLD: 7.9 %
HCT VFR BLD AUTO: 27.8 % (ref 36–48)
HGB BLD-MCNC: 9.8 G/DL (ref 12–16)
LYMPHOCYTES # BLD: 1.7 K/UL (ref 1–5.1)
LYMPHOCYTES NFR BLD: 19.7 %
MAGNESIUM SERPL-MCNC: 1.62 MG/DL (ref 1.8–2.4)
MAGNESIUM SERPL-MCNC: 1.68 MG/DL (ref 1.8–2.4)
MCH RBC QN AUTO: 30.6 PG (ref 26–34)
MCHC RBC AUTO-ENTMCNC: 35.3 G/DL (ref 31–36)
MCV RBC AUTO: 86.6 FL (ref 80–100)
MONOCYTES # BLD: 0.5 K/UL (ref 0–1.3)
MONOCYTES NFR BLD: 5.7 %
NEUTROPHILS # BLD: 5.9 K/UL (ref 1.7–7.7)
NEUTROPHILS NFR BLD: 68.2 %
PERFORMED ON: ABNORMAL
PHOSPHATE SERPL-MCNC: 2.3 MG/DL (ref 2.5–4.9)
PHOSPHATE SERPL-MCNC: 3.4 MG/DL (ref 2.5–4.9)
PLATELET # BLD AUTO: 190 K/UL (ref 135–450)
PMV BLD AUTO: 8.2 FL (ref 5–10.5)
POTASSIUM SERPL-SCNC: 3.9 MMOL/L (ref 3.5–5.1)
POTASSIUM SERPL-SCNC: 4.5 MMOL/L (ref 3.5–5.1)
RBC # BLD AUTO: 3.21 M/UL (ref 4–5.2)
SODIUM SERPL-SCNC: 135 MMOL/L (ref 136–145)
SODIUM SERPL-SCNC: 135 MMOL/L (ref 136–145)
WBC # BLD AUTO: 8.7 K/UL (ref 4–11)

## 2025-04-13 PROCEDURE — 6370000000 HC RX 637 (ALT 250 FOR IP): Performed by: HOSPITALIST

## 2025-04-13 PROCEDURE — 6360000002 HC RX W HCPCS: Performed by: STUDENT IN AN ORGANIZED HEALTH CARE EDUCATION/TRAINING PROGRAM

## 2025-04-13 PROCEDURE — 93010 ELECTROCARDIOGRAM REPORT: CPT | Performed by: INTERNAL MEDICINE

## 2025-04-13 PROCEDURE — 1200000000 HC SEMI PRIVATE

## 2025-04-13 PROCEDURE — 6360000002 HC RX W HCPCS: Performed by: HOSPITALIST

## 2025-04-13 PROCEDURE — 83735 ASSAY OF MAGNESIUM: CPT

## 2025-04-13 PROCEDURE — 80048 BASIC METABOLIC PNL TOTAL CA: CPT

## 2025-04-13 PROCEDURE — 2500000003 HC RX 250 WO HCPCS: Performed by: STUDENT IN AN ORGANIZED HEALTH CARE EDUCATION/TRAINING PROGRAM

## 2025-04-13 PROCEDURE — 2580000003 HC RX 258: Performed by: STUDENT IN AN ORGANIZED HEALTH CARE EDUCATION/TRAINING PROGRAM

## 2025-04-13 PROCEDURE — 36592 COLLECT BLOOD FROM PICC: CPT

## 2025-04-13 PROCEDURE — 85025 COMPLETE CBC W/AUTO DIFF WBC: CPT

## 2025-04-13 PROCEDURE — 94760 N-INVAS EAR/PLS OXIMETRY 1: CPT

## 2025-04-13 PROCEDURE — 2580000003 HC RX 258: Performed by: NURSE PRACTITIONER

## 2025-04-13 PROCEDURE — 2580000003 HC RX 258: Performed by: HOSPITALIST

## 2025-04-13 PROCEDURE — 84100 ASSAY OF PHOSPHORUS: CPT

## 2025-04-13 RX ORDER — INSULIN GLARGINE 100 [IU]/ML
0.25 INJECTION, SOLUTION SUBCUTANEOUS DAILY
Status: DISCONTINUED | OUTPATIENT
Start: 2025-04-13 | End: 2025-04-14 | Stop reason: HOSPADM

## 2025-04-13 RX ORDER — OXYCODONE HYDROCHLORIDE 5 MG/1
5 TABLET ORAL EVERY 4 HOURS PRN
Refills: 0 | Status: DISCONTINUED | OUTPATIENT
Start: 2025-04-13 | End: 2025-04-14 | Stop reason: HOSPADM

## 2025-04-13 RX ORDER — INSULIN GLARGINE 100 [IU]/ML
10 INJECTION, SOLUTION SUBCUTANEOUS ONCE
Status: DISCONTINUED | OUTPATIENT
Start: 2025-04-13 | End: 2025-04-13

## 2025-04-13 RX ORDER — 0.9 % SODIUM CHLORIDE 0.9 %
500 INTRAVENOUS SOLUTION INTRAVENOUS ONCE
Status: COMPLETED | OUTPATIENT
Start: 2025-04-13 | End: 2025-04-13

## 2025-04-13 RX ORDER — GLUCAGON 1 MG/ML
1 KIT INJECTION PRN
Status: DISCONTINUED | OUTPATIENT
Start: 2025-04-13 | End: 2025-04-14 | Stop reason: HOSPADM

## 2025-04-13 RX ORDER — DEXTROSE MONOHYDRATE 100 MG/ML
INJECTION, SOLUTION INTRAVENOUS CONTINUOUS PRN
Status: DISCONTINUED | OUTPATIENT
Start: 2025-04-13 | End: 2025-04-14 | Stop reason: HOSPADM

## 2025-04-13 RX ORDER — INSULIN LISPRO 100 [IU]/ML
0-8 INJECTION, SOLUTION INTRAVENOUS; SUBCUTANEOUS
Status: DISCONTINUED | OUTPATIENT
Start: 2025-04-13 | End: 2025-04-14 | Stop reason: HOSPADM

## 2025-04-13 RX ADMIN — ESCITALOPRAM OXALATE 20 MG: 10 TABLET ORAL at 08:06

## 2025-04-13 RX ADMIN — INSULIN LISPRO 2 UNITS: 100 INJECTION, SOLUTION INTRAVENOUS; SUBCUTANEOUS at 18:49

## 2025-04-13 RX ADMIN — OXYCODONE 5 MG: 5 TABLET ORAL at 18:25

## 2025-04-13 RX ADMIN — SODIUM CHLORIDE 500 ML: 9 INJECTION, SOLUTION INTRAVENOUS at 00:14

## 2025-04-13 RX ADMIN — POTASSIUM CHLORIDE 10 MEQ: 7.46 INJECTION, SOLUTION INTRAVENOUS at 00:49

## 2025-04-13 RX ADMIN — OXYCODONE 5 MG: 5 TABLET ORAL at 22:28

## 2025-04-13 RX ADMIN — POTASSIUM CHLORIDE 10 MEQ: 7.46 INJECTION, SOLUTION INTRAVENOUS at 03:35

## 2025-04-13 RX ADMIN — OXYCODONE 5 MG: 5 TABLET ORAL at 05:24

## 2025-04-13 RX ADMIN — HYDROCORTISONE 5 MG: 5 TABLET ORAL at 18:11

## 2025-04-13 RX ADMIN — HEPARIN SODIUM 5000 UNITS: 5000 INJECTION INTRAVENOUS; SUBCUTANEOUS at 05:25

## 2025-04-13 RX ADMIN — LEVETIRACETAM 500 MG: 500 TABLET, FILM COATED ORAL at 21:00

## 2025-04-13 RX ADMIN — PANTOPRAZOLE SODIUM 40 MG: 40 TABLET, DELAYED RELEASE ORAL at 16:01

## 2025-04-13 RX ADMIN — POTASSIUM CHLORIDE 10 MEQ: 7.46 INJECTION, SOLUTION INTRAVENOUS at 08:59

## 2025-04-13 RX ADMIN — HYDROCORTISONE 10 MG: 10 TABLET ORAL at 08:08

## 2025-04-13 RX ADMIN — INSULIN GLARGINE 15 UNITS: 100 INJECTION, SOLUTION SUBCUTANEOUS at 08:05

## 2025-04-13 RX ADMIN — POTASSIUM CHLORIDE 10 MEQ: 7.46 INJECTION, SOLUTION INTRAVENOUS at 06:47

## 2025-04-13 RX ADMIN — PANTOPRAZOLE SODIUM 40 MG: 40 TABLET, DELAYED RELEASE ORAL at 05:25

## 2025-04-13 RX ADMIN — LEVETIRACETAM 500 MG: 500 TABLET, FILM COATED ORAL at 08:06

## 2025-04-13 RX ADMIN — NIFEDIPINE 30 MG: 30 TABLET, FILM COATED, EXTENDED RELEASE ORAL at 09:00

## 2025-04-13 RX ADMIN — POTASSIUM CHLORIDE 10 MEQ: 7.46 INJECTION, SOLUTION INTRAVENOUS at 04:57

## 2025-04-13 RX ADMIN — HEPARIN SODIUM 5000 UNITS: 5000 INJECTION INTRAVENOUS; SUBCUTANEOUS at 14:19

## 2025-04-13 RX ADMIN — ROPINIROLE HYDROCHLORIDE 1 MG: 1 TABLET, FILM COATED ORAL at 21:00

## 2025-04-13 RX ADMIN — SODIUM PHOSPHATE, MONOBASIC, MONOHYDRATE AND SODIUM PHOSPHATE, DIBASIC, ANHYDROUS 15 MMOL: 142; 276 INJECTION, SOLUTION INTRAVENOUS at 03:40

## 2025-04-13 RX ADMIN — MAGNESIUM SULFATE HEPTAHYDRATE 2000 MG: 40 INJECTION, SOLUTION INTRAVENOUS at 08:57

## 2025-04-13 RX ADMIN — OXYCODONE 5 MG: 5 TABLET ORAL at 09:59

## 2025-04-13 RX ADMIN — POTASSIUM CHLORIDE 10 MEQ: 7.46 INJECTION, SOLUTION INTRAVENOUS at 02:31

## 2025-04-13 RX ADMIN — INSULIN LISPRO 4 UNITS: 100 INJECTION, SOLUTION INTRAVENOUS; SUBCUTANEOUS at 13:08

## 2025-04-13 RX ADMIN — INSULIN LISPRO 2 UNITS: 100 INJECTION, SOLUTION INTRAVENOUS; SUBCUTANEOUS at 20:14

## 2025-04-13 RX ADMIN — HEPARIN SODIUM 5000 UNITS: 5000 INJECTION INTRAVENOUS; SUBCUTANEOUS at 21:58

## 2025-04-13 RX ADMIN — OXYCODONE 5 MG: 5 TABLET ORAL at 14:22

## 2025-04-13 RX ADMIN — NIFEDIPINE 30 MG: 30 TABLET, FILM COATED, EXTENDED RELEASE ORAL at 20:13

## 2025-04-13 RX ADMIN — DEXTROSE AND SODIUM CHLORIDE: 5; .45 INJECTION, SOLUTION INTRAVENOUS at 04:56

## 2025-04-13 ASSESSMENT — PAIN DESCRIPTION - FREQUENCY
FREQUENCY: CONTINUOUS

## 2025-04-13 ASSESSMENT — PAIN - FUNCTIONAL ASSESSMENT

## 2025-04-13 ASSESSMENT — PAIN DESCRIPTION - DESCRIPTORS
DESCRIPTORS: ACHING
DESCRIPTORS: TINGLING
DESCRIPTORS: ACHING
DESCRIPTORS: TINGLING
DESCRIPTORS: ACHING;DISCOMFORT
DESCRIPTORS: ACHING
DESCRIPTORS: ACHING

## 2025-04-13 ASSESSMENT — PAIN DESCRIPTION - ORIENTATION
ORIENTATION: RIGHT;LEFT
ORIENTATION: LEFT;RIGHT
ORIENTATION: LEFT;RIGHT
ORIENTATION: RIGHT;LEFT

## 2025-04-13 ASSESSMENT — PAIN DESCRIPTION - LOCATION
LOCATION: LEG

## 2025-04-13 ASSESSMENT — PAIN DESCRIPTION - ONSET
ONSET: ON-GOING

## 2025-04-13 ASSESSMENT — PAIN SCALES - GENERAL
PAINLEVEL_OUTOF10: 3
PAINLEVEL_OUTOF10: 10
PAINLEVEL_OUTOF10: 1
PAINLEVEL_OUTOF10: 8
PAINLEVEL_OUTOF10: 6
PAINLEVEL_OUTOF10: 8
PAINLEVEL_OUTOF10: 3
PAINLEVEL_OUTOF10: 10
PAINLEVEL_OUTOF10: 1
PAINLEVEL_OUTOF10: 3
PAINLEVEL_OUTOF10: 7
PAINLEVEL_OUTOF10: 7
PAINLEVEL_OUTOF10: 0

## 2025-04-13 ASSESSMENT — PAIN DESCRIPTION - PAIN TYPE
TYPE: CHRONIC PAIN
TYPE: ACUTE PAIN
TYPE: NEUROPATHIC PAIN
TYPE: CHRONIC PAIN

## 2025-04-14 VITALS
RESPIRATION RATE: 22 BRPM | TEMPERATURE: 98 F | BODY MASS INDEX: 23.91 KG/M2 | WEIGHT: 118.61 LBS | DIASTOLIC BLOOD PRESSURE: 58 MMHG | HEART RATE: 78 BPM | OXYGEN SATURATION: 97 % | HEIGHT: 59 IN | SYSTOLIC BLOOD PRESSURE: 105 MMHG

## 2025-04-14 LAB
ANION GAP SERPL CALCULATED.3IONS-SCNC: 10 MMOL/L (ref 3–16)
BASOPHILS # BLD: 0.1 K/UL (ref 0–0.2)
BASOPHILS NFR BLD: 0.9 %
BUN SERPL-MCNC: 17 MG/DL (ref 7–20)
CALCIUM SERPL-MCNC: 9 MG/DL (ref 8.3–10.6)
CHLORIDE SERPL-SCNC: 108 MMOL/L (ref 99–110)
CO2 SERPL-SCNC: 20 MMOL/L (ref 21–32)
CREAT SERPL-MCNC: 1.6 MG/DL (ref 0.6–1.2)
DEPRECATED RDW RBC AUTO: 13.1 % (ref 12.4–15.4)
EOSINOPHIL # BLD: 0.5 K/UL (ref 0–0.6)
EOSINOPHIL NFR BLD: 9.4 %
GFR SERPLBLD CREATININE-BSD FMLA CKD-EPI: 35 ML/MIN/{1.73_M2}
GLUCOSE BLD-MCNC: 141 MG/DL (ref 70–99)
GLUCOSE BLD-MCNC: 218 MG/DL (ref 70–99)
GLUCOSE SERPL-MCNC: 118 MG/DL (ref 70–99)
HCT VFR BLD AUTO: 26.9 % (ref 36–48)
HGB BLD-MCNC: 9.7 G/DL (ref 12–16)
LYMPHOCYTES # BLD: 1.5 K/UL (ref 1–5.1)
LYMPHOCYTES NFR BLD: 26.8 %
MAGNESIUM SERPL-MCNC: 1.49 MG/DL (ref 1.8–2.4)
MCH RBC QN AUTO: 31.4 PG (ref 26–34)
MCHC RBC AUTO-ENTMCNC: 36 G/DL (ref 31–36)
MCV RBC AUTO: 87.2 FL (ref 80–100)
MONOCYTES # BLD: 0.4 K/UL (ref 0–1.3)
MONOCYTES NFR BLD: 6.6 %
NEUTROPHILS # BLD: 3.3 K/UL (ref 1.7–7.7)
NEUTROPHILS NFR BLD: 56.3 %
PERFORMED ON: ABNORMAL
PERFORMED ON: ABNORMAL
PHOSPHATE SERPL-MCNC: 3.9 MG/DL (ref 2.5–4.9)
PLATELET # BLD AUTO: 171 K/UL (ref 135–450)
PMV BLD AUTO: 8.1 FL (ref 5–10.5)
POTASSIUM SERPL-SCNC: 4.1 MMOL/L (ref 3.5–5.1)
RBC # BLD AUTO: 3.08 M/UL (ref 4–5.2)
SODIUM SERPL-SCNC: 138 MMOL/L (ref 136–145)
WBC # BLD AUTO: 5.8 K/UL (ref 4–11)

## 2025-04-14 PROCEDURE — 6370000000 HC RX 637 (ALT 250 FOR IP): Performed by: HOSPITALIST

## 2025-04-14 PROCEDURE — 85025 COMPLETE CBC W/AUTO DIFF WBC: CPT

## 2025-04-14 PROCEDURE — 6360000002 HC RX W HCPCS: Performed by: HOSPITALIST

## 2025-04-14 PROCEDURE — 80048 BASIC METABOLIC PNL TOTAL CA: CPT

## 2025-04-14 PROCEDURE — 94760 N-INVAS EAR/PLS OXIMETRY 1: CPT

## 2025-04-14 PROCEDURE — 36591 DRAW BLOOD OFF VENOUS DEVICE: CPT

## 2025-04-14 PROCEDURE — 84100 ASSAY OF PHOSPHORUS: CPT

## 2025-04-14 PROCEDURE — 6370000000 HC RX 637 (ALT 250 FOR IP): Performed by: NURSE PRACTITIONER

## 2025-04-14 PROCEDURE — 83735 ASSAY OF MAGNESIUM: CPT

## 2025-04-14 RX ORDER — ACETAMINOPHEN 325 MG/1
650 TABLET ORAL EVERY 4 HOURS PRN
Status: DISCONTINUED | OUTPATIENT
Start: 2025-04-14 | End: 2025-04-14 | Stop reason: HOSPADM

## 2025-04-14 RX ORDER — ERGOCALCIFEROL 1.25 MG/1
50000 CAPSULE ORAL WEEKLY
Qty: 12 CAPSULE | Refills: 0 | Status: SHIPPED | OUTPATIENT
Start: 2025-04-14 | End: 2025-04-18

## 2025-04-14 RX ORDER — MAGNESIUM SULFATE IN WATER 40 MG/ML
2000 INJECTION, SOLUTION INTRAVENOUS ONCE
Status: COMPLETED | OUTPATIENT
Start: 2025-04-14 | End: 2025-04-14

## 2025-04-14 RX ADMIN — HYDROCORTISONE 10 MG: 10 TABLET ORAL at 08:40

## 2025-04-14 RX ADMIN — HEPARIN SODIUM 5000 UNITS: 5000 INJECTION INTRAVENOUS; SUBCUTANEOUS at 06:03

## 2025-04-14 RX ADMIN — INSULIN GLARGINE 15 UNITS: 100 INJECTION, SOLUTION SUBCUTANEOUS at 08:43

## 2025-04-14 RX ADMIN — NIFEDIPINE 30 MG: 30 TABLET, FILM COATED, EXTENDED RELEASE ORAL at 08:40

## 2025-04-14 RX ADMIN — MAGNESIUM SULFATE HEPTAHYDRATE 2000 MG: 40 INJECTION, SOLUTION INTRAVENOUS at 06:14

## 2025-04-14 RX ADMIN — ESCITALOPRAM OXALATE 20 MG: 10 TABLET ORAL at 08:40

## 2025-04-14 RX ADMIN — ONDANSETRON 4 MG: 2 INJECTION, SOLUTION INTRAMUSCULAR; INTRAVENOUS at 00:13

## 2025-04-14 RX ADMIN — LEVETIRACETAM 500 MG: 500 TABLET, FILM COATED ORAL at 08:40

## 2025-04-14 RX ADMIN — INSULIN LISPRO 2 UNITS: 100 INJECTION, SOLUTION INTRAVENOUS; SUBCUTANEOUS at 11:55

## 2025-04-14 RX ADMIN — OXYCODONE 5 MG: 5 TABLET ORAL at 04:40

## 2025-04-14 RX ADMIN — PANTOPRAZOLE SODIUM 40 MG: 40 TABLET, DELAYED RELEASE ORAL at 06:03

## 2025-04-14 RX ADMIN — ACETAMINOPHEN 650 MG: 325 TABLET ORAL at 00:10

## 2025-04-14 RX ADMIN — OXYCODONE 5 MG: 5 TABLET ORAL at 08:42

## 2025-04-14 ASSESSMENT — PAIN DESCRIPTION - ONSET
ONSET: GRADUAL
ONSET: ON-GOING

## 2025-04-14 ASSESSMENT — PAIN DESCRIPTION - DESCRIPTORS
DESCRIPTORS: ACHING;DISCOMFORT
DESCRIPTORS: ACHING;DISCOMFORT

## 2025-04-14 ASSESSMENT — PAIN DESCRIPTION - FREQUENCY: FREQUENCY: CONTINUOUS

## 2025-04-14 ASSESSMENT — PAIN SCALES - GENERAL
PAINLEVEL_OUTOF10: 2
PAINLEVEL_OUTOF10: 6
PAINLEVEL_OUTOF10: 0
PAINLEVEL_OUTOF10: 0
PAINLEVEL_OUTOF10: 7
PAINLEVEL_OUTOF10: 6
PAINLEVEL_OUTOF10: 0

## 2025-04-14 ASSESSMENT — PAIN DESCRIPTION - ORIENTATION
ORIENTATION: RIGHT;LEFT
ORIENTATION: LEFT;RIGHT

## 2025-04-14 ASSESSMENT — PAIN DESCRIPTION - LOCATION
LOCATION: LEG
LOCATION: HEAD
LOCATION: LEG

## 2025-04-14 ASSESSMENT — PAIN DESCRIPTION - PAIN TYPE
TYPE: ACUTE PAIN
TYPE: CHRONIC PAIN

## 2025-04-14 NOTE — CARE COORDINATION
/24    Family can provide assistance at DC: Yes ()  Would you like Case Management to discuss the discharge plan with any other family members/significant others, and if so, who? Yes ()  Plans to Return to Present Housing: Yes  Other Identified Issues/Barriers to RETURNING to current housing: none  Potential Assistance needed at discharge: N/A            Potential DME:    Patient expects to discharge to: House  Plan for transportation at discharge:      Financial    Payor: MEDICARE / Plan: MEDICARE PART A AND B / Product Type: *No Product type* /     Does insurance require precert for SNF: No    Potential assistance Purchasing Medications: No  Meds-to-Beds request:        [id number] - [facility] [address] - P [phone] - F [phone]  [address] [facility] [address]  Phone: [phone] Fax: [phone]    [facility] [address] - [facility] [phone] - F [phone]  [address] [place] [address]  Phone: [phone] Fax: [phone]    [facility] [id number] [facility] [phone] - F [phone]  [address] [place] [address]  Phone: [phone] Fax: [phone]      Notes:    Factors facilitating achievement of predicted outcomes: Family support, Motivated, Cooperative, and Pleasant    Barriers to discharge: Medication managment    Additional Case Management Notes: Patient to discharge home with her , denied any discharge needs, independent with ADLs, not a .    The Plan for Transition of Care is related to the following treatment goals of History of melanoma [Z85.820]  Liver lesion [K76.9]  FAVIAN (acute kidney injury) [N17.9]  DKA, type 1, not at goal (HCC) [E10.10]  Diabetic ketoacidosis without coma associated with type 1 diabetes mellitus [E10.10]  Pseudohyponatremia [R79.89]    IF APPLICABLE: The Patient and/or patient representative

## 2025-04-14 NOTE — TELEPHONE ENCOUNTER
Medication:   Requested Prescriptions     Pending Prescriptions Disp Refills    ferrous sulfate 324 (65 Fe) MG EC tablet [Pharmacy Med Name: FERROUS SULFATE 324MG EC TABS RED] 90 tablet 0     Sig: TAKE 1 TABLET BY MOUTH DAILY WITH BREAKFAST        Last Filled:  1/14/25    Patient Phone Number: [phone] (home)     Last appt: 2/13/2025   Next appt: Visit date not found    Last OARRS:        No data to display

## 2025-04-14 NOTE — DISCHARGE SUMMARY
V2.0  Discharge Summary    Name:  Elvia Arguelles /Age/Sex: 1960 (64 y.o. female)   Admit Date: 2025  Discharge Date: 25    MRN & CSN:  [id number] & [id number] Encounter Date and Time 25 12:16 PM EDT    Attending:  Ayaka Quijano MD Discharging Provider: Ayaka Quijano MD     Discharge Diagnosis:     # DKA  # Metastatic melanoma  # Pulmonary nodule  # Pancreatic mass  # Restless leg syndrome  # Adrenal insufficiency  # Acute kidney injury  # Hypertension  # Pseudohyponatremia    Consultants:  IP CONSULT TO DIABETES EDUCATOR    Brief HPI:    Per admitting H and P...\"  Elvia Arguelles is a 64 y.o. female with a past medical history significant for adrenal insufficiency, metastatic melanoma, diabetes mellitus type 1, hypertension, has continuous glucose monitoring and on an insulin pump, states that she has been having nausea and vomiting going on for the past 3 to 4 days, patient received her first immunotherapy approximately week ago.  Patient does change her sensor, may not have been functioning well but she was doing Sticks, noted her sugars to be elevated, her pump has been working according to them, however she has not been able to eat or drink, she has chronic diarrhea, in the ED, patient was noted to be in DKA, sodium was low, creatinine was elevated, magnesium was low, blood sugar was 671, serum ketones were positive, serum pH was noted to be 7.2,Patient had a CT scan of the abdomen and pelvis, that did not reveal any acute process, EKG is negative, patient received IV fluids, was started on insulin drip and thereafter the medicine service was called admit the patient for further management.  Patient was seen and evaluated in the ICU, patient's daughter was at the bedside \"      Brief hospital course:     Please refer to the admitting H and P for details surrounding the initial presentation.     Patient with known metastatic melanoma, also with diabetes, has insulin pump in place,

## 2025-04-14 NOTE — PLAN OF CARE
Problem: Chronic Conditions and Co-morbidities  Goal: Patient's chronic conditions and co-morbidity symptoms are monitored and maintained or improved  4/13/2025 1033 by River Pinzon RN  Outcome: Progressing     Problem: Discharge Planning  Goal: Discharge to home or other facility with appropriate resources  4/13/2025 1033 by River Pinzon RN  Outcome: Progressing     Problem: Skin/Tissue Integrity  Goal: Skin integrity remains intact  Description: 1.  Monitor for areas of redness and/or skin breakdown2.  Assess vascular access sites hourly3.  Every 4-6 hours minimum:  Change oxygen saturation probe site4.  Every 4-6 hours:  If on nasal continuous positive airway pressure, respiratory therapy assess nares and determine need for appliance change or resting period  4/13/2025 1033 by River Pinzon RN  Outcome: Progressing     Problem: Safety - Adult  Goal: Free from fall injury  4/13/2025 1033 by River Pinzon RN  Outcome: Progressing     Problem: ABCDS Injury Assessment  Goal: Absence of physical injury  4/13/2025 1033 by River Piznon RN  Outcome: Progressing     Problem: Genitourinary - Adult  Goal: Absence of urinary retention  4/13/2025 1033 by River Pinzon RN  Outcome: Progressing     Problem: Metabolic/Fluid and Electrolytes - Adult  Goal: Electrolytes maintained within normal limits  4/13/2025 1033 by River Pinzon RN  Outcome: Progressing     Problem: Metabolic/Fluid and Electrolytes - Adult  Goal: Hemodynamic stability and optimal renal function maintained  4/13/2025 1033 by River Pinzon RN  Outcome: Progressing     Problem: Metabolic/Fluid and Electrolytes - Adult  Goal: Glucose maintained within prescribed range  4/13/2025 1033 by River Pinzon RN  Outcome: Progressing     Problem: Hematologic - Adult  Goal: Maintains hematologic stability  4/13/2025 1033 by River Pinzon RN  Outcome: Progressing     Problem: Pain  Goal: 
  Problem: Chronic Conditions and Co-morbidities  Goal: Patient's chronic conditions and co-morbidity symptoms are monitored and maintained or improved  4/14/2025 0920 by Lianne Arreguin RN  Outcome: Progressing  Flowsheets (Taken 4/14/2025 0730)  Care Plan - Patient's Chronic Conditions and Co-Morbidity Symptoms are Monitored and Maintained or Improved: Monitor and assess patient's chronic conditions and comorbid symptoms for stability, deterioration, or improvement     Problem: Discharge Planning  Goal: Discharge to home or other facility with appropriate resources  4/14/2025 0920 by Lianne Arreguin RN  Outcome: Progressing  Flowsheets (Taken 4/14/2025 0730)  Discharge to home or other facility with appropriate resources:   Identify barriers to discharge with patient and caregiver   Arrange for needed discharge resources and transportation as appropriate   Identify discharge learning needs (meds, wound care, etc)     Problem: Skin/Tissue Integrity  Goal: Skin integrity remains intact  Description: 1.  Monitor for areas of redness and/or skin breakdown2.  Assess vascular access sites hourly3.  Every 4-6 hours minimum:  Change oxygen saturation probe site4.  Every 4-6 hours:  If on nasal continuous positive airway pressure, respiratory therapy assess nares and determine need for appliance change or resting period  4/14/2025 0920 by Lianne Arreguin RN  Outcome: Progressing     Problem: Safety - Adult  Goal: Free from fall injury  4/14/2025 0920 by Lianne Arreguin RN  Outcome: Progressing     Problem: ABCDS Injury Assessment  Goal: Absence of physical injury  4/14/2025 0920 by Lianne Arreguin RN  Outcome: Progressing     Problem: Genitourinary - Adult  Goal: Absence of urinary retention  4/14/2025 0920 by Lianne Arreguin RN  Outcome: Progressing     Problem: Metabolic/Fluid and Electrolytes - Adult  Goal: Electrolytes maintained within normal limits  4/14/2025 0920 by Rodríguez 
  Problem: Chronic Conditions and Co-morbidities  Goal: Patient's chronic conditions and co-morbidity symptoms are monitored and maintained or improved  Outcome: Progressing     Problem: Discharge Planning  Goal: Discharge to home or other facility with appropriate resources  Outcome: Progressing     Problem: Skin/Tissue Integrity  Goal: Skin integrity remains intact  Description: 1.  Monitor for areas of redness and/or skin breakdown2.  Assess vascular access sites hourly3.  Every 4-6 hours minimum:  Change oxygen saturation probe site4.  Every 4-6 hours:  If on nasal continuous positive airway pressure, respiratory therapy assess nares and determine need for appliance change or resting period  Outcome: Progressing  Flowsheets (Taken 4/12/2025 1353)  Skin Integrity Remains Intact: Monitor for areas of redness and/or skin breakdown     Problem: Safety - Adult  Goal: Free from fall injury  Outcome: Progressing  Flowsheets (Taken 4/12/2025 1353)  Free From Fall Injury:   Instruct family/caregiver on patient safety   Based on caregiver fall risk screen, instruct family/caregiver to ask for assistance with transferring infant if caregiver noted to have fall risk factors     Problem: ABCDS Injury Assessment  Goal: Absence of physical injury  Outcome: Progressing     Problem: Genitourinary - Adult  Goal: Absence of urinary retention  Outcome: Progressing     Problem: Metabolic/Fluid and Electrolytes - Adult  Goal: Electrolytes maintained within normal limits  Outcome: Progressing  Flowsheets (Taken 4/12/2025 1353)  Electrolytes maintained within normal limits:   Monitor labs and assess patient for signs and symptoms of electrolyte imbalances   Administer electrolyte replacement as ordered   Monitor response to electrolyte replacements, including repeat lab results as appropriate     Problem: Metabolic/Fluid and Electrolytes - Adult  Goal: Hemodynamic stability and optimal renal function maintained  Outcome: 
  Problem: Skin/Tissue Integrity  Goal: Skin integrity remains intact  Description: 1.  Monitor for areas of redness and/or skin breakdown2.  Assess vascular access sites hourly3.  Every 4-6 hours minimum:  Change oxygen saturation probe site4.  Every 4-6 hours:  If on nasal continuous positive airway pressure, respiratory therapy assess nares and determine need for appliance change or resting period  4/13/2025 2100 by Tiff Sloan RN  Outcome: Progressing  Flowsheets (Taken 4/13/2025 2000)  Skin Integrity Remains Intact:   Monitor for areas of redness and/or skin breakdown   Every 4-6 hours minimum:  Change oxygen saturation probe site   Turn and reposition as indicated   Check visual cues for pain   Monitor skin under medical devices     Problem: Safety - Adult  Goal: Free from fall injury  4/13/2025 2100 by Tiff Sloan RN  Outcome: Progressing  Flowsheets (Taken 4/13/2025 2000)  Free From Fall Injury: Instruct family/caregiver on patient safety     Problem: ABCDS Injury Assessment  Goal: Absence of physical injury  4/13/2025 2100 by Tiff Sloan RN  Outcome: Progressing  Flowsheets (Taken 4/13/2025 2000)  Absence of Physical Injury: Implement safety measures based on patient assessment     Problem: Genitourinary - Adult  Goal: Absence of urinary retention  4/13/2025 2100 by Tiff Sloan RN  Outcome: Progressing  Flowsheets (Taken 4/13/2025 2000)  Absence of urinary retention:   Assess patient’s ability to void and empty bladder   Monitor intake/output and perform bladder scan as needed     Problem: Metabolic/Fluid and Electrolytes - Adult  Goal: Electrolytes maintained within normal limits  4/13/2025 2100 by Tiff Sloan RN  Outcome: Progressing  Flowsheets (Taken 4/13/2025 2000)  Electrolytes maintained within normal limits:   Monitor labs and assess patient for signs and symptoms of electrolyte imbalances   Administer electrolyte replacement as ordered   Monitor response to electrolyte 
2000)  Hemodynamic stability and optimal renal function maintained: Monitor labs and assess for signs and symptoms of volume excess or deficit  4/12/2025 1353 by River Pinzon, RN  Outcome: Progressing  Flowsheets (Taken 4/12/2025 1353)  Hemodynamic stability and optimal renal function maintained:   Monitor labs and assess for signs and symptoms of volume excess or deficit   Monitor intake, output and patient weight   Monitor urine specific gravity, serum osmolarity and serum sodium as indicated or ordered  Goal: Glucose maintained within prescribed range  4/13/2025 0303 by Nancy Blanco RN  Outcome: Progressing  Flowsheets (Taken 4/12/2025 2000)  Glucose maintained within prescribed range: Monitor blood glucose as ordered  4/12/2025 1353 by River Pinzon, RN  Outcome: Progressing  Flowsheets (Taken 4/12/2025 1353)  Glucose maintained within prescribed range:   Monitor blood glucose as ordered   Assess for signs and symptoms of hyperglycemia and hypoglycemia   Administer ordered medications to maintain glucose within target range     Problem: Hematologic - Adult  Goal: Maintains hematologic stability  4/13/2025 0303 by Nancy Blanco RN  Outcome: Progressing  Flowsheets (Taken 4/12/2025 2000)  Maintains hematologic stability: Assess for signs and symptoms of bleeding or hemorrhage  4/12/2025 1353 by River Pinzon, RN  Outcome: Progressing     Problem: Pain  Goal: Verbalizes/displays adequate comfort level or baseline comfort level  Outcome: Progressing     Problem: Neurosensory - Adult  Goal: Achieves stable or improved neurological status  Outcome: Progressing     Problem: Skin/Tissue Integrity - Adult  Goal: Skin integrity remains intact  Description: 1.  Monitor for areas of redness and/or skin breakdown2.  Assess vascular access sites hourly3.  Every 4-6 hours minimum:  Change oxygen saturation probe site4.  Every 4-6 hours:  If on nasal continuous positive airway pressure,

## 2025-04-14 NOTE — PROGRESS NOTES
V2.0  [facility] Hospitalist Progress Note      Name:  Elvia Arguelles /Age/Sex: 1960  (64 y.o. female)   MRN & CSN:  [id number] & [id number] Encounter Date/Time: 2025 10:54 AM EDT    Location:  [id number] PCP: Meg Ellis, APRN - CNP       Hospital Day: 2  Subjective:   Chief Complaint: Follow-up DKA    Patient seen and evaluated at bedside, she is feeling and looking much better today, no overnight issues, DKA is resolved    Review of Systems:    Review of Systems    10 point ROS negative except as stated above in \"subjective\" section    Objective:     Intake/Output Summary (Last 24 hours) at 2025 1054  Last data filed at 2025 0954  Gross per 24 hour   Intake 8401.11 ml   Output 1500 ml   Net 6901.11 ml      Vitals:   Vitals:    25 1000   BP: 116/60   Pulse: 89   Resp: 19   Temp:    SpO2: 100%     Physical Exam:   General: Awake, alert and oriented, NAD  Cardiovascular: S1S2 present, regular rate and rhythm, no murmurs  Respiratory: Clear to auscultation  Gastrointestinal: Soft, non tender, positive bowel sounds   Genitourinary: no suprapubic tenderness  Musculoskeletal: No edema    Medications:     Medications:    insulin glargine  0.25 Units/kg SubCUTAneous Daily    insulin lispro  0-8 Units SubCUTAneous 4x Daily WC    heparin (porcine)  5,000 Units SubCUTAneous 3 times per day    escitalopram  20 mg Oral Daily    hydrocortisone  10 mg Oral Daily    hydrocortisone  5 mg Oral QPM    levETIRAcetam  500 mg Oral BID    NIFEdipine  30 mg Oral BID    pantoprazole  40 mg Oral BID AC    rOPINIRole  1 mg Oral Nightly      Infusions:    dextrose      sodium chloride Stopped (25 1412)    dextrose 5 % and 0.45 % NaCl Stopped (25)    insulin Stopped (25)     PRN Meds: glucose, 4 tablet, PRN  dextrose bolus, 125 mL, PRN   Or  dextrose bolus, 250 mL, PRN  glucagon (rDNA), 1 mg, PRN  dextrose, , Continuous PRN  oxyCODONE, 5 mg, Q4H PRN  magnesium 
4 Eyes Skin Assessment     NAME:  Elvia Agruelles  YOB: 1960  MEDICAL RECORD NUMBER:  [id number]    The patient is being assessed for  Admission    I agree that at least one RN has performed a thorough Head to Toe Skin Assessment on the patient. ALL assessment sites listed below have been assessed.      Areas assessed by both nurses:    Head, Face, Ears, Shoulders, Back, Chest, Arms, Elbows, Hands, Sacrum. Buttock, Coccyx, Ischium, and Legs. Feet and Heels        Does the Patient have a Wound? No noted wound(s)       Uriah Prevention initiated by RN: Yes  Wound Care Orders initiated by RN: No    Pressure Injury (Stage 3,4, Unstageable, DTI, NWPT, and Complex wounds) if present, place Wound referral order by RN under : No    New Ostomies, if present place, Ostomy referral order under : No     Nurse 1 eSignature: Electronically signed by River Pinzon RN on 4/12/25 at 1:49 PM EDT    **SHARE this note so that the co-signing nurse can place an eSignature**    Nurse 2 eSignature: Electronically signed by Nela Earl RN on 4/12/25 at 7:28 PM EDT    
Clinical Pharmacy Note  Subcutaneous Anticoagulant Adjustment     Enoxaparin has been adjusted to heparin based on [facility] policy.    Recent Labs     04/12/25  0915   CREATININE 2.7*     Recent Labs     04/12/25 0915   HGB 11.6*   HCT 34.0*        Estimated Creatinine Clearance: 16 mL/min (A) (based on SCr of 2.7 mg/dL (H)).    Pharmacist Review of Appropriate Use and Automatic Dose Adjustment of Subcutaneous Anticoagulants (Adult)    The guidance below is to provide initial recommendations for dosing. If recommended dose does not align well with patient's current clinical picture, communications with the care team will occur to determine most appropriate medication and dose.    TABLE 1.  ENOXAPARIN ROUTINE PROPHYLAXIS DOSING (Medically ill, routine surgery)   Patient Weight (kg)     50.9 and below 51 - 100.9 101 - 150.9 151 - 174.9 175 or greater         Estimated CrCl  (ml/min) 30 or greater   30 mg SUBQ daily   40 mg SUBQ daily 30 mg SUBQ BID  40 mg SUBQ BID 60mg SUBQ BID      15-29 UFH 5000 units SUBQ BID   30 mg SUBQ daily 30 mg SUBQ daily 40 mg SUBQ daily   60 mg SUBQ daily      Less than 15 or Dialysis UFH 5000 units SUBQ BID   UFH 5000 units SUBQ TID UFH 7500 units SUBQ TID       TABLE 2.  ENOXAPARIN TREATMENT DOSING   (Based on 1mg/kg BID for DVT/PE/AFib)   Patient Weight (kg)     50.9 and below .9 151-189.9 190 or greater         Estimated CrCl  (ml/min) 30 or greater Recommend [facility] standardized UFH infusion, apixaban or rivaroxaban 1mg/kg SUBQ BID 1mg/kg SUBQ BID if anti-Xa levels are feasible per institution.  Alternatively,  recommend switch to [facility] standardized UFH infusion     Recommend switch to [facility] standardized UFH infusion.      15-29 Recommend [facility] standardized UFH infusion or apixaban 1mg/kg SUBQ daily Recommend switch to [facility] standardized UFH infusion     Less than 15 or Dialysis Recommend switch to [facility] standardized UFH infusion.     Fausto Christian [facility] 4/12/2025 11:38 AM    
Discharge order placed by . R chest port de accessed & PIV removed, per protocol. CGM remains on, per Diabetes educator. AVS summary printed and reviewed with patient. All questions answered. Education completed. Patient discharged @1250 to home, transportation provided by patient , Shant.   
NAME:  Elvia Arguelles  YOB: 1960  MEDICAL RECORD NUMBER:  [id number]    Shift Summary: Pt alert,oriented x 4,follow commands,at room air.Pt with Dx DKA, on insulin drip.Electrolytes ( K, Mg, and phosphorus )were replaced  following replacement protocol.Pt had elevated BP and hydralazine was given.Also, PO home meds were given.Pt still on insulin drip.    Family updated: Yes:  Pt's     Rhythm: Normal Sinus Rhythm     Most recent vitals:   Visit Vitals  BP (!) 144/43   Pulse 76   Temp 97.1 °F (36.2 °C) (Axillary)   Resp 21   Ht 1.499 m (4' 11\")   Wt 54.4 kg (119 lb 14.9 oz)   SpO2 97%   BMI 24.22 kg/m²           No data found.    No data found.      Respiratory support needed (if any):  - RA    Admission weight Weight - Scale: 54.4 kg (119 lb 14.9 oz) (04/12/25 0845)    Today's weight    Wt Readings from Last 1 Encounters:   04/12/25 54.4 kg (119 lb 14.9 oz)        Mcgovern need assessed each shift: N/A - no mcgovern present  UOP >30ml/hr: YES  Last documented BM (in last 48 hrs):  No data found.             Restraints (in use currently or dc'd in last 12 hrs): No    Order current and documentation up to date? No    Lines/Drains reviewed @ bedside.  Implantable Port 05/18/24 Right Subclavian (Active)   Number of days: 329       Peripheral IV 04/12/25 Left Antecubital (Active)   Number of days: 0         Drip rates at handoff:    sodium chloride Stopped (04/12/25 1412)    dextrose 5 % and 0.45 % NaCl 150 mL/hr at 04/12/25 1411    insulin 2.1 Units/hr (04/12/25 1831)       Lab Data:   CBC:   Recent Labs     04/12/25  0915   WBC 9.4   HGB 11.6*   HCT 34.0*   MCV 89.0        BMP:    Recent Labs     04/12/25  0915 04/12/25  1520   * 139   K 3.8 2.9*   CO2 17* 21   BUN 47* 36*   CREATININE 2.7* 1.9*     ABG: No results for input(s): \"PHART\", \"[id number]\", \"PO2ART\" in the last 72 hours.    Any consults during the shift? No    Any signed and held orders to be released?  No        4 Eyes Skin 
NAME:  Elvia Arguelles  YOB: 1960  MEDICAL RECORD NUMBER:  [id number]    Shift Summary: Gap closed x2, remains on insulin gtt for remainder of shift. Oxy given x2, robaxin given x1. BP dropped after admin of robaxin and oxy, 500ml bolus given. Pt still complaining of extreme pain, NP aware. 80meq K replaced, phos replaced.    Family updated: No    Rhythm: Normal Sinus Rhythm     Most recent vitals:   Visit Vitals  /68   Pulse 89   Temp 99.8 °F (37.7 °C) (Oral)   Resp 19   Ht 1.499 m (4' 11\")   Wt 58 kg (127 lb 13.9 oz)   SpO2 90%   BMI 25.83 kg/m²           No data found.    No data found.      Respiratory support needed (if any):  - RA    Admission weight Weight - Scale: 54.4 kg (119 lb 14.9 oz) (04/12/25 0845)    Today's weight    Wt Readings from Last 1 Encounters:   04/13/25 58 kg (127 lb 13.9 oz)        Mcgovern need assessed each shift: N/A - no mcgovern present  UOP >30ml/hr: YES  Last documented BM (in last 48 hrs):  No data found.             Restraints (in use currently or dc'd in last 12 hrs): No    Order current and documentation up to date? No    Lines/Drains reviewed @ bedside.  Implantable Port 05/18/24 Right Subclavian (Active)   Number of days: 329       Peripheral IV 04/12/25 Left Antecubital (Active)   Number of days: 0         Drip rates at handoff:    sodium chloride Stopped (04/12/25 1412)    dextrose 5 % and 0.45 % NaCl 150 mL/hr at 04/13/25 0632    insulin 0.3 Units/hr (04/13/25 0649)       Lab Data:   CBC:   Recent Labs     04/12/25  0915 04/13/25  0450   WBC 9.4 8.7   HGB 11.6* 9.8*   HCT 34.0* 27.8*   MCV 89.0 86.6    190     BMP:    Recent Labs     04/13/25  0040 04/13/25  0450   * 135*   K 3.9 4.5   CO2 19* 18*   BUN 29* 26*   CREATININE 1.7* 1.7*     ABG: No results for input(s): \"PHART\", \"[id number]\", \"PO2ART\" in the last 72 hours.    Any consults during the shift? No    Any signed and held orders to be released?  No        4 Eyes Skin Assessment       The 
NAME:  Elvia Arguelles  YOB: 1960  MEDICAL RECORD NUMBER:  [id number]    Shift Summary: Vital signs have been stable. Oxycodone PRN was given twice overnight for restless leg syndrome/pain and Tylenol once for headache. Zofran was given once for nausea. Magnesium was replaced this morning.    Family updated: No    Rhythm: Normal Sinus Rhythm     Most recent vitals:   Visit Vitals  /77   Pulse 72   Temp 98.5 °F (36.9 °C) (Oral)   Resp 15   Ht 1.499 m (4' 11.02\")   Wt 53.8 kg (118 lb 9.7 oz)   SpO2 99%   BMI 23.94 kg/m²           No data found.    No data found.      Respiratory support needed (if any):  - RA    Admission weight Weight - Scale: 54.4 kg (119 lb 14.9 oz) (04/12/25 0845)    Today's weight    Wt Readings from Last 1 Encounters:   04/14/25 53.8 kg (118 lb 9.7 oz)        Mcgovern need assessed each shift: N/A - no mcgovern present  UOP >30ml/hr: YES  Last documented BM (in last 48 hrs):  Patient Vitals for the past 48 hrs:   Last BM (including prior to admit)   04/13/25 2000 04/13/25                Restraints (in use currently or dc'd in last 12 hrs): No    Order current and documentation up to date? N/A    Lines/Drains reviewed @ bedside.  Implantable Port 05/18/24 Right Subclavian (Active)   Number of days: 330       Peripheral IV 04/12/25 Left Antecubital (Active)   Number of days: 1         Drip rates at handoff:    dextrose      sodium chloride Stopped (04/12/25 1412)    dextrose 5 % and 0.45 % NaCl Stopped (04/13/25 0933)       Lab Data:   CBC:   Recent Labs     04/13/25 0450 04/14/25  0435   WBC 8.7 5.8   HGB 9.8* 9.7*   HCT 27.8* 26.9*   MCV 86.6 87.2    171     BMP:    Recent Labs     04/13/25  0450 04/14/25  0435   * 138   K 4.5 4.1   CO2 18* 20*   BUN 26* 17   CREATININE 1.7* 1.6*     ABG: No results for input(s): \"PHART\", \"[id number]\", \"PO2ART\" in the last 72 hours.    Any consults during the shift? No    Any signed and held orders to be released?  No        4 Eyes 
NAME:  Elvia Arguelles  YOB: 1960  MEDICAL RECORD NUMBER:  [id number]    Shift Summary: Pt alert,oriented x 4,follow commands, at room air.Insulin drip was discontinue.Pt on insulin sliding scale ACHS.Pt has a diet order.VS stable.Good urine output.    Family updated: Yes:  Pt's .    Rhythm: Normal Sinus Rhythm     Most recent vitals:   Visit Vitals  /65   Pulse 78   Temp 98.4 °F (36.9 °C) (Oral)   Resp 18   Ht 1.499 m (4' 11\")   Wt 58 kg (127 lb 13.9 oz)   SpO2 95%   BMI 25.83 kg/m²           No data found.    No data found.      Respiratory support needed (if any):  - RA    Admission weight Weight - Scale: 54.4 kg (119 lb 14.9 oz) (04/12/25 0845)    Today's weight    Wt Readings from Last 1 Encounters:   04/13/25 58 kg (127 lb 13.9 oz)        Mcgovern need assessed each shift: N/A - no mcgovern present  UOP >30ml/hr: YES  Last documented BM (in last 48 hrs):  No data found.             Restraints (in use currently or dc'd in last 12 hrs): No    Order current and documentation up to date? No    Lines/Drains reviewed @ bedside.  Implantable Port 05/18/24 Right Subclavian (Active)   Number of days: 330       Peripheral IV 04/12/25 Left Antecubital (Active)   Number of days: 1         Drip rates at handoff:    dextrose      sodium chloride Stopped (04/12/25 1412)    dextrose 5 % and 0.45 % NaCl Stopped (04/13/25 0933)       Lab Data:   CBC:   Recent Labs     04/12/25  0915 04/13/25  0450   WBC 9.4 8.7   HGB 11.6* 9.8*   HCT 34.0* 27.8*   MCV 89.0 86.6    190     BMP:    Recent Labs     04/13/25  0040 04/13/25  0450   * 135*   K 3.9 4.5   CO2 19* 18*   BUN 29* 26*   CREATININE 1.7* 1.7*     ABG: No results for input(s): \"PHART\", \"TEJ2SEY\", \"PO2ART\" in the last 72 hours.    Any consults during the shift? No    Any signed and held orders to be released?  No        4 Eyes Skin Assessment       The patient is being assessed for  Shift Handoff    I agree that at least one RN has 
Pharmacy Medication Reconciliation Note     List of medications patient is currently taking is complete.    Source of information:   1. Discussion with patient   2. Epic records (dispense report)    Notes regarding home medications:   1. Utilizes Tandom Mobi insulin pump at home-is unsure of settings. Has been out of her equipment for pump over last few days.    Venice Estrella, SarbjitD, BCPS  4/13/2025 12:23 PM    
Pt coming in stretcher from ED.Transfer to ICU bed was done with safety technique.Pt alert,oriented x 4,follow commands,at room air.Pt has a port a cath running with Insulin drip.Lungs:breath sounds decreased in both hemithorax.Call bell in reach.Side rails up x 3.Bed locked and low position.VS stable.  
 Yes  Reviewed symptoms, prevention and treatment.     Level of patient/caregiver understanding able to:      [x] Verbalized Understanding   [] Demonstrated Understanding       [] Teach Back       [] Needs Reinforcement     []  Other:                    Does the patient/caregiver understand S/S of Hyperglycemia?  No: No     Reviewed symptoms, prevention and treatment.  Reviewed testing ketones and when to contact DR. Latif.      Level of patient/caregiver understanding able to:        [x] Verbalized Understanding   [] Demonstrated Understanding       [] Teach Back       [] Needs Reinforcement     []  Other:           Plan        Ongoing diabetes education and blood glucose monitoring.      Recommend resuming insulin pump tomorrow between 7 - 9 AM.      Consider using Humalog 2 at meals and low correction if discharged home.                                           Discharge Plan:  Discharge needs: no prescription needs identified         Teaching Time Diabetes Education:  30 minutes     Electronically signed by VAHE Hodgson on 4/14/2025 at 11:42 AM

## 2025-04-15 DIAGNOSIS — F32.2 CURRENT SEVERE EPISODE OF MAJOR DEPRESSIVE DISORDER WITHOUT PSYCHOTIC FEATURES WITHOUT PRIOR EPISODE (HCC): ICD-10-CM

## 2025-04-15 RX ORDER — NIFEDIPINE 30 MG/1
30 TABLET, EXTENDED RELEASE ORAL 2 TIMES DAILY
Qty: 60 TABLET | Refills: 2 | Status: SHIPPED | OUTPATIENT
Start: 2025-04-15

## 2025-04-15 RX ORDER — ESCITALOPRAM OXALATE 20 MG/1
20 TABLET ORAL DAILY
Qty: 30 TABLET | Refills: 2 | Status: SHIPPED | OUTPATIENT
Start: 2025-04-15

## 2025-04-15 RX ORDER — FERROUS SULFATE 324(65)MG
324 TABLET, DELAYED RELEASE (ENTERIC COATED) ORAL DAILY
Qty: 90 TABLET | Refills: 0 | Status: SHIPPED | OUTPATIENT
Start: 2025-04-15

## 2025-04-15 NOTE — TELEPHONE ENCOUNTER
Medication:   Requested Prescriptions     Pending Prescriptions Disp Refills    escitalopram (LEXAPRO) 20 MG tablet [Pharmacy Med Name: ESCITALOPRAM 20MG TABLETS] 30 tablet 2     Sig: TAKE 1 TABLET BY MOUTH DAILY    NIFEdipine (PROCARDIA XL) 30 MG extended release tablet [Pharmacy Med Name: NIFEDIPINE 30MG ER (XL/OSM) TABLETS] 60 tablet 2     Sig: TAKE 1 TABLET BY MOUTH IN THE MORNING AND AT BEDTIME        Last Filled:  2/13/25    Pended to: [id number] [address] - P [phone] - F [phone]     Patient Phone Number: [phone] (home)     Last appt: 2/13/2025   Next appt: Visit date not found    Last OARRS:        No data to display

## 2025-04-17 ENCOUNTER — OFFICE VISIT (OUTPATIENT)
Dept: ORTHOPEDIC SURGERY | Age: 65
End: 2025-04-17
Payer: MEDICARE

## 2025-04-17 VITALS — HEIGHT: 59 IN | WEIGHT: 118 LBS | BODY MASS INDEX: 23.79 KG/M2

## 2025-04-17 DIAGNOSIS — M25.551 RIGHT HIP PAIN: ICD-10-CM

## 2025-04-17 DIAGNOSIS — M25.561 RIGHT KNEE PAIN, UNSPECIFIED CHRONICITY: ICD-10-CM

## 2025-04-17 DIAGNOSIS — S72.001K HIP FRACTURE, RIGHT, CLOSED, WITH NONUNION, SUBSEQUENT ENCOUNTER: Primary | ICD-10-CM

## 2025-04-17 PROCEDURE — 3017F COLORECTAL CA SCREEN DOC REV: CPT | Performed by: ORTHOPAEDIC SURGERY

## 2025-04-17 PROCEDURE — 99214 OFFICE O/P EST MOD 30 MIN: CPT | Performed by: ORTHOPAEDIC SURGERY

## 2025-04-17 PROCEDURE — 1160F RVW MEDS BY RX/DR IN RCRD: CPT | Performed by: ORTHOPAEDIC SURGERY

## 2025-04-17 PROCEDURE — 1036F TOBACCO NON-USER: CPT | Performed by: ORTHOPAEDIC SURGERY

## 2025-04-17 PROCEDURE — 1125F AMNT PAIN NOTED PAIN PRSNT: CPT | Performed by: ORTHOPAEDIC SURGERY

## 2025-04-17 PROCEDURE — 1090F PRES/ABSN URINE INCON ASSESS: CPT | Performed by: ORTHOPAEDIC SURGERY

## 2025-04-17 PROCEDURE — G8427 DOCREV CUR MEDS BY ELIG CLIN: HCPCS | Performed by: ORTHOPAEDIC SURGERY

## 2025-04-17 PROCEDURE — G8400 PT W/DXA NO RESULTS DOC: HCPCS | Performed by: ORTHOPAEDIC SURGERY

## 2025-04-17 PROCEDURE — G8420 CALC BMI NORM PARAMETERS: HCPCS | Performed by: ORTHOPAEDIC SURGERY

## 2025-04-17 PROCEDURE — 1111F DSCHRG MED/CURRENT MED MERGE: CPT | Performed by: ORTHOPAEDIC SURGERY

## 2025-04-17 PROCEDURE — 1123F ACP DISCUSS/DSCN MKR DOCD: CPT | Performed by: ORTHOPAEDIC SURGERY

## 2025-04-17 PROCEDURE — 1159F MED LIST DOCD IN RCRD: CPT | Performed by: ORTHOPAEDIC SURGERY

## 2025-04-18 DIAGNOSIS — E10.21 DIABETIC NEPHROPATHY ASSOCIATED WITH TYPE 1 DIABETES MELLITUS: ICD-10-CM

## 2025-04-18 DIAGNOSIS — E10.319 POORLY CONTROLLED TYPE 1 DIABETES MELLITUS WITH RETINOPATHY (HCC): ICD-10-CM

## 2025-04-18 DIAGNOSIS — E10.65 POORLY CONTROLLED TYPE 1 DIABETES MELLITUS WITH RETINOPATHY (HCC): ICD-10-CM

## 2025-04-18 DIAGNOSIS — E78.2 MIXED HYPERLIPIDEMIA: ICD-10-CM

## 2025-04-18 DIAGNOSIS — E10.40 POORLY CONTROLLED TYPE 1 DIABETES MELLITUS WITH NEUROPATHY: ICD-10-CM

## 2025-04-18 DIAGNOSIS — E10.65 POORLY CONTROLLED TYPE 1 DIABETES MELLITUS WITH NEUROPATHY: ICD-10-CM

## 2025-04-18 DIAGNOSIS — E55.9 VITAMIN D DEFICIENCY: ICD-10-CM

## 2025-04-18 DIAGNOSIS — I10 PRIMARY HYPERTENSION: ICD-10-CM

## 2025-04-18 DIAGNOSIS — Z83.49 FAMILY HISTORY OF THYROID DISEASE: ICD-10-CM

## 2025-04-18 RX ORDER — ERGOCALCIFEROL 1.25 MG/1
50000 CAPSULE, LIQUID FILLED ORAL WEEKLY
Qty: 12 CAPSULE | Refills: 0 | Status: SHIPPED | OUTPATIENT
Start: 2025-04-18

## 2025-04-22 ENCOUNTER — TELEPHONE (OUTPATIENT)
Dept: ENDOCRINOLOGY | Age: 65
End: 2025-04-22

## 2025-04-22 DIAGNOSIS — E10.40 POORLY CONTROLLED TYPE 1 DIABETES MELLITUS WITH NEUROPATHY (HCC): ICD-10-CM

## 2025-04-22 DIAGNOSIS — Z83.49 FAMILY HISTORY OF THYROID DISEASE: ICD-10-CM

## 2025-04-22 DIAGNOSIS — E78.2 MIXED HYPERLIPIDEMIA: ICD-10-CM

## 2025-04-22 DIAGNOSIS — E10.21 DIABETIC NEPHROPATHY ASSOCIATED WITH TYPE 1 DIABETES MELLITUS (HCC): ICD-10-CM

## 2025-04-22 DIAGNOSIS — I10 PRIMARY HYPERTENSION: ICD-10-CM

## 2025-04-22 DIAGNOSIS — E10.65 POORLY CONTROLLED TYPE 1 DIABETES MELLITUS WITH RETINOPATHY (HCC): ICD-10-CM

## 2025-04-22 DIAGNOSIS — E10.65 POORLY CONTROLLED TYPE 1 DIABETES MELLITUS WITH NEUROPATHY (HCC): ICD-10-CM

## 2025-04-22 DIAGNOSIS — E10.319 POORLY CONTROLLED TYPE 1 DIABETES MELLITUS WITH RETINOPATHY (HCC): ICD-10-CM

## 2025-04-22 DIAGNOSIS — E55.9 VITAMIN D DEFICIENCY: ICD-10-CM

## 2025-04-22 RX ORDER — INSULIN LISPRO 100 [IU]/ML
INJECTION, SOLUTION INTRAVENOUS; SUBCUTANEOUS
Qty: 40 ML | Refills: 1 | Status: SHIPPED | OUTPATIENT
Start: 2025-04-22

## 2025-04-22 NOTE — TELEPHONE ENCOUNTER
Guille called and needs a new rx for lispro so they can bill medicare please.     [id number] - [place], [place] - [address] - P [phone] - F [phone]  [address] [facility] [address]  Phone: [phone]  Fax: [phone]

## 2025-04-23 ENCOUNTER — TELEPHONE (OUTPATIENT)
Dept: ORTHOPEDIC SURGERY | Age: 65
End: 2025-04-23

## 2025-04-23 RX ORDER — FERROUS SULFATE 324(65)MG
324 TABLET, DELAYED RELEASE (ENTERIC COATED) ORAL DAILY
Qty: 90 TABLET | Refills: 0 | OUTPATIENT
Start: 2025-04-23

## 2025-04-23 NOTE — TELEPHONE ENCOUNTER
Medication:   Requested Prescriptions     Pending Prescriptions Disp Refills    ferrous sulfate 324 (65 Fe) MG EC tablet [Pharmacy Med Name: FERROUS SULFATE 324MG EC TABS RED] 90 tablet 0     Sig: TAKE 1 TABLET BY MOUTH DAILY WITH BREAKFAST       Last Filled:  4/15/25 Duplicate request.    Patient Phone Number: [phone] (home)     Last appt: 2/13/2025   Next appt: Visit date not found    Last Labs DM:   Lab Results   Component Value Date/Time    LABA1C 7.9 04/12/2025 02:10 PM     Last Lipid:   Lab Results   Component Value Date/Time    CHOL 224 03/21/2025 10:33 AM    TRIG 162 03/21/2025 10:33 AM    HDL 97 03/21/2025 10:33 AM     12/07/2011 07:50 AM     Last PSA: No results found for: \"PSA\"  Last Thyroid:   Lab Results   Component Value Date/Time    TSH 4.65 03/21/2025 10:33 AM    FT3 2.2 02/20/2023 08:31 AM    T4FREE 1.2 03/21/2025 10:33 AM

## 2025-04-23 NOTE — TELEPHONE ENCOUNTER
Surgery and/or Procedure Scheduling     Contact Name: Elvia Arguelles \"Ghada\"   Surgical/Procedure Request: RT HIP  Patient Contact Number: [phone]     REQ CALL BACK TO SCHEDULE HIP SX

## 2025-04-25 ENCOUNTER — TELEPHONE (OUTPATIENT)
Dept: ORTHOPEDIC SURGERY | Age: 65
End: 2025-04-25

## 2025-04-28 ENCOUNTER — TELEPHONE (OUTPATIENT)
Dept: ENDOCRINOLOGY | Age: 65
End: 2025-04-28

## 2025-04-28 ENCOUNTER — PREP FOR PROCEDURE (OUTPATIENT)
Dept: ORTHOPEDIC SURGERY | Age: 65
End: 2025-04-28

## 2025-04-28 PROBLEM — M16.11 OSTEOARTHRITIS OF RIGHT HIP: Status: ACTIVE | Noted: 2025-04-28

## 2025-04-28 NOTE — TELEPHONE ENCOUNTER
Call from pt stating that she was just recently seen in the hospital. She stated that her physician is requesting that she follows up with Dr. Latif after her hospital stay     Pt is currently scheduled for 7/17/25  Pt stated that the physician is requesting a sooner appt. He stated that she can't wait that long     Pt is also currently scheduled for surgery on 5/27/25 for a hip replacement     Please advise   # [phone]    Dr. Gonsalo White has faxed over a pre op clearance request form   Scanned in media

## 2025-04-28 NOTE — TELEPHONE ENCOUNTER
Okay to split 40-minute appointment for a follow-up and use 20 minutes, and schedule another follow-up patient in the second half of the appointment.

## 2025-04-30 ENCOUNTER — PATIENT MESSAGE (OUTPATIENT)
Dept: FAMILY MEDICINE CLINIC | Age: 65
End: 2025-04-30

## 2025-05-01 RX ORDER — FERROUS SULFATE 324(65)MG
324 TABLET, DELAYED RELEASE (ENTERIC COATED) ORAL DAILY
Qty: 90 TABLET | Refills: 0 | OUTPATIENT
Start: 2025-05-01

## 2025-05-05 ENCOUNTER — HOSPITAL ENCOUNTER (OUTPATIENT)
Dept: PREADMISSION TESTING | Age: 65
Discharge: HOME | End: 2025-05-09
Payer: MEDICARE

## 2025-05-05 DIAGNOSIS — S72.001K HIP FRACTURE, RIGHT, CLOSED, WITH NONUNION, SUBSEQUENT ENCOUNTER: ICD-10-CM

## 2025-05-05 LAB
25(OH)D3 SERPL-MCNC: 51.4 NG/ML
ABO + RH BLD: NORMAL
ALBUMIN SERPL-MCNC: 3.8 G/DL (ref 3.4–5)
ANION GAP SERPL CALCULATED.3IONS-SCNC: 11 MMOL/L (ref 3–16)
APTT BLD: 26.9 SEC (ref 22.1–36.4)
BASOPHILS # BLD: 0.1 K/UL (ref 0–0.2)
BASOPHILS NFR BLD: 0.6 %
BLD GP AB SCN SERPL QL: NORMAL
BUN SERPL-MCNC: 36 MG/DL (ref 7–20)
CALCIUM SERPL-MCNC: 9.4 MG/DL (ref 8.3–10.6)
CHLORIDE SERPL-SCNC: 106 MMOL/L (ref 99–110)
CO2 SERPL-SCNC: 22 MMOL/L (ref 21–32)
CREAT SERPL-MCNC: 1.8 MG/DL (ref 0.6–1.2)
DEPRECATED RDW RBC AUTO: 13.2 % (ref 12.4–15.4)
EOSINOPHIL # BLD: 0.7 K/UL (ref 0–0.6)
EOSINOPHIL NFR BLD: 7.9 %
GFR SERPLBLD CREATININE-BSD FMLA CKD-EPI: 31 ML/MIN/{1.73_M2}
GLUCOSE SERPL-MCNC: 84 MG/DL (ref 70–99)
HCT VFR BLD AUTO: 31.8 % (ref 36–48)
HGB BLD-MCNC: 10.9 G/DL (ref 12–16)
INR PPP: 0.91 (ref 0.85–1.15)
LYMPHOCYTES # BLD: 1.9 K/UL (ref 1–5.1)
LYMPHOCYTES NFR BLD: 21.9 %
MCH RBC QN AUTO: 30.1 PG (ref 26–34)
MCHC RBC AUTO-ENTMCNC: 34.2 G/DL (ref 31–36)
MCV RBC AUTO: 88 FL (ref 80–100)
MONOCYTES # BLD: 0.5 K/UL (ref 0–1.3)
MONOCYTES NFR BLD: 5.5 %
MRSA DNA SPEC QL NAA+PROBE: NORMAL
NEUTROPHILS # BLD: 5.5 K/UL (ref 1.7–7.7)
NEUTROPHILS NFR BLD: 64.1 %
PLATELET # BLD AUTO: 181 K/UL (ref 135–450)
PMV BLD AUTO: 8.7 FL (ref 5–10.5)
POTASSIUM SERPL-SCNC: 4.4 MMOL/L (ref 3.5–5.1)
PREALB SERPL-MCNC: 23.7 MG/DL (ref 20–40)
PROTHROMBIN TIME: 12.5 SEC (ref 11.9–14.9)
RBC # BLD AUTO: 3.61 M/UL (ref 4–5.2)
SODIUM SERPL-SCNC: 139 MMOL/L (ref 136–145)
WBC # BLD AUTO: 8.5 K/UL (ref 4–11)

## 2025-05-05 PROCEDURE — 82985 ASSAY OF GLYCATED PROTEIN: CPT

## 2025-05-05 PROCEDURE — 86900 BLOOD TYPING SEROLOGIC ABO: CPT

## 2025-05-05 PROCEDURE — 82040 ASSAY OF SERUM ALBUMIN: CPT

## 2025-05-05 PROCEDURE — 85610 PROTHROMBIN TIME: CPT

## 2025-05-05 PROCEDURE — 84134 ASSAY OF PREALBUMIN: CPT

## 2025-05-05 PROCEDURE — 86901 BLOOD TYPING SEROLOGIC RH(D): CPT

## 2025-05-05 PROCEDURE — 86850 RBC ANTIBODY SCREEN: CPT

## 2025-05-05 PROCEDURE — 87641 MR-STAPH DNA AMP PROBE: CPT

## 2025-05-05 PROCEDURE — 85730 THROMBOPLASTIN TIME PARTIAL: CPT

## 2025-05-05 PROCEDURE — 82306 VITAMIN D 25 HYDROXY: CPT

## 2025-05-05 PROCEDURE — 83036 HEMOGLOBIN GLYCOSYLATED A1C: CPT

## 2025-05-05 PROCEDURE — 85025 COMPLETE CBC W/AUTO DIFF WBC: CPT

## 2025-05-05 PROCEDURE — 80048 BASIC METABOLIC PNL TOTAL CA: CPT

## 2025-05-05 RX ORDER — CHOLESTYRAMINE 4 G/9G
1 POWDER, FOR SUSPENSION ORAL 2 TIMES DAILY
COMMUNITY

## 2025-05-05 NOTE — PROGRESS NOTES
Patient and adult child Ritika  attended JET class on 5/5/2025 . Patient verified surgery for Total Hip replacement. Patient and adult child Ritika  received patient information and educational JET folder including the following handouts: jet powerpoint, ERAS, incentive spirometry including purpose and how to perform, case management contact information, hand hygiene, preventing constipation, choices for home health care agency list, pre-operative showering techniques and the use of anti-septic 3 days before surgery.  Interviews completed by PT, OT, and Nurse Navigator.  Labs and Tests to be completed/completed as ordered/necessary by outpatient lab if not already completed.  Anti-septic bottle given to patient to take home. Patient and adult child Ritika  states no further questions or concerns. Patient provided with orthopedic office, after hours clinic, case management, and nurse navigator contact information.    Date Of Surgery: 5/27/2025  Surgeon: Dr White  Per Patient Will see/Saw Primary care provider on 5/12/2025 for physical.    Follows with Specialist Endocrinology . Will see/saw on 5/22/2025 .    HISTORY OF JOINT REPLACEMENT(S): No history of joint replacement    DC Plan: Home    HOME ASSISTANCE - WHO WILL BE STAYING WITH YOU AT HOME FOR FIRST SEVERAL DAYS? spouse Shant    DC TRANSPORTATION: spouse Shant    STEPS INTO HOME: 10    STEPS TO BATHROOM/BEDROOM: 0    DME NEEDS:  Patient has the following equipment: 2 Wheeled rolling walker, Shower Chair, and 4 wheeled walker.   Patient needs the following equipment: No equipment needs noted.      LENGTH OF STAY HAS BEEN DISCUSSED WITH THE PATIENT, APPROPRIATE TO HIS/ HER PROCEDURE.  PATIENT HAS BEEN INFORMED THAT THEY WILL BE DISCHARGED WHEN THE PHYSICIAN DEEMS THEM MEDICALLY READY. MOST PATIENTS CAN EXPECT TO BE IN THE HOSPITAL ONE NIGHT OR 1-2 DAYS AS AN ADMISSION FOR THOSE WITH MEDICAL HEALTH ISSUES/COMPLICATIONS.    HOME CARE CHOICE(S): open to any agency,

## 2025-05-06 DIAGNOSIS — E10.319 POORLY CONTROLLED TYPE 1 DIABETES MELLITUS WITH RETINOPATHY (HCC): ICD-10-CM

## 2025-05-06 DIAGNOSIS — E10.65 POORLY CONTROLLED TYPE 1 DIABETES MELLITUS WITH RETINOPATHY (HCC): ICD-10-CM

## 2025-05-06 DIAGNOSIS — E10.21 DIABETIC NEPHROPATHY ASSOCIATED WITH TYPE 1 DIABETES MELLITUS (HCC): ICD-10-CM

## 2025-05-06 DIAGNOSIS — Z83.49 FAMILY HISTORY OF THYROID DISEASE: ICD-10-CM

## 2025-05-06 DIAGNOSIS — I10 PRIMARY HYPERTENSION: ICD-10-CM

## 2025-05-06 DIAGNOSIS — E55.9 VITAMIN D DEFICIENCY: ICD-10-CM

## 2025-05-06 DIAGNOSIS — E10.65 POORLY CONTROLLED TYPE 1 DIABETES MELLITUS WITH NEUROPATHY (HCC): ICD-10-CM

## 2025-05-06 DIAGNOSIS — E10.40 POORLY CONTROLLED TYPE 1 DIABETES MELLITUS WITH NEUROPATHY (HCC): ICD-10-CM

## 2025-05-06 DIAGNOSIS — E78.2 MIXED HYPERLIPIDEMIA: ICD-10-CM

## 2025-05-06 LAB
EST. AVERAGE GLUCOSE BLD GHB EST-MCNC: 162.8 MG/DL
HBA1C MFR BLD: 7.3 %

## 2025-05-06 RX ORDER — HYDROCORTISONE 5 MG/1
5 TABLET ORAL DAILY
Qty: 90 TABLET | Refills: 0 | Status: SHIPPED | OUTPATIENT
Start: 2025-05-06

## 2025-05-06 RX ORDER — HYDROCORTISONE 10 MG/1
10 TABLET ORAL DAILY
Qty: 90 TABLET | Refills: 0 | Status: SHIPPED | OUTPATIENT
Start: 2025-05-06

## 2025-05-07 LAB — FRUCTOSAMINE SERPL-SCNC: 380 UMOL/L (ref 205–285)

## 2025-05-09 ENCOUNTER — RESULTS FOLLOW-UP (OUTPATIENT)
Dept: FAMILY MEDICINE CLINIC | Age: 65
End: 2025-05-09

## 2025-05-09 ENCOUNTER — OFFICE VISIT (OUTPATIENT)
Dept: FAMILY MEDICINE CLINIC | Age: 65
End: 2025-05-09
Payer: MEDICARE

## 2025-05-09 VITALS
OXYGEN SATURATION: 99 % | HEART RATE: 64 BPM | DIASTOLIC BLOOD PRESSURE: 77 MMHG | RESPIRATION RATE: 14 BRPM | BODY MASS INDEX: 24.6 KG/M2 | TEMPERATURE: 97.3 F | HEIGHT: 59 IN | WEIGHT: 122 LBS | SYSTOLIC BLOOD PRESSURE: 131 MMHG

## 2025-05-09 DIAGNOSIS — Z01.818 PREOP GENERAL PHYSICAL EXAM: Primary | ICD-10-CM

## 2025-05-09 DIAGNOSIS — I10 PRIMARY HYPERTENSION: ICD-10-CM

## 2025-05-09 DIAGNOSIS — C43.9 MALIGNANT MELANOMA, UNSPECIFIED SITE (HCC): ICD-10-CM

## 2025-05-09 DIAGNOSIS — F32.2 CURRENT SEVERE EPISODE OF MAJOR DEPRESSIVE DISORDER WITHOUT PSYCHOTIC FEATURES WITHOUT PRIOR EPISODE (HCC): ICD-10-CM

## 2025-05-09 DIAGNOSIS — N18.30 STAGE 3 CHRONIC KIDNEY DISEASE, UNSPECIFIED WHETHER STAGE 3A OR 3B CKD (HCC): ICD-10-CM

## 2025-05-09 DIAGNOSIS — E10.65 UNCONTROLLED TYPE 1 DIABETES MELLITUS WITH HYPERGLYCEMIA (HCC): ICD-10-CM

## 2025-05-09 DIAGNOSIS — S72.001K CLOSED FRACTURE OF RIGHT HIP WITH NONUNION, SUBSEQUENT ENCOUNTER: ICD-10-CM

## 2025-05-09 DIAGNOSIS — G40.89 OTHER SEIZURES (HCC): ICD-10-CM

## 2025-05-09 PROCEDURE — G8400 PT W/DXA NO RESULTS DOC: HCPCS | Performed by: NURSE PRACTITIONER

## 2025-05-09 PROCEDURE — 3078F DIAST BP <80 MM HG: CPT | Performed by: NURSE PRACTITIONER

## 2025-05-09 PROCEDURE — 1111F DSCHRG MED/CURRENT MED MERGE: CPT | Performed by: NURSE PRACTITIONER

## 2025-05-09 PROCEDURE — G8427 DOCREV CUR MEDS BY ELIG CLIN: HCPCS | Performed by: NURSE PRACTITIONER

## 2025-05-09 PROCEDURE — 1090F PRES/ABSN URINE INCON ASSESS: CPT | Performed by: NURSE PRACTITIONER

## 2025-05-09 PROCEDURE — 2022F DILAT RTA XM EVC RTNOPTHY: CPT | Performed by: NURSE PRACTITIONER

## 2025-05-09 PROCEDURE — 3075F SYST BP GE 130 - 139MM HG: CPT | Performed by: NURSE PRACTITIONER

## 2025-05-09 PROCEDURE — 1123F ACP DISCUSS/DSCN MKR DOCD: CPT | Performed by: NURSE PRACTITIONER

## 2025-05-09 PROCEDURE — 3051F HG A1C>EQUAL 7.0%<8.0%: CPT | Performed by: NURSE PRACTITIONER

## 2025-05-09 PROCEDURE — 1036F TOBACCO NON-USER: CPT | Performed by: NURSE PRACTITIONER

## 2025-05-09 PROCEDURE — 3017F COLORECTAL CA SCREEN DOC REV: CPT | Performed by: NURSE PRACTITIONER

## 2025-05-09 PROCEDURE — 99213 OFFICE O/P EST LOW 20 MIN: CPT | Performed by: NURSE PRACTITIONER

## 2025-05-09 PROCEDURE — G8420 CALC BMI NORM PARAMETERS: HCPCS | Performed by: NURSE PRACTITIONER

## 2025-05-09 PROCEDURE — 1159F MED LIST DOCD IN RCRD: CPT | Performed by: NURSE PRACTITIONER

## 2025-05-09 RX ORDER — FERROUS SULFATE 324(65)MG
324 TABLET, DELAYED RELEASE (ENTERIC COATED) ORAL DAILY
Qty: 90 TABLET | Refills: 0 | Status: SHIPPED | OUTPATIENT
Start: 2025-05-09

## 2025-05-09 ASSESSMENT — ENCOUNTER SYMPTOMS
VOMITING: 1
RHINORRHEA: 0
SHORTNESS OF BREATH: 0
WHEEZING: 0
NAUSEA: 0
DIARRHEA: 0
SORE THROAT: 0
ABDOMINAL PAIN: 0
COUGH: 0
CHEST TIGHTNESS: 0

## 2025-05-09 ASSESSMENT — PATIENT HEALTH QUESTIONNAIRE - PHQ9
SUM OF ALL RESPONSES TO PHQ QUESTIONS 1-9: 5
10. IF YOU CHECKED OFF ANY PROBLEMS, HOW DIFFICULT HAVE THESE PROBLEMS MADE IT FOR YOU TO DO YOUR WORK, TAKE CARE OF THINGS AT HOME, OR GET ALONG WITH OTHER PEOPLE: NOT DIFFICULT AT ALL
SUM OF ALL RESPONSES TO PHQ QUESTIONS 1-9: 5
6. FEELING BAD ABOUT YOURSELF - OR THAT YOU ARE A FAILURE OR HAVE LET YOURSELF OR YOUR FAMILY DOWN: NOT AT ALL
8. MOVING OR SPEAKING SO SLOWLY THAT OTHER PEOPLE COULD HAVE NOTICED. OR THE OPPOSITE, BEING SO FIGETY OR RESTLESS THAT YOU HAVE BEEN MOVING AROUND A LOT MORE THAN USUAL: NOT AT ALL
SUM OF ALL RESPONSES TO PHQ QUESTIONS 1-9: 5
3. TROUBLE FALLING OR STAYING ASLEEP: SEVERAL DAYS
1. LITTLE INTEREST OR PLEASURE IN DOING THINGS: SEVERAL DAYS
SUM OF ALL RESPONSES TO PHQ QUESTIONS 1-9: 5
7. TROUBLE CONCENTRATING ON THINGS, SUCH AS READING THE NEWSPAPER OR WATCHING TELEVISION: NOT AT ALL
4. FEELING TIRED OR HAVING LITTLE ENERGY: MORE THAN HALF THE DAYS
2. FEELING DOWN, DEPRESSED OR HOPELESS: SEVERAL DAYS
9. THOUGHTS THAT YOU WOULD BE BETTER OFF DEAD, OR OF HURTING YOURSELF: NOT AT ALL
5. POOR APPETITE OR OVEREATING: NOT AT ALL

## 2025-05-09 NOTE — PROGRESS NOTES
daily as needed (muscle spasms)) 120 tablet 0    pantoprazole (PROTONIX) 40 MG tablet TAKE 1 TABLET BY MOUTH TWICE DAILY BEFORE MEALS 180 tablet 0    oxyCODONE (ROXICODONE) 5 MG immediate release tablet Take 1 tablet by mouth every 4-6 hours as needed for Pain.      hydrOXYzine pamoate (VISTARIL) 25 MG capsule Take 1 capsule by mouth 3 times daily as needed for Itching      Insulin Infusion Pump Supplies (INSULIN INFUS PUMP RESERVOIR) MISC 1 Device by Does not apply route every 72 hours Tandem Mobi Resivour      Insulin Infusion Pump CATRACHO by Does not apply route      Insulin Pump - insulin lispro Inject 0.521 Units into the skin continuous Correction Factor 1:50  Carb ratio 1:15g  Target Blood Glucose: 110 mg/dL  Bolus Frequency: With Meals      rOPINIRole (REQUIP) 1 MG tablet TAKE 1 TABLET BY MOUTH NIGHTLY 30 tablet 10    Continuous Glucose Sensor (DEXCOM G6 SENSOR) MISC CHANGE Q 10 DAYS 6 each 1    Continuous Glucose Transmitter (DEXCOM G6 TRANSMITTER) MISC CHANGE TRANSMITTER EVERY 90 DAYS 1 each 1    levETIRAcetam (KEPPRA) 500 MG tablet Take 1 tablet by mouth 2 times daily 60 tablet 0    ondansetron (ZOFRAN-ODT) 4 MG disintegrating tablet Take 1 tablet by mouth 3 times daily as needed for Nausea or Vomiting 21 tablet 0       Social History     Tobacco Use    Smoking status: Former     Current packs/day: 0.00     Average packs/day: 1 pack/day for 10.0 years (10.0 ttl pk-yrs)     Types: Cigarettes     Start date: 1970     Quit date: 1980     Years since quittin.4    Smokeless tobacco: Never   Substance Use Topics    Alcohol use: Yes     Comment: social     Family History   Problem Relation Age of Onset    High Blood Pressure Mother     Breast Cancer Mother         breast w mets to bone    Diabetes Father     Stroke Father     Other Cancer Father         bladder       Review of Systems:  Review of Systems   Constitutional:  Positive for activity change and fatigue. Negative for appetite change,

## 2025-05-12 ENCOUNTER — TELEPHONE (OUTPATIENT)
Dept: ENDOCRINOLOGY | Age: 65
End: 2025-05-12

## 2025-05-12 NOTE — TELEPHONE ENCOUNTER
Patient has appointment with me 5/22/2025.  Hemoglobin A1c 7.3 on 5/5/2025, improved.  Okay to proceed with surgery from endocrinology standpoint.    Please advise patient that during her appointment we will download her pump data for last 2-week period prior to appointment.  I advise to put efforts regarding diet to avoid hyperglycemia, in order to optimize healing after the surgery.  Especially try to optimize her hyperglycemia after dinner.

## 2025-05-12 NOTE — TELEPHONE ENCOUNTER
Please advise. Scanned in media from 4/28/25. Apparently this was a \"form\".     They stated pt will be receiving gernal anesthesia for right anterior total hip replacement & hardware removal.     [facility] just wrote a comment on that sheet advising pt was clear from there standpoint. No actual form rec'd.

## 2025-05-12 NOTE — TELEPHONE ENCOUNTER
Dr. Gonsalo White office calling about clearance for this patient states something was faxed over 4/28/25 Lily called asking for a call back at [phone]  Fax [phone]  she having hip surgery

## 2025-05-19 ENCOUNTER — OFFICE VISIT (OUTPATIENT)
Dept: ORTHOPEDIC SURGERY | Age: 65
End: 2025-05-19
Payer: MEDICARE

## 2025-05-19 VITALS — BODY MASS INDEX: 24.64 KG/M2 | HEIGHT: 59 IN

## 2025-05-19 DIAGNOSIS — S72.001K HIP FRACTURE, RIGHT, CLOSED, WITH NONUNION, SUBSEQUENT ENCOUNTER: Primary | ICD-10-CM

## 2025-05-19 PROCEDURE — G8427 DOCREV CUR MEDS BY ELIG CLIN: HCPCS | Performed by: ORTHOPAEDIC SURGERY

## 2025-05-19 PROCEDURE — G8400 PT W/DXA NO RESULTS DOC: HCPCS | Performed by: ORTHOPAEDIC SURGERY

## 2025-05-19 PROCEDURE — 1036F TOBACCO NON-USER: CPT | Performed by: ORTHOPAEDIC SURGERY

## 2025-05-19 PROCEDURE — 3017F COLORECTAL CA SCREEN DOC REV: CPT | Performed by: ORTHOPAEDIC SURGERY

## 2025-05-19 PROCEDURE — 99214 OFFICE O/P EST MOD 30 MIN: CPT | Performed by: ORTHOPAEDIC SURGERY

## 2025-05-19 PROCEDURE — 1123F ACP DISCUSS/DSCN MKR DOCD: CPT | Performed by: ORTHOPAEDIC SURGERY

## 2025-05-19 PROCEDURE — G8420 CALC BMI NORM PARAMETERS: HCPCS | Performed by: ORTHOPAEDIC SURGERY

## 2025-05-19 PROCEDURE — 1159F MED LIST DOCD IN RCRD: CPT | Performed by: ORTHOPAEDIC SURGERY

## 2025-05-19 PROCEDURE — 1090F PRES/ABSN URINE INCON ASSESS: CPT | Performed by: ORTHOPAEDIC SURGERY

## 2025-05-19 PROCEDURE — 1160F RVW MEDS BY RX/DR IN RCRD: CPT | Performed by: ORTHOPAEDIC SURGERY

## 2025-05-19 NOTE — PROGRESS NOTES
[facility] Orthopedics Office Visit  Gonsalo White MD    Reason for visit: Right hip pain    HPI:  Elvia Arguelles is a 64 y.o. who is here in follow-up of right hip pain.  She is scheduled for conversion to right total hip arthroplasty next week.  For review, she underwent cephalomedullary nail for a basicervical femoral neck fracture on May 19, 2024.  She reports she has never really fully improved since the injury and surgery.  She is walking with a rolling walker and reports pain in her groin, thigh, down to her right knee.  There have been no new injuries.  She rates pain as up to 8/10 on a daily basis.  She does have history of lung cancer and diabetes.  She denies tobacco use, history of blood clots, blood thinners.  She has help at home.    Past Medical History:   Diagnosis Date    Adrenal insufficiency     Cancer ([facility]) 2002    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    CKD (chronic kidney disease), stage III ([facility])     Closed displaced intertrochanteric fracture of right femur ([facility]) 05/18/2024    Depression     Diabetes mellitus ([facility])     Type I    Hx of radiation therapy     melanoma right eye    Hypertension     Pt. denies having history of Hypertension    Hyponatremia     Insulin pump in place     Melanoma ([facility]) 01/13/2023    in stomach, pancreas    Prolonged emergence from general anesthesia     slow to wake up    Restless leg syndrome        Exam:  Appearance: sitting in exam room chair, appears to be in no acute distress, awake and alert  Resp: unlabored breathing on room air  Skin: warm, dry and intact with out erythema or significant increased temperature  Neuro: grossly intact both lower extremities. Intact sensation to light touch. Motor exam 4+ to 5/5 in all major motor groups.  RLE: Examination demonstrates pain with logroll and Stinchfield.  There is brisk capillary refill.  Strength is 5/5 in hamstrings, quads, hip flexors.     Imaging:  Prior right hip radiographs were reviewed today.  There is a basicervical

## 2025-05-22 ENCOUNTER — TELEMEDICINE (OUTPATIENT)
Dept: ENDOCRINOLOGY | Age: 65
End: 2025-05-22

## 2025-05-22 DIAGNOSIS — N18.30 STAGE 3 CHRONIC KIDNEY DISEASE, UNSPECIFIED WHETHER STAGE 3A OR 3B CKD (HCC): ICD-10-CM

## 2025-05-22 DIAGNOSIS — E10.40 POORLY CONTROLLED TYPE 1 DIABETES MELLITUS WITH NEUROPATHY (HCC): Primary | ICD-10-CM

## 2025-05-22 DIAGNOSIS — I10 PRIMARY HYPERTENSION: ICD-10-CM

## 2025-05-22 DIAGNOSIS — E03.9 ACQUIRED HYPOTHYROIDISM: ICD-10-CM

## 2025-05-22 DIAGNOSIS — E10.65 POORLY CONTROLLED TYPE 1 DIABETES MELLITUS WITH RETINOPATHY (HCC): ICD-10-CM

## 2025-05-22 DIAGNOSIS — E04.9 THYROID ENLARGEMENT: ICD-10-CM

## 2025-05-22 DIAGNOSIS — C43.9 MALIGNANT MELANOMA, UNSPECIFIED SITE (HCC): ICD-10-CM

## 2025-05-22 DIAGNOSIS — E10.319 POORLY CONTROLLED TYPE 1 DIABETES MELLITUS WITH RETINOPATHY (HCC): ICD-10-CM

## 2025-05-22 DIAGNOSIS — E55.9 VITAMIN D DEFICIENCY: ICD-10-CM

## 2025-05-22 DIAGNOSIS — Z83.49 FAMILY HISTORY OF THYROID DISEASE: ICD-10-CM

## 2025-05-22 DIAGNOSIS — M81.0 AGE RELATED OSTEOPOROSIS, UNSPECIFIED PATHOLOGICAL FRACTURE PRESENCE: ICD-10-CM

## 2025-05-22 DIAGNOSIS — E78.2 MIXED HYPERLIPIDEMIA: ICD-10-CM

## 2025-05-22 DIAGNOSIS — E27.40 ADRENAL INSUFFICIENCY: ICD-10-CM

## 2025-05-22 DIAGNOSIS — E10.65 POORLY CONTROLLED TYPE 1 DIABETES MELLITUS WITH NEUROPATHY (HCC): Primary | ICD-10-CM

## 2025-05-22 DIAGNOSIS — E10.21 DIABETIC NEPHROPATHY ASSOCIATED WITH TYPE 1 DIABETES MELLITUS (HCC): ICD-10-CM

## 2025-05-22 NOTE — PROGRESS NOTES
patient was located in the state where the provider was licensed to provide care.    Total time spent for this encounter:  30 minutes , excluding CGM interpretation time    --Bebe Latif MD on 5/22/2025 at 8:11 AM    An electronic signature was used to authenticate this note.      Return in about 3 months (around 8/22/2025) for diabetes.    Electronically signed by Bebe Latif MD on 5/22/2025 at 8:11 AM

## 2025-05-23 ENCOUNTER — ANESTHESIA EVENT (OUTPATIENT)
Dept: OPERATING ROOM | Age: 65
DRG: 467 | End: 2025-05-23
Payer: MEDICARE

## 2025-05-23 DIAGNOSIS — E03.9 ACQUIRED HYPOTHYROIDISM: ICD-10-CM

## 2025-05-23 LAB — TSH SERPL DL<=0.005 MIU/L-ACNC: 6.1 UIU/ML (ref 0.27–4.2)

## 2025-05-23 NOTE — DISCHARGE INSTR - COC
[facility] Continuity of Care Form    Patient Name:  Elvia Arguelles  : 1960    MRN:  [id number]    Admit date:  (Not on file)  Discharge date:      Code Status Order: Prior  Advance Directives: No    Admitting Physician: Gonsalo White MD  PCP: Meg Ellis, APRN - CNP    Discharging Nurse: Danny  Discharging Hospital Unit/Room#: No information available for this encounter.  Discharging Unit Phone Number: [phone]      Emergency Contact:        Past Surgical History:  Past Surgical History:   Procedure Laterality Date    CARPAL TUNNEL RELEASE Bilateral 2017    CHOLECYSTECTOMY      CT BIOPSY ABDOMEN RETROPERITONEUM  2022    CT BIOPSY ABDOMEN RETROPERITONEUM 2022 [facility] CT SCAN    EYE SURGERY Right     biopsy    HIP SURGERY Right 2024    HIP INTRAMEDULLARY NAIL ROYCE INSERTION performed by Gonsalo White MD at [facility] OR    HYSTERECTOMY (CERVIX STATUS UNKNOWN)      LIPOMA RESECTION      PORT SURGERY Right 2023    RIGHT POWER PORT PLACEMENT performed by Satish Corley MD at [facility] OR    SHOULDER ARTHROSCOPY Right 2018    RIGHT SHOULDER ARTHROSCOPE, SUBACROMIAL DECOMPRESSION, DISTALCLAVICLE EXCISION, CAPSULAR RELEASE, MANIPULATION UNDER ANESTHESIA, DEBRIDEMENT    SHOULDER SURGERY Left     UPPER GASTROINTESTINAL ENDOSCOPY  10/22/2014    UPPER GASTROINTESTINAL ENDOSCOPY N/A 2023    EGD W/EUS FNA performed by Yuri Varela MD at [facility] ENDOSCOPY    UPPER GASTROINTESTINAL ENDOSCOPY  2023    EGD BIOPSY performed by Yuri Varela MD at [facility] ENDOSCOPY    UPPER GASTROINTESTINAL ENDOSCOPY N/A 2024    ESOPHAGOGASTRODUODENOSCOPY BIOPSY performed by Tylor Staley MD at [facility] ENDOSCOPY    US BIOPSY LIVER PERCUTANEOUS  2022    US GUIDED LIVER BIOPSY PERCUTANEOUS 2022 [facility] ULTRASOUND       Immunization History:   Immunization History   Administered Date(s) Administered    COVID-19, MODERNA BLUE border, Primary or Immunocompromised,

## 2025-05-25 ENCOUNTER — TELEPHONE (OUTPATIENT)
Dept: ENDOCRINOLOGY | Age: 65
End: 2025-05-25

## 2025-05-25 DIAGNOSIS — E03.9 ACQUIRED HYPOTHYROIDISM: Primary | ICD-10-CM

## 2025-05-25 RX ORDER — LEVOTHYROXINE SODIUM 25 UG/1
25 TABLET ORAL DAILY
Qty: 30 TABLET | Refills: 3 | Status: SHIPPED | OUTPATIENT
Start: 2025-05-25

## 2025-05-25 NOTE — TELEPHONE ENCOUNTER
I reviewed lab results.  TSH 6.1.  Called patient, advised to start levothyroxine 0.025 mg daily.  Advised to take it 30-60 minutes before meal, 4 hours apart from multivitamin, iron, calcium, fiber supplements.  Patient stated understanding.  5/22/2025 I called Allison, [phone], left my cell phone number to call me back regarding stress dose of steroid given prior to surgery.  I have not received the phone call.  We also faxed endocrinology clearance note with addendum make sure patient receives stress dose of steroids prior to surgery.  I advised patient to let anesthesiologist know that prior to surgery as well.  He can call me if needed.    Please try to contact Marioleoncio if I can speak with her regarding stress dose or ask if she received the message and if there is any problem addressing the recommendation.

## 2025-05-27 ENCOUNTER — ANESTHESIA (OUTPATIENT)
Dept: OPERATING ROOM | Age: 65
DRG: 467 | End: 2025-05-27
Payer: MEDICARE

## 2025-05-27 ENCOUNTER — APPOINTMENT (OUTPATIENT)
Dept: GENERAL RADIOLOGY | Age: 65
DRG: 467 | End: 2025-05-27
Attending: ORTHOPAEDIC SURGERY
Payer: MEDICARE

## 2025-05-27 ENCOUNTER — HOSPITAL ENCOUNTER (INPATIENT)
Age: 65
LOS: 1 days | Discharge: HOME HEALTH | DRG: 467 | End: 2025-05-29
Attending: ORTHOPAEDIC SURGERY | Admitting: ORTHOPAEDIC SURGERY
Payer: MEDICARE

## 2025-05-27 DIAGNOSIS — S72.001K CLOSED DISPLACED FRACTURE OF NECK OF RIGHT FEMUR WITH NONUNION: Primary | ICD-10-CM

## 2025-05-27 LAB
ABO/RH: NORMAL
ANTIBODY SCREEN: NORMAL
DEPRECATED RDW RBC AUTO: 12.7 % (ref 12.4–15.4)
GLUCOSE BLD-MCNC: 135 MG/DL (ref 70–99)
GLUCOSE BLD-MCNC: 157 MG/DL (ref 70–99)
GLUCOSE BLD-MCNC: 177 MG/DL (ref 70–99)
GLUCOSE BLD-MCNC: 352 MG/DL (ref 70–99)
HCT VFR BLD AUTO: 33.1 % (ref 36–48)
HGB BLD-MCNC: 11.1 G/DL (ref 12–16)
MCH RBC QN AUTO: 29.4 PG (ref 26–34)
MCHC RBC AUTO-ENTMCNC: 33.6 G/DL (ref 31–36)
MCV RBC AUTO: 87.5 FL (ref 80–100)
PERFORMED ON: ABNORMAL
PLATELET # BLD AUTO: 254 K/UL (ref 135–450)
PMV BLD AUTO: 8.6 FL (ref 5–10.5)
RBC # BLD AUTO: 3.78 M/UL (ref 4–5.2)
WBC # BLD AUTO: 9.9 K/UL (ref 4–11)

## 2025-05-27 PROCEDURE — 7100000000 HC PACU RECOVERY - FIRST 15 MIN: Performed by: ORTHOPAEDIC SURGERY

## 2025-05-27 PROCEDURE — 2580000003 HC RX 258: Performed by: ANESTHESIOLOGY

## 2025-05-27 PROCEDURE — 2500000003 HC RX 250 WO HCPCS: Performed by: NURSE ANESTHETIST, CERTIFIED REGISTERED

## 2025-05-27 PROCEDURE — 97530 THERAPEUTIC ACTIVITIES: CPT

## 2025-05-27 PROCEDURE — 7100000001 HC PACU RECOVERY - ADDTL 15 MIN: Performed by: ORTHOPAEDIC SURGERY

## 2025-05-27 PROCEDURE — 3700000000 HC ANESTHESIA ATTENDED CARE: Performed by: ORTHOPAEDIC SURGERY

## 2025-05-27 PROCEDURE — 6360000002 HC RX W HCPCS: Performed by: ORTHOPAEDIC SURGERY

## 2025-05-27 PROCEDURE — G0378 HOSPITAL OBSERVATION PER HR: HCPCS

## 2025-05-27 PROCEDURE — 86900 BLOOD TYPING SEROLOGIC ABO: CPT

## 2025-05-27 PROCEDURE — 0SR904Z REPLACEMENT OF RIGHT HIP JOINT WITH CERAMIC ON POLYETHYLENE SYNTHETIC SUBSTITUTE, OPEN APPROACH: ICD-10-PCS | Performed by: ORTHOPAEDIC SURGERY

## 2025-05-27 PROCEDURE — C1776 JOINT DEVICE (IMPLANTABLE): HCPCS | Performed by: ORTHOPAEDIC SURGERY

## 2025-05-27 PROCEDURE — 73502 X-RAY EXAM HIP UNI 2-3 VIEWS: CPT

## 2025-05-27 PROCEDURE — 6360000002 HC RX W HCPCS: Performed by: NURSE ANESTHETIST, CERTIFIED REGISTERED

## 2025-05-27 PROCEDURE — 86850 RBC ANTIBODY SCREEN: CPT

## 2025-05-27 PROCEDURE — 6370000000 HC RX 637 (ALT 250 FOR IP): Performed by: ORTHOPAEDIC SURGERY

## 2025-05-27 PROCEDURE — 85027 COMPLETE CBC AUTOMATED: CPT

## 2025-05-27 PROCEDURE — 86901 BLOOD TYPING SEROLOGIC RH(D): CPT

## 2025-05-27 PROCEDURE — 2580000003 HC RX 258: Performed by: ORTHOPAEDIC SURGERY

## 2025-05-27 PROCEDURE — 2709999900 HC NON-CHARGEABLE SUPPLY: Performed by: ORTHOPAEDIC SURGERY

## 2025-05-27 PROCEDURE — 2720000010 HC SURG SUPPLY STERILE: Performed by: ORTHOPAEDIC SURGERY

## 2025-05-27 PROCEDURE — 6360000002 HC RX W HCPCS: Performed by: ANESTHESIOLOGY

## 2025-05-27 PROCEDURE — 0SP90JZ REMOVAL OF SYNTHETIC SUBSTITUTE FROM RIGHT HIP JOINT, OPEN APPROACH: ICD-10-PCS | Performed by: ORTHOPAEDIC SURGERY

## 2025-05-27 PROCEDURE — 73501 X-RAY EXAM HIP UNI 1 VIEW: CPT

## 2025-05-27 PROCEDURE — C1713 ANCHOR/SCREW BN/BN,TIS/BN: HCPCS | Performed by: ORTHOPAEDIC SURGERY

## 2025-05-27 PROCEDURE — 36415 COLL VENOUS BLD VENIPUNCTURE: CPT

## 2025-05-27 PROCEDURE — 2500000003 HC RX 250 WO HCPCS

## 2025-05-27 PROCEDURE — 2500000003 HC RX 250 WO HCPCS: Performed by: ORTHOPAEDIC SURGERY

## 2025-05-27 PROCEDURE — 97161 PT EVAL LOW COMPLEX 20 MIN: CPT

## 2025-05-27 PROCEDURE — 3700000001 HC ADD 15 MINUTES (ANESTHESIA): Performed by: ORTHOPAEDIC SURGERY

## 2025-05-27 PROCEDURE — 3600000015 HC SURGERY LEVEL 5 ADDTL 15MIN: Performed by: ORTHOPAEDIC SURGERY

## 2025-05-27 PROCEDURE — 3600000005 HC SURGERY LEVEL 5 BASE: Performed by: ORTHOPAEDIC SURGERY

## 2025-05-27 DEVICE — IMPLANTABLE DEVICE: Type: IMPLANTABLE DEVICE | Site: HIP | Status: FUNCTIONAL

## 2025-05-27 DEVICE — SCREW BNE L30MM DIA6.5MM CANC HIP S STL GRIPTION FULL THRD: Type: IMPLANTABLE DEVICE | Site: HIP | Status: FUNCTIONAL

## 2025-05-27 DEVICE — SHELL ACET CMNTLS 48 MM 3 HOLE STRL EMPHASYS LTX: Type: IMPLANTABLE DEVICE | Site: HIP | Status: FUNCTIONAL

## 2025-05-27 RX ORDER — PHENYLEPHRINE HCL IN 0.9% NACL 1 MG/10 ML
SYRINGE (ML) INTRAVENOUS
Status: DISCONTINUED | OUTPATIENT
Start: 2025-05-27 | End: 2025-05-27 | Stop reason: SDUPTHER

## 2025-05-27 RX ORDER — ACETAMINOPHEN 325 MG/1
650 TABLET ORAL EVERY 6 HOURS
Status: DISCONTINUED | OUTPATIENT
Start: 2025-05-27 | End: 2025-05-29 | Stop reason: HOSPADM

## 2025-05-27 RX ORDER — HYDROCORTISONE SODIUM SUCCINATE 100 MG/2ML
INJECTION INTRAMUSCULAR; INTRAVENOUS
Status: DISCONTINUED | OUTPATIENT
Start: 2025-05-27 | End: 2025-05-27 | Stop reason: SDUPTHER

## 2025-05-27 RX ORDER — SODIUM CHLORIDE 0.9 % (FLUSH) 0.9 %
5-40 SYRINGE (ML) INJECTION EVERY 12 HOURS SCHEDULED
Status: DISCONTINUED | OUTPATIENT
Start: 2025-05-27 | End: 2025-05-27 | Stop reason: HOSPADM

## 2025-05-27 RX ORDER — TRANEXAMIC ACID 650 MG/1
1950 TABLET ORAL
Status: DISCONTINUED | OUTPATIENT
Start: 2025-05-27 | End: 2025-05-27 | Stop reason: HOSPADM

## 2025-05-27 RX ORDER — METHOCARBAMOL 100 MG/ML
INJECTION, SOLUTION INTRAMUSCULAR; INTRAVENOUS
Status: DISCONTINUED | OUTPATIENT
Start: 2025-05-27 | End: 2025-05-27 | Stop reason: SDUPTHER

## 2025-05-27 RX ORDER — LISINOPRIL 5 MG/1
5 TABLET ORAL DAILY
Status: DISCONTINUED | OUTPATIENT
Start: 2025-05-28 | End: 2025-05-28

## 2025-05-27 RX ORDER — ONDANSETRON 2 MG/ML
INJECTION INTRAMUSCULAR; INTRAVENOUS
Status: DISCONTINUED | OUTPATIENT
Start: 2025-05-27 | End: 2025-05-27 | Stop reason: SDUPTHER

## 2025-05-27 RX ORDER — PANTOPRAZOLE SODIUM 40 MG/1
40 TABLET, DELAYED RELEASE ORAL
Status: DISCONTINUED | OUTPATIENT
Start: 2025-05-27 | End: 2025-05-29 | Stop reason: HOSPADM

## 2025-05-27 RX ORDER — SODIUM CHLORIDE 0.9 % (FLUSH) 0.9 %
5-40 SYRINGE (ML) INJECTION EVERY 12 HOURS SCHEDULED
Status: DISCONTINUED | OUTPATIENT
Start: 2025-05-27 | End: 2025-05-29 | Stop reason: HOSPADM

## 2025-05-27 RX ORDER — OXYCODONE HYDROCHLORIDE 10 MG/1
10 TABLET ORAL EVERY 4 HOURS PRN
Status: DISCONTINUED | OUTPATIENT
Start: 2025-05-27 | End: 2025-05-29 | Stop reason: HOSPADM

## 2025-05-27 RX ORDER — PROPOFOL 10 MG/ML
INJECTION, EMULSION INTRAVENOUS
Status: DISCONTINUED | OUTPATIENT
Start: 2025-05-27 | End: 2025-05-27 | Stop reason: SDUPTHER

## 2025-05-27 RX ORDER — METHOCARBAMOL 750 MG/1
750 TABLET, FILM COATED ORAL 4 TIMES DAILY PRN
Status: DISCONTINUED | OUTPATIENT
Start: 2025-05-27 | End: 2025-05-29 | Stop reason: HOSPADM

## 2025-05-27 RX ORDER — ONDANSETRON 2 MG/ML
4 INJECTION INTRAMUSCULAR; INTRAVENOUS
Status: COMPLETED | OUTPATIENT
Start: 2025-05-27 | End: 2025-05-27

## 2025-05-27 RX ORDER — SODIUM CHLORIDE 9 MG/ML
INJECTION, SOLUTION INTRAVENOUS PRN
Status: DISCONTINUED | OUTPATIENT
Start: 2025-05-27 | End: 2025-05-27 | Stop reason: HOSPADM

## 2025-05-27 RX ORDER — SODIUM CHLORIDE 0.9 % (FLUSH) 0.9 %
5-40 SYRINGE (ML) INJECTION PRN
Status: DISCONTINUED | OUTPATIENT
Start: 2025-05-27 | End: 2025-05-29 | Stop reason: HOSPADM

## 2025-05-27 RX ORDER — 0.9 % SODIUM CHLORIDE 0.9 %
500 INTRAVENOUS SOLUTION INTRAVENOUS ONCE
Status: DISCONTINUED | OUTPATIENT
Start: 2025-05-27 | End: 2025-05-29 | Stop reason: HOSPADM

## 2025-05-27 RX ORDER — ROCURONIUM BROMIDE 10 MG/ML
INJECTION, SOLUTION INTRAVENOUS
Status: DISCONTINUED | OUTPATIENT
Start: 2025-05-27 | End: 2025-05-27 | Stop reason: SDUPTHER

## 2025-05-27 RX ORDER — ONDANSETRON 4 MG/1
4 TABLET, ORALLY DISINTEGRATING ORAL EVERY 8 HOURS PRN
Status: DISCONTINUED | OUTPATIENT
Start: 2025-05-27 | End: 2025-05-29 | Stop reason: HOSPADM

## 2025-05-27 RX ORDER — INSULIN LISPRO 100 [IU]/ML
0-4 INJECTION, SOLUTION INTRAVENOUS; SUBCUTANEOUS
Status: DISCONTINUED | OUTPATIENT
Start: 2025-05-27 | End: 2025-05-27

## 2025-05-27 RX ORDER — DULOXETIN HYDROCHLORIDE 60 MG/1
60 CAPSULE, DELAYED RELEASE ORAL ONCE
Status: COMPLETED | OUTPATIENT
Start: 2025-05-27 | End: 2025-05-27

## 2025-05-27 RX ORDER — SODIUM CHLORIDE 0.9 % (FLUSH) 0.9 %
5-40 SYRINGE (ML) INJECTION PRN
Status: DISCONTINUED | OUTPATIENT
Start: 2025-05-27 | End: 2025-05-27 | Stop reason: HOSPADM

## 2025-05-27 RX ORDER — LEVOTHYROXINE SODIUM 25 UG/1
25 TABLET ORAL
Status: DISCONTINUED | OUTPATIENT
Start: 2025-05-28 | End: 2025-05-29 | Stop reason: HOSPADM

## 2025-05-27 RX ORDER — NIFEDIPINE 30 MG/1
30 TABLET, EXTENDED RELEASE ORAL 2 TIMES DAILY
Status: DISCONTINUED | OUTPATIENT
Start: 2025-05-27 | End: 2025-05-29 | Stop reason: HOSPADM

## 2025-05-27 RX ORDER — DEXTROSE MONOHYDRATE 100 MG/ML
INJECTION, SOLUTION INTRAVENOUS CONTINUOUS PRN
Status: DISCONTINUED | OUTPATIENT
Start: 2025-05-27 | End: 2025-05-29 | Stop reason: HOSPADM

## 2025-05-27 RX ORDER — SODIUM CHLORIDE 450 MG/100ML
INJECTION, SOLUTION INTRAVENOUS CONTINUOUS
Status: DISCONTINUED | OUTPATIENT
Start: 2025-05-27 | End: 2025-05-28

## 2025-05-27 RX ORDER — CALCIUM CARBONATE 500 MG/1
500 TABLET, CHEWABLE ORAL 3 TIMES DAILY PRN
Status: DISCONTINUED | OUTPATIENT
Start: 2025-05-27 | End: 2025-05-29 | Stop reason: HOSPADM

## 2025-05-27 RX ORDER — MORPHINE SULFATE 2 MG/ML
2 INJECTION, SOLUTION INTRAMUSCULAR; INTRAVENOUS
Status: DISCONTINUED | OUTPATIENT
Start: 2025-05-27 | End: 2025-05-29 | Stop reason: HOSPADM

## 2025-05-27 RX ORDER — 0.9 % SODIUM CHLORIDE 0.9 %
500 INTRAVENOUS SOLUTION INTRAVENOUS ONCE
Status: COMPLETED | OUTPATIENT
Start: 2025-05-27 | End: 2025-05-28

## 2025-05-27 RX ORDER — INSULIN GLARGINE 100 [IU]/ML
0.25 INJECTION, SOLUTION SUBCUTANEOUS NIGHTLY
Status: DISCONTINUED | OUTPATIENT
Start: 2025-05-27 | End: 2025-05-27

## 2025-05-27 RX ORDER — ACETAMINOPHEN 500 MG
1000 TABLET ORAL ONCE
Status: COMPLETED | OUTPATIENT
Start: 2025-05-27 | End: 2025-05-27

## 2025-05-27 RX ORDER — GLUCAGON 1 MG/ML
1 KIT INJECTION PRN
Status: DISCONTINUED | OUTPATIENT
Start: 2025-05-27 | End: 2025-05-29 | Stop reason: HOSPADM

## 2025-05-27 RX ORDER — OXYCODONE HYDROCHLORIDE 5 MG/1
5 TABLET ORAL EVERY 4 HOURS PRN
Status: DISCONTINUED | OUTPATIENT
Start: 2025-05-27 | End: 2025-05-29 | Stop reason: HOSPADM

## 2025-05-27 RX ORDER — ROPINIROLE 1 MG/1
1 TABLET, FILM COATED ORAL NIGHTLY
Status: DISCONTINUED | OUTPATIENT
Start: 2025-05-27 | End: 2025-05-29 | Stop reason: HOSPADM

## 2025-05-27 RX ORDER — ASPIRIN 81 MG/1
81 TABLET ORAL 2 TIMES DAILY
Status: DISCONTINUED | OUTPATIENT
Start: 2025-05-28 | End: 2025-05-29 | Stop reason: HOSPADM

## 2025-05-27 RX ORDER — INSULIN LISPRO 100 [IU]/ML
0.08 INJECTION, SOLUTION INTRAVENOUS; SUBCUTANEOUS
Status: DISCONTINUED | OUTPATIENT
Start: 2025-05-27 | End: 2025-05-27

## 2025-05-27 RX ORDER — MAGNESIUM HYDROXIDE 1200 MG/15ML
LIQUID ORAL CONTINUOUS PRN
Status: COMPLETED | OUTPATIENT
Start: 2025-05-27 | End: 2025-05-27

## 2025-05-27 RX ORDER — OXYCODONE HYDROCHLORIDE 5 MG/1
5 TABLET ORAL PRN
Status: DISCONTINUED | OUTPATIENT
Start: 2025-05-27 | End: 2025-05-27 | Stop reason: HOSPADM

## 2025-05-27 RX ORDER — LIDOCAINE HYDROCHLORIDE 20 MG/ML
INJECTION, SOLUTION EPIDURAL; INFILTRATION; INTRACAUDAL; PERINEURAL
Status: DISCONTINUED | OUTPATIENT
Start: 2025-05-27 | End: 2025-05-27 | Stop reason: SDUPTHER

## 2025-05-27 RX ORDER — MORPHINE SULFATE 4 MG/ML
4 INJECTION, SOLUTION INTRAMUSCULAR; INTRAVENOUS
Status: DISCONTINUED | OUTPATIENT
Start: 2025-05-27 | End: 2025-05-29 | Stop reason: HOSPADM

## 2025-05-27 RX ORDER — ONDANSETRON 2 MG/ML
4 INJECTION INTRAMUSCULAR; INTRAVENOUS EVERY 6 HOURS PRN
Status: DISCONTINUED | OUTPATIENT
Start: 2025-05-27 | End: 2025-05-29 | Stop reason: HOSPADM

## 2025-05-27 RX ORDER — NALOXONE HYDROCHLORIDE 0.4 MG/ML
INJECTION, SOLUTION INTRAMUSCULAR; INTRAVENOUS; SUBCUTANEOUS PRN
Status: DISCONTINUED | OUTPATIENT
Start: 2025-05-27 | End: 2025-05-27 | Stop reason: HOSPADM

## 2025-05-27 RX ORDER — POLYETHYLENE GLYCOL 3350 17 G/17G
17 POWDER, FOR SOLUTION ORAL DAILY PRN
Status: DISCONTINUED | OUTPATIENT
Start: 2025-05-27 | End: 2025-05-29 | Stop reason: HOSPADM

## 2025-05-27 RX ORDER — HYDROXYZINE PAMOATE 25 MG/1
25 CAPSULE ORAL 3 TIMES DAILY PRN
Status: DISCONTINUED | OUTPATIENT
Start: 2025-05-27 | End: 2025-05-29 | Stop reason: HOSPADM

## 2025-05-27 RX ORDER — OXYCODONE HYDROCHLORIDE 10 MG/1
10 TABLET ORAL PRN
Status: DISCONTINUED | OUTPATIENT
Start: 2025-05-27 | End: 2025-05-27 | Stop reason: HOSPADM

## 2025-05-27 RX ORDER — SODIUM CHLORIDE 9 MG/ML
INJECTION, SOLUTION INTRAVENOUS PRN
Status: DISCONTINUED | OUTPATIENT
Start: 2025-05-27 | End: 2025-05-29 | Stop reason: HOSPADM

## 2025-05-27 RX ORDER — FENTANYL CITRATE 50 UG/ML
INJECTION, SOLUTION INTRAMUSCULAR; INTRAVENOUS
Status: DISCONTINUED | OUTPATIENT
Start: 2025-05-27 | End: 2025-05-27 | Stop reason: SDUPTHER

## 2025-05-27 RX ORDER — LEVETIRACETAM 500 MG/1
500 TABLET ORAL 2 TIMES DAILY
Status: DISCONTINUED | OUTPATIENT
Start: 2025-05-27 | End: 2025-05-29 | Stop reason: HOSPADM

## 2025-05-27 RX ORDER — ESCITALOPRAM OXALATE 10 MG/1
20 TABLET ORAL DAILY
Status: DISCONTINUED | OUTPATIENT
Start: 2025-05-28 | End: 2025-05-29 | Stop reason: HOSPADM

## 2025-05-27 RX ADMIN — HYDROMORPHONE HYDROCHLORIDE 0.5 MG: 1 INJECTION, SOLUTION INTRAMUSCULAR; INTRAVENOUS; SUBCUTANEOUS at 12:03

## 2025-05-27 RX ADMIN — HYDROCORTISONE SODIUM SUCCINATE 100 MG: 100 INJECTION, POWDER, FOR SOLUTION INTRAMUSCULAR; INTRAVENOUS at 11:30

## 2025-05-27 RX ADMIN — ROCURONIUM BROMIDE 35 MG: 10 SOLUTION INTRAVENOUS at 11:22

## 2025-05-27 RX ADMIN — HYDROMORPHONE HYDROCHLORIDE 0.5 MG: 1 INJECTION, SOLUTION INTRAMUSCULAR; INTRAVENOUS; SUBCUTANEOUS at 13:52

## 2025-05-27 RX ADMIN — FENTANYL CITRATE 50 MCG: 50 INJECTION INTRAMUSCULAR; INTRAVENOUS at 11:21

## 2025-05-27 RX ADMIN — HYDROMORPHONE HYDROCHLORIDE 0.5 MG: 1 INJECTION, SOLUTION INTRAMUSCULAR; INTRAVENOUS; SUBCUTANEOUS at 11:45

## 2025-05-27 RX ADMIN — ROCURONIUM BROMIDE 5 MG: 10 SOLUTION INTRAVENOUS at 11:21

## 2025-05-27 RX ADMIN — ACETAMINOPHEN 650 MG: 325 TABLET ORAL at 22:25

## 2025-05-27 RX ADMIN — SODIUM CHLORIDE 500 ML: 0.9 INJECTION, SOLUTION INTRAVENOUS at 22:44

## 2025-05-27 RX ADMIN — TRANEXAMIC ACID 1950 MG: 650 TABLET ORAL at 10:27

## 2025-05-27 RX ADMIN — OXYCODONE HYDROCHLORIDE 5 MG: 5 TABLET ORAL at 15:55

## 2025-05-27 RX ADMIN — FENTANYL CITRATE 50 MCG: 50 INJECTION INTRAMUSCULAR; INTRAVENOUS at 11:43

## 2025-05-27 RX ADMIN — Medication 50 MCG: at 11:57

## 2025-05-27 RX ADMIN — ACETAMINOPHEN 650 MG: 325 TABLET ORAL at 15:54

## 2025-05-27 RX ADMIN — DULOXETINE 60 MG: 60 CAPSULE, DELAYED RELEASE ORAL at 10:27

## 2025-05-27 RX ADMIN — SODIUM CHLORIDE: 9 INJECTION, SOLUTION INTRAVENOUS at 12:48

## 2025-05-27 RX ADMIN — SODIUM CHLORIDE 2000 MG: 9 INJECTION, SOLUTION INTRAVENOUS at 11:24

## 2025-05-27 RX ADMIN — HYDROMORPHONE HYDROCHLORIDE 0.5 MG: 1 INJECTION, SOLUTION INTRAMUSCULAR; INTRAVENOUS; SUBCUTANEOUS at 14:09

## 2025-05-27 RX ADMIN — PROPOFOL 120 MG: 10 INJECTION, EMULSION INTRAVENOUS at 11:21

## 2025-05-27 RX ADMIN — ONDANSETRON 4 MG: 2 INJECTION, SOLUTION INTRAMUSCULAR; INTRAVENOUS at 13:34

## 2025-05-27 RX ADMIN — HYDROMORPHONE HYDROCHLORIDE 0.5 MG: 1 INJECTION, SOLUTION INTRAMUSCULAR; INTRAVENOUS; SUBCUTANEOUS at 14:30

## 2025-05-27 RX ADMIN — ONDANSETRON 4 MG: 2 INJECTION INTRAMUSCULAR; INTRAVENOUS at 11:30

## 2025-05-27 RX ADMIN — ROPINIROLE HYDROCHLORIDE 1 MG: 1 TABLET, FILM COATED ORAL at 22:25

## 2025-05-27 RX ADMIN — LEVETIRACETAM 500 MG: 500 TABLET, FILM COATED ORAL at 22:26

## 2025-05-27 RX ADMIN — ROCURONIUM BROMIDE 10 MG: 10 SOLUTION INTRAVENOUS at 11:45

## 2025-05-27 RX ADMIN — SODIUM CHLORIDE: 0.45 INJECTION, SOLUTION INTRAVENOUS at 15:58

## 2025-05-27 RX ADMIN — Medication 100 MCG: at 12:12

## 2025-05-27 RX ADMIN — SODIUM CHLORIDE: 9 INJECTION, SOLUTION INTRAVENOUS at 10:28

## 2025-05-27 RX ADMIN — ACETAMINOPHEN 1000 MG: 500 TABLET ORAL at 10:27

## 2025-05-27 RX ADMIN — LIDOCAINE HYDROCHLORIDE 50 MG: 20 INJECTION, SOLUTION EPIDURAL; INFILTRATION; INTRACAUDAL; PERINEURAL at 11:21

## 2025-05-27 RX ADMIN — ROCURONIUM BROMIDE 20 MG: 10 SOLUTION INTRAVENOUS at 12:07

## 2025-05-27 RX ADMIN — METHOCARBAMOL 750 MG: 750 TABLET ORAL at 18:36

## 2025-05-27 RX ADMIN — PANTOPRAZOLE SODIUM 40 MG: 40 TABLET, DELAYED RELEASE ORAL at 15:55

## 2025-05-27 RX ADMIN — WATER 2000 MG: 1 INJECTION INTRAMUSCULAR; INTRAVENOUS; SUBCUTANEOUS at 18:37

## 2025-05-27 RX ADMIN — SODIUM CHLORIDE, PRESERVATIVE FREE 10 ML: 5 INJECTION INTRAVENOUS at 22:29

## 2025-05-27 RX ADMIN — SUGAMMADEX 200 MG: 100 INJECTION, SOLUTION INTRAVENOUS at 13:06

## 2025-05-27 RX ADMIN — METHOCARBAMOL 500 MG: 100 INJECTION INTRAMUSCULAR; INTRAVENOUS at 11:43

## 2025-05-27 RX ADMIN — HYDROMORPHONE HYDROCHLORIDE 0.5 MG: 1 INJECTION, SOLUTION INTRAMUSCULAR; INTRAVENOUS; SUBCUTANEOUS at 13:24

## 2025-05-27 ASSESSMENT — PAIN DESCRIPTION - ONSET
ONSET: ON-GOING
ONSET: GRADUAL
ONSET: ON-GOING

## 2025-05-27 ASSESSMENT — PAIN DESCRIPTION - ORIENTATION
ORIENTATION: RIGHT

## 2025-05-27 ASSESSMENT — PAIN SCALES - GENERAL
PAINLEVEL_OUTOF10: 10
PAINLEVEL_OUTOF10: 3
PAINLEVEL_OUTOF10: 8
PAINLEVEL_OUTOF10: 8
PAINLEVEL_OUTOF10: 4
PAINLEVEL_OUTOF10: 5
PAINLEVEL_OUTOF10: 6
PAINLEVEL_OUTOF10: 8
PAINLEVEL_OUTOF10: 8
PAINLEVEL_OUTOF10: 7
PAINLEVEL_OUTOF10: 5
PAINLEVEL_OUTOF10: 4
PAINLEVEL_OUTOF10: 5

## 2025-05-27 ASSESSMENT — PAIN DESCRIPTION - DESCRIPTORS
DESCRIPTORS: ACHING
DESCRIPTORS: ACHING
DESCRIPTORS: ACHING;SPASM
DESCRIPTORS: DISCOMFORT;ACHING
DESCRIPTORS: ACHING
DESCRIPTORS: ACHING;SORE
DESCRIPTORS: ACHING
DESCRIPTORS: ACHING
DESCRIPTORS: SPASM;ACHING
DESCRIPTORS: ACHING
DESCRIPTORS: ACHING;DISCOMFORT
DESCRIPTORS: ACHING

## 2025-05-27 ASSESSMENT — PAIN DESCRIPTION - PAIN TYPE
TYPE: SURGICAL PAIN

## 2025-05-27 ASSESSMENT — PAIN DESCRIPTION - DIRECTION
RADIATING_TOWARDS: R KNEE
RADIATING_TOWARDS: RLE

## 2025-05-27 ASSESSMENT — PAIN DESCRIPTION - FREQUENCY
FREQUENCY: CONTINUOUS

## 2025-05-27 ASSESSMENT — PAIN - FUNCTIONAL ASSESSMENT
PAIN_FUNCTIONAL_ASSESSMENT: PREVENTS OR INTERFERES SOME ACTIVE ACTIVITIES AND ADLS
PAIN_FUNCTIONAL_ASSESSMENT: ACTIVITIES ARE NOT PREVENTED
PAIN_FUNCTIONAL_ASSESSMENT: PREVENTS OR INTERFERES SOME ACTIVE ACTIVITIES AND ADLS
PAIN_FUNCTIONAL_ASSESSMENT: ADULT NONVERBAL PAIN SCALE (NPVS)
PAIN_FUNCTIONAL_ASSESSMENT: PREVENTS OR INTERFERES SOME ACTIVE ACTIVITIES AND ADLS
PAIN_FUNCTIONAL_ASSESSMENT: 0-10
PAIN_FUNCTIONAL_ASSESSMENT: PREVENTS OR INTERFERES SOME ACTIVE ACTIVITIES AND ADLS
PAIN_FUNCTIONAL_ASSESSMENT: PREVENTS OR INTERFERES SOME ACTIVE ACTIVITIES AND ADLS

## 2025-05-27 ASSESSMENT — PAIN DESCRIPTION - LOCATION
LOCATION: HIP

## 2025-05-27 ASSESSMENT — ENCOUNTER SYMPTOMS: SHORTNESS OF BREATH: 0

## 2025-05-27 ASSESSMENT — LIFESTYLE VARIABLES: SMOKING_STATUS: 0

## 2025-05-27 NOTE — H&P
Update History & Physical    The patient's History and Physical of May 9, 2025 was reviewed with the patient and I examined the patient. There was no change. The surgical site was confirmed by the patient and me.       Plan: The risks, benefits, expected outcome, and alternative to the recommended procedure have been discussed with the patient. Patient understands and wants to proceed with the procedure.     Electronically signed by AMADO SILVA MD on 5/27/2025 at 11:12 AM

## 2025-05-27 NOTE — TELEPHONE ENCOUNTER
Please call patient and ask how she did with the surgery.  Let me know if she received stress dose of hydrocortisone.  Any problems, let me know.

## 2025-05-27 NOTE — OP NOTE
Patient: Elvia Arguelles  YOB: 1960  MRN: [id number]    DATE OF PROCEDURE: 5/27/2025      PREOPERATIVE DIAGNOSIS: Right basicervical femoral neck fracture nonunion     POSTOPERATIVE DIAGNOSES: Same     OPERATION PERFORMED: Conversion of prior hip surgery to right total hip arthroplasty    SURGEON:  Baldemar White MD     ASSISTANTS:  MADAN Aguilera     ESTIMATED BLOOD LOSS:  250 mL     IMPLANTS:  Depuy Emphasys cup, 48 mm  36 mm polyethylene liner, +4 mm offset  Depuy Reclaim monobloc stem, size 15 standard offset  36 + 8.5 mm ceramic femoral head    INDICATIONS: This is a 64 y.o. female who sustained a right basicervical femoral neck fracture 1 year ago and underwent fixation with a cephalomedullary nail.  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  We discussed the diagnosis and treatment options and I recommended removal of hardware and conversion to total hip arthroplasty. The operative procedure, alternatives, and risks were discussed in detail with the patient.  The risks include but are not limited to: Infection, vessel injury, nerve injury, DVT, pulmonary embolism, implant loosening, need for revision surgery, leg length discrepancy, dislocation, foot drop, intraoperative fracture. Informed consent for surgery was signed by the patient.     OPERATIVE PROCEDURE: The patient was seen in the preoperative holding area where the site of surgery was marked and informed consent was confirmed. The patient was brought back to the operating room by OR personnel. General anesthesia was administered. The patient was placed in a lateral position on the operating table.  All bony prominences were well-padded. The right lower extremity was then prepped and draped in a standard and sterile fashion. A final and formal timeout was then performed which confirmed the correct patient, correct position, and correct site of surgery. IV antibiotics were administered within 1 hour of the skin incision.    A lateral

## 2025-05-27 NOTE — ANESTHESIA PRE PROCEDURE
Department of Anesthesiology  Preprocedure Note       Name:  Elvia Arguelles   Age:  64 y.o.  :  1960                                          MRN:  [id number]         Date:  2025      Surgeon: Surgeon(s):  Gonsalo White MD    Procedure: Procedure(s):  RIGHT  ANTERIOR TOTAL HIP REPLACEMENT AND REMOVAL OF HARDWARE    Medications prior to admission:   Prior to Admission medications    Medication Sig Start Date End Date Taking? Authorizing Provider   ferrous sulfate 324 (65 Fe) MG EC tablet Take 1 tablet by mouth daily 25  Yes Meg Ellis APRN - CNP   hydrocortisone (CORTEF) 10 MG tablet TAKE 1 TABLET BY MOUTH DAILY 25  Yes Bebe Latif MD   hydrocortisone (CORTEF) 5 MG tablet TAKE 1 TABLET BY MOUTH DAILY 25  Yes Bebe Latif MD   insulin lispro (HUMALOG) 100 UNIT/ML SOLN injection vial USE IN INSULIN PUMP; TOTAL DAILY DOSE IS 30 UNITS. VIAL EXPIRES 28 DAYS AFTER OPENING. 25  Yes Bebe Latif MD   escitalopram (LEXAPRO) 20 MG tablet TAKE 1 TABLET BY MOUTH DAILY 4/15/25  Yes Meg Ellis APRN - CNP   NIFEdipine (PROCARDIA XL) 30 MG extended release tablet TAKE 1 TABLET BY MOUTH IN THE MORNING AND AT BEDTIME 4/15/25  Yes Meg Ellis APRN - CNP   lisinopril (PRINIVIL;ZESTRIL) 5 MG tablet Take 1 tablet by mouth daily 25  Yes Bebe Latif MD   methocarbamol (ROBAXIN) 750 MG tablet TAKE 1 TABLET BY MOUTH FOUR TIMES DAILY  Patient taking differently: Take 1 tablet by mouth 4 times daily as needed (muscle spasms) 3/18/25  Yes Meg Ellis APRN - CNP   pantoprazole (PROTONIX) 40 MG tablet TAKE 1 TABLET BY MOUTH TWICE DAILY BEFORE MEALS 3/17/25  Yes Meg Ellis APRN - CNP   oxyCODONE (ROXICODONE) 5 MG immediate release tablet Take 1 tablet by mouth every 4-6 hours as needed for Pain.   Yes Becky Vega MD   hydrOXYzine pamoate (VISTARIL) 25 MG capsule Take 1 capsule by mouth 3 times daily as needed for Itching

## 2025-05-27 NOTE — ANESTHESIA POSTPROCEDURE EVALUATION
Department of Anesthesiology  Postprocedure Note    Patient: Elvia Arguelles  MRN: [id number]  YOB: 1960  Date of evaluation: 5/27/2025    Procedure Summary       Date: 05/27/25 Room / Location: [address]    Anesthesia Start: 1115 Anesthesia Stop: 1315    Procedure: RIGHT  LATERAL TOTAL HIP REPLACEMENT AND REMOVAL OF HARDWARE (Right: Hip) Diagnosis:       Osteoarthritis of right hip      (Osteoarthritis of right hip [M16.11])    Surgeons: Gonsalo White MD Responsible Provider: Partha Talbert MD    Anesthesia Type: general ASA Status: 3            Anesthesia Type: No value filed.    Rosaura Phase I: Rosaura Score: 9    Rosaura Phase II:      Anesthesia Post Evaluation    Patient location during evaluation: PACU  Level of consciousness: awake and alert  Airway patency: patent  Nausea & Vomiting: no nausea and no vomiting  Cardiovascular status: blood pressure returned to baseline  Respiratory status: acceptable  Hydration status: euvolemic  Comments: Postoperative Anesthesia Note    Name:    Elvia Arguelles  MRN:      [id number]    Patient Vitals in the past 12 hrs:  05/27/25 1519, BP:(!) 115/54, Temp:97.7 °F (36.5 °C), Pulse:87, Resp:14, SpO2:98 %  05/27/25 1450, Pulse:88, Resp:10, SpO2:97 %  05/27/25 1449, Pulse:86, Resp:10, SpO2:96 %  05/27/25 1448, Pulse:87, Resp:10, SpO2:99 %  05/27/25 1447, BP:(!) 115/51, Pulse:87, Resp:10, SpO2:98 %  05/27/25 1446, Pulse:88, Resp:10, SpO2:98 %  05/27/25 1445, BP:(!) 63/53, Pulse:87, Resp:12, SpO2:98 %  05/27/25 1444, Pulse:88, Resp:12, SpO2:95 %  05/27/25 1443, Pulse:88, Resp:10, SpO2:97 %  05/27/25 1442, Pulse:87, Resp:12, SpO2:97 %  05/27/25 1441, Pulse:87, Resp:13, SpO2:99 %  05/27/25 1440, Pulse:87, Resp:12, SpO2:96 %  05/27/25 1439, Pulse:87, Resp:16, SpO2:95 %  05/27/25 1438, Pulse:87, Resp:11, SpO2:96 %  05/27/25 1437, Pulse:88, Resp:17, SpO2:96 %  05/27/25 1436, Pulse:88, Resp:17, SpO2:97 %  05/27/25 1435,

## 2025-05-28 PROBLEM — M16.11 UNILATERAL PRIMARY OSTEOARTHRITIS, RIGHT HIP: Status: ACTIVE | Noted: 2025-05-28

## 2025-05-28 LAB
ANION GAP SERPL CALCULATED.3IONS-SCNC: 13 MMOL/L (ref 3–16)
BUN SERPL-MCNC: 48 MG/DL (ref 7–20)
CALCIUM SERPL-MCNC: 8.5 MG/DL (ref 8.3–10.6)
CHLORIDE SERPL-SCNC: 111 MMOL/L (ref 99–110)
CO2 SERPL-SCNC: 14 MMOL/L (ref 21–32)
CREAT SERPL-MCNC: 2.3 MG/DL (ref 0.6–1.2)
EST. AVERAGE GLUCOSE BLD GHB EST-MCNC: 148.5 MG/DL
GFR SERPLBLD CREATININE-BSD FMLA CKD-EPI: 23 ML/MIN/{1.73_M2}
GLUCOSE BLD-MCNC: 122 MG/DL (ref 70–99)
GLUCOSE BLD-MCNC: 136 MG/DL (ref 70–99)
GLUCOSE BLD-MCNC: 171 MG/DL (ref 70–99)
GLUCOSE BLD-MCNC: 89 MG/DL (ref 70–99)
GLUCOSE SERPL-MCNC: 151 MG/DL (ref 70–99)
HBA1C MFR BLD: 6.8 %
PERFORMED ON: ABNORMAL
PERFORMED ON: NORMAL
POTASSIUM SERPL-SCNC: 5.7 MMOL/L (ref 3.5–5.1)
SODIUM SERPL-SCNC: 138 MMOL/L (ref 136–145)

## 2025-05-28 PROCEDURE — 2500000003 HC RX 250 WO HCPCS: Performed by: INTERNAL MEDICINE

## 2025-05-28 PROCEDURE — 36415 COLL VENOUS BLD VENIPUNCTURE: CPT

## 2025-05-28 PROCEDURE — 9990000010 HC NO CHARGE VISIT

## 2025-05-28 PROCEDURE — 6370000000 HC RX 637 (ALT 250 FOR IP): Performed by: INTERNAL MEDICINE

## 2025-05-28 PROCEDURE — G0378 HOSPITAL OBSERVATION PER HR: HCPCS

## 2025-05-28 PROCEDURE — 2580000003 HC RX 258: Performed by: INTERNAL MEDICINE

## 2025-05-28 PROCEDURE — 94760 N-INVAS EAR/PLS OXIMETRY 1: CPT

## 2025-05-28 PROCEDURE — 80048 BASIC METABOLIC PNL TOTAL CA: CPT

## 2025-05-28 PROCEDURE — 6370000000 HC RX 637 (ALT 250 FOR IP): Performed by: ORTHOPAEDIC SURGERY

## 2025-05-28 PROCEDURE — 6360000002 HC RX W HCPCS: Performed by: ORTHOPAEDIC SURGERY

## 2025-05-28 PROCEDURE — 1200000000 HC SEMI PRIVATE

## 2025-05-28 PROCEDURE — 2580000003 HC RX 258: Performed by: ORTHOPAEDIC SURGERY

## 2025-05-28 PROCEDURE — 83036 HEMOGLOBIN GLYCOSYLATED A1C: CPT

## 2025-05-28 PROCEDURE — 97535 SELF CARE MNGMENT TRAINING: CPT

## 2025-05-28 PROCEDURE — 2500000003 HC RX 250 WO HCPCS: Performed by: ORTHOPAEDIC SURGERY

## 2025-05-28 PROCEDURE — 6360000002 HC RX W HCPCS: Performed by: NURSE PRACTITIONER

## 2025-05-28 PROCEDURE — 97166 OT EVAL MOD COMPLEX 45 MIN: CPT

## 2025-05-28 RX ORDER — APREPITANT 40 MG/1
40 CAPSULE ORAL ONCE
Status: COMPLETED | OUTPATIENT
Start: 2025-05-28 | End: 2025-05-28

## 2025-05-28 RX ORDER — CEFUROXIME AXETIL 250 MG/1
250 TABLET ORAL 2 TIMES DAILY
Qty: 14 TABLET | Refills: 0 | Status: SHIPPED | OUTPATIENT
Start: 2025-05-28 | End: 2025-05-28

## 2025-05-28 RX ORDER — OXYCODONE HYDROCHLORIDE 5 MG/1
5-10 TABLET ORAL EVERY 6 HOURS PRN
Qty: 1 TABLET | Refills: 0 | Status: SHIPPED | OUTPATIENT
Start: 2025-05-28 | End: 2025-05-28

## 2025-05-28 RX ORDER — ASPIRIN 81 MG/1
81 TABLET ORAL 2 TIMES DAILY
Qty: 60 TABLET | Refills: 0 | Status: SHIPPED | OUTPATIENT
Start: 2025-05-28 | End: 2025-05-29

## 2025-05-28 RX ORDER — CEFUROXIME AXETIL 250 MG/1
250 TABLET ORAL 2 TIMES DAILY
Qty: 14 TABLET | Refills: 0 | Status: SHIPPED | OUTPATIENT
Start: 2025-05-28 | End: 2025-05-29

## 2025-05-28 RX ORDER — OXYCODONE HYDROCHLORIDE 5 MG/1
5-10 TABLET ORAL EVERY 6 HOURS PRN
Qty: 1 TABLET | Refills: 0 | Status: SHIPPED | OUTPATIENT
Start: 2025-05-28 | End: 2025-06-02

## 2025-05-28 RX ORDER — ASPIRIN 81 MG/1
81 TABLET ORAL 2 TIMES DAILY
Qty: 60 TABLET | Refills: 0 | Status: SHIPPED | OUTPATIENT
Start: 2025-05-28 | End: 2025-05-28

## 2025-05-28 RX ORDER — OXYCODONE HYDROCHLORIDE 5 MG/1
5-10 TABLET ORAL EVERY 6 HOURS PRN
Qty: 40 TABLET | Refills: 0 | Status: SHIPPED | OUTPATIENT
Start: 2025-05-28 | End: 2025-05-28

## 2025-05-28 RX ADMIN — LEVOTHYROXINE SODIUM 25 MCG: 0.03 TABLET ORAL at 06:02

## 2025-05-28 RX ADMIN — LISINOPRIL 5 MG: 5 TABLET ORAL at 09:04

## 2025-05-28 RX ADMIN — ACETAMINOPHEN 650 MG: 325 TABLET ORAL at 04:34

## 2025-05-28 RX ADMIN — SODIUM CHLORIDE, PRESERVATIVE FREE 10 ML: 5 INJECTION INTRAVENOUS at 21:36

## 2025-05-28 RX ADMIN — ONDANSETRON 4 MG: 2 INJECTION, SOLUTION INTRAMUSCULAR; INTRAVENOUS at 04:44

## 2025-05-28 RX ADMIN — HYDROCORTISONE 15 MG: 10 TABLET ORAL at 11:04

## 2025-05-28 RX ADMIN — OXYCODONE HYDROCHLORIDE 5 MG: 5 TABLET ORAL at 03:24

## 2025-05-28 RX ADMIN — ASPIRIN 81 MG: 81 TABLET, COATED ORAL at 06:02

## 2025-05-28 RX ADMIN — ACETAMINOPHEN 650 MG: 325 TABLET ORAL at 12:17

## 2025-05-28 RX ADMIN — NIFEDIPINE 30 MG: 30 TABLET, FILM COATED, EXTENDED RELEASE ORAL at 01:16

## 2025-05-28 RX ADMIN — MORPHINE SULFATE 4 MG: 4 INJECTION, SOLUTION INTRAMUSCULAR; INTRAVENOUS at 01:19

## 2025-05-28 RX ADMIN — NIFEDIPINE 30 MG: 30 TABLET, FILM COATED, EXTENDED RELEASE ORAL at 21:32

## 2025-05-28 RX ADMIN — SODIUM ZIRCONIUM CYCLOSILICATE 10 G: 10 POWDER, FOR SUSPENSION ORAL at 15:23

## 2025-05-28 RX ADMIN — NIFEDIPINE 30 MG: 30 TABLET, FILM COATED, EXTENDED RELEASE ORAL at 09:05

## 2025-05-28 RX ADMIN — LEVETIRACETAM 500 MG: 500 TABLET, FILM COATED ORAL at 09:12

## 2025-05-28 RX ADMIN — OXYCODONE HYDROCHLORIDE 10 MG: 10 TABLET ORAL at 17:56

## 2025-05-28 RX ADMIN — PANTOPRAZOLE SODIUM 40 MG: 40 TABLET, DELAYED RELEASE ORAL at 15:23

## 2025-05-28 RX ADMIN — METHOCARBAMOL 750 MG: 750 TABLET ORAL at 04:35

## 2025-05-28 RX ADMIN — PANTOPRAZOLE SODIUM 40 MG: 40 TABLET, DELAYED RELEASE ORAL at 06:02

## 2025-05-28 RX ADMIN — SODIUM BICARBONATE: 84 INJECTION, SOLUTION INTRAVENOUS at 16:33

## 2025-05-28 RX ADMIN — ESCITALOPRAM OXALATE 20 MG: 10 TABLET ORAL at 09:05

## 2025-05-28 RX ADMIN — OXYCODONE HYDROCHLORIDE 10 MG: 10 TABLET ORAL at 09:05

## 2025-05-28 RX ADMIN — OXYCODONE HYDROCHLORIDE 10 MG: 10 TABLET ORAL at 23:25

## 2025-05-28 RX ADMIN — Medication 12.5 MG: at 11:04

## 2025-05-28 RX ADMIN — WATER 2000 MG: 1 INJECTION INTRAMUSCULAR; INTRAVENOUS; SUBCUTANEOUS at 03:07

## 2025-05-28 RX ADMIN — SODIUM CHLORIDE: 0.45 INJECTION, SOLUTION INTRAVENOUS at 03:29

## 2025-05-28 RX ADMIN — ACETAMINOPHEN 650 MG: 325 TABLET ORAL at 23:27

## 2025-05-28 RX ADMIN — APREPITANT 40 MG: 40 CAPSULE ORAL at 09:03

## 2025-05-28 RX ADMIN — ONDANSETRON 4 MG: 2 INJECTION, SOLUTION INTRAMUSCULAR; INTRAVENOUS at 23:23

## 2025-05-28 RX ADMIN — LEVETIRACETAM 500 MG: 500 TABLET, FILM COATED ORAL at 21:33

## 2025-05-28 RX ADMIN — HYDROXYZINE PAMOATE 25 MG: 25 CAPSULE ORAL at 09:05

## 2025-05-28 RX ADMIN — ROPINIROLE HYDROCHLORIDE 1 MG: 1 TABLET, FILM COATED ORAL at 21:32

## 2025-05-28 RX ADMIN — ASPIRIN 81 MG: 81 TABLET, COATED ORAL at 21:32

## 2025-05-28 RX ADMIN — ONDANSETRON 4 MG: 2 INJECTION, SOLUTION INTRAMUSCULAR; INTRAVENOUS at 15:25

## 2025-05-28 ASSESSMENT — PAIN DESCRIPTION - DESCRIPTORS
DESCRIPTORS: SORE
DESCRIPTORS: ACHING
DESCRIPTORS: SORE
DESCRIPTORS: ACHING
DESCRIPTORS: ACHING;THROBBING
DESCRIPTORS: ACHING
DESCRIPTORS: SORE
DESCRIPTORS: ACHING
DESCRIPTORS: SORE
DESCRIPTORS: SORE

## 2025-05-28 ASSESSMENT — PAIN DESCRIPTION - ORIENTATION
ORIENTATION: RIGHT
ORIENTATION: MID;RIGHT
ORIENTATION: MID;RIGHT

## 2025-05-28 ASSESSMENT — PAIN DESCRIPTION - DIRECTION
RADIATING_TOWARDS: RLE
RADIATING_TOWARDS: RLE

## 2025-05-28 ASSESSMENT — PAIN DESCRIPTION - LOCATION
LOCATION: HIP
LOCATION: LEG;HIP
LOCATION: HIP
LOCATION: HIP
LOCATION: LEG;HIP
LOCATION: HIP
LOCATION: ABDOMEN;HIP
LOCATION: HIP;LEG
LOCATION: LEG;HIP
LOCATION: ABDOMEN;HIP
LOCATION: HIP

## 2025-05-28 ASSESSMENT — PAIN DESCRIPTION - FREQUENCY
FREQUENCY: CONTINUOUS

## 2025-05-28 ASSESSMENT — PAIN DESCRIPTION - ONSET
ONSET: ON-GOING
ONSET: ON-GOING
ONSET: PROGRESSIVE
ONSET: ON-GOING

## 2025-05-28 ASSESSMENT — PAIN SCALES - GENERAL
PAINLEVEL_OUTOF10: 4
PAINLEVEL_OUTOF10: 7
PAINLEVEL_OUTOF10: 9
PAINLEVEL_OUTOF10: 7
PAINLEVEL_OUTOF10: 10
PAINLEVEL_OUTOF10: 9
PAINLEVEL_OUTOF10: 7
PAINLEVEL_OUTOF10: 6
PAINLEVEL_OUTOF10: 9
PAINLEVEL_OUTOF10: 5
PAINLEVEL_OUTOF10: 8

## 2025-05-28 ASSESSMENT — PAIN DESCRIPTION - PAIN TYPE
TYPE: SURGICAL PAIN

## 2025-05-28 NOTE — TELEPHONE ENCOUNTER
LVM to return call    Please call patient and ask how she did with the surgery.  Let me know if she received stress dose of hydrocortisone.  Any problems, let me know.

## 2025-05-28 NOTE — CONSULTS
[facility]  Phone: [phone]  Fax: [phone]  [employer]         Reason for Consult: FAVIAN on CKD 3B, hyperkalemia  Requesting Physician:  Dr. White    History Obtained From:  patient, electronic medical record    History of Present Ilness: 64-year-old white female with history of hypertension, adrenal insufficiency, diabetes mellitus, CVA CKD stage IIIb follows with Dr. Wood  Baseline creatinine 1.6 to 1.8 mg admitted for right prior hip surgery to hip arthroplasty which she underwent on 27 May  Postop noted to have creatinine increased to 2.3 mg CO2 of 14 and potassium of 5.7  Patient reports having nausea vomiting this morning  She had hypotension overnight with pressure dropping in the 80s  Denies any urinary symptoms  Patient has been on lisinopril as needed prior to admission for elevated blood pressure  She also takes Cortef for her history of adrenal insufficiency      Past Medical History:        Diagnosis Date    Adrenal insufficiency     Cancer ([facility]) 2002    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    CKD (chronic kidney disease), stage III ([facility])     Closed displaced intertrochanteric fracture of right femur ([facility]) 05/18/2024    Depression     Diabetes mellitus ([facility])     Type I    Hx of radiation therapy     melanoma right eye    Hypertension     Pt. denies having history of Hypertension    Hyponatremia     Insulin pump in place     Melanoma ([facility]) 01/13/2023    in stomach, pancreas    Prolonged emergence from general anesthesia     slow to wake up    Restless leg syndrome        Past Surgical History:        Procedure Laterality Date    CARPAL TUNNEL RELEASE Bilateral 12/2017    CHOLECYSTECTOMY      CT BIOPSY ABDOMEN RETROPERITONEUM  12/27/2022    CT BIOPSY ABDOMEN RETROPERITONEUM 12/27/2022 [facility] CT SCAN    EYE SURGERY Right     biopsy    HIP SURGERY Right 05/19/2024    HIP INTRAMEDULLARY NAIL ROYCE INSERTION performed by Gonsalo White MD at [facility] OR    HYSTERECTOMY (CERVIX STATUS UNKNOWN)

## 2025-05-29 VITALS
HEART RATE: 89 BPM | HEIGHT: 59 IN | DIASTOLIC BLOOD PRESSURE: 44 MMHG | SYSTOLIC BLOOD PRESSURE: 95 MMHG | OXYGEN SATURATION: 100 % | RESPIRATION RATE: 18 BRPM | BODY MASS INDEX: 24.76 KG/M2 | WEIGHT: 122.8 LBS | TEMPERATURE: 98.4 F

## 2025-05-29 LAB
ALBUMIN SERPL-MCNC: 3.1 G/DL (ref 3.4–5)
ANION GAP SERPL CALCULATED.3IONS-SCNC: 9 MMOL/L (ref 3–16)
BACTERIA URNS QL MICRO: ABNORMAL /HPF
BILIRUB UR QL STRIP.AUTO: NEGATIVE
BUN SERPL-MCNC: 37 MG/DL (ref 7–20)
CALCIUM SERPL-MCNC: 8.5 MG/DL (ref 8.3–10.6)
CHARACTER UR: ABNORMAL
CHLORIDE SERPL-SCNC: 107 MMOL/L (ref 99–110)
CLARITY UR: CLEAR
CO2 SERPL-SCNC: 20 MMOL/L (ref 21–32)
COLOR UR: YELLOW
CREAT SERPL-MCNC: 1.7 MG/DL (ref 0.6–1.2)
EPI CELLS #/AREA URNS HPF: ABNORMAL /HPF (ref 0–5)
GFR SERPLBLD CREATININE-BSD FMLA CKD-EPI: 33 ML/MIN/{1.73_M2}
GLUCOSE BLD-MCNC: 105 MG/DL (ref 70–99)
GLUCOSE BLD-MCNC: 109 MG/DL (ref 70–99)
GLUCOSE SERPL-MCNC: 145 MG/DL (ref 70–99)
GLUCOSE UR STRIP.AUTO-MCNC: NEGATIVE MG/DL
HGB UR QL STRIP.AUTO: ABNORMAL
KETONES UR STRIP.AUTO-MCNC: NEGATIVE MG/DL
LEUKOCYTE ESTERASE UR QL STRIP.AUTO: NEGATIVE
NITRITE UR QL STRIP.AUTO: NEGATIVE
PERFORMED ON: ABNORMAL
PERFORMED ON: ABNORMAL
PH UR STRIP.AUTO: 5.5 [PH] (ref 5–8)
PHOSPHATE SERPL-MCNC: 3.2 MG/DL (ref 2.5–4.9)
POTASSIUM SERPL-SCNC: 4.7 MMOL/L (ref 3.5–5.1)
PROT UR STRIP.AUTO-MCNC: ABNORMAL MG/DL
RBC #/AREA URNS HPF: ABNORMAL /HPF (ref 0–4)
SODIUM SERPL-SCNC: 136 MMOL/L (ref 136–145)
SP GR UR STRIP.AUTO: 1.01 (ref 1–1.03)
UA COMPLETE W REFLEX CULTURE PNL UR: ABNORMAL
UA DIPSTICK W REFLEX MICRO PNL UR: YES
URINE CASTS: 1 /HPF
URN SPEC COLLECT METH UR: ABNORMAL
UROBILINOGEN UR STRIP-ACNC: 0.2 E.U./DL
WBC #/AREA URNS HPF: ABNORMAL /HPF (ref 0–5)

## 2025-05-29 PROCEDURE — 2500000003 HC RX 250 WO HCPCS: Performed by: ORTHOPAEDIC SURGERY

## 2025-05-29 PROCEDURE — 97530 THERAPEUTIC ACTIVITIES: CPT

## 2025-05-29 PROCEDURE — 6370000000 HC RX 637 (ALT 250 FOR IP): Performed by: INTERNAL MEDICINE

## 2025-05-29 PROCEDURE — 81001 URINALYSIS AUTO W/SCOPE: CPT

## 2025-05-29 PROCEDURE — 36415 COLL VENOUS BLD VENIPUNCTURE: CPT

## 2025-05-29 PROCEDURE — 2500000003 HC RX 250 WO HCPCS: Performed by: INTERNAL MEDICINE

## 2025-05-29 PROCEDURE — 2580000003 HC RX 258: Performed by: INTERNAL MEDICINE

## 2025-05-29 PROCEDURE — 6370000000 HC RX 637 (ALT 250 FOR IP): Performed by: ORTHOPAEDIC SURGERY

## 2025-05-29 PROCEDURE — 97116 GAIT TRAINING THERAPY: CPT

## 2025-05-29 PROCEDURE — 97535 SELF CARE MNGMENT TRAINING: CPT

## 2025-05-29 PROCEDURE — 80069 RENAL FUNCTION PANEL: CPT

## 2025-05-29 RX ORDER — ASPIRIN 81 MG/1
81 TABLET ORAL 2 TIMES DAILY
Qty: 60 TABLET | Refills: 0 | Status: SHIPPED | OUTPATIENT
Start: 2025-05-29 | End: 2025-06-28

## 2025-05-29 RX ORDER — CEFUROXIME AXETIL 250 MG/1
250 TABLET ORAL 2 TIMES DAILY
Qty: 14 TABLET | Refills: 0 | Status: SHIPPED | OUTPATIENT
Start: 2025-05-29 | End: 2025-06-05

## 2025-05-29 RX ADMIN — METHOCARBAMOL 750 MG: 750 TABLET ORAL at 02:42

## 2025-05-29 RX ADMIN — LEVETIRACETAM 500 MG: 500 TABLET, FILM COATED ORAL at 09:10

## 2025-05-29 RX ADMIN — PANTOPRAZOLE SODIUM 40 MG: 40 TABLET, DELAYED RELEASE ORAL at 09:15

## 2025-05-29 RX ADMIN — METHOCARBAMOL 750 MG: 750 TABLET ORAL at 09:10

## 2025-05-29 RX ADMIN — ASPIRIN 81 MG: 81 TABLET, COATED ORAL at 09:10

## 2025-05-29 RX ADMIN — OXYCODONE HYDROCHLORIDE 10 MG: 10 TABLET ORAL at 09:10

## 2025-05-29 RX ADMIN — ACETAMINOPHEN 650 MG: 325 TABLET ORAL at 09:10

## 2025-05-29 RX ADMIN — HYDROCORTISONE 15 MG: 10 TABLET ORAL at 09:11

## 2025-05-29 RX ADMIN — NIFEDIPINE 30 MG: 30 TABLET, FILM COATED, EXTENDED RELEASE ORAL at 09:10

## 2025-05-29 RX ADMIN — ACETAMINOPHEN 650 MG: 325 TABLET ORAL at 05:00

## 2025-05-29 RX ADMIN — SODIUM CHLORIDE, PRESERVATIVE FREE 10 ML: 5 INJECTION INTRAVENOUS at 09:14

## 2025-05-29 RX ADMIN — OXYCODONE HYDROCHLORIDE 10 MG: 10 TABLET ORAL at 03:42

## 2025-05-29 RX ADMIN — HYDROXYZINE PAMOATE 25 MG: 25 CAPSULE ORAL at 09:10

## 2025-05-29 RX ADMIN — ESCITALOPRAM OXALATE 20 MG: 10 TABLET ORAL at 09:10

## 2025-05-29 RX ADMIN — SODIUM BICARBONATE: 84 INJECTION, SOLUTION INTRAVENOUS at 03:45

## 2025-05-29 RX ADMIN — ANTACID TABLETS 500 MG: 500 TABLET, CHEWABLE ORAL at 09:10

## 2025-05-29 RX ADMIN — LEVOTHYROXINE SODIUM 25 MCG: 0.03 TABLET ORAL at 07:37

## 2025-05-29 ASSESSMENT — PAIN SCALES - GENERAL
PAINLEVEL_OUTOF10: 5
PAINLEVEL_OUTOF10: 5
PAINLEVEL_OUTOF10: 9
PAINLEVEL_OUTOF10: 0
PAINLEVEL_OUTOF10: 7
PAINLEVEL_OUTOF10: 4
PAINLEVEL_OUTOF10: 3
PAINLEVEL_OUTOF10: 0
PAINLEVEL_OUTOF10: 7
PAINLEVEL_OUTOF10: 5
PAINLEVEL_OUTOF10: 7
PAINLEVEL_OUTOF10: 4

## 2025-05-29 ASSESSMENT — PAIN - FUNCTIONAL ASSESSMENT
PAIN_FUNCTIONAL_ASSESSMENT: PREVENTS OR INTERFERES SOME ACTIVE ACTIVITIES AND ADLS

## 2025-05-29 ASSESSMENT — PAIN DESCRIPTION - PAIN TYPE
TYPE: CHRONIC PAIN
TYPE: SURGICAL PAIN

## 2025-05-29 ASSESSMENT — PAIN DESCRIPTION - LOCATION
LOCATION: ABDOMEN;LEG
LOCATION: ABDOMEN
LOCATION: ABDOMEN
LOCATION: ABDOMEN;LEG
LOCATION: HIP
LOCATION: ABDOMEN;LEG
LOCATION: HIP
LOCATION: HIP
LOCATION: LEG;ABDOMEN

## 2025-05-29 ASSESSMENT — PAIN DESCRIPTION - DESCRIPTORS
DESCRIPTORS: ACHING
DESCRIPTORS: ACHING
DESCRIPTORS: ACHING;THROBBING
DESCRIPTORS: ACHING;THROBBING
DESCRIPTORS: ACHING;SHARP;SORE
DESCRIPTORS: BURNING;THROBBING
DESCRIPTORS: ACHING;THROBBING
DESCRIPTORS: SORE
DESCRIPTORS: SORE
DESCRIPTORS: ACHING;THROBBING

## 2025-05-29 ASSESSMENT — PAIN DESCRIPTION - ORIENTATION
ORIENTATION: RIGHT
ORIENTATION: RIGHT
ORIENTATION: LEFT;MID
ORIENTATION: RIGHT;MID
ORIENTATION: MID;RIGHT
ORIENTATION: RIGHT
ORIENTATION: RIGHT
ORIENTATION: RIGHT;MID
ORIENTATION: RIGHT
ORIENTATION: RIGHT;MID
ORIENTATION: RIGHT

## 2025-05-29 ASSESSMENT — PAIN DESCRIPTION - FREQUENCY
FREQUENCY: CONTINUOUS
FREQUENCY: OTHER (COMMENT)

## 2025-05-29 ASSESSMENT — PAIN DESCRIPTION - ONSET
ONSET: ON-GOING

## 2025-05-29 ASSESSMENT — PAIN DESCRIPTION - DIRECTION
RADIATING_TOWARDS: RLE

## 2025-05-29 NOTE — CONSULTS
Elvia Arguelles  5/29/2025  [id number]    Chief Complaint: Osteoarthritis of right hip    Subjective   HPI: Elvia Arguelles is a 64 y.o. female who sustained a right basicervical femoral neck fracture 1 year ago and underwent fixation with a cephalomedullary nail.  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  Patient was brought into the hospital at this time for repair of her hardware failure, and she had conversion of her prior hip surgery to a right total hip replacement by Dr. White on 5/27.  Her postop labs revealed some renal insufficiency, and nephrology was consulted.  Patient was started in therapies, but was limited by nausea and right lateral and anterior thigh pain. Patient needed contact-guard assist for transfers and gait yesterday in therapies.  Patient lives at home with spouse in a 1 level house.  This morning patient states she feels much better, and she did better in her therapies.  She feels she is strong enough and getting around well enough that she can go home with the help of her , and do well.  I talked with OT in the room today, and OT agrees.      Past Medical History:   Diagnosis Date    Adrenal insufficiency     Cancer ([facility]) 2002    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    CKD (chronic kidney disease), stage III ([facility])     Closed displaced intertrochanteric fracture of right femur ([facility]) 05/18/2024    Depression     Diabetes mellitus ([facility])     Type I    Hx of radiation therapy     melanoma right eye    Hypertension     Pt. denies having history of Hypertension    Hyponatremia     Insulin pump in place     Melanoma ([facility]) 01/13/2023    in stomach, pancreas    Prolonged emergence from general anesthesia     slow to wake up    Restless leg syndrome        Past Surgical History:   Procedure Laterality Date    CARPAL TUNNEL RELEASE Bilateral 12/2017    CHOLECYSTECTOMY      CT BIOPSY ABDOMEN RETROPERITONEUM  12/27/2022    CT BIOPSY ABDOMEN RETROPERITONEUM 12/27/2022 [facility] CT SCAN    EYE SURGERY Right

## 2025-05-29 NOTE — CARE COORDINATION
DISCHARGE SUMMARY     DATE OF DISCHARGE: 5/29/2025    DISCHARGE DESTINATION: home      HOME CARE AGENCY:   Discharging to Facility/ Agency   Name:  [facility]    Address: [address] [place] [address]  Phone: [phone]  Fax: [phone]                TRANSPORTATION:   family    COMMENTS: Patient will return home with [facility] and support of family.  [facility] liaison aware of discharge today.    Electronically signed by BERTRAND Mckoy, LISW, Case Management on 5/29/2025 at 9:23 AM  [place] [phone]      
    Referral made to Julius at [facility].    The Plan for Transition of Care is related to the following treatment goals of Osteoarthritis of right hip [M16.11]  Closed displaced fracture of neck of right femur with nonunion [S72.001K]    IF APPLICABLE: The Patient and/or patient representative Elvia and her family were provided with a choice of provider and agrees with the discharge plan. Freedom of choice list with basic dialogue that supports the patient's individualized plan of care/goals and shares the quality data associated with the providers was provided to: Patient       The Patient and/or Patient Representative Agree with the Discharge Plan? Yes    PREMA Weller  Case Management Department  Ph: [phone]

## 2025-05-29 NOTE — PLAN OF CARE
Problem: Discharge Planning  Goal: Discharge to home or other facility with appropriate resources  5/28/2025 0741 by Danny Salmon, RN  Outcome: Progressing  Flowsheets (Taken 5/28/2025 0449 by Jocelin Anderson, RN)  Discharge to home or other facility with appropriate resources:   Identify barriers to discharge with patient and caregiver   Identify discharge learning needs (meds, wound care, etc)  5/28/2025 0449 by Jocelin Anderson RN  Outcome: Progressing  Flowsheets (Taken 5/28/2025 0449)  Discharge to home or other facility with appropriate resources:   Identify barriers to discharge with patient and caregiver   Identify discharge learning needs (meds, wound care, etc)  5/27/2025 1855 by Lesley Bourne RN  Outcome: Progressing  Flowsheets (Taken 5/27/2025 1855)  Discharge to home or other facility with appropriate resources:   Identify barriers to discharge with patient and caregiver   Identify discharge learning needs (meds, wound care, etc)   Refer to discharge planning if patient needs post-hospital services based on physician order or complex needs related to functional status, cognitive ability or social support system   Arrange for needed discharge resources and transportation as appropriate     Problem: Pain  Goal: Verbalizes/displays adequate comfort level or baseline comfort level  5/28/2025 0741 by Danny Salmon, RN  Outcome: Progressing  Flowsheets (Taken 5/28/2025 0449 by Jocelin Anderson, RN)  Verbalizes/displays adequate comfort level or baseline comfort level:   Encourage patient to monitor pain and request assistance   Assess pain using appropriate pain scale   Administer analgesics based on type and severity of pain and evaluate response   Implement non-pharmacological measures as appropriate and evaluate response  5/28/2025 0449 by Jocelin Anderson, RN  Outcome: Progressing  Flowsheets (Taken 5/28/2025 0449)  Verbalizes/displays adequate comfort level or baseline comfort 
  Problem: Discharge Planning  Goal: Discharge to home or other facility with appropriate resources  Outcome: Progressing  Flowsheets (Taken 5/27/2025 1855)  Discharge to home or other facility with appropriate resources:   Identify barriers to discharge with patient and caregiver   Identify discharge learning needs (meds, wound care, etc)   Refer to discharge planning if patient needs post-hospital services based on physician order or complex needs related to functional status, cognitive ability or social support system   Arrange for needed discharge resources and transportation as appropriate     Problem: Pain  Goal: Verbalizes/displays adequate comfort level or baseline comfort level  Outcome: Progressing  Flowsheets (Taken 5/27/2025 1855)  Verbalizes/displays adequate comfort level or baseline comfort level:   Assess pain using appropriate pain scale   Administer analgesics based on type and severity of pain and evaluate response   Encourage patient to monitor pain and request assistance   Implement non-pharmacological measures as appropriate and evaluate response   Consider cultural and social influences on pain and pain management   Notify Licensed Independent Practitioner if interventions unsuccessful or patient reports new pain     Problem: Safety - Adult  Goal: Free from fall injury  Outcome: Progressing  Flowsheets (Taken 5/27/2025 1855)  Free From Fall Injury: Instruct family/caregiver on patient safety     Problem: Chronic Conditions and Co-morbidities  Goal: Patient's chronic conditions and co-morbidity symptoms are monitored and maintained or improved  Outcome: Progressing  Flowsheets (Taken 5/27/2025 1855)  Care Plan - Patient's Chronic Conditions and Co-Morbidity Symptoms are Monitored and Maintained or Improved: Monitor and assess patient's chronic conditions and comorbid symptoms for stability, deterioration, or improvement     Problem: ABCDS Injury Assessment  Goal: Absence of physical 
RN)  Skin Integrity Remains Intact:   Monitor for areas of redness and/or skin breakdown   Assess vascular access sites hourly   Turn and reposition as indicated  Goal: Incisions, wounds, or drain sites healing without S/S of infection  Outcome: Progressing  Flowsheets (Taken 5/28/2025 0449 by Jocelin Anderson, RN)  Incisions, Wounds, or Drain Sites Healing Without Sign and Symptoms of Infection: TWICE DAILY: Assess and document skin integrity     Problem: Musculoskeletal - Adult  Goal: Return mobility to safest level of function  Outcome: Progressing  Flowsheets (Taken 5/28/2025 0449 by Jocelin Anderson, RN)  Return Mobility to Safest Level of Function:   Assess patient stability and activity tolerance for standing, transferring and ambulating with or without assistive devices   Assist with transfers and ambulation using safe patient handling equipment as needed   Ensure adequate protection for wounds/incisions during mobilization   Obtain physical therapy/occupational therapy consults as needed  Goal: Maintain proper alignment of affected body part  Outcome: Progressing  Flowsheets (Taken 5/28/2025 0449 by Jocelin Anderson, RN)  Maintain proper alignment of affected body part: Support and protect limb and body alignment per provider's orders  Goal: Return ADL status to a safe level of function  Outcome: Progressing  Flowsheets (Taken 5/28/2025 0449 by Jocelin Anderson, RN)  Return ADL Status to a Safe Level of Function:   Administer medication as ordered   Assess activities of daily living deficits and provide assistive devices as needed     Problem: Gastrointestinal - Adult  Goal: Maintains or returns to baseline bowel function  Outcome: Progressing  Flowsheets (Taken 5/28/2025 0449 by Jocelin Anderson, RN)  Maintains or returns to baseline bowel function:   Assess bowel function   Encourage oral fluids to ensure adequate hydration   Administer IV fluids as ordered to ensure adequate hydration   
intact  5/28/2025 0449 by Jocelin Anderson, RN  Outcome: Progressing  Flowsheets (Taken 5/28/2025 0449)  Skin Integrity Remains Intact:   Monitor for areas of redness and/or skin breakdown   Assess vascular access sites hourly   Turn and reposition as indicated     Problem: Skin/Tissue Integrity - Adult  Goal: Incisions, wounds, or drain sites healing without S/S of infection  5/28/2025 0449 by Jocelin Anderson, RN  Outcome: Progressing  Flowsheets (Taken 5/28/2025 0449)  Incisions, Wounds, or Drain Sites Healing Without Sign and Symptoms of Infection: TWICE DAILY: Assess and document skin integrity     Problem: Musculoskeletal - Adult  Goal: Return mobility to safest level of function  5/28/2025 0449 by Jocelin Anderson, RN  Outcome: Progressing  Flowsheets (Taken 5/28/2025 0449)  Return Mobility to Safest Level of Function:   Assess patient stability and activity tolerance for standing, transferring and ambulating with or without assistive devices   Assist with transfers and ambulation using safe patient handling equipment as needed   Ensure adequate protection for wounds/incisions during mobilization   Obtain physical therapy/occupational therapy consults as needed     Problem: Musculoskeletal - Adult  Goal: Maintain proper alignment of affected body part  5/28/2025 0449 by Jocelin Anderson, RN  Outcome: Progressing  Flowsheets (Taken 5/28/2025 0449)  Maintain proper alignment of affected body part: Support and protect limb and body alignment per provider's orders     Problem: Musculoskeletal - Adult  Goal: Return ADL status to a safe level of function  5/28/2025 0449 by Jocelin Anderson, RN  Outcome: Progressing  Flowsheets (Taken 5/28/2025 0449)  Return ADL Status to a Safe Level of Function:   Administer medication as ordered   Assess activities of daily living deficits and provide assistive devices as needed     Problem: Gastrointestinal - Adult  Goal: Maintains or returns to baseline bowel

## 2025-05-29 NOTE — PROGRESS NOTES
PRE-OP INSTRUCTIONS     Do not eat or drink anything after 12:00 midnight prior to surgery or ERAS  This includes water, chewing gum, mints and ice chips.    You may brush your teeth and gargle the morning of surgery but DO  NOT SWALLOW THE WATER.    Take the following medications with a small sip of water on the morning of procedure...Follow your MD/Surgeons pre-procedure instructions regarding your medications     You may be asked to stop blood thinners such as:  Coumadin, Plavix, Fragmin and lovenox.  Please check with your doctor before stopping these or any other medications.    Aspirin, ibuprofen, advil and naproxen, any anti-inflammatory products should be stopped for a week prior to your surgery.    Do not smoke and do not drink any alcoholic beverages 24 hours prior to your surgery.    Please do not wear any jewelry or body piercings on the day of surgery.    Please wear something simple, loose fitting clothing to the hospital.  Do not wear any make-up(including eye make-up) or nail polish on your fingers and toes.    As part of our patient safety program to minimize surgical infections, we ask you to do the following:    Please notify your surgeon if you develop any illness between now and the day of your surgery.  This includes a cough, cold, fever, sore  throat, nausea, vomiting, diarrhea, etc.   Please notify your surgeon if you experience dizziness, shortness of  breath or blurred vision between now and the time of your surgery.     Please notify your surgeon of any open or redden areas that may look infected       DO NOT shave your operative site 96 hours(four days) prior to surgery.          Shower the week before surgery with an antibacterial soap such as:dial,safeguard, etc.       Three(3) days prior to your surgery, cleanse the operative site with Hibiclens(anti-microbial soap). This soap may dry your skin, please do not apply any oils or lotions     Please bring your insurance card and 
  [facility] - Occupational Therapy   Phone: [phone]    Occupational Therapy  Facility/Department:[address] ORTHOPEDICS    [x] Initial Evaluation            [] Daily Treatment Note         [] Discharge Summary      Patient: Elvia Arguelles   : 1960   MRN: [id number]   Date of Service:  2025    Staff Mobility Recommendation: RW x1 w/gait belt     AM-PAC Score:   Discharge Recommendations: home w/24hr assist and HHOT services     Admitting Diagnosis:  Osteoarthritis of right hip  Ordering Physician: Gonsalo White MD   Current Admission Summary: Pt is a 63 y/o female \"who sustained a right basicervical femoral neck fracture 1 year ago and underwent fixation with a cephalomedullary nail.\" Pt is now s/p Conversion of prior hip surgery to right total hip arthroplasty by Dr. White on .     Past Medical History:  has a past medical history of Adrenal insufficiency, Cancer (HCC), CKD (chronic kidney disease), stage III (HCC), Closed displaced intertrochanteric fracture of right femur (HCC), Depression, Diabetes mellitus (HCC), Hx of radiation therapy, Hypertension, Hyponatremia, Insulin pump in place, Melanoma (HCC), Prolonged emergence from general anesthesia, and Restless leg syndrome.  Past Surgical History:  has a past surgical history that includes Hysterectomy; Cholecystectomy; shoulder surgery (Left); Upper gastrointestinal endoscopy (10/22/2014); Carpal tunnel release (Bilateral, 2017); lipoma resection; Eye surgery (Right); Shoulder arthroscopy (Right, 2018); US BIOPSY LIVER PERCUTANEOUS (2022); CT BIOPSY ABDOMEN RETROPERITONEUM (2022); Upper gastrointestinal endoscopy (N/A, 2023); Upper gastrointestinal endoscopy (2023); Port Surgery (Right, 2023); Upper gastrointestinal endoscopy (N/A, 2024); hip surgery (Right, 2024); and Total hip arthroplasty (Right, 2025).    Assessment  Activity Tolerance: Fair  Impairments Requiring 
  [facility] - Physical Therapy   Phone: [phone]    Physical Therapy  Facility/Department:[address] ORTHOPEDICS    [x] Initial Evaluation            [] Daily Treatment Note         [] Discharge Summary      Patient: Elvia Arguelles   : 1960   MRN: [id number]   Date of Service:  2025  Staff Mobility Recommendation: RW CGA 1 with gait belt.    AM-PAC score:   Discharge Recommendations: Home with initial 24 hour support    Admitting Diagnosis: Osteoarthritis of right hip  Ordering Physician: Gonsalo White MD 25  Current Admission Summary: 63 yo female with hx of fall and R basicervical femoral neck fracture 1 year ago and underwent fixation with a cephalomedullary nail. 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 25: Dr. White performs Conversion of prior hip sx to R DALJIT. WBAT R LE, anterior precautions.     Past Medical History:  has a past medical history of Adrenal insufficiency, Cancer (HCC), CKD (chronic kidney disease), stage III (HCC), Closed displaced intertrochanteric fracture of right femur (HCC), Depression, Diabetes mellitus (HCC), Hx of radiation therapy, Hypertension, Hyponatremia, Insulin pump in place, Melanoma (HCC), Prolonged emergence from general anesthesia, and Restless leg syndrome.  Past Surgical History:  has a past surgical history that includes Hysterectomy; Cholecystectomy; shoulder surgery (Left); Upper gastrointestinal endoscopy (10/22/2014); Carpal tunnel release (Bilateral, 2017); lipoma resection; Eye surgery (Right); Shoulder arthroscopy (Right, 2018); US BIOPSY LIVER PERCUTANEOUS (2022); CT BIOPSY ABDOMEN RETROPERITONEUM (2022); Upper gastrointestinal endoscopy (N/A, 2023); Upper gastrointestinal endoscopy (2023); Port Surgery (Right, 2023); Upper gastrointestinal endoscopy (N/A, 2024); and hip surgery (Right, 2024).    Assessment  Activity Tolerance: Fair Mild nausea with mobility, Very sleepy. 94% 
  [facility] - Occupational Therapy   Phone: [phone]    Occupational Therapy  Facility/Department:[address] ORTHOPEDICS    [] Initial Evaluation            [x] Daily Treatment Note         [] Discharge Summary      Patient: Elvia Arguelles   : 1960   MRN: [id number]   Date of Service:  2025    Staff Mobility Recommendation: RW x1 w/gait belt     AM-PAC Score:   Discharge Recommendations: home w/24hr assist and HHOT services     Admitting Diagnosis:  Osteoarthritis of right hip  Ordering Physician: Gonsalo White MD   Current Admission Summary: Pt is a 63 y/o female \"who sustained a right basicervical femoral neck fracture 1 year ago and underwent fixation with a cephalomedullary nail.\" Pt is now s/p Conversion of prior hip surgery to right total hip arthroplasty by Dr. White on .     Past Medical History:  has a past medical history of Adrenal insufficiency, Cancer (HCC), CKD (chronic kidney disease), stage III (HCC), Closed displaced intertrochanteric fracture of right femur (HCC), Depression, Diabetes mellitus (HCC), Hx of radiation therapy, Hypertension, Hyponatremia, Insulin pump in place, Melanoma (HCC), Prolonged emergence from general anesthesia, and Restless leg syndrome.  Past Surgical History:  has a past surgical history that includes Hysterectomy; Cholecystectomy; shoulder surgery (Left); Upper gastrointestinal endoscopy (10/22/2014); Carpal tunnel release (Bilateral, 2017); lipoma resection; Eye surgery (Right); Shoulder arthroscopy (Right, 2018); US BIOPSY LIVER PERCUTANEOUS (2022); CT BIOPSY ABDOMEN RETROPERITONEUM (2022); Upper gastrointestinal endoscopy (N/A, 2023); Upper gastrointestinal endoscopy (2023); Port Surgery (Right, 2023); Upper gastrointestinal endoscopy (N/A, 2024); hip surgery (Right, 2024); and Total hip arthroplasty (Right, 2025).    Assessment  Activity Tolerance: Fair  Impairments Requiring 
  [facility]  Phone: [phone]  Fax: [phone]  [employer]         Reason for Consult: FAVIAN on CKD 3B, hyperkalemia  Requesting Physician:  Dr. White    History Obtained From:  patient, electronic medical record    History of Present Ilness: 64-year-old white female with history of hypertension, adrenal insufficiency, diabetes mellitus, CVA CKD stage IIIb follows with Dr. Wood  Baseline creatinine 1.6 to 1.8 mg admitted for right prior hip surgery to hip arthroplasty which she underwent on 27 May  Postop noted to have creatinine increased to 2.3 mg CO2 of 14 and potassium of 5.7  Patient reports having nausea vomiting this morning  She had hypotension overnight with pressure dropping in the 80s  Denies any urinary symptoms  Patient has been on lisinopril as needed prior to admission for elevated blood pressure  She also takes Cortef for her history of adrenal insufficiency        Review of Systems: feels well Ready to go home       Physical exam:   Constitutional:  VITALS:  BP (!) 95/44 Comment: reported to bedside RN  Pulse 89   Temp 98.4 °F (36.9 °C) (Oral)   Resp 18   Ht 1.499 m (4' 11\")   Wt 55.7 kg (122 lb 12.7 oz)   LMP  (LMP Unknown) Comment: had a hysterectomy a long time ago  SpO2 100%   BMI 24.80 kg/m²   Gen: alert, awake, nad  Skin: no rash, turgor wnl  Heent:  eomi, mmm  Neck: no bruits or jvd noted, thyroid normal  Cardiovascular:  S1, S2 without m/r/g  Respiratory: CTA B without w/r/r; respiratory effort normal  Abdomen:  +bs, soft, nt, nd, no hepatosplenomegaly  Ext: no lower extremity edema      Data/  CBC:   Lab Results   Component Value Date/Time    WBC 9.9 05/27/2025 10:10 AM    RBC 3.78 05/27/2025 10:10 AM    HGB 11.1 05/27/2025 10:10 AM    HCT 33.1 05/27/2025 10:10 AM    MCV 87.5 05/27/2025 10:10 AM    MCH 29.4 05/27/2025 10:10 AM    MCHC 33.6 05/27/2025 10:10 AM    RDW 12.7 05/27/2025 10:10 AM     05/27/2025 10:10 AM    MPV 8.6 05/27/2025 10:10 AM 
 Follow Up Prior to Surgery    DOS: 2025  :1960            Meliza Kirkpatrick:   please see attatched endocrinology clearance-recommendations/orders from Dr. Latif-endocrinologist                                   Surgeon's Name:   
1311 pt arrived from OR, vitals stable on room air. Pt arouses to voice, denies pain. Right hip dressing clean, dry, and intact- telfa, tape. Ice pack applied. Postop xray complete.  
4 Eyes Skin Assessment     NAME:  Elvia Arguelles  YOB: 1960  MEDICAL RECORD NUMBER:  [id number]    The patient is being assessed for  Admission    I agree that at least one RN has performed a thorough Head to Toe Skin Assessment on the patient. ALL assessment sites listed below have been assessed.      Areas assessed by both nurses:    Head, Face, Ears, Shoulders, Back, Chest, Arms, Elbows, Hands, Sacrum. Buttock, Coccyx, Ischium, Legs. Feet and Heels, and Under Medical Devices         Does the Patient have a Wound? No noted wound(s)  - abrasion L 3rd toe   - abrasion x2 LUE   - blanchable redness to bilateral heels & sacrum/coccyx   - scattered bruising   - surgical incision to R hip        Uriah Prevention initiated by RN: Yes  Wound Care Orders initiated by RN: No    Pressure Injury (Stage 3,4, Unstageable, DTI, NWPT, and Complex wounds) if present, place Wound referral order by RN under : No    New Ostomies, if present place, Ostomy referral order under : No     Nurse 1 eSignature: Electronically signed by Lesley Bourne RN on 5/27/25 at 3:29 PM EDT    **SHARE this note so that the co-signing nurse can place an eSignature**    Nurse 2 eSignature: Electronically signed by Denise Alejandra RN on 5/27/25 at 3:48 PM EDT    
[facility]  Diabetes Support      NAME: Elvia Arguelles  MEDICAL RECORD NUMBER:  [id number]  AGE: 64 y.o.   GENDER: female  : 1960  EPISODE DATE:  2025     Data     Recent Labs     25  2039 25  0704 25  1102 25  1631 25  2124 25  0731   POCGLU 177* 171* 136* 122* 89 109*       HgBA1c:    Lab Results   Component Value Date/Time    LABA1C 6.8 2025 04:31 AM       BUN/Creatinine:    Lab Results   Component Value Date/Time    BUN 37 2025 05:13 AM    CREATININE 1.7 2025 05:13 AM     GFR Estimated Creatinine Clearance: 25 mL/min (A) (based on SCr of 1.7 mg/dL (H)).    Medications  Scheduled Medications:   escitalopram  20 mg Oral Daily    hydrocortisone  15 mg Oral Daily    levETIRAcetam  500 mg Oral BID    levothyroxine  25 mcg Oral QAM AC    NIFEdipine  30 mg Oral BID    pantoprazole  40 mg Oral BID AC    rOPINIRole  1 mg Oral Nightly    sodium chloride flush  5-40 mL IntraVENous 2 times per day    acetaminophen  650 mg Oral Q6H    aspirin  81 mg Oral BID    Insulin Pump - insulin lispro   SubCUTAneous 4x Daily AC & HS    Insulin Pump - insulin lispro   SubCUTAneous Daily    sodium chloride  500 mL IntraVENous Once       Diet  Current diet/supplement order: ADULT DIET; Regular; Low Potassium (Less than 3000 mg/day)     Recorded PO: PO Meals Eaten (%): 0% (pt refused tray d/t nausea) last meal in flowsheets      Action      Ghada is feeling much better.  She is hoping for discharge today.      Insulin pump infusing:  (Humalog insulin)    Basal:  Control IQ with current rate 0.45 units/hr  Carb: 1:15  Correction:  1:50  Target 110  Time in range 94%    Recommend continuing with insulin pump self management.      Electronically signed by Marianna Lawson RN on 2025 at 10:57 AM  
[facility]  Glycemic Control       NAME: Elvia Arguelles  MEDICAL RECORD NUMBER:  [id number]  AGE: 64 y.o.   GENDER: female  : 1960  EPISODE DATE:  2025     Data     Recent Labs     25  1025 25  1318 25  1601 25  2039 25  0704 25  1102   POCGLU 135* 157* 352* 177* 171* 136*       Medications  Scheduled Medications:   escitalopram  20 mg Oral Daily    hydrocortisone  15 mg Oral Daily    levETIRAcetam  500 mg Oral BID    levothyroxine  25 mcg Oral QAM AC    lisinopril  5 mg Oral Daily    NIFEdipine  30 mg Oral BID    pantoprazole  40 mg Oral BID AC    rOPINIRole  1 mg Oral Nightly    sodium chloride flush  5-40 mL IntraVENous 2 times per day    acetaminophen  650 mg Oral Q6H    aspirin  81 mg Oral BID    Insulin Pump - insulin lispro   SubCUTAneous 4x Daily AC & HS    Insulin Pump - insulin lispro   SubCUTAneous Daily    sodium chloride  500 mL IntraVENous Once       Diet  Current diet/supplement order: ADULT DIET; Regular     Recorded PO: PO Meals Eaten (%): 0% (pt refused tray d/t nausea) last meal in flowsheets      Action      Ghada is feeling poorly with nausea.      She is pleased with her blood sugar control.      Insulin pump infusing:  (Humalog insulin)    Basal:  Control IQ with current rate 0.45 units/hr  Carb: 1:15  Correction:  1:50  Target 110    Reports having back up  insulin pump supplies with her.      Recommend continuing with insulin pump self management.      Electronically signed by Marianna Lawson RN on 2025 at 11:40 AM  
CLINICAL PHARMACY NOTE: MEDS TO BEDS    Total # of Prescriptions Filled: 2   The following medications were delivered to the patient:  Current Discharge Medication List        START taking these medications    Details   aspirin 81 MG EC tablet Take 1 tablet by mouth in the morning and at bedtime  Qty: 60 tablet, Refills: 0      cefUROXime (CEFTIN) 250 MG tablet Take 1 tablet by mouth 2 times daily for 7 days  Qty: 14 tablet, Refills: 0               Additional Documentation: went over instructions with patient and . Left meds in 's care    
Clinical Pharmacy Note  Medication Counseling    Reviewed new medications started during hospital admission: ASA 81mg bid x 30 days, Cefuroxime. Indications and side effects were emphasized during counseling.  All medication-related questions addressed.  Patient verbalized understanding of education.    Should the patient express any additional questions or concerns regarding their medications, please do not hesitate to contact the pharmacy department.    Patient/caregiver aware they may refuse medications during hospital stay.       5 minutes spent educating patient regarding medications.  Ramiro Christian MELLISSA, 5/29/2025 11:35 AM    
Hand off report from Lorraine MOBLEY.  Patient opens eyes to name, oriented. Resp easy unlabored on room air O2 with SaO2 98%. Right hip surgical dressing dry and intact with ice pack on.  Strong doppler signal to right post tibial pulse. Patient CO surgical pain at 7 of 10 and medicated per order. See MAR. VSS. Patient denies C/O nausea.   
Home insulin pump  - hospitalized patient responsibilities form signed by patient and placed into chart. Patient reports understanding that she must alert staff is she is unable to manage insulin pump independently, if insulin runs out, or if pump malfunctions. Patient reports understanding.     Charting form provided to patient to document insulin administrations in real time. Patient educated on how to use form & that form will become a part of her chart at the time of discharge. Patient reports understanding, denies questions, and is assuming care of all insulin administrations at this time.   
[facility] Orthopedic Surgery   Progress Note      S/P :  SUBJECTIVE  in bed. Awake and oriented. . Pain is   described in right ip and with the intensity of moderate. Pain is described as aching.       OBJECTIVE              Physical                      VITALS:  BP (!) 115/54   Pulse 87   Temp 97.7 °F (36.5 °C)   Resp 14   Ht 1.499 m (4' 11\")   Wt 54.4 kg (120 lb)   LMP  (LMP Unknown) Comment: had a hysterectomy a long time ago  SpO2 98%   BMI 24.24 kg/m²                     MUSCULOSKELETAL:  right foot NVI. Wiggles toes to command. Able to plantarflex and dorsiflex ankle Pedal pulses are not easily palpated but noted with DOPPLER at PT and DP right foot. Bilateral feet are cool and pink.                     NEUROLOGIC:                                  Sensory:  Touch:  Right Lower Extremity:  normal                                                 Surgical wound appears clean and dry with gauze and tape dressing.     Data       CBC:   Lab Results   Component Value Date/Time    WBC 9.9 05/27/2025 10:10 AM    RBC 3.78 05/27/2025 10:10 AM    HGB 11.1 05/27/2025 10:10 AM    HCT 33.1 05/27/2025 10:10 AM    MCV 87.5 05/27/2025 10:10 AM    MCH 29.4 05/27/2025 10:10 AM    MCHC 33.6 05/27/2025 10:10 AM    RDW 12.7 05/27/2025 10:10 AM     05/27/2025 10:10 AM    MPV 8.6 05/27/2025 10:10 AM        WBC:    Lab Results   Component Value Date/Time    WBC 9.9 05/27/2025 10:10 AM        Hemoglobin/Hematocrit:    Lab Results   Component Value Date/Time    HGB 11.1 05/27/2025 10:10 AM    HCT 33.1 05/27/2025 10:10 AM        PT/INR:    Lab Results   Component Value Date/Time    PROTIME 12.5 05/05/2025 01:55 PM    INR 0.91 05/05/2025 01:55 PM              Current Inpatient Medications             Current Facility-Administered Medications: [START ON 5/28/2025] escitalopram (LEXAPRO) tablet 20 mg, 20 mg, Oral, Daily  [START ON 5/28/2025] hydrocortisone (CORTEF) tablet 15 mg, 15 mg, Oral, Daily  hydrOXYzine pamoate (VISTARIL) 
[facility] Orthopedic Surgery   Progress Note      S/P :  SUBJECTIVE  in bed. States tired and nauseated. . Pain is   described in right hip and with the intensity of moderate. Pain is described as aching.       OBJECTIVE              Physical                      VITALS:  /65   Pulse 91   Temp 98.4 °F (36.9 °C) (Oral)   Resp 16   Ht 1.499 m (4' 11\")   Wt 55.7 kg (122 lb 12.7 oz)   LMP  (LMP Unknown) Comment: had a hysterectomy a long time ago  SpO2 98%   BMI 24.80 kg/m²     Appears pale in face.                     MUSCULOSKELETAL:  right foot NVI. Wiggles toes to command. Able to plantarflex and dorsiflex ankle Pedal pulses are palpable.                    NEUROLOGIC:                                  Sensory:  Touch:  Right Lower Extremity:  normal                                                 Surgical wound appears clean and dry right hip with gauze and tape dressing.     Data       CBC:   Lab Results   Component Value Date/Time    WBC 9.9 05/27/2025 10:10 AM    RBC 3.78 05/27/2025 10:10 AM    HGB 11.1 05/27/2025 10:10 AM    HCT 33.1 05/27/2025 10:10 AM    MCV 87.5 05/27/2025 10:10 AM    MCH 29.4 05/27/2025 10:10 AM    MCHC 33.6 05/27/2025 10:10 AM    RDW 12.7 05/27/2025 10:10 AM     05/27/2025 10:10 AM    MPV 8.6 05/27/2025 10:10 AM        WBC:    Lab Results   Component Value Date/Time    WBC 9.9 05/27/2025 10:10 AM        Hemoglobin/Hematocrit:    Lab Results   Component Value Date/Time    HGB 11.1 05/27/2025 10:10 AM    HCT 33.1 05/27/2025 10:10 AM        PT/INR:    Lab Results   Component Value Date/Time    PROTIME 12.5 05/05/2025 01:55 PM    INR 0.91 05/05/2025 01:55 PM              Current Inpatient Medications             Current Facility-Administered Medications: promethazine (PHENERGAN) 12.5mg in sodium chloride 0.9% 50 mL IVPB SOLN 12.5 mg, 12.5 mg, IntraVENous, Once  escitalopram (LEXAPRO) tablet 20 mg, 20 mg, Oral, Daily  hydrocortisone (CORTEF) tablet 15 mg, 15 mg, Oral, 
No new orders provided at this time for hypotension - MD states to keep continuous fluids infusing per current orders. Update to be provided to night shift ITZ Monreal.   
Orders completed this shift:      [x] Surgical site and Neurovascular checks assessed with head to toe assessment and Q4Hr this shift per orders. See flowsheets for documentation.   [x] Insulin protocol implemented per orders, see eMAR for documentation.  [x] Patient  ambulated with 2 wheeled rolling walker 31 feet from bed to toilet to chair. See flowsheet for documentation on how patient tolerated ambulation.  [x] Ice pack/pad in place to surgical site. Barrier in place between patient skin and ice pack/pad.   [x]Pain medication administered per orders for management of pain. See eMAR for documentation.   [x] Incentive spirometer performed by patient. Volume achieved 2250. Encouraged patient to perform Q2hr while awake per order.  [x] IV fluids infusing per orders, see eMAR.   [x] Shift intake and output documented per shift, see flowsheet.  [x] Customized care plan and education charted on Qshift.          
Patient BP noted to be low w/ shift change vitals (90/42). Patient asymptomatic w/ hypotension. Message sent to MD White to alert to hypotension & request fluid bolus vs midodrine to help support BP. Waiting for response.   
Patient alert and oriented x4, discharged to home with documented belongings. IV removed with no complications. Transported out of hospital by w/c. Reviewed discharge, follow up, and medication instructions with patient and patient verbalized understanding. Prescriptions handed to patient by pharmacy tech.    
Patient arrived to unit from PACU and was placed into room 3125. Patient A&O x 4 but groggy. Head to toe assessment completed and charted. Reports obtained from PACU by this RN that there was difficulty finding a pedal pulse in recovery. Pedal pulse found by this RN w/ doppler. R foot cool, capillary refill < 3 seconds. NP Irma Nick made aware. NP to evaluate patient at the bedside shortly.     Patient noted to have a CGM & insulin pump in place to center of abdomen. Patient requesting to use insulin pump while inpatient. This RN spoke face to face w/ LUCINDA Nick regarding insulin pump use. NP states to defer insulin pump use at this time and follow total joint insulin protocol. This RN to update patient.     Patient and family oriented to room, unit routine, call light/telephone use, bed/chair alarm implementation, and meal ordering process. Patient reports understanding, denies questions. Patient denies physical/emotional needs at this time. Call light, telephone, and bed side table are within reach. Fall precautions in place. Will continue to monitor and assess.       
Patient currently working with therapy, a&ox4. Patient tolerating PO intake better, n/v improving. Patient taking PRN antiemetic as prescribed and it has been effective. Patient also continues to c/o pain in right surgical hip, rating pain 9-10/10, taking pain medication as prescribed. Patient IV flushed and infusing as ordered. Patient voiding, no issues. Patient using walker with assistance for transfers. Patient right hip dressing clean, dry and intact. Patient able to make needs known, no other needs voiced at this time. Care plan being followed and on-going.   
Patient glucose rapidly increasing since arrival to unit - glucose now 352 via finger stick. Call placed to NP Park to discuss home insulin pump use again. NP made aware that patient is a type 1 diabetic. NP provided order to use insulin pump for glucose management. Patient updated & is appreciative.     Will continue to monitor and assess.   
Patient has been nauseated throughout the whole day, patient has been given PRN Zofran as ordered. This nurse reached out this morning to attending for something more effective. Attending ordered Emend, Emend given, as soon as patient took Emend, Patient had two more episodes of emesis. Emesis green in color, totaling out to about 400ml. Patient receiving continuous IV fluids, however, patient oral intake poor, patient voiding, however, not much, attending put in consult to nephrologist for elevated kidney levels. Nurse reached out to attending about patient continuing to be nausea and have more emesis. Attending ordered one time order of Phenergan IVBP. This helped temporarily, this afternoon, patient just received second dose of PRN Zofran, and patient states \"Zofran is just taking the edge off patient still feels nausea\" This nurse still trying to encourage scheduled medication & PRN pain medication due to patient being in 9-10/10 pain in that right surgical hip. Patient needs assessed and addressed, no other needs being voiced at this time. Call light and bedside table within reach. Care plan still being followed at this time.   
Perfect serve sent to Dr. White; critical CO2 14. Awaiting response.  
Pt having pain and nausea, treated with PRNs as ordered.  
Report called to Brandon MOBLEY receiving patient into room 3125.  
[facility] Orthopedic Surgery   Progress Note      S/P :  SUBJECTIVE  Alert and oriented. In bed. States minimal nausea today. Wants to go home if able instead of Rehab. . Pain is   described in right hip and with the intensity of moderate. Pain is described as aching.       OBJECTIVE              Physical                      VITALS:  BP (!) 145/55   Pulse 88   Temp 98.2 °F (36.8 °C) (Oral)   Resp 17   Ht 1.499 m (4' 11\")   Wt 55.7 kg (122 lb 12.7 oz)   LMP  (LMP Unknown) Comment: had a hysterectomy a long time ago  SpO2 100%   BMI 24.80 kg/m²                     MUSCULOSKELETAL:  right foot NVI. Wiggles toes to command. Able to plantarflex and dorsiflex ankle Pedal pulses are palpable.                    NEUROLOGIC:                                  Sensory:  Touch:  Right Lower Extremity:  normal                                                 Surgical wound appears clean and dry right hip with PRINEO dressing. Ice pack on. CHELSEY hose on.     Data       CBC:   Lab Results   Component Value Date/Time    WBC 9.9 05/27/2025 10:10 AM    RBC 3.78 05/27/2025 10:10 AM    HGB 11.1 05/27/2025 10:10 AM    HCT 33.1 05/27/2025 10:10 AM    MCV 87.5 05/27/2025 10:10 AM    MCH 29.4 05/27/2025 10:10 AM    MCHC 33.6 05/27/2025 10:10 AM    RDW 12.7 05/27/2025 10:10 AM     05/27/2025 10:10 AM    MPV 8.6 05/27/2025 10:10 AM        WBC:    Lab Results   Component Value Date/Time    WBC 9.9 05/27/2025 10:10 AM        Hemoglobin/Hematocrit:    Lab Results   Component Value Date/Time    HGB 11.1 05/27/2025 10:10 AM    HCT 33.1 05/27/2025 10:10 AM        PT/INR:    Lab Results   Component Value Date/Time    PROTIME 12.5 05/05/2025 01:55 PM    INR 0.91 05/05/2025 01:55 PM              Current Inpatient Medications             Current Facility-Administered Medications: sodium bicarbonate 75 mEq in sodium chloride 0.45 % 1,000 mL infusion, , IntraVENous, Continuous  escitalopram (LEXAPRO) tablet 20 mg, 20 mg, Oral, 
Status Note  5/28/2025  Elvia Arguelles  [id number]  1212    Per discussion with OT and RN, as well as chart review, patient with ongoing nausea and emesis, tolerating OT poorly earlier this date.  Creatinine elevated. Nephrology consulted.   Will follow and see patient today if able to tolerate.    Electronically signed by KELSEY SLATER, PT [id number] (#[phone])  on 5/28/2025 at 12:14 PM      Addendum:   1445    Patient to room to see patient. Patient awake, but reports nausea ongoing, and R lateral and anterior thigh pain \"terrible.\"  Has walked with RW to and from bathroom with staff assist.   Will let patient rest, and defer further mobility until 5-29-25 am.  Therapist fills 2 ice packs and places to R lateral hip and anterior thigh. Needs in reach. Bed alarm on. RN informed.    Electronically signed by KELSEY SLATER, PT [id number] (#[phone])  on 5/28/2025 at 2:54 PM    
The patients OARRS report has been reviewed online and any prescribing of pain related medications is based on our findings.The patient has been issued narcotics to safely reduce postoperative pain and promote tolerance with physical therapies and ADL's. Reduction in dosing will be addressed with the next narcotic refill request. Dosing is adjusted for patients with history of chronic pain disorders.    
patient at risk for falls, gait belt, family/caregiver present, nurse notified, and Pt with OT at end of session, NP notified of pt's preferred d/c plans    Plan  Frequency: Daily until discharge  Current Treatment Recommendations: strengthening, ROM, functional mobility training, transfer training, gait training, stair training, patient/caregiver education, safety education, home exercise program, and positioning    Goals  Patient Goals: pain relief   Short Term Goals:  Time Frame: By acute discharge  Patient will complete transfers with stand by assistance.   Patient will ambulate 50 ft with use of rolling walker with supervision, stand by assistance.   Patient will ascend/descend 4 stairs (B) handrail with stand by assistance, contact guard assistance. Goal Met 5/29/25.   Patient will verbalize positional precautions with min cues    Above goals reviewed on 5/29/2025.  All goals are ongoing at this time unless indicated above.      Therapy Session Time      Individual Group Co-treatment   Time In 0915       Time Out 0958       Minutes 43           Timed Code Treatment Minutes:  43   Total Treatment Minutes:  43 minutes    Minutes per charge:  Therapeutic activity: 28 minutes  Gait training: 15 minutes      Electronically signed by YAO Sheldon on 5/29/2025 at 10:13 AM     I attest that I was present for and made a skilled & mindful clinical judgement during the evaluation and/or treatment of this patient on 5/29/2025     Electronically signed by KELSEY SLATER PT [id number] (#[phone])  on 5/29/2025 at 1:10 PM

## 2025-05-30 NOTE — DISCHARGE SUMMARY
Physician Discharge Summary     Patient ID:  Elvia Arguelles  [id number]  64 y.o.  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 This patient was then electively scheduled for total joint replacement surgery    Surgical procedure: right DALJIT    Consults: PT OT SS    This patient had no postoperative complications. They has PT and OT for ADL's . IV and PO pain med for pain control and was eventually DC in stable condition    Treatments: analgesia,  therapies: PT OT,  and surgery      Disposition: REhab unit    Patient Instructions:   [unfilled]  Activity: activity as tolerated  Diet: regular diet  Wound Care: keep wound clean and dry    Follow-up with LAVERNE White in 2 weeks.    Signed:  CABRERA Solano CNP  5/30/2025  9:42 AM

## 2025-06-02 ENCOUNTER — POST-OP TELEPHONE (OUTPATIENT)
Dept: ORTHOPEDICS UNIT | Age: 65
End: 2025-06-02

## 2025-06-06 ENCOUNTER — TELEPHONE (OUTPATIENT)
Dept: ENDOCRINOLOGY | Age: 65
End: 2025-06-06

## 2025-06-09 DIAGNOSIS — Z83.49 FAMILY HISTORY OF THYROID DISEASE: ICD-10-CM

## 2025-06-09 DIAGNOSIS — E10.65 POORLY CONTROLLED TYPE 1 DIABETES MELLITUS WITH NEUROPATHY (HCC): ICD-10-CM

## 2025-06-09 DIAGNOSIS — E10.40 POORLY CONTROLLED TYPE 1 DIABETES MELLITUS WITH NEUROPATHY (HCC): ICD-10-CM

## 2025-06-09 DIAGNOSIS — E10.319 POORLY CONTROLLED TYPE 1 DIABETES MELLITUS WITH RETINOPATHY (HCC): ICD-10-CM

## 2025-06-09 DIAGNOSIS — I10 PRIMARY HYPERTENSION: ICD-10-CM

## 2025-06-09 DIAGNOSIS — E10.65 POORLY CONTROLLED TYPE 1 DIABETES MELLITUS WITH RETINOPATHY (HCC): ICD-10-CM

## 2025-06-09 DIAGNOSIS — E55.9 VITAMIN D DEFICIENCY: ICD-10-CM

## 2025-06-09 DIAGNOSIS — E78.2 MIXED HYPERLIPIDEMIA: ICD-10-CM

## 2025-06-09 DIAGNOSIS — E10.21 DIABETIC NEPHROPATHY ASSOCIATED WITH TYPE 1 DIABETES MELLITUS (HCC): ICD-10-CM

## 2025-06-09 RX ORDER — INSULIN LISPRO 100 [IU]/ML
INJECTION, SOLUTION INTRAVENOUS; SUBCUTANEOUS
Qty: 30 ML | Refills: 1 | Status: SHIPPED | OUTPATIENT
Start: 2025-06-09

## 2025-06-10 ENCOUNTER — TELEPHONE (OUTPATIENT)
Dept: ENDOCRINOLOGY | Age: 65
End: 2025-06-10

## 2025-06-10 DIAGNOSIS — E10.65 POORLY CONTROLLED TYPE 1 DIABETES MELLITUS WITH NEUROPATHY (HCC): Primary | ICD-10-CM

## 2025-06-10 DIAGNOSIS — E10.40 POORLY CONTROLLED TYPE 1 DIABETES MELLITUS WITH NEUROPATHY (HCC): Primary | ICD-10-CM

## 2025-06-10 NOTE — TELEPHONE ENCOUNTER
Please initiate PA for Humalog, pt is in need of her insulin and pharmacy states she needs a PA. She is on OP5. However, medicare does not view it as a Part B and states it must go through Part D. Please help, thank you so much!

## 2025-06-11 NOTE — TELEPHONE ENCOUNTER
Submitted PA for Humalog  Via [facility] Key: [id number] STATUS: PENDING.    Follow up done daily; if no decision with in three days we will refax.  If another three days goes by with no decision will call the insurance for status.

## 2025-06-12 ENCOUNTER — TELEPHONE (OUTPATIENT)
Dept: ENDOCRINOLOGY | Age: 65
End: 2025-06-12

## 2025-06-12 DIAGNOSIS — E78.00 HYPERCHOLESTEROLEMIA: Primary | ICD-10-CM

## 2025-06-12 RX ORDER — INSULIN ASPART 100 [IU]/ML
INJECTION, SOLUTION INTRAVENOUS; SUBCUTANEOUS
Qty: 30 ML | Refills: 2 | Status: SHIPPED | OUTPATIENT
Start: 2025-06-12

## 2025-06-13 RX ORDER — METHOCARBAMOL 750 MG/1
750 TABLET, FILM COATED ORAL 4 TIMES DAILY
Qty: 120 TABLET | Refills: 0 | Status: SHIPPED | OUTPATIENT
Start: 2025-06-13

## 2025-06-13 NOTE — TELEPHONE ENCOUNTER
Krish Soares, Please see VNG results.  Patient does not have an appt to f/u with you for review.  Thanks! Allyn Medication:   Requested Prescriptions     Pending Prescriptions Disp Refills    methocarbamol (ROBAXIN) 750 MG tablet [Pharmacy Med Name: METHOCARBAMOL 750MG TABLETS] 120 tablet 0     Sig: TAKE 1 TABLET BY MOUTH FOUR TIMES DAILY        Last Filled:   3/19/2025     Patient Phone Number: [phone] (home)     Last appt: 5/9/2025   Next appt: Visit date not found    Last OARRS:        No data to display

## 2025-06-16 ENCOUNTER — TELEPHONE (OUTPATIENT)
Dept: ENDOCRINOLOGY | Age: 65
End: 2025-06-16

## 2025-06-16 NOTE — TELEPHONE ENCOUNTER
Advise to start rosuvastatin 10 mg daily.  It was discussed during appointment.  Please verify patient's pharmacy and if she wants a 30 or 90-day supply.  I pended prescription.

## 2025-06-18 ENCOUNTER — TELEPHONE (OUTPATIENT)
Dept: ORTHOPEDIC SURGERY | Age: 65
End: 2025-06-18

## 2025-06-18 RX ORDER — LISINOPRIL 5 MG/1
5 TABLET ORAL DAILY
Qty: 30 TABLET | Refills: 2 | Status: SHIPPED | OUTPATIENT
Start: 2025-06-18

## 2025-06-18 NOTE — TELEPHONE ENCOUNTER
Submitted PA for Novolog  [facility]ia [facility] Key: [id number]   STATUS: Information regarding your request  Available without authorization.     If this requires a response please respond to the pool ( P [facility] MEDICATION PRE-AUTH).      Thank you please advise patient.

## 2025-06-18 NOTE — TELEPHONE ENCOUNTER
LVM to return call    Advise to start rosuvastatin 10 mg daily.  It was discussed during appointment.  Please verify patient's pharmacy and if she wants a 30 or 90-day supply.  I pended prescription.

## 2025-06-20 RX ORDER — ROSUVASTATIN CALCIUM 10 MG/1
10 TABLET, COATED ORAL DAILY
Qty: 90 TABLET | Refills: 0 | Status: SHIPPED | OUTPATIENT
Start: 2025-06-20

## 2025-06-20 NOTE — TELEPHONE ENCOUNTER
Mrs. Arguelles informed per Dr. Latif:    Advise to start rosuvastatin 10 mg daily. It was discussed during appointment. Please verify patient's pharmacy and if she wants a 30 or 90-day supply. I pended prescription.     She would like to try for 90 day supply and sent RX to [facility] [address] on file.

## 2025-06-23 ENCOUNTER — OFFICE VISIT (OUTPATIENT)
Dept: ORTHOPEDIC SURGERY | Age: 65
End: 2025-06-23

## 2025-06-23 VITALS — HEIGHT: 59 IN | BODY MASS INDEX: 24.19 KG/M2 | WEIGHT: 120 LBS

## 2025-06-23 DIAGNOSIS — Z96.641 HISTORY OF TOTAL HIP REPLACEMENT, RIGHT: Primary | ICD-10-CM

## 2025-06-23 PROCEDURE — 99024 POSTOP FOLLOW-UP VISIT: CPT | Performed by: ORTHOPAEDIC SURGERY

## 2025-06-23 RX ORDER — ASPIRIN 81 MG/1
TABLET, COATED ORAL
Qty: 60 TABLET | Refills: 0 | OUTPATIENT
Start: 2025-06-23

## 2025-06-23 NOTE — PROGRESS NOTES
[facility]  Office Visit    Chief Complaint: Follow-up s/p right total hip arthroplasty    HPI:  Elvia Arguelles is a 64 y.o. who is here in follow-up of right total hip arthroplasty performed on May 27, 2025.  She underwent hardware removal for an intertrochanteric femur fracture nonunion.  She is walking with a walker and reports she is doing well overall.  She has completed working with home physical therapy.  She rates pain as 3/10 and is improved compared to before surgery.    Exam:  Appearance: sitting in exam room chair, appears to be in no acute distress, awake and alert  Resp: unlabored breathing on room air  Skin: warm, dry and intact with out erythema or significant increased temperature  Neuro: grossly intact both lower extremities. Intact sensation to light touch. Motor exam 4+ to 5/5 in all major motor groups.  Right hip: Incision is healed.  Examination demonstrates negative logroll and negative Stinchfield.  There is brisk capillary refill.  She demonstrates active hip flexion.    Imaging:  3 views of the right hip were performed and reviewed today. Significant for total hip arthroplasty prosthesis in place with no signs of osteolysis, loosening, fracture, or dislocation.    Assessment:  S/p right total hip arthroplasty, conversion from prior cephalomedullary nail    Plan:  She is recovering well postoperatively.  She will continue walking with the use of a walker.  She was referred to physical therapy today.  She will continue pain medication per pain management.  Follow-up in 4 to 6 weeks for repeat evaluation.

## 2025-06-24 ENCOUNTER — APPOINTMENT (OUTPATIENT)
Dept: CT IMAGING | Age: 65
End: 2025-06-24
Payer: MEDICARE

## 2025-06-24 ENCOUNTER — APPOINTMENT (OUTPATIENT)
Dept: GENERAL RADIOLOGY | Age: 65
End: 2025-06-24
Payer: MEDICARE

## 2025-06-24 ENCOUNTER — HOSPITAL ENCOUNTER (EMERGENCY)
Age: 65
Discharge: HOME | End: 2025-06-25
Payer: MEDICARE

## 2025-06-24 DIAGNOSIS — R82.81 PYURIA: ICD-10-CM

## 2025-06-24 DIAGNOSIS — D64.9 CHRONIC ANEMIA: ICD-10-CM

## 2025-06-24 DIAGNOSIS — N18.9 CHRONIC KIDNEY DISEASE, UNSPECIFIED CKD STAGE: ICD-10-CM

## 2025-06-24 DIAGNOSIS — R11.2 NAUSEA AND VOMITING, UNSPECIFIED VOMITING TYPE: ICD-10-CM

## 2025-06-24 DIAGNOSIS — E86.0 DEHYDRATION: ICD-10-CM

## 2025-06-24 DIAGNOSIS — R03.0 ELEVATED BLOOD PRESSURE READING: Primary | ICD-10-CM

## 2025-06-24 LAB
ALBUMIN SERPL-MCNC: 4.1 G/DL (ref 3.4–5)
ALBUMIN/GLOB SERPL: 1.3 {RATIO} (ref 1.1–2.2)
ALP SERPL-CCNC: 181 U/L (ref 40–129)
ALT SERPL-CCNC: 12 U/L (ref 10–40)
ANION GAP SERPL CALCULATED.3IONS-SCNC: 13 MMOL/L (ref 3–16)
AST SERPL-CCNC: 28 U/L (ref 15–37)
BACTERIA URNS QL MICRO: ABNORMAL /HPF
BASE EXCESS BLDV CALC-SCNC: -2.8 MMOL/L
BASOPHILS # BLD: 0 K/UL (ref 0–0.2)
BASOPHILS NFR BLD: 0.8 %
BETA-HYDROXYBUTYRATE: 0.45 MMOL/L (ref 0–0.27)
BILIRUB SERPL-MCNC: <0.2 MG/DL (ref 0–1)
BILIRUB UR QL STRIP.AUTO: NEGATIVE
BUN SERPL-MCNC: 39 MG/DL (ref 7–20)
CALCIUM SERPL-MCNC: 9.7 MG/DL (ref 8.3–10.6)
CHLORIDE SERPL-SCNC: 105 MMOL/L (ref 99–110)
CLARITY UR: CLEAR
CO2 BLDV-SCNC: 25 MMOL/L
CO2 SERPL-SCNC: 21 MMOL/L (ref 21–32)
COHGB MFR BLDV: 1.8 %
COLOR UR: YELLOW
CREAT SERPL-MCNC: 2 MG/DL (ref 0.6–1.2)
DEPRECATED RDW RBC AUTO: 14.3 % (ref 12.4–15.4)
EOSINOPHIL # BLD: 0.6 K/UL (ref 0–0.6)
EOSINOPHIL NFR BLD: 11.4 %
EPI CELLS #/AREA URNS AUTO: 2 /HPF (ref 0–5)
FLUAV + FLUBV AG NOSE IA.RAPID: NOT DETECTED
FLUAV + FLUBV AG NOSE IA.RAPID: NOT DETECTED
GFR SERPLBLD CREATININE-BSD FMLA CKD-EPI: 27 ML/MIN/{1.73_M2}
GLUCOSE SERPL-MCNC: 135 MG/DL (ref 70–99)
GLUCOSE UR STRIP.AUTO-MCNC: NEGATIVE MG/DL
HCO3 BLDV-SCNC: 24 MMOL/L (ref 23–29)
HCT VFR BLD AUTO: 32 % (ref 36–48)
HGB BLD-MCNC: 10.7 G/DL (ref 12–16)
HGB UR QL STRIP.AUTO: ABNORMAL
HYALINE CASTS #/AREA URNS AUTO: 0 /LPF (ref 0–8)
KETONES UR STRIP.AUTO-MCNC: NEGATIVE MG/DL
LEUKOCYTE ESTERASE UR QL STRIP.AUTO: ABNORMAL
LIPASE SERPL-CCNC: 19 U/L (ref 13–60)
LYMPHOCYTES # BLD: 1 K/UL (ref 1–5.1)
LYMPHOCYTES NFR BLD: 17.7 %
MAGNESIUM SERPL-MCNC: 1.86 MG/DL (ref 1.8–2.4)
MCH RBC QN AUTO: 29.5 PG (ref 26–34)
MCHC RBC AUTO-ENTMCNC: 33.5 G/DL (ref 31–36)
MCV RBC AUTO: 88.1 FL (ref 80–100)
METHGB MFR BLDV: 0.1 %
MONOCYTES # BLD: 0.4 K/UL (ref 0–1.3)
MONOCYTES NFR BLD: 6.9 %
NEUTROPHILS # BLD: 3.4 K/UL (ref 1.7–7.7)
NEUTROPHILS NFR BLD: 63.2 %
NITRITE UR QL STRIP.AUTO: NEGATIVE
O2 THERAPY: ABNORMAL
PCO2 BLDV: 46.8 MMHG (ref 40–50)
PH BLDV: 7.31 [PH] (ref 7.35–7.45)
PH UR STRIP.AUTO: 6 [PH] (ref 5–8)
PLATELET # BLD AUTO: 221 K/UL (ref 135–450)
PMV BLD AUTO: 8.2 FL (ref 5–10.5)
PO2 BLDV: <30 MMHG
POTASSIUM SERPL-SCNC: 4.3 MMOL/L (ref 3.5–5.1)
PROT SERPL-MCNC: 7.3 G/DL (ref 6.4–8.2)
PROT UR STRIP.AUTO-MCNC: 100 MG/DL
RBC # BLD AUTO: 3.63 M/UL (ref 4–5.2)
RBC CLUMPS #/AREA URNS AUTO: 6 /HPF (ref 0–4)
SAO2 % BLDV: 46 %
SARS-COV-2 RDRP RESP QL NAA+PROBE: NOT DETECTED
SODIUM SERPL-SCNC: 139 MMOL/L (ref 136–145)
SP GR UR STRIP.AUTO: 1.01 (ref 1–1.03)
TROPONIN, HIGH SENSITIVITY: 43 NG/L (ref 0–14)
TSH SERPL DL<=0.005 MIU/L-ACNC: 3.11 UIU/ML (ref 0.27–4.2)
UA COMPLETE W REFLEX CULTURE PNL UR: YES
UA DIPSTICK W REFLEX MICRO PNL UR: YES
URN SPEC COLLECT METH UR: ABNORMAL
UROBILINOGEN UR STRIP-ACNC: 0.2 E.U./DL
WBC # BLD AUTO: 5.4 K/UL (ref 4–11)
WBC #/AREA URNS AUTO: 21 /HPF (ref 0–5)

## 2025-06-24 PROCEDURE — 99285 EMERGENCY DEPT VISIT HI MDM: CPT

## 2025-06-24 PROCEDURE — 87635 SARS-COV-2 COVID-19 AMP PRB: CPT

## 2025-06-24 PROCEDURE — 85025 COMPLETE CBC W/AUTO DIFF WBC: CPT

## 2025-06-24 PROCEDURE — 70450 CT HEAD/BRAIN W/O DYE: CPT

## 2025-06-24 PROCEDURE — 83735 ASSAY OF MAGNESIUM: CPT

## 2025-06-24 PROCEDURE — 84443 ASSAY THYROID STIM HORMONE: CPT

## 2025-06-24 PROCEDURE — 36415 COLL VENOUS BLD VENIPUNCTURE: CPT

## 2025-06-24 PROCEDURE — 6360000002 HC RX W HCPCS: Performed by: PHYSICIAN ASSISTANT

## 2025-06-24 PROCEDURE — 71045 X-RAY EXAM CHEST 1 VIEW: CPT

## 2025-06-24 PROCEDURE — 74176 CT ABD & PELVIS W/O CONTRAST: CPT

## 2025-06-24 PROCEDURE — 6370000000 HC RX 637 (ALT 250 FOR IP): Performed by: PHYSICIAN ASSISTANT

## 2025-06-24 PROCEDURE — 87086 URINE CULTURE/COLONY COUNT: CPT

## 2025-06-24 PROCEDURE — 83690 ASSAY OF LIPASE: CPT

## 2025-06-24 PROCEDURE — 84484 ASSAY OF TROPONIN QUANT: CPT

## 2025-06-24 PROCEDURE — 80053 COMPREHEN METABOLIC PANEL: CPT

## 2025-06-24 PROCEDURE — 81001 URINALYSIS AUTO W/SCOPE: CPT

## 2025-06-24 PROCEDURE — 82010 KETONE BODYS QUAN: CPT

## 2025-06-24 PROCEDURE — 96374 THER/PROPH/DIAG INJ IV PUSH: CPT

## 2025-06-24 PROCEDURE — 93005 ELECTROCARDIOGRAM TRACING: CPT | Performed by: PHYSICIAN ASSISTANT

## 2025-06-24 PROCEDURE — 87502 INFLUENZA DNA AMP PROBE: CPT

## 2025-06-24 PROCEDURE — 82803 BLOOD GASES ANY COMBINATION: CPT

## 2025-06-24 RX ORDER — ONDANSETRON 2 MG/ML
4 INJECTION INTRAMUSCULAR; INTRAVENOUS ONCE
Status: COMPLETED | OUTPATIENT
Start: 2025-06-24 | End: 2025-06-24

## 2025-06-24 RX ORDER — NIFEDIPINE 30 MG/1
30 TABLET, EXTENDED RELEASE ORAL 2 TIMES DAILY
Qty: 60 TABLET | Refills: 2 | Status: SHIPPED | OUTPATIENT
Start: 2025-06-24

## 2025-06-24 RX ORDER — NIFEDIPINE 30 MG/1
30 TABLET, EXTENDED RELEASE ORAL ONCE
Status: COMPLETED | OUTPATIENT
Start: 2025-06-24 | End: 2025-06-24

## 2025-06-24 RX ORDER — 0.9 % SODIUM CHLORIDE 0.9 %
1000 INTRAVENOUS SOLUTION INTRAVENOUS ONCE
Status: COMPLETED | OUTPATIENT
Start: 2025-06-24 | End: 2025-06-25

## 2025-06-24 RX ADMIN — NIFEDIPINE 30 MG: 30 TABLET, FILM COATED, EXTENDED RELEASE ORAL at 23:53

## 2025-06-24 RX ADMIN — ONDANSETRON 4 MG: 2 INJECTION, SOLUTION INTRAMUSCULAR; INTRAVENOUS at 23:55

## 2025-06-24 NOTE — TELEPHONE ENCOUNTER
Recent Visits  Date Type Provider Dept   05/09/25 Office Visit Meg Ellis APRN - CNP [facility]   08/06/24 Office Visit Meg Ellis APRN - CNP [facility]   02/29/24 Office Visit Meg Ellis APRN - CNP [facility]   Showing recent visits within past 540 days with a meds authorizing provider and meeting all other requirements  Future Appointments  No visits were found meeting these conditions.  Showing future appointments within next 150 days with a meds authorizing provider and meeting all other requirements

## 2025-06-25 VITALS
DIASTOLIC BLOOD PRESSURE: 79 MMHG | RESPIRATION RATE: 18 BRPM | TEMPERATURE: 98 F | BODY MASS INDEX: 23.69 KG/M2 | SYSTOLIC BLOOD PRESSURE: 181 MMHG | HEART RATE: 81 BPM | OXYGEN SATURATION: 100 % | WEIGHT: 117.5 LBS | HEIGHT: 59 IN

## 2025-06-25 LAB
BACTERIA UR CULT: NORMAL
EKG ATRIAL RATE: 70 BPM
EKG ATRIAL RATE: 77 BPM
EKG DIAGNOSIS: NORMAL
EKG DIAGNOSIS: NORMAL
EKG P AXIS: 43 DEGREES
EKG P AXIS: 61 DEGREES
EKG P-R INTERVAL: 114 MS
EKG P-R INTERVAL: 134 MS
EKG Q-T INTERVAL: 402 MS
EKG Q-T INTERVAL: 408 MS
EKG QRS DURATION: 68 MS
EKG QRS DURATION: 72 MS
EKG QTC CALCULATION (BAZETT): 440 MS
EKG QTC CALCULATION (BAZETT): 454 MS
EKG R AXIS: 5 DEGREES
EKG R AXIS: 7 DEGREES
EKG T AXIS: 29 DEGREES
EKG T AXIS: 35 DEGREES
EKG VENTRICULAR RATE: 70 BPM
EKG VENTRICULAR RATE: 77 BPM
TROPONIN, HIGH SENSITIVITY: 44 NG/L (ref 0–14)

## 2025-06-25 PROCEDURE — 93005 ELECTROCARDIOGRAM TRACING: CPT | Performed by: PHYSICIAN ASSISTANT

## 2025-06-25 PROCEDURE — 2580000003 HC RX 258: Performed by: PHYSICIAN ASSISTANT

## 2025-06-25 PROCEDURE — 93010 ELECTROCARDIOGRAM REPORT: CPT | Performed by: STUDENT IN AN ORGANIZED HEALTH CARE EDUCATION/TRAINING PROGRAM

## 2025-06-25 PROCEDURE — 6370000000 HC RX 637 (ALT 250 FOR IP): Performed by: PHYSICIAN ASSISTANT

## 2025-06-25 PROCEDURE — 96361 HYDRATE IV INFUSION ADD-ON: CPT

## 2025-06-25 PROCEDURE — 84484 ASSAY OF TROPONIN QUANT: CPT

## 2025-06-25 RX ORDER — CEPHALEXIN 500 MG/1
500 CAPSULE ORAL 3 TIMES DAILY
Qty: 21 CAPSULE | Refills: 0 | Status: SHIPPED | OUTPATIENT
Start: 2025-06-25 | End: 2025-07-02

## 2025-06-25 RX ORDER — ROPINIROLE 1 MG/1
1 TABLET, FILM COATED ORAL ONCE
Status: COMPLETED | OUTPATIENT
Start: 2025-06-25 | End: 2025-06-25

## 2025-06-25 RX ORDER — OXYCODONE HYDROCHLORIDE 5 MG/1
5 TABLET ORAL ONCE
Refills: 0 | Status: COMPLETED | OUTPATIENT
Start: 2025-06-25 | End: 2025-06-25

## 2025-06-25 RX ADMIN — OXYCODONE 5 MG: 5 TABLET ORAL at 01:39

## 2025-06-25 RX ADMIN — ROPINIROLE HYDROCHLORIDE 1 MG: 1 TABLET, FILM COATED ORAL at 01:39

## 2025-06-25 RX ADMIN — SODIUM CHLORIDE 1000 ML: 0.9 INJECTION, SOLUTION INTRAVENOUS at 00:00

## 2025-06-25 ASSESSMENT — PAIN SCALES - GENERAL: PAINLEVEL_OUTOF10: 8

## 2025-06-25 ASSESSMENT — PAIN DESCRIPTION - LOCATION: LOCATION: LEG

## 2025-06-25 ASSESSMENT — PAIN DESCRIPTION - ORIENTATION: ORIENTATION: RIGHT;LEFT

## 2025-06-25 NOTE — ED PROVIDER NOTES
[facility] EMERGENCY DEPARTMENT  EMERGENCY DEPARTMENT ENCOUNTER        Pt Name: Elvia Arguelles  MRN: [id number]  Birthdate 1960  Date of evaluation: 6/24/2025  Provider: MADAN Sutherland  PCP: Meg Ellis APRN - CNP  Note Started: 8:14 PM EDT 6/24/25      YADIRA. I have evaluated this patient.        CHIEF COMPLAINT       Chief Complaint   Patient presents with    Hypertension     Pt into Ed for hypertension and vomiting. Pt states her BP has been elevated the past 3 days then she starting vomiting yesterday. Pt is type 1 diabetic.        HISTORY OF PRESENT ILLNESS: 1 or more Elements     History From: patient       Elvia Arguelles is a 64 y.o. female with past medical history of type 1 diabetes, GERD, metastatic melanoma adrenal insufficiency chronic kidney disease hypertension hyperlipidemia who presents for evaluation of hypertension and emesis.      Nursing Notes were all reviewed and agreed with or any disagreements were addressed in the HPI.    REVIEW OF SYSTEMS :      Review of Systems    Positives and Pertinent negatives as per HPI.     SURGICAL HISTORY     Past Surgical History:   Procedure Laterality Date    CARPAL TUNNEL RELEASE Bilateral 12/2017    CHOLECYSTECTOMY      CT BIOPSY ABDOMEN RETROPERITONEUM  12/27/2022    CT BIOPSY ABDOMEN RETROPERITONEUM 12/27/2022 [facility] CT SCAN    EYE SURGERY Right     biopsy    HIP SURGERY Right 05/19/2024    HIP INTRAMEDULLARY NAIL ROYCE INSERTION performed by Gonsalo White MD at [facility] OR    HYSTERECTOMY (CERVIX STATUS UNKNOWN)      LIPOMA RESECTION      PORT SURGERY Right 01/18/2023    RIGHT POWER PORT PLACEMENT performed by Satish Corley MD at [facility] OR    SHOULDER ARTHROSCOPY Right 08/31/2018    RIGHT SHOULDER ARTHROSCOPE, SUBACROMIAL DECOMPRESSION, DISTALCLAVICLE EXCISION, CAPSULAR RELEASE, MANIPULATION UNDER ANESTHESIA, DEBRIDEMENT    SHOULDER SURGERY Left     TOTAL HIP ARTHROPLASTY Right 5/27/2025    RIGHT  LATERAL TOTAL  HIP REPLACEMENT AND REMOVAL OF HARDWARE performed by Gonsalo White MD at [facility] OR    UPPER GASTROINTESTINAL ENDOSCOPY  10/22/2014    UPPER GASTROINTESTINAL ENDOSCOPY N/A 01/13/2023    EGD W/EUS FNA performed by Yuri Varela MD at [facility] ENDOSCOPY    UPPER GASTROINTESTINAL ENDOSCOPY  01/13/2023    EGD BIOPSY performed by Yuri Varela MD at [facility] ENDOSCOPY    UPPER GASTROINTESTINAL ENDOSCOPY N/A 04/05/2024    ESOPHAGOGASTRODUODENOSCOPY BIOPSY performed by Tylor Staley MD at [facility] ENDOSCOPY    US BIOPSY LIVER PERCUTANEOUS  12/16/2022    US GUIDED LIVER BIOPSY PERCUTANEOUS 12/16/2022 [facility] ULTRASOUND       CURRENTMEDICATIONS       Discharge Medication List as of 6/25/2025  1:48 AM        CONTINUE these medications which have NOT CHANGED    Details   NIFEdipine (PROCARDIA XL) 30 MG extended release tablet Take 1 tablet by mouth in the morning and at bedtime, Disp-60 tablet, R-2ZERO refills remain on this prescription. Your patient is requesting advance approval of refills for this medication to PREVENT ANY MISSED DOSESNormal      rosuvastatin (CRESTOR) 10 MG tablet Take 1 tablet by mouth daily, Disp-90 tablet, R-0Normal      lisinopril (PRINIVIL;ZESTRIL) 5 MG tablet TAKE 1 TABLET BY MOUTH DAILY, Disp-30 tablet, R-2ZERO refills remain on this prescription. Your patient is requesting advance approval of refills for this medication to PREVENT ANY MISSED DOSESNormal      methocarbamol (ROBAXIN) 750 MG tablet TAKE 1 TABLET BY MOUTH FOUR TIMES DAILY, Disp-120 tablet, R-0Normal      insulin aspart (NOVOLOG) 100 UNIT/ML injection vial USE IN INSULIN PUMP TOTAL DAILY DOSE IS 30 UNITS, Disp-30 mL, R-2Normal      insulin lispro (HUMALOG,ADMELOG) 100 UNIT/ML SOLN injection vial USE IN INSULIN PUMP TOTAL DAILY DOSE IS 30 UNITS, Disp-30 mL, R-1Normal      aspirin 81 MG EC tablet Take 1 tablet by mouth in the morning and at bedtime, Disp-60 tablet, R-0Normal      levothyroxine (SYNTHROID) 25 MCG tablet Take 1 tablet by

## 2025-06-25 NOTE — DISCHARGE INSTRUCTIONS
As we discussed, continue all of your home medicines.  If your urine culture is positive you may fill your prescription or if you start to develop any urinary symptoms such as frequency urgency pain or burning with urination I recommend that you fill the prescription.  As we discussed please call your primary care doctor in the morning to set up an appointment to be seen later this week for repeat labs and to ensure that you are improving and for repeat blood pressure check.

## 2025-06-25 NOTE — ED NOTES
Patient DCed from ED at this time. RN de-accessed her port. Discussed AVS, follow up, education, and prescriptions. All questions answered. Reinforced that should symptoms persist or worsen to return to the ED. Pt verbalized understanding. Patient ambulated out of ED.

## 2025-07-01 NOTE — PLAN OF CARE
Other:    Total Timed Code Tx Minutes 25 2  1     Total Treatment Minutes 45        Charge Justification:  (47668) THERAPEUTIC EXERCISE - Provided verbal/tactile cueing for HEP and/or activities related to strengthening, flexibility, endurance, ROM performed to prevent loss of range of motion, maintain or improve muscular strength or increase flexibility, following either an injury or surgery.   (66362) THERAPEUTIC ACTIVITY - use of dynamic activities to improve functional performance. (Ex include squatting, ascending/descending stairs, walking, bending, lifting, catching, throwing, pushing, pulling, jumping.)  Direct, one on one contact, billed in 15-minute increments.    GOALS     Patient stated goal: ***  [] Progressing: [] Met: [] Not Met: [] Adjusted    Therapist goals for Patient:   Short Term Goals: To be achieved in:4weeks  Independent in HEP and progression per patient tolerance, in order to prevent re-injury.   [] Progressing: [] Met: [] Not Met: [] Adjusted  Patient will have a decrease in pain to <***/10 to facilitate improvement in movement, function, and ADLs as indicated by Functional Deficits.  [] Progressing: [] Met: [] Not Met: [] Adjusted    IF APPLICABLE:  [] Patient to demonstrate independence in wear and care for custom orthotic device. (Only if applicable for orthotic eval)     Long Term Goals: To be achieved by d/c  Disability index score of ***% or less for the {outcome measures:04245} to assist with reaching prior level of function with activities such as ***.  [] Progressing: [] Met: [] Not Met: [] Adjusted  Patient will demonstrate increased AROM of *** to *** without pain to allow for proper joint functioning to enable patient to ***.   [] Progressing: [] Met: [] Not Met: [] Adjusted  Patient will demonstrate increased Strength of *** to {STRENGTHGOAL:26007} to allow for proper functional mobility to enable patient to return to ***.   [] Progressing: [] Met: [] Not Met: []

## 2025-07-09 ENCOUNTER — HOSPITAL ENCOUNTER (OUTPATIENT)
Dept: PHYSICAL THERAPY | Age: 65
Setting detail: THERAPIES SERIES
Discharge: HOME | End: 2025-07-09
Attending: ORTHOPAEDIC SURGERY

## 2025-07-09 DIAGNOSIS — R26.89 DECREASED FUNCTIONAL MOBILITY: ICD-10-CM

## 2025-07-09 DIAGNOSIS — R53.1 FUNCTIONAL WEAKNESS: ICD-10-CM

## 2025-07-09 DIAGNOSIS — Z56.89 DIFFICULTY PERFORMING WORK ACTIVITIES: ICD-10-CM

## 2025-07-09 DIAGNOSIS — Z78.9 NEED FOR HOME EXERCISE PROGRAM: ICD-10-CM

## 2025-07-09 DIAGNOSIS — R26.2 DIFFICULTY WALKING: Primary | ICD-10-CM

## 2025-07-09 DIAGNOSIS — R68.89 DECREASED ABILITY TO PERFORM ACTIVITIES: ICD-10-CM

## 2025-07-16 DIAGNOSIS — I10 PRIMARY HYPERTENSION: ICD-10-CM

## 2025-07-16 DIAGNOSIS — E10.40 POORLY CONTROLLED TYPE 1 DIABETES MELLITUS WITH NEUROPATHY (HCC): ICD-10-CM

## 2025-07-16 DIAGNOSIS — M81.0 AGE RELATED OSTEOPOROSIS, UNSPECIFIED PATHOLOGICAL FRACTURE PRESENCE: ICD-10-CM

## 2025-07-16 DIAGNOSIS — E03.9 ACQUIRED HYPOTHYROIDISM: ICD-10-CM

## 2025-07-16 DIAGNOSIS — E04.9 THYROID ENLARGEMENT: ICD-10-CM

## 2025-07-16 DIAGNOSIS — E55.9 VITAMIN D DEFICIENCY: ICD-10-CM

## 2025-07-16 DIAGNOSIS — E78.2 MIXED HYPERLIPIDEMIA: ICD-10-CM

## 2025-07-16 DIAGNOSIS — E10.65 POORLY CONTROLLED TYPE 1 DIABETES MELLITUS WITH NEUROPATHY (HCC): ICD-10-CM

## 2025-07-17 ENCOUNTER — OFFICE VISIT (OUTPATIENT)
Dept: ENDOCRINOLOGY | Age: 65
End: 2025-07-17

## 2025-07-17 VITALS
WEIGHT: 121 LBS | DIASTOLIC BLOOD PRESSURE: 76 MMHG | RESPIRATION RATE: 14 BRPM | HEART RATE: 73 BPM | SYSTOLIC BLOOD PRESSURE: 159 MMHG | BODY MASS INDEX: 24.39 KG/M2 | HEIGHT: 59 IN | TEMPERATURE: 98 F | OXYGEN SATURATION: 98 %

## 2025-07-17 DIAGNOSIS — I10 PRIMARY HYPERTENSION: ICD-10-CM

## 2025-07-17 DIAGNOSIS — M81.0 AGE RELATED OSTEOPOROSIS, UNSPECIFIED PATHOLOGICAL FRACTURE PRESENCE: ICD-10-CM

## 2025-07-17 DIAGNOSIS — E78.00 HYPERCHOLESTEROLEMIA: ICD-10-CM

## 2025-07-17 DIAGNOSIS — E10.21 DIABETIC NEPHROPATHY ASSOCIATED WITH TYPE 1 DIABETES MELLITUS (HCC): ICD-10-CM

## 2025-07-17 DIAGNOSIS — R79.89 ELEVATED LIVER FUNCTION TESTS: ICD-10-CM

## 2025-07-17 DIAGNOSIS — Z83.49 FAMILY HISTORY OF THYROID DISEASE: ICD-10-CM

## 2025-07-17 DIAGNOSIS — E55.9 VITAMIN D DEFICIENCY: ICD-10-CM

## 2025-07-17 DIAGNOSIS — E10.319 CONTROLLED TYPE 1 DIABETES MELLITUS WITH RETINOPATHY, MACULAR EDEMA PRESENCE UNSPECIFIED, UNSPECIFIED LATERALITY, UNSPECIFIED RETINOPATHY SEVERITY (HCC): ICD-10-CM

## 2025-07-17 DIAGNOSIS — E10.40 TYPE 1 DIABETES, CONTROLLED, WITH NEUROPATHY (HCC): Primary | ICD-10-CM

## 2025-07-17 DIAGNOSIS — E78.2 MIXED HYPERLIPIDEMIA: ICD-10-CM

## 2025-07-17 DIAGNOSIS — N18.30 STAGE 3 CHRONIC KIDNEY DISEASE, UNSPECIFIED WHETHER STAGE 3A OR 3B CKD (HCC): ICD-10-CM

## 2025-07-17 DIAGNOSIS — E03.9 ACQUIRED HYPOTHYROIDISM: ICD-10-CM

## 2025-07-17 DIAGNOSIS — C43.9 MALIGNANT MELANOMA, UNSPECIFIED SITE (HCC): ICD-10-CM

## 2025-07-17 DIAGNOSIS — E27.40 ADRENAL INSUFFICIENCY: ICD-10-CM

## 2025-07-17 LAB
25(OH)D3 SERPL-MCNC: 43 NG/ML
ALBUMIN SERPL-MCNC: 4.1 G/DL (ref 3.4–5)
ALBUMIN/GLOB SERPL: 1.8 {RATIO} (ref 1.1–2.2)
ALP SERPL-CCNC: 153 U/L (ref 40–129)
ALT SERPL-CCNC: 47 U/L (ref 10–40)
ANION GAP SERPL CALCULATED.3IONS-SCNC: 11 MMOL/L (ref 3–16)
AST SERPL-CCNC: 56 U/L (ref 15–37)
BILIRUB SERPL-MCNC: <0.2 MG/DL (ref 0–1)
BUN SERPL-MCNC: 39 MG/DL (ref 7–20)
CALCIUM SERPL-MCNC: 9.1 MG/DL (ref 8.3–10.6)
CHLORIDE SERPL-SCNC: 106 MMOL/L (ref 99–110)
CHOLEST SERPL-MCNC: 168 MG/DL (ref 0–199)
CO2 SERPL-SCNC: 23 MMOL/L (ref 21–32)
CREAT SERPL-MCNC: 2.2 MG/DL (ref 0.6–1.2)
CREAT UR-MCNC: 128 MG/DL (ref 28–259)
EST. AVERAGE GLUCOSE BLD GHB EST-MCNC: 128.4 MG/DL
GFR SERPLBLD CREATININE-BSD FMLA CKD-EPI: 24 ML/MIN/{1.73_M2}
GLUCOSE SERPL-MCNC: 93 MG/DL (ref 70–99)
HBA1C MFR BLD: 6.1 %
HDLC SERPL-MCNC: 96 MG/DL (ref 40–60)
LDL CHOLESTEROL: 53 MG/DL
MICROALBUMIN UR DL<=1MG/L-MCNC: 17.7 MG/DL
MICROALBUMIN/CREAT UR: 138.3 MG/G (ref 0–30)
POTASSIUM SERPL-SCNC: 4.9 MMOL/L (ref 3.5–5.1)
PROT SERPL-MCNC: 6.4 G/DL (ref 6.4–8.2)
SODIUM SERPL-SCNC: 140 MMOL/L (ref 136–145)
T4 FREE SERPL-MCNC: 1.1 NG/DL (ref 0.9–1.8)
TRIGL SERPL-MCNC: 94 MG/DL (ref 0–150)
TSH SERPL DL<=0.005 MIU/L-ACNC: 3.38 UIU/ML (ref 0.27–4.2)
VLDLC SERPL CALC-MCNC: 19 MG/DL

## 2025-07-17 NOTE — PROGRESS NOTES
Elvia Arguelles is a 64 y.o. female who is being evaluated for Type 1 diabetes mellitus.     Current symptoms/problems include hyperglycemia and are worsening.     Type 1 diabetes, controlled, with neuropathy (HCC) [E10.40]  Diagnosed with Type 1 diabetes mellitus in 1993.     Comorbid conditions: hypertension, Neuropathy, Nephropathy, Retinopathy, and Chronic Kidney Disease     Current diabetic medications include: Humalog  On Omnipod5/Dexcom 6  Tandem Mobi/Dexcom     Intolerance to diabetes medications: No     Weight trend: stable  Prior visit with dietician: yes  Current diet: on average, 3 meals per day  Current exercise: no     Current monitoring regimen: home blood tests - 4 times daily  Has brought blood glucose log/meter:  Yes  Home blood sugar records: fasting range: 100-120  and postprandial range: 200   Any episodes of hypoglycemia? Yes  Hypoglycemia frequency and time(s):  once in 3 months  Does patient have Glucagon emergency kit? No  Does patient have rapid acting carbohydrate?  Yes  Does patient wear a medic alert bracelet or necklace?  No     2. Diabetic Nephropathy  Has increased urination  Lisinopril caused too low BP     3. Stage 3 chronic kidney disease, unspecified whether stage 3a or 3b CKD (HCC)  No urination problems     4. Type 1 diabetes, poorly controlled, with retinopathy (HCC)  Has blurry vision     5. Vitamin D deficiency  Has fatigue     6. Hyperlipidemia  No muscle pain. Has leg cramps.     7.  Hypertension, primary  Has headaches     8.  Family history of thyroid disease  Mother and sisters had thyroid disease.     9. Thyroid enlargement  No difficulty swallowing, voice change  No radiation to her neck     10.  Melanoma  Has fatigue     11. Osteoporosis  Had right hip fracture in 5/2024.  Had surgery  Relates 4 to immunotherapy.  No family history of hip fractures in mother or father  On steroids  No RA  No smoking  Has GERD, takes medication, not a candidate for oral

## 2025-07-19 LAB — ACTH PLAS-MCNC: 2 PG/ML (ref 7–63)

## 2025-07-23 DIAGNOSIS — E10.319 POORLY CONTROLLED TYPE 1 DIABETES MELLITUS WITH RETINOPATHY (HCC): ICD-10-CM

## 2025-07-23 DIAGNOSIS — E10.65 POORLY CONTROLLED TYPE 1 DIABETES MELLITUS WITH RETINOPATHY (HCC): ICD-10-CM

## 2025-07-23 DIAGNOSIS — I10 PRIMARY HYPERTENSION: ICD-10-CM

## 2025-07-23 DIAGNOSIS — E10.40 POORLY CONTROLLED TYPE 1 DIABETES MELLITUS WITH NEUROPATHY (HCC): ICD-10-CM

## 2025-07-23 DIAGNOSIS — E78.2 MIXED HYPERLIPIDEMIA: ICD-10-CM

## 2025-07-23 DIAGNOSIS — Z83.49 FAMILY HISTORY OF THYROID DISEASE: ICD-10-CM

## 2025-07-23 DIAGNOSIS — E10.21 DIABETIC NEPHROPATHY ASSOCIATED WITH TYPE 1 DIABETES MELLITUS (HCC): ICD-10-CM

## 2025-07-23 DIAGNOSIS — E55.9 VITAMIN D DEFICIENCY: ICD-10-CM

## 2025-07-23 DIAGNOSIS — F32.2 CURRENT SEVERE EPISODE OF MAJOR DEPRESSIVE DISORDER WITHOUT PSYCHOTIC FEATURES WITHOUT PRIOR EPISODE (HCC): ICD-10-CM

## 2025-07-23 DIAGNOSIS — E10.65 POORLY CONTROLLED TYPE 1 DIABETES MELLITUS WITH NEUROPATHY (HCC): ICD-10-CM

## 2025-07-23 RX ORDER — PANTOPRAZOLE SODIUM 40 MG/1
40 TABLET, DELAYED RELEASE ORAL
Qty: 180 TABLET | Refills: 0 | Status: SHIPPED | OUTPATIENT
Start: 2025-07-23

## 2025-07-23 RX ORDER — ERGOCALCIFEROL 1.25 MG/1
50000 CAPSULE, LIQUID FILLED ORAL WEEKLY
Qty: 12 CAPSULE | Refills: 0 | Status: SHIPPED | OUTPATIENT
Start: 2025-07-23

## 2025-07-23 RX ORDER — ESCITALOPRAM OXALATE 20 MG/1
20 TABLET ORAL DAILY
Qty: 30 TABLET | Refills: 2 | Status: SHIPPED | OUTPATIENT
Start: 2025-07-23

## 2025-07-23 NOTE — TELEPHONE ENCOUNTER
Medication:   Requested Prescriptions     Pending Prescriptions Disp Refills    escitalopram (LEXAPRO) 20 MG tablet [Pharmacy Med Name: ESCITALOPRAM 20MG TABLETS] 30 tablet 2     Sig: TAKE 1 TABLET BY MOUTH DAILY    pantoprazole (PROTONIX) 40 MG tablet [Pharmacy Med Name: PANTOPRAZOLE 40MG TABLETS] 180 tablet 0     Sig: TAKE 1 TABLET BY MOUTH TWICE DAILY BEFORE MEALS       Last Filled:  3/17/25    Patient Phone Number: [phone] (home)     Last appt: 5/9/2025   Next appt: Visit date not found    Last Labs DM:   Lab Results   Component Value Date/Time    LABA1C 6.1 07/16/2025 12:22 PM     Last Lipid:   Lab Results   Component Value Date/Time    CHOL 224 03/21/2025 10:33 AM    TRIG 162 03/21/2025 10:33 AM    HDL 96 07/16/2025 12:22 PM     12/07/2011 07:50 AM     Last PSA: No results found for: \"PSA\"  Last Thyroid:   Lab Results   Component Value Date/Time    TSH 3.38 07/16/2025 12:22 PM    FT3 2.2 02/20/2023 08:31 AM    T4FREE 1.1 07/16/2025 12:22 PM

## 2025-08-01 ENCOUNTER — TELEPHONE (OUTPATIENT)
Dept: ENDOCRINOLOGY | Age: 65
End: 2025-08-01

## 2025-08-07 DIAGNOSIS — E78.00 HYPERCHOLESTEROLEMIA: ICD-10-CM

## 2025-08-07 RX ORDER — FERROUS SULFATE 324(65)MG
324 TABLET, DELAYED RELEASE (ENTERIC COATED) ORAL DAILY
Qty: 90 TABLET | Refills: 0 | Status: SHIPPED | OUTPATIENT
Start: 2025-08-07

## 2025-08-08 RX ORDER — ROSUVASTATIN CALCIUM 10 MG/1
10 TABLET, COATED ORAL DAILY
Qty: 90 TABLET | Refills: 0 | Status: SHIPPED | OUTPATIENT
Start: 2025-08-08

## 2025-08-11 ENCOUNTER — TELEPHONE (OUTPATIENT)
Dept: ENDOCRINOLOGY | Age: 65
End: 2025-08-11

## 2025-08-11 DIAGNOSIS — E55.9 VITAMIN D DEFICIENCY: ICD-10-CM

## 2025-08-11 DIAGNOSIS — E10.65 POORLY CONTROLLED TYPE 1 DIABETES MELLITUS WITH RETINOPATHY (HCC): ICD-10-CM

## 2025-08-11 DIAGNOSIS — E10.319 POORLY CONTROLLED TYPE 1 DIABETES MELLITUS WITH RETINOPATHY (HCC): ICD-10-CM

## 2025-08-11 DIAGNOSIS — E10.65 POORLY CONTROLLED TYPE 1 DIABETES MELLITUS WITH NEUROPATHY (HCC): ICD-10-CM

## 2025-08-11 DIAGNOSIS — Z83.49 FAMILY HISTORY OF THYROID DISEASE: ICD-10-CM

## 2025-08-11 DIAGNOSIS — I10 PRIMARY HYPERTENSION: ICD-10-CM

## 2025-08-11 DIAGNOSIS — E10.21 DIABETIC NEPHROPATHY ASSOCIATED WITH TYPE 1 DIABETES MELLITUS (HCC): ICD-10-CM

## 2025-08-11 DIAGNOSIS — E10.40 POORLY CONTROLLED TYPE 1 DIABETES MELLITUS WITH NEUROPATHY (HCC): ICD-10-CM

## 2025-08-11 DIAGNOSIS — E78.2 MIXED HYPERLIPIDEMIA: ICD-10-CM

## 2025-08-11 RX ORDER — HYDROCORTISONE 10 MG/1
10 TABLET ORAL DAILY
Qty: 90 TABLET | Refills: 0 | OUTPATIENT
Start: 2025-08-11

## 2025-08-11 RX ORDER — HYDROCORTISONE 5 MG/1
5 TABLET ORAL DAILY
Qty: 90 TABLET | Refills: 0 | Status: SHIPPED | OUTPATIENT
Start: 2025-08-11

## 2025-08-11 RX ORDER — HYDROCORTISONE 5 MG/1
5 TABLET ORAL DAILY
Qty: 90 TABLET | Refills: 0 | OUTPATIENT
Start: 2025-08-11

## 2025-08-11 RX ORDER — HYDROCORTISONE 10 MG/1
10 TABLET ORAL DAILY
Qty: 90 TABLET | Refills: 0 | Status: SHIPPED | OUTPATIENT
Start: 2025-08-11

## 2025-08-11 RX ORDER — INSULIN ASPART 100 [IU]/ML
INJECTION, SOLUTION INTRAVENOUS; SUBCUTANEOUS
Qty: 30 ML | Refills: 1 | Status: SHIPPED | OUTPATIENT
Start: 2025-08-11

## 2025-08-18 RX ORDER — NIFEDIPINE 30 MG/1
30 TABLET, EXTENDED RELEASE ORAL 2 TIMES DAILY
Qty: 60 TABLET | Refills: 2 | Status: SHIPPED | OUTPATIENT
Start: 2025-08-18

## 2025-08-28 RX ORDER — METHOCARBAMOL 750 MG/1
750 TABLET, FILM COATED ORAL 4 TIMES DAILY
Qty: 120 TABLET | Refills: 0 | Status: SHIPPED | OUTPATIENT
Start: 2025-08-28

## 2025-08-29 ENCOUNTER — TELEPHONE (OUTPATIENT)
Dept: ENDOCRINOLOGY | Age: 65
End: 2025-08-29

## 2025-08-30 DIAGNOSIS — E03.9 ACQUIRED HYPOTHYROIDISM: ICD-10-CM

## 2025-09-02 RX ORDER — LEVOTHYROXINE SODIUM 25 UG/1
25 TABLET ORAL DAILY
Qty: 30 TABLET | Refills: 1 | Status: SHIPPED | OUTPATIENT
Start: 2025-09-02

## (undated) DEVICE — SUTURE STRATAFIX SPRL SZ 2 0 L14IN ABSRB UD MH L36MM 1 2 CIR SXMD2B401

## (undated) DEVICE — BITE BLOCK ENDOSCP AD 60 FR W/ ADJ STRP PLAS GRN BLOX

## (undated) DEVICE — GUIDEWIRE ORTH L400MM DIA3.2MM FOR TFN

## (undated) DEVICE — ELECTRODE PT RET AD L9FT HI MOIST COND ADH HYDRGEL CORDED

## (undated) DEVICE — DRAPE C ARM W/ POLY STRP W42XL72IN FOR MOB XR

## (undated) DEVICE — BIPOLAR SEALER 23-112-1 AQM 6.0: Brand: AQUAMANTYS ®

## (undated) DEVICE — GLOVE,SURG,SENSICARE SLT,LF,PF,8.5: Brand: MEDLINE

## (undated) DEVICE — GLOVE ORANGE PI 8 1/2   MSG9085

## (undated) DEVICE — GLOVE SURG SZ 8 L12IN FNGR THK79MIL GRN LTX FREE

## (undated) DEVICE — SUTURE VICRYL + SZ 0 L18IN ABSRB UD L36MM CT-1 1/2 CIR VCP840D

## (undated) DEVICE — SYRINGE IRRIG 60ML SFT PLIABLE BLB EZ TO GRP 1 HND USE W/

## (undated) DEVICE — SOLUTION IRRIG 1000ML 0.9% SOD CHL USP POUR PLAS BTL

## (undated) DEVICE — SUTURE TICRN BR BL NDL C-20 SZ 5 30 8886302779

## (undated) DEVICE — BIT DRL L330MM DIA4.2MM CALIB L100MM ST 3 FLUT QUIK CPL FOR

## (undated) DEVICE — SINGLE-USE BIOPSY FORCEPS: Brand: RADIAL JAW 4

## (undated) DEVICE — SUTURE ABSORBABLE MONOFILAMENT 1 OS-8 36 CM 40 MM VIO PDS +

## (undated) DEVICE — TRANSFER SET 3": Brand: MEDLINE INDUSTRIES, INC.

## (undated) DEVICE — DRAPE,REIN 53X77,STERILE: Brand: MEDLINE

## (undated) DEVICE — SUTURE VICRYL + SZ 2-0 L27IN ABSRB CLR CT-1 1/2 CIR TAPERCUT VCP259H

## (undated) DEVICE — SOLUTION IRRIG 1000ML 09% SOD CHL USP PIC PLAS CONTAINER

## (undated) DEVICE — BASIC SINGLE BASIN 1-LF: Brand: MEDLINE INDUSTRIES, INC.

## (undated) DEVICE — GLOVE ORANGE PI 8   MSG9080

## (undated) DEVICE — MAT FLR W32XL58IN

## (undated) DEVICE — GLOVE SURG SZ 7 L12IN FNGR THK79MIL GRN LTX FREE

## (undated) DEVICE — FORCEPS BX 240CM 2.4MM L NDL RAD JAW 4 M00513334

## (undated) DEVICE — 3M™ COBAN™ NL STERILE NON-LATEX SELF-ADHERENT WRAP, 2084S, 4 IN X 5 YD (10 CM X 4,5 M), 18 ROLLS/CASE: Brand: 3M™ COBAN™

## (undated) DEVICE — GLOVE SURG SZ 7 L12IN FNGR THK75MIL WHT LTX POLYMER BEAD

## (undated) DEVICE — GLOVE ORTHO 8   MSG9480

## (undated) DEVICE — STERILE TOTAL HIP DRAPE PK: Brand: CARDINAL HEALTH

## (undated) DEVICE — HYPODERMIC SAFETY NEEDLE: Brand: MAGELLAN

## (undated) DEVICE — Device

## (undated) DEVICE — ROD RMR L950MM DIA3MM W/ STR BALL TIP

## (undated) DEVICE — GLOVE SURG SZ 85 L12IN FNGR ORTHO 126MIL CRM LTX FREE

## (undated) DEVICE — SUTURE DEXON/VICRYL/POLYSORB/POLYSYN VC839 CT-1/GS-21/T-12 VC839D

## (undated) DEVICE — GOWN,SIRUS,POLYRNF,BRTHSLV,LG,30/CS: Brand: MEDLINE

## (undated) DEVICE — SUTURE STRATAFIX SPRL SZ 2 0 L14IN ABSRB UD MH L36MM 1 2 CIR SXMP2B401

## (undated) DEVICE — SOLUTION WND IRRIGATION 450 ML 0.5 PVP-I 0.9 NACL

## (undated) DEVICE — ENDOSCOPY KIT: Brand: MEDLINE INDUSTRIES, INC.

## (undated) DEVICE — SUTURE VICRYL + SZ 2-0 L18IN ABSRB UD CT1 L36MM 1/2 CIR VCP839D

## (undated) DEVICE — SUTURE ETHBND 2-0 L30IN NONABSORBABLE GRN L22MM SH-1 1/2 MX763

## (undated) DEVICE — DRESSING FOAM W4XL10IN AG SIL ADH ANTIMIC POSTOP OPTIFOAM

## (undated) DEVICE — ADHESIVE SKIN CLOSURE WND 8.661X1.5 IN 22 CM LIQUIBAND SECUR

## (undated) DEVICE — KIT POS FOAM HANA TBL

## (undated) DEVICE — FORMALIN CLEAR VIAL 20 ML 10%

## (undated) DEVICE — SUTURE MONOCRYL STRATAFIX SPRL SZ 3-0 L12IN ABSRB UD FS-1 L30X30CM SXMP2B410

## (undated) DEVICE — PAD,NON-ADHERENT,3X8,STERILE,LF,1/PK: Brand: MEDLINE

## (undated) DEVICE — SOLUTION PREP PAINT POV IOD FOR SKIN MUCOUS MEM

## (undated) DEVICE — ENDOSCOPIC ULTRASOUND FINE NEEDLE BIOPSY (FNB) DEVICE: Brand: ACQUIRE

## (undated) DEVICE — SYSTEM SKIN CLSR 22CM DERMBND PRINEO

## (undated) DEVICE — TOTAL BASIC: Brand: MEDLINE INDUSTRIES, INC.

## (undated) DEVICE — 6619 2 PTNT ISO SYS INCISE AREA&LT;(&GT;&&LT;)&GT;P: Brand: STERI-DRAPE™ IOBAN™ 2

## (undated) DEVICE — DECANTER BTL 6IN: Brand: MEDLINE INDUSTRIES, INC.

## (undated) DEVICE — MERCY HEALTH WEST TURNOVER: Brand: MEDLINE INDUSTRIES, INC.

## (undated) DEVICE — FORMALIN  10%NBF 20ML PREFLL CONT

## (undated) DEVICE — GOWN,AURORA,NONREINF,RAGLAN,XXL,STERILE: Brand: MEDLINE

## (undated) DEVICE — 1010 S-DRAPE TOWEL DRAPE 10/BX: Brand: STERI-DRAPE™

## (undated) DEVICE — SOLUTION IV 1000ML 0.9% SOD CHL FOR IRRIG PLAS CONT

## (undated) DEVICE — RETRACTOR SURG WIDE FLAT LO PROF LTWT PHOTONGUIDE

## (undated) DEVICE — C-ARM PACK: Brand: C-ARM COVER

## (undated) DEVICE — DRESSING FOAM W3XL3IN GENTLE SIL FACE BORD ADH PD SUP ABSRB

## (undated) DEVICE — C-ARMOR C-ARM EQUIPMENT COVERS CLEAR STERILE UNIVERSAL FIT 12 PER CASE: Brand: C-ARMOR

## (undated) DEVICE — HIP PINNING: Brand: MEDLINE INDUSTRIES, INC.

## (undated) DEVICE — ELECTRODE BLDE L4IN NONINSULATED EDGE

## (undated) DEVICE — TOTAL HIP: Brand: MEDLINE INDUSTRIES, INC.

## (undated) DEVICE — SCANLAN® SUTURE BOOT™ INSTRUMENT JAW COVERS - ORIGINAL YELLOW, STANDARD PKG (5 PAIR/CARTRIDGE, 1 CARTRIDGE/PKG): Brand: SCANLAN® SUTURE BOOT™ INSTRUMENT JAW COVERS

## (undated) DEVICE — BANDAGE,COHESIVE,TAN,3X5YD,LF,STRL: Brand: MEDLINE

## (undated) DEVICE — GLOVE,SURG,SENSICARE SLT,LF,PF,8: Brand: MEDLINE

## (undated) DEVICE — PORT INSERTION: Brand: MEDLINE INDUSTRIES, INC.

## (undated) DEVICE — SUTURE VCRL SZ 3-0 L18IN ABSRB UD L26MM SH 1/2 CIR J864D

## (undated) DEVICE — TOWEL,STOP FLAG GOLD N-W: Brand: MEDLINE

## (undated) DEVICE — BIT DRL L500MM DIA6X9MM CANN STP L QUIK CPL FOR DH DC TFN

## (undated) DEVICE — HYPODERMIC SAFETY NEEDLE: Brand: MONOJECT